# Patient Record
Sex: MALE | Race: WHITE | NOT HISPANIC OR LATINO | Employment: OTHER | URBAN - METROPOLITAN AREA
[De-identification: names, ages, dates, MRNs, and addresses within clinical notes are randomized per-mention and may not be internally consistent; named-entity substitution may affect disease eponyms.]

---

## 2017-03-24 ENCOUNTER — OFFICE VISIT (OUTPATIENT)
Dept: FAMILY MEDICINE | Facility: CLINIC | Age: 67
End: 2017-03-24
Payer: MEDICARE

## 2017-03-24 VITALS
BODY MASS INDEX: 33.12 KG/M2 | HEIGHT: 68 IN | WEIGHT: 218.5 LBS | DIASTOLIC BLOOD PRESSURE: 76 MMHG | HEART RATE: 93 BPM | SYSTOLIC BLOOD PRESSURE: 130 MMHG | OXYGEN SATURATION: 98 %

## 2017-03-24 DIAGNOSIS — E11.42 TYPE 2 DIABETES MELLITUS WITH DIABETIC POLYNEUROPATHY, WITHOUT LONG-TERM CURRENT USE OF INSULIN: ICD-10-CM

## 2017-03-24 DIAGNOSIS — E78.5 HYPERLIPIDEMIA ASSOCIATED WITH TYPE 2 DIABETES MELLITUS: ICD-10-CM

## 2017-03-24 DIAGNOSIS — I15.2 HYPERTENSION ASSOCIATED WITH DIABETES: ICD-10-CM

## 2017-03-24 DIAGNOSIS — R09.89 PROMINENT AORTA: ICD-10-CM

## 2017-03-24 DIAGNOSIS — H43.11 VITREOUS HEMORRHAGE, RIGHT: ICD-10-CM

## 2017-03-24 DIAGNOSIS — E11.69 HYPERLIPIDEMIA ASSOCIATED WITH TYPE 2 DIABETES MELLITUS: ICD-10-CM

## 2017-03-24 DIAGNOSIS — Z00.00 ENCOUNTER FOR PREVENTIVE HEALTH EXAMINATION: Primary | ICD-10-CM

## 2017-03-24 DIAGNOSIS — Z13.5 SCREENING FOR GLAUCOMA: ICD-10-CM

## 2017-03-24 DIAGNOSIS — E11.59 HYPERTENSION ASSOCIATED WITH DIABETES: ICD-10-CM

## 2017-03-24 DIAGNOSIS — E03.9 HYPOTHYROIDISM, UNSPECIFIED TYPE: ICD-10-CM

## 2017-03-24 DIAGNOSIS — Z23 NEED FOR VACCINATION AGAINST STREPTOCOCCUS PNEUMONIAE: ICD-10-CM

## 2017-03-24 DIAGNOSIS — Z12.5 ENCOUNTER FOR PROSTATE CANCER SCREENING: ICD-10-CM

## 2017-03-24 DIAGNOSIS — Z11.59 NEED FOR HEPATITIS C SCREENING TEST: ICD-10-CM

## 2017-03-24 PROCEDURE — 90670 PCV13 VACCINE IM: CPT | Mod: S$GLB,,, | Performed by: NURSE PRACTITIONER

## 2017-03-24 PROCEDURE — 99999 PR PBB SHADOW E&M-EST. PATIENT-LVL IV: CPT | Mod: PBBFAC,,, | Performed by: NURSE PRACTITIONER

## 2017-03-24 PROCEDURE — 3075F SYST BP GE 130 - 139MM HG: CPT | Mod: S$GLB,,, | Performed by: NURSE PRACTITIONER

## 2017-03-24 PROCEDURE — 3078F DIAST BP <80 MM HG: CPT | Mod: S$GLB,,, | Performed by: NURSE PRACTITIONER

## 2017-03-24 PROCEDURE — G0439 PPPS, SUBSEQ VISIT: HCPCS | Mod: S$GLB,,, | Performed by: NURSE PRACTITIONER

## 2017-03-24 PROCEDURE — 99499 UNLISTED E&M SERVICE: CPT | Mod: S$GLB,,, | Performed by: NURSE PRACTITIONER

## 2017-03-24 PROCEDURE — G0009 ADMIN PNEUMOCOCCAL VACCINE: HCPCS | Mod: S$GLB,,, | Performed by: NURSE PRACTITIONER

## 2017-03-24 NOTE — PATIENT INSTRUCTIONS
Counseling and Referral of Other Preventative  (Italic type indicates deductible and co-insurance are waived)    Patient Name: Cesar Simpson  Today's Date: 3/24/2017      SERVICE LIMITATIONS RECOMMENDATION    Vaccines    · Pneumococcal (once after 65)    · Influenza (annually)    · Hepatitis B (if medium/high risk)    · Prevnar 13      Hepatitis B medium/high risk factors:       - End-stage renal disease       - Hemophiliacs who received Factor VII or         IX concentrates       - Clients of institutions for the mentally             retarded       - Persons who live in the same house as          a HepB carrier       - Homosexual men       - Illicit injectable drug abusers     Pneumococcal: Done, no repeat necessary     Influenza: Done, repeat in one year     Hepatitis B: N/A     Prevnar 13: Scheduled - see appointments    Prostate cancer screening (annually to age 75)     Prostate specific antigen (PSA) Shared decision making with Provider. Sometimes a co-pay may be required if the patient decides to have this test. The USPSTF no longer recommends prostate cancer screening routinely in medicine: every 1 year    Colorectal cancer screening (to age 75)    · Fecal occult blood test (annual)  · Flexible sigmoidoscopy (5y)  · Screening colonoscopy (10y)  · Barium enema   Last done 01/25/08, recommend to repeat every 10  years    Diabetes self-management training (no USPSTF recommendations)  Requires referral by treating physician for patient with diabetes or renal disease. 10 hours of initial DSMT sessions of no less than 30 minutes each in a continuous 12-month period. 2 hours of follow-up DSMT in subsequent years.  N/A    Glaucoma screening (no USPSTF recommendation)  Diabetes mellitus, family history   , age 50 or over    American, age 65 or over  Scheduled, see appointments    Medical nutrition therapy for diabetes or renal disease (no recommended schedule)  Requires referral by treating  physician for patient with diabetes or renal disease or kidney transplant within the past 3 years.  Can be provided in same year as diabetes self-management training (DSMT), and CMS recommends medical nutrition therapy take place after DSMT. Up to 3 hours for initial year and 2 hours in subsequent years.  N/A    Cardiovascular screening blood tests (every 5 years)  · Fasting lipid panel  Order as a panel if possible  Done this year, repeat every year    Diabetes screening tests (at least every 3 years, Medicare covers annually or at 6-month intervals for prediabetic patients)  · Fasting blood sugar (FBS) or glucose tolerance test (GTT)  Patient must be diagnosed with one of the following:       - Hypertension       - Dyslipidemia       - Obesity (BMI 30kg/m2)       - Previous elevated impaired FBS or GTT       ... or any two of the following:       - Overweight (BMI 25 but <30)       - Family history of diabetes       - Age 65 or older       - History of gestational diabetes or birth of baby weighing more than 9 pounds  Done this year, repeat every year    Abdominal aortic aneurysm screening (once)  · Sonogram   Limited to patients who meet one of the following criteria:       - Men who are 65-75 years old and have smoked more than 100 cigarette in their lifetime       - Anyone with a family history of abdominal aortic aneurysm       - Anyone recommended for screening by the USPSTF  N/A    HIV screening (annually for increased risk patients)  · HIV-1 and HIV-2 by EIA, or ELICEO, rapid antibody test or oral mucosa transudate  Patients must be at increased risk for HIV infection per USPSTF guidelines or pregnant. Tests covered annually for patient at increased risk or as requested by the patient. Pregnant patients may receive up to 3 tests during pregnancy.  Risks discussed, screening is not recommended    Smoking cessation counseling (up to 8 sessions per year)  Patients must be asymptomatic of tobacco-related  conditions to receive as a preventative service.  Never Smoker    Subsequent annual wellness visit  At least 12 months since last AWV  Return in one year     The following information is provided to all patients.  This information is to help you find resources for any of the problems found today that may be affecting your health:                Living healthy guide: www.[facility].[place].[facility]      Understanding Diabetes: www.diabetes.org      Eating healthy: www.cdc.gov/healthyweight      CDC home safety checklist: www.cdc.gov/steadi/patient.html      Agency on Aging: www.goea.[place].[facility]      Alcoholics anonymous (AA): www.aa.org      Physical Activity: www.kenneth.nih.gov/gr2tjoa      Tobacco use: www.quitwithusla.org   Bandaid applied, tolerated well, pt instructed to wait 15 minutes.

## 2017-03-24 NOTE — MR AVS SNAPSHOT
[facility]   [facility] [place] [address]  Phone: [phone]  Fax: [phone]                  Cesar Simpson   3/24/2017 2:00 PM   Office Visit    Description:  Male : 1950   Provider:  Judy Sanz NP   Department:  [facility]           Reason for Visit     Health Risk Assessment           Diagnoses this Visit        Comments    Encounter for preventive health examination    -  Primary     Type 2 diabetes mellitus with diabetic polyneuropathy, without long-term current use of insulin         Screening for glaucoma         Need for hepatitis C screening test         Encounter for prostate cancer screening         Need for vaccination against Streptococcus pneumoniae                To Do List           Future Appointments        Provider Department Dept Phone    3/27/2017 11:00 AM Emerson Kumar OD [place] [facility]ometry [phone]    2017 9:00 AM [facility], [facility] [phone]    2017 3:20 PM Ezio Murcia MD [facility] [phone]      Goals (5 Years of Data)     None      [facility]     [facility] On [facility] Nurse Care Line -  Assistance  Registered nurses in the [facility] provide clinical advisement, health education, appointment booking, and other advisory services.  Call for this free service at [phone].             Medications           Message regarding Medications     Verify the changes and/or additions to your medication regime listed below are the same as discussed with your clinician today.  If any of these changes or additions are incorrect, please notify your healthcare provider.             Verify that the below list of medications is an accurate representation of the medications you are currently taking.  If none reported, the list may be blank. If incorrect, please contact your healthcare provider. Carry this list with you in case of emergency.          "  Current Medications     CONTOUR TEST STRIPS Strp USE TO TEST TWICE DAILY    levothyroxine (SYNTHROID) 75 MCG tablet Take 1 tablet (75 mcg total) by mouth before breakfast.    losartan (COZAAR) 100 MG tablet TAKE 1 TABLET ONE TIME DAILY    metformin (GLUCOPHAGE-XR) 500 MG 24 hr tablet Take 1 tablet (500 mg total) by mouth 2 (two) times daily.    MICROLET LANCET Misc AS DIRECTED TWICE DAILY    pravastatin (PRAVACHOL) 40 MG tablet Take 1 tablet (40 mg total) by mouth every evening.    zolpidem (AMBIEN) 5 MG Tab Take 1 tablet (5 mg total) by mouth nightly as needed.           Clinical Reference Information           Your Vitals Were     BP Pulse Height Weight SpO2 BMI    130/76 (BP Location: Left arm, Patient Position: Sitting, BP Method: Manual) 93 5' 7.5" (1.715 m) 99.1 kg (218 lb 7.6 oz) 98% 33.71 kg/m2      Blood Pressure          Most Recent Value    BP  130/76      Allergies as of 3/24/2017     No Known Allergies      Immunizations Administered on Date of Encounter - 3/24/2017     Name Date Dose VIS Date Route    Pneumococcal Conjugate - 13 Valent  Incomplete 0.5 mL 11/5/2015 Intramuscular      Orders Placed During Today's Visit      Normal Orders This Visit    Ambulatory referral to Optometry     PNEUMOCOCCAL CONJUGATE VACCINE 13-VALENT LESS THAN 4YO & GREATER THAN 51YO IM     Future Labs/Procedures Expected by Expires    Hepatitis C antibody  3/24/2017 5/23/2018    PSA, Screening  3/24/2017 5/23/2018      Instructions      Counseling and Referral of Other Preventative  (Italic type indicates deductible and co-insurance are waived)    Patient Name: Cesar Simpson  Today's Date: 3/24/2017      SERVICE LIMITATIONS RECOMMENDATION    Vaccines    · Pneumococcal (once after 65)    · Influenza (annually)    · Hepatitis B (if medium/high risk)    · Prevnar 13      Hepatitis B medium/high risk factors:       - End-stage renal disease       - Hemophiliacs who received Factor VII or         IX concentrates       - Clients " [facility] for the mentally             retarded       - Persons who live in the same house as          a HepB carrier       - Homosexual men       - Illicit injectable drug abusers     Pneumococcal: Done, no repeat necessary     Influenza: Done, repeat in one year     Hepatitis B: N/A     Prevnar 13: Scheduled - see appointments    Prostate cancer screening (annually to age 75)     Prostate specific antigen (PSA) Shared decision making with Provider. Sometimes a co-pay may be required if the patient decides to have this test. The USPSTF no longer recommends prostate cancer screening routinely in medicine: every 1 year    Colorectal cancer screening (to age 75)    · Fecal occult blood test (annual)  · Flexible sigmoidoscopy (5y)  · Screening colonoscopy (10y)  · Barium enema   Last done 01/25/08, recommend to repeat every 10  years    Diabetes self-management training (no USPSTF recommendations)  Requires referral by treating physician for patient with diabetes or renal disease. 10 hours of initial DSMT sessions of no less than 30 minutes each in a continuous 12-month period. 2 hours of follow-up DSMT in subsequent years.  N/A    Glaucoma screening (no USPSTF recommendation)  Diabetes mellitus, family history   , age 50 or over    American, age 65 or over  Scheduled, see appointments    Medical nutrition therapy for diabetes or renal disease (no recommended schedule)  Requires referral by treating physician for patient with diabetes or renal disease or kidney transplant within the past 3 years.  Can be provided in same year as diabetes self-management training (DSMT), and CMS recommends medical nutrition therapy take place after DSMT. Up to 3 hours for initial year and 2 hours in subsequent years.  N/A    Cardiovascular screening blood tests (every 5 years)  · Fasting lipid panel  Order as a panel if possible  Done this year, repeat every year    Diabetes screening tests (at least every  3 years, Medicare covers annually or at 6-month intervals for prediabetic patients)  · Fasting blood sugar (FBS) or glucose tolerance test (GTT)  Patient must be diagnosed with one of the following:       - Hypertension       - Dyslipidemia       - Obesity (BMI 30kg/m2)       - Previous elevated impaired FBS or GTT       ... or any two of the following:       - Overweight (BMI 25 but <30)       - Family history of diabetes       - Age 65 or older       - History of gestational diabetes or birth of baby weighing more than 9 pounds  Done this year, repeat every year    Abdominal aortic aneurysm screening (once)  · Sonogram   Limited to patients who meet one of the following criteria:       - Men who are 65-75 years old and have smoked more than 100 cigarette in their lifetime       - Anyone with a family history of abdominal aortic aneurysm       - Anyone recommended for screening by the USPSTF  N/A    HIV screening (annually for increased risk patients)  · HIV-1 and HIV-2 by EIA, or ELICEO, rapid antibody test or oral mucosa transudate  Patients must be at increased risk for HIV infection per USPSTF guidelines or pregnant. Tests covered annually for patient at increased risk or as requested by the patient. Pregnant patients may receive up to 3 tests during pregnancy.  Risks discussed, screening is not recommended    Smoking cessation counseling (up to 8 sessions per year)  Patients must be asymptomatic of tobacco-related conditions to receive as a preventative service.  Never Smoker    Subsequent annual wellness visit  At least 12 months since last AWV  Return in one year     The following information is provided to all patients.  This information is to help you find resources for any of the problems found today that may be affecting your health:                Living healthy guide: www.[facility].[place].gov      Understanding Diabetes: www.diabetes.org      Eating healthy: www.cdc.gov/healthyweight      CDC home safety  checklist: www.cdc.gov/steadi/patient.html      Agency on Aging: www.goea.[place].gov      Alcoholics anonymous (AA): www.aa.org      Physical Activity: www.kenneth.nih.gov/po7xbwe      Tobacco use: www.quitwithusla.org          Language Assistance Services     ATTENTION: Language assistance services are available, free of charge. Please call [phone].      ATENCIÓN: Si kendrickla cyndi, tiene a lim disposición servicios gratuitos de asistencia lingüística. Llame al [phone].     CHÚ Ý: N?u b?n nói Ti?ng Vi?t, có các d?ch v? h? tr? ngôn ng? mi?n phí dành cho b?n. G?i s? [phone].         [facility] complies with applicable Federal civil rights laws and does not discriminate on the basis of race, color, national origin, age, disability, or sex.

## 2017-03-24 NOTE — PROGRESS NOTES
"Cesar Simpson presented for a  Medicare AWV and comprehensive Health Risk Assessment today. The following components were reviewed and updated:    · Medical history  · Family History  · Social history  · Allergies and Current Medications  · Health Risk Assessment  · Health Maintenance  · Care Team     ** See Completed Assessments for Annual Wellness Visit within the encounter summary.**       The following assessments were completed:  · Living Situation  · CAGE  · Depression Screening  · Timed Get Up and Go  · Whisper Test  · Cognitive Function Screening  · Nutrition Screening  · ADL Screening  · PAQ Screening    Vitals:    03/24/17 1344   BP: 130/76   BP Location: Left arm   Patient Position: Sitting   BP Method: Manual   Pulse: 93   SpO2: 98%   Weight: 99.1 kg (218 lb 7.6 oz)   Height: 5' 7.5" (1.715 m)     Body mass index is 33.71 kg/(m^2).     Physical Exam   Constitutional: He is oriented to person, place, and time. He appears well-developed and well-nourished. No distress.   HENT:   Head: Normocephalic and atraumatic.   Eyes: EOM are normal. Pupils are equal, round, and reactive to light.   Neck: Neck supple. No JVD present. No tracheal deviation present.   Cardiovascular: Normal rate, regular rhythm, normal heart sounds and intact distal pulses.    No murmur heard.  Pulmonary/Chest: Effort normal and breath sounds normal. No respiratory distress. He has no wheezes. He has no rales.   Abdominal: Soft. Bowel sounds are normal. He exhibits no distension and no mass. There is no tenderness.   Musculoskeletal: Normal range of motion. He exhibits no edema or tenderness.   Neurological: He is alert and oriented to person, place, and time. Coordination normal.   Skin: Skin is warm and dry. No erythema. No pallor.   Psychiatric: He has a normal mood and affect. His behavior is normal. Judgment and thought content normal. Cognition and memory are normal. He expresses no homicidal and no suicidal ideation.   Nursing note " and vitals reviewed.        Diagnoses and health risks identified today and associated recommendations/orders:    1. Encounter for preventive health examination    2. Type 2 diabetes mellitus with diabetic polyneuropathy, without long-term current use of insulin  Chronic; stable on medication.  Followed by PCP.    3. Hypertension associated with diabetes  Chronic; stable on medication.  Followed by PCP.    4. Hyperlipidemia associated with type 2 diabetes mellitus  Chronic; stable on medication.  Followed by PCP.    5. Prominent aorta  Chronic; stable.  Seen on imaging dated 08/13/10.  Followed by PCP.    6. Hypothyroidism, unspecified type  Chronic; stable on medication.  Followed by PCP.    7. Vitreous hemorrhage, right  Chronic; stable.  Appt with Optometry scheduled.    8. Screening for glaucoma  - Ambulatory referral to Optometry    9. Encounter for prostate cancer screening  - PSA, Screening; Future    10. Need for hepatitis C screening test  - Hepatitis C antibody; Future    11. Need for vaccination against Streptococcus pneumoniae  Prevnar 13 vaccination administered today in clinic.  - PNEUMOCOCCAL CONJUGATE VACCINE 13-VALENT LESS THAN 6YO & GREATER THAN 49YO IM      Provided Cesar with a 5-10 year written screening schedule and personal prevention plan. Recommendations were developed using the USPSTF age appropriate recommendations. Education, counseling, and referrals were provided as needed. After Visit Summary printed and given to patient which includes a list of additional screenings\tests needed.    Return in 4 weeks (on 4/21/2017) for follow-up with PCP, HRA visit in 1 year.    Judy Sanz NP

## 2017-03-24 NOTE — Clinical Note
Primary Care Providers: Ezio Murcia MD, MD (General)  Your patient was seen today for a HRA visit. Gap(s) in care (HEDIS gaps) have been identified during this visit that require additional testing and possible follow up.  Orders Placed This Encounter     PNEUMOCOCCAL CONJUGATE VACCINE 13-VALENT LESS THAN 6YO & GREATER THAN 51YO IM     Hepatitis C antibody         Standing Status: Future         Standing Expiration Date: 5/23/2018     PSA, Screening         Standing Status: Future         Standing Expiration Date: 5/23/2018     Ambulatory referral to Optometry         Referral Priority:Routine         Referral Type:Vision (Optometry)         Referral Reason:Specialty Services Required         Requested Specialty:Optometry         Number of Visits Requested:1   These orders were placed using [facility] approved protocol and any results will be forwarded to your office for appropriate follow up. I have included a copy of my visit note; please review the note and feel free to contac

## 2017-03-27 ENCOUNTER — OFFICE VISIT (OUTPATIENT)
Dept: OPTOMETRY | Facility: CLINIC | Age: 67
End: 2017-03-27
Payer: MEDICARE

## 2017-03-27 DIAGNOSIS — Z98.890 HISTORY OF REFRACTIVE SURGERY: ICD-10-CM

## 2017-03-27 DIAGNOSIS — H52.7 REFRACTIVE ERROR: ICD-10-CM

## 2017-03-27 DIAGNOSIS — E11.9 TYPE 2 DIABETES MELLITUS WITHOUT RETINOPATHY: Primary | ICD-10-CM

## 2017-03-27 DIAGNOSIS — Z96.1 PSEUDOPHAKIA OF BOTH EYES: ICD-10-CM

## 2017-03-27 DIAGNOSIS — H43.393 FLOATER, VITREOUS, BILATERAL: ICD-10-CM

## 2017-03-27 PROCEDURE — 99999 PR PBB SHADOW E&M-EST. PATIENT-LVL II: CPT | Mod: PBBFAC,,, | Performed by: OPTOMETRIST

## 2017-03-27 PROCEDURE — 92014 COMPRE OPH EXAM EST PT 1/>: CPT | Mod: S$GLB,,, | Performed by: OPTOMETRIST

## 2017-03-27 PROCEDURE — 92015 DETERMINE REFRACTIVE STATE: CPT | Mod: S$GLB,,, | Performed by: OPTOMETRIST

## 2017-03-27 PROCEDURE — 99499 UNLISTED E&M SERVICE: CPT | Mod: S$GLB,,, | Performed by: OPTOMETRIST

## 2017-03-27 NOTE — LETTER
March 27, 2017      Judy Sanz NP  [address] [place] [address]           [facility]  [address]  [place] [address]  Phone: [phone]  Fax: [phone]          Patient: Cesar Simpson   MR Number: [id number]   YOB: 1950   Date of Visit: 3/27/2017       Dear Judy Sanz:    Thank you for referring Cesar Simpson to me for evaluation. Attached you will find relevant portions of my assessment and plan of care.    If you have questions, please do not hesitate to call me. I look forward to following Cesar Simpson along with you.    Sincerely,    Emerson Kumar, OD    Enclosure  CC:  No Recipients    If you would like to receive this communication electronically, please contact [email or web address] or [phone] to request more information on [facility] access.    For providers and/or their staff who would like to refer a patient to [facility], please contact us through our one-stop-shop provider referral line, Jon Smith, at [phone].    If you feel you have received this communication in error or would no longer like to receive these types of communications, please e-mail [email or web address]

## 2017-03-27 NOTE — PROGRESS NOTES
HPI     OD vision seems worse. Diabetic. CE/IOL ou 6-7 yrs ago. Radial   keratometry ou about 20 yrs ago.  Blur od at dist, x mos, no assoc pain or red, no relief over time,   constant       Last edited by Emerson Kumar, OD on 3/27/2017 12:13 PM.     ROS     Negative for: Constitutional, Gastrointestinal, Neurological, Skin,   Genitourinary, Musculoskeletal, HENT, Endocrine, Cardiovascular, Eyes,   Respiratory, Psychiatric, Allergic/Imm, Heme/Lymph    Last edited by Emerson Kumar, OD on 3/27/2017 11:59 AM. (History)        Assessment /Plan     For exam results, see Encounter Report.    Type 2 diabetes mellitus without retinopathy    Floater, vitreous, bilateral    History of refractive surgery    Refractive error    Pseudophakia of both eyes      1. No diabetic retinopathy, no csme. Return in 1 year for dilated eye exam.  2. Monitor condition. Patient to report any changes. RTC 1 year recheck.  3. S/p rk with scarring OD.  4. Spec Rx given. Different lens options discussed with patient. RTC 1 year full exam.  5. Monitor condition. Patient to report any changes. RTC 1 year recheck.

## 2017-04-12 ENCOUNTER — LAB VISIT (OUTPATIENT)
Dept: LAB | Facility: HOSPITAL | Age: 67
End: 2017-04-12
Attending: FAMILY MEDICINE
Payer: MEDICARE

## 2017-04-12 DIAGNOSIS — E11.9 TYPE 2 DIABETES MELLITUS WITHOUT COMPLICATION: ICD-10-CM

## 2017-04-12 DIAGNOSIS — Z12.5 ENCOUNTER FOR PROSTATE CANCER SCREENING: ICD-10-CM

## 2017-04-12 DIAGNOSIS — Z11.59 NEED FOR HEPATITIS C SCREENING TEST: ICD-10-CM

## 2017-04-12 LAB
CHOLEST/HDLC SERPL: 4.4 {RATIO}
COMPLEXED PSA SERPL-MCNC: 1.3 NG/ML
HDL/CHOLESTEROL RATIO: 22.6 %
HDLC SERPL-MCNC: 155 MG/DL
HDLC SERPL-MCNC: 35 MG/DL
LDLC SERPL CALC-MCNC: 96.2 MG/DL
NONHDLC SERPL-MCNC: 120 MG/DL
TRIGL SERPL-MCNC: 119 MG/DL

## 2017-04-12 PROCEDURE — 80061 LIPID PANEL: CPT

## 2017-04-12 PROCEDURE — 84153 ASSAY OF PSA TOTAL: CPT

## 2017-04-12 PROCEDURE — 86803 HEPATITIS C AB TEST: CPT

## 2017-04-12 PROCEDURE — 36415 COLL VENOUS BLD VENIPUNCTURE: CPT | Mod: PO

## 2017-04-12 PROCEDURE — 83036 HEMOGLOBIN GLYCOSYLATED A1C: CPT

## 2017-04-13 LAB
ESTIMATED AVG GLUCOSE: 131 MG/DL
HBA1C MFR BLD HPLC: 6.2 %
HCV AB SERPL QL IA: NEGATIVE

## 2017-04-21 ENCOUNTER — OFFICE VISIT (OUTPATIENT)
Dept: FAMILY MEDICINE | Facility: CLINIC | Age: 67
End: 2017-04-21
Payer: MEDICARE

## 2017-04-21 ENCOUNTER — LAB VISIT (OUTPATIENT)
Dept: LAB | Facility: HOSPITAL | Age: 67
End: 2017-04-21
Attending: FAMILY MEDICINE
Payer: MEDICARE

## 2017-04-21 VITALS
OXYGEN SATURATION: 95 % | BODY MASS INDEX: 32.77 KG/M2 | SYSTOLIC BLOOD PRESSURE: 118 MMHG | WEIGHT: 216.25 LBS | DIASTOLIC BLOOD PRESSURE: 76 MMHG | HEART RATE: 105 BPM | HEIGHT: 68 IN

## 2017-04-21 DIAGNOSIS — E11.42 TYPE 2 DIABETES MELLITUS WITH DIABETIC POLYNEUROPATHY, WITHOUT LONG-TERM CURRENT USE OF INSULIN: Primary | ICD-10-CM

## 2017-04-21 DIAGNOSIS — E11.59 HYPERTENSION ASSOCIATED WITH DIABETES: ICD-10-CM

## 2017-04-21 DIAGNOSIS — E03.9 HYPOTHYROIDISM, UNSPECIFIED TYPE: ICD-10-CM

## 2017-04-21 DIAGNOSIS — R82.90 FOUL SMELLING URINE: ICD-10-CM

## 2017-04-21 DIAGNOSIS — I15.2 HYPERTENSION ASSOCIATED WITH DIABETES: ICD-10-CM

## 2017-04-21 DIAGNOSIS — E78.6 LOW HDL (UNDER 40): ICD-10-CM

## 2017-04-21 LAB
BACTERIA #/AREA URNS AUTO: ABNORMAL /HPF
BILIRUB UR QL STRIP: NEGATIVE
CLARITY UR REFRACT.AUTO: ABNORMAL
COLOR UR AUTO: ABNORMAL
GLUCOSE UR QL STRIP: NEGATIVE
HGB UR QL STRIP: ABNORMAL
KETONES UR QL STRIP: NEGATIVE
LEUKOCYTE ESTERASE UR QL STRIP: ABNORMAL
MICROSCOPIC COMMENT: ABNORMAL
NITRITE UR QL STRIP: NEGATIVE
PH UR STRIP: 5 [PH] (ref 5–8)
PROT UR QL STRIP: NEGATIVE
RBC #/AREA URNS AUTO: 1 /HPF (ref 0–4)
SP GR UR STRIP: 1.02 (ref 1–1.03)
SQUAMOUS #/AREA URNS AUTO: 0 /HPF
TSH SERPL DL<=0.005 MIU/L-ACNC: 3.73 UIU/ML
URN SPEC COLLECT METH UR: ABNORMAL
UROBILINOGEN UR STRIP-ACNC: NEGATIVE EU/DL
WBC #/AREA URNS AUTO: 14 /HPF (ref 0–5)

## 2017-04-21 PROCEDURE — 84443 ASSAY THYROID STIM HORMONE: CPT

## 2017-04-21 PROCEDURE — 36415 COLL VENOUS BLD VENIPUNCTURE: CPT | Mod: PO

## 2017-04-21 PROCEDURE — 99214 OFFICE O/P EST MOD 30 MIN: CPT | Mod: S$GLB,,, | Performed by: FAMILY MEDICINE

## 2017-04-21 PROCEDURE — 1159F MED LIST DOCD IN RCRD: CPT | Mod: S$GLB,,, | Performed by: FAMILY MEDICINE

## 2017-04-21 PROCEDURE — 3078F DIAST BP <80 MM HG: CPT | Mod: S$GLB,,, | Performed by: FAMILY MEDICINE

## 2017-04-21 PROCEDURE — 1125F AMNT PAIN NOTED PAIN PRSNT: CPT | Mod: S$GLB,,, | Performed by: FAMILY MEDICINE

## 2017-04-21 PROCEDURE — 4010F ACE/ARB THERAPY RXD/TAKEN: CPT | Mod: S$GLB,,, | Performed by: FAMILY MEDICINE

## 2017-04-21 PROCEDURE — 3044F HG A1C LEVEL LT 7.0%: CPT | Mod: S$GLB,,, | Performed by: FAMILY MEDICINE

## 2017-04-21 PROCEDURE — 1160F RVW MEDS BY RX/DR IN RCRD: CPT | Mod: S$GLB,,, | Performed by: FAMILY MEDICINE

## 2017-04-21 PROCEDURE — 99999 PR PBB SHADOW E&M-EST. PATIENT-LVL III: CPT | Mod: PBBFAC,,, | Performed by: FAMILY MEDICINE

## 2017-04-21 PROCEDURE — 3074F SYST BP LT 130 MM HG: CPT | Mod: S$GLB,,, | Performed by: FAMILY MEDICINE

## 2017-04-21 NOTE — MR AVS SNAPSHOT
[facility]   [place]  [address] [address]  Phone: [phone]  Fax: [phone]                  Cesar Simpson   2017 3:20 PM   Office Visit    Description:  Male : 1950   Provider:  Ezio Murcia MD   Department:  [facility]           Reason for Visit     Follow-up           Diagnoses this Visit        Comments    Type 2 diabetes mellitus with diabetic polyneuropathy, without long-term current use of insulin    -  Primary     Hypertension associated with diabetes         Foul smelling urine         BMI 33.0-33.9,adult         Hypothyroidism, unspecified type         Low HDL (under 40)                To Do List           Future Appointments        Provider Department Dept Phone    2017 4:00 PM [facility], [facility] [phone]      Goals (5 Years of Data)     None      Follow-Up and Disposition     Return in about 6 months (around 10/21/2017), or if symptoms worsen or fail to improve.      [facility]     [facility] Care [facility] -  Assistance  Unless otherwise directed by your provider, please contact [facility], our nurse care line that is available for  assistance.     Registered nurses in the [facility] provide: appointment scheduling, clinical advisement, health education, and other advisory services.  Call: [phone] (toll free)               Medications           Message regarding Medications     Verify the changes and/or additions to your medication regime listed below are the same as discussed with your clinician today.  If any of these changes or additions are incorrect, please notify your healthcare provider.             Verify that the below list of medications is an accurate representation of the medications you are currently taking.  If none reported, the list may be blank. If incorrect, please contact your healthcare provider. Carry this list with you in case of  "emergency.           Current Medications     CONTOUR TEST STRIPS Strp USE TO TEST TWICE DAILY    levothyroxine (SYNTHROID) 75 MCG tablet Take 1 tablet (75 mcg total) by mouth before breakfast.    losartan (COZAAR) 100 MG tablet TAKE 1 TABLET ONE TIME DAILY    metformin (GLUCOPHAGE-XR) 500 MG 24 hr tablet Take 1 tablet (500 mg total) by mouth 2 (two) times daily.    MICROLET LANCET Misc AS DIRECTED TWICE DAILY    pravastatin (PRAVACHOL) 40 MG tablet Take 1 tablet (40 mg total) by mouth every evening.           Clinical Reference Information           Your Vitals Were     BP Pulse Height Weight SpO2 BMI    118/76 (BP Location: Right arm, Patient Position: Sitting, BP Method: Manual) 105 5' 7.5" (1.715 m) 98.1 kg (216 lb 4.3 oz) 95% 33.37 kg/m2      Blood Pressure          Most Recent Value    BP  118/76      Allergies as of 4/21/2017     No Known Allergies      Immunizations Administered on Date of Encounter - 4/21/2017     None      Orders Placed During Today's Visit      Normal Orders This Visit    Urinalysis     Urine culture     Future Labs/Procedures Expected by Expires    TSH  4/21/2017 7/20/2017      Instructions      Comidas sanas para la diabetes    Pídale a lim equipo de atención médica que le ayude a preparar un plan de comidas adecuado a debra necesidades. [place] plan establecerá el horario de debra comidas, el tipo de alimentos que debe comer y la cantidad de cada rich de ellos. No es necesario que deje de comer todas las cosas que le gustan, rojelio deberá seguir ciertas pautas.  Elija carbohidratos saludables  Los almidones, las azúcares y la fibra son todos tipos de carbohidratos. La fibra puede ayudarle a bajar lim colesterol y triglicéridos. También es un alimento saludable para el corazón. Usted debe consumir de 20 a 35 gramos de fibra total todos los días. Entre los alimentos ricos en fibra, se encuentran los siguientes:     · Pan y cereales integrales  · Rajani bulgur  · Arroz de la torre     · Pastas de rajani " "integral  · Frutas y verduras  · Frijoles y chícharos secos      Lleve la cuenta de los carbohidratos que consume. [place] puede ayudarle a mantener el equilibrio adecuado entre la actividad física y los medicamentos. La cantidad de carbohidratos necesarios varará para cada persona, dependiendo de muchas cosas bassem lim isaiah, los medicamentos que mann y qué tan activo se mantiene. Puede empezar con unos 45 a 60 gramos de carbohidratos por comida, dependiendo de lim situación.  Estos son algunos ejemplos de alimentos que contienen unos 15 gramos de carbohidratos (1 porción de carbohidratos):  · 1/2 taza de fruta enlatada o congelada  · Linda fruta pequeña (4 onzas)  · 1 rebanada de pan  · 1/2 taza de mack  · 1/3 de taza de arroz  · 4 a 6 galletas saladas  · 1/2 English muffin  · 1/2 taza de frijoles negros  · 1/4 de papa azada, demar (3 onzas)  · 2/3 de taza de yogur natural sin grasa  · 1 taza de sopa  · 1/2 taza de guizado  · 6 "nuggets" de awilda  · 1 bownie de 2 pulgadas cuadradas o un pastel sin glasear  · 2 galletas dulces pequeñas  · 1/2 taza de helado o de sorbete  Escoja alimentos proteínicos sanos  Las proteínas bajas en grasa pueden ayudarle a controlar el peso y a mantener jose el corazón. Entre los alimentos proteínicos bajos en grasa, se encuentran los siguientes:  · Pescado  · Proteínas vegetales bassem las de los frijoles y chícharos secos, las nueces y los productos derivados de la soya, bassem el tofu y la leche de soya  · Carne magra a la que se le ha quitado toda la grasa visible  · Carne de ave sin la piel  · Leche, queso o yogur bajos en grasa o sin grasa  Limite las grasas no saludables y el azúcar  Las grasas saturadas y las grasas trans no son buenas para el corazón porque aumentan el colesterol LDL (madelaine). Además, la grasa tiene un alto contenido de calorías y le puede hacer ganar peso. Para reducir la cantidad de grasas malas y azúcar, limite el consumo de los siguientes alimentos:     · Mantequilla " "y margarina  · Aceite de romero o de deborah  · Crema o ranav  · Queso  · Tocino  · Dylon frías     · Helados  · Dulces, pasteles, madalenas y donas  · Mermeladas y gelatinas  · Chocolatinas  · Refrescos azucarados   Cuánto debe comer  La cantidad de comida que ingiere afecta el nivel de azúcar en lim sherrie y lim peso. Lim equipo de atención médica le dirá cuánto debe comer de cada clase de alimentos.  · Utilice tazas y cucharas de medir, y clarence balanza de cocina para medir las porciones.  · Aprenda a identificar visualmente el tamaño correcto de clarence porción en lim plato. [place] le será útil cuando coma fuera de casa y no pueda medir las porciones.  · Coma solamente el número de porciones que le hayan dado en lim plan de comidas para cada tipo de alimento. No se sirva por segunda vez.  · Aprenda a leer las etiquetas de los alimentos. Asegúrese de saber cuánto es clarence porción, y la cantidad total de carbohidratos, fibra, calorías, azúcar y grasa saturada y trans que tiene. Busque alternativas más saludables para los alimentos que tengan azúcar añadida.  · Planifique de antemano para las fiestas de manera que aún pueda divertirse sin sobrepasarse con alimentos poco saludables. Dé un buen ejemplo trayendo un platillo saudable a los "potlucks".  Elija bocadillos saludables  Cuando se habla de bocadillos se piensa en alimentos con azúcar añadida y grasas, rojelio hay otras opciones de bocadillos que son más saludables. Estas son algunas ideas que puede elegir:  Bocadillos con menos de 5 gramos de carbohidratos  · Clarence unidad de queso en tiras (string cheese)  · Larry ramas de apio con 1 cucharada de mantequilla de cacahuate  · 5 tomates tipo "cherry" con 1 cucharada de aderezo tipo "ranch"  · 1 huevo matty  · 1/4 de taza de arándanos azules (blue berries)  · 5 zanahorias "baby"  · 1 taza de palomitas de maiz "light"  · 1/2 taza de gelatina sin azúcar  · 15 almendras  Bocadillos con entre 10 y 20 gramos de carbohidratos " "aproximadamente  · 1/3 de taza de "hummus" con trozos de vegetales sin almidon (zanahorias, pimientos verdes, brócoli, apio o clarence combinación)  · 1/2 taza de fruta fresca o enlatada con 1/4 de taza de requezón (cottage cheese)  · 1/2 taza de ensalada de atún con 4 galletas saladas  · 2 pasteles de arroz (rice cakes) y clarence cucharada de mantequilla de cacahuate  · 1 manzana o 1 naranja pequeñas  · 3 tazas de palomitas de maíz "light"  · 1/2 sándwich de pavo que tenga clarence rebanada de pan de rajani integral, 2 onzas de pavo y mostaza  El tamaño de las porciones es importante para controlar el azúcar en la sherrie y mantener un peso saludable. Aprovisiónese de bocadillos saludables para que los tenga siempre a mano.  Cuándo debe comer  Lim plan de comidas probablemente incluirá desayuno, almuerzo, eloy y algunos bocadillos entre comidas.  · Intente que debra comidas y bocadillos ester a la misma hora todos los días.  · Coma todas debra comidas y bocadillos. Saltarse clarence comida o bocadillo puede hacer que baje demasiado el nivel de azúcar en lim sherrie y provocar que coma excesivamente más tarde, con lo que el nivel de azúcar en lim sherrie podría elevarse demasiado.  Date Last Reviewed: 3/31/2014  © 0137-2304 [employer]. [address] [place] [address], [place] [address]. Todos los derechos reservados. Esta información no pretende sustituir la atención médica profesional. Sólo lim médico puede diagnosticar y tratar un problema de isaiah.             Language Assistance Services     ATTENTION: Language assistance services are available, free of charge. Please call [phone].      ATENCIÓN: Si habla español, tiene a lim disposición servicios gratuitos de asistencia lingüística. Llame al [phone].     CHÚ Ý: N?u b?n nói Ti?ng Vi?t, có các d?ch v? h? tr? ngôn ng? mi?n phí dành cho b?n. G?i s? [phone].         [facility] complies with applicable Federal civil rights laws and does not discriminate on " the basis of race, color, national origin, age, disability, or sex.

## 2017-04-21 NOTE — PATIENT INSTRUCTIONS
"  Comidas sanas para la diabetes    Pídale a lim equipo de atención médica que le ayude a preparar un plan de comidas adecuado a debra necesidades. [place] plan establecerá el horario de debra comidas, el tipo de alimentos que debe comer y la cantidad de cada rich de ellos. No es necesario que deje de comer todas las cosas que le gustan, rojelio deberá seguir ciertas pautas.  Elija carbohidratos saludables  Los almidones, las azúcares y la fibra son todos tipos de carbohidratos. La fibra puede ayudarle a bajar lim colesterol y triglicéridos. También es un alimento saludable para el corazón. Usted debe consumir de 20 a 35 gramos de fibra total todos los días. Entre los alimentos ricos en fibra, se encuentran los siguientes:     · Pan y cereales integrales  · Rajani bulgur  · Arroz de la torre     · Pastas de rajani integral  · Frutas y verduras  · Frijoles y chícharos secos      Lleve la cuenta de los carbohidratos que consume. [place] puede ayudarle a mantener el equilibrio adecuado entre la actividad física y los medicamentos. La cantidad de carbohidratos necesarios varará para cada persona, dependiendo de muchas cosas bassem lim isaiah, los medicamentos que mann y qué tan activo se mantiene. Puede empezar con unos 45 a 60 gramos de carbohidratos por comida, dependiendo de lim situación.  Estos son algunos ejemplos de alimentos que contienen unos 15 gramos de carbohidratos (1 porción de carbohidratos):  · 1/2 taza de fruta enlatada o congelada  · Linda fruta pequeña (4 onzas)  · 1 rebanada de pan  · 1/2 taza de mack  · 1/3 de taza de arroz  · 4 a 6 galletas saladas  · 1/2 English muffin  · 1/2 taza de frijoles negros  · 1/4 de papa azada, demar (3 onzas)  · 2/3 de taza de yogur natural sin grasa  · 1 taza de sopa  · 1/2 taza de guizado  · 6 "nuggets" de awilda  · 1 bownie de 2 pulgadas cuadradas o un pastel sin glasear  · 2 galletas dulces pequeñas  · 1/2 taza de helado o de sorbete  Escoja alimentos proteínicos sanos  Las proteínas bajas en " grasa pueden ayudarle a controlar el peso y a mantener jose el corazón. Entre los alimentos proteínicos bajos en grasa, se encuentran los siguientes:  · Pescado  · Proteínas vegetales bassem las de los frijoles y chícharos secos, las nueces y los productos derivados de la soya, bassem el tofu y la leche de soya  · Carne magra a la que se le ha quitado toda la grasa visible  · Carne de ave sin la piel  · Leche, queso o yogur bajos en grasa o sin grasa  Limite las grasas no saludables y el azúcar  Las grasas saturadas y las grasas trans no son buenas para el corazón porque aumentan el colesterol LDL (madelaine). Además, la grasa tiene un alto contenido de calorías y le puede hacer ganar peso. Para reducir la cantidad de grasas malas y azúcar, limite el consumo de los siguientes alimentos:     · Mantequilla y margarina  · Aceite de romero o de deborah  · Crema o arnav  · Queso  · Tocino  · Dylon frías     · Helados  · Dulces, pasteles, madalenas y donas  · Mermeladas y gelatinas  · Chocolatinas  · Refrescos azucarados   Cuánto debe comer  La cantidad de comida que ingiere afecta el nivel de azúcar en lim sherrie y lim peso. Lim equipo de atención médica le dirá cuánto debe comer de cada clase de alimentos.  · Utilice tazas y cucharas de medir, y clarence balanza de cocina para medir las porciones.  · Aprenda a identificar visualmente el tamaño correcto de clarence porción en lim plato. [place] le será útil cuando coma fuera de casa y no pueda medir las porciones.  · Coma solamente el número de porciones que le hayan dado en lim plan de comidas para cada tipo de alimento. No se sirva por segunda vez.  · Aprenda a leer las etiquetas de los alimentos. Asegúrese de saber cuánto es clarence porción, y la cantidad total de carbohidratos, fibra, calorías, azúcar y grasa saturada y trans que tiene. Busque alternativas más saludables para los alimentos que tengan azúcar añadida.  · Planifique de antemano para las fiestas de manera que aún pueda divertirse sin  "sobrepasarse con alimentos poco saludables. Dé un buen ejemplo trayendo un platillo saudable a los "potlucks".  Elija bocadillos saludables  Cuando se habla de bocadillos se piensa en alimentos con azúcar añadida y grasas, rojelio hay otras opciones de bocadillos que son más saludables. Estas son algunas ideas que puede elegir:  Bocadillos con menos de 5 gramos de carbohidratos  · Clarence unidad de queso en tiras (string cheese)  · Larry ramas de apio con 1 cucharada de mantequilla de cacahuate  · 5 tomates tipo "cherry" con 1 cucharada de aderezo tipo "ranch"  · 1 huevo matty  · 1/4 de taza de arándanos azules (blue berries)  · 5 zanahorias "baby"  · 1 taza de palomitas de maiz "light"  · 1/2 taza de gelatina sin azúcar  · 15 almendras  Bocadillos con entre 10 y 20 gramos de carbohidratos aproximadamente  · 1/3 de taza de "hummus" con trozos de vegetales sin almidon (zanahorias, pimientos verdes, brócoli, apio o clarence combinación)  · 1/2 taza de fruta fresca o enlatada con 1/4 de taza de requezón (cottage cheese)  · 1/2 taza de ensalada de atún con 4 galletas saladas  · 2 pasteles de arroz (rice cakes) y clarence cucharada de mantequilla de cacahuate  · 1 manzana o 1 naranja pequeñas  · 3 tazas de palomitas de maíz "light"  · 1/2 sándwich de pavo que tenga clarence rebanada de pan de rajani integral, 2 onzas de pavo y mostaza  El tamaño de las porciones es importante para controlar el azúcar en la sherrie y mantener un peso saludable. Aprovisiónese de bocadillos saludables para que los tenga siempre a mano.  Cuándo debe comer  Lim plan de comidas probablemente incluirá desayuno, almuerzo, eloy y algunos bocadillos entre comidas.  · Intente que debra comidas y bocadillos ester a la misma hora todos los días.  · Coma todas debra comidas y bocadillos. Saltarse clarence comida o bocadillo puede hacer que baje demasiado el nivel de azúcar en lim sherrie y provocar que coma excesivamente más tarde, con lo que el nivel de azúcar en lim sherrie podría elevarse " demasiado.  Date Last Reviewed: 3/31/2014  © 6172-2246 [employer]. [address] [place] [address], [place] [address]. Todos los derechos reservados. Esta información no pretende sustituir la atención médica profesional. Sólo lim médico puede diagnosticar y tratar un problema de isaiah.

## 2017-04-21 NOTE — PROGRESS NOTES
Subjective:       Patient ID: Cesar Simpson is a 67 y.o. male.    Chief Complaint: Follow-up    HPI Comments: 67 years old male who came to the clinic for diabetes check.  Last A1c was stable.  No polyuria polydipsia or polyphagia.  Blood pressure today is stable.  No chest pain palpitations orthopnea or PND.  Patient is trying to increase his physical activity to improve his HDL.  Last BMI was 33 and he is currently trying to lose weight.  Patient reports foul-smelling urine.    Review of Systems   Constitutional: Negative.  Negative for chills and fever.   HENT: Negative.    Eyes: Negative.    Respiratory: Negative.    Cardiovascular: Negative.  Negative for chest pain, palpitations and leg swelling.   Gastrointestinal: Negative.    Endocrine: Negative for cold intolerance, heat intolerance, polydipsia, polyphagia and polyuria.   Genitourinary: Negative.  Negative for dysuria, frequency and urgency.   Musculoskeletal: Negative.    Skin: Negative.    Neurological: Negative.    Psychiatric/Behavioral: Negative.        Objective:      Physical Exam   Constitutional: He is oriented to person, place, and time. He appears well-developed and well-nourished. No distress.   HENT:   Head: Normocephalic and atraumatic.   Right Ear: External ear normal.   Left Ear: External ear normal.   Nose: Nose normal.   Mouth/Throat: Oropharynx is clear and moist. No oropharyngeal exudate.   Eyes: Conjunctivae and EOM are normal. Pupils are equal, round, and reactive to light. Right eye exhibits no discharge. Left eye exhibits no discharge. No scleral icterus.   Neck: Normal range of motion. Neck supple. No JVD present. No tracheal deviation present. No thyromegaly present.   Cardiovascular: Normal rate, regular rhythm, normal heart sounds and intact distal pulses.  Exam reveals no gallop and no friction rub.    No murmur heard.  Pulmonary/Chest: Effort normal and breath sounds normal. No stridor. No respiratory distress. He has no  wheezes. He has no rales. He exhibits no tenderness.   Abdominal: Soft. Bowel sounds are normal. He exhibits no distension and no mass. There is no tenderness. There is no rebound and no guarding.   Musculoskeletal: Normal range of motion. He exhibits no edema or tenderness.   Lymphadenopathy:     He has no cervical adenopathy.   Neurological: He is alert and oriented to person, place, and time. He has normal reflexes. He displays normal reflexes. No cranial nerve deficit. He exhibits normal muscle tone. Coordination normal.   Skin: Skin is warm and dry. No rash noted. He is not diaphoretic. No erythema. No pallor.   Psychiatric: He has a normal mood and affect. His behavior is normal. Judgment and thought content normal.   Nursing note and vitals reviewed.      Assessment:       1. Type 2 diabetes mellitus with diabetic polyneuropathy, without long-term current use of insulin    2. Hypertension associated with diabetes    3. Foul smelling urine    4. BMI 33.0-33.9,adult    5. Hypothyroidism, unspecified type    6. Low HDL (under 40)        Plan:     Cesar was seen today for follow-up.    Diagnoses and all orders for this visit:    Type 2 diabetes mellitus with diabetic polyneuropathy, without long-term current use of insulin    Hypertension associated with diabetes    Foul smelling urine  -     Urine culture  -     Urinalysis    BMI 33.0-33.9,adult    Hypothyroidism, unspecified type  -     TSH; Future    Low HDL (under 40)    Continue monitoring blood pressure at home, low sodium diet.    Continue monitoring blood sugar at home,ADA diet.   Diet and physical activity to promote weight loss.

## 2017-04-24 ENCOUNTER — TELEPHONE (OUTPATIENT)
Dept: FAMILY MEDICINE | Facility: CLINIC | Age: 67
End: 2017-04-24

## 2017-04-24 DIAGNOSIS — N39.0 URINARY TRACT INFECTION WITHOUT HEMATURIA, SITE UNSPECIFIED: Primary | ICD-10-CM

## 2017-04-24 LAB — BACTERIA UR CULT: NORMAL

## 2017-04-24 RX ORDER — CIPROFLOXACIN 500 MG/1
500 TABLET ORAL 2 TIMES DAILY
Qty: 14 TABLET | Refills: 0 | Status: SHIPPED | OUTPATIENT
Start: 2017-04-24 | End: 2017-05-04

## 2017-06-02 ENCOUNTER — PATIENT MESSAGE (OUTPATIENT)
Dept: ADMINISTRATIVE | Facility: OTHER | Age: 67
End: 2017-06-02

## 2017-06-07 ENCOUNTER — PATIENT MESSAGE (OUTPATIENT)
Dept: RESEARCH | Facility: HOSPITAL | Age: 67
End: 2017-06-07

## 2017-06-07 ENCOUNTER — PATIENT MESSAGE (OUTPATIENT)
Dept: ADMINISTRATIVE | Facility: OTHER | Age: 67
End: 2017-06-07

## 2017-06-19 ENCOUNTER — PATIENT OUTREACH (OUTPATIENT)
Dept: OTHER | Facility: OTHER | Age: 67
End: 2017-06-19
Payer: MEDICARE

## 2017-06-19 NOTE — PROGRESS NOTES
"Last 5 Patient Entered Readings                                                               Current 30 Day Average: 130/85     Recent Readings 6/19/2017 6/18/2017 6/18/2017 6/17/2017 6/16/2017    Systolic BP (mmHg) 135 136 139 132 113    Diastolic BP (mmHg) 88 91 87 97 80    Pulse 71 74 74 83 90      Initial introduction completed with the pt and the role of the health  was explained. Patient wears hearing aids.  We discussed the following information:  Exercise - Mr. Simpson walks daily. He reports that he gets about 8-12,000 steps daily.  Diet - Patient does not follow any specific diet, but tries to stay away from sweets and "flour". He reports that he does not add salt to any foods, but may be eating foods that are high in sodium. He will start to review food labels.    Mr. Simpson has been taking BP medication since he was 20 y.o. He would like to know if he should be cutting BP pill in half so he can take 1/2 in am and 1/2 in pm for better BP control.     Resources on diet and exercise were sent.     Pt aware that I am not available for emergencies and to call 911 or [facility] on call if one arises.  Pt aware of the importance of medication adherence.  Pt aware of the importance of diet and exercise.  Pt aware that his sodium intake should be no more than 2000mg per day.  Pt aware that the recommended physical activity each week should be about 30 minutes per day at least 5 times per week.   Pt aware of the importance of taking BP readings at least weekly if not more and during different times each day.  Pt aware that the health  can be used as a resource for lifestyle modifications to help reduce or maintain a healthy BP    "

## 2017-06-27 ENCOUNTER — PATIENT OUTREACH (OUTPATIENT)
Dept: OTHER | Facility: OTHER | Age: 67
End: 2017-06-27

## 2017-06-27 NOTE — PROGRESS NOTES
Last 5 Patient Entered Readings                                                               Current 30 Day Average: 126/86     Recent Readings 6/27/2017 6/27/2017 6/27/2017 6/27/2017 6/26/2017    Systolic BP (mmHg) 134 149 123 145 115    Diastolic BP (mmHg) 100 92 84 83 84    Pulse 69 63 61 65 93        Hypertension Medications             losartan (COZAAR) 100 MG tablet TAKE 1 TABLET ONE TIME DAILY - qam        Called patient to introduce him into the HDM (hypertension digital medicine) clinic.     Verified the following information with the patient:  1. Medications, medication adherence- patient states he is adherent.   2. Drug allergies    Screening questionnaires:  1. Depression- patient did not answer questionnaire  2. Sleep apnea- diagnosed, not on CPAP- patient did not see improvement in sleep.  Patient states he will start     Explained that our goal is to get his BP to consistently below 140/90mmHg.  Patient denies having further questions, concerns. BP is well controlled. Will continue to monitor, WCB in 4 weeks.

## 2017-06-27 NOTE — LETTER
"   Saira Puente, Jolynn  [address] [place] [address]     Dear Cesar Simpson,    Welcome to the [facility]!         My name is Saira Puente PharmD and I am your dedicated Digital Medicine clinician.  As an expert in medication management, I will help ensure that the medications you are taking continue to provide you with the intended benefits.      This is Suly Lion and she will be your health  for the duration of the program.  Her job is to help you identify lifestyle changes to improve your blood pressure control.  You will talk about nutrition, exercise, and other ways that you may be able to adjust your current habits to better your health. Together, we will work to improve your overall health and encourage you to meet your goals for a healthier lifestyle.    What we expect from YOU:    You will need to take blood pressure readings multiple times a week and no less than one reading per week.   It is important that you take your measurements at different times during the day, when possible.     What you should expect from your Digital Medicine Care Team:   We will provide you with education about high blood pressure, including lifestyle changes that could help you to control your blood pressure.   We will review your weekly readings and provide you with monthly blood pressure progress reports after you have been in the program for more than 30 days.   We will send monthly progress reports on your blood pressure control to your physician so they can follow along with your progress as well.    You will be able to reach me by phone at [phone] or through your [facility] account by clicking "Send a message to your doctor's office" on the home screen then selecting my name in the "To the office of:" field.     I look forward to working with you to achieve your blood pressure goals!    Sincerely,    Saira Puente PharmD  Your personal " clinician    Please visit [email or web address] to learn more about high blood pressure and what you can do lower your blood pressure.                                                                                         Cesar Simpson  [address] [place] [address]

## 2017-07-10 ENCOUNTER — PATIENT OUTREACH (OUTPATIENT)
Dept: OTHER | Facility: OTHER | Age: 67
End: 2017-07-10

## 2017-07-10 NOTE — PROGRESS NOTES
"Last 5 Patient Entered Redings Current 30 Day Average: 121/84     Recent Readings 7/10/2017 7/10/2017 7/10/2017 7/9/2017 7/9/2017    Systolic BP (mmHg) 125 131 124 105 90    Diastolic BP (mmHg) 85 88 93 72 55    Pulse 77 79 76 85 86        Follow up with Mr. Cesar Simpson completed. Mr. Simpson is doing well. Patient reports that all the salt that he eats is "hidden salt". Patient does not add salt to any foods. He has also increased physical activity. Patient did not have any further questions or concerns. I will follow up in a few weeks to see how he is doing and progressing.        "

## 2017-07-25 ENCOUNTER — PATIENT OUTREACH (OUTPATIENT)
Dept: OTHER | Facility: OTHER | Age: 67
End: 2017-07-25

## 2017-07-25 NOTE — PROGRESS NOTES
Last 5 Patient Entered Redings Current 30 Day Average: 117/82     Recent Readings 7/25/2017 7/25/2017 7/25/2017 7/25/2017 7/25/2017    Systolic BP (mmHg) 110 121 106 135 130    Diastolic BP (mmHg) 79 85 73 88 89    Pulse 64 63 63 66 72        Hypertension Medications             losartan (COZAAR) 100 MG tablet TAKE 1 TABLET ONE TIME DAILY        Plan:   Called patient to follow up. Per current 30 day average, BP is well controlled. Patient denies hypotensive s/sx. Patient denies having questions or concerns. Will continue to monitor. WCB in 4 months, sooner if BP begins to trend up or down.

## 2017-08-07 ENCOUNTER — PATIENT OUTREACH (OUTPATIENT)
Dept: OTHER | Facility: OTHER | Age: 67
End: 2017-08-07

## 2017-08-07 NOTE — PROGRESS NOTES
Last 5 Patient Entered Redings Current 30 Day Average: 119/82     Recent Readings 8/14/2017 8/14/2017 8/14/2017 8/14/2017 8/14/2017    Systolic BP (mmHg) 144 145 137 140 123    Diastolic BP (mmHg) 87 95 87 97 83    Pulse 70 77 63 63 63        Follow up with Mr. Cesar Simpson completed. Mr. Simpson is doing well. Patient states that he would like to see his diastolic BP average below 80. Patient reports that he doesn't add salt to anything, but may eat food with salt. He is cooking more at home to monitor his sodium intake. Patient is walking for exercise. Patient did not have any further questions or concerns. I will follow up in a few weeks to see how he is doing and progressing.

## 2017-09-12 ENCOUNTER — PATIENT OUTREACH (OUTPATIENT)
Dept: OTHER | Facility: OTHER | Age: 67
End: 2017-09-12

## 2017-09-12 NOTE — PROGRESS NOTES
"Last 5 Patient Entered Redings Current 30 Day Average: 112/78     Recent Readings 9/12/2017 9/12/2017 9/12/2017 9/11/2017 9/11/2017    Systolic BP (mmHg) 128 116 122 114 111    Diastolic BP (mmHg) 81 79 89 77 76    Pulse 65 72 64 81 73        Hypertension Digital Medicine Program (HDMP): Health  Follow Up    Lifestyle Modifications:    1. Low sodium diet: yes Patient reports that he is really trying to monitor his sodium intake. He is concerned about diastolic BP being "too high (85-90s)". Discussed foods to avoid that are high in sodium and tips to reduce salt when dining out.     2.Physical activity: yes Mr. Simpson is walking for exercise.    3.Hypotension/Hypertension symptoms: no   Frequency/Alleviating factors/Precipitating factors, etc.     4. Patient  has been been compliant with the medication regimen     Follow up with Mr. Cesar Simpson completed. Mr. Simpson is questioning the accuracy of his BP cuff. Patient reports that he went to donate blood a few weeks ago and his BP was 138/85. Last week he went to the health fair at [facility] and the nurse had to take three readings. His final reading was 140/90. Mr. Simpson believes that his digital cuff needs to be calibrated and compared to a manual reading for accuracy. Explained "white coat" and "masked" hypertension to Mr. Simpson. Also explained variations in BP can be normal. He will bring his cuff to [facility] to have it compared to a manual BP reading. No further questions or concerns. I will follow up in a few weeks to assess progress.         "

## 2017-10-11 ENCOUNTER — PATIENT OUTREACH (OUTPATIENT)
Dept: OTHER | Facility: OTHER | Age: 67
End: 2017-10-11

## 2017-10-11 NOTE — PROGRESS NOTES
Last 5 Patient Entered Redings Current 30 Day Average: 112/77     Recent Readings 10/11/2017 10/11/2017 10/10/2017 10/10/2017 10/10/2017    Systolic BP (mmHg) 117 132 111 115 110    Diastolic BP (mmHg) 71 80 78 77 85    Pulse 72 74 94 99 102        Hypertension Digital Medicine Program (HDMP): Health  Follow Up    Lifestyle Modifications:    1.Low sodium diet: yes Patient reports that he is maintaining his low sodium diet.     2.Physical activity: yes Mr. Simpson is continuing to walk for exercise.    3.Hypotension/Hypertension symptoms: no   Frequency/Alleviating factors/Precipitating factors, etc.     4.Patient has been compliant with the medication regimen.     Follow up with Mr. Cesar Simpson completed. Mr. Simpson is doing well. He is no longer concerned about the accuracy of this digital cuff. No further questions or concerns. I will follow up in a few weeks to assess progress.

## 2017-11-14 ENCOUNTER — PATIENT OUTREACH (OUTPATIENT)
Dept: OTHER | Facility: OTHER | Age: 67
End: 2017-11-14

## 2017-11-14 NOTE — PROGRESS NOTES
Last 5 Patient Entered Readings Current 30 Day Average: 113/78     Recent Readings 12/5/2017 12/5/2017 12/5/2017 12/5/2017 12/4/2017    Systolic BP (mmHg) 122 107 102 110 104    Diastolic BP (mmHg) 72 76 74 75 78    Pulse 126 95 92 67 104        Hypertension Digital Medicine Program (HDMP): Health  Follow Up    Lifestyle Modifications:    1.Low sodium diet: yes Patient reports that his salt intake in minimal.    2.Physical activity: yes Mr. Simpson is continuing to walk for exercise.    3.Hypotension/Hypertension symptoms: no   Frequency/Alleviating factors/Precipitating factors, etc.     4.Patient has been compliant with the medication regimen.     Follow up with Mr. Cesar Simpson completed. Mr. Simpson is doing well. No further questions or concerns. I will follow up in a few weeks to assess progress.

## 2017-11-15 ENCOUNTER — TELEPHONE (OUTPATIENT)
Dept: FAMILY MEDICINE | Facility: CLINIC | Age: 67
End: 2017-11-15

## 2017-11-15 ENCOUNTER — PATIENT MESSAGE (OUTPATIENT)
Dept: INTERNAL MEDICINE | Facility: CLINIC | Age: 67
End: 2017-11-15

## 2017-11-15 NOTE — TELEPHONE ENCOUNTER
Patient sent a message to my [facility] to  asking about possible labs.  Please check with the patient if he is changing primary care provider.

## 2017-11-22 ENCOUNTER — OFFICE VISIT (OUTPATIENT)
Dept: INTERNAL MEDICINE | Facility: CLINIC | Age: 67
End: 2017-11-22
Payer: MEDICARE

## 2017-11-22 VITALS
OXYGEN SATURATION: 99 % | DIASTOLIC BLOOD PRESSURE: 72 MMHG | HEIGHT: 68 IN | BODY MASS INDEX: 30.3 KG/M2 | SYSTOLIC BLOOD PRESSURE: 128 MMHG | HEART RATE: 108 BPM | WEIGHT: 199.94 LBS

## 2017-11-22 DIAGNOSIS — Z00.00 ANNUAL PHYSICAL EXAM: Primary | ICD-10-CM

## 2017-11-22 DIAGNOSIS — Z12.5 PROSTATE CANCER SCREENING: ICD-10-CM

## 2017-11-22 DIAGNOSIS — E11.65 TYPE 2 DIABETES MELLITUS WITH HYPERGLYCEMIA, WITHOUT LONG-TERM CURRENT USE OF INSULIN: ICD-10-CM

## 2017-11-22 DIAGNOSIS — R93.89 ABNORMAL FINDING ON RADIOLOGY EXAM: ICD-10-CM

## 2017-11-22 DIAGNOSIS — I10 ESSENTIAL HYPERTENSION: ICD-10-CM

## 2017-11-22 DIAGNOSIS — E78.5 DYSLIPIDEMIA: ICD-10-CM

## 2017-11-22 DIAGNOSIS — R00.0 TACHYCARDIA: ICD-10-CM

## 2017-11-22 DIAGNOSIS — Z23 NEED FOR IMMUNIZATION AGAINST INFLUENZA: ICD-10-CM

## 2017-11-22 DIAGNOSIS — E03.9 HYPOTHYROIDISM, UNSPECIFIED TYPE: ICD-10-CM

## 2017-11-22 PROCEDURE — 99397 PER PM REEVAL EST PAT 65+ YR: CPT | Mod: S$GLB,,, | Performed by: INTERNAL MEDICINE

## 2017-11-22 PROCEDURE — G0008 ADMIN INFLUENZA VIRUS VAC: HCPCS | Mod: S$GLB,,, | Performed by: INTERNAL MEDICINE

## 2017-11-22 PROCEDURE — 93005 ELECTROCARDIOGRAM TRACING: CPT | Mod: S$GLB,,, | Performed by: INTERNAL MEDICINE

## 2017-11-22 PROCEDURE — 90662 IIV NO PRSV INCREASED AG IM: CPT | Mod: S$GLB,,, | Performed by: INTERNAL MEDICINE

## 2017-11-22 PROCEDURE — 99499 UNLISTED E&M SERVICE: CPT | Mod: S$PBB,,, | Performed by: INTERNAL MEDICINE

## 2017-11-22 PROCEDURE — 99999 PR PBB SHADOW E&M-EST. PATIENT-LVL III: CPT | Mod: PBBFAC,,, | Performed by: INTERNAL MEDICINE

## 2017-11-22 PROCEDURE — 93010 ELECTROCARDIOGRAM REPORT: CPT | Mod: S$GLB,,, | Performed by: INTERNAL MEDICINE

## 2017-11-22 RX ORDER — NIACIN 500 MG
CAPSULE, EXTENDED RELEASE ORAL
COMMUNITY
Start: 2017-10-05 | End: 2018-12-19

## 2017-11-22 RX ORDER — LANCETS
EACH MISCELLANEOUS
Qty: 100 EACH | Refills: 12 | Status: CANCELLED | OUTPATIENT
Start: 2017-11-22

## 2017-11-22 RX ORDER — LEVOTHYROXINE SODIUM 75 UG/1
75 TABLET ORAL
Qty: 90 TABLET | Refills: 3 | Status: SHIPPED | OUTPATIENT
Start: 2017-11-22 | End: 2018-06-28 | Stop reason: SDUPTHER

## 2017-11-22 RX ORDER — NIACIN 500 MG
CAPSULE, EXTENDED RELEASE ORAL
Status: CANCELLED | COMMUNITY
Start: 2017-11-22

## 2017-11-22 RX ORDER — DEXTROSE 4 G
TABLET,CHEWABLE ORAL
Qty: 1 EACH | Refills: 0 | Status: SHIPPED | OUTPATIENT
Start: 2017-11-22 | End: 2017-12-27 | Stop reason: ALTCHOICE

## 2017-11-22 RX ORDER — METFORMIN HYDROCHLORIDE 500 MG/1
500 TABLET, EXTENDED RELEASE ORAL 2 TIMES DAILY
Qty: 180 TABLET | Refills: 3 | Status: SHIPPED | OUTPATIENT
Start: 2017-11-22 | End: 2018-06-28 | Stop reason: SDUPTHER

## 2017-11-22 RX ORDER — LANCETS
EACH MISCELLANEOUS
Qty: 1 EACH | Refills: 11 | Status: SHIPPED | OUTPATIENT
Start: 2017-11-22 | End: 2017-12-27 | Stop reason: ALTCHOICE

## 2017-11-22 RX ORDER — PRAVASTATIN SODIUM 40 MG/1
40 TABLET ORAL NIGHTLY
Qty: 90 TABLET | Refills: 1 | Status: SHIPPED | OUTPATIENT
Start: 2017-11-22 | End: 2018-04-12 | Stop reason: SDUPTHER

## 2017-11-22 RX ORDER — LOSARTAN POTASSIUM 100 MG/1
TABLET ORAL
Qty: 90 TABLET | Refills: 3 | Status: SHIPPED | OUTPATIENT
Start: 2017-11-22 | End: 2018-11-22 | Stop reason: SDUPTHER

## 2017-11-22 NOTE — PATIENT INSTRUCTIONS
Understanding Tachycardia    The heart has an electrical system that sends signals to control the heartbeat. Any abnormal change in the speed or pattern of the heartbeat is called an arrhythmia. If you have an arrhythmia that causes the heart to beat faster than normal, this is known as tachycardia. There are many types of tachycardia. They can affect the hearts upper chambers (atria), the hearts lower chambers (ventricles), or both.  What causes tachycardia?  Many things can cause tachycardia, including:  · Damage to heart tissue from heart disease, past heart attack, or heart surgery  · Abnormal electrical pathways in the heart  · Problems with the hearts structure that you are born with   · High blood pressure  · Overactive thyroid  · Use of certain medicines  · Severe blood loss or anemia  · Dehydration  · Severe stress, fear, or anxiety  · Smoking  · Too much alcohol or caffeine  · Abuse of certain street drugs, such as cocaine  · Infections  · Certain inflammatory conditions  · Chronic  pain syndromes  What are the symptoms of tachycardia?  Tachycardia can cause a fast, pounding, or irregular heartbeat. It can also make it harder for the heart to pump blood efficiently to the body. This may cause symptoms such as:  · Shortness of breath  · Tiredness  · Dizziness or fainting  · Chest pain  Some people with tachycardia may have no symptoms at all.  How is tachycardiatreated?  Treatment for tachycardia depends on the cause. It also depends on the type you have and how severe your symptoms are. Tachycardia in the ventricles is often more serious than in the atria. For this reason, it may need to be treated right away. Possible treatments include:  · Treating the underlying cause. For instance, if a medicine is causing your tachycardia, changing the dosage or stopping the medicine with your doctors guidance may correct the problem.  · Lifestyle changes. These include getting enough sleep and reducing stress.  They also include avoiding caffeine, alcohol, tobacco, and street drugs.  · Vagal maneuvers.These are techniques that may help interrupt a fast heartbeat and slow it down. One example is to take a deep breath and bear down hard while holding your breath.   · Medicines. These may be used to help slow down a fast heartbeat. They may also be used to regulate the pattern of the heartbeat.  · Electrical cardioversion. Special pads or paddles are used to send one or more brief  electrical shocks to the heart. This can help restore the heartbeat to normal.  · Ablation. A long thin tube called a catheter is inserted into a blood vessel and threaded to the heart. The catheter sends out hot or cold energy to the areas causing abnormal signals. This destroys the problem tissue or cells. This may stop certain types of tachycardia.  · Pacemaker. This is a device that is placed just under the skin in the chest. It sends paced signals to make the heart beat at a more normal rate and rhythm. You may need this when certain medicines that treat tachycardia also result in a slow heart rate.    · Implantable cardioverter defibrillator (ICD). This is a device that is placed just under the skin in the chest or armpit. The ICD monitors your heart rate. When needed, it sends controlled burst of signals to the heart to overdrive a tachycardia.  It can also send a single stronger shock to the heart to stop a life-threatening type of tachycardia, if needed.  · Surgery. During surgery, different techniques may be used to create scar tissue in the areas of the heart causing abnormal signals. This may help stop certain types of tachycardia.  What are the complications of tachycardia?  These can include:  · Blood clots or stroke  · Heart failure. With this problem, the heart muscle is so weak it no longer pumps blood well.  · Sudden cardiac arrest. This is when the heart suddenly stops beating.  When should I call my healthcare provider?  Call  your healthcare provider right away if you have any of these:  · Symptoms that dont get better with treatment, or get worse  · New symptoms   Date Last Reviewed: 5/1/2016  © 3190-1796 [employer]. [address] [place] [address], [place] [address]. All rights reserved. This information is not intended as a substitute for professional medical care. Always follow your healthcare professional's instructions.

## 2017-11-24 ENCOUNTER — TELEPHONE (OUTPATIENT)
Dept: INTERNAL MEDICINE | Facility: CLINIC | Age: 67
End: 2017-11-24

## 2017-11-24 ENCOUNTER — PATIENT OUTREACH (OUTPATIENT)
Dept: OTHER | Facility: OTHER | Age: 67
End: 2017-11-24

## 2017-11-24 ENCOUNTER — HOSPITAL ENCOUNTER (OUTPATIENT)
Dept: RADIOLOGY | Facility: HOSPITAL | Age: 67
Discharge: HOME | End: 2017-11-24
Attending: INTERNAL MEDICINE
Payer: MEDICARE

## 2017-11-24 DIAGNOSIS — R93.89 ABNORMAL FINDING ON RADIOLOGY EXAM: ICD-10-CM

## 2017-11-24 PROCEDURE — 71020 XR CHEST PA AND LATERAL: CPT | Mod: TC

## 2017-11-24 PROCEDURE — 71020 XR CHEST PA AND LATERAL: CPT | Mod: 26,,, | Performed by: RADIOLOGY

## 2017-11-24 NOTE — PROGRESS NOTES
Last 5 Patient Entered Readings Current 30 Day Average: 112/78     Recent Readings 11/24/2017 11/24/2017 11/23/2017 11/23/2017 11/23/2017    Systolic BP (mmHg) 112 125 143 118 118    Diastolic BP (mmHg) 79 81 93 82 78    Pulse 81 83 75 78 77        Hypertension Medications             losartan (COZAAR) 100 MG tablet TAKE 1 TABLET ONE TIME DAILY        Assessment/ Plan:   Called patient to follow up. Per newly released 2017 ACC/ AHA HTN guidelines (goal of BP < 130/80), current 30-day average is well controlled.  Discussed new goals with patient.   Patient denies having questions or concerns. Instructed patient to call if he has any questions or concerns, patient confirms understanding.   Will continue to monitor. WCB in 4 months, sooner if BP begins to trend up or down.

## 2017-11-24 NOTE — PROGRESS NOTES
Portions of this note are generated with voice recognition software. Typographical errors may exist.     SUBJECTIVE:    This is a/an 67 y.o. male here for primary care visit for  Chief Complaint   Patient presents with    [facility]    Medication Refill     Patient states that for the last several months he has been having recurring episodes of tachycardia at rest.  No associated orthostatic symptoms.  There has not been any syncope or presyncopal symptoms.  Patient without taking extra doses of levothyroxine.  Denies associated angina symptoms.  Patient does not believe that this is related to emotional stress necessarily but he does note that he has difficulty with sleeping for unclear reasons.    Patient states that he isn't having dysuria.  States that occasionally he notices a strong odor to the urine but that hasn't been recent.    Patient is complying with diabetic medications.  Not checking blood sugar.  Complying with blood pressure and cholesterol medications.    Patient concerned about incidental finding of prominent aorta on chest x-ray many years ago.        Answers for HPI/ROS submitted by the patient on 11/20/2017   Diabetes problem  Diabetes type: type 2  MedicAlert ID: No  Disease duration: 10 years  fatigue: Yes  foot paresthesias: No  foot ulcerations: No  polyphagia: No  polyuria: Yes  visual change: Yes  Symptom course: worsening  confusion: No  hunger: Yes  mood changes: No  pallor: No  sleepiness: Yes  speech difficulty: No  sweats: No  blackouts: No  hospitalization: No  nocturnal hypoglycemia: No  required assistance: Yes  required glucagon: No  CVA: No  heart disease: Yes  impotence: No  nephropathy: No  peripheral neuropathy: No  PVD: Yes  retinopathy: No  autonomic neuropathy: No  CAD risks: dyslipidemia, family history, hypertension, obesity, sedentary lifestyle, stress, male sex  Current treatments: diet, oral agent (monotherapy)  Treatment compliance: all of the time  Home blood  tests: 1-2 x per day  Monitoring compliance: inadequate  Blood glucose trend: increasing steadily  Weight trend: decreasing steadily  Current diet: diabetic  Meal planning: avoidance of concentrated sweets  Exercise: every other day  Dietitian visit: No  Eye exam current: Yes  Sees podiatrist: No        Medications Reviewed and Updated    Past medical, family, and social histories were reviewed and updated.    Review of Systems negative unless otherwise noted in history of present illness-  Review of Systems   Constitutional: Negative for weight loss.   Cardiovascular: Negative for chest pain.   Neurological: Positive for weakness and headaches. Negative for dizziness, tremors and seizures.   Endo/Heme/Allergies: Positive for polydipsia.   Psychiatric/Behavioral: The patient is nervous/anxious.            Allergic:  Review of patient's allergies indicates:  No Known Allergies    OBJECTIVE:  BP: 128/72 Pulse: 108    Wt Readings from Last 3 Encounters:   11/22/17 90.7 kg (199 lb 15.3 oz)   04/21/17 98.1 kg (216 lb 4.3 oz)   03/24/17 99.1 kg (218 lb 7.6 oz)    Body mass index is 30.86 kg/m².  Previous Blood Pressure Readings :   BP Readings from Last 3 Encounters:   11/22/17 128/72   04/21/17 118/76   03/24/17 130/76       Physical Exam    GEN: No apparent distress  HEENT: sclera non-icteric, conjunctiva clear  CV: no peripheral edema, tachycardia.  Grade 2/6 systolic murmur  PULM: breathing non-labored  ABD:  protuberant abdomen., supple  PSYCH: appropriate affect  MSK: able to rise from chair without assistance  SKIN: normal skin turgor    Pertinent Labs Reviewed       ASSESSMENT/PLAN:    Annual physical exam    Essential hypertension  -     losartan (COZAAR) 100 MG tablet; TAKE 1 TABLET ONE TIME DAILY  Dispense: 90 tablet; Refill: 3    Dyslipidemia  -     pravastatin (PRAVACHOL) 40 MG tablet; Take 1 tablet (40 mg total) by mouth every evening.  Dispense: 90 tablet; Refill: 1    Type 2 diabetes mellitus with  hyperglycemia, without long-term current use of insulin  -     metFORMIN (GLUCOPHAGE-XR) 500 MG 24 hr tablet; Take 1 tablet (500 mg total) by mouth 2 (two) times daily.  Dispense: 180 tablet; Refill: 3  -     Hemoglobin A1c; Future; Expected date: 11/22/2017  -     blood-glucose meter Misc; 1 device.  Test once daily. Supply whatever is covered by insurance.  Dispense: 1 each; Refill: 0  -     blood sugar diagnostic Strp; 100 each.  Test once daily. Supply whatever insurance covers.  Dispense: 100 each; Refill: 11  -     lancets Misc; 1 box.  Test once daily. Supply whatever is covered by insurance  Dispense: 1 each; Refill: 11    Hypothyroidism, unspecified type  -     levothyroxine (SYNTHROID) 75 MCG tablet; Take 1 tablet (75 mcg total) by mouth before breakfast.  Dispense: 90 tablet; Refill: 3    Abnormal finding on radiology exam  -     Cancel: X-Ray Chest PA And Lateral; Future; Expected date: 11/22/2017  -     X-Ray Chest PA And Lateral; Future; Expected date: 11/22/2017    Tachycardia  -     EKG 12-lead; Future  -     TSH; Future; Expected date: 11/22/2017  -     CBC auto differential; Future; Expected date: 11/22/2017  -     Brain natriuretic peptide; Future; Expected date: 11/22/2017  -     Cardiac event monitor (30 day); Future    Prostate cancer screening  -     PSA, Screening; Future; Expected date: 11/22/2017    Need for immunization against influenza  -     Influenza - High Dose (65+) (PF) (IM)          Future Appointments  Date Time Provider Department Center   11/27/2017 2:00 PM MONITOR, ARRHYTHMIA EVENT [facility] ARRHYTH [address]   12/27/2017 10:00 AM Ar Willett MD [facility] Shantal Willett  11/24/2017  11:40 AM

## 2017-11-24 NOTE — TELEPHONE ENCOUNTER
----- Message from Ar Willett MD sent at 11/24/2017 11:45 AM CST -----  Contact patient to schedule Holter monitor study.  This is to further evaluate the cause of tachycardia which is inconclusive based on current blood work.

## 2017-11-27 ENCOUNTER — CLINICAL SUPPORT (OUTPATIENT)
Dept: ELECTROPHYSIOLOGY | Facility: CLINIC | Age: 67
End: 2017-11-27
Attending: INTERNAL MEDICINE
Payer: MEDICARE

## 2017-11-27 DIAGNOSIS — R00.0 TACHYCARDIA: ICD-10-CM

## 2017-11-27 PROCEDURE — 93271 ECG/MONITORING AND ANALYSIS: CPT | Mod: S$GLB,,, | Performed by: INTERNAL MEDICINE

## 2017-11-27 PROCEDURE — 93270 REMOTE 30 DAY ECG REV/REPORT: CPT | Mod: S$GLB,,, | Performed by: INTERNAL MEDICINE

## 2017-12-27 ENCOUNTER — OFFICE VISIT (OUTPATIENT)
Dept: INTERNAL MEDICINE | Facility: CLINIC | Age: 67
End: 2017-12-27
Payer: MEDICARE

## 2017-12-27 VITALS
HEIGHT: 68 IN | HEART RATE: 66 BPM | DIASTOLIC BLOOD PRESSURE: 80 MMHG | SYSTOLIC BLOOD PRESSURE: 132 MMHG | WEIGHT: 198.63 LBS | OXYGEN SATURATION: 98 % | BODY MASS INDEX: 30.1 KG/M2

## 2017-12-27 DIAGNOSIS — R35.0 URINARY FREQUENCY: ICD-10-CM

## 2017-12-27 DIAGNOSIS — E03.9 HYPOTHYROIDISM, UNSPECIFIED TYPE: ICD-10-CM

## 2017-12-27 DIAGNOSIS — R35.0 BENIGN PROSTATIC HYPERPLASIA WITH URINARY FREQUENCY: Primary | ICD-10-CM

## 2017-12-27 DIAGNOSIS — I10 ESSENTIAL HYPERTENSION: ICD-10-CM

## 2017-12-27 DIAGNOSIS — R00.2 HEART PALPITATIONS: ICD-10-CM

## 2017-12-27 DIAGNOSIS — N40.1 BENIGN PROSTATIC HYPERPLASIA WITH URINARY FREQUENCY: Primary | ICD-10-CM

## 2017-12-27 PROCEDURE — 99499 UNLISTED E&M SERVICE: CPT | Mod: S$GLB,,, | Performed by: INTERNAL MEDICINE

## 2017-12-27 PROCEDURE — 87186 SC STD MICRODIL/AGAR DIL: CPT

## 2017-12-27 PROCEDURE — 87086 URINE CULTURE/COLONY COUNT: CPT

## 2017-12-27 PROCEDURE — 87077 CULTURE AEROBIC IDENTIFY: CPT

## 2017-12-27 PROCEDURE — 81001 URINALYSIS AUTO W/SCOPE: CPT

## 2017-12-27 PROCEDURE — 87088 URINE BACTERIA CULTURE: CPT

## 2017-12-27 PROCEDURE — 99999 PR PBB SHADOW E&M-EST. PATIENT-LVL III: CPT | Mod: PBBFAC,,, | Performed by: INTERNAL MEDICINE

## 2017-12-27 PROCEDURE — 81003 URINALYSIS AUTO W/O SCOPE: CPT

## 2017-12-27 PROCEDURE — 99214 OFFICE O/P EST MOD 30 MIN: CPT | Mod: S$GLB,,, | Performed by: INTERNAL MEDICINE

## 2017-12-27 RX ORDER — BLOOD-GLUCOSE METER
EACH MISCELLANEOUS
COMMUNITY
Start: 2017-11-27 | End: 2019-05-23

## 2017-12-27 RX ORDER — LANCETS 33 GAUGE
EACH MISCELLANEOUS
COMMUNITY
Start: 2017-11-27 | End: 2019-05-23

## 2017-12-27 RX ORDER — TAMSULOSIN HYDROCHLORIDE 0.4 MG/1
0.4 CAPSULE ORAL DAILY
Qty: 90 CAPSULE | Refills: 0 | Status: SHIPPED | OUTPATIENT
Start: 2017-12-27 | End: 2018-06-28 | Stop reason: SDUPTHER

## 2017-12-27 NOTE — PATIENT INSTRUCTIONS
01/05/17, resultados, de taye estudio, llamons.     Recommendations for today    Urinary symptoms are likely due to enlarged prostate.  Therefore I recommend starting the medication below called Flomax\tamsulosin.  Within the first 7 days of taking the medication the frequency of urination should improve significantly.  If symptoms fail to improve contact the clinic so that we can arrange urology follow-up.      .If mild side effects develop on tamsulosin simply continue taking the medication.  Mild side effects tend to go away after taking the medication for 14 days.  If side effects persist or worsen stop taking the medication and contact the clinic.    Do not take tamsulosin at the same time his blood pressure medication losartan.  Separate these medications by at least 4-5 hours to avoid lightheadedness.          Tamsulosin capsules  What is this medicine?  TAMSULOSIN (martins ALIZE manisha sin) is used to treat enlargement of the prostate gland in men, a condition called benign prostatic hyperplasia or BPH. It is not for use in women. It works by relaxing muscles in the prostate and bladder neck. This improves urine flow and reduces BPH symptoms.  How should I use this medicine?  Take this medicine by mouth about 30 minutes after the same meal every day. Follow the directions on the prescription label. Swallow the capsules whole with a glass of water. Do not crush, chew, or open capsules. Do not take your medicine more often than directed. Do not stop taking your medicine unless your doctor tells you to.  Talk to your pediatrician regarding the use of this medicine in children. Special care may be needed.    What may interact with this medicine?    · ketoconazole  · medicines for erectile disfunction like sildenafil, tadalafil, vardenafil  · medicines for high blood pressure    What if I miss a dose?  If you miss a dose, take it as soon as you can. If it is almost time for your next dose, take only that dose. Do not take  double or extra doses. If you stop taking your medicine for several days or more, ask your doctor or health care professional what dose you should start back on.  Where should I keep my medicine?  Keep out of the reach of children.  Store at room temperature between 15 and 30 degrees C (59 and 86 degrees F). Throw away any unused medicine after the expiration date.    What should I watch for while using this medicine?  Visit your doctor or health care professional for regular check ups. You will need lab work done before you start this medicine and regularly while you are taking it. Check your blood pressure as directed. Ask your health care professional what your blood pressure should be, and when you should contact him or her.  This medicine may make you feel dizzy or lightheaded. This is more likely to happen after the first dose, after an increase in dose, or during hot weather or exercise. Drinking alcohol and taking some medicines can make this worse. Do not drive, use machinery, or do anything that needs mental alertness until you know how this medicine affects you. Do not sit or stand up quickly. If you begin to feel dizzy, sit down until you feel better. These effects can decrease once your body adjusts to the medicine.  Contact your doctor or health care professional right away if you have an erection that lasts longer than 4 hours or if it becomes painful. This may be a sign of a serious problem and must be treated right away to prevent permanent damage.  If you are thinking of having cataract surgery, tell your eye surgeon that you have taken this medicine.  NOTE:This sheet is a summary. It may not cover all possible information. If you have questions about this medicine, talk to your doctor, pharmacist, or health care provider. Copyright© 2017 [employer]

## 2017-12-27 NOTE — PROGRESS NOTES
Portions of this note are generated with voice recognition software. Typographical errors may exist.     SUBJECTIVE:    This is a/an 67 y.o. male here for primary care visit for  Chief Complaint   Patient presents with    Diabetes     follow up     Patient states that he has significant problems with urinary frequency often having to go 6 or 7 times daily.  Incomplete elimination.  Nocturia is not as much of a problem.  Denies dysuria now but he has had problems like this in the past.  He has had episodes of prostatitis in the past but he denies classic symptoms now.  Patient has not been on alpha blockade medication in the past.  States it is willing to pursue this.    Patient is compliant with blood pressure medication.  Heart palpitations happen occasionally and he has continued to complete Holter monitor and is due to return this soon.  Has not been contacted by cardiology regarding any abnormal results of far.        Answers for HPI/ROS submitted by the patient on 12/25/2017   activity change: No  unexpected weight change: No  rhinorrhea: No  trouble swallowing: No  visual disturbance: No  chest tightness: No  polyuria: Yes  difficulty urinating: No  joint swelling: No  arthralgias: No  confusion: No  dysphoric mood: No        Medications Reviewed and Updated    Past medical, family, and social histories were reviewed and updated.    Review of Systems negative unless otherwise noted in history of present illness-  Review of Systems   HENT: Negative for hearing loss.    Eyes: Negative for discharge.   Respiratory: Negative for wheezing.    Cardiovascular: Positive for palpitations. Negative for chest pain.   Gastrointestinal: Negative for blood in stool, constipation, diarrhea and vomiting.   Genitourinary: Negative for hematuria and urgency.   Musculoskeletal: Negative for neck pain.   Neurological: Negative for weakness and headaches.   Endo/Heme/Allergies: Negative for polydipsia.         Allergic:  Review  of patient's allergies indicates:  No Known Allergies    OBJECTIVE:  BP: 132/80 Pulse: 66    Wt Readings from Last 3 Encounters:   12/27/17 90.1 kg (198 lb 10.2 oz)   11/22/17 90.7 kg (199 lb 15.3 oz)   04/21/17 98.1 kg (216 lb 4.3 oz)    Body mass index is 30.65 kg/m².  Previous Blood Pressure Readings :   BP Readings from Last 3 Encounters:   12/27/17 132/80   11/22/17 128/72   04/21/17 118/76       Physical Exam    GEN: No apparent distress  HEENT: sclera non-icteric, conjunctiva clear  CV: no peripheral edema, RRR  PULM: breathing non-labored  ABD: Obese, protuberant abdomen. Supple   PSYCH: appropriate affect  MSK: able to rise from chair without assistance  SKIN: normal skin turgor    Pertinent Labs Reviewed       ASSESSMENT/PLAN:    Benign prostatic hyperplasia with urinary frequency.Condition not optimally controlled. Detailed counseling on self care measures. Plan to monitor clinically in addition to plan below.   -     tamsulosin (FLOMAX) 0.4 mg Cp24; Take 1 capsule (0.4 mg total) by mouth once daily.  Dispense: 90 capsule; Refill: 0    Urinary frequency.Further evaluation warranted.  Recommendations as below.  -     URINALYSIS  -     Urine culture    Hypothyroidism, unspecified type.Condition stable.  Counseling on self-care measures. Plan to monitor clinically. Continue current medical plan.   -     TSH; Future; Expected date: 12/27/2017    Heart palpitations.Further evaluation warranted.  Recommendations as below.  - waiting holter    Essential hypertension.Condition stable.  Counseling on self-care measures. Plan to monitor clinically. Continue current medical plan.       Future Appointments  Date Time Provider Department [place]   6/25/2018 7:00 AM LAB, [facility] [place]   6/28/2018 8:00 AM Ar Willett MD [facility] Shantal Willett  12/27/2017  12:34 PM

## 2017-12-28 LAB
BACTERIA #/AREA URNS AUTO: ABNORMAL /HPF
BILIRUB UR QL STRIP: NEGATIVE
CLARITY UR REFRACT.AUTO: ABNORMAL
COLOR UR AUTO: YELLOW
GLUCOSE UR QL STRIP: NEGATIVE
HGB UR QL STRIP: ABNORMAL
KETONES UR QL STRIP: NEGATIVE
LEUKOCYTE ESTERASE UR QL STRIP: ABNORMAL
MICROSCOPIC COMMENT: ABNORMAL
NITRITE UR QL STRIP: POSITIVE
PH UR STRIP: 5 [PH] (ref 5–8)
PROT UR QL STRIP: NEGATIVE
RBC #/AREA URNS AUTO: 2 /HPF (ref 0–4)
SP GR UR STRIP: 1.02 (ref 1–1.03)
URN SPEC COLLECT METH UR: ABNORMAL
UROBILINOGEN UR STRIP-ACNC: NEGATIVE EU/DL
WBC #/AREA URNS AUTO: 13 /HPF (ref 0–5)

## 2017-12-29 PROCEDURE — 93272 ECG/REVIEW INTERPRET ONLY: CPT | Mod: S$GLB,,, | Performed by: INTERNAL MEDICINE

## 2017-12-30 LAB — BACTERIA UR CULT: NORMAL

## 2018-01-03 ENCOUNTER — TELEPHONE (OUTPATIENT)
Dept: FAMILY MEDICINE | Facility: CLINIC | Age: 68
End: 2018-01-03

## 2018-01-03 ENCOUNTER — PATIENT MESSAGE (OUTPATIENT)
Dept: INTERNAL MEDICINE | Facility: CLINIC | Age: 68
End: 2018-01-03

## 2018-01-03 DIAGNOSIS — N30.00 ACUTE CYSTITIS WITHOUT HEMATURIA: Primary | ICD-10-CM

## 2018-01-03 RX ORDER — CIPROFLOXACIN 250 MG/1
250 TABLET, FILM COATED ORAL EVERY 12 HOURS
Qty: 14 TABLET | Refills: 0 | Status: SHIPPED | OUTPATIENT
Start: 2018-01-03 | End: 2018-01-10

## 2018-01-03 NOTE — TELEPHONE ENCOUNTER
Spoke with patient and informed patient urine culture positive and antibiotic sent to the pharmacy. Patient voices understanding.

## 2018-01-03 NOTE — TELEPHONE ENCOUNTER
----- Message from Cathy Fam MD sent at 1/3/2018  4:18 PM CST -----  I am covering for Dr. Willett. Please let Mr. Simpson know that his urine culture was positive, indicating a urinary tract infection. I am sending an antibiotic to his pharmacy for treatment.    Thank you  Dr. Fam

## 2018-01-04 ENCOUNTER — PATIENT MESSAGE (OUTPATIENT)
Dept: INTERNAL MEDICINE | Facility: CLINIC | Age: 68
End: 2018-01-04

## 2018-01-11 ENCOUNTER — PATIENT OUTREACH (OUTPATIENT)
Dept: OTHER | Facility: OTHER | Age: 68
End: 2018-01-11

## 2018-01-11 NOTE — PROGRESS NOTES
"Last 5 Patient Entered Readings Current 30 Day Average: 118/80     Recent Readings 1/11/2018 1/11/2018 1/11/2018 1/10/2018 1/10/2018    SBP (mmHg) 109 105 121 103 110    DBP (mmHg) 78 78 73 71 73    Pulse 65 67 64 66 68        Hypertension Digital Medicine Program (HDMP): Health  Follow Up    Lifestyle Modifications:    1.Low sodium diet: yes Patient is continuing to limit salt intake.    2.Physical activity: yes Mr. Simpson continues to walk for exercise.    3.Hypotension/Hypertension symptoms: no   Frequency/Alleviating factors/Precipitating factors, etc.     4.Patient has been compliant with the medication regimen.     Follow up with Mr. Cesar Simpson completed. Mr. Simpson reports feeling "a little under the weather". He is pleased with his BP readings. No further questions or concerns. I will follow up in a few weeks to assess progress.         "

## 2018-02-20 ENCOUNTER — PATIENT OUTREACH (OUTPATIENT)
Dept: OTHER | Facility: OTHER | Age: 68
End: 2018-02-20

## 2018-02-20 NOTE — PROGRESS NOTES
"Last 5 Patient Entered Readings                                      Current 30 Day Average: 115/78     Recent Readings 2/19/2018 2/19/2018 2/19/2018 2/19/2018 2/19/2018    SBP (mmHg) 105 99 118 110 116    DBP (mmHg) 70 72 82 72 78    Pulse 81 82 82 86 65        Hypertension Digital Medicine Program (HDMP): Health  Follow Up    Lifestyle Modifications:    1.Low sodium diet: yes Patient reports that he is "really watching" his diet in order to limit his salt intake.    2.Physical activity: yes Mr. Simpson is walking for exercise 3-4 days each week.    3.Hypotension/Hypertension symptoms: no   Frequency/Alleviating factors/Precipitating factors, etc.     4.Patient has been compliant with the medication regimen.     Follow up with Mr. Cesar Simpson completed. Mr. Simpson is doing well. Overall, he is pleased with his BP. No further questions or concerns. I will follow up in a few weeks to assess progress.         "

## 2018-02-21 ENCOUNTER — PES CALL (OUTPATIENT)
Dept: ADMINISTRATIVE | Facility: CLINIC | Age: 68
End: 2018-02-21

## 2018-02-21 ENCOUNTER — TELEPHONE (OUTPATIENT)
Dept: ADMINISTRATIVE | Facility: HOSPITAL | Age: 68
End: 2018-02-21

## 2018-02-21 DIAGNOSIS — Z12.11 ENCOUNTER FOR COLORECTAL CANCER SCREENING: Primary | ICD-10-CM

## 2018-02-21 DIAGNOSIS — Z12.12 ENCOUNTER FOR COLORECTAL CANCER SCREENING: Primary | ICD-10-CM

## 2018-03-12 ENCOUNTER — LAB VISIT (OUTPATIENT)
Dept: LAB | Facility: HOSPITAL | Age: 68
End: 2018-03-12
Attending: INTERNAL MEDICINE
Payer: MEDICARE

## 2018-03-12 DIAGNOSIS — Z12.12 ENCOUNTER FOR COLORECTAL CANCER SCREENING: ICD-10-CM

## 2018-03-12 DIAGNOSIS — Z12.11 ENCOUNTER FOR COLORECTAL CANCER SCREENING: ICD-10-CM

## 2018-03-12 LAB — HEMOCCULT STL QL IA: NEGATIVE

## 2018-03-12 PROCEDURE — 82274 ASSAY TEST FOR BLOOD FECAL: CPT

## 2018-03-26 ENCOUNTER — PATIENT OUTREACH (OUTPATIENT)
Dept: OTHER | Facility: OTHER | Age: 68
End: 2018-03-26

## 2018-03-26 NOTE — PROGRESS NOTES
Last 5 Patient Entered Readings                                      Current 30 Day Average: 117/79     Recent Readings 3/26/2018 3/26/2018 3/25/2018 3/25/2018 3/25/2018    SBP (mmHg) 116 121 115 121 108    DBP (mmHg) 76 78 77 76 70    Pulse 58 63 89 80 92        Hypertension Medications             losartan (COZAAR) 100 MG tablet TAKE 1 TABLET ONE TIME DAILY        Plan:   Called patient to follow up. Per newly released 2017 ACC/ AHA HTN guidelines  (goal of BP < 130/80), current 30-day average is well controlled.  LVM, requested patient call back at his convenience if he has any questions or concerns.  Will continue to monitor. WCB in 3 months, sooner if BP begins to trend up or down.

## 2018-03-27 ENCOUNTER — OFFICE VISIT (OUTPATIENT)
Dept: FAMILY MEDICINE | Facility: CLINIC | Age: 68
End: 2018-03-27
Payer: MEDICARE

## 2018-03-27 VITALS
DIASTOLIC BLOOD PRESSURE: 80 MMHG | SYSTOLIC BLOOD PRESSURE: 122 MMHG | OXYGEN SATURATION: 97 % | HEIGHT: 68 IN | BODY MASS INDEX: 28.74 KG/M2 | WEIGHT: 189.63 LBS | HEART RATE: 67 BPM

## 2018-03-27 DIAGNOSIS — E78.5 HYPERLIPIDEMIA ASSOCIATED WITH TYPE 2 DIABETES MELLITUS: ICD-10-CM

## 2018-03-27 DIAGNOSIS — E66.3 OVERWEIGHT (BMI 25.0-29.9): ICD-10-CM

## 2018-03-27 DIAGNOSIS — E11.69 HYPERLIPIDEMIA ASSOCIATED WITH TYPE 2 DIABETES MELLITUS: ICD-10-CM

## 2018-03-27 DIAGNOSIS — N40.1 BENIGN PROSTATIC HYPERPLASIA WITH URINARY FREQUENCY: ICD-10-CM

## 2018-03-27 DIAGNOSIS — R09.89 PROMINENT AORTA: ICD-10-CM

## 2018-03-27 DIAGNOSIS — E03.9 HYPOTHYROIDISM, UNSPECIFIED TYPE: ICD-10-CM

## 2018-03-27 DIAGNOSIS — E11.59 HYPERTENSION ASSOCIATED WITH DIABETES: ICD-10-CM

## 2018-03-27 DIAGNOSIS — R35.0 BENIGN PROSTATIC HYPERPLASIA WITH URINARY FREQUENCY: ICD-10-CM

## 2018-03-27 DIAGNOSIS — E11.42 TYPE 2 DIABETES MELLITUS WITH DIABETIC POLYNEUROPATHY, WITHOUT LONG-TERM CURRENT USE OF INSULIN: ICD-10-CM

## 2018-03-27 DIAGNOSIS — Z00.00 ENCOUNTER FOR PREVENTIVE HEALTH EXAMINATION: Primary | ICD-10-CM

## 2018-03-27 DIAGNOSIS — I15.2 HYPERTENSION ASSOCIATED WITH DIABETES: ICD-10-CM

## 2018-03-27 PROCEDURE — 99499 UNLISTED E&M SERVICE: CPT | Mod: S$GLB,,, | Performed by: NURSE PRACTITIONER

## 2018-03-27 PROCEDURE — 99999 PR PBB SHADOW E&M-EST. PATIENT-LVL IV: CPT | Mod: PBBFAC,,, | Performed by: NURSE PRACTITIONER

## 2018-03-27 PROCEDURE — G0439 PPPS, SUBSEQ VISIT: HCPCS | Mod: S$GLB,,, | Performed by: NURSE PRACTITIONER

## 2018-03-27 NOTE — PATIENT INSTRUCTIONS
Counseling and Referral of Other Preventative  (Italic type indicates deductible and co-insurance are waived)    Patient Name: Cesar Simpson  Today's Date: 3/27/2018    Health Maintenance       Date Due Completion Date    Eye Exam 04/21/2018 4/21/2017    Override on 12/22/2015: Done (Dr. Rodriguez)    Lipid Panel 04/12/2018 4/12/2017    Hemoglobin A1c 05/24/2018 11/24/2017    Foot Exam 11/22/2018 11/22/2017 (Done)    Override on 11/22/2017: Done    Low Dose Statin 12/27/2018 12/27/2017    Fecal Occult Blood Test (FOBT)/[employer] 03/12/2019 3/12/2018    Pneumococcal (65+) (2 of 2 - PPSV23) 02/08/2021 3/24/2017    TETANUS VACCINE 10/10/2026 10/10/2016        No orders of the defined types were placed in this encounter.    The following information is provided to all patients.  This information is to help you find resources for any of the problems found today that may be affecting your health:                Living healthy guide: www.[facility].[place].gov      Understanding Diabetes: www.diabetes.org      Eating healthy: www.cdc.gov/healthyweight      CDC home safety checklist: www.cdc.gov/steadi/patient.html      Agency on Aging: www.goea.[place].gov      Alcoholics anonymous (AA): www.aa.org      Physical Activity: www.kenneth.nih.gov/ur7arwb      Tobacco use: www.quitwithusla.org

## 2018-04-03 ENCOUNTER — PATIENT OUTREACH (OUTPATIENT)
Dept: OTHER | Facility: OTHER | Age: 68
End: 2018-04-03

## 2018-04-03 NOTE — PROGRESS NOTES
"Last 5 Patient Entered Readings                                      Current 30 Day Average: 116/78     Recent Readings 4/17/2018 4/17/2018 4/17/2018 4/17/2018 4/17/2018    SBP (mmHg) 112 106 112 104 102    DBP (mmHg) 73 62 83 71 74    Pulse 67 65 66 67 70        Digital Medicine: Health  Follow Up    Lifestyle Modifications:    1.Dietary Modifications (Sodium intake <2,000mg/day, food labels, dining out): Patient reports that his diet "has not changed".    2.Physical Activity: Mr. Simpson continues to walk for exercise.     3.Medication Therapy: Patient has been compliant with the medication regimen.    4.Patient has the following medication side effects/concerns: None  (Frequency/Alleviating factors/Precipitating factors, etc.)     Follow up with Mr. Cesar Simpson completed. Mr. Simpson is doing okay. He reports that his diet and exercise routine have not changed. He attributes spikes in BP to rushing to check his BP or not having the cuff on tight enough. Asked that he rest long enough before taking a reading and to make sure that the cuff is secure. Patient verbalized understanding. No further questions or concerns. I will follow up in a few weeks to assess progress.    "

## 2018-04-12 DIAGNOSIS — E78.5 DYSLIPIDEMIA: ICD-10-CM

## 2018-04-13 RX ORDER — PRAVASTATIN SODIUM 40 MG/1
TABLET ORAL
Qty: 90 TABLET | Refills: 1 | Status: SHIPPED | OUTPATIENT
Start: 2018-04-13 | End: 2018-11-22 | Stop reason: SDUPTHER

## 2018-05-16 ENCOUNTER — PATIENT MESSAGE (OUTPATIENT)
Dept: INTERNAL MEDICINE | Facility: CLINIC | Age: 68
End: 2018-05-16

## 2018-05-18 DIAGNOSIS — E11.9 CONTROLLED TYPE 2 DIABETES MELLITUS WITHOUT COMPLICATION, WITHOUT LONG-TERM CURRENT USE OF INSULIN: Primary | ICD-10-CM

## 2018-05-22 ENCOUNTER — PATIENT OUTREACH (OUTPATIENT)
Dept: OTHER | Facility: OTHER | Age: 68
End: 2018-05-22

## 2018-05-22 NOTE — PROGRESS NOTES
Last 5 Patient Entered Readings                                      Current 30 Day Average: 113/77     Recent Readings 5/22/2018 5/22/2018 5/21/2018 5/21/2018 5/21/2018    SBP (mmHg) 120 118 120 128 121    DBP (mmHg) 79 75 77 81 79    Pulse 59 59 67 71 71        Digital Medicine: Health  Follow Up    Lifestyle Modifications:    1.Dietary Modifications (Sodium intake <2,000mg/day, food labels, dining out): Mr. Simpson reports that he has made a few more changes to his diet and has lost a little weight. He has also noticed a difference in his BG readings since weight loss.     2.Physical Activity: Patient states that he is continuing to walk for exercise.     3.Medication Therapy: Patient has been compliant with the medication regimen.    4.Patient has the following medication side effects/concerns: None   (Frequency/Alleviating factors/Precipitating factors, etc.)     Follow up with Mr. Cesar Simpson completed. Mr. Simpson is doing well. Patient states that he received a letter in the mail regarding diabetes care. He messaged his PCP, whom submitted a referral for diabetes education. As of today, patient states that he has not heard anything back. Advised to message PCP next week if no one has called. He is pleased with the HDMP, and believes that he may benefit from diabetes education as well. He will discuss cutting back on antihypertensive medications at his next appointment late June. No further questions or concerns. Will continue follow up to achieve health goals.

## 2018-05-24 ENCOUNTER — PATIENT MESSAGE (OUTPATIENT)
Dept: ADMINISTRATIVE | Facility: HOSPITAL | Age: 68
End: 2018-05-24

## 2018-06-18 ENCOUNTER — PATIENT OUTREACH (OUTPATIENT)
Dept: OTHER | Facility: OTHER | Age: 68
End: 2018-06-18

## 2018-06-18 NOTE — PROGRESS NOTES
Last 5 Patient Entered Readings                                      Current 30 Day Average: 111/76     Recent Readings 6/18/2018 6/18/2018 6/17/2018 6/17/2018 6/16/2018    SBP (mmHg) 107 110 105 106 109    DBP (mmHg) 73 72 74 73 78    Pulse 65 64 73 69 66        Hypertension Medications             losartan (COZAAR) 100 MG tablet TAKE 1 TABLET ONE TIME DAILY        Plan:   Called patient to follow up. Per newly released 2017 ACC/ AHA HTN guidelines  (goal of BP < 130/80), current 30-day average is well controlled.  LVM, requested patient call back at his convenience if he has any questions or concerns, and to continue to take at least weekly BP readings.   Will continue to monitor. WCB in 6 months, sooner if BP begins to trend up or down.

## 2018-06-25 ENCOUNTER — LAB VISIT (OUTPATIENT)
Dept: LAB | Facility: HOSPITAL | Age: 68
End: 2018-06-25
Attending: INTERNAL MEDICINE
Payer: MEDICARE

## 2018-06-25 DIAGNOSIS — E03.9 HYPOTHYROIDISM, UNSPECIFIED TYPE: ICD-10-CM

## 2018-06-25 DIAGNOSIS — E11.9 CONTROLLED TYPE 2 DIABETES MELLITUS WITHOUT COMPLICATION, WITHOUT LONG-TERM CURRENT USE OF INSULIN: ICD-10-CM

## 2018-06-25 LAB
ALBUMIN SERPL BCP-MCNC: 3.8 G/DL
ALP SERPL-CCNC: 45 U/L
ALT SERPL W/O P-5'-P-CCNC: 23 U/L
ANION GAP SERPL CALC-SCNC: 8 MMOL/L
AST SERPL-CCNC: 18 U/L
BILIRUB SERPL-MCNC: 0.4 MG/DL
BUN SERPL-MCNC: 26 MG/DL
CALCIUM SERPL-MCNC: 9.8 MG/DL
CHLORIDE SERPL-SCNC: 103 MMOL/L
CHOLEST SERPL-MCNC: 140 MG/DL
CHOLEST/HDLC SERPL: 4 {RATIO}
CO2 SERPL-SCNC: 27 MMOL/L
CREAT SERPL-MCNC: 0.8 MG/DL
EST. GFR  (AFRICAN AMERICAN): >60 ML/MIN/1.73 M^2
EST. GFR  (NON AFRICAN AMERICAN): >60 ML/MIN/1.73 M^2
ESTIMATED AVG GLUCOSE: 103 MG/DL
GLUCOSE SERPL-MCNC: 109 MG/DL
HBA1C MFR BLD HPLC: 5.2 %
HDLC SERPL-MCNC: 35 MG/DL
HDLC SERPL: 25 %
LDLC SERPL CALC-MCNC: 88.8 MG/DL
NONHDLC SERPL-MCNC: 105 MG/DL
POTASSIUM SERPL-SCNC: 4 MMOL/L
PROT SERPL-MCNC: 7.3 G/DL
SODIUM SERPL-SCNC: 138 MMOL/L
T4 FREE SERPL-MCNC: 0.94 NG/DL
TRIGL SERPL-MCNC: 81 MG/DL
TSH SERPL DL<=0.005 MIU/L-ACNC: 5.42 UIU/ML

## 2018-06-25 PROCEDURE — 84443 ASSAY THYROID STIM HORMONE: CPT

## 2018-06-25 PROCEDURE — 80053 COMPREHEN METABOLIC PANEL: CPT

## 2018-06-25 PROCEDURE — 80061 LIPID PANEL: CPT

## 2018-06-25 PROCEDURE — 36415 COLL VENOUS BLD VENIPUNCTURE: CPT | Mod: PO

## 2018-06-25 PROCEDURE — 84439 ASSAY OF FREE THYROXINE: CPT

## 2018-06-25 PROCEDURE — 83036 HEMOGLOBIN GLYCOSYLATED A1C: CPT

## 2018-06-28 ENCOUNTER — OFFICE VISIT (OUTPATIENT)
Dept: INTERNAL MEDICINE | Facility: CLINIC | Age: 68
End: 2018-06-28
Payer: MEDICARE

## 2018-06-28 VITALS
HEART RATE: 72 BPM | OXYGEN SATURATION: 97 % | SYSTOLIC BLOOD PRESSURE: 134 MMHG | BODY MASS INDEX: 28.63 KG/M2 | WEIGHT: 188.94 LBS | DIASTOLIC BLOOD PRESSURE: 80 MMHG | HEIGHT: 68 IN

## 2018-06-28 DIAGNOSIS — E11.65 TYPE 2 DIABETES MELLITUS WITH HYPERGLYCEMIA, WITHOUT LONG-TERM CURRENT USE OF INSULIN: ICD-10-CM

## 2018-06-28 DIAGNOSIS — R35.0 BENIGN PROSTATIC HYPERPLASIA WITH URINARY FREQUENCY: ICD-10-CM

## 2018-06-28 DIAGNOSIS — E78.5 HYPERLIPIDEMIA, UNSPECIFIED HYPERLIPIDEMIA TYPE: ICD-10-CM

## 2018-06-28 DIAGNOSIS — E11.42 TYPE 2 DIABETES MELLITUS WITH DIABETIC POLYNEUROPATHY, WITHOUT LONG-TERM CURRENT USE OF INSULIN: ICD-10-CM

## 2018-06-28 DIAGNOSIS — E11.9 DIABETIC EYE EXAM: ICD-10-CM

## 2018-06-28 DIAGNOSIS — E03.9 HYPOTHYROIDISM, UNSPECIFIED TYPE: ICD-10-CM

## 2018-06-28 DIAGNOSIS — R06.02 SHORTNESS OF BREATH: ICD-10-CM

## 2018-06-28 DIAGNOSIS — R06.09 DYSPNEA ON EXERTION: ICD-10-CM

## 2018-06-28 DIAGNOSIS — R01.1 HEART MURMUR, SYSTOLIC: Primary | ICD-10-CM

## 2018-06-28 DIAGNOSIS — Z01.00 DIABETIC EYE EXAM: ICD-10-CM

## 2018-06-28 DIAGNOSIS — N40.1 BENIGN PROSTATIC HYPERPLASIA WITH URINARY FREQUENCY: ICD-10-CM

## 2018-06-28 PROCEDURE — 99215 OFFICE O/P EST HI 40 MIN: CPT | Mod: S$GLB,,, | Performed by: INTERNAL MEDICINE

## 2018-06-28 PROCEDURE — 3044F HG A1C LEVEL LT 7.0%: CPT | Mod: CPTII,S$GLB,, | Performed by: INTERNAL MEDICINE

## 2018-06-28 PROCEDURE — 3079F DIAST BP 80-89 MM HG: CPT | Mod: CPTII,S$GLB,, | Performed by: INTERNAL MEDICINE

## 2018-06-28 PROCEDURE — 99499 UNLISTED E&M SERVICE: CPT | Mod: S$PBB,,, | Performed by: INTERNAL MEDICINE

## 2018-06-28 PROCEDURE — 99999 PR PBB SHADOW E&M-EST. PATIENT-LVL III: CPT | Mod: PBBFAC,,, | Performed by: INTERNAL MEDICINE

## 2018-06-28 PROCEDURE — 3075F SYST BP GE 130 - 139MM HG: CPT | Mod: CPTII,S$GLB,, | Performed by: INTERNAL MEDICINE

## 2018-06-28 PROCEDURE — 93000 ELECTROCARDIOGRAM COMPLETE: CPT | Mod: S$GLB,,, | Performed by: INTERNAL MEDICINE

## 2018-06-28 RX ORDER — METFORMIN HYDROCHLORIDE 500 MG/1
500 TABLET, EXTENDED RELEASE ORAL
Qty: 90 TABLET | Refills: 3 | Status: SHIPPED | OUTPATIENT
Start: 2018-06-28 | End: 2020-02-26 | Stop reason: SDUPTHER

## 2018-06-28 RX ORDER — TAMSULOSIN HYDROCHLORIDE 0.4 MG/1
0.4 CAPSULE ORAL DAILY
Qty: 90 CAPSULE | Refills: 3 | Status: SHIPPED | OUTPATIENT
Start: 2018-06-28 | End: 2019-07-31

## 2018-06-28 RX ORDER — LEVOTHYROXINE SODIUM 88 UG/1
88 TABLET ORAL
Qty: 90 TABLET | Refills: 3 | Status: SHIPPED | OUTPATIENT
Start: 2018-06-28 | End: 2019-07-31 | Stop reason: SDUPTHER

## 2018-06-28 NOTE — PATIENT INSTRUCTIONS
Recommendations for today    It should be acceptable to reduce the dosage of metformin.  Taking 1 tablet once daily should be sufficient to control blood sugar levels.  Blood sugar ranging between 90 and 120 confirms that blood sugar is still well controlled.  If reducing the dosage of metformin results in blood sugar rising above this range on a consistent basis we recommend that you contact the clinic so that we can adjust the metformin dosage    We are increasing the dosage of levothyroxine to help with thyroid hormone levels.  We will check thyroid levels again within 4-5 weeks to see if the new dosage is correct.

## 2018-06-30 NOTE — PROGRESS NOTES
Portions of this note are generated with voice recognition software. Typographical errors may exist.     SUBJECTIVE:    This is a/an 68 y.o. male here for primary care visit for  Chief Complaint   Patient presents with    Annual Exam       Patient was doing well with Flomax.  Requesting a refill.    Patient states that over the last 6 months he has felt that he has had uncharacteristic fatigue with typical activities of daily living.  No classic angina symptoms.  He has not had a cardiac stress test in more than 8 years.  The last stress test that was done did not diagnosis coronary artery disease.    If patient is complying with all medications and voices no side effects.        Medications Reviewed and Updated    Past medical, family, and social histories were reviewed and updated.    Review of Systems negative unless otherwise noted in history of present illness-  ROS    General ROS: negative  Psychological ROS: negative  ENT ROS: negative  Endocrine ROS: Negative  Allergy and Immunology ROS: negative  Cardiovascular ROS: negative  Pulmonary ROS: Negative  Gastrointestinal ROS: negative  Genito-Urinary ROS: negative  Musculoskeletal ROS: negative  Neurological ROS: negative  Dermatological ROS: negative        Allergic:  Review of patient's allergies indicates:  No Known Allergies    OBJECTIVE:  BP: 134/80 Pulse: 72    Wt Readings from Last 3 Encounters:   06/28/18 85.7 kg (188 lb 15 oz)   03/27/18 86 kg (189 lb 9.5 oz)   12/27/17 90.1 kg (198 lb 10.2 oz)    Body mass index is 29.15 kg/m².  Previous Blood Pressure Readings :   BP Readings from Last 3 Encounters:   06/28/18 134/80   03/27/18 122/80   12/27/17 132/80       Physical Exam    GEN: No apparent distress  HEENT: sclera non-icteric, conjunctiva clear  CV: no peripheral edema  PULM: breathing non-labored  ABD: Obese, protuberant abdomen.  PSYCH: appropriate affect  MSK: able to rise from chair without assistance  SKIN: normal skin turgor    Pertinent Labs  Reviewed       ASSESSMENT/PLAN:    Heart murmur, systolic.Etiology unclear. Not controlled. Further evaluation warranted.  Recommendations as below or as written on After Visit Summary.   -     EKG 12-lead; Future  -     Brain natriuretic peptide; Standing  -     NM Myocardial Perfusion Spect Multi Pharmacologic; Future; Expected date: 06/28/2018  -     [facility] Study RX Stress Card Component; Future    Hypothyroidism, unspecified type.Condition stable.  Counseling given today on self-care measures. Plan to monitor clinically. Continue current medical plan.   -     levothyroxine (SYNTHROID) 88 MCG tablet; Take 1 tablet (88 mcg total) by mouth before breakfast.  Dispense: 90 tablet; Refill: 3  -     TSH; Standing    Hyperlipidemia, unspecified hyperlipidemia type.Condition stable.  Counseling given today on self-care measures. Plan to monitor clinically. Continue current medical plan.     Type 2 diabetes mellitus with diabetic polyneuropathy, without long-term current use of insulin.Condition stable.  Counseling given today on self-care measures. Plan to monitor clinically. Continue current medical plan.     Benign prostatic hyperplasia with urinary frequency.Condition improving.  Counseling on self-care measures today.  Continue with current plan in addition to recommendations written on After Visit Summary.   -     tamsulosin (FLOMAX) 0.4 mg Cp24; Take 1 capsule (0.4 mg total) by mouth once daily.  Dispense: 90 capsule; Refill: 3    Type 2 diabetes mellitus with hyperglycemia, without long-term current use of insulin  -     metFORMIN (GLUCOPHAGE-XR) 500 MG 24 hr tablet; Take 1 tablet (500 mg total) by mouth daily with breakfast.  Dispense: 90 tablet; Refill: 3    Dyspnea on exertion.Etiology unclear. Not controlled. Further evaluation warranted.  Recommendations as below or as written on After Visit Summary.   -     NM Myocardial Perfusion Spect Multi Pharmacologic; Future; Expected date: 06/28/2018  -     NM Multi  Study RX Stress Card Component; Future      Diabetic eye exam  -     Diabetic Eye Screening Photo; Future    Other orders  -     Cancel: NM Myocardial Perfusion Spect Multi Exer; Future; Expected date: 06/28/2018  -     Cancel: NM Multi Study Stress Exer Card Component; Future          Future Appointments  Date Time Provider Department Center   7/10/2018 9:00 AM [facility] NM2 INJ [facility] NUCLEAR [facility]   7/10/2018 9:30 AM [facility] NM1 [facility] NUCLEAR [facility]   7/10/2018 10:00 AM CARDIOLOGY, STRESS/TILT TABLE/PRESTON [facility] CARD [facility]   8/2/2018 8:30 AM LAB, [facility] [place]   11/19/2018 8:30 AM LAB, [facility] [place]   11/21/2018 8:20 AM Ar Willett MD [facility] Shantal Willett  6/30/2018  5:48 PM

## 2018-07-10 ENCOUNTER — HOSPITAL ENCOUNTER (OUTPATIENT)
Dept: RADIOLOGY | Facility: HOSPITAL | Age: 68
Discharge: HOME | End: 2018-07-10
Attending: INTERNAL MEDICINE
Payer: MEDICARE

## 2018-07-10 ENCOUNTER — HOSPITAL ENCOUNTER (OUTPATIENT)
Dept: CARDIOLOGY | Facility: HOSPITAL | Age: 68
Discharge: HOME | End: 2018-07-10
Attending: INTERNAL MEDICINE
Payer: MEDICARE

## 2018-07-10 DIAGNOSIS — R01.1 HEART MURMUR, SYSTOLIC: ICD-10-CM

## 2018-07-10 DIAGNOSIS — R06.09 DYSPNEA ON EXERTION: ICD-10-CM

## 2018-07-10 DIAGNOSIS — R06.02 SHORTNESS OF BREATH: ICD-10-CM

## 2018-07-10 LAB — DIASTOLIC DYSFUNCTION: NO

## 2018-07-10 PROCEDURE — 93018 CV STRESS TEST I&R ONLY: CPT | Mod: ,,, | Performed by: INTERNAL MEDICINE

## 2018-07-10 PROCEDURE — 78452 HT MUSCLE IMAGE SPECT MULT: CPT | Mod: 26,,, | Performed by: RADIOLOGY

## 2018-07-10 PROCEDURE — 93017 CV STRESS TEST TRACING ONLY: CPT

## 2018-07-10 PROCEDURE — A9502 TC99M TETROFOSMIN: HCPCS

## 2018-07-10 PROCEDURE — 93016 CV STRESS TEST SUPVJ ONLY: CPT | Mod: ,,, | Performed by: INTERNAL MEDICINE

## 2018-07-11 ENCOUNTER — PATIENT OUTREACH (OUTPATIENT)
Dept: OTHER | Facility: OTHER | Age: 68
End: 2018-07-11

## 2018-07-11 NOTE — PROGRESS NOTES
Last 5 Patient Entered Readings                                      Current 30 Day Average: 114/78     Recent Readings 7/11/2018 7/11/2018 7/10/2018 7/10/2018 7/9/2018    SBP (mmHg) 115 121 110 120 108    DBP (mmHg) 75 75 79 76 78    Pulse 60 64 66 65 99        Digital Medicine: Health  Follow Up    Left voicemail to follow up with Mr. Cesar Simpson.  Current BP average 114/78 mmHg is at goal, <130/80 mmHg.  Will follow up in 2 weeks.

## 2018-07-11 NOTE — PROGRESS NOTES
"Last 5 Patient Entered Readings                                      Current 30 Day Average: 114/78     Recent Readings 7/11/2018 7/11/2018 7/10/2018 7/10/2018 7/9/2018    SBP (mmHg) 115 121 110 120 108    DBP (mmHg) 75 75 79 76 78    Pulse 60 64 66 65 99      Digital Medicine: Health  Follow Up    Lifestyle Modifications:    1.Dietary Modifications (Sodium intake <2,000mg/day, food labels, dining out): Patient is maintaining a low sodium diet.     2.Physical Activity: Mr. Simpson denies changes to his exercise regimen.    3.Medication Therapy: Patient has been compliant with the medication regimen.    4.Patient has the following medication side effects/concerns: None  (Frequency/Alleviating factors/Precipitating factors, etc.)     Follow up with Mr. Cesar Simpson completed. Mr. Simpson is doing well. He visited his PCP on 6/28, and wanted to discuss reducing doses of antihypertensive medications. However, his BP in the clinic was 134/80. He states that he was able to show Dr. Willett his BP readings from home, but "he's kind of tough". Mr. Simpson denies being nervous/anixuos when visiting the clinic. He did say that his feet are "hanging" when his BP is checked in the clinic. Reviewed proper technique for taking a BP reading. He now questions the accuracy of his digital cuff. Advised patient to bring [employer] cuff with to next visit to have compared to a manual reading. No further questions or concerns. Will continue follow up to achieve health goals.        "

## 2018-08-02 ENCOUNTER — LAB VISIT (OUTPATIENT)
Dept: LAB | Facility: HOSPITAL | Age: 68
End: 2018-08-02
Attending: INTERNAL MEDICINE
Payer: MEDICARE

## 2018-08-02 DIAGNOSIS — E03.9 HYPOTHYROIDISM, UNSPECIFIED TYPE: ICD-10-CM

## 2018-08-02 LAB — TSH SERPL DL<=0.005 MIU/L-ACNC: 3.63 UIU/ML

## 2018-08-02 PROCEDURE — 36415 COLL VENOUS BLD VENIPUNCTURE: CPT | Mod: PO

## 2018-08-02 PROCEDURE — 84443 ASSAY THYROID STIM HORMONE: CPT

## 2018-08-15 ENCOUNTER — PATIENT OUTREACH (OUTPATIENT)
Dept: OTHER | Facility: OTHER | Age: 68
End: 2018-08-15

## 2018-08-15 NOTE — PROGRESS NOTES
Last 5 Patient Entered Readings                                      Current 30 Day Average: 115/78     Recent Readings 8/15/2018 8/15/2018 8/15/2018 8/15/2018 8/14/2018    SBP (mmHg) 114 107 117 132 120    DBP (mmHg) 72 72 97 77 75    Pulse 67 66 80 86 61        Digital Medicine: Health  Follow Up    Left voicemail to follow up with Mr. Cesar Simpson.  Current BP average 115/78 mmHg is at goal, <130/80 mmHg.  Will follow up in 2 weeks.

## 2018-08-29 ENCOUNTER — PATIENT MESSAGE (OUTPATIENT)
Dept: ADMINISTRATIVE | Facility: OTHER | Age: 68
End: 2018-08-29

## 2018-08-29 DIAGNOSIS — E11.9 TYPE 2 DIABETES MELLITUS: ICD-10-CM

## 2018-08-29 NOTE — PROGRESS NOTES
"Last 5 Patient Entered Readings                                      Current 30 Day Average: 114/78     Recent Readings 8/29/2018 8/29/2018 8/29/2018 8/28/2018 8/27/2018    SBP (mmHg) 120 127 122 117 116    DBP (mmHg) 76 79 80 82 80    Pulse 56 55 53 60 64      Digital Medicine: Health  Follow Up    Lifestyle Modifications:    1.Dietary Modifications (Sodium intake <2,000mg/day, food labels, dining out): Patient is maintaining his low salt diet.     2.Physical Activity: Mr. Simpson is continuing to exercise most days of the week.     3.Medication Therapy: Patient has been compliant with the medication regimen.    4.Patient has the following medication side effects/concerns: None  (Frequency/Alleviating factors/Precipitating factors, etc.)     Follow up with Mr. Cesar Simpson completed. Patient reports that he is having "a little more trouble controlling the blood pressure". He would like to see lower readings so he can stop HTN medications, but is not sure what else he can do in order to lower them. Otherwise, he feels his BP is okay. Submitted order for DD today. No further questions or concerns. Will continue follow up to achieve health goals.        "

## 2018-08-29 NOTE — PROGRESS NOTES
Last 5 Patient Entered Readings                                      Current 30 Day Average: 114/78     Recent Readings 8/29/2018 8/29/2018 8/29/2018 8/28/2018 8/27/2018    SBP (mmHg) 120 127 122 117 116    DBP (mmHg) 76 79 80 82 80    Pulse 56 55 53 60 64        Digital Medicine: Health  Follow Up    Left voicemail to follow up with Mr. Guerrero Calvin.  Current BP average 114/78 mmHg is at goal, <130/80 mmHg.  Patient is now eligible for DDMP. Will follow up 1 week.

## 2018-09-18 ENCOUNTER — PATIENT OUTREACH (OUTPATIENT)
Dept: OTHER | Facility: OTHER | Age: 68
End: 2018-09-18

## 2018-09-18 DIAGNOSIS — E11.42 TYPE 2 DIABETES MELLITUS WITH DIABETIC POLYNEUROPATHY, WITHOUT LONG-TERM CURRENT USE OF INSULIN: Primary | ICD-10-CM

## 2018-09-18 NOTE — PROGRESS NOTES
Last 6 Patient Entered Readings                                          Most Recent A1c: 5.2% on 6/25/2018  (Goal: 7%)     Recent Readings 9/18/2018 9/17/2018 9/16/2018 9/15/2018 9/15/2018    Blood Glucose (mg/dL) 123 109 120 154 100        Diabetes Medications             metFORMIN (GLUCOPHAGE-XR) 500 MG 24 hr tablet Take 1 tablet (500 mg total) by mouth daily with breakfast.        Plan:   Called to welcome patient to the DDMP. Reviewed recent readings. Per AACE/ACE guidelines (goal A1C < 7%), DM is well controlled (A1C on 6/25 was 5.2%).  LVM, requested patient call back at his convenience.  Will continue to monitor. WCB in 1 month.

## 2018-09-19 ENCOUNTER — PATIENT MESSAGE (OUTPATIENT)
Dept: ADMINISTRATIVE | Facility: OTHER | Age: 68
End: 2018-09-19

## 2018-09-24 NOTE — PROGRESS NOTES
1. HTN  Last 5 Patient Entered Readings                                      Current 30 Day Average: 113/76     Recent Readings 9/24/2018 9/24/2018 9/24/2018 9/24/2018 9/24/2018    SBP (mmHg) 114 114 111 111 120    DBP (mmHg) 76 76 80 80 49    Pulse 87 87 90 90 91        Hypertension Medications             losartan (COZAAR) 100 MG tablet TAKE 1 TABLET ONE TIME DAILY        Plan:   Called patient to follow up, reviewed BP readings. Per 2017 ACC/ AHA HTN guidelines  (goal of BP < 130/80), current 30-day average is well controlled.  LVM, requested patient call back at his convenience.  Will continue to monitor. WCB in 2 weeks.       2. DM  Last 6 Patient Entered Readings                                          Most Recent A1c: 5.2% on 6/25/2018  (Goal: 7%)     Recent Readings 9/24/2018 9/22/2018 9/22/2018 9/21/2018 9/21/2018    Blood Glucose (mg/dL) 116 170 103 61 109        Diabetes Medications             metFORMIN (GLUCOPHAGE-XR) 500 MG 24 hr tablet Take 1 tablet (500 mg total) by mouth daily with breakfast.        Plan:   Called to welcome patient to the DD and to review low reading of 61 taken on 9/21. Reviewed recent readings. Per AACE/ACE guidelines (goal A1C < 7%), DM is well controlled (A1C on 6/25 was 5.2%).  LVM, 2nd attempt, requested patient call back at his convenience.  Will continue to monitor. WCB in 2 weeks.

## 2018-10-04 ENCOUNTER — PATIENT OUTREACH (OUTPATIENT)
Dept: OTHER | Facility: OTHER | Age: 68
End: 2018-10-04

## 2018-10-04 NOTE — PROGRESS NOTES
Last 5 Patient Entered Readings                                      Current 30 Day Average: 113/76     Recent Readings 10/4/2018 10/4/2018 10/4/2018 10/4/2018 10/4/2018    SBP (mmHg) 123 123 121 121 126    DBP (mmHg) 81 81 75 75 78    Pulse 62 62 60 60 63          Last 6 Patient Entered Readings                                          Most Recent A1c: 5.2% on 6/25/2018  (Goal: 8%)     Recent Readings 10/3/2018 10/2/2018 10/1/2018 10/1/2018 9/30/2018    Blood Glucose (mg/dL) 121 111 131 109 110        Digital Medicine: Health  Introduction    Patient is newly enrolled in [facility]. Discussed health  role and recommended lifestyle modifications.    Lifestyle Assessment:  Current Dietary Habits(i.e. low sodium, food labels, dining out): Reviewed meal times with Mr. Simpson. Patient states that he may not eat breakfast until 11am most day. He is mostly concerned with his BG levels after eating. He also reports eating rice with every meal. Encouraged patient to keep food diary to see what foods may be causing BG to spike. Patient agrees, and will check BG 1-2 hours after eating.     Exercise: Mr. Simpson walks for exercise.    Alcohol/Tobacco: None    Medication Adherence: has been compliant with the medicaiton regimen    Reviewed AHA/AACE recommendations:  Recommended CHO intake, 45-65% of daily caloric intake  Fasting BG goal 90-130mg/dI and BG Post-Meal goal <180mg/dI 1 hour after; <160mg/dI 2 hours after  Perform 150 minutes of physical activity per week    Reviewed the importance of self-monitoring, medication adherence, and that the health  can be used as a resource for lifestyle modifications to help reduce or maintain a healthy lifestyle.  Reviewed that the Digital Medicine team is not available for emergencies and instructed the patient to call 911 or [facility] ([phone] or [phone]) if one arises.

## 2018-10-18 NOTE — PROGRESS NOTES
1. HTN  Last 5 Patient Entered Readings                                      Current 30 Day Average: 112/74     Recent Readings 10/13/2018 10/13/2018 10/13/2018 10/13/2018 10/13/2018    SBP (mmHg) 121 121 122 122 125    DBP (mmHg) 75 75 78 78 81    Pulse 62 62 62 62 59        Hypertension Medications             losartan (COZAAR) 100 MG tablet TAKE 1 TABLET ONE TIME DAILY        Assessment/ Plan:   Called patient to follow up, reviewed recent readings.   Per 2017 ACC/ AHA HTN guidelines (goal of BP < 130/80), current 30-day average is well controlled.   Patient denies having questions or concerns. Instructed patient to call if he has any questions or concerns, patient confirms understanding.   Will continue to monitor. WCB in 3 months, sooner if BP begins to trend up or down.       2. DM  Last 6 Patient Entered Readings                                          Most Recent A1c: 5.2% on 6/25/2018  (Goal: 8%)     Recent Readings 10/16/2018 10/15/2018 10/15/2018 10/14/2018 10/12/2018    Blood Glucose (mg/dL) 108 166 145 120 124        Diabetes Medications             metFORMIN (GLUCOPHAGE-XR) 500 MG 24 hr tablet Take 1 tablet (500 mg total) by mouth daily with breakfast.        Assessment/ Plan:   Called to welcome patient to the [facility], reviewed recent readings.   Per AACE/ACE guidelines (goal A1C < 7%), DM is well controlled (A1C on 6/25 was 5.2%). Next A1C will be due in 12/18.  Will send updated RX for test strips so patient may test BID.   Patient denies having questions or concerns. Instructed patient to call if he has any questions or concerns, patient confirms understanding.   Will continue to monitor. WCB in 3 months, sooner if BG begins to trend up or down.

## 2018-11-01 ENCOUNTER — PATIENT OUTREACH (OUTPATIENT)
Dept: OTHER | Facility: OTHER | Age: 68
End: 2018-11-01

## 2018-11-01 NOTE — PROGRESS NOTES
Last 5 Patient Entered Readings                                      Current 30 Day Average: 115/77     Recent Readings 11/1/2018 11/1/2018 11/1/2018 11/1/2018 11/1/2018    SBP (mmHg) 108 108 176 176 102    DBP (mmHg) 76 76 128 128 69    Pulse 65 65 65 65 58        Hypertension Medications             losartan (COZAAR) 100 MG tablet TAKE 1 TABLET ONE TIME DAILY        Plan:   Called patient to follow up regarding recent critical readings, 176/128 on 11/1 and 132/117 on 10/31. Patient retook readings and repeat readings were 108/76 and 117/78, respectively. Reviewed BP readings. Per 2017 ACC/ AHA HTN guidelines  (goal of BP < 130/80), current 30-day average is well controlled.  LVM, requested patient call back at his convenience.  Will continue to monitor. WCB in 2 weeks.       Patient called back. Patient attributes moving his arm while taking his BP to falsely elevated readings.   Reviewed recent readings. Per 2017 ACC/ AHA HTN guidelines (goal of BP < 130/80), current 30-day average is well controlled.   Patient denies having questions or concerns. Instructed patient to call if he has any questions or concerns, patient confirms understanding.   Will continue to monitor. WCB in 3 months, sooner if BP begins to trend up or down.

## 2018-11-12 ENCOUNTER — PATIENT OUTREACH (OUTPATIENT)
Dept: OTHER | Facility: OTHER | Age: 68
End: 2018-11-12

## 2018-11-12 NOTE — PROGRESS NOTES
Last 5 Patient Entered Readings                                      Current 30 Day Average: 115/78     Recent Readings 11/12/2018 11/12/2018 11/12/2018 11/12/2018 11/11/2018    SBP (mmHg) 114 114 125 125 102    DBP (mmHg) 77 77 84 84 66    Pulse 72 72 76 76 68          Last 6 Patient Entered Readings                                          Most Recent A1c: 5.2% on 6/25/2018  (Goal: 8%)     Recent Readings 11/11/2018 11/10/2018 11/10/2018 11/10/2018 11/10/2018    Blood Glucose (mg/dL) 136 129 6 1 134        Digital Medicine: Health  Follow Up    Left voicemail to follow up with Mr. Cesar Simpson.  Current BP average 115/78 mmHg is at goal, <130/80 mmHg.  High BP and low BG recorded 11/10. Will follow up in 1 week.

## 2018-11-13 NOTE — PROGRESS NOTES
"Last 5 Patient Entered Readings                                      Current 30 Day Average: 116/79     Recent Readings 11/13/2018 11/13/2018 11/13/2018 11/13/2018 11/13/2018    SBP (mmHg) 135 135 135 135 123    DBP (mmHg) 82 82 82 82 82    Pulse 59 59 61 61 62          Last 6 Patient Entered Readings                                          Most Recent A1c: 5.2% on 6/25/2018  (Goal: 8%)     Recent Readings 11/11/2018 11/10/2018 11/10/2018 11/10/2018 11/10/2018    Blood Glucose (mg/dL) 136 129 6 1 134          Digital Medicine: Health  Follow Up    Lifestyle Modifications:    1.Dietary Modifications (Sodium intake <2,000mg/day, food labels, dining out): Patient states that he is watching what he eats. He has "eliminated almost everything that has carbs". He reports buying a lot of foods "without labels" (eating mostly fresh produce and salads). Other foods, he is reading the food label to help with limiting CHO intake. Patient states that he occasionally will purchase frozen meals, but does not consider sodium when making his purchase. Advised patient to be cautious about frozen prepared meals, review sodium intake and keep meals <500mg.     2.Physical Activity: Patient continues to walk for exercise.     3.Medication Therapy: Patient has been compliant with the medication regimen.    4.Patient has the following medication side effects/concerns: None   (Frequency/Alleviating factors/Precipitating factors, etc.)     Follow up with Mr. Cesar Simpson completed. Mr. Simpson is doing okay. Patient attributes low BG to "the meter refusing to work because it thought I was out of strips". He is not sure what caused spike in BP. Will continue to monitor. No questions or concerns currently. Will continue to follow up to achieve health goals.      "

## 2018-11-19 ENCOUNTER — LAB VISIT (OUTPATIENT)
Dept: LAB | Facility: HOSPITAL | Age: 68
End: 2018-11-19
Attending: INTERNAL MEDICINE
Payer: MEDICARE

## 2018-11-19 DIAGNOSIS — E03.9 HYPOTHYROIDISM, UNSPECIFIED TYPE: ICD-10-CM

## 2018-11-19 DIAGNOSIS — E11.9 CONTROLLED TYPE 2 DIABETES MELLITUS WITHOUT COMPLICATION, WITHOUT LONG-TERM CURRENT USE OF INSULIN: ICD-10-CM

## 2018-11-19 LAB
ALBUMIN SERPL BCP-MCNC: 3.8 G/DL
ALP SERPL-CCNC: 44 U/L
ALT SERPL W/O P-5'-P-CCNC: 30 U/L
ANION GAP SERPL CALC-SCNC: 6 MMOL/L
AST SERPL-CCNC: 24 U/L
BILIRUB SERPL-MCNC: 0.4 MG/DL
BUN SERPL-MCNC: 15 MG/DL
CALCIUM SERPL-MCNC: 9.6 MG/DL
CHLORIDE SERPL-SCNC: 102 MMOL/L
CO2 SERPL-SCNC: 30 MMOL/L
CREAT SERPL-MCNC: 0.8 MG/DL
EST. GFR  (AFRICAN AMERICAN): >60 ML/MIN/1.73 M^2
EST. GFR  (NON AFRICAN AMERICAN): >60 ML/MIN/1.73 M^2
GLUCOSE SERPL-MCNC: 113 MG/DL
POTASSIUM SERPL-SCNC: 4.8 MMOL/L
PROT SERPL-MCNC: 7.3 G/DL
SODIUM SERPL-SCNC: 138 MMOL/L
TSH SERPL DL<=0.005 MIU/L-ACNC: 2.38 UIU/ML

## 2018-11-19 PROCEDURE — 36415 COLL VENOUS BLD VENIPUNCTURE: CPT | Mod: HCNC,PO

## 2018-11-19 PROCEDURE — 80053 COMPREHEN METABOLIC PANEL: CPT | Mod: HCNC

## 2018-11-19 PROCEDURE — 84443 ASSAY THYROID STIM HORMONE: CPT | Mod: HCNC

## 2018-11-21 ENCOUNTER — OFFICE VISIT (OUTPATIENT)
Dept: INTERNAL MEDICINE | Facility: CLINIC | Age: 68
End: 2018-11-21
Payer: MEDICARE

## 2018-11-21 VITALS
DIASTOLIC BLOOD PRESSURE: 80 MMHG | HEART RATE: 72 BPM | OXYGEN SATURATION: 95 % | BODY MASS INDEX: 29.44 KG/M2 | HEIGHT: 68 IN | WEIGHT: 194.25 LBS | SYSTOLIC BLOOD PRESSURE: 126 MMHG

## 2018-11-21 DIAGNOSIS — Z23 NEED FOR IMMUNIZATION AGAINST INFLUENZA: ICD-10-CM

## 2018-11-21 DIAGNOSIS — G47.33 OSA ON CPAP: ICD-10-CM

## 2018-11-21 DIAGNOSIS — R53.82 CHRONIC FATIGUE: Primary | ICD-10-CM

## 2018-11-21 DIAGNOSIS — E11.9 CONTROLLED TYPE 2 DIABETES MELLITUS WITHOUT COMPLICATION, WITHOUT LONG-TERM CURRENT USE OF INSULIN: ICD-10-CM

## 2018-11-21 DIAGNOSIS — E11.9 ENCOUNTER FOR DIABETIC FOOT EXAM: ICD-10-CM

## 2018-11-21 PROCEDURE — G0008 ADMIN INFLUENZA VIRUS VAC: HCPCS | Mod: HCNC,S$GLB,, | Performed by: INTERNAL MEDICINE

## 2018-11-21 PROCEDURE — 1101F PT FALLS ASSESS-DOCD LE1/YR: CPT | Mod: CPTII,HCNC,S$GLB, | Performed by: INTERNAL MEDICINE

## 2018-11-21 PROCEDURE — 3079F DIAST BP 80-89 MM HG: CPT | Mod: CPTII,HCNC,S$GLB, | Performed by: INTERNAL MEDICINE

## 2018-11-21 PROCEDURE — 99499 UNLISTED E&M SERVICE: CPT | Mod: HCNC,S$GLB,, | Performed by: INTERNAL MEDICINE

## 2018-11-21 PROCEDURE — 3044F HG A1C LEVEL LT 7.0%: CPT | Mod: CPTII,HCNC,S$GLB, | Performed by: INTERNAL MEDICINE

## 2018-11-21 PROCEDURE — 3074F SYST BP LT 130 MM HG: CPT | Mod: CPTII,HCNC,S$GLB, | Performed by: INTERNAL MEDICINE

## 2018-11-21 PROCEDURE — 90662 IIV NO PRSV INCREASED AG IM: CPT | Mod: HCNC,S$GLB,, | Performed by: INTERNAL MEDICINE

## 2018-11-21 PROCEDURE — 99214 OFFICE O/P EST MOD 30 MIN: CPT | Mod: 25,HCNC,S$GLB, | Performed by: INTERNAL MEDICINE

## 2018-11-21 PROCEDURE — 99999 PR PBB SHADOW E&M-EST. PATIENT-LVL IV: CPT | Mod: PBBFAC,HCNC,, | Performed by: INTERNAL MEDICINE

## 2018-11-21 NOTE — PROGRESS NOTES
Portions of this note are generated with voice recognition software. Typographical errors may exist.     SUBJECTIVE:    This is a/an 68 y.o. male here for primary care visit for  Chief Complaint   Patient presents with    Diabetes     follow up      Patient states lately his sleeping has been 3-4 hours of sleep on interrupted and that he has very significant difficulty going back to sleep.  The patient states that nocturia is not a component of this.  States that he only goes to the bathroom once nightly.  Let using CPAP.  Patient states that he has traditionally gone to the gym but has not done significant aerobic activity when he goes.  He does not have any major orthopedic limitations to doing low impact aerobic activity.  He states he simply just has to motivate himself to do the aerobic exercise and to do it consistently every week.    The patient has been meticulously checking his blood pressure but has been frustrated that the goal from the program of a systolic blood pressure of 130 has not been achieved lately.  The patient is reassured that based on his age a blood pressure of less than 140 is still a reasonable target.    The patient denies any diabetic foot problems such as numbness tingling or burning on a recurring basis.    Medications Reviewed and Updated    Past medical, family, and social histories were reviewed and updated.    Review of Systems negative unless otherwise noted in history of present illness-  ROS    General ROS: negative  Psychological ROS: negative  ENT ROS: negative  Endocrine ROS: Negative  Allergy and Immunology ROS: negative  Cardiovascular ROS: negative  Pulmonary ROS: Negative  Gastrointestinal ROS: negative  Genito-Urinary ROS: negative  Musculoskeletal ROS: negative  Neurological ROS: negative  Dermatological ROS: negative        Allergic:  Review of patient's allergies indicates:  No Known Allergies    OBJECTIVE:  BP: 126/80 Pulse: 72    Wt Readings from Last 3  Encounters:   11/21/18 88.1 kg (194 lb 3.6 oz)   06/28/18 85.7 kg (188 lb 15 oz)   03/27/18 86 kg (189 lb 9.5 oz)    Body mass index is 29.97 kg/m².  Previous Blood Pressure Readings :   BP Readings from Last 3 Encounters:   11/21/18 126/80   06/28/18 134/80   03/27/18 122/80       Physical Exam    GEN: No apparent distress  HEENT: sclera non-icteric, conjunctiva clear  CV: no peripheral edema regular rate and rhythm no murmurs  PULM: breathing non-labored  ABD:  protuberant abdomen.  PSYCH: appropriate affect  MSK: able to rise from chair without assistance  SKIN: normal skin turgor        Neuropathy Screen   Left: Filament test present 9 sites  Right:   Filament test present 9 sites    Dermatologic Skin  - cracking: No  - thickened nails  No  - blistering  No    Infection:   - fungal infection between toes: No    Ulceration: No    Musculoskeletal Deformity  - claw toes No  - prominent metatarsal heads  No  - charcot joint  No  - muscle wasting (guttering between metatarsals) No    Left DPP Pulse Diminished: No  Right DPP Pulse Diminished:  No        Pertinent Labs Reviewed       ASSESSMENT/PLAN:    Chronic fatigue.Condition not optimally controlled. Detailed counseling on self care measures. Plan to monitor clinically in addition to plan below or as listed on After Visit Summary.     FABY on CPAP.Condition not optimally controlled. Detailed counseling on self care measures. Plan to monitor clinically in addition to plan below or as listed on After Visit Summary.   -     Ambulatory consult to Sleep Disorders    Controlled type 2 diabetes mellitus without complication, without long-term current use of insulin.Condition stable.  Counseling given today on self-care measures. Plan to monitor clinically. Continue current medical plan.     Encounter for diabetic foot exam    Need for immunization against influenza  -     Influenza - High Dose (65+) (PF) (IM)          Future Appointments   Date Time Provider Department  [place]   11/28/2018  9:30 AM John Rodriguez OD [facility] [place]   12/13/2018  3:40 PM Idalia Jacob NP [facility] SLEEP [place]   5/20/2019  7:00 AM [facility] [place]   5/20/2019  7:15 AM SPECIMEN, [facility] SPECLAB [place]   5/23/2019  8:40 AM Ar Willett MD [facility] [place]       Ar Willett  11/21/2018  8:49 AM

## 2018-11-21 NOTE — PATIENT INSTRUCTIONS
Ejercicios para la parte inferior del cuerpo: Estiramiento de pantorrilla  Peyton ejercicio estira y fortalece la parte inferior del cuerpo para ayudar a lim espalda. Practíquelo con la frecuencia que le recomiende lim proveedor de atención médica. No se apresure ni nico esfuerzos mientras hace el ejercicio. Use clarence colchoneta, almohada o toalla doblada para protegerse las rodillas y otras áreas delicadas.  · Colóquese de frente a clarence pared, a 2 pies de distancia. Dé un paso hacia la pared con un pie.  · Apoye las dos nell en la pared y doble la rodilla de la pierna que acaba de avanzar.  · Inclínese hacia adelante, manteniendo recta la pierna posterior y el talón en el piso.  · Sostenga la posición david 20 segundos. Jena de temona.  Date Last Reviewed: 11/14/2015  © 1736-8268 [employer]. [address] [place] [address], [place] [address]. Todos los derechos reservados. Esta información no pretende sustituir la atención médica profesional. Sólo lim médico puede diagnosticar y tratar un problema de isaiah.

## 2018-11-22 DIAGNOSIS — E78.5 DYSLIPIDEMIA: ICD-10-CM

## 2018-11-22 DIAGNOSIS — I10 ESSENTIAL HYPERTENSION: ICD-10-CM

## 2018-11-22 RX ORDER — LOSARTAN POTASSIUM 100 MG/1
TABLET ORAL
Qty: 90 TABLET | Refills: 1 | Status: SHIPPED | OUTPATIENT
Start: 2018-11-22 | End: 2018-11-22 | Stop reason: SDUPTHER

## 2018-11-22 RX ORDER — PRAVASTATIN SODIUM 40 MG/1
TABLET ORAL
Qty: 90 TABLET | Refills: 1 | Status: SHIPPED | OUTPATIENT
Start: 2018-11-22 | End: 2019-05-09 | Stop reason: SDUPTHER

## 2018-11-22 RX ORDER — LOSARTAN POTASSIUM 100 MG/1
TABLET ORAL
Qty: 90 TABLET | Refills: 1 | Status: SHIPPED | OUTPATIENT
Start: 2018-11-22 | End: 2019-05-09 | Stop reason: SDUPTHER

## 2018-11-28 ENCOUNTER — OFFICE VISIT (OUTPATIENT)
Dept: OPTOMETRY | Facility: CLINIC | Age: 68
End: 2018-11-28
Payer: COMMERCIAL

## 2018-11-28 DIAGNOSIS — Z98.42 S/P BILATERAL CATARACT EXTRACTION: ICD-10-CM

## 2018-11-28 DIAGNOSIS — Z96.1 PSEUDOPHAKIA OF BOTH EYES: ICD-10-CM

## 2018-11-28 DIAGNOSIS — E11.9 TYPE 2 DIABETES MELLITUS WITHOUT RETINOPATHY: ICD-10-CM

## 2018-11-28 DIAGNOSIS — E11.9 DIABETIC EYE EXAM: Primary | ICD-10-CM

## 2018-11-28 DIAGNOSIS — H52.7 REFRACTIVE ERRORS: ICD-10-CM

## 2018-11-28 DIAGNOSIS — Z01.00 DIABETIC EYE EXAM: Primary | ICD-10-CM

## 2018-11-28 DIAGNOSIS — Z98.41 S/P BILATERAL CATARACT EXTRACTION: ICD-10-CM

## 2018-11-28 DIAGNOSIS — Z98.890 HISTORY OF REFRACTIVE SURGERY: ICD-10-CM

## 2018-11-28 PROCEDURE — 92014 COMPRE OPH EXAM EST PT 1/>: CPT | Mod: S$GLB,,, | Performed by: OPTOMETRIST

## 2018-11-28 PROCEDURE — 92015 DETERMINE REFRACTIVE STATE: CPT | Mod: S$GLB,,, | Performed by: OPTOMETRIST

## 2018-11-28 PROCEDURE — 99999 PR PBB SHADOW E&M-EST. PATIENT-LVL III: CPT | Mod: PBBFAC,,, | Performed by: OPTOMETRIST

## 2018-11-28 NOTE — PROGRESS NOTES
"HPI     Diabetic Eye Exam      Additional comments: Diabetic eye exam  NIDDM (taking Metformin) x 10 years.   Feels VA in OD ("dominant eye") better than in OS.  Gets headache if he wears glasses "too long"              Comments     Patient's age: 68 y.o.  male   Occupation: Retired  Approximate date of last eye examination:  03/27/2017  Name of last eye doctor seen:   City/State: [place]  Wears glasses? OTC readers     If yes, wears  Full-time or part-time?  Part time  Present glasses are: Bifocal, SV Distance, SV Reading?  SV reading     Any problem with VA with glasses?  Headache if he wears glasses too long   Wears CLs?:    Headaches?  Occasional HA's with readers, as noted above  Eye pain/discomfort?  No                                                                                      Flashes?  no  Floaters?  intermittent   Diplopia/Double vision?  No   Patient's Ocular History:         Any eye surgeries? S/p phaco OU         Any eye injury?  No          Any treatment for eye disease?  No   Family history of eye disease?  no  Significant patient medical history:         1. Diabetes?  Yes, 113 this morning       If yes, IDDM or NIDDM?  NIDDM x 10 years +/-   2. HBP?  Yes, controlled                ! OTC eyedrops currently using:  No   ! Prescription eye meds currently using:  No    ! Any history of allergy/adverse reaction to any eye meds used   previously?  No     ! Any history of allergy/adverse reaction to eyedrops used during prior   eye exam(s)? No     ! Any history of allergy/adverse reaction to Novacaine or similar meds?   No    ! Any history of allergy/adverse reaction to Epinephrine or similar meds?   No     ! Patient okay with use of anesthetic eyedrops to check eye pressure?    Yes         ! Patient okay with use of eyedrops to dilate pupils today?  yes   !  Allergies/Medications/Medical History/Family History reviewed today?    yes      PD =   64/40  Desired reading distance =  " "15"                                                                     Last edited by John Rodriguez, OD on 11/28/2018  9:57 AM. (History)            Assessment /Plan     For exam results, see Encounter Report.    1. Diabetic eye exam     2. Type 2 diabetes mellitus without retinopathy     3. S/P bilateral cataract extraction     4. Pseudophakia of both eyes     5. History of refractive surgery     6. Refractive errors                    S/P cataract surgery in both eyes, with bilateral posterior chamber intraocular lens.  S/P YAG laser posterior capsulotomy in both eyes.  Residual distance refractive error in each eye, with very satisfactory best-corrected VA in each eye.    New spectacle lens Rx issued for use as desired.    S/P RK refractive surgery in both eyes many years ago.      Type 2 diabetes without evidence of diabetic retinopathy in either eye.    Recheck in twelve months, or prior if any problems or changes in vision noted in the interim.     "

## 2018-11-28 NOTE — PATIENT INSTRUCTIONS
S/P cataract surgery in both eyes, with bilateral posterior chamber intraocular lens.  S/P YAG laser posterior capsulotomy in both eyes.  Residual distance refractive error in each eye, with very satisfactory best-corrected VA in each eye.    New spectacle lens Rx issued for use as desired.    S/P RK refractive surgery in both eyes many years ago.      Type 2 diabetes without evidence of diabetic retinopathy in either eye.    Recheck in twelve months, or prior if any problems or changes in vision noted in the interim.

## 2018-12-11 ENCOUNTER — PATIENT OUTREACH (OUTPATIENT)
Dept: OTHER | Facility: OTHER | Age: 68
End: 2018-12-11

## 2018-12-11 NOTE — PROGRESS NOTES
Last 5 Patient Entered Readings                                      Current 30 Day Average: 118/80     Recent Readings 12/11/2018 12/11/2018 12/10/2018 12/10/2018 12/10/2018    SBP (mmHg) 102 102 118 118 116    DBP (mmHg) 68 68 74 74 75    Pulse 67 67 86 86 56          Last 6 Patient Entered Readings                                          Most Recent A1c: 5.2% on 6/25/2018  (Goal: 8%)     Recent Readings 12/9/2018 12/7/2018 12/6/2018 12/6/2018 12/5/2018    Blood Glucose (mg/dL) 108 128 134 144 128        Digital Medicine: Health  Follow Up    Left voicemail to follow up with Mr. Cesar Simpson.  Current BP average 118/80 mmHg is at goal, <130/80 mmHg.  Hypoglycemic event recorded 11/21. Will follow up in 2-3 weeks.

## 2018-12-13 NOTE — PROGRESS NOTES
Last 5 Patient Entered Readings                                      Current 30 Day Average: 120/80     Recent Readings 12/13/2018 12/13/2018 12/13/2018 12/13/2018 12/12/2018    SBP (mmHg) 120 120 126 126 115    DBP (mmHg) 82 82 82 82 70    Pulse 59 59 61 61 61          Last 6 Patient Entered Readings                                          Most Recent A1c: 5.2% on 6/25/2018  (Goal: 8%)     Recent Readings 12/12/2018 12/11/2018 12/9/2018 12/7/2018 12/6/2018    Blood Glucose (mg/dL) 103 114 108 128 134        Digital Medicine: Health  Follow Up    Lifestyle Modifications:    1.Dietary Modifications (Sodium intake <2,000mg/day, food labels, dining out): Mr. Simpson is staying on track with his diet. Encouraged sodium restriction.    2.Physical Activity: Patient reports that he has been trying to exercise a little more.     3.Medication Therapy: Patient has been compliant with the medication regimen.    4.Patient has the following medication side effects/concerns: None  (Frequency/Alleviating factors/Precipitating factors, etc.)     Follow up with Mr. Cesar Simpson completed. Mr. Simpson is doing well. He reports that low BG recorded on 11/21 was false. He is now questioning the accuracy of his [facility] glucometer. He states that BG readings can sometimes be up to 20 points off. His 30 day BP average 120/80 mmHg is at goal, <130/80 mmHg. He has no questions for improving dietary habits or physical activity currently. No further questions or concerns. Will continue to follow up to achieve health goals.

## 2018-12-19 ENCOUNTER — OFFICE VISIT (OUTPATIENT)
Dept: SLEEP MEDICINE | Facility: CLINIC | Age: 68
End: 2018-12-19
Payer: MEDICARE

## 2018-12-19 VITALS
DIASTOLIC BLOOD PRESSURE: 73 MMHG | HEART RATE: 60 BPM | HEIGHT: 68 IN | WEIGHT: 194.88 LBS | BODY MASS INDEX: 29.54 KG/M2 | SYSTOLIC BLOOD PRESSURE: 123 MMHG

## 2018-12-19 DIAGNOSIS — G47.00 INSOMNIA, UNSPECIFIED TYPE: ICD-10-CM

## 2018-12-19 DIAGNOSIS — G47.33 OSA (OBSTRUCTIVE SLEEP APNEA): Primary | ICD-10-CM

## 2018-12-19 PROCEDURE — 99204 OFFICE O/P NEW MOD 45 MIN: CPT | Mod: HCNC,S$GLB,, | Performed by: PSYCHIATRY & NEUROLOGY

## 2018-12-19 PROCEDURE — 3078F DIAST BP <80 MM HG: CPT | Mod: CPTII,HCNC,S$GLB, | Performed by: PSYCHIATRY & NEUROLOGY

## 2018-12-19 PROCEDURE — 99999 PR PBB SHADOW E&M-EST. PATIENT-LVL III: CPT | Mod: PBBFAC,HCNC,, | Performed by: PSYCHIATRY & NEUROLOGY

## 2018-12-19 PROCEDURE — 3074F SYST BP LT 130 MM HG: CPT | Mod: CPTII,HCNC,S$GLB, | Performed by: PSYCHIATRY & NEUROLOGY

## 2018-12-19 PROCEDURE — 1101F PT FALLS ASSESS-DOCD LE1/YR: CPT | Mod: CPTII,HCNC,S$GLB, | Performed by: PSYCHIATRY & NEUROLOGY

## 2018-12-19 RX ORDER — TRAZODONE HYDROCHLORIDE 50 MG/1
50 TABLET ORAL NIGHTLY
Qty: 30 TABLET | Refills: 5 | Status: SHIPPED | OUTPATIENT
Start: 2018-12-19 | End: 2019-03-20 | Stop reason: SDUPTHER

## 2018-12-19 NOTE — LETTER
December 19, 2018      Ar Willett MD  [address]  [place] [address]           [facility]  [address] [place] [address]  Phone: [phone]  Fax: [phone]          Patient: Cesar Simpson   MR Number: [id number]   YOB: 1950   Date of Visit: 12/19/2018       Dear Dr. Ar Willett:    Thank you for referring Cesar Simpson to me for evaluation. Attached you will find relevant portions of my assessment and plan of care.    If you have questions, please do not hesitate to call me. I look forward to following Cesar Simpson along with you.    Sincerely,    Keri Cr MD    Enclosure  CC:  No Recipients    If you would like to receive this communication electronically, please contact [email or web address] or [phone] to request more information on [employer] access.    For providers and/or their staff who would like to refer a patient to [facility], please contact us through our one-stop-shop provider referral line, [facility], at [phone].    If you feel you have received this communication in error or would no longer like to receive these types of communications, please e-mail [email or web address]

## 2018-12-21 ENCOUNTER — TELEPHONE (OUTPATIENT)
Dept: FAMILY MEDICINE | Facility: CLINIC | Age: 68
End: 2018-12-21

## 2018-12-21 NOTE — TELEPHONE ENCOUNTER
----- Message from Irene Gregg sent at 12/21/2018 10:12 AM CST -----  Contact: Cesar Stephenson,    Mr. Simpson received a Diabetes [facility] e-mail on 12/8/18 stating he needed to complete an A1C lab.  The patient went to both labs--at [facility] and both labs said they were not aware of the test needed. Please contact the patient & schedule if the lab is needed.    Thank you,     Irene Gregg  [facility] [place] [facility]

## 2019-01-10 ENCOUNTER — PATIENT OUTREACH (OUTPATIENT)
Dept: OTHER | Facility: OTHER | Age: 69
End: 2019-01-10

## 2019-01-10 NOTE — PROGRESS NOTES
HPI:  Called patient to follow up. Patient endorses adherence to medication regimen daily and denies missed doses. Patient denies hypotensive s/sx (lightheadedness, dizziness, nausea, fatigue); patient denies hypertensive s/sx (SOB, CP, severe headaches, changes in vision, dizziness, fatigue, confusion, anxiety, nosebleeds).     Last 5 Patient Entered Readings                                      Current 30 Day Average: 115/75     Recent Readings 1/10/2019 1/10/2019 1/10/2019 1/10/2019 1/10/2019    SBP (mmHg) 122 122 114 114 105    DBP (mmHg) 75 75 78 78 66    Pulse 68 68 65 65 70        Assessment:  Reviewed recent readings. Per 2017 ACC/ AHA HTN guidelines (goal of BP < 130/80), current 30-day average is well controlled.     Plan:  Continue current medication regimen.   Instructed patient to call dispensing pharmacy to confirm whether or not he has received recalled losartan.   Patients health , Suly Lion, will be following up as scheduled.   I will continue to monitor regularly and will follow-up in 12 weeks, sooner if blood pressure begins to trend upward or downward.     Current medication regimen:  Hypertension Medications             losartan (COZAAR) 100 MG tablet TAKE 1 TABLET ONE TIME DAILY         Patient denies having questions or concerns. Patient has my contact information and knows to call with any concerns or clinical changes. Instructed patient to call if BP remains > 130/80.       2. DM  HPI:  Called patient to follow up regarding the DDMP (Diabetes Digital Medicine Program).  Patient endorses adherence to medication regimen daily and denies missed doses.   Patient denies hypoglycemic s/sx (dizziness, extreme hunger, headaches, confusion, trouble concentrating, sweating, shaking, blurred vision, personality changes); patient denies hyperglycemic s/sx (increased thirst, increased urination, headaches, trouble concentrating, blurred vision, fatigue).    Last 6 Patient Entered Readings                                           Most Recent A1c: 5.2% on 6/25/2018  (Goal: 7%)     Recent Readings 1/9/2019 1/7/2019 1/6/2019 1/5/2019 1/4/2019    Blood Glucose (mg/dL) 102 130 113 117 131        Assessment:  Reviewed recent readings. Per AACE/ACE guidelines (goal A1C < 7%), DM is well controlled (A1C on 6/25 was 5.2%). Next A1C is due.    Plan:  Continue current medication regimen. Patients health , Suly Lion, will be following up as scheduled. I will continue to monitor regularly and will follow-up in 12 weeks, sooner if blood sugar begins to trend upward or downward.     Current Medication Regimen:  Diabetes Medications             metFORMIN (GLUCOPHAGE-XR) 500 MG 24 hr tablet Take 1 tablet (500 mg total) by mouth daily with breakfast.         Patient has my contact information and knows to call with any concerns or clinical changes.

## 2019-01-22 ENCOUNTER — PATIENT OUTREACH (OUTPATIENT)
Dept: OTHER | Facility: OTHER | Age: 69
End: 2019-01-22

## 2019-01-22 DIAGNOSIS — E11.42 TYPE 2 DIABETES MELLITUS WITH DIABETIC POLYNEUROPATHY, WITHOUT LONG-TERM CURRENT USE OF INSULIN: Primary | ICD-10-CM

## 2019-01-22 NOTE — PROGRESS NOTES
Last 5 Patient Entered Readings                                      Current 30 Day Average: 117/75     Recent Readings 1/21/2019 1/21/2019 1/20/2019 1/20/2019 1/19/2019    SBP (mmHg) 110 110 115 115 124    DBP (mmHg) 73 73 79 79 83    Pulse 71 71 63 63 74          Last 6 Patient Entered Readings                                          Most Recent A1c: 5.2% on 6/25/2018  (Goal: 7%)     Recent Readings 1/21/2019 1/20/2019 1/19/2019 1/18/2019 1/18/2019    Blood Glucose (mg/dL) 107 112 162 181 180        Digital Medicine: Health  Follow Up    Lifestyle Modifications:    1.Dietary Modifications (Sodium intake <2,000mg/day, food labels, dining out): Patient denies changes to his diet, and reports eating the same things.     2.Physical Activity: Mr. Simpson continues to walk for exercise.     3.Medication Therapy: Patient has been compliant with the medication regimen.    4.Patient has the following medication side effects/concerns: None  (Frequency/Alleviating factors/Precipitating factors, etc.)     Follow up with Mr. Cesar Simpson completed. Mr. Simpson is doing okay. He continues to express some concern about his [employer] glucometer, but overall, is pleased with his BG readings. He states that he went to [facility] for have his A1c drawn, but was told that there wasn't anything in the system for him. Will inquire. No further questions or concerns. Will continue to follow up to achieve health goals.

## 2019-01-29 ENCOUNTER — LAB VISIT (OUTPATIENT)
Dept: LAB | Facility: HOSPITAL | Age: 69
End: 2019-01-29
Attending: INTERNAL MEDICINE
Payer: MEDICARE

## 2019-01-29 DIAGNOSIS — E11.9 CONTROLLED TYPE 2 DIABETES MELLITUS WITHOUT COMPLICATION, WITHOUT LONG-TERM CURRENT USE OF INSULIN: ICD-10-CM

## 2019-01-29 LAB
ESTIMATED AVG GLUCOSE: 114 MG/DL
HBA1C MFR BLD HPLC: 5.6 %

## 2019-01-29 PROCEDURE — 83036 HEMOGLOBIN GLYCOSYLATED A1C: CPT | Mod: HCNC

## 2019-01-29 PROCEDURE — 36415 COLL VENOUS BLD VENIPUNCTURE: CPT | Mod: HCNC,PO

## 2019-01-29 NOTE — PROGRESS NOTES
Last 5 Patient Entered Readings                                      Current 30 Day Average: 119/76     Recent Readings 1/29/2019 1/29/2019 1/29/2019 1/29/2019 1/28/2019    SBP (mmHg) 127 127 131 131 117    DBP (mmHg) 79 79 79 79 81    Pulse 70 70 71 71 66          Last 6 Patient Entered Readings                                          Most Recent A1c: 5.2% on 6/25/2018  (Goal: 7%)     Recent Readings 1/28/2019 1/27/2019 1/26/2019 1/26/2019 1/25/2019    Blood Glucose (mg/dL) 105 103 150 174 99        Called patient to inform his of A1c orders in place. He should report to the lab to have new A1c drawn. Left voicemail.

## 2019-02-27 ENCOUNTER — PATIENT OUTREACH (OUTPATIENT)
Dept: OTHER | Facility: OTHER | Age: 69
End: 2019-02-27

## 2019-02-27 NOTE — PROGRESS NOTES
Last 5 Patient Entered Readings                                      Current 30 Day Average: 115/75     Recent Readings 2/26/2019 2/26/2019 2/24/2019 2/24/2019 2/23/2019    SBP (mmHg) 112 112 103 103 114    DBP (mmHg) 75 75 71 71 73    Pulse 59 59 71 71 74          Last 6 Patient Entered Readings                                          Most Recent A1c: 5.6% on 1/29/2019  (Goal: 7%)     Recent Readings 2/26/2019 2/25/2019 2/24/2019 2/24/2019 2/23/2019    Blood Glucose (mg/dL) 118 110 116 95 113        Digital Medicine: Health  Follow Up    Mr. Cesar Simpson is out of test strips for his [employer] glucometer. He has been entering readings manually. DME contact information provided. No further questions or concerns. Will follow up in 2-3 weeks.

## 2019-03-20 ENCOUNTER — OFFICE VISIT (OUTPATIENT)
Dept: SLEEP MEDICINE | Facility: CLINIC | Age: 69
End: 2019-03-20
Payer: MEDICARE

## 2019-03-20 VITALS
SYSTOLIC BLOOD PRESSURE: 121 MMHG | BODY MASS INDEX: 29.45 KG/M2 | HEART RATE: 63 BPM | DIASTOLIC BLOOD PRESSURE: 75 MMHG | HEIGHT: 68 IN | WEIGHT: 194.31 LBS

## 2019-03-20 DIAGNOSIS — G47.00 INSOMNIA, UNSPECIFIED TYPE: ICD-10-CM

## 2019-03-20 PROCEDURE — 99214 OFFICE O/P EST MOD 30 MIN: CPT | Mod: HCNC,S$GLB,, | Performed by: PSYCHIATRY & NEUROLOGY

## 2019-03-20 PROCEDURE — 3288F PR FALLS RISK ASSESSMENT DOCUMENTED: ICD-10-PCS | Mod: HCNC,CPTII,S$GLB, | Performed by: PSYCHIATRY & NEUROLOGY

## 2019-03-20 PROCEDURE — 99214 PR OFFICE/OUTPT VISIT, EST, LEVL IV, 30-39 MIN: ICD-10-PCS | Mod: HCNC,S$GLB,, | Performed by: PSYCHIATRY & NEUROLOGY

## 2019-03-20 PROCEDURE — 99999 PR PBB SHADOW E&M-EST. PATIENT-LVL III: ICD-10-PCS | Mod: PBBFAC,HCNC,, | Performed by: PSYCHIATRY & NEUROLOGY

## 2019-03-20 PROCEDURE — 3074F SYST BP LT 130 MM HG: CPT | Mod: HCNC,CPTII,S$GLB, | Performed by: PSYCHIATRY & NEUROLOGY

## 2019-03-20 PROCEDURE — 3078F DIAST BP <80 MM HG: CPT | Mod: HCNC,CPTII,S$GLB, | Performed by: PSYCHIATRY & NEUROLOGY

## 2019-03-20 PROCEDURE — 1100F PTFALLS ASSESS-DOCD GE2>/YR: CPT | Mod: HCNC,CPTII,S$GLB, | Performed by: PSYCHIATRY & NEUROLOGY

## 2019-03-20 PROCEDURE — 99499 UNLISTED E&M SERVICE: CPT | Mod: HCNC,S$GLB,, | Performed by: PSYCHIATRY & NEUROLOGY

## 2019-03-20 PROCEDURE — 99499 RISK ADDL DX/OHS AUDIT: ICD-10-PCS | Mod: HCNC,S$GLB,, | Performed by: PSYCHIATRY & NEUROLOGY

## 2019-03-20 PROCEDURE — 3078F PR MOST RECENT DIASTOLIC BLOOD PRESSURE < 80 MM HG: ICD-10-PCS | Mod: HCNC,CPTII,S$GLB, | Performed by: PSYCHIATRY & NEUROLOGY

## 2019-03-20 PROCEDURE — 3288F FALL RISK ASSESSMENT DOCD: CPT | Mod: HCNC,CPTII,S$GLB, | Performed by: PSYCHIATRY & NEUROLOGY

## 2019-03-20 PROCEDURE — 1100F PR PT FALLS ASSESS DOC 2+ FALLS/FALL W/INJURY/YR: ICD-10-PCS | Mod: HCNC,CPTII,S$GLB, | Performed by: PSYCHIATRY & NEUROLOGY

## 2019-03-20 PROCEDURE — 99999 PR PBB SHADOW E&M-EST. PATIENT-LVL III: CPT | Mod: PBBFAC,HCNC,, | Performed by: PSYCHIATRY & NEUROLOGY

## 2019-03-20 PROCEDURE — 3074F PR MOST RECENT SYSTOLIC BLOOD PRESSURE < 130 MM HG: ICD-10-PCS | Mod: HCNC,CPTII,S$GLB, | Performed by: PSYCHIATRY & NEUROLOGY

## 2019-03-20 RX ORDER — TRAZODONE HYDROCHLORIDE 50 MG/1
TABLET ORAL
Qty: 60 TABLET | Refills: 5 | Status: SHIPPED | OUTPATIENT
Start: 2019-03-20 | End: 2020-02-05 | Stop reason: SDUPTHER

## 2019-03-20 NOTE — PROGRESS NOTES
Cesar Simpson  was seen as a follow up.    CHIEF COMPLAINT:    No chief complaint on file.      HISTORY OF PRESENT ILLNESS: Cesar Simpson is a 68 y.o. male is here for sleep evaluation.   Patient was referred for in lab sleep study but was denied.  Patient with loud snoring.  No witnessed sleep apnea.  +fatigue upon awake daily.  No parasomnia.  No cataplexy.  Over past 2 years, patient had experience 2-3 episodes where he woken up from sleep with choking sensation.      Patient has difficulty with sleep initiation since 2000.  No known trigger.  Sleep difficulties worsen since [facility].    +nocturna cramp in calves area.    [place] Sleepiness Scale score during initial sleep evaluation was 11.  Patient underwent hst on 7/1/15 with ahi of 26.  Patient was started on apap (8-15).  Feel more rested upon awake.  During last visit, stimulus control and sleep restriction d/w patient to help with sleep initation and maintenance.  Patient stated that ambien with sleep initiation but still wake up 4.5 hour later.  Using apap everynight without issue.        12/19/2018: He had a break using his machine - back to usage of APAP 8-15  90% 9 cm  Failed Ambien in the past.  6hr  AHI 0.6  Leak 1-5%  Lost 15 lbs - taken of some HTN and DM meds.  Sees no diff ence in insomnia.   Followed sleep hygiene recommendations given by Dr. Smita oliveros: going to bed when sleepy, avoiding TV in the bedroom. 1 cup of coffee in AM.    Used to work night shifts in the past.    Once in bed feels awake    The patient reports improved sleep continuity and daytime sleepiness on PAP. ESS today is 14/24.  Denies break through snoring. No dry mouth.     Still drowsy driving.    EPWORTH SLEEPINESS SCALE 12/19/2018   Sitting and reading 3   Watching TV 3   Sitting, inactive in a public place (e.g. a theatre or a meeting) 3   As a passenger in a car for an hour without a break 2   Lying down to rest in the afternoon when circumstances permit 2   Sitting and  talking to someone 0   Sitting quietly after a lunch without alcohol 0   In a car, while stopped for a few minutes in traffic 1   Total score 14       PHQ9 12/19/2018   Little interest or pleasure in doing things: More than half the days   Feeling down, depressed or hopeless: More than half the days   Trouble falling asleep, staying asleep, or sleeping too much: Nearly every day   Feeling tired or having little energy: More than half the days   Poor appetite or overeating: Not at all   Feeling bad about yourself- or that you are a failure or have let yourself or family down Several days   Trouble concentrating on things, such as reading the newspaper or watching television: Not at all   Moving or speaking so slowly that other people could have noticed. Or the opposite- being so fidgety or restless that you have been moving around a lot more than usual: Not at all   Thoughts that you would be better off dead or hurting yourself in some way: Not at all   If you indicated you have experienced any of the aforementioned problems, how difficult have these problems made it for you to do your work, take care of things at home or get along with other people? Somewhat difficult   Total Score 10     GAD7 12/19/2018   Feeling nervous, anxious, on edge Several days   Not being able to stop or control worrying Nearly everyday   Worrying too much about different things Nearly everyday   Trouble relaxing More than half the days   Being so restless that its hard to sit still More than half the days   Becoming easily annoyed or irritable Not at all   Feeling afraid as if something awful might happen Several days   If you marked you are experiencing any of the aforementioned problems, how difficult have these made it for you to do your work, take care of things at home, or get along with other people? Somewhat difficult   ANTONIA-7 Score 12             Still difficulty with sleep initiation and early morning awakenings.    Worries  BT:  10:30-12  WT: 5:30-6 AM      SLEEP ROUTINE:  Activity the hour prior to sleep: watch tv    Bed partner:  alone  Time to bed:  12 am   Lights off:  yes  Sleep onset latency:  15 minutes; 30 minutes without ambien  Disruptions or awakenings:    1 (difficulty going back to sleep); usually 4.5 hours later   Wakeup time:      5:30 am  Perceived sleep quality:  rested      Daytime naps:      none  Weekend sleep routine:      same  Caffeine use: 1-2 cups of coffee in am  exercise habit:   3 miles walking per day        INTERVAL HISTORY:    03/20/2019:  The patient has not presented any new complaints since the previous visit. HAs been doing fairly well on 8-15 cm H2O range.  90% 9 cm.  Since switching to a full face mask (did not bring today), he feels some lack of air at the starting pressure of  8 cm.      4/7 and 21/30 days of compliance.    His modem is no longer recording    AHI  Was 2-3  Leak 2-3 %    Averages 6:42    It is still hard for his to sleep through the night.    Tends to look at his fitbit at night    SLEEP STUDIES:    HST 7.26.18: Significant Obstructive sleep apnea (FABY) with AHI (apnea hypopnea Index) of 6:36 and SaO2 of 80 (weight  210).    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    Hyperlipidemia associated with type 2 diabetes mellitus 03/24/2017    Essential hypertension 03/24/2017    Benign prostatic hyperplasia with urinary frequency 12/27/2017    Heart palpitations 12/27/2017    Hypertension  associated with diabetes 03/27/2018    Overweight (BMI 25.0-29.9) 03/27/2018     Resolved Ambulatory Problems     Diagnosis Date Noted    Diabetes mellitus type II, uncontrolled 02/27/2013    Vitreous hemorrhage 07/07/2014     Past Medical History:   Diagnosis Date    Diabetes mellitus type II, uncontrolled 2/27/2013    High-density lipoprotein deficiency 2/27/2013    HTN (hypertension) 2/27/2013    Hyperlipidemia     Hypothyroidism     Obesity     Osteoarthritis 2/8/2016    Screening for prostate cancer 2/27/2013    Trouble in sleeping     Type 2 diabetes mellitus     Type 2 diabetes mellitus with diabetic polyneuropathy, without long-term current use of insulin                 PAST SURGICAL HISTORY:    Past Surgical History:   Procedure Laterality Date    CATARACT EXTRACTION      EYE SURGERY      cataracts    JOINT REPLACEMENT Left     arthroplasty    SHOULDER SURGERY      TONSILLECTOMY      TOTAL SHOULDER ARTHROPLASTY Left          FAMILY HISTORY:                Family History   Problem Relation Age of Onset    Osteoporosis Mother     Early death Father     Stroke Father     Heart disease Father     Heart disease Brother     Early death Brother     No Known Problems Daughter     No Known Problems Son        SOCIAL HISTORY:          Tobacco:   Social History     Tobacco Use   Smoking Status Never Smoker   Smokeless Tobacco Never Used       alcohol use:    Social History     Substance and Sexual Activity   Alcohol Use No                 Occupation: former [employer] worker as a     ALLERGIES:  Review of patient's allergies indicates:  No Known Allergies    CURRENT MEDICATIONS:    Current Outpatient Medications   Medication Sig Dispense Refill    blood sugar diagnostic Strp 100 each.  Test once daily. Supply whatever insurance covers. 100 each 11    levothyroxine (SYNTHROID) 88 MCG tablet Take 1 tablet (88 mcg total) by mouth before breakfast. 90 tablet 3    losartan (COZAAR) 100  "MG tablet TAKE 1 TABLET ONE TIME DAILY 90 tablet 1    metFORMIN (GLUCOPHAGE-XR) 500 MG 24 hr tablet Take 1 tablet (500 mg total) by mouth daily with breakfast. 90 tablet 3    pravastatin (PRAVACHOL) 40 MG tablet TAKE 1 TABLET EVERY EVENING 90 tablet 1    tamsulosin (FLOMAX) 0.4 mg Cp24 Take 1 capsule (0.4 mg total) by mouth once daily. 90 capsule 3    traZODone (DESYREL) 50 MG tablet Take 1 tablet (50 mg total) by mouth every evening. 1 pill PO PRN for insomnia at bedtime. OK to take 2nd pill in 30 minutes if still awake. 30 tablet 5    TRUE METRIX AIR GLUCOSE METER kit       TRUEPLUS LANCETS 33 gauge Misc        No current facility-administered medications for this visit.                   REVIEW OF SYSTEMS:   No acute changes from previous encounter dated 6/23/2015 with exceptions mentioned in HPI.              PHYSICAL EXAM:  Vitals:    03/20/19 0958   BP: 121/75   Pulse: 63   Weight: 88.1 kg (194 lb 4.8 oz)   Height: 5' 7.5" (1.715 m)   PainSc: 0-No pain     Body mass index is 29.98 kg/m².     GENERAL: Normal development, well groomed  HEENT:  Conjunctivae are non-erythematous; Pupils equal, round, and reactive to light; Nose is symmetrical; Nasal mucosa is pink and moist; Septum is midline; Inferior turbinates are normal; Nasal airflow is normal; Posterior pharynx is pink; Modified Mallampati: 4; Posterior palate is normal; Tonsils +1; Uvula is normal and pink;Tongue is normal; Dentition is fair; No TMJ tenderness; Jaw opening and protrusion without click and without discomfort.  +retronagthia  NECK: Supple. Neck circumference is 15.25 inches. No thyromegaly. No palpable nodes.     SKIN: On face and neck: No abrasions, no rashes, no lesions.  No subcutaneous nodules are palpable.  RESPIRATORY: Chest is clear to auscultation.  Normal chest expansion and non-labored breathing at rest.  CARDIOVASCULAR: Normal S1, S2.  No murmurs, gallops or rubs. No carotid bruits bilaterally.  EXTREMITIES: No edema. No " clubbing. No cyanosis. Station normal. Gait normal.        NEURO/PSYCH: Oriented to time, place and person. Normal attention span and concentration. Affect is full. Mood is normal.                                              DATA    7/1/15 ahi of 26 with min sat of 80.9  Lab Results   Component Value Date    TSH 2.383 11/19/2018     ASSESSMENT  No diagnosis found.     1. Insomnia, prior shift work, anxiety likely play a role  2. FABY - moderate-radu    PLAN:       - modem no longer transmitting    Increase APAP 8-15 to 9-15 cm H2O  Will re-order supplies.    Will order Trazodone - will increase to 60 pills per moght    Will change his fit bit heart rate settings.    Will refer to [facility] whenever the next group is available.      ---------------------------------------    Will re-evaluate - at that point will consider repeating HST if wt stays down.     Sleep Apnea - good compliance with improve sleep quality.  Prefer simplus mask over nasal mask.    Insomnia - difficulty with sleep initiation.  Sleep hygiene d/w patient.  Stimulus control and sleep restriction d/w patient.  Sleep time from 1 am till 5:30 am.  ambien prn.  Sleep diary request.    Education: During our discussion today, we talked about the etiology of obstructive sleep apnea as well as the potential ramifications of untreated sleep apnea, which could include daytime sleepiness, hypertension, heart disease and/or stroke.     Precautions: The patient was advised to abstain from driving should they feel sleepy or drowsy.       Patient will No Follow-up on file.    This is 25 minutes visit, over 50% of time spent in direct consultation with patient.

## 2019-03-22 ENCOUNTER — PATIENT OUTREACH (OUTPATIENT)
Dept: OTHER | Facility: OTHER | Age: 69
End: 2019-03-22

## 2019-03-22 NOTE — PROGRESS NOTES
Last 5 Patient Entered Readings                                      Current 30 Day Average: 112/75     Recent Readings 3/22/2019 3/22/2019 3/22/2019 3/22/2019 3/21/2019    SBP (mmHg) 115 115 108 108 119    DBP (mmHg) 72 72 70 70 74    Pulse 59 59 57 57 60          Last 6 Patient Entered Readings                                          Most Recent A1c: 5.6% on 1/29/2019  (Goal: 7%)     Recent Readings 3/2/2019 2/28/2019 2/26/2019 2/25/2019 2/24/2019    Blood Glucose (mg/dL) 120 122 118 110 116        Digital Medicine: Health  Follow Up    Lifestyle Modifications:    1.Dietary Modifications (Sodium intake <2,000mg/day, food labels, dining out): Patient denies changes to his diet. He has no questions for improving dietary habits currently.     2.Physical Activity: Patient continues to walk for exercise. He has no questions for improving physical activity.     3.Medication Therapy: Patient has been compliant with the medication regimen.    4.Patient has the following medication side effects/concerns: None  (Frequency/Alleviating factors/Precipitating factors, etc.)     Follow up with Mr. Cesar Simpson completed. Mr. Simpson is doing well and feeling good. He was able to contact DME and received a new order to testing supplies. Mr. Simpson was instructed to update his [employer] application. He plans to do so today, and will resume BG monitoring. He is pleased with his BP. He will follow up in with PCP in May. He thanks me for calling, and has no questions or concerns. Will continue to follow up to achieve health goals.

## 2019-04-02 ENCOUNTER — PATIENT OUTREACH (OUTPATIENT)
Dept: OTHER | Facility: OTHER | Age: 69
End: 2019-04-02

## 2019-04-02 NOTE — PROGRESS NOTES
1. HTN  HPI:  Called patient to follow up. Patient reports adherence to medication regimen daily and denies missed doses. Patient denies hypotensive s/sx (lightheadedness, dizziness, nausea, fatigue); patient denies hypertensive s/sx (SOB, CP, severe headaches, changes in vision, dizziness, fatigue, confusion, anxiety, nosebleeds).     Last 5 Patient Entered Readings                                      Current 30 Day Average: 111/75     Recent Readings 3/31/2019 3/31/2019 3/31/2019 3/31/2019 3/30/2019    SBP (mmHg) 123 123 114 114 100    DBP (mmHg) 78 78 78 78 65    Pulse 57 57 55 55 69        Assessment:  Reviewed recent readings. Per 2017 ACC/ AHA HTN guidelines (goal of BP < 130/80), current 30-day average is well controlled.     Plan:  Offered to decrease losartan to 50mg due to well controlled/ low BP. Patient declines change today, states he would like to discuss with PCP on 5/23; therefore, continue current medication regimen.   Patients health , Suly Lion, will be following up as scheduled.   I will continue to monitor regularly and will follow-up in 12 weeks, sooner if blood pressure begins to trend upward or downward.     Current medication regimen:  Hypertension Medications             losartan (COZAAR) 100 MG tablet TAKE 1 TABLET ONE TIME DAILY         Patient denies having questions or concerns. Patient has my contact information and knows to call with any concerns or clinical changes. Instructed patient to call if BP remains > 130/80.     2. DM  HPI:  Called patient to follow up regarding the DDMP (Diabetes Digital Medicine Program).  Patient reports adherence to medication regimen daily and denies missed doses.   Patient denies hypoglycemic s/sx (dizziness, extreme hunger, headaches, confusion, trouble concentrating, sweating, shaking, blurred vision, personality changes); patient denies hyperglycemic s/sx (increased thirst, increased urination, headaches, trouble concentrating, blurred  vision, fatigue).        Last 6 Patient Entered Readings                                          Most Recent A1c: 5.6% on 1/29/2019  (Goal: 7%)     Recent Readings 4/1/2019 3/28/2019 3/26/2019 3/25/2019 3/23/2019    Blood Glucose (mg/dL) 115 119 122 125 120        Assessment:  Reviewed recent readings. Per AACE/ACE guidelines (goal A1C < 7%), DM is well controlled (A1C on 1/29 was 5.6%). Next A1C will be due in .    Plan:  Continue current medication regimen. Patients health , Suly Lion, will be following up as scheduled. I will continue to monitor regularly and will follow-up in 12 weeks, sooner if blood sugar begins to trend upward or downward.     Current Medication Regimen:  Diabetes Medications             metFORMIN (GLUCOPHAGE-XR) 500 MG 24 hr tablet Take 1 tablet (500 mg total) by mouth daily with breakfast.         Patient has my contact information and knows to call with any concerns or clinical changes.

## 2019-05-09 DIAGNOSIS — E78.5 DYSLIPIDEMIA: ICD-10-CM

## 2019-05-09 DIAGNOSIS — I10 ESSENTIAL HYPERTENSION: ICD-10-CM

## 2019-05-10 RX ORDER — LOSARTAN POTASSIUM 100 MG/1
TABLET ORAL
Qty: 90 TABLET | Refills: 0 | Status: SHIPPED | OUTPATIENT
Start: 2019-05-10 | End: 2019-07-31 | Stop reason: SDUPTHER

## 2019-05-10 RX ORDER — PRAVASTATIN SODIUM 40 MG/1
TABLET ORAL
Qty: 90 TABLET | Refills: 0 | Status: SHIPPED | OUTPATIENT
Start: 2019-05-10 | End: 2020-07-10 | Stop reason: SDUPTHER

## 2019-05-17 ENCOUNTER — PATIENT OUTREACH (OUTPATIENT)
Dept: OTHER | Facility: OTHER | Age: 69
End: 2019-05-17

## 2019-05-17 NOTE — PROGRESS NOTES
Last 5 Patient Entered Readings                                      Current 30 Day Average: 119/78     Recent Readings 5/15/2019 5/15/2019 5/15/2019 5/13/2019 5/13/2019    SBP (mmHg) 121 130 118 114 112    DBP (mmHg) 82 83 79 78 73    Pulse 61 62 63 70 68          Last 6 Patient Entered Readings                                          Most Recent A1c: 5.6% on 1/29/2019  (Goal: 7%)     Recent Readings 5/17/2019 5/16/2019 5/16/2019 5/15/2019 5/13/2019    Blood Glucose (mg/dL) 105 121 100 119 100        Digital Medicine: Health  Follow Up    Lifestyle Modifications:    1.Dietary Modifications (Sodium intake <2,000mg/day, food labels, dining out): Patient denies changes to his diet.     2.Physical Activity: Mr. Simpson continues to walk for exercise.    3.Medication Therapy: Patient has been compliant with the medication regimen.    4.Patient has the following medication side effects/concerns: None  (Frequency/Alleviating factors/Precipitating factors, etc.)     Follow up with Mr. Cesar Simpson completed. Mr. Simpson is feeling well. He is pleased with his BP and BG overall. Patient will have labs drawn early next week, and will meet with his PCP on Friday, 5/24. He thanks me for calling, and has no further questions or concerns currently. Will continue to follow up to achieve health goals.

## 2019-05-20 ENCOUNTER — LAB VISIT (OUTPATIENT)
Dept: LAB | Facility: HOSPITAL | Age: 69
End: 2019-05-20
Attending: INTERNAL MEDICINE
Payer: MEDICARE

## 2019-05-20 DIAGNOSIS — E11.9 CONTROLLED TYPE 2 DIABETES MELLITUS WITHOUT COMPLICATION, WITHOUT LONG-TERM CURRENT USE OF INSULIN: ICD-10-CM

## 2019-05-20 DIAGNOSIS — E03.9 HYPOTHYROIDISM, UNSPECIFIED TYPE: ICD-10-CM

## 2019-05-20 LAB
ALBUMIN SERPL BCP-MCNC: 3.5 G/DL (ref 3.5–5.2)
ALP SERPL-CCNC: 50 U/L (ref 55–135)
ALT SERPL W/O P-5'-P-CCNC: 14 U/L (ref 10–44)
ANION GAP SERPL CALC-SCNC: 12 MMOL/L (ref 8–16)
AST SERPL-CCNC: 16 U/L (ref 10–40)
BILIRUB SERPL-MCNC: 0.3 MG/DL (ref 0.1–1)
BUN SERPL-MCNC: 14 MG/DL (ref 8–23)
CALCIUM SERPL-MCNC: 9.4 MG/DL (ref 8.7–10.5)
CHLORIDE SERPL-SCNC: 108 MMOL/L (ref 95–110)
CHOLEST SERPL-MCNC: 157 MG/DL (ref 120–199)
CHOLEST/HDLC SERPL: 3.9 {RATIO} (ref 2–5)
CO2 SERPL-SCNC: 21 MMOL/L (ref 23–29)
CREAT SERPL-MCNC: 0.9 MG/DL (ref 0.5–1.4)
EST. GFR  (AFRICAN AMERICAN): >60 ML/MIN/1.73 M^2
EST. GFR  (NON AFRICAN AMERICAN): >60 ML/MIN/1.73 M^2
ESTIMATED AVG GLUCOSE: 120 MG/DL (ref 68–131)
GLUCOSE SERPL-MCNC: 114 MG/DL (ref 70–110)
HBA1C MFR BLD HPLC: 5.8 % (ref 4–5.6)
HDLC SERPL-MCNC: 40 MG/DL (ref 40–75)
HDLC SERPL: 25.5 % (ref 20–50)
LDLC SERPL CALC-MCNC: 105.8 MG/DL (ref 63–159)
NONHDLC SERPL-MCNC: 117 MG/DL
POTASSIUM SERPL-SCNC: 3.9 MMOL/L (ref 3.5–5.1)
PROT SERPL-MCNC: 6.8 G/DL (ref 6–8.4)
SODIUM SERPL-SCNC: 141 MMOL/L (ref 136–145)
TRIGL SERPL-MCNC: 56 MG/DL (ref 30–150)
TSH SERPL DL<=0.005 MIU/L-ACNC: 1.91 UIU/ML (ref 0.4–4)

## 2019-05-20 PROCEDURE — 80061 LIPID PANEL: CPT | Mod: HCNC

## 2019-05-20 PROCEDURE — 36415 COLL VENOUS BLD VENIPUNCTURE: CPT | Mod: HCNC,PO

## 2019-05-20 PROCEDURE — 80053 COMPREHEN METABOLIC PANEL: CPT | Mod: HCNC

## 2019-05-20 PROCEDURE — 83036 HEMOGLOBIN GLYCOSYLATED A1C: CPT | Mod: HCNC

## 2019-05-20 PROCEDURE — 84443 ASSAY THYROID STIM HORMONE: CPT | Mod: HCNC

## 2019-05-23 ENCOUNTER — OFFICE VISIT (OUTPATIENT)
Dept: FAMILY MEDICINE | Facility: CLINIC | Age: 69
End: 2019-05-23
Payer: MEDICARE

## 2019-05-23 ENCOUNTER — DOCUMENTATION ONLY (OUTPATIENT)
Dept: INTERNAL MEDICINE | Facility: CLINIC | Age: 69
End: 2019-05-23

## 2019-05-23 ENCOUNTER — OFFICE VISIT (OUTPATIENT)
Dept: INTERNAL MEDICINE | Facility: CLINIC | Age: 69
End: 2019-05-23
Payer: MEDICARE

## 2019-05-23 VITALS
HEART RATE: 62 BPM | BODY MASS INDEX: 28.87 KG/M2 | DIASTOLIC BLOOD PRESSURE: 74 MMHG | OXYGEN SATURATION: 96 % | WEIGHT: 190.5 LBS | SYSTOLIC BLOOD PRESSURE: 118 MMHG | HEIGHT: 68 IN

## 2019-05-23 VITALS
HEIGHT: 68 IN | OXYGEN SATURATION: 96 % | DIASTOLIC BLOOD PRESSURE: 74 MMHG | WEIGHT: 190.5 LBS | SYSTOLIC BLOOD PRESSURE: 118 MMHG | BODY MASS INDEX: 28.87 KG/M2 | HEART RATE: 62 BPM

## 2019-05-23 DIAGNOSIS — E03.9 HYPOTHYROIDISM, UNSPECIFIED TYPE: ICD-10-CM

## 2019-05-23 DIAGNOSIS — E78.5 HYPERLIPIDEMIA ASSOCIATED WITH TYPE 2 DIABETES MELLITUS: ICD-10-CM

## 2019-05-23 DIAGNOSIS — Z23 NEED FOR SHINGLES VACCINE: ICD-10-CM

## 2019-05-23 DIAGNOSIS — E11.42 TYPE 2 DIABETES MELLITUS WITH DIABETIC POLYNEUROPATHY, WITHOUT LONG-TERM CURRENT USE OF INSULIN: ICD-10-CM

## 2019-05-23 DIAGNOSIS — G47.00 INSOMNIA, UNSPECIFIED TYPE: ICD-10-CM

## 2019-05-23 DIAGNOSIS — E66.3 OVERWEIGHT (BMI 25.0-29.9): Primary | ICD-10-CM

## 2019-05-23 DIAGNOSIS — E11.59 HYPERTENSION ASSOCIATED WITH DIABETES: ICD-10-CM

## 2019-05-23 DIAGNOSIS — Z12.11 ENCOUNTER FOR FECAL IMMUNOCHEMICAL TEST SCREENING: ICD-10-CM

## 2019-05-23 DIAGNOSIS — E66.3 OVERWEIGHT (BMI 25.0-29.9): ICD-10-CM

## 2019-05-23 DIAGNOSIS — R35.0 BENIGN PROSTATIC HYPERPLASIA WITH URINARY FREQUENCY: ICD-10-CM

## 2019-05-23 DIAGNOSIS — N40.1 BENIGN PROSTATIC HYPERPLASIA WITH URINARY FREQUENCY: ICD-10-CM

## 2019-05-23 DIAGNOSIS — E11.69 HYPERLIPIDEMIA ASSOCIATED WITH TYPE 2 DIABETES MELLITUS: ICD-10-CM

## 2019-05-23 DIAGNOSIS — I15.2 HYPERTENSION ASSOCIATED WITH DIABETES: ICD-10-CM

## 2019-05-23 DIAGNOSIS — Z00.00 ENCOUNTER FOR PREVENTIVE HEALTH EXAMINATION: Primary | ICD-10-CM

## 2019-05-23 DIAGNOSIS — R09.89 PROMINENT AORTA: ICD-10-CM

## 2019-05-23 PROCEDURE — 99499 UNLISTED E&M SERVICE: CPT | Mod: HCNC,S$GLB,, | Performed by: NURSE PRACTITIONER

## 2019-05-23 PROCEDURE — 99999 PR PBB SHADOW E&M-EST. PATIENT-LVL IV: CPT | Mod: PBBFAC,HCNC,, | Performed by: NURSE PRACTITIONER

## 2019-05-23 PROCEDURE — 3044F PR MOST RECENT HEMOGLOBIN A1C LEVEL <7.0%: ICD-10-PCS | Mod: HCNC,CPTII,S$GLB, | Performed by: NURSE PRACTITIONER

## 2019-05-23 PROCEDURE — 99213 PR OFFICE/OUTPT VISIT, EST, LEVL III, 20-29 MIN: ICD-10-PCS | Mod: HCNC,S$GLB,, | Performed by: INTERNAL MEDICINE

## 2019-05-23 PROCEDURE — G0439 PR MEDICARE ANNUAL WELLNESS SUBSEQUENT VISIT: ICD-10-PCS | Mod: HCNC,S$GLB,, | Performed by: NURSE PRACTITIONER

## 2019-05-23 PROCEDURE — G0439 PPPS, SUBSEQ VISIT: HCPCS | Mod: HCNC,S$GLB,, | Performed by: NURSE PRACTITIONER

## 2019-05-23 PROCEDURE — 3078F DIAST BP <80 MM HG: CPT | Mod: HCNC,CPTII,S$GLB, | Performed by: INTERNAL MEDICINE

## 2019-05-23 PROCEDURE — 3074F SYST BP LT 130 MM HG: CPT | Mod: HCNC,CPTII,S$GLB, | Performed by: NURSE PRACTITIONER

## 2019-05-23 PROCEDURE — 1101F PT FALLS ASSESS-DOCD LE1/YR: CPT | Mod: HCNC,CPTII,S$GLB, | Performed by: INTERNAL MEDICINE

## 2019-05-23 PROCEDURE — 3074F PR MOST RECENT SYSTOLIC BLOOD PRESSURE < 130 MM HG: ICD-10-PCS | Mod: HCNC,CPTII,S$GLB, | Performed by: NURSE PRACTITIONER

## 2019-05-23 PROCEDURE — 3078F PR MOST RECENT DIASTOLIC BLOOD PRESSURE < 80 MM HG: ICD-10-PCS | Mod: HCNC,CPTII,S$GLB, | Performed by: NURSE PRACTITIONER

## 2019-05-23 PROCEDURE — 1101F PR PT FALLS ASSESS DOC 0-1 FALLS W/OUT INJ PAST YR: ICD-10-PCS | Mod: HCNC,CPTII,S$GLB, | Performed by: INTERNAL MEDICINE

## 2019-05-23 PROCEDURE — 3078F PR MOST RECENT DIASTOLIC BLOOD PRESSURE < 80 MM HG: ICD-10-PCS | Mod: HCNC,CPTII,S$GLB, | Performed by: INTERNAL MEDICINE

## 2019-05-23 PROCEDURE — 99213 OFFICE O/P EST LOW 20 MIN: CPT | Mod: HCNC,S$GLB,, | Performed by: INTERNAL MEDICINE

## 2019-05-23 PROCEDURE — 3078F DIAST BP <80 MM HG: CPT | Mod: HCNC,CPTII,S$GLB, | Performed by: NURSE PRACTITIONER

## 2019-05-23 PROCEDURE — 99999 PR PBB SHADOW E&M-EST. PATIENT-LVL IV: ICD-10-PCS | Mod: PBBFAC,HCNC,, | Performed by: INTERNAL MEDICINE

## 2019-05-23 PROCEDURE — 3074F PR MOST RECENT SYSTOLIC BLOOD PRESSURE < 130 MM HG: ICD-10-PCS | Mod: HCNC,CPTII,S$GLB, | Performed by: INTERNAL MEDICINE

## 2019-05-23 PROCEDURE — 3074F SYST BP LT 130 MM HG: CPT | Mod: HCNC,CPTII,S$GLB, | Performed by: INTERNAL MEDICINE

## 2019-05-23 PROCEDURE — 99499 RISK ADDL DX/OHS AUDIT: ICD-10-PCS | Mod: HCNC,S$GLB,, | Performed by: NURSE PRACTITIONER

## 2019-05-23 PROCEDURE — 99999 PR PBB SHADOW E&M-EST. PATIENT-LVL IV: CPT | Mod: PBBFAC,HCNC,, | Performed by: INTERNAL MEDICINE

## 2019-05-23 PROCEDURE — 3044F HG A1C LEVEL LT 7.0%: CPT | Mod: HCNC,CPTII,S$GLB, | Performed by: NURSE PRACTITIONER

## 2019-05-23 PROCEDURE — 99999 PR PBB SHADOW E&M-EST. PATIENT-LVL IV: ICD-10-PCS | Mod: PBBFAC,HCNC,, | Performed by: NURSE PRACTITIONER

## 2019-05-23 RX ORDER — UBIDECARENONE 30 MG
CAPSULE ORAL
COMMUNITY
Start: 2019-04-01 | End: 2019-09-24

## 2019-05-23 NOTE — PROGRESS NOTES
"Cesar Simpson presented for a  Medicare AWV and comprehensive Health Risk Assessment today. The following components were reviewed and updated:    · Medical history  · Family History  · Social history  · Allergies and Current Medications  · Health Risk Assessment  · Health Maintenance  · Care Team     ** See Completed Assessments for Annual Wellness Visit within the encounter summary.**       The following assessments were completed:  · Living Situation  · CAGE  · Depression Screening  · Timed Get Up and Go  · Whisper Test  · Cognitive Function Screening  · Nutrition Screening  · ADL Screening  · PAQ Screening    Vitals:    05/23/19 0911   BP: 118/74   BP Location: Right arm   Patient Position: Sitting   BP Method: Medium (Manual)   Pulse: 62   SpO2: 96%   Weight: 86.4 kg (190 lb 7.6 oz)   Height: 5' 7.5" (1.715 m)     Body mass index is 29.39 kg/m².     Physical Exam   Constitutional: He is oriented to person, place, and time. He appears well-developed and well-nourished. No distress.   HENT:   Head: Normocephalic and atraumatic.   Eyes: Pupils are equal, round, and reactive to light. EOM are normal.   Neck: Neck supple. No JVD present. No tracheal deviation present.   Cardiovascular: Normal rate, regular rhythm, normal heart sounds and intact distal pulses.   No murmur heard.  Pulmonary/Chest: Effort normal and breath sounds normal. No respiratory distress. He has no wheezes. He has no rales.   Abdominal: Soft. Bowel sounds are normal. He exhibits no distension and no mass. There is no tenderness.   Musculoskeletal: Normal range of motion. He exhibits no edema or tenderness.   Neurological: He is alert and oriented to person, place, and time. Coordination normal.   Skin: Skin is warm and dry. No erythema. No pallor.   Psychiatric: He has a normal mood and affect. His behavior is normal. Judgment and thought content normal. Cognition and memory are normal. He expresses no homicidal and no suicidal ideation. "   Nursing note and vitals reviewed.        Diagnoses and health risks identified today and associated recommendations/orders:    1. Encounter for preventive health examination    2. Type 2 diabetes mellitus with diabetic polyneuropathy, without long-term current use of insulin  Chronic; stable on medication.  Followed by PCP.    3. Hypertension associated with diabetes  Chronic; stable on medication.  Followed by PCP.    4. Hyperlipidemia associated with type 2 diabetes mellitus  Chronic; stable on medication.  Followed by PCP.    5. Prominent aorta  Chronic; stable.  Followed by PCP.    6. Hypothyroidism, unspecified type  Chronic; stable on medication.  Followed by PCP.    7. Benign prostatic hyperplasia with urinary frequency  Chronic; stable on medication.  Followed by PCP.    8. Insomnia, unspecified type  Chronic; stable on medication.  Followed by Sleep Medicine.    9. Overweight (BMI 25.0-29.9)  Chronic, stable. Therapeutic lifestyle changes discussed. Followed by PCP.      Provided Cesar with a 5-10 year written screening schedule and personal prevention plan. Recommendations were developed using the USPSTF age appropriate recommendations. Education, counseling, and referrals were provided as needed. After Visit Summary printed and given to patient which includes a list of additional screenings\tests needed.    Follow up for Annual Wellness Visit in 1 year.    Judy Sanz NP         I offered to discuss end of life issues, including information on how to make advance directives that the patient could use to name someone who would make medical decisions on their behalf if they became too ill to make themselves.    ___Patient declined  _X_Patient is interested, I provided paper work and offered to discuss.

## 2019-05-23 NOTE — PATIENT INSTRUCTIONS
Counseling and Referral of Other Preventative  (Italic type indicates deductible and co-insurance are waived)    Patient Name: Cesar Simpson  Today's Date: 5/23/2019    Health Maintenance       Date Due Completion Date    Shingles Vaccine (2 of 3) 01/09/2016 11/14/2015    Fecal Occult Blood Test (FOBT)/[employer] 03/12/2019 3/12/2018    Influenza Vaccine 08/01/2019 11/21/2018    Override on 10/15/2015: Done (Beck)    Hemoglobin A1c 11/20/2019 5/20/2019    Foot Exam 11/21/2019 11/21/2018 (Done)    Override on 11/21/2018: Done    Override on 11/22/2017: Done    Eye Exam 11/28/2019 11/28/2018    Override on 12/22/2015: Done (Dr. Rodriguez)    Low Dose Statin 05/10/2020 5/10/2019    Lipid Panel 05/20/2020 5/20/2019    Pneumococcal Vaccine (65+ Low/Medium Risk) (2 of 2 - PPSV23) 02/08/2021 3/24/2017    TETANUS VACCINE 10/10/2026 10/10/2016        No orders of the defined types were placed in this encounter.    The following information is provided to all patients.  This information is to help you find resources for any of the problems found today that may be affecting your health:                Living healthy guide: www.[facility].[place].gov      Understanding Diabetes: www.diabetes.org      Eating healthy: www.cdc.gov/healthyweight      CDC home safety checklist: www.cdc.gov/steadi/patient.html      Agency on Aging: www.goea.[place].gov      Alcoholics anonymous (AA): www.aa.org      Physical Activity: www.kenneth.nih.gov/qx3wisa      Tobacco use: www.quitwithusla.org

## 2019-05-23 NOTE — PROGRESS NOTES
Portions of this note are generated with voice recognition software. Typographical errors may exist.     SUBJECTIVE:    This is a/an 69 y.o. male here for primary care visit for  Chief Complaint   Patient presents with    Follow-up     6m       Patient states he is dissatisfied with his current weight.  He leads a mostly sedentary life.  Very low intensity aerobic exercise sporadically.  Patient does not have any major orthopedic limitations to pursue more vigorous exercise.  He is motivated to increase physical activity before trying pharmacologic efforts for weight loss.    Patient states that he has not had an episode of shingles.    Patient is complying with diabetic medications and voices no hypoglycemic episodes.    Answers for HPI/ROS submitted by the patient on 5/21/2019   activity change: No  unexpected weight change: No  rhinorrhea: No  trouble swallowing: No  visual disturbance: Yes  chest tightness: No  polyuria: No  difficulty urinating: No  joint swelling: No  arthralgias: No  confusion: No  dysphoric mood: No      Medications Reviewed and Updated    Past medical, family, and social histories were reviewed and updated.    Review of Systems negative unless otherwise noted in history of present illness-  Review of Systems   Constitutional: Negative for weight loss.   HENT: Positive for hearing loss.    Eyes: Negative for discharge.   Respiratory: Negative for wheezing.    Cardiovascular: Negative for chest pain and palpitations.   Gastrointestinal: Negative for blood in stool, constipation, diarrhea and vomiting.   Genitourinary: Negative for hematuria and urgency.   Musculoskeletal: Negative for neck pain.   Neurological: Negative for weakness and headaches.   Endo/Heme/Allergies: Negative for polydipsia.         Allergic:  Review of patient's allergies indicates:  No Known Allergies    OBJECTIVE:  BP: 118/74 Pulse: 62    Wt Readings from Last 3 Encounters:   05/23/19 86.4 kg (190 lb 7.6 oz)   05/23/19  86.4 kg (190 lb 7.6 oz)   03/20/19 88.1 kg (194 lb 4.8 oz)    Body mass index is 29.39 kg/m².  Previous Blood Pressure Readings :   BP Readings from Last 3 Encounters:   05/23/19 118/74   05/23/19 118/74   03/20/19 121/75       Physical Exam    GEN: No apparent distress  HEENT: sclera non-icteric, conjunctiva clear  CV: no peripheral edema regular rate and rhythm.  PULM: breathing non-labored  ABD: Obese, protuberant abdomen.  PSYCH: appropriate affect  MSK: able to rise from chair without assistance  SKIN: normal skin turgor    Pertinent Labs Reviewed       ASSESSMENT/PLAN:    Overweight (BMI 25.0-29.9).Condition not optimally controlled. Detailed counseling on self care measures. Plan to monitor clinically in addition to plan below or as listed on After Visit Summary.     Need for shingles vaccine  -     varicella-zoster [id number], PF, (SHINGRIX, PF,) 50 mcg/0.5 mL injection; Inject 0.5 mLs into the muscle As instructed. 2 doses  Dispense: 0.5 mL; Refill: 2    Encounter for fecal immunochemical test screening  -     Fecal Immunochemical Test (iFOBT); Future; Expected date: 05/23/2019          Future Appointments   Date Time Provider Department Center   9/19/2019  7:30 AM LAB, [facility] [place]   9/24/2019  8:00 AM Ar Willett MD [facility]  5/25/2019  9:00 AM

## 2019-05-23 NOTE — PATIENT INSTRUCTIONS
Aerobic exercise necessary for weight loss is typically between 4-5 sessions.  Aerobic exercise necessary to maintain your weight is approximately 3 sessions per week.    Weight loss should occur at a rate of 1 pound per week once you have started 4-5 sessions of aerobic exercise weekly.    To promote weight loss, count your total daily calories for several days.  Then reduce your calories by 500 from your baseline.     TARGET WEIGHT FOR YOU:  10 lb weight loss    ACTIVITY FOR YOU:  Elliptical, treadmill, exercise bicycle    FREQUENCY  - 4-5 sessions per week    DURATION   - 30-40 minutes

## 2019-05-24 LAB — HEMOCCULT STL QL IA: NEGATIVE

## 2019-06-24 ENCOUNTER — PATIENT OUTREACH (OUTPATIENT)
Dept: OTHER | Facility: OTHER | Age: 69
End: 2019-06-24

## 2019-06-24 NOTE — PROGRESS NOTES
1. HTN  Last 5 Patient Entered Readings                                      Current 30 Day Average: 123/81     Recent Readings 6/23/2019 6/23/2019 6/23/2019 6/23/2019 6/23/2019    SBP (mmHg) 111 118 125 136 102    DBP (mmHg) 72 77 93 110 75    Pulse 75 84 87 94 107        Hypertension Medications             losartan (COZAAR) 100 MG tablet TAKE 1 TABLET EVERY DAY        Called patient to follow up, reviewed BP readings. Per 2017 ACC/ AHA HTN guidelines  (goal of BP < 130/80), current 30-day average is slightly uncontrolled; however, reading of 136/110 taken on 6/23 appears to be an outlier and is likely skewing the current average. Patient was seen by PCP on 5/23, BP was 118/74.  LVM, requested patient call back at his convenience if he has any questions or concerns.  Will continue to monitor. WCB in 3 months, sooner if BP begins to trend up or down.     2. DM  Last 6 Patient Entered Readings                                          Most Recent A1c: 5.8% on 5/20/2019  (Goal: 7%)     Recent Readings 6/24/2019 6/21/2019 6/20/2019 6/17/2019 6/17/2019    Blood Glucose (mg/dL) 104 90 110 130 112        Diabetes Medications             metFORMIN (GLUCOPHAGE-XR) 500 MG 24 hr tablet Take 1 tablet (500 mg total) by mouth daily with breakfast.        Called to follow up with patient, reviewed recent readings. Per AACE/ACE guidelines (goal A1C < 7%), DM is well controlled (A1C on 5/20 was 5.8%).  LVM, requested patient call back at his convenience if he has any questions or concerns.  Will continue to monitor. WCB in 3 months, sooner if BG begins to trend up or down.

## 2019-06-25 ENCOUNTER — PATIENT OUTREACH (OUTPATIENT)
Dept: OTHER | Facility: OTHER | Age: 69
End: 2019-06-25

## 2019-06-25 NOTE — PROGRESS NOTES
Last 5 Patient Entered Readings                                      Current 30 Day Average: 122/81     Recent Readings 6/25/2019 6/25/2019 6/24/2019 6/24/2019 6/24/2019    SBP (mmHg) 110 114 118 116 129    DBP (mmHg) 69 77 83 78 84    Pulse 63 64 77 74 81          Last 6 Patient Entered Readings                                          Most Recent A1c: 5.8% on 5/20/2019  (Goal: 7%)     Recent Readings 6/25/2019 6/24/2019 6/21/2019 6/20/2019 6/17/2019    Blood Glucose (mg/dL) 97 104 90 110 130          Opened in error.

## 2019-07-11 ENCOUNTER — TELEPHONE (OUTPATIENT)
Dept: ADMINISTRATIVE | Facility: HOSPITAL | Age: 69
End: 2019-07-11

## 2019-07-16 ENCOUNTER — PATIENT OUTREACH (OUTPATIENT)
Dept: OTHER | Facility: OTHER | Age: 69
End: 2019-07-16

## 2019-07-16 NOTE — PROGRESS NOTES
Last 5 Patient Entered Readings                                      Current 30 Day Average: 115/77     Recent Readings 7/16/2019 7/16/2019 7/14/2019 7/14/2019 7/13/2019    SBP (mmHg) 113 120 105 107 109    DBP (mmHg) 74 77 72 71 74    Pulse 60 61 62 63 65        Last 6 Patient Entered Readings                                          Most Recent A1c: 5.8% on 5/20/2019  (Goal: 7%)     Recent Readings 7/14/2019 7/13/2019 7/11/2019 7/10/2019 7/8/2019    Blood Glucose (mg/dL) 100 121 107 108 99        Digital Medicine: Health  Follow Up    Lifestyle Modifications:    1.Dietary Modifications (Sodium intake <2,000mg/day, food labels, dining out): Encouraged patient to maintain healthy eating habits.     2.Physical Activity: Encouraged patient to continue daily activity.    3.Medication Therapy: Patient has been compliant with the medication regimen.    4.Patient has the following medication side effects/concerns: None   (Frequency/Alleviating factors/Precipitating factors, etc.)     Follow up with Mr. Cesar Simpson completed. Mr. Simpson is feeling well and is pleased with his BP and BG overall. His 30 day BP average 115/77 mmHg is at goal, <130/80 mmHg, and his most recent A1c is at goal, <7%. He has no new health goals to establish currently, and no questions for improving dietary habits or physical activity. Will continue to follow up to achieve health goals.

## 2019-07-31 DIAGNOSIS — E03.9 HYPOTHYROIDISM, UNSPECIFIED TYPE: ICD-10-CM

## 2019-07-31 DIAGNOSIS — I10 ESSENTIAL HYPERTENSION: ICD-10-CM

## 2019-07-31 RX ORDER — TAMSULOSIN HYDROCHLORIDE 0.4 MG/1
CAPSULE ORAL
Qty: 90 CAPSULE | Refills: 1 | Status: SHIPPED | OUTPATIENT
Start: 2019-07-31 | End: 2019-12-02 | Stop reason: SDUPTHER

## 2019-07-31 RX ORDER — LOSARTAN POTASSIUM 100 MG/1
TABLET ORAL
Qty: 90 TABLET | Refills: 1 | Status: SHIPPED | OUTPATIENT
Start: 2019-07-31 | End: 2019-12-03 | Stop reason: SDUPTHER

## 2019-07-31 RX ORDER — LEVOTHYROXINE SODIUM 88 UG/1
88 TABLET ORAL
Qty: 90 TABLET | Refills: 1 | Status: SHIPPED | OUTPATIENT
Start: 2019-07-31 | End: 2019-12-03 | Stop reason: SDUPTHER

## 2019-09-09 NOTE — PROGRESS NOTES
"Cesar Simpson presented for a  Medicare AWV and comprehensive Health Risk Assessment today. The following components were reviewed and updated:    · Medical history  · Family History  · Social history  · Allergies and Current Medications  · Health Risk Assessment  · Health Maintenance  · Care Team     ** See Completed Assessments for Annual Wellness Visit within the encounter summary.**       The following assessments were completed:  · Living Situation  · CAGE  · Depression Screening  · Timed Get Up and Go  · Whisper Test  · Cognitive Function Screening  · Nutrition Screening  · ADL Screening  · PAQ Screening    Vitals:    03/27/18 0946   BP: 122/80   BP Location: Right arm   Patient Position: Sitting   BP Method: Medium (Manual)   Pulse: 67   SpO2: 97%   Weight: 86 kg (189 lb 9.5 oz)   Height: 5' 7.5" (1.715 m)     Body mass index is 29.26 kg/m².     Physical Exam   Constitutional: He is oriented to person, place, and time. He appears well-developed and well-nourished. No distress.   HENT:   Head: Normocephalic and atraumatic.   Eyes: EOM are normal. Pupils are equal, round, and reactive to light.   Neck: Neck supple. No JVD present. No tracheal deviation present.   Cardiovascular: Normal rate, regular rhythm, normal heart sounds and intact distal pulses.    No murmur heard.  Pulmonary/Chest: Effort normal and breath sounds normal. No respiratory distress. He has no wheezes. He has no rales.   Abdominal: Soft. Bowel sounds are normal. He exhibits no distension and no mass. There is no tenderness.   Musculoskeletal: Normal range of motion. He exhibits no edema or tenderness.   Neurological: He is alert and oriented to person, place, and time. Coordination normal.   Skin: Skin is warm and dry. No erythema. No pallor.   Psychiatric: He has a normal mood and affect. His behavior is normal. Judgment and thought content normal. Cognition and memory are normal. He expresses no homicidal and no suicidal ideation.   Nursing " note and vitals reviewed.        Diagnoses and health risks identified today and associated recommendations/orders:    1. Encounter for preventive health examination    2. Type 2 diabetes mellitus with diabetic polyneuropathy, without long-term current use of insulin  Chronic; stable on medication.  Followed by PCP.    3. Hypertension associated with diabetes  Chronic; stable on medication.  Followed by PCP.    4. Hyperlipidemia associated with type 2 diabetes mellitus  Chronic; stable on medication.  Followed by PCP.    5. Prominent aorta  Chronic; stable.  Followed by PCP.    6. Hypothyroidism, unspecified type  Chronic; stable on medication.  Followed by PCP.    7. Benign prostatic hyperplasia with urinary frequency  Chronic; stable on medication.  Followed by PCP.    8. Overweight (BMI 25.0-29.9)  Chronic, stable. Therapeutic lifestyle changes discussed. Followed by PCP.      Provided Cesar with a 5-10 year written screening schedule and personal prevention plan. Recommendations were developed using the USPSTF age appropriate recommendations. Education, counseling, and referrals were provided as needed. After Visit Summary printed and given to patient which includes a list of additional screenings\tests needed.    Follow-up in 3 months (on 6/28/2018) for follow-up with PCP, HRA visit in 1 year.    Judy Sanz NP   panic attack

## 2019-09-11 ENCOUNTER — PATIENT MESSAGE (OUTPATIENT)
Dept: ADMINISTRATIVE | Facility: OTHER | Age: 69
End: 2019-09-11

## 2019-09-16 ENCOUNTER — PATIENT OUTREACH (OUTPATIENT)
Dept: OTHER | Facility: OTHER | Age: 69
End: 2019-09-16

## 2019-09-16 NOTE — PROGRESS NOTES
Digital Medicine: Clinician Follow-Up    HPI  1. HTN  Last 5 Patient Entered Readings                                      Current 30 Day Average: 115/77     Recent Readings 9/15/2019 9/15/2019 9/14/2019 9/14/2019 9/12/2019    SBP (mmHg) 110 121 120 121 115    DBP (mmHg) 70 75 77 76 74    Pulse 56 60 58 54 56        Hypertension Medications             losartan (COZAAR) 100 MG tablet TAKE 1 TABLET EVERY DAY        Called patient to follow up, reviewed BP readings. Per 2017 ACC/ AHA HTN guidelines  (goal of BP < 130/80), current 30-day average is well controlled.  LVM, requested patient call back at his convenience if he has any questions or concerns.  Will continue to monitor. WCB in 3 months, sooner if BP begins to trend up or down.      2. DM  Last 6 Patient Entered Readings                                          Most Recent A1c: 5.8% on 5/20/2019  (Goal: 7%)     Recent Readings 9/15/2019 9/11/2019 9/10/2019 9/9/2019 9/8/2019    Blood Glucose (mg/dL) 101 118 120 100 118         Diabetes Medications             metFORMIN (GLUCOPHAGE-XR) 500 MG 24 hr tablet Take 1 tablet (500 mg total) by mouth daily with breakfast.        Called to follow up with patient, reviewed recent readings. Per AACE/ACE guidelines (goal A1C < 7%), DM is well controlled (A1C on 5/20 was 5.8%).  LVM, requested patient call back at his convenience if he has any questions or concerns.  Will continue to monitor. WCB in 3 months, sooner if BG begins to trend up or down.

## 2019-09-19 ENCOUNTER — LAB VISIT (OUTPATIENT)
Dept: LAB | Facility: HOSPITAL | Age: 69
End: 2019-09-19
Attending: INTERNAL MEDICINE
Payer: MEDICARE

## 2019-09-19 DIAGNOSIS — E11.9 CONTROLLED TYPE 2 DIABETES MELLITUS WITHOUT COMPLICATION, WITHOUT LONG-TERM CURRENT USE OF INSULIN: ICD-10-CM

## 2019-09-19 LAB
ESTIMATED AVG GLUCOSE: 120 MG/DL (ref 68–131)
HBA1C MFR BLD HPLC: 5.8 % (ref 4–5.6)

## 2019-09-19 PROCEDURE — 36415 COLL VENOUS BLD VENIPUNCTURE: CPT | Mod: HCNC,PO

## 2019-09-19 PROCEDURE — 83036 HEMOGLOBIN GLYCOSYLATED A1C: CPT | Mod: HCNC

## 2019-09-24 ENCOUNTER — OFFICE VISIT (OUTPATIENT)
Dept: INTERNAL MEDICINE | Facility: CLINIC | Age: 69
End: 2019-09-24
Payer: MEDICARE

## 2019-09-24 VITALS
WEIGHT: 181 LBS | HEART RATE: 62 BPM | SYSTOLIC BLOOD PRESSURE: 132 MMHG | DIASTOLIC BLOOD PRESSURE: 86 MMHG | HEIGHT: 67 IN | OXYGEN SATURATION: 99 % | BODY MASS INDEX: 28.41 KG/M2

## 2019-09-24 DIAGNOSIS — G47.00 RECURRENT INSOMNIA: Primary | ICD-10-CM

## 2019-09-24 DIAGNOSIS — E78.5 DYSLIPIDEMIA: ICD-10-CM

## 2019-09-24 DIAGNOSIS — E11.65 TYPE 2 DIABETES MELLITUS WITH HYPERGLYCEMIA, WITHOUT LONG-TERM CURRENT USE OF INSULIN: ICD-10-CM

## 2019-09-24 DIAGNOSIS — Z23 NEED FOR IMMUNIZATION AGAINST INFLUENZA: ICD-10-CM

## 2019-09-24 DIAGNOSIS — E11.9 ENCOUNTER FOR DIABETIC FOOT EXAM: ICD-10-CM

## 2019-09-24 PROCEDURE — 90662 IIV NO PRSV INCREASED AG IM: CPT | Mod: HCNC,S$GLB,, | Performed by: INTERNAL MEDICINE

## 2019-09-24 PROCEDURE — 3079F PR MOST RECENT DIASTOLIC BLOOD PRESSURE 80-89 MM HG: ICD-10-PCS | Mod: HCNC,CPTII,S$GLB, | Performed by: INTERNAL MEDICINE

## 2019-09-24 PROCEDURE — 99999 PR PBB SHADOW E&M-EST. PATIENT-LVL III: CPT | Mod: PBBFAC,HCNC,, | Performed by: INTERNAL MEDICINE

## 2019-09-24 PROCEDURE — 1100F PTFALLS ASSESS-DOCD GE2>/YR: CPT | Mod: HCNC,CPTII,S$GLB, | Performed by: INTERNAL MEDICINE

## 2019-09-24 PROCEDURE — 3044F PR MOST RECENT HEMOGLOBIN A1C LEVEL <7.0%: ICD-10-PCS | Mod: HCNC,CPTII,S$GLB, | Performed by: INTERNAL MEDICINE

## 2019-09-24 PROCEDURE — 3079F DIAST BP 80-89 MM HG: CPT | Mod: HCNC,CPTII,S$GLB, | Performed by: INTERNAL MEDICINE

## 2019-09-24 PROCEDURE — 90662 FLU VACCINE - HIGH DOSE (65+) PRESERVATIVE FREE IM: ICD-10-PCS | Mod: HCNC,S$GLB,, | Performed by: INTERNAL MEDICINE

## 2019-09-24 PROCEDURE — 3044F HG A1C LEVEL LT 7.0%: CPT | Mod: HCNC,CPTII,S$GLB, | Performed by: INTERNAL MEDICINE

## 2019-09-24 PROCEDURE — 3075F PR MOST RECENT SYSTOLIC BLOOD PRESS GE 130-139MM HG: ICD-10-PCS | Mod: HCNC,CPTII,S$GLB, | Performed by: INTERNAL MEDICINE

## 2019-09-24 PROCEDURE — 3288F FALL RISK ASSESSMENT DOCD: CPT | Mod: HCNC,CPTII,S$GLB, | Performed by: INTERNAL MEDICINE

## 2019-09-24 PROCEDURE — 3288F PR FALLS RISK ASSESSMENT DOCUMENTED: ICD-10-PCS | Mod: HCNC,CPTII,S$GLB, | Performed by: INTERNAL MEDICINE

## 2019-09-24 PROCEDURE — 99213 PR OFFICE/OUTPT VISIT, EST, LEVL III, 20-29 MIN: ICD-10-PCS | Mod: 25,HCNC,S$GLB, | Performed by: INTERNAL MEDICINE

## 2019-09-24 PROCEDURE — G0008 ADMIN INFLUENZA VIRUS VAC: HCPCS | Mod: HCNC,S$GLB,, | Performed by: INTERNAL MEDICINE

## 2019-09-24 PROCEDURE — 99213 OFFICE O/P EST LOW 20 MIN: CPT | Mod: 25,HCNC,S$GLB, | Performed by: INTERNAL MEDICINE

## 2019-09-24 PROCEDURE — 99999 PR PBB SHADOW E&M-EST. PATIENT-LVL III: ICD-10-PCS | Mod: PBBFAC,HCNC,, | Performed by: INTERNAL MEDICINE

## 2019-09-24 PROCEDURE — 3075F SYST BP GE 130 - 139MM HG: CPT | Mod: HCNC,CPTII,S$GLB, | Performed by: INTERNAL MEDICINE

## 2019-09-24 PROCEDURE — G0008 FLU VACCINE - HIGH DOSE (65+) PRESERVATIVE FREE IM: ICD-10-PCS | Mod: HCNC,S$GLB,, | Performed by: INTERNAL MEDICINE

## 2019-09-24 PROCEDURE — 1100F PR PT FALLS ASSESS DOC 2+ FALLS/FALL W/INJURY/YR: ICD-10-PCS | Mod: HCNC,CPTII,S$GLB, | Performed by: INTERNAL MEDICINE

## 2019-09-24 RX ORDER — PRAVASTATIN SODIUM 40 MG/1
40 TABLET ORAL NIGHTLY
Qty: 90 TABLET | Refills: 0 | Status: CANCELLED | OUTPATIENT
Start: 2019-09-24

## 2019-09-24 RX ORDER — METFORMIN HYDROCHLORIDE 500 MG/1
500 TABLET, EXTENDED RELEASE ORAL
Qty: 90 TABLET | Refills: 3 | Status: CANCELLED | OUTPATIENT
Start: 2019-09-24 | End: 2020-09-23

## 2019-09-24 NOTE — PROGRESS NOTES
Portions of this note are generated with voice recognition software. Typographical errors may exist.     SUBJECTIVE:    This is a/an 69 y.o. male here for primary care visit for  Chief Complaint   Patient presents with    Diabetes     Patient states that he continues to have recurring problems with wakening with significant difficulty with sleep re-initiation.  States that he takes 1-2 tablets of trazodone to help with symptoms but overall remains dissatisfied with the quality of his sleep.  Most days he exercises 30 min on treadmill or elliptical.  Patient does not have major orthopedic limitations to increasing time on the exercise equipment.  He is not motivated to pursue adjustments and pharmacologic treatment and wants to pursue changes in exercise 1st.  States that because of inadequate sleep during the daytime he has problems with multitasking nights that he has not slept well.    Patient denies any problems with numbness tingling or burning affecting the feet.      Answers for HPI/ROS submitted by the patient on 9/18/2019   activity change: No  unexpected weight change: No  rhinorrhea: No  trouble swallowing: No  visual disturbance: Yes  chest tightness: No  polyuria: No  difficulty urinating: No  joint swelling: No  arthralgias: Yes  confusion: No  dysphoric mood: No        Medications Reviewed and Updated    Past medical, family, and social histories were reviewed and updated.    Review of Systems negative unless otherwise noted in history of present illness-  ROS    General ROS: negative  Psychological ROS: negative  ENT ROS: negative  Endocrine ROS: Negative  Allergy and Immunology ROS: negative  Cardiovascular ROS: negative  Genito-Urinary ROS: negative  Musculoskeletal ROS: negative  Neurological ROS: negative  Dermatological ROS: negative        Allergic:  Review of patient's allergies indicates:  No Known Allergies    OBJECTIVE:  BP: 132/86 Pulse: 62    Wt Readings from Last 3 Encounters:   09/24/19  82.1 kg (181 lb)   05/23/19 86.4 kg (190 lb 7.6 oz)   05/23/19 86.4 kg (190 lb 7.6 oz)    Body mass index is 28.35 kg/m².  Previous Blood Pressure Readings :   BP Readings from Last 3 Encounters:   09/24/19 132/86   05/23/19 118/74   05/23/19 118/74       Physical Exam    GEN: No apparent distress  HEENT: sclera non-icteric, conjunctiva clear  CV: no peripheral edema regular rate and rhythm  PULM: breathing non-labored  ABD: Obese, protuberant abdomen.  PSYCH: appropriate affect  MSK: able to rise from chair without assistance   SKIN: normal skin turgor    Pertinent Labs Reviewed       ASSESSMENT/PLAN:    Recurrent insomnia.Condition not optimally controlled. Detailed counseling on self care measures. Plan to monitor clinically in addition to plan below or as listed on After Visit Summary.    - increase exercise     Encounter for diabetic foot exam.Condition stable.  Counseling given today on self-care measures. Plan to monitor clinically. Continue current medical plan.     Need for immunization against influenza  -     Influenza - High Dose (65+) (PF) ()          Future Appointments   Date Time Provider Department Center   2/24/2020  8:00 AM LAB, [facility] [place]   2/26/2020  8:00 AM Ar Willett MD [facility]  9/24/2019  8:22 AM

## 2019-09-27 ENCOUNTER — PATIENT MESSAGE (OUTPATIENT)
Dept: ADMINISTRATIVE | Facility: OTHER | Age: 69
End: 2019-09-27

## 2019-10-21 NOTE — PROGRESS NOTES
Digital Medicine: Health  Follow-Up    The history is provided by the patient.     Follow Up  Follow-up reason(s): reading review    Mr. Simpson is doing okay. He mostly discussed interest in reducing antihypertensive medications. Patient states that he recently had an annual check up with his PCP, but this was not discussed during that visit. Mr. Simpson would like to discuss potentially cutting back on some medications, but not eliminating it completely. He is okay to wait to discuss with PCP or PharmD, VITALY Puente, at next encounter.     He is pleased with BP and BG readings. No additional questions or concerns today. Will continue to monitor.     There are no preventive care reminders to display for this patient.    Last 5 Patient Entered Readings                                      Current 30 Day Average: 117/79     Recent Readings 10/21/2019 10/21/2019 10/21/2019 10/19/2019 10/18/2019    SBP (mmHg) 117 138 129 118 118    DBP (mmHg) 78 88 85 74 77    Pulse 60 64 62 65 65        Last 6 Patient Entered Readings                                          Most Recent A1c: 5.8% on 9/19/2019  (Goal: 7%)     Recent Readings 10/19/2019 10/18/2019 10/16/2019 10/12/2019 10/11/2019    Blood Glucose (mg/dL) 109 118 125 95 118

## 2019-10-24 DIAGNOSIS — E11.42 TYPE 2 DIABETES MELLITUS WITH DIABETIC POLYNEUROPATHY, WITHOUT LONG-TERM CURRENT USE OF INSULIN: Primary | ICD-10-CM

## 2019-11-21 ENCOUNTER — TELEPHONE (OUTPATIENT)
Dept: FAMILY MEDICINE | Facility: HOSPITAL | Age: 69
End: 2019-11-21

## 2019-11-23 ENCOUNTER — CLINICAL SUPPORT (OUTPATIENT)
Dept: FAMILY MEDICINE | Facility: CLINIC | Age: 69
End: 2019-11-23
Payer: MEDICARE

## 2019-11-23 DIAGNOSIS — Z23 FLU VACCINE NEED: Primary | ICD-10-CM

## 2019-11-23 PROCEDURE — 90662 IIV NO PRSV INCREASED AG IM: CPT | Mod: PBBFAC,HCNC,PO

## 2019-11-23 NOTE — PROGRESS NOTES
Flu revaccination.   Injection was administered IM. Patient tolerated well. Patient instructed to sit for 15mins before leaving. VIS was given.

## 2019-12-02 RX ORDER — TAMSULOSIN HYDROCHLORIDE 0.4 MG/1
CAPSULE ORAL
Qty: 90 CAPSULE | Refills: 1 | Status: SHIPPED | OUTPATIENT
Start: 2019-12-02 | End: 2020-06-12

## 2019-12-03 DIAGNOSIS — E03.9 HYPOTHYROIDISM, UNSPECIFIED TYPE: ICD-10-CM

## 2019-12-03 DIAGNOSIS — I10 ESSENTIAL HYPERTENSION: ICD-10-CM

## 2019-12-04 RX ORDER — LEVOTHYROXINE SODIUM 88 UG/1
88 TABLET ORAL
Qty: 90 TABLET | Refills: 0 | Status: SHIPPED | OUTPATIENT
Start: 2019-12-04 | End: 2020-02-04

## 2019-12-04 RX ORDER — LOSARTAN POTASSIUM 100 MG/1
TABLET ORAL
Qty: 90 TABLET | Refills: 0 | Status: SHIPPED | OUTPATIENT
Start: 2019-12-04 | End: 2020-04-04

## 2019-12-09 ENCOUNTER — PATIENT OUTREACH (OUTPATIENT)
Dept: OTHER | Facility: OTHER | Age: 69
End: 2019-12-09

## 2020-01-04 ENCOUNTER — PATIENT OUTREACH (OUTPATIENT)
Dept: ADMINISTRATIVE | Facility: OTHER | Age: 70
End: 2020-01-04

## 2020-01-07 ENCOUNTER — OFFICE VISIT (OUTPATIENT)
Dept: OPTOMETRY | Facility: CLINIC | Age: 70
End: 2020-01-07
Payer: MEDICARE

## 2020-01-07 DIAGNOSIS — Z96.1 PSEUDOPHAKIA OF BOTH EYES: ICD-10-CM

## 2020-01-07 DIAGNOSIS — Z98.890 HISTORY OF REFRACTIVE SURGERY: ICD-10-CM

## 2020-01-07 DIAGNOSIS — Z98.41 S/P BILATERAL CATARACT EXTRACTION: ICD-10-CM

## 2020-01-07 DIAGNOSIS — E11.9 TYPE 2 DIABETES MELLITUS WITHOUT RETINOPATHY: ICD-10-CM

## 2020-01-07 DIAGNOSIS — E11.9 DIABETIC EYE EXAM: Primary | ICD-10-CM

## 2020-01-07 DIAGNOSIS — Z98.42 S/P BILATERAL CATARACT EXTRACTION: ICD-10-CM

## 2020-01-07 DIAGNOSIS — Z01.00 DIABETIC EYE EXAM: Primary | ICD-10-CM

## 2020-01-07 DIAGNOSIS — H52.7 REFRACTIVE ERRORS: ICD-10-CM

## 2020-01-07 PROCEDURE — 92014 PR EYE EXAM, EST PATIENT,COMPREHESV: ICD-10-PCS | Mod: HCNC,S$GLB,, | Performed by: OPTOMETRIST

## 2020-01-07 PROCEDURE — 92015 PR REFRACTION: ICD-10-PCS | Mod: HCNC,S$GLB,, | Performed by: OPTOMETRIST

## 2020-01-07 PROCEDURE — 99999 PR PBB SHADOW E&M-EST. PATIENT-LVL III: CPT | Mod: PBBFAC,HCNC,, | Performed by: OPTOMETRIST

## 2020-01-07 PROCEDURE — 92015 DETERMINE REFRACTIVE STATE: CPT | Mod: HCNC,S$GLB,, | Performed by: OPTOMETRIST

## 2020-01-07 PROCEDURE — 99999 PR PBB SHADOW E&M-EST. PATIENT-LVL III: ICD-10-PCS | Mod: PBBFAC,HCNC,, | Performed by: OPTOMETRIST

## 2020-01-07 PROCEDURE — 92014 COMPRE OPH EXAM EST PT 1/>: CPT | Mod: HCNC,S$GLB,, | Performed by: OPTOMETRIST

## 2020-01-07 NOTE — PATIENT INSTRUCTIONS
S/P cataract surgery in both eyes, with bilateral posterior chamber intraocular lens.  S/P YAG laser posterior capsulotomy in both eyes.  Residual distance refractive error in each eye, with very satisfactory best-corrected VA in each eye.  Note change in refractive error in each eye, greater in the right eye.      New spectacle lens Rx issued for use as desired.     S/P RK refractive surgery in both eyes many years ago.       Type 2 diabetes without evidence of diabetic retinopathy in either eye.     Recheck in twelve months, or prior if any problems or changes in vision noted in the interim.

## 2020-01-07 NOTE — PROGRESS NOTES
"HPI     Eye Problem      Additional comments: Feels that VA in the right eye is getting worse.  S/P cataract surgery in both eyes.               Comments     Patient's age: 69 y.o.  Occupation: RETIRED  Approximate date of last eye examination:  11/28/2018  Name of [facility] eye doctor seen: [facility]/State: [facility]  Wears glasses? YES     If yes, wears  Full-time or part-time?  FULL  Present glasses are: Bifocal, SV Distance, SV Reading?  BIFOCAL  Approximate age of present glasses:  1 YEAR   Got new glasses following last exam, or subsequently?:  YES   Any problem with VA with glasses?  NO  Wears CLs?:  NO  Headaches?  NO  Eye pain/discomfort?  NO                                                                                     Flashes?  YES  Floaters?  YES  Diplopia/Double vision?  NO  Patient's Ocular History:         Any eye surgeries? YES         Any eye injury?  YES         Any treatment for eye disease?  NO  Family history of eye disease?  NO  Significant patient medical history:         1. Diabetes?  BORDERLINE       If yes, IDDM or NIDDM? NIDDM   2. HBP?  YES              3. Other (describe):  NO   ! OTC eyedrops currently using:  NO   ! Prescription eye meds currently using:  NO   ! Any history of allergy/adverse reaction to any eye meds used   previously?  NO    ! Any history of allergy/adverse reaction to eyedrops used during prior   eye exam(s)? NO    ! Any history of allergy/adverse reaction to Novacaine or similar meds?   NO   ! Any history of allergy/adverse reaction to Epinephrine or similar meds?   NO    ! Patient okay with use of anesthetic eyedrops to check eye pressure?    YES        ! Patient okay with use of eyedrops to dilate pupils today?  YES   !  Allergies/Medications/Medical History/Family History reviewed today?    NO      PD =   64/60  Desired reading distance =  15"                                                                        Last edited by John Rodriguez, OD on 1/7/2020 "  3:21 PM. (History)            Assessment /Plan     For exam results, see Encounter Report.    1. Diabetic eye exam     2. Type 2 diabetes mellitus without retinopathy     3. S/P bilateral cataract extraction     4. Pseudophakia of both eyes     5. History of refractive surgery     6. Refractive errors                       S/P cataract surgery in both eyes, with bilateral posterior chamber intraocular lens.  S/P YAG laser posterior capsulotomy in both eyes.  Residual distance refractive error in each eye, with very satisfactory best-corrected VA in each eye.  Note change in refractive error in each eye, greater in the right eye.      New spectacle lens Rx issued for use as desired.     S/P RK refractive surgery in both eyes many years ago.       Type 2 diabetes without evidence of diabetic retinopathy in either eye.     Recheck in twelve months, or prior if any problems or changes in vision noted in the interim.

## 2020-01-08 ENCOUNTER — DOCUMENTATION ONLY (OUTPATIENT)
Dept: INTERNAL MEDICINE | Facility: CLINIC | Age: 70
End: 2020-01-08

## 2020-02-04 DIAGNOSIS — E03.9 HYPOTHYROIDISM, UNSPECIFIED TYPE: ICD-10-CM

## 2020-02-04 RX ORDER — LEVOTHYROXINE SODIUM 88 UG/1
88 TABLET ORAL
Qty: 90 TABLET | Refills: 1 | Status: SHIPPED | OUTPATIENT
Start: 2020-02-04 | End: 2020-04-04

## 2020-02-05 DIAGNOSIS — G47.00 INSOMNIA, UNSPECIFIED TYPE: ICD-10-CM

## 2020-02-05 RX ORDER — TRAZODONE HYDROCHLORIDE 50 MG/1
TABLET ORAL
Qty: 60 TABLET | Refills: 5 | Status: SHIPPED | OUTPATIENT
Start: 2020-02-05 | End: 2020-07-27 | Stop reason: SDUPTHER

## 2020-02-05 NOTE — TELEPHONE ENCOUNTER
LOV 03/20/19    Last filled traZODone (DESYREL) 50 MG tablet 60 tablet w/ 5 refills on 3/20/2019.

## 2020-02-17 ENCOUNTER — PATIENT OUTREACH (OUTPATIENT)
Dept: OTHER | Facility: OTHER | Age: 70
End: 2020-02-17

## 2020-02-24 ENCOUNTER — LAB VISIT (OUTPATIENT)
Dept: LAB | Facility: HOSPITAL | Age: 70
End: 2020-02-24
Attending: INTERNAL MEDICINE
Payer: MEDICARE

## 2020-02-24 DIAGNOSIS — E03.9 HYPOTHYROIDISM, UNSPECIFIED TYPE: ICD-10-CM

## 2020-02-24 DIAGNOSIS — E11.9 CONTROLLED TYPE 2 DIABETES MELLITUS WITHOUT COMPLICATION, WITHOUT LONG-TERM CURRENT USE OF INSULIN: ICD-10-CM

## 2020-02-24 LAB
ALBUMIN SERPL BCP-MCNC: 3.9 G/DL (ref 3.5–5.2)
ALP SERPL-CCNC: 45 U/L (ref 55–135)
ALT SERPL W/O P-5'-P-CCNC: 19 U/L (ref 10–44)
ANION GAP SERPL CALC-SCNC: 13 MMOL/L (ref 8–16)
AST SERPL-CCNC: 17 U/L (ref 10–40)
BILIRUB SERPL-MCNC: 0.3 MG/DL (ref 0.1–1)
BUN SERPL-MCNC: 23 MG/DL (ref 8–23)
CALCIUM SERPL-MCNC: 9.8 MG/DL (ref 8.7–10.5)
CHLORIDE SERPL-SCNC: 100 MMOL/L (ref 95–110)
CO2 SERPL-SCNC: 25 MMOL/L (ref 23–29)
CREAT SERPL-MCNC: 0.9 MG/DL (ref 0.5–1.4)
EST. GFR  (AFRICAN AMERICAN): >60 ML/MIN/1.73 M^2
EST. GFR  (NON AFRICAN AMERICAN): >60 ML/MIN/1.73 M^2
ESTIMATED AVG GLUCOSE: 117 MG/DL (ref 68–131)
GLUCOSE SERPL-MCNC: 115 MG/DL (ref 70–110)
HBA1C MFR BLD HPLC: 5.7 % (ref 4–5.6)
POTASSIUM SERPL-SCNC: 4.1 MMOL/L (ref 3.5–5.1)
PROT SERPL-MCNC: 7.5 G/DL (ref 6–8.4)
SODIUM SERPL-SCNC: 138 MMOL/L (ref 136–145)
T4 FREE SERPL-MCNC: 1.01 NG/DL (ref 0.71–1.51)
TSH SERPL DL<=0.005 MIU/L-ACNC: 4.22 UIU/ML (ref 0.4–4)

## 2020-02-24 PROCEDURE — 84439 ASSAY OF FREE THYROXINE: CPT | Mod: HCNC

## 2020-02-24 PROCEDURE — 84443 ASSAY THYROID STIM HORMONE: CPT | Mod: HCNC

## 2020-02-24 PROCEDURE — 83036 HEMOGLOBIN GLYCOSYLATED A1C: CPT | Mod: HCNC

## 2020-02-24 PROCEDURE — 80053 COMPREHEN METABOLIC PANEL: CPT | Mod: HCNC

## 2020-02-24 PROCEDURE — 36415 COLL VENOUS BLD VENIPUNCTURE: CPT | Mod: HCNC,PO

## 2020-02-26 ENCOUNTER — OFFICE VISIT (OUTPATIENT)
Dept: INTERNAL MEDICINE | Facility: CLINIC | Age: 70
End: 2020-02-26
Payer: MEDICARE

## 2020-02-26 VITALS
DIASTOLIC BLOOD PRESSURE: 80 MMHG | OXYGEN SATURATION: 98 % | HEART RATE: 75 BPM | HEIGHT: 67 IN | SYSTOLIC BLOOD PRESSURE: 118 MMHG | TEMPERATURE: 98 F | WEIGHT: 182.13 LBS | BODY MASS INDEX: 28.58 KG/M2

## 2020-02-26 DIAGNOSIS — E11.65 TYPE 2 DIABETES MELLITUS WITH HYPERGLYCEMIA, WITHOUT LONG-TERM CURRENT USE OF INSULIN: ICD-10-CM

## 2020-02-26 DIAGNOSIS — E11.9 CONTROLLED TYPE 2 DIABETES MELLITUS WITHOUT COMPLICATION, WITHOUT LONG-TERM CURRENT USE OF INSULIN: Primary | ICD-10-CM

## 2020-02-26 PROCEDURE — 1126F PR PAIN SEVERITY QUANTIFIED, NO PAIN PRESENT: ICD-10-PCS | Mod: HCNC,S$GLB,, | Performed by: INTERNAL MEDICINE

## 2020-02-26 PROCEDURE — 99213 OFFICE O/P EST LOW 20 MIN: CPT | Mod: HCNC,S$GLB,, | Performed by: INTERNAL MEDICINE

## 2020-02-26 PROCEDURE — 1101F PR PT FALLS ASSESS DOC 0-1 FALLS W/OUT INJ PAST YR: ICD-10-PCS | Mod: HCNC,CPTII,S$GLB, | Performed by: INTERNAL MEDICINE

## 2020-02-26 PROCEDURE — 3079F PR MOST RECENT DIASTOLIC BLOOD PRESSURE 80-89 MM HG: ICD-10-PCS | Mod: HCNC,CPTII,S$GLB, | Performed by: INTERNAL MEDICINE

## 2020-02-26 PROCEDURE — 99999 PR PBB SHADOW E&M-EST. PATIENT-LVL III: ICD-10-PCS | Mod: PBBFAC,HCNC,, | Performed by: INTERNAL MEDICINE

## 2020-02-26 PROCEDURE — 3044F HG A1C LEVEL LT 7.0%: CPT | Mod: HCNC,CPTII,S$GLB, | Performed by: INTERNAL MEDICINE

## 2020-02-26 PROCEDURE — 99999 PR PBB SHADOW E&M-EST. PATIENT-LVL III: CPT | Mod: PBBFAC,HCNC,, | Performed by: INTERNAL MEDICINE

## 2020-02-26 PROCEDURE — 99213 PR OFFICE/OUTPT VISIT, EST, LEVL III, 20-29 MIN: ICD-10-PCS | Mod: HCNC,S$GLB,, | Performed by: INTERNAL MEDICINE

## 2020-02-26 PROCEDURE — 1126F AMNT PAIN NOTED NONE PRSNT: CPT | Mod: HCNC,S$GLB,, | Performed by: INTERNAL MEDICINE

## 2020-02-26 PROCEDURE — 3074F SYST BP LT 130 MM HG: CPT | Mod: HCNC,CPTII,S$GLB, | Performed by: INTERNAL MEDICINE

## 2020-02-26 PROCEDURE — 1101F PT FALLS ASSESS-DOCD LE1/YR: CPT | Mod: HCNC,CPTII,S$GLB, | Performed by: INTERNAL MEDICINE

## 2020-02-26 PROCEDURE — 3044F PR MOST RECENT HEMOGLOBIN A1C LEVEL <7.0%: ICD-10-PCS | Mod: HCNC,CPTII,S$GLB, | Performed by: INTERNAL MEDICINE

## 2020-02-26 PROCEDURE — 3079F DIAST BP 80-89 MM HG: CPT | Mod: HCNC,CPTII,S$GLB, | Performed by: INTERNAL MEDICINE

## 2020-02-26 PROCEDURE — 1159F MED LIST DOCD IN RCRD: CPT | Mod: HCNC,S$GLB,, | Performed by: INTERNAL MEDICINE

## 2020-02-26 PROCEDURE — 1159F PR MEDICATION LIST DOCUMENTED IN MEDICAL RECORD: ICD-10-PCS | Mod: HCNC,S$GLB,, | Performed by: INTERNAL MEDICINE

## 2020-02-26 PROCEDURE — 3074F PR MOST RECENT SYSTOLIC BLOOD PRESSURE < 130 MM HG: ICD-10-PCS | Mod: HCNC,CPTII,S$GLB, | Performed by: INTERNAL MEDICINE

## 2020-02-26 RX ORDER — METFORMIN HYDROCHLORIDE 500 MG/1
500 TABLET, EXTENDED RELEASE ORAL
Qty: 90 TABLET | Refills: 3 | Status: CANCELLED | OUTPATIENT
Start: 2020-02-26 | End: 2021-02-25

## 2020-02-26 RX ORDER — METFORMIN HYDROCHLORIDE 500 MG/1
500 TABLET, EXTENDED RELEASE ORAL
Qty: 90 TABLET | Refills: 3 | Status: SHIPPED | OUTPATIENT
Start: 2020-02-26 | End: 2020-09-24

## 2020-02-26 RX ORDER — LANCETS
EACH MISCELLANEOUS
Qty: 100 EACH | Refills: 3 | Status: SHIPPED | OUTPATIENT
Start: 2020-02-26 | End: 2021-08-11 | Stop reason: SDUPTHER

## 2020-02-26 RX ORDER — DEXTROSE 4 G
TABLET,CHEWABLE ORAL
Qty: 1 EACH | Refills: 0 | Status: SHIPPED | OUTPATIENT
Start: 2020-02-26

## 2020-02-26 NOTE — PROGRESS NOTES
Portions of this note are generated with voice recognition software. Typographical errors may exist.     SUBJECTIVE:    This is a/an 69 y.o. male here for primary care visit for  Chief Complaint   Patient presents with    Diabetes     Patient states he has not had symptomatic hypoglycemia.  States that he has been having a lot of variable numbers with his glucometer from [facility].  He wants to discontinue and switch to a different meter.    Patient states that he did receive a prescription for benzodiazepine and opiate medication for planned Lasix procedure but the procedure was canceled and he never took the medication.  States that he plans to throw away the medication as it is not needed.    Answers for HPI/ROS submitted by the patient on 2/25/2020   activity change: No  unexpected weight change: No  rhinorrhea: No  trouble swallowing: No  visual disturbance: Yes  chest tightness: No  polyuria: No  difficulty urinating: No  joint swelling: No  arthralgias: No  confusion: No  dysphoric mood: Yes            Medications Reviewed and Updated    Past medical, family, and social histories were reviewed and updated.    Review of Systems negative unless otherwise noted in history of present illness-  Review of Systems   Constitutional: Negative for malaise/fatigue.   HENT: Positive for hearing loss.    Eyes: Negative for discharge.   Respiratory: Negative for wheezing.    Cardiovascular: Negative for chest pain and palpitations.   Gastrointestinal: Negative for blood in stool, constipation, diarrhea and vomiting.   Genitourinary: Positive for urgency. Negative for hematuria.   Musculoskeletal: Negative for neck pain.   Neurological: Negative for weakness and headaches.   Endo/Heme/Allergies: Negative for polydipsia.           Allergic:  Review of patient's allergies indicates:  No Known Allergies    OBJECTIVE:  BP: 118/80 Pulse: 75 Temp: 98.1 °F (36.7 °C)  Wt Readings from Last 3 Encounters:   02/26/20  82.6 kg (182 lb 1.6 oz)   09/24/19 82.1 kg (181 lb)   05/23/19 86.4 kg (190 lb 7.6 oz)    Body mass index is 28.52 kg/m².  Previous Blood Pressure Readings :   BP Readings from Last 3 Encounters:   02/26/20 118/80   09/24/19 132/86   05/23/19 118/74       Physical Exam    GEN: No apparent distress  HEENT: sclera non-icteric, conjunctiva clear  CV: no peripheral edema regular rate and rhythm.  No murmurs.  No carotid bruits.  PULM: breathing non-labored  ABD: non protuberant abdomen.  PSYCH: appropriate affect  MSK: able to rise from chair without assistance  SKIN: normal skin turgor    Pertinent Labs Reviewed       ASSESSMENT/PLAN:    Controlled type 2 diabetes mellitus without complication, without long-term current use of insulin.Condition stable.  Counseling given today on self-care measures. Plan to monitor clinically. Continue current medical plan.   -     blood-glucose meter Misc; 1 device.  Check sugar 1 times daily as directed by MD. Supply preferred brand .Dx Code: [E11.8]  Dispense: 1 each; Refill: 0  -     lancets Misc; 1 box.  Check 1x daily as directed by MD. Supply brand covered by insurance. Dx Code: [E11.8]  Dispense: 100 each; Refill: 3  -     blood sugar diagnostic Strp; Check 1 x daily as directed by MD. Supply brand covered by insurance. Dx Code: [E11.8]  Dispense: 100 each; Refill: 3    Type 2 diabetes mellitus with hyperglycemia, without long-term current use of insulin  -     metFORMIN (GLUCOPHAGE-XR) 500 MG XR 24hr tablet; Take 1 tablet (500 mg total) by mouth daily with breakfast.  Dispense: 90 tablet; Refill: 3          Future Appointments   Date Time Provider Department [facility]   6/22/2020  9:00 AM LAB, [facility] [place]   [facility]25/2020  [facility]  2/26/2020  8:25 AM

## 2020-02-27 ENCOUNTER — PATIENT OUTREACH (OUTPATIENT)
Dept: OTHER | Facility: OTHER | Age: 70
End: 2020-02-27

## 2020-02-27 NOTE — PROGRESS NOTES
1. HTN  Last 5 Patient Entered Readings                                      Current 30 Day Average: 117/80     Recent Readings 2/26/2020 2/24/2020 2/21/2020 2/21/2020 2/21/2020    SBP (mmHg) 120 119 128 127 132    DBP (mmHg) 81 80 85 86 86    Pulse 71 72 70 79 81        Hypertension Medications             losartan (COZAAR) 100 MG tablet TAKE 1 TABLET EVERY DAY        Called patient to follow up, no answer, no VM. Reviewed recent readings and labs (last CMP drawn on 2/24/20). Per 2017 ACC/ AHA HTN guidelines  (goal of BP < 130/80), current 30-day average is well controlled. Patient was seen by PCP yesterday, BP was 118/80.  Will continue to monitor. WCB in 3 months, sooner if BP begins to trend up or down.       2. DM  Last 6 Patient Entered Readings                                          Most Recent A1c: 5.7% on 2/24/2020  (Goal: 7%)     Recent Readings 2/25/2020 2/21/2020 2/19/2020 2/18/2020 2/15/2020    Blood Glucose (mg/dL) 169 146 125 136 132        Diabetes Medications             metFORMIN (GLUCOPHAGE-XR) 500 MG XR 24hr tablet Take 1 tablet (500 mg total) by mouth daily with breakfast.        Called patient to follow up, no answer, no VM. Reviewed recent readings. Per AACE/ACE guidelines (goal A1C < 7%), DM is well controlled (A1C on 2/24/20 was 5.7%).  Will continue to monitor. WCB in 3 months, sooner if BG begins to trend up or down.

## 2020-02-27 NOTE — PROGRESS NOTES
Digital Medicine: Clinician Follow-Up    The history is provided by the patient.     Follow Up  Follow-up reason(s): reading review        1. HTN  HPI:  Patient called back. Patient reports adherence to medication regimen daily and denies missed doses. Patient denies hypotensive s/sx (lightheadedness, dizziness, nausea, fatigue); patient denies hypertensive s/sx (SOB, CP, severe headaches, changes in vision, dizziness, fatigue, confusion, anxiety, nosebleeds).     Last 5 Patient Entered Readings                                      Current 30 Day Average: 117/80     Recent Readings 2/26/2020 2/24/2020 2/21/2020 2/21/2020 2/21/2020    SBP (mmHg) 120 119 128 127 132    DBP (mmHg) 81 80 85 86 86    Pulse 71 72 70 79 81        Assessment:  Reviewed recent readings and labs (last CMP drawn on 2/24/20). Per 2017 ACC/ AHA HTN guidelines (goal of BP < 130/80), current 30-day average is well controlled. Patient was seen by PCP yesterday, BP was 118/80.    Plan:  Continue current medication regimen.   Instructed patient to call if BP remains > 130/80.   Patients health  will be following up as scheduled.   I will continue to monitor regularly and will follow-up in 12 weeks, sooner if blood pressure begins to trend upward or downward.     Current medication regimen:  Hypertension Medications             losartan (COZAAR) 100 MG tablet TAKE 1 TABLET EVERY DAY         Patient denies having questions or concerns. Patient has my contact information and knows to call with any concerns or clinical changes.     2. DM  HPI:  Patient called back.  Patient reports adherence to medication regimen daily and denies missed doses.   Patient denies hypoglycemic s/sx (dizziness, extreme hunger, headaches, confusion, trouble concentrating, sweating, shaking, blurred vision, personality changes); patient denies hyperglycemic s/sx (increased thirst, increased urination, headaches, trouble concentrating, blurred vision, fatigue).    Patient  reports digital glucometer run about 15-30 points higher than home glucometer.    Last 6 Patient Entered Readings                                          Most Recent A1c: 5.7% on 2/24/2020  (Goal: 7%)     Recent Readings 2/25/2020 2/21/2020 2/19/2020 2/18/2020 2/15/2020    Blood Glucose (mg/dL) 169 146 125 136 132        Assessment:  Reviewed recent readings. Per AACE/ACE guidelines (goal A1C < 7%), DM is well controlled (A1C on 2/24/20 was 5.7%). Next A1C will be due in 8/2020.    Plan:  Continue current medication regimen.   Patients health  will be following up as scheduled.   I will continue to monitor regularly and will follow-up in 12 weeks, sooner if blood sugar begins to trend upward or downward.     Current Medication Regimen:  Diabetes Medications             metFORMIN (GLUCOPHAGE-XR) 500 MG XR 24hr tablet Take 1 tablet (500 mg total) by mouth daily with breakfast.         Patient has my contact information and knows to call with any concerns or clinical changes.

## 2020-03-02 NOTE — PROGRESS NOTES
Digital Medicine: Health  Follow-Up    The history is provided by the patient.     Follow Up  Follow-up reason(s): reading review      Readings are trending down   Mr. Simpson is doing okay. He expressed some concern about low BP readings today, but denies hypotensive symptoms. He drank a cup of coffee and his BP came back up. Encouraged patient to stay hydrated today. Also advised him to call back if symptoms arise, or to have a small, salty snack to help bring BP back up. He says that he was having some flu symptoms last week, but he is feeling better today. He thinks this may be why his BP is low.    Mr. Simpson isn't as confident in the [employer] glucometer as he is in his personal device. He reports that readings can sometimes be >30 points different. He called [employer] after being told to by [facility] personnel. They told him that up to 40 points difference is acceptable. He finds this discouraging, but will continue to monitor. He is pleased with his most recent A1c.        INTERVENTION(S)  encouragement/support and denied questions    PLAN  continue monitoring      There are no preventive care reminders to display for this patient.    Last 5 Patient Entered Readings                                      Current 30 Day Average: 119/79     Recent Readings 3/2/2020 3/2/2020 3/2/2020 3/2/2020 2/29/2020    SBP (mmHg) 124 104 90 101 139    DBP (mmHg) 79 71 72 58 93    Pulse 69 68 76 70 107        Last 6 Patient Entered Readings                                          Most Recent A1c: 5.7% on 2/24/2020  (Goal: 7%)     Recent Readings 2/25/2020 2/21/2020 2/19/2020 2/18/2020 2/15/2020    Blood Glucose (mg/dL) 169 146 125 136 132                    Diet Screening   Patient reports eating or drinking the following: fruit, water, fresh vegetables and caffeineHe has the following dietary restrictions: low sodium diet and diabeticHe has standard fasting periods.    Fasting details: Jewish fasting and no eating after 6PM  daily    Physical Activity Screening   When asked if exercising, patient responded: yes    Patient participates in the following activities: walking      SDOH

## 2020-03-11 ENCOUNTER — PATIENT MESSAGE (OUTPATIENT)
Dept: ADMINISTRATIVE | Facility: OTHER | Age: 70
End: 2020-03-11

## 2020-04-03 DIAGNOSIS — I10 ESSENTIAL HYPERTENSION: ICD-10-CM

## 2020-04-03 DIAGNOSIS — E03.9 HYPOTHYROIDISM, UNSPECIFIED TYPE: ICD-10-CM

## 2020-04-04 RX ORDER — LEVOTHYROXINE SODIUM 88 UG/1
88 TABLET ORAL
Qty: 90 TABLET | Refills: 0 | Status: SHIPPED | OUTPATIENT
Start: 2020-04-04 | End: 2020-06-12

## 2020-04-04 RX ORDER — LOSARTAN POTASSIUM 100 MG/1
TABLET ORAL
Qty: 90 TABLET | Refills: 0 | Status: SHIPPED | OUTPATIENT
Start: 2020-04-04 | End: 2020-06-12

## 2020-05-01 ENCOUNTER — PATIENT MESSAGE (OUTPATIENT)
Dept: ADMINISTRATIVE | Facility: OTHER | Age: 70
End: 2020-05-01

## 2020-05-15 ENCOUNTER — TELEPHONE (OUTPATIENT)
Dept: INTERNAL MEDICINE | Facility: CLINIC | Age: 70
End: 2020-05-15

## 2020-05-19 ENCOUNTER — PATIENT OUTREACH (OUTPATIENT)
Dept: OTHER | Facility: OTHER | Age: 70
End: 2020-05-19

## 2020-05-19 NOTE — PROGRESS NOTES
1. HTN  Last 5 Patient Entered Readings                                      Current 30 Day Average: 116/78     Recent Readings 5/19/2020 5/18/2020 5/17/2020 5/16/2020 5/16/2020    SBP (mmHg) 116 114 106 123 151    DBP (mmHg) 73 75 76 87 121    Pulse 70 65 73 94 97        Hypertension Medications             losartan (COZAAR) 100 MG tablet TAKE 1 TABLET EVERY DAY        Called patient to follow up, no answer, no VM. Reviewed recent readings and labs (last CMP drawn on 2/24/20). Per 2017 ACC/ AHA HTN guidelines  (goal of BP < 130/80), current 30-day average is well controlled. Will also message patient.  Will continue to monitor. WCB in 3 months, sooner if BP begins to trend up or down.         2. DM  Last 6 Patient Entered Readings                                          Most Recent A1c: 5.7% on 2/24/2020  (Goal: 7%)     Recent Readings 5/19/2020 5/18/2020 5/17/2020 5/16/2020 5/16/2020    Blood Glucose (mg/dL) 110 115 115 127 125        Diabetes Medications             metFORMIN (GLUCOPHAGE-XR) 500 MG XR 24hr tablet Take 1 tablet (500 mg total) by mouth daily with breakfast.        Called to follow up with patient, no answer, no VM. Reviewed recent readings. Per AACE/ACE guidelines (goal A1C < 7%), DM is well controlled (A1C on 2/24/20 was 5.7%). Will also message patient.  Will continue to monitor. WCB in 3 months, sooner if BG begins to trend up or down.

## 2020-05-31 PROCEDURE — 99454 PR REMOTE MNTR, PHYS PARAM, INITIAL, EA 30 DAYS: ICD-10-PCS | Mod: S$GLB,,, | Performed by: FAMILY MEDICINE

## 2020-05-31 PROCEDURE — 99454 REM MNTR PHYSIOL PARAM 16-30: CPT | Mod: S$GLB,,, | Performed by: FAMILY MEDICINE

## 2020-06-11 ENCOUNTER — PATIENT OUTREACH (OUTPATIENT)
Dept: OTHER | Facility: OTHER | Age: 70
End: 2020-06-11

## 2020-06-17 DIAGNOSIS — E78.5 DYSLIPIDEMIA: ICD-10-CM

## 2020-06-22 ENCOUNTER — LAB VISIT (OUTPATIENT)
Dept: LAB | Facility: HOSPITAL | Age: 70
End: 2020-06-22
Attending: INTERNAL MEDICINE
Payer: MEDICARE

## 2020-06-22 DIAGNOSIS — E03.9 HYPOTHYROIDISM, UNSPECIFIED TYPE: ICD-10-CM

## 2020-06-22 DIAGNOSIS — E11.9 CONTROLLED TYPE 2 DIABETES MELLITUS WITHOUT COMPLICATION, WITHOUT LONG-TERM CURRENT USE OF INSULIN: ICD-10-CM

## 2020-06-22 LAB
ALBUMIN SERPL BCP-MCNC: 3.9 G/DL (ref 3.5–5.2)
ALP SERPL-CCNC: 46 U/L (ref 55–135)
ALT SERPL W/O P-5'-P-CCNC: 21 U/L (ref 10–44)
ANION GAP SERPL CALC-SCNC: 9 MMOL/L (ref 8–16)
AST SERPL-CCNC: 17 U/L (ref 10–40)
BILIRUB SERPL-MCNC: 0.4 MG/DL (ref 0.1–1)
BUN SERPL-MCNC: 13 MG/DL (ref 8–23)
CALCIUM SERPL-MCNC: 9 MG/DL (ref 8.7–10.5)
CHLORIDE SERPL-SCNC: 107 MMOL/L (ref 95–110)
CO2 SERPL-SCNC: 25 MMOL/L (ref 23–29)
CREAT SERPL-MCNC: 0.8 MG/DL (ref 0.5–1.4)
EST. GFR  (AFRICAN AMERICAN): >60 ML/MIN/1.73 M^2
EST. GFR  (NON AFRICAN AMERICAN): >60 ML/MIN/1.73 M^2
ESTIMATED AVG GLUCOSE: 120 MG/DL (ref 68–131)
GLUCOSE SERPL-MCNC: 109 MG/DL (ref 70–110)
HBA1C MFR BLD HPLC: 5.8 % (ref 4–5.6)
POTASSIUM SERPL-SCNC: 4.3 MMOL/L (ref 3.5–5.1)
PROT SERPL-MCNC: 7.2 G/DL (ref 6–8.4)
SODIUM SERPL-SCNC: 141 MMOL/L (ref 136–145)
TSH SERPL DL<=0.005 MIU/L-ACNC: 2.32 UIU/ML (ref 0.4–4)

## 2020-06-22 PROCEDURE — 80053 COMPREHEN METABOLIC PANEL: CPT | Mod: HCNC

## 2020-06-22 PROCEDURE — 36415 COLL VENOUS BLD VENIPUNCTURE: CPT | Mod: HCNC,PO

## 2020-06-22 PROCEDURE — 83036 HEMOGLOBIN GLYCOSYLATED A1C: CPT | Mod: HCNC

## 2020-06-22 PROCEDURE — 84443 ASSAY THYROID STIM HORMONE: CPT | Mod: HCNC

## 2020-06-24 ENCOUNTER — OFFICE VISIT (OUTPATIENT)
Dept: INTERNAL MEDICINE | Facility: CLINIC | Age: 70
End: 2020-06-24
Payer: MEDICARE

## 2020-06-24 ENCOUNTER — DOCUMENTATION ONLY (OUTPATIENT)
Dept: INTERNAL MEDICINE | Facility: CLINIC | Age: 70
End: 2020-06-24

## 2020-06-24 VITALS
HEART RATE: 82 BPM | OXYGEN SATURATION: 98 % | WEIGHT: 190.25 LBS | TEMPERATURE: 98 F | BODY MASS INDEX: 29.8 KG/M2 | DIASTOLIC BLOOD PRESSURE: 64 MMHG | SYSTOLIC BLOOD PRESSURE: 110 MMHG

## 2020-06-24 DIAGNOSIS — Z12.11 ENCOUNTER FOR FECAL IMMUNOCHEMICAL TEST SCREENING: ICD-10-CM

## 2020-06-24 DIAGNOSIS — M77.8 RIGHT SHOULDER TENDONITIS: ICD-10-CM

## 2020-06-24 DIAGNOSIS — M19.049 HAND ARTHRITIS: Primary | ICD-10-CM

## 2020-06-24 DIAGNOSIS — E11.9 CONTROLLED TYPE 2 DIABETES MELLITUS WITHOUT COMPLICATION, WITHOUT LONG-TERM CURRENT USE OF INSULIN: ICD-10-CM

## 2020-06-24 PROCEDURE — 1125F AMNT PAIN NOTED PAIN PRSNT: CPT | Mod: HCNC,S$GLB,, | Performed by: INTERNAL MEDICINE

## 2020-06-24 PROCEDURE — 1101F PR PT FALLS ASSESS DOC 0-1 FALLS W/OUT INJ PAST YR: ICD-10-PCS | Mod: HCNC,CPTII,S$GLB, | Performed by: INTERNAL MEDICINE

## 2020-06-24 PROCEDURE — 3044F PR MOST RECENT HEMOGLOBIN A1C LEVEL <7.0%: ICD-10-PCS | Mod: HCNC,CPTII,S$GLB, | Performed by: INTERNAL MEDICINE

## 2020-06-24 PROCEDURE — 99999 PR PBB SHADOW E&M-EST. PATIENT-LVL V: ICD-10-PCS | Mod: PBBFAC,HCNC,, | Performed by: INTERNAL MEDICINE

## 2020-06-24 PROCEDURE — 3008F PR BODY MASS INDEX (BMI) DOCUMENTED: ICD-10-PCS | Mod: HCNC,CPTII,S$GLB, | Performed by: INTERNAL MEDICINE

## 2020-06-24 PROCEDURE — 3074F SYST BP LT 130 MM HG: CPT | Mod: HCNC,CPTII,S$GLB, | Performed by: INTERNAL MEDICINE

## 2020-06-24 PROCEDURE — 3078F PR MOST RECENT DIASTOLIC BLOOD PRESSURE < 80 MM HG: ICD-10-PCS | Mod: HCNC,CPTII,S$GLB, | Performed by: INTERNAL MEDICINE

## 2020-06-24 PROCEDURE — 3078F DIAST BP <80 MM HG: CPT | Mod: HCNC,CPTII,S$GLB, | Performed by: INTERNAL MEDICINE

## 2020-06-24 PROCEDURE — 99214 OFFICE O/P EST MOD 30 MIN: CPT | Mod: HCNC,S$GLB,, | Performed by: INTERNAL MEDICINE

## 2020-06-24 PROCEDURE — 1159F PR MEDICATION LIST DOCUMENTED IN MEDICAL RECORD: ICD-10-PCS | Mod: HCNC,S$GLB,, | Performed by: INTERNAL MEDICINE

## 2020-06-24 PROCEDURE — 3044F HG A1C LEVEL LT 7.0%: CPT | Mod: HCNC,CPTII,S$GLB, | Performed by: INTERNAL MEDICINE

## 2020-06-24 PROCEDURE — 99999 PR PBB SHADOW E&M-EST. PATIENT-LVL V: CPT | Mod: PBBFAC,HCNC,, | Performed by: INTERNAL MEDICINE

## 2020-06-24 PROCEDURE — 3074F PR MOST RECENT SYSTOLIC BLOOD PRESSURE < 130 MM HG: ICD-10-PCS | Mod: HCNC,CPTII,S$GLB, | Performed by: INTERNAL MEDICINE

## 2020-06-24 PROCEDURE — 1101F PT FALLS ASSESS-DOCD LE1/YR: CPT | Mod: HCNC,CPTII,S$GLB, | Performed by: INTERNAL MEDICINE

## 2020-06-24 PROCEDURE — 99214 PR OFFICE/OUTPT VISIT, EST, LEVL IV, 30-39 MIN: ICD-10-PCS | Mod: HCNC,S$GLB,, | Performed by: INTERNAL MEDICINE

## 2020-06-24 PROCEDURE — 1125F PR PAIN SEVERITY QUANTIFIED, PAIN PRESENT: ICD-10-PCS | Mod: HCNC,S$GLB,, | Performed by: INTERNAL MEDICINE

## 2020-06-24 PROCEDURE — 3008F BODY MASS INDEX DOCD: CPT | Mod: HCNC,CPTII,S$GLB, | Performed by: INTERNAL MEDICINE

## 2020-06-24 PROCEDURE — 1159F MED LIST DOCD IN RCRD: CPT | Mod: HCNC,S$GLB,, | Performed by: INTERNAL MEDICINE

## 2020-06-24 NOTE — PROGRESS NOTES
Portions of this note are generated with voice recognition software. Typographical errors may exist.     SUBJECTIVE:    This is a/an 70 y.o. male here for primary care visit for  Chief Complaint   Patient presents with    Thyroid Problem     f/u       Patient states that he has had hypertrophy of the joints especially in his upper extremities for many years and this has not been progressing rapidly.  He is right hand dominant and has had the most deformity in his right hand.  He has an inability to completely extend the fingers in his right hand any is starting to have some finger deviation.  Elbow and shoulder also problematic.  Knees and ankles are not as bothersome.  Patient does not think that he has been screened for rheumatologic or crystalline arthritis.    Patient has had left shoulder surgery which was a very complicated or deal.  States that after he had surgery he ended up having problems with range of motion in the left shoulder and even had some episodes of dislocation caused by range of motion exercises.  He now has most problems with his right shoulder.  He is reluctant to pursue surgical intervention but is considering physical therapy to help with range of motion.  He chronically has stiffness that limits his activities with his right shoulder.    Patient has been monitoring blood pressure and diabetes with the digital medicine program.  Doing well.    Answers for HPI/ROS submitted by the patient on 6/20/2020   activity change: Yes  unexpected weight change: No  rhinorrhea: No  trouble swallowing: No  visual disturbance: No  chest tightness: No  polyuria: No  difficulty urinating: No  joint swelling: No  arthralgias: No  confusion: No  dysphoric mood: Yes      Medications Reviewed and Updated    Past medical, family, and social histories were reviewed and updated.    Review of Systems negative unless otherwise noted in history of present illness-  Review of Systems   HENT: Positive for hearing  loss.    Eyes: Negative for discharge.   Respiratory: Negative for wheezing.    Cardiovascular: Negative for chest pain and palpitations.   Gastrointestinal: Negative for blood in stool, constipation, diarrhea and vomiting.   Genitourinary: Positive for urgency. Negative for hematuria.   Musculoskeletal: Negative for neck pain.   Neurological: Positive for weakness. Negative for headaches.   Endo/Heme/Allergies: Negative for polydipsia.           Allergic:  Review of patient's allergies indicates:  No Known Allergies    OBJECTIVE:  BP: 110/64 Pulse: 82 Temp: 97.5 °F (36.4 °C)  Wt Readings from Last 3 Encounters:   06/24/20 86.3 kg (190 lb 4.1 oz)   02/26/20 82.6 kg (182 lb 1.6 oz)   09/24/19 82.1 kg (181 lb)    Body mass index is 29.8 kg/m².  Previous Blood Pressure Readings :   BP Readings from Last 3 Encounters:   06/24/20 110/64   02/26/20 118/80   09/24/19 132/86       Physical Exam    GEN: No apparent distress  HEENT: sclera non-icteric, conjunctiva clear  CV: no peripheral edema.  Grade 2/6 systolic murmur heard loudest in the apex  PULM: breathing non-labored  ABD: non, protuberant abdomen.  PSYCH: appropriate affect  MSK: able to rise from chair without assistance. AROM limited, R shoulder  - Joint hypertrophy, primarily MCP, R dominant hand  SKIN: normal skin turgor    Pertinent Labs Reviewed       ASSESSMENT/PLAN:  .Etiology unclear. Not controlled. Further evaluation warranted.  Recommendations as below or as written on After Visit Summary.   Hand arthritis  -     Rheumatoid factor; Future; Expected date: 06/24/2020  -     URIC ACID; Future; Expected date: 06/24/2020    Controlled type 2 diabetes mellitus without complication, without long-term current use of insulin.Condition stable.  Counseling given today on self-care measures. Plan to monitor clinically. Continue current medical plan.   -     Brain Natriuretic Peptide; Future; Expected date: 06/24/2020    Encounter for fecal immunochemical test  screening  -     Fecal Immunochemical Test (iFOBT); Future; Expected date: 06/24/2020    Right shoulder tendonitis.Condition not optimally controlled. Detailed counseling on self care measures. Plan to monitor clinically in addition to plan below or as listed on After Visit Summary.   -     Ambulatory referral/consult to Physical/Occupational Therapy; Future; Expected date: 07/01/2020          Future Appointments   Date Time Provider Department Center   [facility]26[facility]2020  [facility]   9/14/2020 10:00 AM Keri Cr MD [facility] SLEEP [place]   9/18/2020  8:30 AM LAB, [facility] [place]   9/24/2020  9:20 AM DO BRYNN Garcia [facility]       Ar Willett  6/25/2020  10:52 AM

## 2020-06-25 ENCOUNTER — LAB VISIT (OUTPATIENT)
Dept: LAB | Facility: HOSPITAL | Age: 70
End: 2020-06-25
Attending: INTERNAL MEDICINE
Payer: MEDICARE

## 2020-06-25 DIAGNOSIS — M19.049 HAND ARTHRITIS: ICD-10-CM

## 2020-06-25 LAB — RHEUMATOID FACT SERPL-ACNC: <10 IU/ML (ref 0–15)

## 2020-06-25 PROCEDURE — 36415 COLL VENOUS BLD VENIPUNCTURE: CPT | Mod: HCNC,PO

## 2020-06-25 PROCEDURE — 86431 RHEUMATOID FACTOR QUANT: CPT | Mod: HCNC

## 2020-06-26 ENCOUNTER — LAB VISIT (OUTPATIENT)
Dept: LAB | Facility: HOSPITAL | Age: 70
End: 2020-06-26
Attending: INTERNAL MEDICINE
Payer: MEDICARE

## 2020-06-26 ENCOUNTER — CLINICAL SUPPORT (OUTPATIENT)
Dept: REHABILITATION | Facility: HOSPITAL | Age: 70
End: 2020-06-26
Attending: INTERNAL MEDICINE
Payer: MEDICARE

## 2020-06-26 DIAGNOSIS — M25.611 DECREASED SHOULDER MOBILITY, RIGHT: ICD-10-CM

## 2020-06-26 DIAGNOSIS — R29.3 POSTURE ABNORMALITY: ICD-10-CM

## 2020-06-26 DIAGNOSIS — M25.511 CHRONIC RIGHT SHOULDER PAIN: ICD-10-CM

## 2020-06-26 DIAGNOSIS — G89.29 CHRONIC RIGHT SHOULDER PAIN: ICD-10-CM

## 2020-06-26 DIAGNOSIS — Z12.11 ENCOUNTER FOR FECAL IMMUNOCHEMICAL TEST SCREENING: ICD-10-CM

## 2020-06-26 PROCEDURE — 82274 ASSAY TEST FOR BLOOD FECAL: CPT | Mod: HCNC

## 2020-06-26 PROCEDURE — 97161 PT EVAL LOW COMPLEX 20 MIN: CPT | Mod: HCNC,PN

## 2020-06-26 NOTE — PLAN OF CARE
[facility]  Physical Therapy Initial Evaluation    Date: 6/26/2020   Name: Cesar Simpson  Clinic Number: [id number]    Therapy Diagnosis:   Encounter Diagnoses   Name Primary?    Chronic right shoulder pain     Decreased shoulder mobility, right     Posture abnormality      Physician: Ar Willett*    Physician Orders: PT eval only submit for treat  Medical Diagnosis from Referral: M75.81 (ICD-10-CM) - Right shoulder tendonitis  Evaluation Date: 6/26/2020  Authorization Period Expiration: 12/31/2020  Plan of Care Expiration: 8/7/2020  Visit # / Visits authorized: 1/ 1    Time In: 1:30PM  Time Out: 2:20PM  Total Appointment Time (timed & untimed codes): 50 minutes (1 low eval)    Precautions: Standard, Diabetes and Fall (pt reports he has fallen 3-4 times within last year)    Subjective   Date of onset: R shoulder pain/dysfunction has been present for several years but has gotten worse within last year and a half; carrying heavy objects and gripping has gotten much more difficult as well  History of current condition - Cesar reports: Both L and R shoulders have caused difficulty completing functional tasks over the past several years. He reports 3 shoulder surgeries on L shoulder (2 debridements and 1 shoulder replacement that occurred when he was 59). R shoulder is chief complaint currently, and has gotten worse in terms of pain/limited ROM over the past year and a half. Tasks like carrying heavy objects, gripping (ex: using firearm), overhead activity, and behind the back activity has gotten more difficult within this period of time.     Medical History:   Past Medical History:   Diagnosis Date    Diabetes mellitus type II, uncontrolled 2/27/2013    High-density lipoprotein deficiency 2/27/2013    HTN (hypertension) 2/27/2013    Hyperlipidemia     Hypothyroidism     Obesity     Osteoarthritis 2/8/2016    Screening for prostate cancer 2/27/2013    Trouble in sleeping   "   Type 2 diabetes mellitus     Type 2 diabetes mellitus with diabetic polyneuropathy, without long-term current use of insulin        Surgical History:   Cesar Simpson  has a past surgical history that includes Shoulder surgery; Cataract extraction; Eye surgery; Tonsillectomy; Joint replacement (Left); and Total shoulder arthroplasty (Left).    Medications:   Cesar has a current medication list which includes the following prescription(s): blood sugar diagnostic, blood-glucose meter, lancets, levothyroxine, losartan, metformin, pravastatin, tamsulosin, trazodone, and varicella-zoster [id number] (pf).    Allergies:   Review of patient's allergies indicates:  No Known Allergies     Imaging: None recent    Prior Therapy: PT for L shoulder surgeries   Social History: Lives alone  Activity Level: Relatively active; went to gym regularly before Covid-19 outbreak; often working on home projects  Occupation: Retired  Prior Level of Function: Independent with less pain and saucedo use of RUE  Current Level of Function: Independent but with more pain/more difficulty using RUE, especially for gripping, lifting, and reaching behind back    Pain:  Current 5/10, worst 7/10, best 3/10   Location: right shoulder  Description: Aching  Aggravating Factors: Lifting and UE activity; changes in weather "reaching high and reaching behind"  Easing Factors: Heat    Pts goals: Improve function and range of motion of RUE    Objective     Command followin% simple and complex     Speech: no deficits    Mental status: alert, oriented to person, place, and time  Behavior:  calm and cooperative  Attention Span and Concentration:  Normal    Dominant hand:  right     Posture Alignment: Forward head; rounded shoulders    Sensation: Denies sensory changes    Visual/Auditory: Pt reports he is mildly hard of hearing    Upper Extremity ROM/Strength   RUE AROM/PROM LUE AROM/PROM RUE MMT LUE MMT   Shoulder Flexion: 120/130 169/175 5/5! 5/5   Shoulder " Abduction: 105/115 160/166 5/5! 5/5   Shoulder Extension: Reduced WFL NT NT   Shoulder External  Rotation (90/90):   50/54 60/66 5/5 5/5   Shoulder Internal  Rotation (90/90):   10/13 60/65 5/5 5/5   Elbow Flexion:     WFL WFL 5/5 5/5   Elbow Extension: WFL WFL 5/5 5/5   : N/A N/A Reduced Reduced   Key: ! = with pain; NT = not tested; WFL = within functional limits    Accessory Motion: GH glides reduced in all directions on R; L NT due to previous shoulder replacement    Thoracic Spine: Significant joint hypomobility for upper to mid T-spine PA    Cervical Spine: ROM WFL with exception of B side-bending    Palpation: Hypertonicity and TTP over R upper trap    Special Tests: Cervical Compression (-); Cervical Distraction (-); Yergason's (-); Neer (+) on R; [place] (-); Hornblower's Sign (-); Belly Press (-)    Limitation/Restriction for FOTO Shoulder Survey    Therapist reviewed FOTO scores for Cesar Simpson on 6/26/2020.   FOTO documents entered into [employer] - see Media section.    Limitation Score: 39%         TREATMENT   Treatment options to be initiated at next visit: Moist heat; upper trap and levator scap stretch; GH stretching/mobilization; pulleys; pec stretching; thoracic extension over bolster    Home Exercises and Patient Education Provided    Education provided:   - General PT POC    Written Home Exercises Provided: yes.  Exercises were reviewed and Cesar was able to demonstrate them prior to the end of the session.  Cesar demonstrated good  understanding of the education provided.     See EMR under Patient Instructions for exercises provided 6/26/2020.    Assessment   Cesar is a 70 y.o. male referred to outpatient Physical Therapy with a medical diagnosis of Right shoulder tendonitis. Pt's medical history notable for L shoulder issues in the past, with partial replacement completed about a decade ago. Pt presents with impaired R shoulder and upper quarter mobility (including reduced R shoulder  AROM/PROM, accessory motion of GH joint in all planes, and upper to mid-thoracic mobility), reduced bilateral  strength, and pain/dysfunction with overhead activity, reaching behind back, gripping activity, and lifting activities, especially when using RUE. Neer test (+) on R, but unclear if pain directly resulted from significant reductions in shoulder complex mobility or impingement. BUE strength WNL. Pt exhibits no signs/symptoms associated with cervical dysfunction. Pt is appropriate for physical therapy services to improve pain and significant functional mobility impairments of R shoulder complex in order to reduce activity/participation restrictions.    Pt prognosis is Good.   Pt will benefit from skilled outpatient Physical Therapy to address the deficits stated above and in the chart below, provide pt/family education, and to maximize pt's level of independence.     Plan of care discussed with patient: Yes  Pt's spiritual, cultural and educational needs considered and patient is agreeable to the plan of care and goals as stated below:     Anticipated Barriers for therapy: Age, chronicity of R shoulder dysfunction    Medical Necessity is demonstrated by the following  History  Co-morbidities and personal factors that may impact the plan of care Co-morbidities:    Diabetes mellitus type II, uncontrolled    High-density lipoprotein deficiency    HTN (hypertension)    Hyperlipidemia    Hypothyroidism    Obesity    Osteoarthritis    Screening for prostate cancer    Trouble in sleeping    Type 2 diabetes mellitus    Type 2 diabetes mellitus with diabetic polyneuropathy, without long-term current use of insulin     Personal Factors:   age     high   Examination  Body Structures and Functions, activity limitations and participation restrictions that may impact the plan of care Body Regions:   back  upper extremities    Body Systems:    gross symmetry  ROM    Participation Restrictions:   All  activities involving lifting/gripping (ex: Using firearm/repairs around home)    Activity limitations:   Learning and applying knowledge  no deficits    General Tasks and Commands  no deficits    Communication  communicating with/receiving spoken language (difficulty hearing)    Mobility  lifting and carrying objects  fine hand use (grasping/picking up)    Self care  washing oneself (bathing, drying, washing hands)  caring for body parts (brushing teeth, shaving, grooming)  dressing    Domestic Life  doing house work (cleaning house, washing dishes, laundry)    Interactions/Relationships  no deficits    Life Areas  no deficits    Community and Social Life  community life  recreation and leisure         high   Clinical Presentation stable and uncomplicated low   Decision Making/ Complexity Score: low     Goals:  Short Term Goals: 3 weeks   1. Pt will be independent with initial HEP in order to maximize therapeutic benefits outside of PT.  2. Pt will improve R shoulder ROM measures by 5 degrees or more in order to improve functional mobility of upper extremity.  3. Pt will reduce pain at worst (currently 7/10) to 5/10 in order to improve comfort during ADLs.  4. Pt will demonstrate improved sitting posture to decrease pain and improve functional use of upper quadrant.    Long Term Goals: 6 weeks   1. Pt will be independent with updated HEP in order to maximize therapeutic benefits outside of PT.  2. Pt will improve R shoulder ROM measures by 10 degrees or more in order to improve functional mobility of upper extremity.  3. Pt will reduce pain at worst (currently 7/10) to 3/10 in order to improve comfort during ADLs.  4. Pt will improve B cervical SB ROM to WNL in order to improve mobility of upper quadrant during R shoulder activity.  5. Pt will improve FOTO to </=30% limitation to improve perceived limitation with changing and maintaining mobility.    Plan   Plan of care Certification: 6/26/2020 to  8/7/2020.    Outpatient Physical Therapy 2 times weekly for 6 weeks to include the following interventions: Manual Therapy, Moist Heat/ Ice, Neuromuscular Re-ed, Patient Education, Self Care, Therapeutic Activites and Therapeutic Exercise.     ZAC STOCKTON, PT

## 2020-06-26 NOTE — PATIENT INSTRUCTIONS
Flexibility: Upper Trapezius Stretch  Copyright © 4669-8875 [employer]  Gently draw your head towards the side of your top hand. Do not over-stretch.  Hold 30 seconds. Repeat 3 times per session. Do 2 sessions per day.      Levator Scapula Stretch, Sitting  Copyright © 1566-9996 [employer]  Sitting upright, tilt your head downward and towards your armpit. Hold once you feel a gentle stretch. Perform on both sides.  Hold 30 seconds. Repeat 3 times per session. Do 2 sessions per day.      Pec Door Stretch     doorframe with palms, forearms, and elbows against frame. Lean forward until a stretch is felt in the chest. Hold 30 seconds.  Repeat 3 times per session. Do 2 sessions per day.      Pectoral Stretch, Supine on Foam Roller     Copyright © 8469-6426 [employer]  Lie on back on rolled up towel and allow arms to spread wide with palms up, keeping arms relaxed on ground. To increase stretch, bend elbows or slide arms over head.   Hold 3-5 minutes. Do 2 sessions per day.            ROM: Flexion - Wand (Supine)        Lie on back holding wand. Raise arms over head.   Repeat 15 times per set. Do 2 sets per session. Do 3-5 sessions per day.     [email or web address].[email or web address]

## 2020-06-30 ENCOUNTER — CLINICAL SUPPORT (OUTPATIENT)
Dept: REHABILITATION | Facility: HOSPITAL | Age: 70
End: 2020-06-30
Payer: MEDICARE

## 2020-06-30 DIAGNOSIS — M25.511 CHRONIC RIGHT SHOULDER PAIN: ICD-10-CM

## 2020-06-30 DIAGNOSIS — M25.611 DECREASED SHOULDER MOBILITY, RIGHT: ICD-10-CM

## 2020-06-30 DIAGNOSIS — R29.3 POSTURE ABNORMALITY: ICD-10-CM

## 2020-06-30 DIAGNOSIS — G89.29 CHRONIC RIGHT SHOULDER PAIN: ICD-10-CM

## 2020-06-30 PROCEDURE — 97140 MANUAL THERAPY 1/> REGIONS: CPT | Mod: HCNC,PN,CQ

## 2020-06-30 PROCEDURE — 97110 THERAPEUTIC EXERCISES: CPT | Mod: HCNC,PN,CQ

## 2020-06-30 NOTE — PROGRESS NOTES
"  Physical Therapy Treatment Note     Name: Cesar Simpson  Clinic Number: [id number]    Therapy Diagnosis:        Encounter Diagnoses   Name Primary?    Chronic right shoulder pain      Decreased shoulder mobility, right      Posture abnormality       Physician: Ar Willett*       Visit Date: 6/30/2020    Physician Orders: PT eval only submit for treat  Medical Diagnosis from Referral: M75.81 (ICD-10-CM) - Right shoulder tendonitis  Evaluation Date: 6/26/2020  Authorization Period Expiration: 12/31/2020  Plan of Care Expiration: 8/7/2020  Visit # / Visits authorized: 2/ 1 (pending)       Time In: 1400   Time Out:1445  Total Billable Time: 45 minutes    Precautions: Standard, Diabetes and Fall (pt reports he has fallen 3-4 times within last year)    Subjective     Pt reports: no new falls. .  He was not compliant with home exercise program.  Response to previous treatment: Tolerated well   Functional change: Ongoing    Pain: 0/10  Location: right shoulder     Objective     Cesar received therapeutic exercises to develop strength, endurance, ROM, flexibility, posture and core stabilization for 30 minutes including:    Supine:  Chest press                                         2x10 w/dowel   Shoulder oscillation at 90* flex             20 x CW&CCW  Shoulder flexion                                   2x10 w/dowel       Side Lying:  Right ER                                              2x10 reps                    Right Abd                                             2x10 reps  Right Flexion w/[place]                       2x10 reps        Seated:   Upper trap stretch                                3x30"   Levator stretch                                     3x30"     Standing:  Rows                                                    3x10 w/GTB     received the following manual therapy techniques: Joint mobilizations, Myofacial release, Soft tissue Mobilization and Friction Massage were applied to the: right " upper quadrant for 15 minutes, including:  Right shoulder PROM  Right upper trap static pressure  Right upper trap CFM  Right G-H joint AP moblization      Home Exercises Provided and Patient Education Provided     Education provided:   - HEP review    Written Home Exercises Provided: yes.  Exercises were reviewed and Cesar was able to demonstrate them prior to the end of the session.  Cesar demonstrated good  understanding of the education provided.     See EMR under Patient Instructions for exercises provided prior visit.    Assessment   Cesar tolerated first follow up session well. Reviewed HEP. Interventions as above. No pain elicited     Cesar is progressing well towards his goals.   Pt prognosis is Good.     Pt will continue to benefit from skilled outpatient physical therapy to address the deficits listed in the problem list box on initial evaluation, provide pt/family education and to maximize pt's level of independence in the home and community environment.     Pt's spiritual, cultural and educational needs considered and pt agreeable to plan of care and goals.     Anticipated barriers to physical therapy: Age, chronicity of R shoulder dysfunction    Goals:   Short Term Goals: 3 weeks   1. Pt will be independent with initial HEP in order to maximize therapeutic benefits outside of PT.  2. Pt will improve R shoulder ROM measures by 5 degrees or more in order to improve functional mobility of upper extremity.  3. Pt will reduce pain at worst (currently 7/10) to 5/10 in order to improve comfort during ADLs.  4. Pt will demonstrate improved sitting posture to decrease pain and improve functional use of upper quadrant.     Long Term Goals: 6 weeks   1. Pt will be independent with updated HEP in order to maximize therapeutic benefits outside of PT.  2. Pt will improve R shoulder ROM measures by 10 degrees or more in order to improve functional mobility of upper extremity.  3. Pt will reduce pain at worst (currently  7/10) to 3/10 in order to improve comfort during ADLs.  4. Pt will improve B cervical SB ROM to WNL in order to improve mobility of upper quadrant during R shoulder activity.  5. Pt will improve FOTO to </=30% limitation to improve perceived limitation with changing and maintaining mobility.    Plan     Continue PT POC    Link Mejia, PTA

## 2020-07-07 ENCOUNTER — CLINICAL SUPPORT (OUTPATIENT)
Dept: REHABILITATION | Facility: HOSPITAL | Age: 70
End: 2020-07-07
Payer: MEDICARE

## 2020-07-07 DIAGNOSIS — M25.611 DECREASED SHOULDER MOBILITY, RIGHT: ICD-10-CM

## 2020-07-07 DIAGNOSIS — G89.29 CHRONIC RIGHT SHOULDER PAIN: ICD-10-CM

## 2020-07-07 DIAGNOSIS — R29.3 POSTURE ABNORMALITY: ICD-10-CM

## 2020-07-07 DIAGNOSIS — M25.511 CHRONIC RIGHT SHOULDER PAIN: ICD-10-CM

## 2020-07-07 LAB — HEMOCCULT STL QL IA: NEGATIVE

## 2020-07-07 PROCEDURE — 97140 MANUAL THERAPY 1/> REGIONS: CPT | Mod: HCNC,PN

## 2020-07-07 PROCEDURE — 97110 THERAPEUTIC EXERCISES: CPT | Mod: HCNC,PN

## 2020-07-07 NOTE — PROGRESS NOTES
"  Physical Therapy Treatment Note     Name: Cesar Simpson  Clinic Number: [id number]    Therapy Diagnosis:        Encounter Diagnoses   Name Primary?    Chronic right shoulder pain      Decreased shoulder mobility, right      Posture abnormality       Physician: Ar Willett*     Visit Date: 7/7/2020    Physician Orders: PT eval and treat  Medical Diagnosis from Referral: M75.81 (ICD-10-CM) - Right shoulder tendonitis  Evaluation Date: 6/26/2020  Authorization Period Expiration: 12/31/2020  Plan of Care Expiration: 8/7/2020  Visit # / Visits authorized: 2/12 (3 visits total including eval)     Time In: 1:56PM  Time Out: 2:41  Total Billable Time: 45 minutes (1MT, 2TE)    Precautions: Standard, Diabetes and Fall (pt reports he has fallen 3-4 times within last year)    Subjective     Pt reports: Humidity has been causing shoulder pain to flare up; he has been able to reach higher shelves at home however  He was compliant with home exercise program.  Response to previous treatment: Tolerated well   Functional change: Pt notes he is able to reach higher shelves lately    Pain: 2/10  Location: right shoulder     Objective     Cesar received therapeutic exercises to develop strength, endurance, ROM, flexibility, posture and core stabilization for 30 minutes including:    Supine:  -- Pec stretch over towel roll   2 min   -- Chest press     2x10 w/dowel   -- Shoulder rhythmic stab at 90* flex  3 x 30 multi-direction  -- Shoulder flexion     3x10; 2# dowel     Standing:  -- Rows     3x10 GTB  -- Shoulder ER    2x10 RTB  -- IR towel stretch    3x10"    Cesar received the following manual therapy techniques: Joint mobilizations, Myofacial release, Soft tissue Mobilization and Friction Massage were applied to the: right upper quadrant for 15 minutes, including:  Right shoulder PROM  Right G-H joint AP moblization  Right GH joint inferior mobilization  Right GH joint distraction w/ oscillations  Right upper trap manual " stretching     AROM Shoulder Measurements (7/7/2020)  Flexion: 134  Abduction: 110  ER 90/90: 50  IR 90/90: 10    Home Exercises Provided and Patient Education Provided     Education provided:   - continue HEP with additions    Written Home Exercises Provided: yes.  Exercises were reviewed and Cesar was able to demonstrate them prior to the end of the session.  Cesar demonstrated good  understanding of the education provided.     See EMR under Patient Instructions for exercises provided prior visit. See Media for 7/7/20 for new HEP items    Assessment   Pt tolerated program well today. Increased AROM noted in flexion and abduction, with significant ROM limitations still noted in ER/IR. Pt given updated HEP items and displayed understanding with performance. Per required mod verbal and tactile cues to perform exercises correctly, but responded well to these. He remains appropriate for PT services to progress toward goal attainment.    Cesar is progressing well towards his goals.   Pt prognosis is Good.     Pt will continue to benefit from skilled outpatient physical therapy to address the deficits listed in the problem list box on initial evaluation, provide pt/family education and to maximize pt's level of independence in the home and community environment.     Pt's spiritual, cultural and educational needs considered and pt agreeable to plan of care and goals.     Anticipated barriers to physical therapy: Age, chronicity of R shoulder dysfunction    Goals:   Short Term Goals: 3 weeks   1. Pt will be independent with initial HEP in order to maximize therapeutic benefits outside of PT. (progressing, not met)  2. Pt will improve R shoulder ROM measures by 5 degrees or more in order to improve functional mobility of upper extremity. (progressing, not met)  3. Pt will reduce pain at worst (currently 7/10) to 5/10 in order to improve comfort during ADLs. (progressing, not met)  4. Pt will demonstrate improved sitting  posture to decrease pain and improve functional use of upper quadrant. (progressing, not met)     Long Term Goals: 6 weeks   1. Pt will be independent with updated HEP in order to maximize therapeutic benefits outside of PT. (progressing, not met)  2. Pt will improve R shoulder ROM measures by 10 degrees or more in order to improve functional mobility of upper extremity. (progressing, not met)  3. Pt will reduce pain at worst (currently 7/10) to 3/10 in order to improve comfort during ADLs. (progressing, not met)  4. Pt will improve B cervical SB ROM to WNL in order to improve mobility of upper quadrant during R shoulder activity. (progressing, not met)  5. Pt will improve FOTO to </=30% limitation to improve perceived limitation with changing and maintaining mobility. (progressing, not met)    Plan     Continue PT POC. Follow up regarding new HEP Items.    ZAC STOCKTON, PT

## 2020-07-09 ENCOUNTER — CLINICAL SUPPORT (OUTPATIENT)
Dept: REHABILITATION | Facility: HOSPITAL | Age: 70
End: 2020-07-09
Payer: MEDICARE

## 2020-07-09 DIAGNOSIS — G89.29 CHRONIC RIGHT SHOULDER PAIN: ICD-10-CM

## 2020-07-09 DIAGNOSIS — M25.611 DECREASED SHOULDER MOBILITY, RIGHT: ICD-10-CM

## 2020-07-09 DIAGNOSIS — M25.511 CHRONIC RIGHT SHOULDER PAIN: ICD-10-CM

## 2020-07-09 DIAGNOSIS — R29.3 POSTURE ABNORMALITY: ICD-10-CM

## 2020-07-09 PROCEDURE — 97110 THERAPEUTIC EXERCISES: CPT | Mod: HCNC,PN,CQ

## 2020-07-09 PROCEDURE — 97140 MANUAL THERAPY 1/> REGIONS: CPT | Mod: HCNC,PN,CQ

## 2020-07-09 NOTE — PROGRESS NOTES
"  Physical Therapy Treatment Note     Name: Cesar Simpson  Clinic Number: [id number]    Therapy Diagnosis:        Encounter Diagnoses   Name Primary?    Chronic right shoulder pain      Decreased shoulder mobility, right      Posture abnormality       Physician: Ar Willett*     Visit Date: 7/9/2020    Physician Orders: PT eval and treat  Medical Diagnosis from Referral: M75.81 (ICD-10-CM) - Right shoulder tendonitis  Evaluation Date: 6/26/2020  Authorization Period Expiration: 12/31/2020  Plan of Care Expiration: 8/7/2020  Visit # / Visits authorized: 3/12 (3 visits total including eval)     Time In: 1400  Time Out: 1445  Total Billable Time: 45 minutes (1MT, 2TE)    Precautions: Standard, Diabetes and Fall (pt reports he has fallen 3-4 times within last year)    Subjective     Pt reports: he feels like he's making progress.  He was compliant with home exercise program  Response to previous treatment: Tolerated well   Functional change: flexibility improving    Pain: 1/10  Location: right shoulder     Objective     Cesar received therapeutic exercises to develop strength, endurance, ROM, flexibility, posture and core stabilization for 30 minutes including:    Supine:  -- Pec stretch over towel roll   2 min   -- Chest press     2x10 w/ 2# dowel  --Serratus press ups                                    2x10 w/2# dowel   -- Shoulder rhythmic stab at 90* flex  3 x 30 multi-direction  -- Shoulder flexion                                           2 x10; 2# dowel    Side lying:  --Right ER                                                       2x10   --Abdduction                                                   2x10   --Flexion                                                          2x10      Standing:  -- Rows     3x10 BTB  -- Shoulder ER    2x10 RTB  -- IR towel stretch    3x10"    Cesar received the following manual therapy techniques: Joint mobilizations, Myofacial release, Soft tissue Mobilization and " Friction Massage were applied to the: right upper quadrant for 15 minutes, including:    Right shoulder PROM  Right G-H joint AP moblization  Right GH joint inferior mobilization  Right GH joint distraction w/ oscillations  Right upper trap manual stretching   Right upper trap deep static forearm pressure release    Home Exercises Provided and Patient Education Provided     Education provided:   - continue HEP with additions    Written Home Exercises Provided: yes.  Exercises were reviewed and Cesar was able to demonstrate them prior to the end of the session.  Cesar demonstrated good  understanding of the education provided.     See EMR under Patient Instructions for exercises provided prior visit. See Media for 7/7/20 for new HEP items    Assessment   Pt tolerated program well today. Increased AROM noted in flexion and abduction, with less ROM limitations still noted in ER/IR but improving. He remains appropriate for PT services to address deficits    Cesar is progressing well towards his goals.    Pt prognosis is Good.     Pt will continue to benefit from skilled outpatient physical therapy to address the deficits listed in the problem list box on initial evaluation, provide pt/family education and to maximize pt's level of independence in the home and community environment.     Pt's spiritual, cultural and educational needs considered and pt agreeable to plan of care and goals.     Anticipated barriers to physical therapy: Age, chronicity of R shoulder dysfunction    Goals:   Short Term Goals: 3 weeks   1. Pt will be independent with initial HEP in order to maximize therapeutic benefits outside of PT. (progressing, not met)  2. Pt will improve R shoulder ROM measures by 5 degrees or more in order to improve functional mobility of upper extremity. (progressing, not met)  3. Pt will reduce pain at worst (currently 7/10) to 5/10 in order to improve comfort during ADLs. (progressing, not met)  4. Pt will demonstrate  improved sitting posture to decrease pain and improve functional use of upper quadrant. (progressing, not met)     Long Term Goals: 6 weeks   1. Pt will be independent with updated HEP in order to maximize therapeutic benefits outside of PT. (progressing, not met)  2. Pt will improve R shoulder ROM measures by 10 degrees or more in order to improve functional mobility of upper extremity. (progressing, not met)  3. Pt will reduce pain at worst (currently 7/10) to 3/10 in order to improve comfort during ADLs. (progressing, not met)  4. Pt will improve B cervical SB ROM to WNL in order to improve mobility of upper quadrant during R shoulder activity. (progressing, not met)  5. Pt will improve FOTO to </=30% limitation to improve perceived limitation with changing and maintaining mobility. (progressing, not met)    Plan     Continue PT POC. Follow up regarding new HEP Items.    Link Mejia, PTA

## 2020-07-10 DIAGNOSIS — E78.5 DYSLIPIDEMIA: ICD-10-CM

## 2020-07-10 RX ORDER — PRAVASTATIN SODIUM 40 MG/1
40 TABLET ORAL NIGHTLY
Qty: 90 TABLET | Refills: 0 | Status: SHIPPED | OUTPATIENT
Start: 2020-07-10 | End: 2020-09-24 | Stop reason: SDUPTHER

## 2020-07-17 ENCOUNTER — CLINICAL SUPPORT (OUTPATIENT)
Dept: REHABILITATION | Facility: HOSPITAL | Age: 70
End: 2020-07-17
Payer: MEDICARE

## 2020-07-17 DIAGNOSIS — M25.611 DECREASED SHOULDER MOBILITY, RIGHT: ICD-10-CM

## 2020-07-17 DIAGNOSIS — R29.3 POSTURE ABNORMALITY: ICD-10-CM

## 2020-07-17 DIAGNOSIS — G89.29 CHRONIC RIGHT SHOULDER PAIN: ICD-10-CM

## 2020-07-17 DIAGNOSIS — M25.511 CHRONIC RIGHT SHOULDER PAIN: ICD-10-CM

## 2020-07-17 PROCEDURE — 97110 THERAPEUTIC EXERCISES: CPT | Mod: HCNC,PN,CQ

## 2020-07-17 PROCEDURE — 97140 MANUAL THERAPY 1/> REGIONS: CPT | Mod: HCNC,PN,CQ

## 2020-07-17 NOTE — PROGRESS NOTES
"  Physical Therapy Treatment Note     Name: Cesar Simpson  Clinic Number: [id number]    Therapy Diagnosis:        Encounter Diagnoses   Name Primary?    Chronic right shoulder pain      Decreased shoulder mobility, right      Posture abnormality       Physician: Ar Willett*     Visit Date: 7/17/2020    Physician Orders: PT eval and treat  Medical Diagnosis from Referral: M75.81 (ICD-10-CM) - Right shoulder tendonitis  Evaluation Date: 6/26/2020  Authorization Period Expiration: 12/31/2020  Plan of Care Expiration: 8/7/2020  Visit # / Visits authorized: 4/12 (4 visits total including eval)     Time In: 8:47 am  Time Out: 9:28 am  Total Billable Time: 41 minutes (1MT, 2TE)    Precautions: Standard, Diabetes and Fall (pt reports he has fallen 3-4 times within last year)    Subjective     Pt reports: his pain is not too bad.  Patient reports having good days and bad days.  He was compliant with home exercise program  Response to previous treatment: Tolerated well   Functional change: flexibility improving    Pain: 2/10  Location: right shoulder     Objective     Cesar received therapeutic exercises to develop strength, endurance, ROM, flexibility, posture and core stabilization for 31 minutes including:    Supine:  -- Pec stretch over towel roll   2 min   -- Chest press     2x10 w/ 2# dowel  --Serratus press ups    2x10 w/2# dowel   -- Shoulder rhythmic stab at 90* flex  3 x 30 multi-direction  -- Shoulder flexion    2 x10; 2# dowel    Side lying:  --Right ER      2x10   --Abdduction     2x10   --Flexion     2x10      Standing:  -- Rows     3x10 BTB  -- Shoulder ER    3x10 RTB  -- IR towel stretch    3x10"    Cesar received the following manual therapy techniques: Joint mobilizations, Myofacial release, Soft tissue Mobilization and Friction Massage were applied to the: right upper quadrant for 10 minutes, including:    Right shoulder PROM  Right G-H joint AP moblization  Right GH joint inferior " mobilization  Right GH joint distraction w/ oscillations  Right upper trap manual stretching  - not today  Right upper trap deep static forearm pressure release - not today    Home Exercises Provided and Patient Education Provided     Education provided:   - continue HEP with additions    Written Home Exercises Provided: yes.  Exercises were reviewed and Cesar was able to demonstrate them prior to the end of the session.  Cesar demonstrated good  understanding of the education provided.     See EMR under Patient Instructions for exercises provided prior visit. See Media for 7/7/20 for new HEP items    Assessment   Patient had no difficulty with today's progressions with no increase in symptom,s prior to leaving the clinic.. He remains appropriate for PT services to address deficits    Cesar is progressing well towards his goals.    Pt prognosis is Good.     Pt will continue to benefit from skilled outpatient physical therapy to address the deficits listed in the problem list box on initial evaluation, provide pt/family education and to maximize pt's level of independence in the home and community environment.     Pt's spiritual, cultural and educational needs considered and pt agreeable to plan of care and goals.     Anticipated barriers to physical therapy: Age, chronicity of R shoulder dysfunction    Goals:   Short Term Goals: 3 weeks   1. Pt will be independent with initial HEP in order to maximize therapeutic benefits outside of PT. (progressing, not met)  2. Pt will improve R shoulder ROM measures by 5 degrees or more in order to improve functional mobility of upper extremity. (progressing, not met)  3. Pt will reduce pain at worst (currently 7/10) to 5/10 in order to improve comfort during ADLs. (progressing, not met)  4. Pt will demonstrate improved sitting posture to decrease pain and improve functional use of upper quadrant. (progressing, not met)     Long Term Goals: 6 weeks   1. Pt will be independent with  updated HEP in order to maximize therapeutic benefits outside of PT. (progressing, not met)  2. Pt will improve R shoulder ROM measures by 10 degrees or more in order to improve functional mobility of upper extremity. (progressing, not met)  3. Pt will reduce pain at worst (currently 7/10) to 3/10 in order to improve comfort during ADLs. (progressing, not met)  4. Pt will improve B cervical SB ROM to WNL in order to improve mobility of upper quadrant during R shoulder activity. (progressing, not met)  5. Pt will improve FOTO to </=30% limitation to improve perceived limitation with changing and maintaining mobility. (progressing, not met)    Plan     Continue PT POC. Follow up regarding new HEP Items.    Foster Vallejo, PTA

## 2020-07-20 ENCOUNTER — CLINICAL SUPPORT (OUTPATIENT)
Dept: REHABILITATION | Facility: HOSPITAL | Age: 70
End: 2020-07-20
Payer: MEDICARE

## 2020-07-20 DIAGNOSIS — R29.3 POSTURE ABNORMALITY: ICD-10-CM

## 2020-07-20 DIAGNOSIS — M25.511 CHRONIC RIGHT SHOULDER PAIN: ICD-10-CM

## 2020-07-20 DIAGNOSIS — G89.29 CHRONIC RIGHT SHOULDER PAIN: ICD-10-CM

## 2020-07-20 DIAGNOSIS — M25.611 DECREASED SHOULDER MOBILITY, RIGHT: ICD-10-CM

## 2020-07-20 PROCEDURE — 97140 MANUAL THERAPY 1/> REGIONS: CPT | Mod: HCNC,PN

## 2020-07-20 PROCEDURE — 97110 THERAPEUTIC EXERCISES: CPT | Mod: HCNC,PN

## 2020-07-20 NOTE — TELEPHONE ENCOUNTER
LOV 03/20/2019    Last filled traZODone (DESYREL) 50 MG tablet 60 tablet w/ 5 refills on 2/5/2020.     Pt has an appt scheduled with Dr. Cr on 09/14/2020.

## 2020-07-20 NOTE — PROGRESS NOTES
"  Physical Therapy Treatment Note     Name: Cesar Simpson  Clinic Number: [id number]    Therapy Diagnosis:        Encounter Diagnoses   Name Primary?    Chronic right shoulder pain      Decreased shoulder mobility, right      Posture abnormality       Physician: Ar Willett*     Visit Date: 7/20/2020    Physician Orders: PT eval and treat  Medical Diagnosis from Referral: M75.81 (ICD-10-CM) - Right shoulder tendonitis  Evaluation Date: 6/26/2020  Authorization Period Expiration: 12/31/2020  Plan of Care Expiration: 8/7/2020  Visit # / Visits authorized: 5/12 (5 visits total including eval)  FOTO: 5/10 (5th visit FOTO complete)     Time In: 12:40PM  Time Out: 1:25PM  Total Billable Time: 45 minutes (1MT, 2TE)    Precautions: Standard, Diabetes and Fall (pt reports he has fallen 3-4 times within last year)    Subjective     Pt reports: He still feels intermittent stiffness in shoulder, but pain with activity is getting better  He was compliant with home exercise program  Response to previous treatment: No adverse response   Functional change: Pain with functional activities gradually decreasing    Pain: 2/10  Location: right shoulder     Objective   Pt completed FOTO survey x 5min    Cesar received therapeutic exercises to develop strength, endurance, ROM, flexibility, posture and core stabilization for 28 minutes including:    Supine:   -- Chest press     2x10 w/ 4# dowel   -- Shoulder flexion    2 x10; 3# dowel    Side lying:  --Right ER      2x15 1#   --Abduction     2x15;1#    Prone  --Prone I and T    2x8 each        Standing:  -- Rows     3x10 BTB  -- IR towel stretch    3x30"  -- Corner pec stretch    3x30"    Cesar received the following manual therapy techniques: Joint mobilizations were applied to the: right shoulder for 12 minutes, including:    Right shoulder PROM  Right G-H joint AP mobilization (Grade III-IV)  Right GH joint inferior mobilization (Grade III-IV)  Right GH joint distraction w/ " oscillations    Home Exercises Provided and Patient Education Provided     Education provided:   - continue HEP  - importance of proper posture    Written Home Exercises Provided: yes.  Exercises were reviewed and Cesar was able to demonstrate them prior to the end of the session.  Cesar demonstrated good  understanding of the education provided.     See EMR under Patient Instructions for exercises provided prior visit. See Media for 7/7/20 for new HEP items    Assessment   Cesar tolerated progressions (increased weight) well with no increase in symptoms. Some difficulty noted completing prone exercises due to fatigue onset and limited shoulder ROM. Shoulder IR ROM still limited, but slight increase in PROM noted following manual therapy. Verbal cues still required for correct posture during standing exercise, and pt encouraged to be mindful of posture during daily activity.    Cesar is progressing well towards his goals.    Pt prognosis is Good.     Pt will continue to benefit from skilled outpatient physical therapy to address the deficits listed in the problem list box on initial evaluation, provide pt/family education and to maximize pt's level of independence in the home and community environment.     Pt's spiritual, cultural and educational needs considered and pt agreeable to plan of care and goals.     Anticipated barriers to physical therapy: Age, chronicity of R shoulder dysfunction    Goals:   Short Term Goals: 3 weeks   1. Pt will be independent with initial HEP in order to maximize therapeutic benefits outside of PT. MET (7/20/2020)  2. Pt will improve R shoulder ROM measures by 5 degrees or more in order to improve functional mobility of upper extremity. (progressing, not met)  3. Pt will reduce pain at worst (currently 7/10) to 5/10 in order to improve comfort during ADLs. MET (7/20/2020)  4. Pt will demonstrate improved sitting posture to decrease pain and improve functional use of upper quadrant.  (progressing, not met)     Long Term Goals: 6 weeks   1. Pt will be independent with updated HEP in order to maximize therapeutic benefits outside of PT. (progressing, not met)  2. Pt will improve R shoulder ROM measures by 10 degrees or more in order to improve functional mobility of upper extremity. (progressing, not met)  3. Pt will reduce pain at worst (currently 7/10) to 3/10 in order to improve comfort during ADLs. (progressing, not met)  4. Pt will improve B cervical SB ROM to WNL in order to improve mobility of upper quadrant during R shoulder activity. (progressing, not met)  5. Pt will improve FOTO to </=30% limitation to improve perceived limitation with changing and maintaining mobility. (progressing, not met)    Plan     Continue to progress as per POC; attempt arm bike if time permits.    ZAC STOCKTON, PT

## 2020-07-27 DIAGNOSIS — G47.00 INSOMNIA, UNSPECIFIED TYPE: ICD-10-CM

## 2020-07-27 RX ORDER — TRAZODONE HYDROCHLORIDE 50 MG/1
TABLET ORAL
Qty: 60 TABLET | Refills: 5 | Status: SHIPPED | OUTPATIENT
Start: 2020-07-27 | End: 2020-12-28

## 2020-07-27 NOTE — TELEPHONE ENCOUNTER
LOV 03/20/2019    Last filled traZODone (DESYREL) 50 MG tablet 60 tablet w/ 5 refills on 2/5/2020.    Pt is scheduled to see Dr. Cr on 09/14/2020.

## 2020-07-28 NOTE — PROGRESS NOTES
[facility]: Health  Follow-Up    The history is provided by the patient.             Reason for review: Blood glucose at goal and Blood pressure at goal        Topics Covered on Call: overdue labs    Additional Follow-up details: Mr. Simpson is doing okay. He is pleased with his readings overall and denies any lifestyle changes.     Patient commented on overdue labs for lipids, but is having to wait to establish care with a new PCP since Dr. Willett left [facility]. He has an appointment scheduled in September 2020.        Diet-no change to diet    No change to diet.      Additional diet details:    Physical Activity-no change to routine  No change to exercise routine.     Medication Adherence-Medication Adherence not addressed.      Substance, Sleep, Stress-Not assessed    PLAN  Continue current diet/physical activity routine:    Patient did not express questions or concerns and patient has contact information if needed.    Explained the importance of self-monitoring and medication adherence. Encouraged the patient to communicate with their health  for lifestyle modifications to help improve or maintain a healthy lifestyle.            Topic    Lipid (Cholesterol) Test        Last 5 Patient Entered Readings                                      Current 30 Day Average: 118/78     Recent Readings 7/26/2020 7/25/2020 7/24/2020 7/24/2020 7/24/2020    SBP (mmHg) 117 124 110 102 122    DBP (mmHg) 74 80 69 65 78    Pulse 92 70 79 76 87        Last 6 Patient Entered Readings                                          Most Recent A1c: 5.8% on 6/22/2020  (Goal: 7%)     Recent Readings 7/27/2020 7/27/2020 7/26/2020 7/23/2020 7/16/2020    Blood Glucose (mg/dL) 108 130 114 113 116

## 2020-07-29 ENCOUNTER — CLINICAL SUPPORT (OUTPATIENT)
Dept: REHABILITATION | Facility: HOSPITAL | Age: 70
End: 2020-07-29
Payer: MEDICARE

## 2020-07-29 DIAGNOSIS — G89.29 CHRONIC RIGHT SHOULDER PAIN: ICD-10-CM

## 2020-07-29 DIAGNOSIS — M25.511 CHRONIC RIGHT SHOULDER PAIN: ICD-10-CM

## 2020-07-29 DIAGNOSIS — M25.611 DECREASED SHOULDER MOBILITY, RIGHT: ICD-10-CM

## 2020-07-29 DIAGNOSIS — R29.3 POSTURE ABNORMALITY: ICD-10-CM

## 2020-07-29 PROCEDURE — 97140 MANUAL THERAPY 1/> REGIONS: CPT | Mod: HCNC,PN,CQ

## 2020-07-29 PROCEDURE — 97110 THERAPEUTIC EXERCISES: CPT | Mod: HCNC,PN,CQ

## 2020-07-29 NOTE — PROGRESS NOTES
"  Physical Therapy Treatment Note     Name: Cesar Simpson  Clinic Number: [id number]    Therapy Diagnosis:        Encounter Diagnoses   Name Primary?    Chronic right shoulder pain      Decreased shoulder mobility, right      Posture abnormality       Physician: Ar Willett*     Visit Date: 7/29/2020    Physician Orders: PT eval and treat  Medical Diagnosis from Referral: M75.81 (ICD-10-CM) - Right shoulder tendonitis  Evaluation Date: 6/26/2020  Authorization Period Expiration: 12/31/2020  Plan of Care Expiration: 8/7/2020  Visit # / Visits authorized: 6/12 (6 visits total including eval)  FOTO: 6/10 (5th visit FOTO complete)     Time In: 9:00 AM  Time Out: 9:30 AM  Total Billable Time: 30 minutes (1MT, 1TE)    Precautions: Standard, Diabetes and Fall (pt reports he has fallen 3-4 times within last year)    Subjective     Pt reports: his pain is slowly improving, not too bad today.  He was compliant with home exercise program  Response to previous treatment: No adverse response   Functional change: Pain with functional activities gradually decreasing    Pain: 2/10  Location: right shoulder     Objective     Cesar received therapeutic exercises to develop strength, endurance, ROM, flexibility, posture and core stabilization for 20 minutes including:    Supine:   -- Chest press     3x10 w/ 4# dowel   -- Shoulder flexion    3 x10; 3# dowel    Side lying:  --Right ER      2x15 1#   --Abduction     2x15;1#    Prone  --Prone I and T    2x10 each        Standing:  -- Rows     3x10 BTB  -- IR towel stretch    3x30"  -- Corner pec stretch    3x30"    Cesar received the following manual therapy techniques: Joint mobilizations were applied to the: right shoulder for 10 minutes, including:    Right shoulder PROM  Right G-H joint AP mobilization (Grade III-IV)  Right GH joint inferior mobilization (Grade III-IV)  Right GH joint distraction w/ oscillations    Home Exercises Provided and Patient Education Provided "     Education provided:   - continue HEP  - importance of proper posture    Written Home Exercises Provided: yes.  Exercises were reviewed and Cesar was able to demonstrate them prior to the end of the session.  Cesar demonstrated good  understanding of the education provided.     See EMR under Patient Instructions for exercises provided prior visit. See Media for 7/7/20 for new HEP items    Assessment   Cesar had no difficulty with today's progressions with no increase in symptoms prior to leaving the clinic. Patient continues to require verbal cues for correct posture during standing exercise, and pt encouraged to be mindful of posture during daily activity.    Cesar is progressing well towards his goals.    Pt prognosis is Good.     Pt will continue to benefit from skilled outpatient physical therapy to address the deficits listed in the problem list box on initial evaluation, provide pt/family education and to maximize pt's level of independence in the home and community environment.     Pt's spiritual, cultural and educational needs considered and pt agreeable to plan of care and goals.     Anticipated barriers to physical therapy: Age, chronicity of R shoulder dysfunction    Goals:   Short Term Goals: 3 weeks   1. Pt will be independent with initial HEP in order to maximize therapeutic benefits outside of PT. MET (7/20/2020)  2. Pt will improve R shoulder ROM measures by 5 degrees or more in order to improve functional mobility of upper extremity. (progressing, not met)  3. Pt will reduce pain at worst (currently 7/10) to 5/10 in order to improve comfort during ADLs. MET (7/20/2020)  4. Pt will demonstrate improved sitting posture to decrease pain and improve functional use of upper quadrant. (progressing, not met)     Long Term Goals: 6 weeks   1. Pt will be independent with updated HEP in order to maximize therapeutic benefits outside of PT. (progressing, not met)  2. Pt will improve R shoulder ROM measures by  10 degrees or more in order to improve functional mobility of upper extremity. (progressing, not met)  3. Pt will reduce pain at worst (currently 7/10) to 3/10 in order to improve comfort during ADLs. (progressing, not met)  4. Pt will improve B cervical SB ROM to WNL in order to improve mobility of upper quadrant during R shoulder activity. (progressing, not met)  5. Pt will improve FOTO to </=30% limitation to improve perceived limitation with changing and maintaining mobility. (progressing, not met)    Plan     Continue to progress as per POC; attempt arm bike if time permits.    Foster Vallejo, PTA

## 2020-07-31 ENCOUNTER — CLINICAL SUPPORT (OUTPATIENT)
Dept: REHABILITATION | Facility: HOSPITAL | Age: 70
End: 2020-07-31
Payer: MEDICARE

## 2020-07-31 DIAGNOSIS — R29.3 POSTURE ABNORMALITY: ICD-10-CM

## 2020-07-31 DIAGNOSIS — M25.511 CHRONIC RIGHT SHOULDER PAIN: ICD-10-CM

## 2020-07-31 DIAGNOSIS — M25.611 DECREASED SHOULDER MOBILITY, RIGHT: ICD-10-CM

## 2020-07-31 DIAGNOSIS — G89.29 CHRONIC RIGHT SHOULDER PAIN: ICD-10-CM

## 2020-07-31 PROCEDURE — 97110 THERAPEUTIC EXERCISES: CPT | Mod: HCNC,PN,CQ

## 2020-07-31 PROCEDURE — 97140 MANUAL THERAPY 1/> REGIONS: CPT | Mod: HCNC,PN,CQ

## 2020-07-31 PROCEDURE — 99454 PR REMOTE MNTR, PHYS PARAM, INITIAL, EA 30 DAYS: ICD-10-PCS | Mod: S$GLB,,, | Performed by: FAMILY MEDICINE

## 2020-07-31 PROCEDURE — 99454 REM MNTR PHYSIOL PARAM 16-30: CPT | Mod: S$GLB,,, | Performed by: FAMILY MEDICINE

## 2020-07-31 NOTE — PATIENT INSTRUCTIONS
Strengthening: Resisted Extension    Hold Red elastic band in both hand, elbow straight and arm in front of body. Pull arm back, elbow straight, and squeeze shoulder blades back. Do not shrug.  Repeat 10 times each side per set. Do 1 sets per session. Do 2 sessions per day.

## 2020-07-31 NOTE — PROGRESS NOTES
"  Physical Therapy Treatment Note     Name: Cesar Simpson  Clinic Number: [id number]    Therapy Diagnosis:        Encounter Diagnoses   Name Primary?    Chronic right shoulder pain      Decreased shoulder mobility, right      Posture abnormality       Physician: Ar Willett*     Visit Date: 7/31/2020    Physician Orders: PT eval and treat  Medical Diagnosis from Referral: M75.81 (ICD-10-CM) - Right shoulder tendonitis  Evaluation Date: 6/26/2020  Authorization Period Expiration: 12/31/2020  Plan of Care Expiration: 8/7/2020  Visit # / Visits authorized: 7/12 (8 visits total including eval)  FOTO: 7/10 (5th visit FOTO complete)     Time In: 10:05 AM  Time Out: 10:45 AM  Total Billable Time: 40 minutes (1MT, 2TE)    Precautions: Standard, Diabetes and Fall (pt reports he has fallen 3-4 times within last year)    Subjective     Pt reports: his pain is level is low but feels more stiffness than pain.  He was compliant with home exercise program  Response to previous treatment: No adverse response   Functional change: Pain with functional activities gradually decreasing    Pain: 2/10  Location: right shoulder     Objective     Cesar received therapeutic exercises to develop strength, endurance, ROM, flexibility, posture and core stabilization for 32 minutes including:    Supine:   -- Chest press     3x10 w/ 4# dowel   -- Shoulder flexion    3 x10; 4# dowel    Side lying:  --Right ER      2x15 1#   --Abduction     2x15;1#    Prone  --Prone I and T    2x10 each        Standing:  -- Rows     3x10 BTB  --SAPD     2x10 RTB  -- IR towel stretch    3x30"  -- Corner pec stretch    3x30"    Cesar received the following manual therapy techniques: Joint mobilizations were applied to the: right shoulder for 8 minutes, including:    Right shoulder PROM  Right G-H joint AP mobilization (Grade III-IV)  Right GH joint inferior mobilization (Grade III-IV)  Right GH joint distraction w/ oscillations    Home Exercises Provided and " Patient Education Provided     Education provided:   - continue HEP  - importance of proper posture    Written Home Exercises Provided: yes.  Exercises were reviewed and Cesar was able to demonstrate them prior to the end of the session.  Cesar demonstrated good  understanding of the education provided.     See EMR under Patient Instructions for exercises provided prior visit. See Media for 7/7/20 for new HEP items    Assessment   Cesar had no difficulty with new exercise added along with today's progressions with no increase in symptoms prior to leaving the clinic. Patient continues to require verbal cues for correct posture during standing exercise.    Cesar is progressing well towards his goals.    Pt prognosis is Good.     Pt will continue to benefit from skilled outpatient physical therapy to address the deficits listed in the problem list box on initial evaluation, provide pt/family education and to maximize pt's level of independence in the home and community environment.     Pt's spiritual, cultural and educational needs considered and pt agreeable to plan of care and goals.     Anticipated barriers to physical therapy: Age, chronicity of R shoulder dysfunction    Goals:   Short Term Goals: 3 weeks   1. Pt will be independent with initial HEP in order to maximize therapeutic benefits outside of PT. MET (7/20/2020)  2. Pt will improve R shoulder ROM measures by 5 degrees or more in order to improve functional mobility of upper extremity. (progressing, not met)  3. Pt will reduce pain at worst (currently 7/10) to 5/10 in order to improve comfort during ADLs. MET (7/20/2020)  4. Pt will demonstrate improved sitting posture to decrease pain and improve functional use of upper quadrant. (progressing, not met)     Long Term Goals: 6 weeks   1. Pt will be independent with updated HEP in order to maximize therapeutic benefits outside of PT. (progressing, not met)  2. Pt will improve R shoulder ROM measures by 10 degrees  or more in order to improve functional mobility of upper extremity. (progressing, not met)  3. Pt will reduce pain at worst (currently 7/10) to 3/10 in order to improve comfort during ADLs. (progressing, not met)  4. Pt will improve B cervical SB ROM to WNL in order to improve mobility of upper quadrant during R shoulder activity. (progressing, not met)  5. Pt will improve FOTO to </=30% limitation to improve perceived limitation with changing and maintaining mobility. (progressing, not met)    Plan     Continue to progress as per POC; attempt arm bike if time permits.    Foster Vallejo, PTA

## 2020-08-04 ENCOUNTER — CLINICAL SUPPORT (OUTPATIENT)
Dept: REHABILITATION | Facility: HOSPITAL | Age: 70
End: 2020-08-04
Payer: MEDICARE

## 2020-08-04 DIAGNOSIS — M25.511 CHRONIC RIGHT SHOULDER PAIN: ICD-10-CM

## 2020-08-04 DIAGNOSIS — M25.611 DECREASED SHOULDER MOBILITY, RIGHT: ICD-10-CM

## 2020-08-04 DIAGNOSIS — R29.3 POSTURE ABNORMALITY: ICD-10-CM

## 2020-08-04 DIAGNOSIS — G89.29 CHRONIC RIGHT SHOULDER PAIN: ICD-10-CM

## 2020-08-04 PROCEDURE — 97110 THERAPEUTIC EXERCISES: CPT | Mod: HCNC,PN

## 2020-08-04 PROCEDURE — 97140 MANUAL THERAPY 1/> REGIONS: CPT | Mod: HCNC,PN

## 2020-08-04 NOTE — PLAN OF CARE
"  Outpatient Therapy Updated Plan of Care     Visit Date: 8/4/2020  Name: Cesar Simpson  Clinic Number: [id number]    Therapy Diagnosis:   Encounter Diagnoses   Name Primary?    Chronic right shoulder pain     Decreased shoulder mobility, right     Posture abnormality      Physician: Ar Willett*    Physician Orders:  PT eval and treat  Medical Diagnosis: M75.81 (ICD-10-CM) - Right shoulder tendonitis  Evaluation Date: 6/26/2020    Total Visits Received: 9 (including initial evaluation)  Cancelled Visits: 2  No Show Visits: 0    Current Certification Period:  8/4/2020 to 9/18/2020  Precautions:  Standard, Diabetes and Fall (pt reports he has fallen 3-4 times within last year)  Visits from Evaluation Date:  9  Functional Level Prior to Evaluation:  Independent but with more pain/more difficulty using RUE, especially for gripping, lifting, and reaching behind back    Subjective     Update: Cesar believes his shoulder function has improved since physical therapy in terms of pain, but that he still feels frequent "stiffness" when attempting to perform ADLs. He says he would like to continue with physical therapy, but once a week,    Objective     Update:    Upper Extremity ROM/Strength NEW VALUES BOLDED/IN PARENTHESES    RUE AROM/PROM RUE MMT   Shoulder Flexion: 120/130 (130/145) 5/5   Shoulder Abduction: 105/115 (115/120) 5/5   Shoulder External  Rotation (90/90):    50/54 (58/62) 5/5   Shoulder Internal  Rotation (90/90):    10/13 (30/36) 5/5     Current pain: 1/10  Pain at worst during ADLs: 5/10    Assessment     Update: Pt with overall improved function of R shoulder since initial evaluation per self-report. Objective measures show increase in shoulder ROM in all planes, but shoulder R ROM still limited. Other improvements include decreased pain noted with resisted shoulder motions, particularly for resisted R shoulder abduction and R shoulder flexion. Pt still demonstrating impaired posture at rest " (forward head, rounded shoulders). He would benefit from continued physical therapy to address ROM deficits, strengthen within newly gained ranges, continue to receive enforcement of postural cuing. Pt believes he can only commit to one session/week moving forward with therapy.    Previous Short Term Goals Status:   3 weeks   1. Pt will be independent with initial HEP in order to maximize therapeutic benefits outside of PT. MET (7/20/2020)  2. Pt will improve R shoulder ROM measures by 5 degrees or more in order to improve functional mobility of upper extremity. MET (8/4/2020)  3. Pt will reduce pain at worst (currently 7/10) to 5/10 in order to improve comfort during ADLs. MET (7/20/2020)  4. Pt will demonstrate improved sitting posture to decrease pain and improve functional use of upper quadrant. (progressing, not met)  New Short Term Goals Status: Continue to progress toward unmet STGs  Long Term Goal Status:  1. Pt will be independent with updated HEP in order to maximize therapeutic benefits outside of PT. (progressing, not met)  2. Pt will improve R shoulder ROM measures by 10 degrees or more in order to improve functional mobility of upper extremity. (progressing, not met) [MET for all directions except shoulder ER]  3. Pt will reduce pain at worst (currently 7/10) to 3/10 in order to improve comfort during ADLs. (progressing, not met)  4. Pt will improve B cervical SB ROM to WNL in order to improve mobility of upper quadrant during R shoulder activity. (progressing, not met)  5. Pt will improve FOTO to </=30% limitation to improve perceived limitation with changing and maintaining mobility. (progressing, not met)  Reasons for Recertification of Therapy: Expiration of current POC    Plan     Updated Certification Period: 8/4/2020 to 9/18/2020  Recommended Treatment Plan: 1 times per week for 6 weeks: Manual Therapy, Moist Heat/ Ice, Neuromuscular Re-ed, Orthotic Management and Training, Patient Education,  Self Care, Therapeutic Activites, Therapeutic Exercise and Modalities PRN  Other Recommendations: N/A    ZAC STOCKTON, PT  8/4/2020    I CERTIFY THE NEED FOR THESE SERVICES FURNISHED UNDER THIS PLAN OF TREATMENT AND WHILE UNDER MY CARE    Physician's comments:        Physician's Signature: ___________________________________________________

## 2020-08-11 ENCOUNTER — PATIENT OUTREACH (OUTPATIENT)
Dept: OTHER | Facility: OTHER | Age: 70
End: 2020-08-11

## 2020-08-11 NOTE — PROGRESS NOTES
Digital Medicine: Clinician Follow-Up    Patient reports he is scheduled to establish care with new PCP, Dr. Fritz, on 9/24/20.    The history is provided by the patient.   Follow-up reason(s): routine follow up.     Hypertension    Patient readings are stable   Diabetes    Patient readings are stable   Patient is not experiencing signs/symptoms of hypotension.  Patient is not experiencing signs/symptoms of hypertension.  Patient is not experiencing signs/symptoms of hypoglycemia.  Patient is not experiencing signs/symptoms of hyperglycemia.      Last 5 Patient Entered Readings                                      Current 30 Day Average: 117/77     Recent Readings 8/10/2020 8/10/2020 8/9/2020 8/8/2020 8/7/2020    SBP (mmHg) 132 135 126 123 120    DBP (mmHg) 82 85 79 76 81    Pulse 62 67 70 56 101        Last 6 Patient Entered Readings                                          Most Recent A1c: 5.8% on 6/22/2020  (Goal: 7%)     Recent Readings 8/10/2020 8/9/2020 8/7/2020 8/5/2020 8/3/2020    Blood Glucose (mg/dL) 112 98 110 110 152           Depression Screening  Did not address depression screening.    Sleep Apnea Screening    Did not address sleep apnea screening.     Medication Affordability Screening  Did not address medication affordability screening.     Medication Adherence-Medication Adherence not addressed.        ASSESSMENT(S)  Patients BP average is 117/77 mmHg, which is at goal. Patient's BP goal is less than or equal to 130/80 per 2017 ACC/AHA Hypertension Guidelines.  Patient's A1C goal is less than or equal to 7 per 2020 ADA guidelines. Patient's most recent A1C result is at goal. Lab Results    Component                Value               Date                     HGBA1C                   5.8 (H)             06/22/2020          .         Hypertension Plan  Continue current therapy.    Diabetes Plan  Continue current therapy.       Addressed any questions or concerns and patient has my contact  information if needed prior to next outreach. Patient verbalizes understanding.          Hypertension Medications             losartan (COZAAR) 100 MG tablet TAKE 1 TABLET EVERY DAY        Diabetes Medications             metFORMIN (GLUCOPHAGE-XR) 500 MG XR 24hr tablet Take 1 tablet (500 mg total) by mouth daily with breakfast.

## 2020-08-13 ENCOUNTER — CLINICAL SUPPORT (OUTPATIENT)
Dept: REHABILITATION | Facility: HOSPITAL | Age: 70
End: 2020-08-13
Payer: MEDICARE

## 2020-08-13 DIAGNOSIS — M25.611 DECREASED SHOULDER MOBILITY, RIGHT: ICD-10-CM

## 2020-08-13 DIAGNOSIS — M25.511 CHRONIC RIGHT SHOULDER PAIN: ICD-10-CM

## 2020-08-13 DIAGNOSIS — R29.3 POSTURE ABNORMALITY: ICD-10-CM

## 2020-08-13 DIAGNOSIS — G89.29 CHRONIC RIGHT SHOULDER PAIN: ICD-10-CM

## 2020-08-13 PROCEDURE — 97110 THERAPEUTIC EXERCISES: CPT | Mod: HCNC,PN,CQ

## 2020-08-13 PROCEDURE — 97140 MANUAL THERAPY 1/> REGIONS: CPT | Mod: HCNC,PN,CQ

## 2020-08-13 NOTE — PROGRESS NOTES
"  Physical Therapy Treatment Note     Name: Cesar Simpson  Clinic Number: [id number]    Therapy Diagnosis:        Encounter Diagnoses   Name Primary?    Chronic right shoulder pain      Decreased shoulder mobility, right      Posture abnormality       Physician: Ar Willett*     Visit Date: 8/13/2020    Physician Orders: PT eval and treat  Medical Diagnosis from Referral: M75.81 (ICD-10-CM) - Right shoulder tendonitis  Evaluation Date: 6/26/2020  Authorization Period Expiration: 12/31/2020  Plan of Care Expiration: 8/7/2020  Visit # / Visits authorized: 9/12 (9 visits total including eval)  FOTO: 9/10      Time In: 1315  Time Out: 1400  Total Billable Time: 45 minutes (2MT, 1TE)    Precautions: Standard, Diabetes and Fall (pt reports he has fallen 3-4 times within last year)    Subjective     Pt reports:   He was compliant with home exercise program  Response to previous treatment: No adverse response   Functional change: Pain with functional activities continues to decrease    Pain: 1/10  Location: right shoulder     Objective     Cesar received therapeutic exercises to develop strength, endurance, ROM, flexibility, posture and core stabilization for 25 minutes including:    -UBE: 3 min forward; 3 min backwards  Supine:              -- Chest press                                                 3x10 w/ 5# dowel   -- Shoulder flexion                                            3 x10;  5# dowel   -- Serratus press up                                      3 x 10;  5# dowel      Side lying:  --Right ER                                                       2x15 2#   --Abduction                                                      2x15;2#  --Flexion                                                         2x15 2#     Prone  -- scapular squeezes                                    10x5" hold  --Prone I and T                                                 2x10  W/1# dumbbell each B                                    " "           Standing: out of time  -- Rows                                                            3x10 BTB  --SAPD                                                            2x10 RTB  -- IR towel stretch                                            3x30"  -- Corner pec stretch                                       3x30"       Cesar received the following manual therapy techniques: Joint mobilizations were applied to the: right shoulder for 20 minutes, including:    Right shoulder PROM (with overpressure at end range)   Right G-H joint AP mobilization (Grade III-IV)  Right GH joint inferior mobilization (Grade III-IV)  Right GH joint distraction w/ oscillations  Right Upper trap release static pressure    Home Exercises Provided and Patient Education Provided     Education provided:   - continue HEP  - importance of proper posture  - discharge planning  - importance of strengthening within newly gained ROM    Written Home Exercises Provided: yes.  Exercises were reviewed and Cesar was able to demonstrate them prior to the end of the session.  Cesar demonstrated good  understanding of the education provided.     See EMR under Patient Instructions for exercises provided prior visit. See Media for 7/7/20 for new HEP items    Assessment   Tolerated session well. Progressed with supine, side lying, and prone strengthening therapeutic exercises in bold    Cesar is progressing well towards his goals.    Pt prognosis is Good.     Pt will continue to benefit from skilled outpatient physical therapy to address the deficits listed in the problem list box on initial evaluation, provide pt/family education and to maximize pt's level of independence in the home and community environment.     Pt's spiritual, cultural and educational needs considered and pt agreeable to plan of care and goals.     Anticipated barriers to physical therapy: Age, chronicity of R shoulder dysfunction    Goals:   Short Term Goals: 3 weeks   1. Pt will be " independent with initial HEP in order to maximize therapeutic benefits outside of PT. MET (7/20/2020)  2. Pt will improve R shoulder ROM measures by 5 degrees or more in order to improve functional mobility of upper extremity. MET (8/4/2020)  3. Pt will reduce pain at worst (currently 7/10) to 5/10 in order to improve comfort during ADLs. MET (7/20/2020)  4. Pt will demonstrate improved sitting posture to decrease pain and improve functional use of upper quadrant. (progressing, not met)     Long Term Goals: 6 weeks   1. Pt will be independent with updated HEP in order to maximize therapeutic benefits outside of PT. (progressing, not met)  2. Pt will improve R shoulder ROM measures by 10 degrees or more in order to improve functional mobility of upper extremity. (progressing, not met) [MET for all directions except shoulder ER]  3. Pt will reduce pain at worst (currently 7/10) to 3/10 in order to improve comfort during ADLs. (progressing, not met)  4. Pt will improve B cervical SB ROM to WNL in order to improve mobility of upper quadrant during R shoulder activity. (progressing, not met)  5. Pt will improve FOTO to </=30% limitation to improve perceived limitation with changing and maintaining mobility. (progressing, not met)    Plan     Continue PT POC  Link Mejia, PTA

## 2020-08-18 ENCOUNTER — CLINICAL SUPPORT (OUTPATIENT)
Dept: REHABILITATION | Facility: HOSPITAL | Age: 70
End: 2020-08-18
Payer: MEDICARE

## 2020-08-18 DIAGNOSIS — M25.511 CHRONIC RIGHT SHOULDER PAIN: ICD-10-CM

## 2020-08-18 DIAGNOSIS — R29.3 POSTURE ABNORMALITY: ICD-10-CM

## 2020-08-18 DIAGNOSIS — G89.29 CHRONIC RIGHT SHOULDER PAIN: ICD-10-CM

## 2020-08-18 DIAGNOSIS — M25.611 DECREASED SHOULDER MOBILITY, RIGHT: ICD-10-CM

## 2020-08-18 PROCEDURE — 97110 THERAPEUTIC EXERCISES: CPT | Mod: HCNC,PN,CQ

## 2020-08-18 NOTE — PROGRESS NOTES
"  Physical Therapy Treatment Note     Name: Cesar Simposn  Clinic Number: [id number]    Therapy Diagnosis:        Encounter Diagnoses   Name Primary?    Chronic right shoulder pain      Decreased shoulder mobility, right      Posture abnormality       Physician: Ar Willett*     Visit Date: 8/18/2020    Physician Orders: PT eval and treat  Medical Diagnosis from Referral: M75.81 (ICD-10-CM) - Right shoulder tendonitis  Evaluation Date: 6/26/2020  Authorization Period Expiration: 12/31/2020  Plan of Care Expiration: 8/7/2020  Visit # / Visits authorized: 10/12 (9 visits total including eval)  FOTO: 10/ at DC      Time In: 1400  Time Out: 1445  Total Billable Time: 45 minutes (2MT, 1TE)    Precautions: Standard, Diabetes and Fall (pt reports he has fallen 3-4 times within last year)    Subjective     Pt reports: no new c/o  He was compliant with home exercise program  Response to previous treatment: No adverse response   Functional change: Pain with functional activities continues to decrease    Pain: 0/10  Location: right shoulder     Objective     Cesar received therapeutic exercises to develop strength, endurance, ROM, flexibility, posture and core stabilization for 45 minutes including:    -UBE: 3 min forward; 3 min backwards  Supine:              -- Chest press                                                 3x10  6# dowel   -- Shoulder flexion                                           3 x10;  6# dowel   -- Serratus press up                                        3 x 10;  6# dowel       Side lying:  --Right ER                                                       2x15 3#   --Abduction                                                      2x15;3#  --Flexion                                                         2x15 3#     Prone  -- scapular squeezes                                    10x5" hold  --Prone I and T                                                 2x10  W/1# dumbbell each B                    " "                           Standing  -- Rows                                                            3x10 BTB  -- Pull downs                                                  3x10 RTB  -- Brugers                                                       3x10 RTB         --SAPD                                                            2x10 RTB  -- IR towel stretch                                            3x30"  -- Corner pec stretch                                       3x30"       Cesar received the following manual therapy techniques: Joint mobilizations were applied to the: right shoulder for 00 minutes, including:    Right shoulder PROM (with overpressure at end range)   Right G-H joint AP mobilization (Grade III-IV)  Right GH joint inferior mobilization (Grade III-IV)  Right GH joint distraction w/ oscillations  Right Upper trap release static pressure    Home Exercises Provided and Patient Education Provided     Education provided:   - continue HEP  - importance of proper posture  - discharge planning  - importance of strengthening within newly gained ROM    Written Home Exercises Provided: yes.  Exercises were reviewed and Cesar was able to demonstrate them prior to the end of the session.  Cesar demonstrated good  understanding of the education provided.     See EMR under Patient Instructions for exercises provided prior visit. See Media for 7/7/20 for new HEP items    Assessment   Presets with no pain today. Manual therapy deferred. Tolerated session well. Progressed with supine, side lying, prone and standing strengthening therapeutic exercises in bold    Cesar is progressing well towards his goals.    Pt prognosis is Good.     Pt will continue to benefit from skilled outpatient physical therapy to address the deficits listed in the problem list box on initial evaluation, provide pt/family education and to maximize pt's level of independence in the home and community environment.     Pt's spiritual, cultural " and educational needs considered and pt agreeable to plan of care and goals.     Anticipated barriers to physical therapy: Age, chronicity of R shoulder dysfunction    Goals:   Short Term Goals: 3 weeks   1. Pt will be independent with initial HEP in order to maximize therapeutic benefits outside of PT. MET (7/20/2020)  2. Pt will improve R shoulder ROM measures by 5 degrees or more in order to improve functional mobility of upper extremity. MET (8/4/2020)  3. Pt will reduce pain at worst (currently 7/10) to 5/10 in order to improve comfort during ADLs. MET (7/20/2020)  4. Pt will demonstrate improved sitting posture to decrease pain and improve functional use of upper quadrant. (progressing, not met)     Long Term Goals: 6 weeks   1. Pt will be independent with updated HEP in order to maximize therapeutic benefits outside of PT. (progressing, not met)  2. Pt will improve R shoulder ROM measures by 10 degrees or more in order to improve functional mobility of upper extremity. (progressing, not met) [MET for all directions except shoulder ER]  3. Pt will reduce pain at worst (currently 7/10) to 3/10 in order to improve comfort during ADLs. (progressing, not met)  4. Pt will improve B cervical SB ROM to WNL in order to improve mobility of upper quadrant during R shoulder activity. (progressing, not met)  5. Pt will improve FOTO to </=30% limitation to improve perceived limitation with changing and maintaining mobility. (progressing, not met)    Plan     Continue PT POC  Link Mejia, PTA

## 2020-08-24 ENCOUNTER — CLINICAL SUPPORT (OUTPATIENT)
Dept: REHABILITATION | Facility: HOSPITAL | Age: 70
End: 2020-08-24
Payer: MEDICARE

## 2020-08-24 DIAGNOSIS — G89.29 CHRONIC RIGHT SHOULDER PAIN: ICD-10-CM

## 2020-08-24 DIAGNOSIS — M25.611 DECREASED SHOULDER MOBILITY, RIGHT: ICD-10-CM

## 2020-08-24 DIAGNOSIS — M25.511 CHRONIC RIGHT SHOULDER PAIN: ICD-10-CM

## 2020-08-24 DIAGNOSIS — R29.3 POSTURE ABNORMALITY: ICD-10-CM

## 2020-08-24 PROCEDURE — 97110 THERAPEUTIC EXERCISES: CPT | Mod: PN

## 2020-08-24 NOTE — PROGRESS NOTES
"  Physical Therapy Treatment Note/Discharge Summary     Name: Cesar Simpson  Clinic Number: [id number]    Therapy Diagnosis:        Encounter Diagnoses   Name Primary?    Chronic right shoulder pain      Decreased shoulder mobility, right      Posture abnormality       Physician: Ar Willett*     Visit Date: 8/24/2020    Physician Orders: PT eval and treat  Medical Diagnosis from Referral: M75.81 (ICD-10-CM) - Right shoulder tendonitis  Evaluation Date: 6/26/2020  Authorization Period Expiration: 12/31/2020  Plan of Care Expiration: 9/18/2020  Visit # / Visits authorized: 12/12 (13 visits total including eval)  FOTO: COMPLETE      Time In: 10:30AM  Time Out: 11:15AM  Total Billable Time: 45 minutes (3TE)    Precautions: Standard, Diabetes and Fall (pt reports he has fallen 3-4 times within last year)    Subjective     Pt reports: Feels like his right shoulder mobility has improved since beginning PT, but now believes his left shoulder is getting worse in terms of function. He says it has felt unstable and like it has "subluxed" in the past since his partial shoulder replacement, but now these symptoms are getting worse. He plans to tell his PCP about these new issues.  He was compliant with home exercise program  Response to previous treatment: No adverse response   Functional change: Pain with functional activities continues to decrease    Pain: 3/10  Location: right shoulder     Objective     Cesar received therapeutic exercises to develop strength, endurance, ROM, flexibility, posture and core stabilization for 45 minutes including:    -UBE: 3 min forward; 3 min backwards  -Seated upper trap stretch 3x30" B  -Scapular retractions BTB 3x10  -IR stretch with strap 3x30" R only  -Objective tests/measures:                                          Upper Extremity ROM/Strength NEW VALUES BOLDED/IN PARENTHESES    RUE AROM/PROM RUE MMT   Shoulder Flexion: 120/130 (132/155) 5/5   Shoulder Abduction: 105/115 " (116/130) 5/5   Shoulder External  Rotation (90/90):    50/54 (55/60) 5/5   Shoulder Internal  Rotation (90/90):    10/13 (30/35) 5/5     Home Exercises Provided and Patient Education Provided     Education provided:   - continue HEP with updated items  - importance of proper posture  - discharge planning  - importance of strengthening within newly gained ROM    Written Home Exercises Provided: yes.  Exercises were reviewed and Ceasr was able to demonstrate them prior to the end of the session.  Cesar demonstrated good  understanding of the education provided.     See EMR under Patient Instructions for exercises provided prior visit. See Media for 7/7/20 for new HEP items    Assessment     Since beginning physical therapy, Cesar has improved right shoulder mobility in all planes, but he has not increased ROM significantly since last formally assessed. He appears to be reaching his functional maximum for this reason. Despite self-report of improved mobility, Cesar's FOTO limitation score has not improved since initial evaluation. Of note, pt's left shoulder is now bothering him more than right shoulder. He had received partial shoulder replacement in the past on left side, but notes he feels increased instability of left shoulder lately. No gross instability noted during brief assessment of left shoulder, but pt is appropriate for further medical workup and will address these concerns at next PCP visit. He is appropriate for discharge for PT for right shoulder at this time.    Cesar is progressing well towards his goals.    Pt prognosis is Good.     Pt will continue to benefit from skilled outpatient physical therapy to address the deficits listed in the problem list box on initial evaluation, provide pt/family education and to maximize pt's level of independence in the home and community environment.     Pt's spiritual, cultural and educational needs considered and pt agreeable to plan of care and goals.     Anticipated  barriers to physical therapy: Age, chronicity of R shoulder dysfunction    Goals:   Short Term Goals: 3 weeks   1. Pt will be independent with initial HEP in order to maximize therapeutic benefits outside of PT. MET (7/20/2020)  2. Pt will improve R shoulder ROM measures by 5 degrees or more in order to improve functional mobility of upper extremity. MET (8/4/2020)  3. Pt will reduce pain at worst (currently 7/10) to 5/10 in order to improve comfort during ADLs. MET (7/20/2020)  4. Pt will demonstrate improved sitting posture to decrease pain and improve functional use of upper quadrant. (progressing, not met)     Long Term Goals: 6 weeks   1. Pt will be independent with updated HEP in order to maximize therapeutic benefits outside of PT. MET (8/24/2020)  2. Pt will improve R shoulder ROM measures by 10 degrees or more in order to improve functional mobility of upper extremity. (progressing, not met) [MET for all directions except shoulder ER]  3. Pt will reduce pain at worst (currently 7/10) to 3/10 in order to improve comfort during ADLs. MET (8/24/2020)  4. Pt will improve B cervical SB ROM to WNL in order to improve mobility of upper quadrant during R shoulder activity. (progressing, not met)  5. Pt will improve FOTO to </=30% limitation to improve perceived limitation with changing and maintaining mobility. (progressing, not met)    Plan   Discharge PT    ZAC STOCKTON PT

## 2020-08-25 PROBLEM — M25.611 DECREASED SHOULDER MOBILITY, RIGHT: Status: RESOLVED | Noted: 2020-06-26 | Resolved: 2020-08-25

## 2020-08-25 PROBLEM — R29.3 POSTURE ABNORMALITY: Status: RESOLVED | Noted: 2020-06-26 | Resolved: 2020-08-25

## 2020-08-25 PROBLEM — M25.511 CHRONIC RIGHT SHOULDER PAIN: Status: RESOLVED | Noted: 2020-06-26 | Resolved: 2020-08-25

## 2020-08-25 PROBLEM — G89.29 CHRONIC RIGHT SHOULDER PAIN: Status: RESOLVED | Noted: 2020-06-26 | Resolved: 2020-08-25

## 2020-08-26 ENCOUNTER — OFFICE VISIT (OUTPATIENT)
Dept: SLEEP MEDICINE | Facility: CLINIC | Age: 70
End: 2020-08-26
Payer: MEDICARE

## 2020-08-26 VITALS
BODY MASS INDEX: 30.13 KG/M2 | SYSTOLIC BLOOD PRESSURE: 117 MMHG | WEIGHT: 192 LBS | DIASTOLIC BLOOD PRESSURE: 71 MMHG | HEIGHT: 67 IN | HEART RATE: 68 BPM | TEMPERATURE: 98 F

## 2020-08-26 DIAGNOSIS — G47.33 OSA (OBSTRUCTIVE SLEEP APNEA): Primary | ICD-10-CM

## 2020-08-26 DIAGNOSIS — G47.00 INSOMNIA, UNSPECIFIED TYPE: ICD-10-CM

## 2020-08-26 PROCEDURE — 3078F DIAST BP <80 MM HG: CPT | Mod: HCNC,CPTII,S$GLB, | Performed by: PSYCHIATRY & NEUROLOGY

## 2020-08-26 PROCEDURE — 3078F PR MOST RECENT DIASTOLIC BLOOD PRESSURE < 80 MM HG: ICD-10-PCS | Mod: HCNC,CPTII,S$GLB, | Performed by: PSYCHIATRY & NEUROLOGY

## 2020-08-26 PROCEDURE — 3008F PR BODY MASS INDEX (BMI) DOCUMENTED: ICD-10-PCS | Mod: HCNC,CPTII,S$GLB, | Performed by: PSYCHIATRY & NEUROLOGY

## 2020-08-26 PROCEDURE — 99214 PR OFFICE/OUTPT VISIT, EST, LEVL IV, 30-39 MIN: ICD-10-PCS | Mod: HCNC,S$GLB,, | Performed by: PSYCHIATRY & NEUROLOGY

## 2020-08-26 PROCEDURE — 1125F AMNT PAIN NOTED PAIN PRSNT: CPT | Mod: HCNC,S$GLB,, | Performed by: PSYCHIATRY & NEUROLOGY

## 2020-08-26 PROCEDURE — 1159F PR MEDICATION LIST DOCUMENTED IN MEDICAL RECORD: ICD-10-PCS | Mod: HCNC,S$GLB,, | Performed by: PSYCHIATRY & NEUROLOGY

## 2020-08-26 PROCEDURE — 3074F SYST BP LT 130 MM HG: CPT | Mod: HCNC,CPTII,S$GLB, | Performed by: PSYCHIATRY & NEUROLOGY

## 2020-08-26 PROCEDURE — 99999 PR PBB SHADOW E&M-EST. PATIENT-LVL IV: ICD-10-PCS | Mod: PBBFAC,HCNC,, | Performed by: PSYCHIATRY & NEUROLOGY

## 2020-08-26 PROCEDURE — 1159F MED LIST DOCD IN RCRD: CPT | Mod: HCNC,S$GLB,, | Performed by: PSYCHIATRY & NEUROLOGY

## 2020-08-26 PROCEDURE — 3074F PR MOST RECENT SYSTOLIC BLOOD PRESSURE < 130 MM HG: ICD-10-PCS | Mod: HCNC,CPTII,S$GLB, | Performed by: PSYCHIATRY & NEUROLOGY

## 2020-08-26 PROCEDURE — 1125F PR PAIN SEVERITY QUANTIFIED, PAIN PRESENT: ICD-10-PCS | Mod: HCNC,S$GLB,, | Performed by: PSYCHIATRY & NEUROLOGY

## 2020-08-26 PROCEDURE — 1101F PR PT FALLS ASSESS DOC 0-1 FALLS W/OUT INJ PAST YR: ICD-10-PCS | Mod: HCNC,CPTII,S$GLB, | Performed by: PSYCHIATRY & NEUROLOGY

## 2020-08-26 PROCEDURE — 1101F PT FALLS ASSESS-DOCD LE1/YR: CPT | Mod: HCNC,CPTII,S$GLB, | Performed by: PSYCHIATRY & NEUROLOGY

## 2020-08-26 PROCEDURE — 99999 PR PBB SHADOW E&M-EST. PATIENT-LVL IV: CPT | Mod: PBBFAC,HCNC,, | Performed by: PSYCHIATRY & NEUROLOGY

## 2020-08-26 PROCEDURE — 99214 OFFICE O/P EST MOD 30 MIN: CPT | Mod: HCNC,S$GLB,, | Performed by: PSYCHIATRY & NEUROLOGY

## 2020-08-26 PROCEDURE — 3008F BODY MASS INDEX DOCD: CPT | Mod: HCNC,CPTII,S$GLB, | Performed by: PSYCHIATRY & NEUROLOGY

## 2020-08-26 RX ORDER — TRAZODONE HYDROCHLORIDE 50 MG/1
TABLET ORAL
Qty: 90 TABLET | Refills: 5 | Status: SHIPPED | OUTPATIENT
Start: 2020-08-26 | End: 2020-12-28 | Stop reason: SDUPTHER

## 2020-08-26 NOTE — PROGRESS NOTES
Cesar Simpson  Reports difficulty returning to sleep after waking up for a bathroom   [employer]  - not interested in getting a new machine. He would like to repeat the sleep study, but not till after COVID a he is wondering if his FABY has improved with 20 lbs weight loss.    The patient reports improved sleep continuity and daytime sleepiness on PAP. ESS today is 8/24.  Denies break through snoring.  No dry mouth.   No aerophagia or air hunger. No significant mask leaks and discomfort.    Using [employer] FFM - he has just replaced cushion    Using Trazodone 100 mg - insufficient effect on staying asleep.    APAP 9-15 cm H2O   6 hrs average   7/7 and 26/30 days  15% mask leak  AHi 0.6    PREVIOUS SLEEP HISTORY    12/19/2018: He had a break using his machine - back to usage of APAP 8-15  90% 9 cm  Failed Ambien in the past.  6hr  AHI 0.6  Leak 1-5%  Lost 15 lbs - taken of some HTN and DM meds.  Sees no diff ence in insomnia.   Followed sleep hygiene recommendations given by Dr. Ordoñez re: going to bed when sleepy, avoiding TV in the bedroom. 1 cup of coffee in AM.    Used to work night shifts in the past.    Once in bed feels awake    The patient reports improved sleep continuity and daytime sleepiness on PAP. ESS today is 14/24.  Denies break through snoring. No dry mouth.     Still drowsy driving.    ESS 14/24     PHQ9 - 7; GAD7-9      Still difficulty with sleep initiation and early morning awakenings.      03/20/2019:  The patient has not presented any new complaints since the previous visit. HAs been doing fairly well on 8-15 cm H2O range.  90% 9 cm.  Since switching to a full face mask (did not bring today), he feels some lack of air at the starting pressure of  8 cm.      4/7 and 21/30 days of compliance.    His modem is no longer recording    AHI  Was 2-3  Leak 2-3 %    Averages 6:42    It is still hard for his to sleep through the night.    Tends to look at his fitbit at night        SLEEP  ROUTINE:    Worries  BT: 10:30-12  WT: 5:30-6 AM    Activity the hour prior to sleep: watch tv    Bed partner:  alone  Time to bed:  12 am   Lights off:  yes  Sleep onset latency:  15 minutes; 30 minutes without ambien  Disruptions or awakenings:    1 (difficulty going back to sleep); usually 4.5 hours later   Wakeup time:      5:30 am  Perceived sleep quality:  rested      Daytime naps:      none  Weekend sleep routine:      same  Caffeine use: 1-2 cups of coffee in am  exercise habit:   3 miles walking per day          SLEEP STUDIES:    HST 7.26.18: Significant Obstructive sleep apnea (FABY) with AHI (apnea hypopnea Index) of 6:36 and SaO2 of 80 (weight  210).    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    Hyperlipidemia associated with type 2 diabetes mellitus 03/24/2017    Essential hypertension 03/24/2017    Benign prostatic hyperplasia with urinary frequency 12/27/2017    Heart palpitations 12/27/2017    Hypertension associated with diabetes 03/27/2018    Overweight (BMI 25.0-29.9) 03/27/2018    Insomnia 05/23/2019     Resolved Ambulatory Problems     Diagnosis Date Noted    Diabetes mellitus type II, uncontrolled 02/27/2013    Vitreous hemorrhage 07/07/2014    Chronic right shoulder pain 06/26/2020    Decreased shoulder mobility, right 06/26/2020    Posture abnormality 06/26/2020     Past Medical History:   Diagnosis Date    HTN (hypertension) 2/27/2013    Obesity     Screening for prostate cancer  2/27/2013    Trouble in sleeping     Type 2 diabetes mellitus                 PAST SURGICAL HISTORY:    Past Surgical History:   Procedure Laterality Date    CATARACT EXTRACTION      EYE SURGERY      cataracts    JOINT REPLACEMENT Left     arthroplasty    SHOULDER SURGERY      TONSILLECTOMY      TOTAL SHOULDER ARTHROPLASTY Left          FAMILY HISTORY:                Family History   Problem Relation Age of Onset    Osteoporosis Mother     Early death Father     Stroke Father     Heart disease Father     Heart disease Brother     Early death Brother     No Known Problems Daughter     No Known Problems Son        SOCIAL HISTORY:          Tobacco:   Social History     Tobacco Use   Smoking Status Never Smoker   Smokeless Tobacco Never Used       alcohol use:    Social History     Substance and Sexual Activity   Alcohol Use Yes    Frequency: Monthly or less    Drinks per session: 1 or 2    Binge frequency: Never    Comment: occasionally (maybe every 3 months)                 Occupation: former shipyard worker as a     ALLERGIES:  Review of patient's allergies indicates:  No Known Allergies    CURRENT MEDICATIONS:    Current Outpatient Medications   Medication Sig Dispense Refill    blood sugar diagnostic Strp Check 1 x daily as directed by MD. Supply brand covered by insurance. Dx Code: [E11.8] 100 each 3    blood-glucose meter Misc 1 device.  Check sugar 1 times daily as directed by MD. Supply preferred brand .Dx Code: [E11.8] 1 each 0    lancets Misc 1 box.  Check 1x daily as directed by MD. Supply brand covered by insurance. Dx Code: [E11.8] 100 each 3    levothyroxine (SYNTHROID) 88 MCG tablet TAKE 1 TABLET (88 MCG TOTAL) BY MOUTH BEFORE BREAKFAST. 90 tablet 0    losartan (COZAAR) 100 MG tablet TAKE 1 TABLET EVERY DAY 90 tablet 0    metFORMIN (GLUCOPHAGE-XR) 500 MG XR 24hr tablet Take 1 tablet (500 mg total) by mouth daily with breakfast. 90 tablet 3    pravastatin (PRAVACHOL) 40 MG  "tablet Take 1 tablet (40 mg total) by mouth every evening. 90 tablet 0    tamsulosin (FLOMAX) 0.4 mg Cap TAKE 1 CAPSULE EVERY DAY 90 capsule 0    traZODone (DESYREL) 50 MG tablet 2 pills PO bedtime PRN insomnia 60 tablet 5    varicella-zoster gE-AS01B, PF, (SHINGRIX, PF,) 50 mcg/0.5 mL injection Inject 0.5 mLs into the muscle As instructed. 2 doses 0.5 mL 2     No current facility-administered medications for this visit.                   REVIEW OF SYSTEMS:   No acute changes from previous encounter dated 6/23/2015 with exceptions mentioned in HPI.              PHYSICAL EXAM:  Vitals:    08/26/20 0813   BP: 117/71   Pulse: 68   Temp: 97.9 °F (36.6 °C)   Weight: 87.1 kg (192 lb)   Height: 5' 7" (1.702 m)   PainSc:   3   PainLoc: Shoulder     Body mass index is 30.07 kg/m².     GENERAL: Normal development, well groomed  HEENT:  Conjunctivae are non-erythematous; Pupils equal, round, and reactive to light; Nose is symmetrical; Nasal mucosa is pink and moist; Septum is midline; Inferior turbinates are normal; Nasal airflow is normal; Posterior pharynx is pink; Modified Mallampati: 4; Posterior palate is normal; Tonsils +1; Uvula is normal and pink;Tongue is normal; Dentition is fair; No TMJ tenderness; Jaw opening and protrusion without click and without discomfort.  +retronagthia  NECK: Supple. Neck circumference is 15.25 inches. No thyromegaly. No palpable nodes.     SKIN: On face and neck: No abrasions, no rashes, no lesions.  No subcutaneous nodules are palpable.  RESPIRATORY: Chest is clear to auscultation.  Normal chest expansion and non-labored breathing at rest.  CARDIOVASCULAR: Normal S1, S2.  No murmurs, gallops or rubs. No carotid bruits bilaterally.  EXTREMITIES: No edema. No clubbing. No cyanosis. Station normal. Gait normal.        NEURO/PSYCH: Oriented to time, place and person. Normal attention span and concentration. Affect is full. Mood is normal.                                              DATA  "   7/1/15 ahi of 26 with min sat of 80.9  Lab Results   Component Value Date    TSH 2.317 06/22/2020     ASSESSMENT  No diagnosis found.     1. Insomnia, prior shift work, anxiety likely play a role  2. FABY - moderate-radu    PLAN:       - modem no longer transmitting.   He would like to repeat the study post weight loss and get a new machine once COVID lockdown is completely over.     For now continue APAP 8-15   Will re-order supplies - wants to try F30      Will order Trazodone - will increase to 90 pills per month        ---------------------------------------    Will re-evaluate - at that point will consider repeating HST if wt stays down.     Sleep Apnea - good compliance with improve sleep quality.  Prefer simplus mask over nasal mask.    Insomnia - difficulty with sleep initiation.  Sleep hygiene d/w patient.  Stimulus control and sleep restriction d/w patient.  Sleep time from 1 am till 5:30 am.  ambien prn.  Sleep diary request.    Education: During our discussion today, we talked about the etiology of obstructive sleep apnea as well as the potential ramifications of untreated sleep apnea, which could include daytime sleepiness, hypertension, heart disease and/or stroke.     Precautions: The patient was advised to abstain from driving should they feel sleepy or drowsy.       Patient will No follow-ups on file.    This is 25 minutes visit, over 50% of time spent in direct consultation with patient.

## 2020-08-31 PROCEDURE — 99454 REM MNTR PHYSIOL PARAM 16-30: CPT | Mod: S$GLB,,, | Performed by: FAMILY MEDICINE

## 2020-08-31 PROCEDURE — 99454 PR REMOTE MNTR, PHYS PARAM, INITIAL, EA 30 DAYS: ICD-10-PCS | Mod: S$GLB,,, | Performed by: FAMILY MEDICINE

## 2020-09-18 ENCOUNTER — LAB VISIT (OUTPATIENT)
Dept: LAB | Facility: HOSPITAL | Age: 70
End: 2020-09-18
Attending: INTERNAL MEDICINE
Payer: MEDICARE

## 2020-09-18 DIAGNOSIS — E11.9 CONTROLLED TYPE 2 DIABETES MELLITUS WITHOUT COMPLICATION, WITHOUT LONG-TERM CURRENT USE OF INSULIN: ICD-10-CM

## 2020-09-18 DIAGNOSIS — M19.049 HAND ARTHRITIS: ICD-10-CM

## 2020-09-18 LAB
BNP SERPL-MCNC: <10 PG/ML (ref 0–99)
CHOLEST SERPL-MCNC: 167 MG/DL (ref 120–199)
CHOLEST/HDLC SERPL: 4.4 {RATIO} (ref 2–5)
HDLC SERPL-MCNC: 38 MG/DL (ref 40–75)
HDLC SERPL: 22.8 % (ref 20–50)
LDLC SERPL CALC-MCNC: 114.2 MG/DL (ref 63–159)
NONHDLC SERPL-MCNC: 129 MG/DL
TRIGL SERPL-MCNC: 74 MG/DL (ref 30–150)
URATE SERPL-MCNC: 6.6 MG/DL (ref 3.4–7)

## 2020-09-18 PROCEDURE — 84550 ASSAY OF BLOOD/URIC ACID: CPT | Mod: HCNC

## 2020-09-18 PROCEDURE — 36415 COLL VENOUS BLD VENIPUNCTURE: CPT | Mod: HCNC,PO

## 2020-09-18 PROCEDURE — 80061 LIPID PANEL: CPT | Mod: HCNC

## 2020-09-18 PROCEDURE — 83880 ASSAY OF NATRIURETIC PEPTIDE: CPT | Mod: HCNC

## 2020-09-23 ENCOUNTER — PATIENT MESSAGE (OUTPATIENT)
Dept: ADMINISTRATIVE | Facility: OTHER | Age: 70
End: 2020-09-23

## 2020-09-24 ENCOUNTER — LAB VISIT (OUTPATIENT)
Dept: LAB | Facility: HOSPITAL | Age: 70
End: 2020-09-24
Attending: FAMILY MEDICINE
Payer: MEDICARE

## 2020-09-24 ENCOUNTER — OFFICE VISIT (OUTPATIENT)
Dept: FAMILY MEDICINE | Facility: CLINIC | Age: 70
End: 2020-09-24
Payer: MEDICARE

## 2020-09-24 VITALS
TEMPERATURE: 97 F | WEIGHT: 194.44 LBS | HEART RATE: 76 BPM | SYSTOLIC BLOOD PRESSURE: 128 MMHG | HEIGHT: 67 IN | BODY MASS INDEX: 30.52 KG/M2 | OXYGEN SATURATION: 98 % | DIASTOLIC BLOOD PRESSURE: 78 MMHG

## 2020-09-24 DIAGNOSIS — Z76.89 ENCOUNTER TO ESTABLISH CARE WITH NEW DOCTOR: Primary | ICD-10-CM

## 2020-09-24 DIAGNOSIS — E78.5 HYPERLIPIDEMIA, UNSPECIFIED HYPERLIPIDEMIA TYPE: ICD-10-CM

## 2020-09-24 DIAGNOSIS — R35.1 BPH ASSOCIATED WITH NOCTURIA: ICD-10-CM

## 2020-09-24 DIAGNOSIS — E11.69 HYPERLIPIDEMIA ASSOCIATED WITH TYPE 2 DIABETES MELLITUS: ICD-10-CM

## 2020-09-24 DIAGNOSIS — Z79.899 ENCOUNTER FOR LONG-TERM CURRENT USE OF MEDICATION: ICD-10-CM

## 2020-09-24 DIAGNOSIS — R39.9 UTI SYMPTOMS: ICD-10-CM

## 2020-09-24 DIAGNOSIS — N40.1 BPH ASSOCIATED WITH NOCTURIA: ICD-10-CM

## 2020-09-24 DIAGNOSIS — Z23 NEED FOR INFLUENZA VACCINATION: ICD-10-CM

## 2020-09-24 DIAGNOSIS — E11.9 DIET-CONTROLLED DIABETES MELLITUS: ICD-10-CM

## 2020-09-24 DIAGNOSIS — E03.8 OTHER SPECIFIED HYPOTHYROIDISM: ICD-10-CM

## 2020-09-24 DIAGNOSIS — E79.0 ELEVATED URIC ACID IN BLOOD: ICD-10-CM

## 2020-09-24 DIAGNOSIS — I10 ESSENTIAL HYPERTENSION: ICD-10-CM

## 2020-09-24 DIAGNOSIS — I15.2 HYPERTENSION ASSOCIATED WITH DIABETES: ICD-10-CM

## 2020-09-24 DIAGNOSIS — E78.5 HYPERLIPIDEMIA ASSOCIATED WITH TYPE 2 DIABETES MELLITUS: ICD-10-CM

## 2020-09-24 DIAGNOSIS — Z12.5 SCREENING PSA (PROSTATE SPECIFIC ANTIGEN): ICD-10-CM

## 2020-09-24 DIAGNOSIS — M19.90 ARTHRITIS: ICD-10-CM

## 2020-09-24 DIAGNOSIS — E11.59 HYPERTENSION ASSOCIATED WITH DIABETES: ICD-10-CM

## 2020-09-24 DIAGNOSIS — E11.42 TYPE 2 DIABETES MELLITUS WITH DIABETIC POLYNEUROPATHY, WITHOUT LONG-TERM CURRENT USE OF INSULIN: ICD-10-CM

## 2020-09-24 LAB
BACTERIA #/AREA URNS AUTO: ABNORMAL /HPF
BILIRUB UR QL STRIP: NEGATIVE
CLARITY UR REFRACT.AUTO: ABNORMAL
COLOR UR AUTO: YELLOW
GLUCOSE UR QL STRIP: NEGATIVE
HGB UR QL STRIP: NEGATIVE
KETONES UR QL STRIP: NEGATIVE
LEUKOCYTE ESTERASE UR QL STRIP: ABNORMAL
MICROSCOPIC COMMENT: ABNORMAL
NITRITE UR QL STRIP: NEGATIVE
PH UR STRIP: 5 [PH] (ref 5–8)
PROT UR QL STRIP: NEGATIVE
RBC #/AREA URNS AUTO: 1 /HPF (ref 0–4)
SP GR UR STRIP: 1.01 (ref 1–1.03)
SQUAMOUS #/AREA URNS AUTO: 0 /HPF
URN SPEC COLLECT METH UR: ABNORMAL
WBC #/AREA URNS AUTO: 20 /HPF (ref 0–5)

## 2020-09-24 PROCEDURE — 1126F AMNT PAIN NOTED NONE PRSNT: CPT | Mod: HCNC,S$GLB,, | Performed by: FAMILY MEDICINE

## 2020-09-24 PROCEDURE — 3074F SYST BP LT 130 MM HG: CPT | Mod: HCNC,CPTII,S$GLB, | Performed by: FAMILY MEDICINE

## 2020-09-24 PROCEDURE — 1159F PR MEDICATION LIST DOCUMENTED IN MEDICAL RECORD: ICD-10-PCS | Mod: HCNC,S$GLB,, | Performed by: FAMILY MEDICINE

## 2020-09-24 PROCEDURE — 90694 VACC AIIV4 NO PRSRV 0.5ML IM: CPT | Mod: HCNC,S$GLB,, | Performed by: FAMILY MEDICINE

## 2020-09-24 PROCEDURE — 3044F HG A1C LEVEL LT 7.0%: CPT | Mod: HCNC,CPTII,S$GLB, | Performed by: FAMILY MEDICINE

## 2020-09-24 PROCEDURE — 3008F BODY MASS INDEX DOCD: CPT | Mod: HCNC,CPTII,S$GLB, | Performed by: FAMILY MEDICINE

## 2020-09-24 PROCEDURE — 99999 PR PBB SHADOW E&M-EST. PATIENT-LVL IV: ICD-10-PCS | Mod: PBBFAC,HCNC,, | Performed by: FAMILY MEDICINE

## 2020-09-24 PROCEDURE — 87086 URINE CULTURE/COLONY COUNT: CPT | Mod: HCNC

## 2020-09-24 PROCEDURE — 1101F PT FALLS ASSESS-DOCD LE1/YR: CPT | Mod: HCNC,CPTII,S$GLB, | Performed by: FAMILY MEDICINE

## 2020-09-24 PROCEDURE — 3074F PR MOST RECENT SYSTOLIC BLOOD PRESSURE < 130 MM HG: ICD-10-PCS | Mod: HCNC,CPTII,S$GLB, | Performed by: FAMILY MEDICINE

## 2020-09-24 PROCEDURE — 1159F MED LIST DOCD IN RCRD: CPT | Mod: HCNC,S$GLB,, | Performed by: FAMILY MEDICINE

## 2020-09-24 PROCEDURE — 87088 URINE BACTERIA CULTURE: CPT | Mod: HCNC

## 2020-09-24 PROCEDURE — 81001 URINALYSIS AUTO W/SCOPE: CPT | Mod: HCNC

## 2020-09-24 PROCEDURE — 3078F DIAST BP <80 MM HG: CPT | Mod: HCNC,CPTII,S$GLB, | Performed by: FAMILY MEDICINE

## 2020-09-24 PROCEDURE — 3078F PR MOST RECENT DIASTOLIC BLOOD PRESSURE < 80 MM HG: ICD-10-PCS | Mod: HCNC,CPTII,S$GLB, | Performed by: FAMILY MEDICINE

## 2020-09-24 PROCEDURE — G0008 ADMIN INFLUENZA VIRUS VAC: HCPCS | Mod: HCNC,S$GLB,, | Performed by: FAMILY MEDICINE

## 2020-09-24 PROCEDURE — 99215 OFFICE O/P EST HI 40 MIN: CPT | Mod: HCNC,25,S$GLB, | Performed by: FAMILY MEDICINE

## 2020-09-24 PROCEDURE — 90694 FLU VACCINE - QUADRIVALENT - ADJUVANTED: ICD-10-PCS | Mod: HCNC,S$GLB,, | Performed by: FAMILY MEDICINE

## 2020-09-24 PROCEDURE — 1101F PR PT FALLS ASSESS DOC 0-1 FALLS W/OUT INJ PAST YR: ICD-10-PCS | Mod: HCNC,CPTII,S$GLB, | Performed by: FAMILY MEDICINE

## 2020-09-24 PROCEDURE — G0008 PR ADMIN INFLUENZA VIRUS VAC: ICD-10-PCS | Mod: HCNC,S$GLB,, | Performed by: FAMILY MEDICINE

## 2020-09-24 PROCEDURE — 87186 SC STD MICRODIL/AGAR DIL: CPT | Mod: HCNC

## 2020-09-24 PROCEDURE — 99215 PR OFFICE/OUTPT VISIT, EST, LEVL V, 40-54 MIN: ICD-10-PCS | Mod: HCNC,25,S$GLB, | Performed by: FAMILY MEDICINE

## 2020-09-24 PROCEDURE — 1126F PR PAIN SEVERITY QUANTIFIED, NO PAIN PRESENT: ICD-10-PCS | Mod: HCNC,S$GLB,, | Performed by: FAMILY MEDICINE

## 2020-09-24 PROCEDURE — 87077 CULTURE AEROBIC IDENTIFY: CPT | Mod: HCNC

## 2020-09-24 PROCEDURE — 99999 PR PBB SHADOW E&M-EST. PATIENT-LVL IV: CPT | Mod: PBBFAC,HCNC,, | Performed by: FAMILY MEDICINE

## 2020-09-24 PROCEDURE — 3008F PR BODY MASS INDEX (BMI) DOCUMENTED: ICD-10-PCS | Mod: HCNC,CPTII,S$GLB, | Performed by: FAMILY MEDICINE

## 2020-09-24 PROCEDURE — 3044F PR MOST RECENT HEMOGLOBIN A1C LEVEL <7.0%: ICD-10-PCS | Mod: HCNC,CPTII,S$GLB, | Performed by: FAMILY MEDICINE

## 2020-09-24 RX ORDER — FINASTERIDE 5 MG/1
5 TABLET, FILM COATED ORAL DAILY
Qty: 90 TABLET | Refills: 1 | Status: SHIPPED | OUTPATIENT
Start: 2020-09-24 | End: 2020-12-28 | Stop reason: SDUPTHER

## 2020-09-24 RX ORDER — DICLOFENAC SODIUM 10 MG/G
2 GEL TOPICAL DAILY
Qty: 100 G | Refills: 0 | Status: SHIPPED | OUTPATIENT
Start: 2020-09-24 | End: 2021-12-28

## 2020-09-24 RX ORDER — TAMSULOSIN HYDROCHLORIDE 0.4 MG/1
1 CAPSULE ORAL DAILY
Qty: 90 CAPSULE | Refills: 1 | Status: SHIPPED | OUTPATIENT
Start: 2020-09-24 | End: 2020-12-28 | Stop reason: SDUPTHER

## 2020-09-24 RX ORDER — PRAVASTATIN SODIUM 40 MG/1
40 TABLET ORAL NIGHTLY
Qty: 90 TABLET | Refills: 1 | Status: SHIPPED | OUTPATIENT
Start: 2020-09-24 | End: 2020-12-28 | Stop reason: SDUPTHER

## 2020-09-24 RX ORDER — DULOXETIN HYDROCHLORIDE 30 MG/1
30 CAPSULE, DELAYED RELEASE ORAL DAILY
Qty: 90 CAPSULE | Refills: 0 | Status: SHIPPED | OUTPATIENT
Start: 2020-09-24 | End: 2020-12-28 | Stop reason: SDUPTHER

## 2020-09-24 RX ORDER — LEVOTHYROXINE SODIUM 88 UG/1
88 TABLET ORAL
Qty: 90 TABLET | Refills: 1 | Status: SHIPPED | OUTPATIENT
Start: 2020-09-24 | End: 2020-12-28 | Stop reason: SDUPTHER

## 2020-09-24 RX ORDER — MELOXICAM 7.5 MG/1
7.5 TABLET ORAL DAILY
Qty: 90 TABLET | Refills: 0 | Status: SHIPPED | OUTPATIENT
Start: 2020-09-24 | End: 2021-06-28 | Stop reason: SDUPTHER

## 2020-09-24 RX ORDER — LOSARTAN POTASSIUM 100 MG/1
100 TABLET ORAL DAILY
Qty: 90 TABLET | Refills: 1 | Status: SHIPPED | OUTPATIENT
Start: 2020-09-24 | End: 2020-12-28 | Stop reason: SDUPTHER

## 2020-09-24 NOTE — PATIENT INSTRUCTIONS
Trial of diet controlled diabetes  If sugars are >140 AM fasting, let me know  Otherwise, low carbs, low sugar, low rice  Try to increase activity  Stop metformin     Continue synthroid  Continue pravastatin  Continue losartan  Continue flomax for BPH. Try adding finesteride. This may take 3 months to notice a difference.     I want you to start taking tylenol 500 mg with breakfast and dinner  I want you to try mobic with food once daily as needed  In 10 days, I want you to try taking cymbalta, daily for sleep/pain  I also have given voltaren gel, an antiinflammatory gel, use that on unbroken skin, from the neck down, for parts that hurt      Eating to Prevent Gout  Gout is a painful form of arthritis caused by an excess of uric acid. This is a waste product made by the body. It builds up in the body and forms crystals that collect in the joints, bringing on a gout attack. Alcohol and certain foods can trigger a gout attack. Below are some guidelines for changing your diet to help you manage gout. Your healthcare provider can work with you to determine the best eating plan for you. Know that diet is only one part of managing gout. Take your medicines as prescribed and follow the other guidelines your healthcare provider has given you.  Foods to limit  Eating too many foods containing purines may increase the levels of uric acid in your body and increase your risk for a gout attack. It may be best to limit these high-purine foods:  · Alcohol (beer, red wine). You may be told to avoid alcohol completely.  · Certain fish (anchovies, sardines, fish roes, herring, tuna, mussels, codfish, scallops, trout, and laura)  · Certain meats (red meat, processed meat, conteh, turkey, wild game, and goose)  · Sauces and gravies made with meat  · Organ meats (such as liver, kidneys, sweetbreads, and tripe)  · Legumes (such as dried beans, peas)  · Mushrooms, spinach, asparagus, and cauliflower  · Yeast and yeast extract  supplements  Foods to try  Some foods may be helpful for people with gout. You may want to try adding some of the following foods to your diet:  · Dark berries: These include blueberries, blackberries, and cherries. These berries contain chemicals that may lower uric acid.  · Tofu: Tofu, which is made from soy, is a good source of protein. Studies have shown that it may be a better choice than meat for people with gout.  · Omega fatty acids: These acids are found in fatty fish (such as salmon), certain oils (such as flax, olive, or nut oils), or nuts. They may help prevent inflammation due to gout.  The following guidelines are recommended by the American Medical Association for people with gout. Your diet should be:  · High in fiber, whole grains, fruits, and vegetables.  · Low in protein (15% of calories should come from protein. Choose lean sources such as soy, lean meats, and poultry).  · Low in fat (no more than 30% of calories should come from fat, with only 10% coming from animal fat).   Date Last Reviewed: 6/17/2015  © 2347-6357 [employer]. [address] [place] [address]. All rights reserved. This information is not intended as a substitute for professional medical care. Always follow your healthcare professional's instructions.      Diabetes Management Status    Statin: Taking  ACE/ARB: Taking    Screening or Prevention Patient's value Goal Complete/Controlled?   HgA1C Testing and Control   Lab Results   Component Value Date    HGBA1C 5.8 (H) 06/22/2020      Annually/Less than 8% Yes   Lipid profile : 09/18/2020 Annually Yes   LDL control Lab Results   Component Value Date    LDLCALC 114.2 09/18/2020    Annually/Less than 100 mg/dl  No   Nephropathy screening Lab Results   Component Value Date    LABMICR 8.0 05/20/2019     Lab Results   Component Value Date    PROTEINUA Negative 12/27/2017    Annually No   Blood pressure BP Readings from Last 1 Encounters:   09/24/20 128/78    Less  than 140/90 Yes   Dilated retinal exam : 01/07/2020 Annually Yes   Foot exam   : 09/24/2020 Annually Yes

## 2020-09-24 NOTE — PROGRESS NOTES
Subjective:       Patient ID: Cesar Simpson is a 70 y.o. male.    Chief Complaint: establish care    Cesar Simpson is a 70 y.o. male who presents today to establish care.     Diet: he doesn't drink any soda. Rare fast food. He tries to cook. He tries to eat salads.   Exercise: limited due to arthritis.     Flu: ordered  Labs: reviewed and ordered    Family history of MI: had stress test in 2018. Normal myocardial perfusion scan with no evidence of ischemia or infarct.  DM: taking metformin once daily. Fasting sugars are in the low 100s.   DLD: taking pravastatin  HTN: taking losartan.   Insomnia: taking trazodone    Arthritis: has joint pain. Negative RF. Mostly shoulder and hips and fingers. He has trouble sitting on the floor. Has had arthritis on imaging since 2010.     C-scope: FIT Kit UTD      PMHx: reviewed in EMR and updated  Meds: reviewed in EMR and updated  Shx: reviewed in EMR and updated  FMHx: reviewed in EMR and updated  Social: he lives alone. He has 2 kids, they live in [place] and [place]. No pets at home.       Review of Systems   Constitutional: Negative for activity change and unexpected weight change.   HENT: Positive for hearing loss. Negative for rhinorrhea and trouble swallowing.    Eyes: Negative for discharge and visual disturbance.   Respiratory: Negative for chest tightness and wheezing.    Cardiovascular: Negative for chest pain and palpitations.   Gastrointestinal: Negative for blood in stool, constipation, diarrhea and vomiting.   Endocrine: Positive for polyuria. Negative for polydipsia.   Genitourinary: Positive for urgency. Negative for difficulty urinating and hematuria.   Musculoskeletal: Positive for arthralgias. Negative for joint swelling and neck pain.   Neurological: Negative for weakness and headaches.   Psychiatric/Behavioral: Negative for confusion and dysphoric mood.           Health Maintenance Due   Topic Date Due    Shingles Vaccine (2 of 3) 01/09/2016     Immunization  History   Administered Date(s) Administered    Influenza 02/27/2013, 10/27/2015    Influenza (FLUAD) - Quadrivalent - Adjuvanted - PF *Preferred* (65+) 09/24/2020    Influenza - High Dose - PF (65 years and older) 10/10/2016, 11/22/2017, 11/21/2018, 09/24/2019, 11/23/2019    Influenza - Quadrivalent 10/29/2014    Influenza - Trivalent (ADULT) 02/27/2013    Influenza - Trivalent - PF (ADULT) 10/27/2015    Influenza Split 02/27/2013    Pneumococcal Conjugate - 13 Valent 03/24/2017    Pneumococcal Polysaccharide - 23 Valent 02/08/2016    Tdap 10/10/2016    Zoster 11/14/2015           Objective:     Vitals:    09/24/20 0913   BP: 128/78   Pulse: 76   Temp: 97.4 °F (36.3 °C)        Physical Exam  Vitals signs and nursing note reviewed.   Constitutional:       Appearance: He is well-developed.   HENT:      Head: Normocephalic and atraumatic.   Eyes:      Conjunctiva/sclera: Conjunctivae normal.   Neck:      Musculoskeletal: Normal range of motion and neck supple.   Cardiovascular:      Rate and Rhythm: Normal rate and regular rhythm.   Pulmonary:      Effort: Pulmonary effort is normal.      Breath sounds: Normal breath sounds.   Abdominal:      Palpations: Abdomen is soft.      Tenderness: There is no abdominal tenderness.   Skin:     Findings: No rash.   Neurological:      Mental Status: He is alert and oriented to person, place, and time.   Psychiatric:         Behavior: Behavior normal.         Thought Content: Thought content normal.         Judgment: Judgment normal.         Assessment:       1. Encounter to establish care with new doctor    2. Hypertension associated with diabetes    3. Essential hypertension    4. Hyperlipidemia associated with type 2 diabetes mellitus    5. Hyperlipidemia, unspecified hyperlipidemia type    6. Diet-controlled diabetes mellitus    7. Type 2 diabetes mellitus with diabetic polyneuropathy, without long-term current use of insulin    8. Other specified hypothyroidism    9.  BMI 30.0-30.9,adult    10. Screening PSA (prostate specific antigen)    11. Encounter for long-term current use of medication     12. Elevated uric acid in blood    13. Arthritis    14. BPH associated with nocturia    15. UTI symptoms    16. Need for influenza vaccination        Plan:       Trial of diet controlled diabetes  If sugars are >140 AM fasting, let me know  Otherwise, low carbs, low sugar, low rice  Try to increase activity  Stop metformin     Continue synthroid  Continue pravastatin  Continue losartan  Continue flomax for BPH. Try adding finesteride. This may take 3 months to notice a difference.     I want you to start taking tylenol 500 mg with breakfast and dinner  I want you to try mobic with food once daily as needed  In 10 days, I want you to try taking cymbalta, daily for sleep/pain  I also have given voltaren gel, an antiinflammatory gel, use that on unbroken skin, from the neck down, for parts that hurt    Urine today. F/u 3 months, labs prior.     Encounter to establish care with new doctor  -     CBC auto differential; Future; Expected date: 09/24/2020  -     Comprehensive metabolic panel; Future; Expected date: 09/24/2020    Hypertension associated with diabetes  -     CBC auto differential; Future; Expected date: 09/24/2020  -     Comprehensive metabolic panel; Future; Expected date: 09/24/2020    Essential hypertension  -     CBC auto differential; Future; Expected date: 09/24/2020  -     Comprehensive metabolic panel; Future; Expected date: 09/24/2020  -     losartan (COZAAR) 100 MG tablet; Take 1 tablet (100 mg total) by mouth once daily.  Dispense: 90 tablet; Refill: 1    Hyperlipidemia associated with type 2 diabetes mellitus  -     pravastatin (PRAVACHOL) 40 MG tablet; Take 1 tablet (40 mg total) by mouth every evening.  Dispense: 90 tablet; Refill: 1    Hyperlipidemia, unspecified hyperlipidemia type  -     pravastatin (PRAVACHOL) 40 MG tablet; Take 1 tablet (40 mg total) by mouth  every evening.  Dispense: 90 tablet; Refill: 1    Diet-controlled diabetes mellitus    Type 2 diabetes mellitus with diabetic polyneuropathy, without long-term current use of insulin  -     Vitamin B12; Future; Expected date: 09/24/2020  -     Hemoglobin A1C; Future; Expected date: 09/24/2020    Other specified hypothyroidism  -     levothyroxine (SYNTHROID) 88 MCG tablet; Take 1 tablet (88 mcg total) by mouth before breakfast.  Dispense: 90 tablet; Refill: 1    BMI 30.0-30.9,adult    Screening PSA (prostate specific antigen)  -     PSA, Screening; Future; Expected date: 09/24/2020    Encounter for long-term current use of medication   -     Vitamin B12; Future; Expected date: 09/24/2020    Elevated uric acid in blood  -     URIC ACID; Future; Expected date: 09/24/2020  -     diclofenac sodium (VOLTAREN) 1 % Gel; Apply 2 g topically once daily.  Dispense: 100 g; Refill: 0    Arthritis  -     diclofenac sodium (VOLTAREN) 1 % Gel; Apply 2 g topically once daily.  Dispense: 100 g; Refill: 0  -     DULoxetine (CYMBALTA) 30 MG capsule; Take 1 capsule (30 mg total) by mouth once daily.  Dispense: 90 capsule; Refill: 0  -     meloxicam (MOBIC) 7.5 MG tablet; Take 1 tablet (7.5 mg total) by mouth once daily.  Dispense: 90 tablet; Refill: 0    BPH associated with nocturia  -     tamsulosin (FLOMAX) 0.4 mg Cap; Take 1 capsule (0.4 mg total) by mouth once daily.  Dispense: 90 capsule; Refill: 1  -     finasteride (PROSCAR) 5 mg tablet; Take 1 tablet (5 mg total) by mouth once daily.  Dispense: 90 tablet; Refill: 1    UTI symptoms  -     Urine culture; Future; Expected date: 09/24/2020  -     Urinalysis; Future; Expected date: 09/24/2020    Need for influenza vaccination  -     Influenza (FLUAD) - Quadrivalent (Adjuvanted) *Preferred* (65+) (PF)      40 minutes spent with patient, of which >50% was spent on counseling and coordination of care.       Warning signs discussed, patient to call with any further issues or worsening  of symptoms.

## 2020-09-26 LAB — BACTERIA UR CULT: ABNORMAL

## 2020-09-27 DIAGNOSIS — R35.1 BPH ASSOCIATED WITH NOCTURIA: ICD-10-CM

## 2020-09-27 DIAGNOSIS — R39.9 UTI SYMPTOMS: Primary | ICD-10-CM

## 2020-09-27 DIAGNOSIS — N40.1 BPH ASSOCIATED WITH NOCTURIA: ICD-10-CM

## 2020-09-27 RX ORDER — CIPROFLOXACIN 500 MG/1
500 TABLET ORAL 2 TIMES DAILY
Qty: 60 TABLET | Refills: 1 | Status: SHIPPED | OUTPATIENT
Start: 2020-09-27 | End: 2020-10-27

## 2020-09-28 ENCOUNTER — PATIENT MESSAGE (OUTPATIENT)
Dept: FAMILY MEDICINE | Facility: CLINIC | Age: 70
End: 2020-09-28

## 2020-10-20 ENCOUNTER — PES CALL (OUTPATIENT)
Dept: ADMINISTRATIVE | Facility: CLINIC | Age: 70
End: 2020-10-20

## 2020-11-03 ENCOUNTER — PATIENT OUTREACH (OUTPATIENT)
Dept: OTHER | Facility: OTHER | Age: 70
End: 2020-11-03

## 2020-11-03 NOTE — PROGRESS NOTES
1. HTN  Attempted to reach patient for routine follow up. Reviewed available blood pressure readings and labs. Per 2017 ACC/AHA Hypertension Guidelines, current 30-day average is at goal.     Patient was seen by new PCP on 9/24/20, BP was 128/78, metformin was stopped.    Last 5 Patient Entered Readings                                      Current 30 Day Average: 118/78     Recent Readings 11/3/2020 11/3/2020 11/2/2020 10/31/2020 10/31/2020    SBP (mmHg) 137 137 110 121 117    DBP (mmHg) 83 89 67 78 77    Pulse 69 72 68 84 62        Hypertension Medications             losartan (COZAAR) 100 MG tablet Take 1 tablet (100 mg total) by mouth once daily.        No answer, no VM.    Will continue to monitor regularly. Will follow up in 1 month.        2. DMII  Attempted to reach patient for routine follow up. Reviewed available SMBG readings and labs. Per ADA guidelines, current SMBG readings and A1C are at goal.    Last 6 Patient Entered Readings                                          Most Recent A1c: 5.8% on 6/22/2020  (Goal: 7%)     Recent Readings 11/3/2020 11/2/2020 11/1/2020 10/31/2020 10/29/2020    Blood Glucose (mg/dL) 111 108 105 111 116        No answer, no VM.    Will continue to monitor regularly. Will follow up in 1 month.

## 2020-11-13 ENCOUNTER — PATIENT OUTREACH (OUTPATIENT)
Dept: OTHER | Facility: OTHER | Age: 70
End: 2020-11-13

## 2020-11-13 NOTE — PROGRESS NOTES
"Digital Medicine: Health  Follow-Up    The history is provided by the patient.             Reason for review: Blood glucose at goal and Blood pressure at goal        Topics Covered on Call: physical activity and Diet    Additional Follow-up details: Mr. Simpson is doing okay. Patient reports establishing care with a new PCP and during his first visit, metformin was stopped. He agrees that readings remain "okay" but wonders what his A1c will look like in December. Encouraged patient to maintain healthy dietary habits and exercise regularly. He plans to stay on track.            Diet-no change to diet    No change to diet.        Physical Activity-no change to routine  No change to exercise routine.     Medication Adherence-Medication adherence was assessed.      Substance, Sleep, Stress-Not assessed      Continue current diet/physical activity routine.       Addressed patient questions and patient has my contact information if needed prior to next outreach. Patient verbalizes understanding.      Explained the importance of self-monitoring and medication adherence. Encouraged the patient to communicate with their health  for lifestyle modifications to help improve or maintain a healthy lifestyle.               There are no preventive care reminders to display for this patient.      Last 5 Patient Entered Readings                                      Current 30 Day Average: 118/78     Recent Readings 11/11/2020 11/11/2020 11/9/2020 11/7/2020 11/3/2020    SBP (mmHg) 116 124 126 125 117    DBP (mmHg) 77 84 87 79 72    Pulse 61 75 70 68 62        Last 6 Patient Entered Readings                                          Most Recent A1c: 5.8% on 6/22/2020  (Goal: 7%)     Recent Readings 11/11/2020 11/10/2020 11/7/2020 11/6/2020 11/4/2020    Blood Glucose (mg/dL) 114 122 109 104 110             "

## 2020-11-23 PROCEDURE — 99454 REM MNTR PHYSIOL PARAM 16-30: CPT | Mod: S$GLB,,, | Performed by: FAMILY MEDICINE

## 2020-11-23 PROCEDURE — 99454 PR REMOTE MNTR, PHYS PARAM, INITIAL, EA 30 DAYS: ICD-10-PCS | Mod: S$GLB,,, | Performed by: FAMILY MEDICINE

## 2020-11-24 ENCOUNTER — PES CALL (OUTPATIENT)
Dept: ADMINISTRATIVE | Facility: CLINIC | Age: 70
End: 2020-11-24

## 2020-11-30 ENCOUNTER — PES CALL (OUTPATIENT)
Dept: ADMINISTRATIVE | Facility: CLINIC | Age: 70
End: 2020-11-30

## 2020-12-02 NOTE — PROGRESS NOTES
Digital Medicine: Clinician Follow-Up    Called patient to follow up regarding HTN and DM digital readings. Patient is scheduled to have labs drawn on 12/23/20 and is scheduled to see PCP on 12/28/20.      The history is provided by the patient.   Follow-up reason(s): routine follow up.     Hypertension    Patient's blood pressure is stable.   Diabetes    Patient's blood glucose is stable.   Patient is not experiencing signs/symptoms of hypotension.  Patient is not experiencing signs/symptoms of hypertension.  Patient is not experiencing signs/symptoms of hypoglycemia.  Patient is not experiencing signs/symptoms of hyperglycemia.          Last 5 Patient Entered Readings                                      Current 30 Day Average: 122/81     Recent Readings 12/2/2020 12/2/2020 12/2/2020 12/2/2020 12/1/2020    SBP (mmHg) 114 118 116 132 113    DBP (mmHg) 72 72 76 81 77    Pulse 74 72 75 75 106        Last 6 Patient Entered Readings                                          Most Recent A1c: 5.8% on 6/22/2020  (Goal: 7%)     Recent Readings 12/2/2020 12/1/2020 11/30/2020 11/29/2020 11/28/2020    Blood Glucose (mg/dL) 119 108 107 117 125               Depression Screening  Did not address depression screening.    Sleep Apnea Screening    Did not address sleep apnea screening.     Medication Affordability Screening  Did not address medication affordability screening.     Medication Adherence-Medication adherence was asssessed.  Patient continue taking medication as prescribed.            ASSESSMENT(S)  Patients BP average is 122/81 mmHg, which is at goal. Patient's BP goal is less than or equal to 130/80.  Patient's A1C goal is less than or equal to 7. Patient's most recent A1C result is at goal. Lab Results    Component                Value               Date                     HGBA1C                   5.8 (H)             06/22/2020          .       Hypertension Plan  Instructed patient to call if BP remains > 130/80 or  if he begins to experience s/s of hypotension.   Diabetes Plan  Continue current therapy.  Instructed patient to call if BG repeatedly < 70 or >180 or if he begins to experience s/s of hypoglycemia, hyperglycemia.    Will continue to monitor, WCB in 12 weeks.          Addressed patient questions and patient has my contact information if needed prior to next outreach. Patient verbalizes understanding.                 Hypertension Medications             losartan (COZAAR) 100 MG tablet Take 1 tablet (100 mg total) by mouth once daily.        DMII medications: none

## 2020-12-17 PROCEDURE — 99454 PR REMOTE MNTR, PHYS PARAM, INITIAL, EA 30 DAYS: ICD-10-PCS | Mod: S$GLB,,, | Performed by: FAMILY MEDICINE

## 2020-12-17 PROCEDURE — 99454 REM MNTR PHYSIOL PARAM 16-30: CPT | Mod: S$GLB,,, | Performed by: FAMILY MEDICINE

## 2020-12-23 ENCOUNTER — LAB VISIT (OUTPATIENT)
Dept: LAB | Facility: HOSPITAL | Age: 70
End: 2020-12-23
Attending: FAMILY MEDICINE
Payer: MEDICARE

## 2020-12-23 DIAGNOSIS — E11.59 HYPERTENSION ASSOCIATED WITH DIABETES: ICD-10-CM

## 2020-12-23 DIAGNOSIS — Z12.5 SCREENING PSA (PROSTATE SPECIFIC ANTIGEN): ICD-10-CM

## 2020-12-23 DIAGNOSIS — E79.0 ELEVATED URIC ACID IN BLOOD: ICD-10-CM

## 2020-12-23 DIAGNOSIS — Z76.89 ENCOUNTER TO ESTABLISH CARE WITH NEW DOCTOR: ICD-10-CM

## 2020-12-23 DIAGNOSIS — I15.2 HYPERTENSION ASSOCIATED WITH DIABETES: ICD-10-CM

## 2020-12-23 DIAGNOSIS — E11.42 TYPE 2 DIABETES MELLITUS WITH DIABETIC POLYNEUROPATHY, WITHOUT LONG-TERM CURRENT USE OF INSULIN: ICD-10-CM

## 2020-12-23 DIAGNOSIS — I10 ESSENTIAL HYPERTENSION: ICD-10-CM

## 2020-12-23 DIAGNOSIS — Z79.899 ENCOUNTER FOR LONG-TERM CURRENT USE OF MEDICATION: ICD-10-CM

## 2020-12-23 LAB
ALBUMIN SERPL BCP-MCNC: 4 G/DL (ref 3.5–5.2)
ALP SERPL-CCNC: 54 U/L (ref 55–135)
ALT SERPL W/O P-5'-P-CCNC: 63 U/L (ref 10–44)
ANION GAP SERPL CALC-SCNC: 9 MMOL/L (ref 8–16)
AST SERPL-CCNC: 42 U/L (ref 10–40)
BASOPHILS # BLD AUTO: 0.05 K/UL (ref 0–0.2)
BASOPHILS NFR BLD: 0.8 % (ref 0–1.9)
BILIRUB SERPL-MCNC: 0.4 MG/DL (ref 0.1–1)
BUN SERPL-MCNC: 22 MG/DL (ref 8–23)
CALCIUM SERPL-MCNC: 9.1 MG/DL (ref 8.7–10.5)
CHLORIDE SERPL-SCNC: 98 MMOL/L (ref 95–110)
CO2 SERPL-SCNC: 30 MMOL/L (ref 23–29)
COMPLEXED PSA SERPL-MCNC: 0.43 NG/ML (ref 0–4)
CREAT SERPL-MCNC: 0.9 MG/DL (ref 0.5–1.4)
DIFFERENTIAL METHOD: NORMAL
EOSINOPHIL # BLD AUTO: 0.2 K/UL (ref 0–0.5)
EOSINOPHIL NFR BLD: 3 % (ref 0–8)
ERYTHROCYTE [DISTWIDTH] IN BLOOD BY AUTOMATED COUNT: 14.2 % (ref 11.5–14.5)
EST. GFR  (AFRICAN AMERICAN): >60 ML/MIN/1.73 M^2
EST. GFR  (NON AFRICAN AMERICAN): >60 ML/MIN/1.73 M^2
ESTIMATED AVG GLUCOSE: 117 MG/DL (ref 68–131)
GLUCOSE SERPL-MCNC: 107 MG/DL (ref 70–110)
HBA1C MFR BLD HPLC: 5.7 % (ref 4–5.6)
HCT VFR BLD AUTO: 45.7 % (ref 40–54)
HGB BLD-MCNC: 15 G/DL (ref 14–18)
IMM GRANULOCYTES # BLD AUTO: 0.03 K/UL (ref 0–0.04)
IMM GRANULOCYTES NFR BLD AUTO: 0.5 % (ref 0–0.5)
LYMPHOCYTES # BLD AUTO: 1.7 K/UL (ref 1–4.8)
LYMPHOCYTES NFR BLD: 27.9 % (ref 18–48)
MCH RBC QN AUTO: 28.8 PG (ref 27–31)
MCHC RBC AUTO-ENTMCNC: 32.8 G/DL (ref 32–36)
MCV RBC AUTO: 88 FL (ref 82–98)
MONOCYTES # BLD AUTO: 0.5 K/UL (ref 0.3–1)
MONOCYTES NFR BLD: 8.9 % (ref 4–15)
NEUTROPHILS # BLD AUTO: 3.5 K/UL (ref 1.8–7.7)
NEUTROPHILS NFR BLD: 58.9 % (ref 38–73)
NRBC BLD-RTO: 0 /100 WBC
PLATELET # BLD AUTO: 212 K/UL (ref 150–350)
PMV BLD AUTO: 10.9 FL (ref 9.2–12.9)
POTASSIUM SERPL-SCNC: 4.1 MMOL/L (ref 3.5–5.1)
PROT SERPL-MCNC: 7.5 G/DL (ref 6–8.4)
RBC # BLD AUTO: 5.21 M/UL (ref 4.6–6.2)
SODIUM SERPL-SCNC: 137 MMOL/L (ref 136–145)
URATE SERPL-MCNC: 6.3 MG/DL (ref 3.4–7)
VIT B12 SERPL-MCNC: 969 PG/ML (ref 210–950)
WBC # BLD AUTO: 5.96 K/UL (ref 3.9–12.7)

## 2020-12-23 PROCEDURE — 36415 COLL VENOUS BLD VENIPUNCTURE: CPT | Mod: HCNC,PO

## 2020-12-23 PROCEDURE — 82607 VITAMIN B-12: CPT | Mod: HCNC

## 2020-12-23 PROCEDURE — 84550 ASSAY OF BLOOD/URIC ACID: CPT | Mod: HCNC

## 2020-12-23 PROCEDURE — 83036 HEMOGLOBIN GLYCOSYLATED A1C: CPT | Mod: HCNC

## 2020-12-23 PROCEDURE — 84153 ASSAY OF PSA TOTAL: CPT | Mod: HCNC

## 2020-12-23 PROCEDURE — 80053 COMPREHEN METABOLIC PANEL: CPT | Mod: HCNC

## 2020-12-23 PROCEDURE — 85025 COMPLETE CBC W/AUTO DIFF WBC: CPT | Mod: HCNC

## 2020-12-28 ENCOUNTER — OFFICE VISIT (OUTPATIENT)
Dept: FAMILY MEDICINE | Facility: CLINIC | Age: 70
End: 2020-12-28
Payer: MEDICARE

## 2020-12-28 ENCOUNTER — LAB VISIT (OUTPATIENT)
Dept: LAB | Facility: HOSPITAL | Age: 70
End: 2020-12-28
Attending: FAMILY MEDICINE
Payer: MEDICARE

## 2020-12-28 VITALS
OXYGEN SATURATION: 98 % | HEIGHT: 67 IN | WEIGHT: 184.75 LBS | SYSTOLIC BLOOD PRESSURE: 110 MMHG | DIASTOLIC BLOOD PRESSURE: 70 MMHG | HEART RATE: 95 BPM | BODY MASS INDEX: 29 KG/M2 | TEMPERATURE: 97 F

## 2020-12-28 DIAGNOSIS — E11.42 TYPE 2 DIABETES MELLITUS WITH DIABETIC POLYNEUROPATHY, WITHOUT LONG-TERM CURRENT USE OF INSULIN: Primary | ICD-10-CM

## 2020-12-28 DIAGNOSIS — N40.1 BENIGN PROSTATIC HYPERPLASIA WITH URINARY FREQUENCY: ICD-10-CM

## 2020-12-28 DIAGNOSIS — R35.1 BPH ASSOCIATED WITH NOCTURIA: ICD-10-CM

## 2020-12-28 DIAGNOSIS — N40.1 BPH ASSOCIATED WITH NOCTURIA: ICD-10-CM

## 2020-12-28 DIAGNOSIS — I15.2 HYPERTENSION ASSOCIATED WITH DIABETES: ICD-10-CM

## 2020-12-28 DIAGNOSIS — I10 ESSENTIAL HYPERTENSION: ICD-10-CM

## 2020-12-28 DIAGNOSIS — E11.59 HYPERTENSION ASSOCIATED WITH DIABETES: ICD-10-CM

## 2020-12-28 DIAGNOSIS — R79.89 ELEVATED LIVER FUNCTION TESTS: ICD-10-CM

## 2020-12-28 DIAGNOSIS — E66.3 OVERWEIGHT (BMI 25.0-29.9): ICD-10-CM

## 2020-12-28 DIAGNOSIS — E78.5 HYPERLIPIDEMIA ASSOCIATED WITH TYPE 2 DIABETES MELLITUS: ICD-10-CM

## 2020-12-28 DIAGNOSIS — E11.69 HYPERLIPIDEMIA ASSOCIATED WITH TYPE 2 DIABETES MELLITUS: ICD-10-CM

## 2020-12-28 DIAGNOSIS — R35.0 BENIGN PROSTATIC HYPERPLASIA WITH URINARY FREQUENCY: ICD-10-CM

## 2020-12-28 DIAGNOSIS — E11.9 DIET-CONTROLLED DIABETES MELLITUS: ICD-10-CM

## 2020-12-28 DIAGNOSIS — M19.90 ARTHRITIS: ICD-10-CM

## 2020-12-28 DIAGNOSIS — E03.8 OTHER SPECIFIED HYPOTHYROIDISM: ICD-10-CM

## 2020-12-28 DIAGNOSIS — G47.00 INSOMNIA, UNSPECIFIED TYPE: ICD-10-CM

## 2020-12-28 DIAGNOSIS — E78.49 OTHER HYPERLIPIDEMIA: ICD-10-CM

## 2020-12-28 LAB
BILIRUB UR QL STRIP: NEGATIVE
CLARITY UR REFRACT.AUTO: CLEAR
COLOR UR AUTO: YELLOW
GLUCOSE UR QL STRIP: NEGATIVE
HGB UR QL STRIP: NEGATIVE
KETONES UR QL STRIP: NEGATIVE
LEUKOCYTE ESTERASE UR QL STRIP: NEGATIVE
MICROSCOPIC COMMENT: NORMAL
NITRITE UR QL STRIP: NEGATIVE
PH UR STRIP: 5 [PH] (ref 5–8)
PROT UR QL STRIP: NEGATIVE
RBC #/AREA URNS AUTO: 1 /HPF (ref 0–4)
SP GR UR STRIP: 1.02 (ref 1–1.03)
SQUAMOUS #/AREA URNS AUTO: 0 /HPF
URN SPEC COLLECT METH UR: NORMAL
WBC #/AREA URNS AUTO: 1 /HPF (ref 0–5)

## 2020-12-28 PROCEDURE — 81001 URINALYSIS AUTO W/SCOPE: CPT | Mod: HCNC

## 2020-12-28 PROCEDURE — 3078F DIAST BP <80 MM HG: CPT | Mod: HCNC,CPTII,S$GLB, | Performed by: FAMILY MEDICINE

## 2020-12-28 PROCEDURE — 1101F PT FALLS ASSESS-DOCD LE1/YR: CPT | Mod: HCNC,CPTII,S$GLB, | Performed by: FAMILY MEDICINE

## 2020-12-28 PROCEDURE — 87086 URINE CULTURE/COLONY COUNT: CPT | Mod: HCNC

## 2020-12-28 PROCEDURE — 3074F PR MOST RECENT SYSTOLIC BLOOD PRESSURE < 130 MM HG: ICD-10-PCS | Mod: HCNC,CPTII,S$GLB, | Performed by: FAMILY MEDICINE

## 2020-12-28 PROCEDURE — 3008F BODY MASS INDEX DOCD: CPT | Mod: HCNC,CPTII,S$GLB, | Performed by: FAMILY MEDICINE

## 2020-12-28 PROCEDURE — 99999 PR PBB SHADOW E&M-EST. PATIENT-LVL IV: ICD-10-PCS | Mod: PBBFAC,HCNC,, | Performed by: FAMILY MEDICINE

## 2020-12-28 PROCEDURE — 3008F PR BODY MASS INDEX (BMI) DOCUMENTED: ICD-10-PCS | Mod: HCNC,CPTII,S$GLB, | Performed by: FAMILY MEDICINE

## 2020-12-28 PROCEDURE — 3044F HG A1C LEVEL LT 7.0%: CPT | Mod: HCNC,CPTII,S$GLB, | Performed by: FAMILY MEDICINE

## 2020-12-28 PROCEDURE — 87088 URINE BACTERIA CULTURE: CPT | Mod: HCNC

## 2020-12-28 PROCEDURE — 3288F PR FALLS RISK ASSESSMENT DOCUMENTED: ICD-10-PCS | Mod: HCNC,CPTII,S$GLB, | Performed by: FAMILY MEDICINE

## 2020-12-28 PROCEDURE — 99214 PR OFFICE/OUTPT VISIT, EST, LEVL IV, 30-39 MIN: ICD-10-PCS | Mod: HCNC,S$GLB,, | Performed by: FAMILY MEDICINE

## 2020-12-28 PROCEDURE — 1126F PR PAIN SEVERITY QUANTIFIED, NO PAIN PRESENT: ICD-10-PCS | Mod: HCNC,S$GLB,, | Performed by: FAMILY MEDICINE

## 2020-12-28 PROCEDURE — 3044F PR MOST RECENT HEMOGLOBIN A1C LEVEL <7.0%: ICD-10-PCS | Mod: HCNC,CPTII,S$GLB, | Performed by: FAMILY MEDICINE

## 2020-12-28 PROCEDURE — 1159F PR MEDICATION LIST DOCUMENTED IN MEDICAL RECORD: ICD-10-PCS | Mod: HCNC,S$GLB,, | Performed by: FAMILY MEDICINE

## 2020-12-28 PROCEDURE — 3074F SYST BP LT 130 MM HG: CPT | Mod: HCNC,CPTII,S$GLB, | Performed by: FAMILY MEDICINE

## 2020-12-28 PROCEDURE — 1126F AMNT PAIN NOTED NONE PRSNT: CPT | Mod: HCNC,S$GLB,, | Performed by: FAMILY MEDICINE

## 2020-12-28 PROCEDURE — 1159F MED LIST DOCD IN RCRD: CPT | Mod: HCNC,S$GLB,, | Performed by: FAMILY MEDICINE

## 2020-12-28 PROCEDURE — 99214 OFFICE O/P EST MOD 30 MIN: CPT | Mod: HCNC,S$GLB,, | Performed by: FAMILY MEDICINE

## 2020-12-28 PROCEDURE — 1101F PR PT FALLS ASSESS DOC 0-1 FALLS W/OUT INJ PAST YR: ICD-10-PCS | Mod: HCNC,CPTII,S$GLB, | Performed by: FAMILY MEDICINE

## 2020-12-28 PROCEDURE — 3288F FALL RISK ASSESSMENT DOCD: CPT | Mod: HCNC,CPTII,S$GLB, | Performed by: FAMILY MEDICINE

## 2020-12-28 PROCEDURE — 3078F PR MOST RECENT DIASTOLIC BLOOD PRESSURE < 80 MM HG: ICD-10-PCS | Mod: HCNC,CPTII,S$GLB, | Performed by: FAMILY MEDICINE

## 2020-12-28 PROCEDURE — 99999 PR PBB SHADOW E&M-EST. PATIENT-LVL IV: CPT | Mod: PBBFAC,HCNC,, | Performed by: FAMILY MEDICINE

## 2020-12-28 RX ORDER — LOSARTAN POTASSIUM 100 MG/1
100 TABLET ORAL DAILY
Qty: 90 TABLET | Refills: 1 | Status: SHIPPED | OUTPATIENT
Start: 2020-12-28 | End: 2021-06-28 | Stop reason: SDUPTHER

## 2020-12-28 RX ORDER — DULOXETIN HYDROCHLORIDE 30 MG/1
30 CAPSULE, DELAYED RELEASE ORAL DAILY
Qty: 90 CAPSULE | Refills: 1 | Status: SHIPPED | OUTPATIENT
Start: 2020-12-28 | End: 2021-03-25 | Stop reason: SDUPTHER

## 2020-12-28 RX ORDER — PRAVASTATIN SODIUM 40 MG/1
40 TABLET ORAL NIGHTLY
Qty: 90 TABLET | Refills: 1 | Status: SHIPPED | OUTPATIENT
Start: 2020-12-28 | End: 2021-06-28 | Stop reason: SDUPTHER

## 2020-12-28 RX ORDER — METFORMIN HYDROCHLORIDE 500 MG/1
TABLET, EXTENDED RELEASE ORAL
COMMUNITY
Start: 2020-10-06 | End: 2020-12-28

## 2020-12-28 RX ORDER — FINASTERIDE 5 MG/1
5 TABLET, FILM COATED ORAL DAILY
Qty: 90 TABLET | Refills: 1 | Status: SHIPPED | OUTPATIENT
Start: 2020-12-28 | End: 2021-04-24 | Stop reason: SDUPTHER

## 2020-12-28 RX ORDER — LEVOTHYROXINE SODIUM 88 UG/1
88 TABLET ORAL
Qty: 90 TABLET | Refills: 1 | Status: SHIPPED | OUTPATIENT
Start: 2020-12-28 | End: 2021-06-28 | Stop reason: SDUPTHER

## 2020-12-28 RX ORDER — TRAZODONE HYDROCHLORIDE 50 MG/1
50 TABLET ORAL NIGHTLY PRN
Qty: 90 TABLET | Refills: 1 | Status: SHIPPED | OUTPATIENT
Start: 2020-12-28 | End: 2021-01-21 | Stop reason: SDUPTHER

## 2020-12-28 RX ORDER — TAMSULOSIN HYDROCHLORIDE 0.4 MG/1
1 CAPSULE ORAL DAILY
Qty: 90 CAPSULE | Refills: 1 | Status: SHIPPED | OUTPATIENT
Start: 2020-12-28 | End: 2021-06-28 | Stop reason: SDUPTHER

## 2020-12-28 NOTE — PROGRESS NOTES
Subjective:       Patient ID: Cesar Simpson is a 70 y.o. male.    Chief Complaint: Follow-up and Diabetes    Cesar is a 70 y.o. male who presents today for f/u    Family history of MI: had stress test in 2018. Normal myocardial perfusion scan with no evidence of ischemia or infarct.    DM: trial of diet controlled DM. Stopped metformin at visit 9/2020. a1c stable.     DLD: taking pravastatin  HTN: taking losartan.   Insomnia: taking trazodone, prn. Started cymbalta. Tolerating. Sleep is better  Hypothyroidism: on synthroid  BPH: added 2nd medication. Urinary symptoms improved, especially nocturia.   Elevated LFT: unclear etiology. Will recheck in 3-6 months.     Has lost weight. Has been trying to eat less. Cutting down on portions.     Seeing the eye doctor in 2 months.     Labs as below reviewed in detail.     Review of Systems   Constitutional: Negative for chills and fever.   Gastrointestinal: Negative for diarrhea, nausea and vomiting.   Skin: Negative for rash.   Neurological: Negative for dizziness, light-headedness and headaches.             Lab Results   Component Value Date    HGBA1C 5.7 (H) 12/23/2020    HGBA1C 5.8 (H) 06/22/2020    HGBA1C 5.7 (H) 02/24/2020             Results for orders placed or performed in visit on 12/23/20   CBC auto differential   Result Value Ref Range    WBC 5.96 3.90 - 12.70 K/uL    RBC 5.21 4.60 - 6.20 M/uL    Hemoglobin 15.0 14.0 - 18.0 g/dL    Hematocrit 45.7 40.0 - 54.0 %    MCV 88 82 - 98 fL    MCH 28.8 27.0 - 31.0 pg    MCHC 32.8 32.0 - 36.0 g/dL    RDW 14.2 11.5 - 14.5 %    Platelets 212 150 - 350 K/uL    MPV 10.9 9.2 - 12.9 fL    Immature Granulocytes 0.5 0.0 - 0.5 %    Gran # (ANC) 3.5 1.8 - 7.7 K/uL    Immature Grans (Abs) 0.03 0.00 - 0.04 K/uL    Lymph # 1.7 1.0 - 4.8 K/uL    Mono # 0.5 0.3 - 1.0 K/uL    Eos # 0.2 0.0 - 0.5 K/uL    Baso # 0.05 0.00 - 0.20 K/uL    nRBC 0 0 /100 WBC    Gran % 58.9 38.0 - 73.0 %    Lymph % 27.9 18.0 - 48.0 %    Mono % 8.9 4.0 - 15.0 %     Eosinophil % 3.0 0.0 - 8.0 %    Basophil % 0.8 0.0 - 1.9 %    Differential Method Automated    Comprehensive metabolic panel   Result Value Ref Range    Sodium 137 136 - 145 mmol/L    Potassium 4.1 3.5 - 5.1 mmol/L    Chloride 98 95 - 110 mmol/L    CO2 30 (H) 23 - 29 mmol/L    Glucose 107 70 - 110 mg/dL    BUN 22 8 - 23 mg/dL    Creatinine 0.9 0.5 - 1.4 mg/dL    Calcium 9.1 8.7 - 10.5 mg/dL    Total Protein 7.5 6.0 - 8.4 g/dL    Albumin 4.0 3.5 - 5.2 g/dL    Total Bilirubin 0.4 0.1 - 1.0 mg/dL    Alkaline Phosphatase 54 (L) 55 - 135 U/L    AST 42 (H) 10 - 40 U/L    ALT 63 (H) 10 - 44 U/L    Anion Gap 9 8 - 16 mmol/L    eGFR if African American >60.0 >60 mL/min/1.73 m^2    eGFR if non African American >60.0 >60 mL/min/1.73 m^2   PSA, Screening   Result Value Ref Range    PSA, Screen 0.43 0.00 - 4.00 ng/mL   Vitamin B12   Result Value Ref Range    Vitamin B-12 969 (H) 210 - 950 pg/mL   Hemoglobin A1C   Result Value Ref Range    Hemoglobin A1C 5.7 (H) 4.0 - 5.6 %    Estimated Avg Glucose 117 68 - 131 mg/dL   URIC ACID   Result Value Ref Range    Uric Acid 6.3 3.4 - 7.0 mg/dL       Objective:     Vitals:    12/28/20 0957   BP: 110/70   Pulse: 95   Temp: 97.4 °F (36.3 °C)        Physical Exam  Vitals signs and nursing note reviewed.   Constitutional:       Appearance: He is well-developed.   HENT:      Head: Normocephalic and atraumatic.   Cardiovascular:      Rate and Rhythm: Normal rate and regular rhythm.      Heart sounds: Murmur present. Systolic murmur present with a grade of 2/6.   Pulmonary:      Effort: Pulmonary effort is normal.      Breath sounds: Normal breath sounds.   Abdominal:      Palpations: Abdomen is soft.      Tenderness: There is no abdominal tenderness.   Skin:     Findings: No rash.   Neurological:      Mental Status: He is alert and oriented to person, place, and time.   Psychiatric:         Behavior: Behavior normal.         Thought Content: Thought content normal.         Judgment: Judgment  normal.         Assessment:       1. Type 2 diabetes mellitus with diabetic polyneuropathy, without long-term current use of insulin    2. Diet-controlled diabetes mellitus    3. Overweight (BMI 25.0-29.9)    4. Other specified hypothyroidism    5. Essential hypertension    6. Hypertension associated with diabetes    7. Hyperlipidemia associated with type 2 diabetes mellitus    8. Other hyperlipidemia    9. BPH associated with nocturia    10. Benign prostatic hyperplasia with urinary frequency    11. Elevated liver function tests    12. Arthritis    13. Insomnia, unspecified type        Plan:       Continue diet controlled diabetes  No metformin  Diet, exercise, weight loss. Decrease sugar/carbs    Continue synthroid  Continue pravastatin  Continue losartan  Continue flomax for BPH.   continue finasteride (added in early 2020)  Continue cymbalta for sleep/pain    Can continue trazodone PRN    F/u 6 months, labs prior. MAWV    Type 2 diabetes mellitus with diabetic polyneuropathy, without long-term current use of insulin  -     Hemoglobin A1C; Future; Expected date: 12/28/2020    Diet-controlled diabetes mellitus  -     Hemoglobin A1C; Future; Expected date: 12/28/2020    Overweight (BMI 25.0-29.9)    Other specified hypothyroidism  -     TSH; Future; Expected date: 12/28/2020  -     T4, Free; Future; Expected date: 12/28/2020  -     levothyroxine (SYNTHROID) 88 MCG tablet; Take 1 tablet (88 mcg total) by mouth before breakfast.  Dispense: 90 tablet; Refill: 1    Essential hypertension  -     CBC Auto Differential; Future; Expected date: 12/28/2020  -     Comprehensive Metabolic Panel; Future; Expected date: 12/28/2020  -     losartan (COZAAR) 100 MG tablet; Take 1 tablet (100 mg total) by mouth once daily.  Dispense: 90 tablet; Refill: 1    Hypertension associated with diabetes    Hyperlipidemia associated with type 2 diabetes mellitus  -     pravastatin (PRAVACHOL) 40 MG tablet; Take 1 tablet (40 mg total) by mouth  every evening.  Dispense: 90 tablet; Refill: 1    Other hyperlipidemia  -     Lipid Panel; Future; Expected date: 12/28/2020  -     pravastatin (PRAVACHOL) 40 MG tablet; Take 1 tablet (40 mg total) by mouth every evening.  Dispense: 90 tablet; Refill: 1    BPH associated with nocturia  -     finasteride (PROSCAR) 5 mg tablet; Take 1 tablet (5 mg total) by mouth once daily.  Dispense: 90 tablet; Refill: 1  -     tamsulosin (FLOMAX) 0.4 mg Cap; Take 1 capsule (0.4 mg total) by mouth once daily.  Dispense: 90 capsule; Refill: 1  -     Urine culture; Future; Expected date: 12/28/2020  -     Urinalysis; Future; Expected date: 12/28/2020    Benign prostatic hyperplasia with urinary frequency  -     Urine culture; Future; Expected date: 12/28/2020  -     Urinalysis; Future; Expected date: 12/28/2020    Elevated liver function tests  -     Comprehensive Metabolic Panel; Future; Expected date: 12/28/2020    Arthritis  -     DULoxetine (CYMBALTA) 30 MG capsule; Take 1 capsule (30 mg total) by mouth once daily.  Dispense: 90 capsule; Refill: 1    Insomnia, unspecified type  -     traZODone (DESYREL) 50 MG tablet; Take 1 tablet (50 mg total) by mouth nightly as needed for Insomnia.  Dispense: 90 tablet; Refill: 1        Warning signs discussed, patient to call with any further issues or worsening of symptoms.

## 2020-12-28 NOTE — PATIENT INSTRUCTIONS
Continue diet controlled diabetes  No metformin  Diet, exercise, weight loss. Decrease sugar/carbs    Continue synthroid  Continue pravastatin  Continue losartan  Continue flomax for BPH.   continue finasteride (added in early 2020)  Continue cymbalta for sleep/pain    Can continue trazodone PRN    F/u 6 months, labs prior. MAWV    Diabetes health maintenance:  -- advise regular and consistent glucose monitoring and medication compliance.  -- advise daily foot checks  -- advise yearly ophthalmologic exams  -- advise adequate dietary and exercise modification  -- advise regular dental visits  -- Medication management    Diabetes Management Status    Statin: Taking  ACE/ARB: Taking    Screening or Prevention Patient's value Goal Complete/Controlled?   HgA1C Testing and Control   Lab Results   Component Value Date    HGBA1C 5.7 (H) 12/23/2020      Annually/Less than 8% Yes   Lipid profile : 09/18/2020 Annually Yes   LDL control Lab Results   Component Value Date    LDLCALC 114.2 09/18/2020    Annually/Less than 100 mg/dl  No   Nephropathy screening Lab Results   Component Value Date    LABMICR 8.0 05/20/2019     Lab Results   Component Value Date    PROTEINUA Negative 09/24/2020    Annually Yes   Blood pressure BP Readings from Last 1 Encounters:   12/28/20 110/70    Less than 140/90 Yes   Dilated retinal exam : 01/07/2020 Annually Yes   Foot exam   : 09/24/2020 Annually Yes

## 2020-12-29 LAB — BACTERIA UR CULT: ABNORMAL

## 2021-01-05 ENCOUNTER — PATIENT MESSAGE (OUTPATIENT)
Dept: ADMINISTRATIVE | Facility: OTHER | Age: 71
End: 2021-01-05

## 2021-01-21 DIAGNOSIS — G47.00 INSOMNIA, UNSPECIFIED TYPE: ICD-10-CM

## 2021-01-21 RX ORDER — TRAZODONE HYDROCHLORIDE 50 MG/1
50 TABLET ORAL NIGHTLY PRN
Qty: 90 TABLET | Refills: 1 | Status: SHIPPED | OUTPATIENT
Start: 2021-01-21 | End: 2021-12-28 | Stop reason: SDUPTHER

## 2021-01-23 PROCEDURE — 99454 PR REMOTE MNTR, PHYS PARAM, INITIAL, EA 30 DAYS: ICD-10-PCS | Mod: S$GLB,,, | Performed by: FAMILY MEDICINE

## 2021-01-23 PROCEDURE — 99454 REM MNTR PHYSIOL PARAM 16-30: CPT | Mod: S$GLB,,, | Performed by: FAMILY MEDICINE

## 2021-02-15 ENCOUNTER — OFFICE VISIT (OUTPATIENT)
Dept: OPTOMETRY | Facility: CLINIC | Age: 71
End: 2021-02-15
Payer: MEDICARE

## 2021-02-15 DIAGNOSIS — Z13.5 SCREENING FOR EYE CONDITION: ICD-10-CM

## 2021-02-15 DIAGNOSIS — H52.7 REFRACTIVE ERRORS: ICD-10-CM

## 2021-02-15 DIAGNOSIS — E11.9 TYPE 2 DIABETES MELLITUS WITHOUT RETINOPATHY: ICD-10-CM

## 2021-02-15 DIAGNOSIS — Z98.42 S/P BILATERAL CATARACT EXTRACTION: ICD-10-CM

## 2021-02-15 DIAGNOSIS — Z01.00 DIABETIC EYE EXAM: Primary | ICD-10-CM

## 2021-02-15 DIAGNOSIS — Z98.890 HISTORY OF REFRACTIVE SURGERY: ICD-10-CM

## 2021-02-15 DIAGNOSIS — H43.811 VITREOUS DETACHMENT OF RIGHT EYE: ICD-10-CM

## 2021-02-15 DIAGNOSIS — H43.393 VITREOUS FLOATERS OF BOTH EYES: ICD-10-CM

## 2021-02-15 DIAGNOSIS — Z98.41 S/P BILATERAL CATARACT EXTRACTION: ICD-10-CM

## 2021-02-15 DIAGNOSIS — E11.9 DIABETIC EYE EXAM: Primary | ICD-10-CM

## 2021-02-15 DIAGNOSIS — Z96.1 PSEUDOPHAKIA OF BOTH EYES: ICD-10-CM

## 2021-02-15 PROCEDURE — 99999 PR PBB SHADOW E&M-EST. PATIENT-LVL III: CPT | Mod: PBBFAC,,, | Performed by: OPTOMETRIST

## 2021-02-15 PROCEDURE — 92015 PR REFRACTION: ICD-10-PCS | Mod: S$GLB,,, | Performed by: OPTOMETRIST

## 2021-02-15 PROCEDURE — 92015 DETERMINE REFRACTIVE STATE: CPT | Mod: S$GLB,,, | Performed by: OPTOMETRIST

## 2021-02-15 PROCEDURE — 92014 PR EYE EXAM, EST PATIENT,COMPREHESV: ICD-10-PCS | Mod: S$GLB,,, | Performed by: OPTOMETRIST

## 2021-02-15 PROCEDURE — 99499 RISK ADDL DX/OHS AUDIT: ICD-10-PCS | Mod: S$GLB,,, | Performed by: OPTOMETRIST

## 2021-02-15 PROCEDURE — 99499 UNLISTED E&M SERVICE: CPT | Mod: S$GLB,,, | Performed by: OPTOMETRIST

## 2021-02-15 PROCEDURE — 99999 PR PBB SHADOW E&M-EST. PATIENT-LVL III: ICD-10-PCS | Mod: PBBFAC,,, | Performed by: OPTOMETRIST

## 2021-02-15 PROCEDURE — 92014 COMPRE OPH EXAM EST PT 1/>: CPT | Mod: S$GLB,,, | Performed by: OPTOMETRIST

## 2021-02-21 PROCEDURE — 99454 REM MNTR PHYSIOL PARAM 16-30: CPT | Mod: S$GLB,,, | Performed by: FAMILY MEDICINE

## 2021-02-21 PROCEDURE — 99454 PR REMOTE MNTR, PHYS PARAM, INITIAL, EA 30 DAYS: ICD-10-PCS | Mod: S$GLB,,, | Performed by: FAMILY MEDICINE

## 2021-03-19 PROCEDURE — 99454 REM MNTR PHYSIOL PARAM 16-30: CPT | Mod: S$GLB,,, | Performed by: FAMILY MEDICINE

## 2021-03-19 PROCEDURE — 99454 PR REMOTE MNTR, PHYS PARAM, INITIAL, EA 30 DAYS: ICD-10-PCS | Mod: S$GLB,,, | Performed by: FAMILY MEDICINE

## 2021-03-25 DIAGNOSIS — M19.90 ARTHRITIS: ICD-10-CM

## 2021-03-25 RX ORDER — DULOXETIN HYDROCHLORIDE 30 MG/1
30 CAPSULE, DELAYED RELEASE ORAL DAILY
Qty: 90 CAPSULE | Refills: 0 | Status: SHIPPED | OUTPATIENT
Start: 2021-03-25 | End: 2021-04-05 | Stop reason: SDUPTHER

## 2021-04-05 DIAGNOSIS — M19.90 ARTHRITIS: ICD-10-CM

## 2021-04-05 RX ORDER — DULOXETIN HYDROCHLORIDE 30 MG/1
30 CAPSULE, DELAYED RELEASE ORAL DAILY
Qty: 90 CAPSULE | Refills: 0 | Status: SHIPPED | OUTPATIENT
Start: 2021-04-05 | End: 2021-06-21

## 2021-04-22 PROCEDURE — 99454 PR REMOTE MNTR, PHYS PARAM, INITIAL, EA 30 DAYS: ICD-10-PCS | Mod: S$GLB,,, | Performed by: FAMILY MEDICINE

## 2021-04-22 PROCEDURE — 99454 REM MNTR PHYSIOL PARAM 16-30: CPT | Mod: S$GLB,,, | Performed by: FAMILY MEDICINE

## 2021-04-24 DIAGNOSIS — N40.1 BPH ASSOCIATED WITH NOCTURIA: ICD-10-CM

## 2021-04-24 DIAGNOSIS — R35.1 BPH ASSOCIATED WITH NOCTURIA: ICD-10-CM

## 2021-04-26 RX ORDER — FINASTERIDE 5 MG/1
5 TABLET, FILM COATED ORAL DAILY
Qty: 90 TABLET | Refills: 0 | Status: SHIPPED | OUTPATIENT
Start: 2021-04-26 | End: 2021-06-21

## 2021-05-18 PROCEDURE — 99454 PR REMOTE MNTR, PHYS PARAM, INITIAL, EA 30 DAYS: ICD-10-PCS | Mod: S$GLB,,, | Performed by: FAMILY MEDICINE

## 2021-05-18 PROCEDURE — 99454 REM MNTR PHYSIOL PARAM 16-30: CPT | Mod: S$GLB,,, | Performed by: FAMILY MEDICINE

## 2021-06-16 ENCOUNTER — PES CALL (OUTPATIENT)
Dept: ADMINISTRATIVE | Facility: CLINIC | Age: 71
End: 2021-06-16

## 2021-06-25 ENCOUNTER — LAB VISIT (OUTPATIENT)
Dept: LAB | Facility: HOSPITAL | Age: 71
End: 2021-06-25
Attending: FAMILY MEDICINE
Payer: MEDICARE

## 2021-06-25 DIAGNOSIS — R79.89 ELEVATED LIVER FUNCTION TESTS: ICD-10-CM

## 2021-06-25 DIAGNOSIS — I10 ESSENTIAL HYPERTENSION: ICD-10-CM

## 2021-06-25 DIAGNOSIS — E78.49 OTHER HYPERLIPIDEMIA: ICD-10-CM

## 2021-06-25 DIAGNOSIS — E03.8 OTHER SPECIFIED HYPOTHYROIDISM: ICD-10-CM

## 2021-06-25 DIAGNOSIS — E11.42 TYPE 2 DIABETES MELLITUS WITH DIABETIC POLYNEUROPATHY, WITHOUT LONG-TERM CURRENT USE OF INSULIN: ICD-10-CM

## 2021-06-25 DIAGNOSIS — E11.9 DIET-CONTROLLED DIABETES MELLITUS: ICD-10-CM

## 2021-06-25 LAB
ALBUMIN SERPL BCP-MCNC: 3.9 G/DL (ref 3.5–5.2)
ALP SERPL-CCNC: 43 U/L (ref 55–135)
ALT SERPL W/O P-5'-P-CCNC: 30 U/L (ref 10–44)
ANION GAP SERPL CALC-SCNC: 10 MMOL/L (ref 8–16)
AST SERPL-CCNC: 22 U/L (ref 10–40)
BASOPHILS # BLD AUTO: 0.07 K/UL (ref 0–0.2)
BASOPHILS NFR BLD: 1.2 % (ref 0–1.9)
BILIRUB SERPL-MCNC: 0.4 MG/DL (ref 0.1–1)
BUN SERPL-MCNC: 23 MG/DL (ref 8–23)
CALCIUM SERPL-MCNC: 9.1 MG/DL (ref 8.7–10.5)
CHLORIDE SERPL-SCNC: 104 MMOL/L (ref 95–110)
CHOLEST SERPL-MCNC: 161 MG/DL (ref 120–199)
CHOLEST/HDLC SERPL: 3.5 {RATIO} (ref 2–5)
CO2 SERPL-SCNC: 25 MMOL/L (ref 23–29)
CREAT SERPL-MCNC: 1 MG/DL (ref 0.5–1.4)
DIFFERENTIAL METHOD: ABNORMAL
EOSINOPHIL # BLD AUTO: 0.3 K/UL (ref 0–0.5)
EOSINOPHIL NFR BLD: 4.4 % (ref 0–8)
ERYTHROCYTE [DISTWIDTH] IN BLOOD BY AUTOMATED COUNT: 13.1 % (ref 11.5–14.5)
EST. GFR  (AFRICAN AMERICAN): >60 ML/MIN/1.73 M^2
EST. GFR  (NON AFRICAN AMERICAN): >60 ML/MIN/1.73 M^2
ESTIMATED AVG GLUCOSE: 120 MG/DL (ref 68–131)
GLUCOSE SERPL-MCNC: 107 MG/DL (ref 70–110)
HBA1C MFR BLD: 5.8 % (ref 4–5.6)
HCT VFR BLD AUTO: 41.8 % (ref 40–54)
HDLC SERPL-MCNC: 46 MG/DL (ref 40–75)
HDLC SERPL: 28.6 % (ref 20–50)
HGB BLD-MCNC: 13.9 G/DL (ref 14–18)
IMM GRANULOCYTES # BLD AUTO: 0.03 K/UL (ref 0–0.04)
IMM GRANULOCYTES NFR BLD AUTO: 0.5 % (ref 0–0.5)
LDLC SERPL CALC-MCNC: 95.6 MG/DL (ref 63–159)
LYMPHOCYTES # BLD AUTO: 1.7 K/UL (ref 1–4.8)
LYMPHOCYTES NFR BLD: 28.7 % (ref 18–48)
MCH RBC QN AUTO: 29.9 PG (ref 27–31)
MCHC RBC AUTO-ENTMCNC: 33.3 G/DL (ref 32–36)
MCV RBC AUTO: 90 FL (ref 82–98)
MONOCYTES # BLD AUTO: 0.6 K/UL (ref 0.3–1)
MONOCYTES NFR BLD: 9.4 % (ref 4–15)
NEUTROPHILS # BLD AUTO: 3.3 K/UL (ref 1.8–7.7)
NEUTROPHILS NFR BLD: 55.8 % (ref 38–73)
NONHDLC SERPL-MCNC: 115 MG/DL
NRBC BLD-RTO: 0 /100 WBC
PLATELET # BLD AUTO: 189 K/UL (ref 150–450)
PMV BLD AUTO: 11.1 FL (ref 9.2–12.9)
POTASSIUM SERPL-SCNC: 4 MMOL/L (ref 3.5–5.1)
PROT SERPL-MCNC: 7.2 G/DL (ref 6–8.4)
RBC # BLD AUTO: 4.65 M/UL (ref 4.6–6.2)
SODIUM SERPL-SCNC: 139 MMOL/L (ref 136–145)
T4 FREE SERPL-MCNC: 0.99 NG/DL (ref 0.71–1.51)
TRIGL SERPL-MCNC: 97 MG/DL (ref 30–150)
TSH SERPL DL<=0.005 MIU/L-ACNC: 3.25 UIU/ML (ref 0.4–4)
WBC # BLD AUTO: 5.85 K/UL (ref 3.9–12.7)

## 2021-06-25 PROCEDURE — 84439 ASSAY OF FREE THYROXINE: CPT | Performed by: FAMILY MEDICINE

## 2021-06-25 PROCEDURE — 80061 LIPID PANEL: CPT | Performed by: FAMILY MEDICINE

## 2021-06-25 PROCEDURE — 84443 ASSAY THYROID STIM HORMONE: CPT | Performed by: FAMILY MEDICINE

## 2021-06-25 PROCEDURE — 36415 COLL VENOUS BLD VENIPUNCTURE: CPT | Mod: PO | Performed by: FAMILY MEDICINE

## 2021-06-25 PROCEDURE — 83036 HEMOGLOBIN GLYCOSYLATED A1C: CPT | Performed by: FAMILY MEDICINE

## 2021-06-25 PROCEDURE — 80053 COMPREHEN METABOLIC PANEL: CPT | Performed by: FAMILY MEDICINE

## 2021-06-25 PROCEDURE — 85025 COMPLETE CBC W/AUTO DIFF WBC: CPT | Performed by: FAMILY MEDICINE

## 2021-06-28 ENCOUNTER — OFFICE VISIT (OUTPATIENT)
Dept: FAMILY MEDICINE | Facility: CLINIC | Age: 71
End: 2021-06-28
Payer: MEDICARE

## 2021-06-28 VITALS
HEART RATE: 75 BPM | OXYGEN SATURATION: 98 % | BODY MASS INDEX: 28.75 KG/M2 | DIASTOLIC BLOOD PRESSURE: 72 MMHG | SYSTOLIC BLOOD PRESSURE: 112 MMHG | WEIGHT: 183.19 LBS | HEIGHT: 67 IN

## 2021-06-28 DIAGNOSIS — Z00.00 MEDICARE ANNUAL WELLNESS VISIT, SUBSEQUENT: Primary | ICD-10-CM

## 2021-06-28 DIAGNOSIS — I10 ESSENTIAL HYPERTENSION: ICD-10-CM

## 2021-06-28 DIAGNOSIS — M19.90 ARTHRITIS: ICD-10-CM

## 2021-06-28 DIAGNOSIS — L98.9 SKIN LESION: ICD-10-CM

## 2021-06-28 DIAGNOSIS — Z12.11 COLON CANCER SCREENING: ICD-10-CM

## 2021-06-28 DIAGNOSIS — E78.49 OTHER HYPERLIPIDEMIA: ICD-10-CM

## 2021-06-28 DIAGNOSIS — D64.9 NORMOCYTIC ANEMIA: ICD-10-CM

## 2021-06-28 DIAGNOSIS — R35.1 BPH ASSOCIATED WITH NOCTURIA: ICD-10-CM

## 2021-06-28 DIAGNOSIS — E11.9 DIET-CONTROLLED DIABETES MELLITUS: ICD-10-CM

## 2021-06-28 DIAGNOSIS — E11.42 TYPE 2 DIABETES MELLITUS WITH DIABETIC POLYNEUROPATHY, WITHOUT LONG-TERM CURRENT USE OF INSULIN: ICD-10-CM

## 2021-06-28 DIAGNOSIS — N40.1 BPH ASSOCIATED WITH NOCTURIA: ICD-10-CM

## 2021-06-28 DIAGNOSIS — E78.5 HYPERLIPIDEMIA ASSOCIATED WITH TYPE 2 DIABETES MELLITUS: ICD-10-CM

## 2021-06-28 DIAGNOSIS — E03.8 OTHER SPECIFIED HYPOTHYROIDISM: ICD-10-CM

## 2021-06-28 DIAGNOSIS — E11.69 HYPERLIPIDEMIA ASSOCIATED WITH TYPE 2 DIABETES MELLITUS: ICD-10-CM

## 2021-06-28 DIAGNOSIS — Z97.4 DOES USE HEARING AID: ICD-10-CM

## 2021-06-28 PROBLEM — R00.2 HEART PALPITATIONS: Status: RESOLVED | Noted: 2017-12-27 | Resolved: 2021-06-28

## 2021-06-28 PROBLEM — R35.0 BENIGN PROSTATIC HYPERPLASIA WITH URINARY FREQUENCY: Status: RESOLVED | Noted: 2017-12-27 | Resolved: 2021-06-28

## 2021-06-28 PROCEDURE — G0439 PR MEDICARE ANNUAL WELLNESS SUBSEQUENT VISIT: ICD-10-PCS | Mod: S$GLB,,, | Performed by: FAMILY MEDICINE

## 2021-06-28 PROCEDURE — 3008F PR BODY MASS INDEX (BMI) DOCUMENTED: ICD-10-PCS | Mod: CPTII,S$GLB,, | Performed by: FAMILY MEDICINE

## 2021-06-28 PROCEDURE — 99999 PR PBB SHADOW E&M-EST. PATIENT-LVL IV: CPT | Mod: PBBFAC,,, | Performed by: FAMILY MEDICINE

## 2021-06-28 PROCEDURE — 3288F PR FALLS RISK ASSESSMENT DOCUMENTED: ICD-10-PCS | Mod: CPTII,S$GLB,, | Performed by: FAMILY MEDICINE

## 2021-06-28 PROCEDURE — 1101F PR PT FALLS ASSESS DOC 0-1 FALLS W/OUT INJ PAST YR: ICD-10-PCS | Mod: CPTII,S$GLB,, | Performed by: FAMILY MEDICINE

## 2021-06-28 PROCEDURE — 3072F PR LOW RISK FOR RETINOPATHY: ICD-10-PCS | Mod: S$GLB,,, | Performed by: FAMILY MEDICINE

## 2021-06-28 PROCEDURE — 99499 UNLISTED E&M SERVICE: CPT | Mod: S$GLB,,, | Performed by: FAMILY MEDICINE

## 2021-06-28 PROCEDURE — 99999 PR PBB SHADOW E&M-EST. PATIENT-LVL IV: ICD-10-PCS | Mod: PBBFAC,,, | Performed by: FAMILY MEDICINE

## 2021-06-28 PROCEDURE — 3288F FALL RISK ASSESSMENT DOCD: CPT | Mod: CPTII,S$GLB,, | Performed by: FAMILY MEDICINE

## 2021-06-28 PROCEDURE — 1126F PR PAIN SEVERITY QUANTIFIED, NO PAIN PRESENT: ICD-10-PCS | Mod: S$GLB,,, | Performed by: FAMILY MEDICINE

## 2021-06-28 PROCEDURE — 3008F BODY MASS INDEX DOCD: CPT | Mod: CPTII,S$GLB,, | Performed by: FAMILY MEDICINE

## 2021-06-28 PROCEDURE — 1126F AMNT PAIN NOTED NONE PRSNT: CPT | Mod: S$GLB,,, | Performed by: FAMILY MEDICINE

## 2021-06-28 PROCEDURE — 1101F PT FALLS ASSESS-DOCD LE1/YR: CPT | Mod: CPTII,S$GLB,, | Performed by: FAMILY MEDICINE

## 2021-06-28 PROCEDURE — 3072F LOW RISK FOR RETINOPATHY: CPT | Mod: S$GLB,,, | Performed by: FAMILY MEDICINE

## 2021-06-28 PROCEDURE — G0439 PPPS, SUBSEQ VISIT: HCPCS | Mod: S$GLB,,, | Performed by: FAMILY MEDICINE

## 2021-06-28 PROCEDURE — 99499 RISK ADDL DX/OHS AUDIT: ICD-10-PCS | Mod: S$GLB,,, | Performed by: FAMILY MEDICINE

## 2021-06-28 RX ORDER — LOSARTAN POTASSIUM 100 MG/1
100 TABLET ORAL DAILY
Qty: 90 TABLET | Refills: 1 | Status: SHIPPED | OUTPATIENT
Start: 2021-06-28 | End: 2021-09-14

## 2021-06-28 RX ORDER — TAMSULOSIN HYDROCHLORIDE 0.4 MG/1
1 CAPSULE ORAL DAILY
Qty: 90 CAPSULE | Refills: 1 | Status: SHIPPED | OUTPATIENT
Start: 2021-06-28 | End: 2021-11-22

## 2021-06-28 RX ORDER — PRAVASTATIN SODIUM 40 MG/1
40 TABLET ORAL NIGHTLY
Qty: 90 TABLET | Refills: 1 | Status: SHIPPED | OUTPATIENT
Start: 2021-06-28 | End: 2021-06-28 | Stop reason: SDUPTHER

## 2021-06-28 RX ORDER — FINASTERIDE 5 MG/1
5 TABLET, FILM COATED ORAL DAILY
Qty: 90 TABLET | Refills: 0 | Status: SHIPPED | OUTPATIENT
Start: 2021-06-28 | End: 2021-09-12

## 2021-06-28 RX ORDER — PRAVASTATIN SODIUM 40 MG/1
40 TABLET ORAL NIGHTLY
Qty: 90 TABLET | Refills: 1 | Status: SHIPPED | OUTPATIENT
Start: 2021-06-28 | End: 2021-09-14

## 2021-06-28 RX ORDER — LEVOTHYROXINE SODIUM 88 UG/1
88 TABLET ORAL
Qty: 90 TABLET | Refills: 1 | Status: SHIPPED | OUTPATIENT
Start: 2021-06-28 | End: 2021-09-14

## 2021-06-28 RX ORDER — DULOXETIN HYDROCHLORIDE 30 MG/1
30 CAPSULE, DELAYED RELEASE ORAL DAILY
Qty: 90 CAPSULE | Refills: 1 | Status: SHIPPED | OUTPATIENT
Start: 2021-06-28 | End: 2021-12-28 | Stop reason: SDUPTHER

## 2021-06-28 RX ORDER — MELOXICAM 7.5 MG/1
7.5 TABLET ORAL DAILY PRN
Qty: 90 TABLET | Refills: 0 | Status: SHIPPED | OUTPATIENT
Start: 2021-06-28 | End: 2022-12-29

## 2021-07-08 ENCOUNTER — LAB VISIT (OUTPATIENT)
Dept: LAB | Facility: HOSPITAL | Age: 71
End: 2021-07-08
Attending: FAMILY MEDICINE
Payer: MEDICARE

## 2021-07-08 DIAGNOSIS — Z12.11 COLON CANCER SCREENING: ICD-10-CM

## 2021-07-08 PROCEDURE — 82274 ASSAY TEST FOR BLOOD FECAL: CPT | Performed by: FAMILY MEDICINE

## 2021-07-16 LAB — HEMOCCULT STL QL IA: NEGATIVE

## 2021-07-23 PROCEDURE — 99454 PR REMOTE MNTR, PHYS PARAM, INITIAL, EA 30 DAYS: ICD-10-PCS | Mod: S$GLB,,, | Performed by: FAMILY MEDICINE

## 2021-07-23 PROCEDURE — 99454 REM MNTR PHYSIOL PARAM 16-30: CPT | Mod: S$GLB,,, | Performed by: FAMILY MEDICINE

## 2021-08-28 PROCEDURE — 99454 REM MNTR PHYSIOL PARAM 16-30: CPT | Mod: S$GLB,,, | Performed by: FAMILY MEDICINE

## 2021-08-28 PROCEDURE — 99454 PR REMOTE MNTR, PHYS PARAM, INITIAL, EA 30 DAYS: ICD-10-PCS | Mod: S$GLB,,, | Performed by: FAMILY MEDICINE

## 2021-09-11 DIAGNOSIS — R35.1 BPH ASSOCIATED WITH NOCTURIA: ICD-10-CM

## 2021-09-11 DIAGNOSIS — N40.1 BPH ASSOCIATED WITH NOCTURIA: ICD-10-CM

## 2021-09-12 RX ORDER — FINASTERIDE 5 MG/1
TABLET, FILM COATED ORAL
Qty: 90 TABLET | Refills: 0 | Status: SHIPPED | OUTPATIENT
Start: 2021-09-12 | End: 2021-09-30

## 2021-09-13 ENCOUNTER — PATIENT MESSAGE (OUTPATIENT)
Dept: DERMATOLOGY | Facility: CLINIC | Age: 71
End: 2021-09-13

## 2021-09-20 ENCOUNTER — PATIENT MESSAGE (OUTPATIENT)
Dept: DERMATOLOGY | Facility: CLINIC | Age: 71
End: 2021-09-20

## 2021-11-23 ENCOUNTER — OFFICE VISIT (OUTPATIENT)
Dept: DERMATOLOGY | Facility: CLINIC | Age: 71
End: 2021-11-23
Payer: MEDICARE

## 2021-11-23 DIAGNOSIS — D18.01 ANGIOMA OF SKIN: ICD-10-CM

## 2021-11-23 DIAGNOSIS — L81.4 LENTIGO: ICD-10-CM

## 2021-11-23 DIAGNOSIS — L82.1 SK (SEBORRHEIC KERATOSIS): Primary | ICD-10-CM

## 2021-11-23 DIAGNOSIS — Z12.83 SCREENING EXAM FOR SKIN CANCER: ICD-10-CM

## 2021-11-23 DIAGNOSIS — L91.8 SKIN TAG: ICD-10-CM

## 2021-11-23 DIAGNOSIS — L73.8 SEBACEOUS GLAND HYPERPLASIA: ICD-10-CM

## 2021-11-23 PROCEDURE — 3072F LOW RISK FOR RETINOPATHY: CPT | Mod: HCNC,CPTII,S$GLB, | Performed by: DERMATOLOGY

## 2021-11-23 PROCEDURE — 3072F PR LOW RISK FOR RETINOPATHY: ICD-10-PCS | Mod: HCNC,CPTII,S$GLB, | Performed by: DERMATOLOGY

## 2021-11-23 PROCEDURE — 3061F NEG MICROALBUMINURIA REV: CPT | Mod: HCNC,CPTII,S$GLB, | Performed by: DERMATOLOGY

## 2021-11-23 PROCEDURE — 99203 PR OFFICE/OUTPT VISIT, NEW, LEVL III, 30-44 MIN: ICD-10-PCS | Mod: HCNC,S$GLB,, | Performed by: DERMATOLOGY

## 2021-11-23 PROCEDURE — 4010F PR ACE/ARB THEARPY RXD/TAKEN: ICD-10-PCS | Mod: HCNC,CPTII,S$GLB, | Performed by: DERMATOLOGY

## 2021-11-23 PROCEDURE — 99203 OFFICE O/P NEW LOW 30 MIN: CPT | Mod: HCNC,S$GLB,, | Performed by: DERMATOLOGY

## 2021-11-23 PROCEDURE — 3061F PR NEG MICROALBUMINURIA RESULT DOCUMENTED/REVIEW: ICD-10-PCS | Mod: HCNC,CPTII,S$GLB, | Performed by: DERMATOLOGY

## 2021-11-23 PROCEDURE — 99999 PR PBB SHADOW E&M-EST. PATIENT-LVL III: CPT | Mod: PBBFAC,HCNC,, | Performed by: DERMATOLOGY

## 2021-11-23 PROCEDURE — 99999 PR PBB SHADOW E&M-EST. PATIENT-LVL III: ICD-10-PCS | Mod: PBBFAC,HCNC,, | Performed by: DERMATOLOGY

## 2021-11-23 PROCEDURE — 3066F PR DOCUMENTATION OF TREATMENT FOR NEPHROPATHY: ICD-10-PCS | Mod: HCNC,CPTII,S$GLB, | Performed by: DERMATOLOGY

## 2021-11-23 PROCEDURE — 3066F NEPHROPATHY DOC TX: CPT | Mod: HCNC,CPTII,S$GLB, | Performed by: DERMATOLOGY

## 2021-11-23 PROCEDURE — 4010F ACE/ARB THERAPY RXD/TAKEN: CPT | Mod: HCNC,CPTII,S$GLB, | Performed by: DERMATOLOGY

## 2021-12-21 ENCOUNTER — LAB VISIT (OUTPATIENT)
Dept: LAB | Facility: HOSPITAL | Age: 71
End: 2021-12-21
Attending: FAMILY MEDICINE
Payer: MEDICARE

## 2021-12-21 DIAGNOSIS — E11.42 TYPE 2 DIABETES MELLITUS WITH DIABETIC POLYNEUROPATHY, WITHOUT LONG-TERM CURRENT USE OF INSULIN: ICD-10-CM

## 2021-12-21 DIAGNOSIS — D64.9 NORMOCYTIC ANEMIA: ICD-10-CM

## 2021-12-21 DIAGNOSIS — E11.9 DIET-CONTROLLED DIABETES MELLITUS: ICD-10-CM

## 2021-12-21 LAB
ALBUMIN SERPL BCP-MCNC: 4.1 G/DL (ref 3.5–5.2)
ALP SERPL-CCNC: 53 U/L (ref 55–135)
ALT SERPL W/O P-5'-P-CCNC: 34 U/L (ref 10–44)
ANION GAP SERPL CALC-SCNC: 9 MMOL/L (ref 8–16)
AST SERPL-CCNC: 20 U/L (ref 10–40)
BASOPHILS # BLD AUTO: 0.06 K/UL (ref 0–0.2)
BASOPHILS NFR BLD: 0.7 % (ref 0–1.9)
BILIRUB SERPL-MCNC: 0.7 MG/DL (ref 0.1–1)
BUN SERPL-MCNC: 26 MG/DL (ref 8–23)
CALCIUM SERPL-MCNC: 10 MG/DL (ref 8.7–10.5)
CHLORIDE SERPL-SCNC: 101 MMOL/L (ref 95–110)
CO2 SERPL-SCNC: 28 MMOL/L (ref 23–29)
CREAT SERPL-MCNC: 1 MG/DL (ref 0.5–1.4)
DIFFERENTIAL METHOD: ABNORMAL
EOSINOPHIL # BLD AUTO: 0.2 K/UL (ref 0–0.5)
EOSINOPHIL NFR BLD: 2.3 % (ref 0–8)
ERYTHROCYTE [DISTWIDTH] IN BLOOD BY AUTOMATED COUNT: 12.6 % (ref 11.5–14.5)
EST. GFR  (AFRICAN AMERICAN): >60 ML/MIN/1.73 M^2
EST. GFR  (NON AFRICAN AMERICAN): >60 ML/MIN/1.73 M^2
ESTIMATED AVG GLUCOSE: 114 MG/DL (ref 68–131)
GLUCOSE SERPL-MCNC: 119 MG/DL (ref 70–110)
HBA1C MFR BLD: 5.6 % (ref 4–5.6)
HCT VFR BLD AUTO: 46 % (ref 40–54)
HGB BLD-MCNC: 15.6 G/DL (ref 14–18)
IMM GRANULOCYTES # BLD AUTO: 0.07 K/UL (ref 0–0.04)
IMM GRANULOCYTES NFR BLD AUTO: 0.8 % (ref 0–0.5)
LYMPHOCYTES # BLD AUTO: 1.7 K/UL (ref 1–4.8)
LYMPHOCYTES NFR BLD: 20.8 % (ref 18–48)
MCH RBC QN AUTO: 30.4 PG (ref 27–31)
MCHC RBC AUTO-ENTMCNC: 33.9 G/DL (ref 32–36)
MCV RBC AUTO: 90 FL (ref 82–98)
MONOCYTES # BLD AUTO: 0.7 K/UL (ref 0.3–1)
MONOCYTES NFR BLD: 8.6 % (ref 4–15)
NEUTROPHILS # BLD AUTO: 5.6 K/UL (ref 1.8–7.7)
NEUTROPHILS NFR BLD: 66.8 % (ref 38–73)
NRBC BLD-RTO: 0 /100 WBC
PLATELET # BLD AUTO: 221 K/UL (ref 150–450)
PMV BLD AUTO: 10.7 FL (ref 9.2–12.9)
POTASSIUM SERPL-SCNC: 4.7 MMOL/L (ref 3.5–5.1)
PROT SERPL-MCNC: 7.5 G/DL (ref 6–8.4)
RBC # BLD AUTO: 5.14 M/UL (ref 4.6–6.2)
SODIUM SERPL-SCNC: 138 MMOL/L (ref 136–145)
WBC # BLD AUTO: 8.35 K/UL (ref 3.9–12.7)

## 2021-12-21 PROCEDURE — 83036 HEMOGLOBIN GLYCOSYLATED A1C: CPT | Mod: HCNC | Performed by: FAMILY MEDICINE

## 2021-12-21 PROCEDURE — 85025 COMPLETE CBC W/AUTO DIFF WBC: CPT | Mod: HCNC | Performed by: FAMILY MEDICINE

## 2021-12-21 PROCEDURE — 80053 COMPREHEN METABOLIC PANEL: CPT | Mod: HCNC | Performed by: FAMILY MEDICINE

## 2021-12-21 PROCEDURE — 36415 COLL VENOUS BLD VENIPUNCTURE: CPT | Mod: HCNC,PO | Performed by: FAMILY MEDICINE

## 2021-12-28 ENCOUNTER — OFFICE VISIT (OUTPATIENT)
Dept: FAMILY MEDICINE | Facility: CLINIC | Age: 71
End: 2021-12-28
Payer: MEDICARE

## 2021-12-28 ENCOUNTER — HOSPITAL ENCOUNTER (OUTPATIENT)
Dept: RADIOLOGY | Facility: HOSPITAL | Age: 71
Discharge: HOME | End: 2021-12-28
Attending: FAMILY MEDICINE
Payer: MEDICARE

## 2021-12-28 VITALS
WEIGHT: 190.25 LBS | HEIGHT: 67 IN | SYSTOLIC BLOOD PRESSURE: 118 MMHG | HEART RATE: 98 BPM | DIASTOLIC BLOOD PRESSURE: 64 MMHG | BODY MASS INDEX: 29.86 KG/M2 | OXYGEN SATURATION: 96 %

## 2021-12-28 DIAGNOSIS — Z23 NEED FOR INFLUENZA VACCINATION: Primary | ICD-10-CM

## 2021-12-28 DIAGNOSIS — I10 ESSENTIAL HYPERTENSION: ICD-10-CM

## 2021-12-28 DIAGNOSIS — M54.9 DORSALGIA, UNSPECIFIED: ICD-10-CM

## 2021-12-28 DIAGNOSIS — E78.5 HYPERLIPIDEMIA ASSOCIATED WITH TYPE 2 DIABETES MELLITUS: ICD-10-CM

## 2021-12-28 DIAGNOSIS — E11.9 DIET-CONTROLLED DIABETES MELLITUS: ICD-10-CM

## 2021-12-28 DIAGNOSIS — N40.1 BPH ASSOCIATED WITH NOCTURIA: ICD-10-CM

## 2021-12-28 DIAGNOSIS — M19.90 ARTHRITIS: ICD-10-CM

## 2021-12-28 DIAGNOSIS — E11.69 HYPERLIPIDEMIA ASSOCIATED WITH TYPE 2 DIABETES MELLITUS: ICD-10-CM

## 2021-12-28 DIAGNOSIS — E78.49 OTHER HYPERLIPIDEMIA: ICD-10-CM

## 2021-12-28 DIAGNOSIS — R35.1 BPH ASSOCIATED WITH NOCTURIA: ICD-10-CM

## 2021-12-28 DIAGNOSIS — E03.8 OTHER SPECIFIED HYPOTHYROIDISM: ICD-10-CM

## 2021-12-28 DIAGNOSIS — G47.00 INSOMNIA, UNSPECIFIED TYPE: ICD-10-CM

## 2021-12-28 PROCEDURE — 3288F PR FALLS RISK ASSESSMENT DOCUMENTED: ICD-10-PCS | Mod: CPTII,S$GLB,, | Performed by: FAMILY MEDICINE

## 2021-12-28 PROCEDURE — 99214 OFFICE O/P EST MOD 30 MIN: CPT | Mod: 25,S$GLB,, | Performed by: FAMILY MEDICINE

## 2021-12-28 PROCEDURE — 99999 PR PBB SHADOW E&M-EST. PATIENT-LVL IV: ICD-10-PCS | Mod: PBBFAC,,, | Performed by: FAMILY MEDICINE

## 2021-12-28 PROCEDURE — 72110 XR LUMBAR SPINE COMPLETE 5 VIEW: ICD-10-PCS | Mod: 26,HCNC,, | Performed by: RADIOLOGY

## 2021-12-28 PROCEDURE — 1101F PT FALLS ASSESS-DOCD LE1/YR: CPT | Mod: CPTII,S$GLB,, | Performed by: FAMILY MEDICINE

## 2021-12-28 PROCEDURE — G0008 ADMIN INFLUENZA VIRUS VAC: HCPCS | Mod: S$GLB,,, | Performed by: FAMILY MEDICINE

## 2021-12-28 PROCEDURE — 1101F PR PT FALLS ASSESS DOC 0-1 FALLS W/OUT INJ PAST YR: ICD-10-PCS | Mod: CPTII,S$GLB,, | Performed by: FAMILY MEDICINE

## 2021-12-28 PROCEDURE — 1160F PR REVIEW ALL MEDS BY PRESCRIBER/CLIN PHARMACIST DOCUMENTED: ICD-10-PCS | Mod: CPTII,S$GLB,, | Performed by: FAMILY MEDICINE

## 2021-12-28 PROCEDURE — 3072F LOW RISK FOR RETINOPATHY: CPT | Mod: CPTII,S$GLB,, | Performed by: FAMILY MEDICINE

## 2021-12-28 PROCEDURE — 1159F MED LIST DOCD IN RCRD: CPT | Mod: CPTII,S$GLB,, | Performed by: FAMILY MEDICINE

## 2021-12-28 PROCEDURE — 72110 X-RAY EXAM L-2 SPINE 4/>VWS: CPT | Mod: TC,HCNC,FY,PO

## 2021-12-28 PROCEDURE — 90694 VACC AIIV4 NO PRSRV 0.5ML IM: CPT | Mod: S$GLB,,, | Performed by: FAMILY MEDICINE

## 2021-12-28 PROCEDURE — 3044F HG A1C LEVEL LT 7.0%: CPT | Mod: CPTII,S$GLB,, | Performed by: FAMILY MEDICINE

## 2021-12-28 PROCEDURE — 99214 PR OFFICE/OUTPT VISIT, EST, LEVL IV, 30-39 MIN: ICD-10-PCS | Mod: 25,S$GLB,, | Performed by: FAMILY MEDICINE

## 2021-12-28 PROCEDURE — 3061F PR NEG MICROALBUMINURIA RESULT DOCUMENTED/REVIEW: ICD-10-PCS | Mod: CPTII,S$GLB,, | Performed by: FAMILY MEDICINE

## 2021-12-28 PROCEDURE — 3066F NEPHROPATHY DOC TX: CPT | Mod: CPTII,S$GLB,, | Performed by: FAMILY MEDICINE

## 2021-12-28 PROCEDURE — 1159F PR MEDICATION LIST DOCUMENTED IN MEDICAL RECORD: ICD-10-PCS | Mod: CPTII,S$GLB,, | Performed by: FAMILY MEDICINE

## 2021-12-28 PROCEDURE — 3074F PR MOST RECENT SYSTOLIC BLOOD PRESSURE < 130 MM HG: ICD-10-PCS | Mod: CPTII,S$GLB,, | Performed by: FAMILY MEDICINE

## 2021-12-28 PROCEDURE — 3061F NEG MICROALBUMINURIA REV: CPT | Mod: CPTII,S$GLB,, | Performed by: FAMILY MEDICINE

## 2021-12-28 PROCEDURE — 3072F PR LOW RISK FOR RETINOPATHY: ICD-10-PCS | Mod: CPTII,S$GLB,, | Performed by: FAMILY MEDICINE

## 2021-12-28 PROCEDURE — 99999 PR PBB SHADOW E&M-EST. PATIENT-LVL IV: CPT | Mod: PBBFAC,,, | Performed by: FAMILY MEDICINE

## 2021-12-28 PROCEDURE — 3044F PR MOST RECENT HEMOGLOBIN A1C LEVEL <7.0%: ICD-10-PCS | Mod: CPTII,S$GLB,, | Performed by: FAMILY MEDICINE

## 2021-12-28 PROCEDURE — 1160F RVW MEDS BY RX/DR IN RCRD: CPT | Mod: CPTII,S$GLB,, | Performed by: FAMILY MEDICINE

## 2021-12-28 PROCEDURE — 1125F AMNT PAIN NOTED PAIN PRSNT: CPT | Mod: CPTII,S$GLB,, | Performed by: FAMILY MEDICINE

## 2021-12-28 PROCEDURE — 3074F SYST BP LT 130 MM HG: CPT | Mod: CPTII,S$GLB,, | Performed by: FAMILY MEDICINE

## 2021-12-28 PROCEDURE — 3008F BODY MASS INDEX DOCD: CPT | Mod: CPTII,S$GLB,, | Performed by: FAMILY MEDICINE

## 2021-12-28 PROCEDURE — 3288F FALL RISK ASSESSMENT DOCD: CPT | Mod: CPTII,S$GLB,, | Performed by: FAMILY MEDICINE

## 2021-12-28 PROCEDURE — 72110 X-RAY EXAM L-2 SPINE 4/>VWS: CPT | Mod: 26,HCNC,, | Performed by: RADIOLOGY

## 2021-12-28 PROCEDURE — 3008F PR BODY MASS INDEX (BMI) DOCUMENTED: ICD-10-PCS | Mod: CPTII,S$GLB,, | Performed by: FAMILY MEDICINE

## 2021-12-28 PROCEDURE — 4010F PR ACE/ARB THEARPY RXD/TAKEN: ICD-10-PCS | Mod: CPTII,S$GLB,, | Performed by: FAMILY MEDICINE

## 2021-12-28 PROCEDURE — 4010F ACE/ARB THERAPY RXD/TAKEN: CPT | Mod: CPTII,S$GLB,, | Performed by: FAMILY MEDICINE

## 2021-12-28 PROCEDURE — G0008 FLU VACCINE - QUADRIVALENT - ADJUVANTED: ICD-10-PCS | Mod: S$GLB,,, | Performed by: FAMILY MEDICINE

## 2021-12-28 PROCEDURE — 90694 FLU VACCINE - QUADRIVALENT - ADJUVANTED: ICD-10-PCS | Mod: S$GLB,,, | Performed by: FAMILY MEDICINE

## 2021-12-28 PROCEDURE — 3078F DIAST BP <80 MM HG: CPT | Mod: CPTII,S$GLB,, | Performed by: FAMILY MEDICINE

## 2021-12-28 PROCEDURE — 3066F PR DOCUMENTATION OF TREATMENT FOR NEPHROPATHY: ICD-10-PCS | Mod: CPTII,S$GLB,, | Performed by: FAMILY MEDICINE

## 2021-12-28 PROCEDURE — 1125F PR PAIN SEVERITY QUANTIFIED, PAIN PRESENT: ICD-10-PCS | Mod: CPTII,S$GLB,, | Performed by: FAMILY MEDICINE

## 2021-12-28 PROCEDURE — 3078F PR MOST RECENT DIASTOLIC BLOOD PRESSURE < 80 MM HG: ICD-10-PCS | Mod: CPTII,S$GLB,, | Performed by: FAMILY MEDICINE

## 2021-12-28 RX ORDER — DICLOFENAC SODIUM 10 MG/G
2 GEL TOPICAL DAILY
Qty: 100 G | Refills: 3 | Status: SHIPPED | OUTPATIENT
Start: 2021-12-28 | End: 2022-12-29

## 2021-12-28 RX ORDER — LOSARTAN POTASSIUM 100 MG/1
100 TABLET ORAL DAILY
Qty: 90 TABLET | Refills: 1 | Status: SHIPPED | OUTPATIENT
Start: 2021-12-28 | End: 2021-12-28 | Stop reason: SDUPTHER

## 2021-12-28 RX ORDER — PRAVASTATIN SODIUM 40 MG/1
40 TABLET ORAL NIGHTLY
Qty: 90 TABLET | Refills: 1 | Status: SHIPPED | OUTPATIENT
Start: 2021-12-28 | End: 2022-06-24

## 2021-12-28 RX ORDER — LEVOTHYROXINE SODIUM 88 UG/1
88 TABLET ORAL
Qty: 90 TABLET | Refills: 1 | Status: SHIPPED | OUTPATIENT
Start: 2021-12-28 | End: 2021-12-28 | Stop reason: SDUPTHER

## 2021-12-28 RX ORDER — FINASTERIDE 5 MG/1
5 TABLET, FILM COATED ORAL DAILY
Qty: 90 TABLET | Refills: 0 | Status: SHIPPED | OUTPATIENT
Start: 2021-12-28 | End: 2022-04-15

## 2021-12-28 RX ORDER — FINASTERIDE 5 MG/1
5 TABLET, FILM COATED ORAL DAILY
Qty: 90 TABLET | Refills: 0 | Status: SHIPPED | OUTPATIENT
Start: 2021-12-28 | End: 2021-12-28 | Stop reason: SDUPTHER

## 2021-12-28 RX ORDER — TAMSULOSIN HYDROCHLORIDE 0.4 MG/1
1 CAPSULE ORAL DAILY
Qty: 90 CAPSULE | Refills: 1 | Status: SHIPPED | OUTPATIENT
Start: 2021-12-28 | End: 2021-12-28 | Stop reason: SDUPTHER

## 2021-12-28 RX ORDER — LOSARTAN POTASSIUM 100 MG/1
100 TABLET ORAL DAILY
Qty: 90 TABLET | Refills: 1 | Status: SHIPPED | OUTPATIENT
Start: 2021-12-28 | End: 2022-06-24

## 2021-12-28 RX ORDER — DULOXETIN HYDROCHLORIDE 30 MG/1
30 CAPSULE, DELAYED RELEASE ORAL DAILY
Qty: 90 CAPSULE | Refills: 1 | Status: SHIPPED | OUTPATIENT
Start: 2021-12-28 | End: 2022-06-24

## 2021-12-28 RX ORDER — TRAZODONE HYDROCHLORIDE 50 MG/1
50 TABLET ORAL NIGHTLY PRN
Qty: 90 TABLET | Refills: 1 | Status: SHIPPED | OUTPATIENT
Start: 2021-12-28 | End: 2022-06-29 | Stop reason: SDUPTHER

## 2021-12-28 RX ORDER — TAMSULOSIN HYDROCHLORIDE 0.4 MG/1
1 CAPSULE ORAL DAILY
Qty: 90 CAPSULE | Refills: 1 | Status: SHIPPED | OUTPATIENT
Start: 2021-12-28 | End: 2022-06-27

## 2021-12-28 RX ORDER — DULOXETIN HYDROCHLORIDE 30 MG/1
30 CAPSULE, DELAYED RELEASE ORAL DAILY
Qty: 90 CAPSULE | Refills: 1 | Status: SHIPPED | OUTPATIENT
Start: 2021-12-28 | End: 2021-12-28 | Stop reason: SDUPTHER

## 2021-12-28 RX ORDER — LEVOTHYROXINE SODIUM 88 UG/1
88 TABLET ORAL
Qty: 90 TABLET | Refills: 1 | Status: SHIPPED | OUTPATIENT
Start: 2021-12-28 | End: 2022-06-24

## 2021-12-28 RX ORDER — PRAVASTATIN SODIUM 40 MG/1
40 TABLET ORAL NIGHTLY
Qty: 90 TABLET | Refills: 1 | Status: SHIPPED | OUTPATIENT
Start: 2021-12-28 | End: 2021-12-28 | Stop reason: SDUPTHER

## 2021-12-28 RX ORDER — TIZANIDINE 4 MG/1
4 TABLET ORAL NIGHTLY PRN
Qty: 30 TABLET | Refills: 0 | Status: SHIPPED | OUTPATIENT
Start: 2021-12-28 | End: 2022-01-07

## 2022-02-11 NOTE — PROGRESS NOTES
Cesar Simpson  was seen as a follow up.    CHIEF COMPLAINT:    Chief Complaint   Patient presents with    Follow-up       HISTORY OF PRESENT ILLNESS: Cesar Simpson is a 68 y.o. male is here for sleep evaluation.   Patient was referred for in lab sleep study but was denied.  Patient with loud snoring.  No witnessed sleep apnea.  +fatigue upon awake daily.  No parasomnia.  No cataplexy.  Over past 2 years, patient had experience 2-3 episodes where he woken up from sleep with choking sensation.      Patient has difficulty with sleep initiation since 2000.  No known trigger.  Sleep difficulties worsen since skilled nursing.    +nocturna cramp in calves area.    [place] Sleepiness Scale score during initial sleep evaluation was 11.  Patient underwent hst on 7/1/15 with ahi of 26.  Patient was started on apap (8-15).  Feel more rested upon awake.  During last visit, stimulus control and sleep restriction d/w patient to help with sleep initation and maintenance.  Patient stated that ambien with sleep initiation but still wake up 4.5 hour later.  Using apap everynight without issue.        12/19/2018: He had a break using his machine - back to usage of APAP 8-15  90% 9 cm  Failed Ambien in the past.  6hr  AHI 0.6  Leak 1-5%  Lost 15 lbs - taken of some HTN and DM meds.  Sees no diff ence in insomnia.   Followed sleep hygiene recommendations given by Dr. Smita oliveros: going to bed when sleepy, avoiding TV in the bedroom. 1 cup of coffee in AM.    Used to work night shifts in the past.    Once in bed feels awake    The patient reports improved sleep continuity and daytime sleepiness on PAP. ESS today is 14/24.  Denies break through snoring. No dry mouth.     Still drowsy driving.    EPWORTH SLEEPINESS SCALE 12/19/2018   Sitting and reading 3   Watching TV 3   Sitting, inactive in a public place (e.g. a theatre or a meeting) 3   As a passenger in a car for an hour without a break 2   Lying down to rest in the afternoon when circumstances  permit 2   Sitting and talking to someone 0   Sitting quietly after a lunch without alcohol 0   In a car, while stopped for a few minutes in traffic 1   Total score 14       PHQ9 12/19/2018   Little interest or pleasure in doing things: More than half the days   Feeling down, depressed or hopeless: More than half the days   Trouble falling asleep, staying asleep, or sleeping too much: Nearly every day   Feeling tired or having little energy: More than half the days   Poor appetite or overeating: Not at all   Feeling bad about yourself- or that you are a failure or have let yourself or family down Several days   Trouble concentrating on things, such as reading the newspaper or watching television: Not at all   Moving or speaking so slowly that other people could have noticed. Or the opposite- being so fidgety or restless that you have been moving around a lot more than usual: Not at all   Thoughts that you would be better off dead or hurting yourself in some way: Not at all   If you indicated you have experienced any of the aforementioned problems, how difficult have these problems made it for you to do your work, take care of things at home or get along with other people? Somewhat difficult   Total Score 10     GAD7 12/19/2018   Feeling nervous, anxious, on edge Several days   Not being able to stop or control worrying Nearly everyday   Worrying too much about different things Nearly everyday   Trouble relaxing More than half the days   Being so restless that its hard to sit still More than half the days   Becoming easily annoyed or irritable Not at all   Feeling afraid as if something awful might happen Several days   If you marked you are experiencing any of the aforementioned problems, how difficult have these made it for you to do your work, take care of things at home, or get along with other people? Somewhat difficult   ANTONIA-7 Score 12             Still difficulty with sleep initiation and early morning  awakenings.    Worries  BT: 10:30-12  WT: 5:30-6 AM      SLEEP ROUTINE:  Activity the hour prior to sleep: watch tv    Bed partner:  alone  Time to bed:  12 am   Lights off:  yes  Sleep onset latency:  15 minutes; 30 minutes without ambien  Disruptions or awakenings:    1 (difficulty going back to sleep); usually 4.5 hours later   Wakeup time:      5:30 am  Perceived sleep quality:  rested      Daytime naps:      none  Weekend sleep routine:      same  Caffeine use: 1-2 cups of coffee in am  exercise habit:   3 miles walking per day        SLEEP STUDIES:    HST 7.26.18: Significant Obstructive sleep apnea (FABY) with AHI (apnea hypopnea Index) of 6:36 and SaO2 of 80 (weight  210).    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    Hyperlipidemia associated with type 2 diabetes mellitus 03/24/2017    Essential hypertension 03/24/2017    Benign prostatic hyperplasia with urinary frequency 12/27/2017    Heart palpitations 12/27/2017    Hypertension associated with diabetes 03/27/2018    Overweight (BMI 25.0-29.9) 03/27/2018     Resolved Ambulatory Problems     Diagnosis Date Noted    Diabetes mellitus type II, uncontrolled 02/27/2013    Vitreous hemorrhage 07/07/2014     Past Medical History:   Diagnosis Date    Diabetes mellitus type II, uncontrolled 2/27/2013    High-density lipoprotein deficiency 2/27/2013    HTN (hypertension) 2/27/2013    Hyperlipidemia     Hypothyroidism     Obesity     Osteoarthritis 2/8/2016     Screening for prostate cancer 2/27/2013    Trouble in sleeping     Type 2 diabetes mellitus     Type 2 diabetes mellitus with diabetic polyneuropathy, without long-term current use of insulin                 PAST SURGICAL HISTORY:    Past Surgical History:   Procedure Laterality Date    CATARACT EXTRACTION      EYE SURGERY      cataracts    JOINT REPLACEMENT Left     arthroplasty    SHOULDER SURGERY      TONSILLECTOMY      TOTAL SHOULDER ARTHROPLASTY Left          FAMILY HISTORY:                Family History   Problem Relation Age of Onset    Osteoporosis Mother     Early death Father     Stroke Father     Heart disease Father     Heart disease Brother     Early death Brother     No Known Problems Daughter     No Known Problems Son        SOCIAL HISTORY:          Tobacco:   Social History     Tobacco Use   Smoking Status Never Smoker   Smokeless Tobacco Never Used       alcohol use:    Social History     Substance and Sexual Activity   Alcohol Use No                 Occupation: former [employer] worker as a     ALLERGIES:  Review of patient's allergies indicates:  No Known Allergies    CURRENT MEDICATIONS:    Current Outpatient Medications   Medication Sig Dispense Refill    blood sugar diagnostic Strp 100 each.  Test once daily. Supply whatever insurance covers. 100 each 11    levothyroxine (SYNTHROID) 88 MCG tablet Take 1 tablet (88 mcg total) by mouth before breakfast. 90 tablet 3    losartan (COZAAR) 100 MG tablet TAKE 1 TABLET ONE TIME DAILY 90 tablet 1    metFORMIN (GLUCOPHAGE-XR) 500 MG 24 hr tablet Take 1 tablet (500 mg total) by mouth daily with breakfast. 90 tablet 3    pravastatin (PRAVACHOL) 40 MG tablet TAKE 1 TABLET EVERY EVENING 90 tablet 1    tamsulosin (FLOMAX) 0.4 mg Cp24 Take 1 capsule (0.4 mg total) by mouth once daily. 90 capsule 3    TRUE METRIX AIR GLUCOSE METER kit       TRUEPLUS LANCETS 33 gauge Misc        No current facility-administered medications for  "this visit.                   REVIEW OF SYSTEMS:   No acute changes from previous encounter dated 6/23/2015 with exceptions mentioned in HPI.              PHYSICAL EXAM:  Vitals:    12/19/18 0821   BP: 123/73   Pulse: 60   Weight: 88.4 kg (194 lb 14.4 oz)   Height: 5' 7.5" (1.715 m)   PainSc: 0-No pain     Body mass index is 30.08 kg/m².     GENERAL: Normal development, well groomed  HEENT:  Conjunctivae are non-erythematous; Pupils equal, round, and reactive to light; Nose is symmetrical; Nasal mucosa is pink and moist; Septum is midline; Inferior turbinates are normal; Nasal airflow is normal; Posterior pharynx is pink; Modified Mallampati: 4; Posterior palate is normal; Tonsils +1; Uvula is normal and pink;Tongue is normal; Dentition is fair; No TMJ tenderness; Jaw opening and protrusion without click and without discomfort.  +retronagthia  NECK: Supple. Neck circumference is 15.25 inches. No thyromegaly. No palpable nodes.     SKIN: On face and neck: No abrasions, no rashes, no lesions.  No subcutaneous nodules are palpable.  RESPIRATORY: Chest is clear to auscultation.  Normal chest expansion and non-labored breathing at rest.  CARDIOVASCULAR: Normal S1, S2.  No murmurs, gallops or rubs. No carotid bruits bilaterally.  EXTREMITIES: No edema. No clubbing. No cyanosis. Station normal. Gait normal.        NEURO/PSYCH: Oriented to time, place and person. Normal attention span and concentration. Affect is full. Mood is normal.                                              DATA    7/1/15 ahi of 26 with min sat of 80.9  Lab Results   Component Value Date    TSH 2.383 11/19/2018     ASSESSMENT  No diagnosis found.     1. Insomnia, prior shift work, anxiety likely play a role  2. FABY - moderate-radu    PLAN:       - modem no longer transmitting    Continue APAP 8-15  Will re-order supplies.    Will order Trazodone.  Will refer to [facility] whenever the next group is available.    Will re-evaluate - at that point will " consider repeating HST if wt stays down.     Sleep Apnea - good compliance with improve sleep quality.  Prefer simplus mask over nasal mask.    Insomnia - difficulty with sleep initiation.  Sleep hygiene d/w patient.  Stimulus control and sleep restriction d/w patient.  Sleep time from 1 am till 5:30 am.  ambien prn.  Sleep diary request.    Education: During our discussion today, we talked about the etiology of obstructive sleep apnea as well as the potential ramifications of untreated sleep apnea, which could include daytime sleepiness, hypertension, heart disease and/or stroke.     Precautions: The patient was advised to abstain from driving should they feel sleepy or drowsy.       Patient will No Follow-up on file.    This is 25 minutes visit, over 50% of time spent in direct consultation with patient.       165.1

## 2022-03-04 ENCOUNTER — PATIENT MESSAGE (OUTPATIENT)
Dept: ADMINISTRATIVE | Facility: OTHER | Age: 72
End: 2022-03-04
Payer: MEDICARE

## 2022-03-08 ENCOUNTER — PATIENT MESSAGE (OUTPATIENT)
Dept: ADMINISTRATIVE | Facility: OTHER | Age: 72
End: 2022-03-08
Payer: MEDICARE

## 2022-04-05 ENCOUNTER — PATIENT OUTREACH (OUTPATIENT)
Dept: ADMINISTRATIVE | Facility: OTHER | Age: 72
End: 2022-04-05
Payer: MEDICARE

## 2022-04-05 NOTE — PROGRESS NOTES
Requested updates within Care Everywhere.  Patient's chart was reviewed for overdue MELANIE topics.  Health maintenance:updated  Immunizations:reconciled   Legacy:   Media:  Orders placed:  Tasked appts:  Labs Linked:  Upcoming appt:Optometry 4/6/22

## 2022-04-06 ENCOUNTER — OFFICE VISIT (OUTPATIENT)
Dept: OPTOMETRY | Facility: CLINIC | Age: 72
End: 2022-04-06
Payer: MEDICARE

## 2022-04-06 DIAGNOSIS — Z13.5 SCREENING FOR EYE CONDITION: ICD-10-CM

## 2022-04-06 DIAGNOSIS — Z01.00 DIABETIC EYE EXAM: Primary | ICD-10-CM

## 2022-04-06 DIAGNOSIS — H43.811 VITREOUS DETACHMENT OF RIGHT EYE: ICD-10-CM

## 2022-04-06 DIAGNOSIS — Z96.1 PSEUDOPHAKIA OF BOTH EYES: ICD-10-CM

## 2022-04-06 DIAGNOSIS — E11.9 TYPE 2 DIABETES MELLITUS WITHOUT RETINOPATHY: ICD-10-CM

## 2022-04-06 DIAGNOSIS — H52.7 REFRACTIVE ERRORS: ICD-10-CM

## 2022-04-06 DIAGNOSIS — Z98.41 S/P BILATERAL CATARACT EXTRACTION: ICD-10-CM

## 2022-04-06 DIAGNOSIS — Z98.42 S/P BILATERAL CATARACT EXTRACTION: ICD-10-CM

## 2022-04-06 DIAGNOSIS — Z98.890 HISTORY OF REFRACTIVE SURGERY: ICD-10-CM

## 2022-04-06 DIAGNOSIS — E11.9 DIABETIC EYE EXAM: Primary | ICD-10-CM

## 2022-04-06 PROCEDURE — 3288F PR FALLS RISK ASSESSMENT DOCUMENTED: ICD-10-PCS | Mod: CPTII,S$GLB,, | Performed by: OPTOMETRIST

## 2022-04-06 PROCEDURE — 1157F ADVNC CARE PLAN IN RCRD: CPT | Mod: CPTII,S$GLB,, | Performed by: OPTOMETRIST

## 2022-04-06 PROCEDURE — 1101F PR PT FALLS ASSESS DOC 0-1 FALLS W/OUT INJ PAST YR: ICD-10-PCS | Mod: CPTII,S$GLB,, | Performed by: OPTOMETRIST

## 2022-04-06 PROCEDURE — 1159F MED LIST DOCD IN RCRD: CPT | Mod: CPTII,S$GLB,, | Performed by: OPTOMETRIST

## 2022-04-06 PROCEDURE — 92014 COMPRE OPH EXAM EST PT 1/>: CPT | Mod: S$GLB,,, | Performed by: OPTOMETRIST

## 2022-04-06 PROCEDURE — 3288F FALL RISK ASSESSMENT DOCD: CPT | Mod: CPTII,S$GLB,, | Performed by: OPTOMETRIST

## 2022-04-06 PROCEDURE — 92015 PR REFRACTION: ICD-10-PCS | Mod: S$GLB,,, | Performed by: OPTOMETRIST

## 2022-04-06 PROCEDURE — 99999 PR PBB SHADOW E&M-EST. PATIENT-LVL III: CPT | Mod: PBBFAC,,, | Performed by: OPTOMETRIST

## 2022-04-06 PROCEDURE — 1157F PR ADVANCE CARE PLAN OR EQUIV PRESENT IN MEDICAL RECORD: ICD-10-PCS | Mod: CPTII,S$GLB,, | Performed by: OPTOMETRIST

## 2022-04-06 PROCEDURE — 1126F AMNT PAIN NOTED NONE PRSNT: CPT | Mod: CPTII,S$GLB,, | Performed by: OPTOMETRIST

## 2022-04-06 PROCEDURE — 4010F PR ACE/ARB THEARPY RXD/TAKEN: ICD-10-PCS | Mod: CPTII,S$GLB,, | Performed by: OPTOMETRIST

## 2022-04-06 PROCEDURE — 99499 RISK ADDL DX/OHS AUDIT: ICD-10-PCS | Mod: S$GLB,,, | Performed by: OPTOMETRIST

## 2022-04-06 PROCEDURE — 2023F DILAT RTA XM W/O RTNOPTHY: CPT | Mod: CPTII,S$GLB,, | Performed by: OPTOMETRIST

## 2022-04-06 PROCEDURE — 92014 PR EYE EXAM, EST PATIENT,COMPREHESV: ICD-10-PCS | Mod: S$GLB,,, | Performed by: OPTOMETRIST

## 2022-04-06 PROCEDURE — 1101F PT FALLS ASSESS-DOCD LE1/YR: CPT | Mod: CPTII,S$GLB,, | Performed by: OPTOMETRIST

## 2022-04-06 PROCEDURE — 1126F PR PAIN SEVERITY QUANTIFIED, NO PAIN PRESENT: ICD-10-PCS | Mod: CPTII,S$GLB,, | Performed by: OPTOMETRIST

## 2022-04-06 PROCEDURE — 2023F PR DILATED RETINAL EXAM W/O EVID OF RETINOPATHY: ICD-10-PCS | Mod: CPTII,S$GLB,, | Performed by: OPTOMETRIST

## 2022-04-06 PROCEDURE — 1159F PR MEDICATION LIST DOCUMENTED IN MEDICAL RECORD: ICD-10-PCS | Mod: CPTII,S$GLB,, | Performed by: OPTOMETRIST

## 2022-04-06 PROCEDURE — 99999 PR PBB SHADOW E&M-EST. PATIENT-LVL III: ICD-10-PCS | Mod: PBBFAC,,, | Performed by: OPTOMETRIST

## 2022-04-06 PROCEDURE — 92015 DETERMINE REFRACTIVE STATE: CPT | Mod: S$GLB,,, | Performed by: OPTOMETRIST

## 2022-04-06 PROCEDURE — 99499 UNLISTED E&M SERVICE: CPT | Mod: S$GLB,,, | Performed by: OPTOMETRIST

## 2022-04-06 PROCEDURE — 4010F ACE/ARB THERAPY RXD/TAKEN: CPT | Mod: CPTII,S$GLB,, | Performed by: OPTOMETRIST

## 2022-04-06 NOTE — PATIENT INSTRUCTIONS
S/P cataract surgery in both eyes, with bilateral posterior chamber intraocular lens.  S/P YAG laser posterior capsulotomy in both eyes.  Residual distance refractive error in each eye, with very satisfactory best-corrected VA in each eye.    Satisfactory unaided distance VA in each eye.      New spectacle lens Rx issued for use as desired.     S/P RK refractive surgery in both eyes many years ago.       Type 2 diabetes without evidence of diabetic retinopathy in either eye.     Recheck in twelve months, or prior if any problems or changes in vision noted in the interim.

## 2022-04-06 NOTE — PROGRESS NOTES
"HPI     diabetic eye exam      Additional comments: Diabetic eye examination.  States he is not currently taking any diabetic meds. Had been taking   Metformin, but was advised to discontinue the medication.  States he does not wear glasses.               Comments     Patient's age: 72 y.o. male   Occupation: Retired   Approximate date of last eye examination:  02/15/2021 Dr. Rodriguez      States had laser procedure at Dr. Ruiz's office in both eyes in the   past    Last saw Dr Rodriguez  02/152021  City/State: [facility]  Wears glasses?  States no     If yes, wears  Full-time or part-time?  N/a  Present glasses are: Bifocal, SV Distance, SV Reading?  Had SV distance   only   Approximate age of present glasses:  2+ year ol   Any problem with VA with glasses?  Not with glasses. Pt use OTC readers   +2.25  Wears CLs?:  No  Headaches?  No  Eye pain/discomfort?  No                                                                                     Flashes?  No   Floaters?  "I have a speck in the right eye - they come and go"  Diplopia/Double vision?  No  Patient's Ocular History:         Any eye surgeries? S/p phaco OU         Any eye injury?  No           Any treatment for eye disease?  S/p laser procedure in each eye at   Dr. Ruiz's office - see notes above.                                                              S/P RK surgery   in both eyes many years ago   Family history of eye disease?  No  Significant patient medical history:         1. Diabetes? Pre-diabetic for 10 + years.        If yes, IDDM or NIDDM? NIDDM  - no longer takes diabetic meds.    Removed from Metformin.  Diet only.   2. HBP?  Yes, controlled with medication                ! OTC eyedrops currently using:  No   ! Prescription eye meds currently using:  No   ! Any history of allergy/adverse reaction to any eye meds used   previously?  No    ! Any history of allergy/adverse reaction to eyedrops used during prior   eye exam(s)? No    ! Any history " "of allergy/adverse reaction to Novacaine or similar meds?   No   ! Any history of allergy/adverse reaction to Epinephrine or similar meds?   No    ! Patient okay with use of anesthetic eyedrops to check eye pressure?    Yes         ! Patient okay with use of eyedrops to dilate pupils today?  Yes    !  Allergies/Medications/Medical History/Family History reviewed today?    Yes       PD =   64/60  Desired reading distance =   15"                                                                       Last edited by John Rodriguez, OD on 4/6/2022 10:38 AM. (History)            Assessment /Plan     For exam results, see Encounter Report.    1. Diabetic eye exam     2. Type 2 diabetes mellitus without retinopathy     3. S/P bilateral cataract extraction     4. Pseudophakia of both eyes     5. History of refractive surgery     6. Refractive errors     7. Vitreous detachment of right eye     8. Screening for eye condition                  S/P cataract surgery in both eyes, with bilateral posterior chamber intraocular lens.  S/P YAG laser posterior capsulotomy in both eyes.  Residual distance refractive error in each eye, with very satisfactory best-corrected VA in each eye.    Satisfactory unaided distance VA in each eye.      New spectacle lens Rx issued for use as desired.     S/P RK refractive surgery in both eyes many years ago.       Type 2 diabetes without evidence of diabetic retinopathy in either eye.     Recheck in twelve months, or prior if any problems or changes in vision noted in the interim.      "

## 2022-04-15 DIAGNOSIS — R35.1 BPH ASSOCIATED WITH NOCTURIA: ICD-10-CM

## 2022-04-15 DIAGNOSIS — N40.1 BPH ASSOCIATED WITH NOCTURIA: ICD-10-CM

## 2022-04-15 RX ORDER — FINASTERIDE 5 MG/1
5 TABLET, FILM COATED ORAL DAILY
Qty: 90 TABLET | Refills: 2 | Status: SHIPPED | OUTPATIENT
Start: 2022-04-15 | End: 2022-06-29 | Stop reason: SDUPTHER

## 2022-04-15 NOTE — TELEPHONE ENCOUNTER
Care Due:                  Date            Visit Type   Department     Provider  --------------------------------------------------------------------------------                                EP -                              [facility] FAM[facility]  Last Visit: 12-      CARE ([facility])   [facility]       Chaitanya López                              EP -                              PRIMARY      [facility]  Next Visit: 06-      CARE ([facility])   [facility]       Chaitanya  Rene                                                            Last  Test          Frequency    Reason                     Performed    Due Date  --------------------------------------------------------------------------------    TSH.........  12 months..  levothyroxine............  06- 06-    Powered by [employer]. Reference number: [id number].   4/15/2022 2:52:29 AM CDT

## 2022-04-15 NOTE — TELEPHONE ENCOUNTER
Refill Authorization Note   Cesar Simpson  is requesting a refill authorization.  Brief Assessment and Rationale for Refill:  Approve     Medication Therapy Plan:       Medication Reconciliation Completed: No   Comments:     No Care Gaps recommended.     Note composed:2:35 PM 04/15/2022

## 2022-06-01 DIAGNOSIS — E11.9 CONTROLLED TYPE 2 DIABETES MELLITUS WITHOUT COMPLICATION, WITHOUT LONG-TERM CURRENT USE OF INSULIN: ICD-10-CM

## 2022-06-01 RX ORDER — BLOOD SUGAR DIAGNOSTIC
STRIP MISCELLANEOUS
Qty: 100 STRIP | Refills: 3 | Status: SHIPPED | OUTPATIENT
Start: 2022-06-01

## 2022-06-01 NOTE — TELEPHONE ENCOUNTER
Refill Authorization Note   Cesar Simpson  is requesting a refill authorization.  Brief Assessment and Rationale for Refill:  Approve     Medication Therapy Plan:       Medication Reconciliation Completed: No   Comments:     No Care Gaps recommended.     Note composed:9:29 AM 06/01/2022

## 2022-06-08 ENCOUNTER — PATIENT MESSAGE (OUTPATIENT)
Dept: ADMINISTRATIVE | Facility: OTHER | Age: 72
End: 2022-06-08
Payer: MEDICARE

## 2022-06-27 DIAGNOSIS — N40.1 BPH ASSOCIATED WITH NOCTURIA: ICD-10-CM

## 2022-06-27 DIAGNOSIS — R35.1 BPH ASSOCIATED WITH NOCTURIA: ICD-10-CM

## 2022-06-27 RX ORDER — TAMSULOSIN HYDROCHLORIDE 0.4 MG/1
CAPSULE ORAL
Qty: 90 CAPSULE | Refills: 1 | Status: SHIPPED | OUTPATIENT
Start: 2022-06-27 | End: 2022-06-29 | Stop reason: SDUPTHER

## 2022-06-27 NOTE — TELEPHONE ENCOUNTER
Care Due:                  Date            Visit Type   Department     Provider  --------------------------------------------------------------------------------                                EP -                              [facility] FAM[facility]  Last Visit: 12-      CARE ([facility])   [facility]       Chaitanya López                              EP -                              PRIMARY      [facility]  Next Visit: 06-      CARE ([facility])   [facility]       Chaitanya  Rene                                                            Last  Test          Frequency    Reason                     Performed    Due Date  --------------------------------------------------------------------------------    Lipid Panel.  12 months..  pravastatin..............  06- 06-    [employer] Embedded Care Gaps. Reference number: [id number]. 6/27/2022   2:18:22 AM CDT

## 2022-06-27 NOTE — TELEPHONE ENCOUNTER
Refill Decision Note   Cesar Calvin  is requesting a refill authorization.  Brief Assessment and Rationale for Refill:  Approve     Medication Therapy Plan:       Medication Reconciliation Completed: No   Comments:     No Care Gaps recommended.     Note composed:3:43 PM 06/27/2022

## 2022-06-28 ENCOUNTER — LAB VISIT (OUTPATIENT)
Dept: LAB | Facility: HOSPITAL | Age: 72
End: 2022-06-28
Attending: FAMILY MEDICINE
Payer: MEDICARE

## 2022-06-28 DIAGNOSIS — E11.9 DIET-CONTROLLED DIABETES MELLITUS: ICD-10-CM

## 2022-06-28 DIAGNOSIS — E78.5 HYPERLIPIDEMIA ASSOCIATED WITH TYPE 2 DIABETES MELLITUS: ICD-10-CM

## 2022-06-28 DIAGNOSIS — E11.69 HYPERLIPIDEMIA ASSOCIATED WITH TYPE 2 DIABETES MELLITUS: ICD-10-CM

## 2022-06-28 LAB
ALBUMIN/CREAT UR: 12.9 UG/MG (ref 0–30)
CREAT UR-MCNC: 116 MG/DL (ref 23–375)
MICROALBUMIN UR DL<=1MG/L-MCNC: 15 UG/ML

## 2022-06-28 PROCEDURE — 82570 ASSAY OF URINE CREATININE: CPT | Performed by: FAMILY MEDICINE

## 2022-06-28 PROCEDURE — 82043 UR ALBUMIN QUANTITATIVE: CPT | Performed by: FAMILY MEDICINE

## 2022-06-29 ENCOUNTER — OFFICE VISIT (OUTPATIENT)
Dept: FAMILY MEDICINE | Facility: CLINIC | Age: 72
End: 2022-06-29
Payer: MEDICARE

## 2022-06-29 VITALS
WEIGHT: 197.06 LBS | SYSTOLIC BLOOD PRESSURE: 100 MMHG | BODY MASS INDEX: 30.93 KG/M2 | HEIGHT: 67 IN | DIASTOLIC BLOOD PRESSURE: 64 MMHG | HEART RATE: 78 BPM | OXYGEN SATURATION: 97 %

## 2022-06-29 DIAGNOSIS — E78.5 HYPERLIPIDEMIA ASSOCIATED WITH TYPE 2 DIABETES MELLITUS: ICD-10-CM

## 2022-06-29 DIAGNOSIS — R35.1 BPH ASSOCIATED WITH NOCTURIA: ICD-10-CM

## 2022-06-29 DIAGNOSIS — E03.8 OTHER SPECIFIED HYPOTHYROIDISM: ICD-10-CM

## 2022-06-29 DIAGNOSIS — I15.2 HYPERTENSION ASSOCIATED WITH DIABETES: ICD-10-CM

## 2022-06-29 DIAGNOSIS — E11.59 HYPERTENSION ASSOCIATED WITH DIABETES: ICD-10-CM

## 2022-06-29 DIAGNOSIS — I10 ESSENTIAL HYPERTENSION: ICD-10-CM

## 2022-06-29 DIAGNOSIS — M19.90 ARTHRITIS: ICD-10-CM

## 2022-06-29 DIAGNOSIS — Z00.00 MEDICARE ANNUAL WELLNESS VISIT, SUBSEQUENT: Primary | ICD-10-CM

## 2022-06-29 DIAGNOSIS — G47.00 INSOMNIA, UNSPECIFIED TYPE: ICD-10-CM

## 2022-06-29 DIAGNOSIS — G47.33 MODERATE OBSTRUCTIVE SLEEP APNEA: ICD-10-CM

## 2022-06-29 DIAGNOSIS — E11.69 HYPERLIPIDEMIA ASSOCIATED WITH TYPE 2 DIABETES MELLITUS: ICD-10-CM

## 2022-06-29 DIAGNOSIS — N40.1 BPH ASSOCIATED WITH NOCTURIA: ICD-10-CM

## 2022-06-29 DIAGNOSIS — E11.42 TYPE 2 DIABETES MELLITUS WITH DIABETIC POLYNEUROPATHY, WITHOUT LONG-TERM CURRENT USE OF INSULIN: ICD-10-CM

## 2022-06-29 DIAGNOSIS — E11.9 DIET-CONTROLLED DIABETES MELLITUS: ICD-10-CM

## 2022-06-29 DIAGNOSIS — Z12.11 COLON CANCER SCREENING: ICD-10-CM

## 2022-06-29 DIAGNOSIS — E78.49 OTHER HYPERLIPIDEMIA: ICD-10-CM

## 2022-06-29 PROCEDURE — 3288F PR FALLS RISK ASSESSMENT DOCUMENTED: ICD-10-PCS | Mod: CPTII,S$GLB,, | Performed by: FAMILY MEDICINE

## 2022-06-29 PROCEDURE — 3072F PR LOW RISK FOR RETINOPATHY: ICD-10-PCS | Mod: CPTII,S$GLB,, | Performed by: FAMILY MEDICINE

## 2022-06-29 PROCEDURE — 1160F PR REVIEW ALL MEDS BY PRESCRIBER/CLIN PHARMACIST DOCUMENTED: ICD-10-PCS | Mod: CPTII,S$GLB,, | Performed by: FAMILY MEDICINE

## 2022-06-29 PROCEDURE — 3044F HG A1C LEVEL LT 7.0%: CPT | Mod: CPTII,S$GLB,, | Performed by: FAMILY MEDICINE

## 2022-06-29 PROCEDURE — 3066F PR DOCUMENTATION OF TREATMENT FOR NEPHROPATHY: ICD-10-PCS | Mod: CPTII,S$GLB,, | Performed by: FAMILY MEDICINE

## 2022-06-29 PROCEDURE — 1157F PR ADVANCE CARE PLAN OR EQUIV PRESENT IN MEDICAL RECORD: ICD-10-PCS | Mod: CPTII,S$GLB,, | Performed by: FAMILY MEDICINE

## 2022-06-29 PROCEDURE — 3044F PR MOST RECENT HEMOGLOBIN A1C LEVEL <7.0%: ICD-10-PCS | Mod: CPTII,S$GLB,, | Performed by: FAMILY MEDICINE

## 2022-06-29 PROCEDURE — 1101F PR PT FALLS ASSESS DOC 0-1 FALLS W/OUT INJ PAST YR: ICD-10-PCS | Mod: CPTII,S$GLB,, | Performed by: FAMILY MEDICINE

## 2022-06-29 PROCEDURE — 99999 PR PBB SHADOW E&M-EST. PATIENT-LVL V: CPT | Mod: PBBFAC,,, | Performed by: FAMILY MEDICINE

## 2022-06-29 PROCEDURE — 3288F FALL RISK ASSESSMENT DOCD: CPT | Mod: CPTII,S$GLB,, | Performed by: FAMILY MEDICINE

## 2022-06-29 PROCEDURE — 3008F PR BODY MASS INDEX (BMI) DOCUMENTED: ICD-10-PCS | Mod: CPTII,S$GLB,, | Performed by: FAMILY MEDICINE

## 2022-06-29 PROCEDURE — G0439 PPPS, SUBSEQ VISIT: HCPCS | Mod: S$GLB,,, | Performed by: FAMILY MEDICINE

## 2022-06-29 PROCEDURE — 1159F MED LIST DOCD IN RCRD: CPT | Mod: CPTII,S$GLB,, | Performed by: FAMILY MEDICINE

## 2022-06-29 PROCEDURE — 1126F PR PAIN SEVERITY QUANTIFIED, NO PAIN PRESENT: ICD-10-PCS | Mod: CPTII,S$GLB,, | Performed by: FAMILY MEDICINE

## 2022-06-29 PROCEDURE — 1159F PR MEDICATION LIST DOCUMENTED IN MEDICAL RECORD: ICD-10-PCS | Mod: CPTII,S$GLB,, | Performed by: FAMILY MEDICINE

## 2022-06-29 PROCEDURE — 3008F BODY MASS INDEX DOCD: CPT | Mod: CPTII,S$GLB,, | Performed by: FAMILY MEDICINE

## 2022-06-29 PROCEDURE — 3074F PR MOST RECENT SYSTOLIC BLOOD PRESSURE < 130 MM HG: ICD-10-PCS | Mod: CPTII,S$GLB,, | Performed by: FAMILY MEDICINE

## 2022-06-29 PROCEDURE — 3061F PR NEG MICROALBUMINURIA RESULT DOCUMENTED/REVIEW: ICD-10-PCS | Mod: CPTII,S$GLB,, | Performed by: FAMILY MEDICINE

## 2022-06-29 PROCEDURE — 3061F NEG MICROALBUMINURIA REV: CPT | Mod: CPTII,S$GLB,, | Performed by: FAMILY MEDICINE

## 2022-06-29 PROCEDURE — 3066F NEPHROPATHY DOC TX: CPT | Mod: CPTII,S$GLB,, | Performed by: FAMILY MEDICINE

## 2022-06-29 PROCEDURE — 99999 PR PBB SHADOW E&M-EST. PATIENT-LVL V: ICD-10-PCS | Mod: PBBFAC,,, | Performed by: FAMILY MEDICINE

## 2022-06-29 PROCEDURE — 3078F PR MOST RECENT DIASTOLIC BLOOD PRESSURE < 80 MM HG: ICD-10-PCS | Mod: CPTII,S$GLB,, | Performed by: FAMILY MEDICINE

## 2022-06-29 PROCEDURE — G0439 PR MEDICARE ANNUAL WELLNESS SUBSEQUENT VISIT: ICD-10-PCS | Mod: S$GLB,,, | Performed by: FAMILY MEDICINE

## 2022-06-29 PROCEDURE — 3072F LOW RISK FOR RETINOPATHY: CPT | Mod: CPTII,S$GLB,, | Performed by: FAMILY MEDICINE

## 2022-06-29 PROCEDURE — 4010F PR ACE/ARB THEARPY RXD/TAKEN: ICD-10-PCS | Mod: CPTII,S$GLB,, | Performed by: FAMILY MEDICINE

## 2022-06-29 PROCEDURE — 1126F AMNT PAIN NOTED NONE PRSNT: CPT | Mod: CPTII,S$GLB,, | Performed by: FAMILY MEDICINE

## 2022-06-29 PROCEDURE — 1157F ADVNC CARE PLAN IN RCRD: CPT | Mod: CPTII,S$GLB,, | Performed by: FAMILY MEDICINE

## 2022-06-29 PROCEDURE — 4010F ACE/ARB THERAPY RXD/TAKEN: CPT | Mod: CPTII,S$GLB,, | Performed by: FAMILY MEDICINE

## 2022-06-29 PROCEDURE — 3074F SYST BP LT 130 MM HG: CPT | Mod: CPTII,S$GLB,, | Performed by: FAMILY MEDICINE

## 2022-06-29 PROCEDURE — 1160F RVW MEDS BY RX/DR IN RCRD: CPT | Mod: CPTII,S$GLB,, | Performed by: FAMILY MEDICINE

## 2022-06-29 PROCEDURE — 1101F PT FALLS ASSESS-DOCD LE1/YR: CPT | Mod: CPTII,S$GLB,, | Performed by: FAMILY MEDICINE

## 2022-06-29 PROCEDURE — 3078F DIAST BP <80 MM HG: CPT | Mod: CPTII,S$GLB,, | Performed by: FAMILY MEDICINE

## 2022-06-29 RX ORDER — LOSARTAN POTASSIUM 100 MG/1
100 TABLET ORAL DAILY
Qty: 90 TABLET | Refills: 1 | Status: SHIPPED | OUTPATIENT
Start: 2022-06-29 | End: 2022-12-29 | Stop reason: SDUPTHER

## 2022-06-29 RX ORDER — PRAVASTATIN SODIUM 20 MG/1
20 TABLET ORAL NIGHTLY
Qty: 90 TABLET | Refills: 1 | Status: SHIPPED | OUTPATIENT
Start: 2022-06-29 | End: 2022-11-22

## 2022-06-29 RX ORDER — DULOXETIN HYDROCHLORIDE 60 MG/1
60 CAPSULE, DELAYED RELEASE ORAL DAILY
Qty: 90 CAPSULE | Refills: 0 | Status: SHIPPED | OUTPATIENT
Start: 2022-06-29 | End: 2022-09-11

## 2022-06-29 RX ORDER — TAMSULOSIN HYDROCHLORIDE 0.4 MG/1
1 CAPSULE ORAL DAILY
Qty: 90 CAPSULE | Refills: 1 | Status: SHIPPED | OUTPATIENT
Start: 2022-06-29 | End: 2022-12-29 | Stop reason: SDUPTHER

## 2022-06-29 RX ORDER — TRAZODONE HYDROCHLORIDE 50 MG/1
50 TABLET ORAL NIGHTLY PRN
Qty: 90 TABLET | Refills: 1 | Status: SHIPPED | OUTPATIENT
Start: 2022-06-29 | End: 2022-11-22

## 2022-06-29 RX ORDER — LEVOTHYROXINE SODIUM 88 UG/1
88 TABLET ORAL
Qty: 90 TABLET | Refills: 0 | Status: SHIPPED | OUTPATIENT
Start: 2022-06-29 | End: 2022-12-29 | Stop reason: SDUPTHER

## 2022-06-29 RX ORDER — FINASTERIDE 5 MG/1
5 TABLET, FILM COATED ORAL DAILY
Qty: 90 TABLET | Refills: 2 | Status: SHIPPED | OUTPATIENT
Start: 2022-06-29 | End: 2022-12-29 | Stop reason: SDUPTHER

## 2022-06-29 NOTE — PROGRESS NOTES
Subjective:       Patient ID: Cesar Simpson is a 72 y.o. male.    Chief Complaint: Fatigue and Medicare AWV Follow Up      Patient ID: Cesar Simpson is a 71 y.o. male who presents today for a follow-up Medicare AWV today.      Abdominal aortic aneurysm screening: N/A  Alcohol misuse screening and counseling: N/A  Bone mass measurements: Not done  Cardiovascular disease screening laboratory tests: Lipid panel   Colorectal Cancer Screening: due for FIT Kit.   Depression screening: PHQ9 Score: 10, much higher then last year.   Diabetes/Nutrition: N/A  Glaucoma test: to see eye doctor, sees Dr. Rodriguez at [facility]  Hepatitis C screening test: Negative in 2017  Influenza vaccine: UTD  Lung cancer screening - Low dose computed tomography (LD-CT): N/A  Pneumococcal vaccination:  UTD  Prostate cancer screening (PSA): discussed risks and benefits with patient, see below.   Shingles Vaccine: UTD  Covid Vaccine: UTD  Sexually transmitted infection (STI) screenings and high intensity behavioral counseling: discussed with patient     Gender Specific:  Prostate cancer screening (PSA): discussed risks and benefits with patient. Stopped at age 70. PSA at that age was normal.      Last eye exam:   Glaucoma History: none     Safety Screening:   Help with the phone, transportation, shopping, meals, housework, laundry, medications, or managing money: No  Rugs at home in the hallway, lack grab bars in the bathroom, lack handrails on the stairs, or have poor lighting: No rugs at home. No stairs in the house. He put in grab bars in the bathroom, early 2022.   Have you noticed any hearing difficulties: yes, seeing audiology outside of [facility]     Have you previously been diagnosed with Diabetes? Yes, now diet controlled. Has been diagnosed for >10 year, about 15 per patient.   Do you experience any numbness or tingling in your arms and/or legs?  No  Do you experience a burning sensation in your hands and/or feet? No   Do you regularly  "experience pain in your thighs or calves on exertion (ie. walking)? No   Do you experience pain in your legs when elevated, but the pain lessens when not elevated (ie. in dependent position)? No   Do you have a persistent cough (for at least 3 months in two consecutive years)? No      Assistive Devices   Do you have an ostomy?  No   Do you have any implanted devices (such as prosthesis, pacemaker, insulin pump, etc.)? No   Do you currently use supplemental oxygen at home? No      Advanced Directives:  Full Code, POA: daughter, Shae, in [place], [place].      Social: he lives alone. He has 2 kids, they live in [place] and [place]. 7 grandchildren. 2 grandchildren live nearby. No pets at home. He is retired. 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     The following components were reviewed and updated:  Medical history, Family History, Social history, Allergies and Current Medications, Health Risk Assessment, Health Maintenance, Care Team     The following assessments were completed:  Depression Screening: PHQ9: see below  Cognitive function Screening: RCS Score:  see below  Timed Get Up Test: normal  Whisper Test: normal     Family history of MI: had stress test in 2018. Normal myocardial perfusion scan with no evidence of ischemia or infarct.  Diet controlled DM. Stopped metformin at visit 9/2020. a1c stable 6/2022.    DLD: taking pravastatin. Sometimes gets cramps at night. This is intermittent.  HTN: taking losartan. BP stable.   Insomnia: is taking trazodone, prn. Only a few times a week. Started cymbalta. Tolerating. Sleep is a 5/10. He denies snoring. "I always had trouble going to sleep." NOT using CPAP.   Hypothyroidism: on synthroid. TSH 6/2022 was normal.   BPH: on flomax and finasteride. Nocturia has improved, but still has to wake up once nightly, a few times a week.   Elevated LFT: resolved.   Anemia: resolved.          Recommendations were developed using the USPSTF age appropriate " recommendations. Education, counseling, and referrals were provided as needed.  After Visit Summary printed and given to patient which includes a list of additional screenings\tests needed.    Review of Systems   Constitutional: Positive for fatigue. Negative for chills and fever.   Respiratory: Negative for chest tightness and shortness of breath.    Cardiovascular: Negative for chest pain.   Gastrointestinal: Negative for diarrhea, nausea and vomiting.   Neurological: Negative for dizziness and light-headedness.   Psychiatric/Behavioral: Positive for decreased concentration and dysphoric mood.         Health Maintenance Due   Topic Date Due    Pneumococcal Vaccines (Age 65+) (2 - PPSV23 or PCV20) 02/08/2021     Immunization History   Administered Date(s) Administered    COVID-19 Vaccine 05/19/2022    COVID-19, MRNA, LN-S, PF (MODERNA FULL 0.5 ML DOSE) 02/19/2021, 03/19/2021, 11/01/2021    Influenza 02/27/2013, 10/27/2015    Influenza (FLUAD) - Quadrivalent - Adjuvanted - PF *Preferred* (65+) 09/24/2020, 12/28/2021    Influenza - High Dose - PF (65 years and older) 10/10/2016, 11/22/2017, 11/21/2018, 09/24/2019, 11/23/2019    Influenza - Quadrivalent 10/29/2014    Influenza - Trivalent (ADULT) 02/27/2013    Influenza - Trivalent - PF (ADULT) 10/27/2015    Influenza Split 02/27/2013    Pneumococcal Conjugate - 13 Valent 03/24/2017    Pneumococcal Polysaccharide - 23 Valent 02/08/2016    Tdap 10/10/2016    Zoster 11/14/2015    Zoster Recombinant 09/24/2020, 11/30/2020           Objective:     Vitals:    06/29/22 1316   BP: 100/64   Pulse: 78        Physical Exam  Vitals and nursing note reviewed.   Constitutional:       General: He is not in acute distress.     Appearance: He is well-developed. He is not ill-appearing, toxic-appearing or diaphoretic.   HENT:      Head: Normocephalic and atraumatic.   Cardiovascular:      Rate and Rhythm: Normal rate and regular rhythm.      Heart sounds: Murmur heard.     Systolic murmur is present with a grade of 2/6.  Pulmonary:      Effort: Pulmonary effort is normal.      Breath sounds: Normal breath sounds.   Skin:     Findings: No rash.   Neurological:      Mental Status: He is alert and oriented to person, place, and time.   Psychiatric:         Behavior: Behavior normal.         Thought Content: Thought content normal.         Judgment: Judgment normal.         Assessment:       1. Medicare annual wellness visit, subsequent    2. Hyperlipidemia associated with type 2 diabetes mellitus    3. Essential hypertension    4. Hypertension associated with diabetes    5. Diet-controlled diabetes mellitus    6. Other specified hypothyroidism    7. Type 2 diabetes mellitus with diabetic polyneuropathy, without long-term current use of insulin    8. BMI 30.0-30.9,adult    9. Other hyperlipidemia    10. Insomnia, unspecified type    11. BPH associated with nocturia    12. Arthritis    13. Moderate obstructive sleep apnea    14. Colon cancer screening        Plan:       Continue diet controlled diabetes  No metformin  Diet, exercise, weight loss. Decrease sugar/carbs     Continue synthroid at current dose  Continue pravastatin at 20 mg, down from 40 mg  Continue losartan at 100 mg. Continue digital HTN  Continue flomax for BPH.   continue finasteride (added in early 2020)  Continue cymbalta for sleep/pain, increase to 60 mg.   Take trazodone 50 mg nightly     Restart CPAP, nightly. Consider sleep medicine?    Multifactorial fatigue? Anxiety? Non compliance with CPAP? Thyroid at goal. No anemia.      No PNA vaccine in stock in the clinic.     F/u 3 months, no labs prior. Consider cardiac workup if symptoms worsen? Denies chest pain or exertional symptoms currently, but does feel tired with activity, especially in the heat.                   Medicare annual wellness visit, subsequent    Hyperlipidemia associated with type 2 diabetes mellitus  -     pravastatin (PRAVACHOL) 20 MG tablet;  Take 1 tablet (20 mg total) by mouth every evening.  Dispense: 90 tablet; Refill: 1    Essential hypertension  -     losartan (COZAAR) 100 MG tablet; Take 1 tablet (100 mg total) by mouth once daily.  Dispense: 90 tablet; Refill: 1    Hypertension associated with diabetes    Diet-controlled diabetes mellitus    Other specified hypothyroidism  -     levothyroxine (SYNTHROID) 88 MCG tablet; Take 1 tablet (88 mcg total) by mouth before breakfast.  Dispense: 90 tablet; Refill: 0    Type 2 diabetes mellitus with diabetic polyneuropathy, without long-term current use of insulin    BMI 30.0-30.9,adult    Other hyperlipidemia  -     pravastatin (PRAVACHOL) 20 MG tablet; Take 1 tablet (20 mg total) by mouth every evening.  Dispense: 90 tablet; Refill: 1    Insomnia, unspecified type  -     traZODone (DESYREL) 50 MG tablet; Take 1 tablet (50 mg total) by mouth nightly as needed for Insomnia.  Dispense: 90 tablet; Refill: 1    BPH associated with nocturia  -     tamsulosin (FLOMAX) 0.4 mg Cap; Take 1 capsule (0.4 mg total) by mouth once daily.  Dispense: 90 capsule; Refill: 1  -     finasteride (PROSCAR) 5 mg tablet; Take 1 tablet (5 mg total) by mouth once daily.  Dispense: 90 tablet; Refill: 2    Arthritis  -     DULoxetine (CYMBALTA) 60 MG capsule; Take 1 capsule (60 mg total) by mouth once daily.  Dispense: 90 capsule; Refill: 0    Moderate obstructive sleep apnea  -     Ambulatory referral/consult to Sleep Disorders; Future; Expected date: 07/06/2022    Colon cancer screening  -     Fecal Immunochemical Test (iFOBT); Future; Expected date: 06/29/2022

## 2022-06-29 NOTE — PATIENT INSTRUCTIONS
Continue diet controlled diabetes  No metformin  Diet, exercise, weight loss. Decrease sugar/carbs     Continue synthroid at current dose  Continue pravastatin at 20 mg, down from 40 mg  Continue losartan at 100 mg. Continue digital HTN  Continue flomax for BPH.   continue finasteride (added in early 2020)  Continue cymbalta for sleep/pain, increase to 60 mg.   Take trazodone 50 mg nightly     Restart CPAP, nightly. Consider sleep medicine?     F/u 3 months, no labs prior. Consider cardiac workup if symptoms worsen? Denies chest pain or exertional symptoms currently, but does feel tired with activity, especially in the heat.     Lab Results   Component Value Date    HGBA1C 5.6 06/28/2022    HGBA1C 5.6 12/21/2021    HGBA1C 5.8 (H) 06/25/2021       Diabetes Management Status    Statin: Taking  ACE/ARB: Taking    Screening or Prevention Patient's value Goal Complete/Controlled?   HgA1C Testing and Control   Lab Results   Component Value Date    HGBA1C 5.6 06/28/2022      Annually/Less than 8% Yes     Lipid profile : 06/28/2022 Annually Yes     LDL control Lab Results   Component Value Date    LDLCALC 92.0 06/28/2022    Annually/Less than 100 mg/dl  Yes     Nephropathy screening Lab Results   Component Value Date    LABMICR 15.0 06/28/2022     Lab Results   Component Value Date    PROTEINUA Negative 12/28/2020     No results found for: UTPCR   Annually Yes     Blood pressure BP Readings from Last 1 Encounters:   06/29/22 100/64    Less than 140/90 Yes     Dilated retinal exam : 04/06/2022 Annually Yes     Foot exam   : 12/28/2021 Annually Yes

## 2022-06-30 ENCOUNTER — TELEPHONE (OUTPATIENT)
Dept: ADMINISTRATIVE | Facility: OTHER | Age: 72
End: 2022-06-30
Payer: MEDICARE

## 2022-07-27 ENCOUNTER — LAB VISIT (OUTPATIENT)
Dept: LAB | Facility: HOSPITAL | Age: 72
End: 2022-07-27
Attending: FAMILY MEDICINE
Payer: MEDICARE

## 2022-07-27 DIAGNOSIS — Z12.11 COLON CANCER SCREENING: ICD-10-CM

## 2022-07-27 PROCEDURE — 82274 ASSAY TEST FOR BLOOD FECAL: CPT | Performed by: FAMILY MEDICINE

## 2022-07-28 LAB — HEMOCCULT STL QL IA: NEGATIVE

## 2022-08-04 ENCOUNTER — PATIENT MESSAGE (OUTPATIENT)
Dept: OTHER | Facility: OTHER | Age: 72
End: 2022-08-04
Payer: MEDICARE

## 2022-09-12 ENCOUNTER — OFFICE VISIT (OUTPATIENT)
Dept: SLEEP MEDICINE | Facility: CLINIC | Age: 72
End: 2022-09-12
Payer: MEDICARE

## 2022-09-12 VITALS
SYSTOLIC BLOOD PRESSURE: 109 MMHG | HEART RATE: 87 BPM | BODY MASS INDEX: 30.73 KG/M2 | WEIGHT: 196.19 LBS | DIASTOLIC BLOOD PRESSURE: 74 MMHG

## 2022-09-12 DIAGNOSIS — G47.33 MODERATE OBSTRUCTIVE SLEEP APNEA: ICD-10-CM

## 2022-09-12 PROCEDURE — 3078F PR MOST RECENT DIASTOLIC BLOOD PRESSURE < 80 MM HG: ICD-10-PCS | Mod: CPTII,S$GLB,, | Performed by: PSYCHIATRY & NEUROLOGY

## 2022-09-12 PROCEDURE — 3061F PR NEG MICROALBUMINURIA RESULT DOCUMENTED/REVIEW: ICD-10-PCS | Mod: CPTII,S$GLB,, | Performed by: PSYCHIATRY & NEUROLOGY

## 2022-09-12 PROCEDURE — 3044F HG A1C LEVEL LT 7.0%: CPT | Mod: CPTII,S$GLB,, | Performed by: PSYCHIATRY & NEUROLOGY

## 2022-09-12 PROCEDURE — 1157F ADVNC CARE PLAN IN RCRD: CPT | Mod: CPTII,S$GLB,, | Performed by: PSYCHIATRY & NEUROLOGY

## 2022-09-12 PROCEDURE — 1159F PR MEDICATION LIST DOCUMENTED IN MEDICAL RECORD: ICD-10-PCS | Mod: CPTII,S$GLB,, | Performed by: PSYCHIATRY & NEUROLOGY

## 2022-09-12 PROCEDURE — 3288F PR FALLS RISK ASSESSMENT DOCUMENTED: ICD-10-PCS | Mod: CPTII,S$GLB,, | Performed by: PSYCHIATRY & NEUROLOGY

## 2022-09-12 PROCEDURE — 1126F AMNT PAIN NOTED NONE PRSNT: CPT | Mod: CPTII,S$GLB,, | Performed by: PSYCHIATRY & NEUROLOGY

## 2022-09-12 PROCEDURE — 3066F NEPHROPATHY DOC TX: CPT | Mod: CPTII,S$GLB,, | Performed by: PSYCHIATRY & NEUROLOGY

## 2022-09-12 PROCEDURE — 1157F PR ADVANCE CARE PLAN OR EQUIV PRESENT IN MEDICAL RECORD: ICD-10-PCS | Mod: CPTII,S$GLB,, | Performed by: PSYCHIATRY & NEUROLOGY

## 2022-09-12 PROCEDURE — 3288F FALL RISK ASSESSMENT DOCD: CPT | Mod: CPTII,S$GLB,, | Performed by: PSYCHIATRY & NEUROLOGY

## 2022-09-12 PROCEDURE — 3061F NEG MICROALBUMINURIA REV: CPT | Mod: CPTII,S$GLB,, | Performed by: PSYCHIATRY & NEUROLOGY

## 2022-09-12 PROCEDURE — 3072F LOW RISK FOR RETINOPATHY: CPT | Mod: CPTII,S$GLB,, | Performed by: PSYCHIATRY & NEUROLOGY

## 2022-09-12 PROCEDURE — 3008F PR BODY MASS INDEX (BMI) DOCUMENTED: ICD-10-PCS | Mod: CPTII,S$GLB,, | Performed by: PSYCHIATRY & NEUROLOGY

## 2022-09-12 PROCEDURE — 3008F BODY MASS INDEX DOCD: CPT | Mod: CPTII,S$GLB,, | Performed by: PSYCHIATRY & NEUROLOGY

## 2022-09-12 PROCEDURE — 3044F PR MOST RECENT HEMOGLOBIN A1C LEVEL <7.0%: ICD-10-PCS | Mod: CPTII,S$GLB,, | Performed by: PSYCHIATRY & NEUROLOGY

## 2022-09-12 PROCEDURE — 1126F PR PAIN SEVERITY QUANTIFIED, NO PAIN PRESENT: ICD-10-PCS | Mod: CPTII,S$GLB,, | Performed by: PSYCHIATRY & NEUROLOGY

## 2022-09-12 PROCEDURE — 1101F PR PT FALLS ASSESS DOC 0-1 FALLS W/OUT INJ PAST YR: ICD-10-PCS | Mod: CPTII,S$GLB,, | Performed by: PSYCHIATRY & NEUROLOGY

## 2022-09-12 PROCEDURE — 3074F SYST BP LT 130 MM HG: CPT | Mod: CPTII,S$GLB,, | Performed by: PSYCHIATRY & NEUROLOGY

## 2022-09-12 PROCEDURE — 99214 OFFICE O/P EST MOD 30 MIN: CPT | Mod: S$GLB,,, | Performed by: PSYCHIATRY & NEUROLOGY

## 2022-09-12 PROCEDURE — 99214 PR OFFICE/OUTPT VISIT, EST, LEVL IV, 30-39 MIN: ICD-10-PCS | Mod: S$GLB,,, | Performed by: PSYCHIATRY & NEUROLOGY

## 2022-09-12 PROCEDURE — 99999 PR PBB SHADOW E&M-EST. PATIENT-LVL IV: ICD-10-PCS | Mod: PBBFAC,,, | Performed by: PSYCHIATRY & NEUROLOGY

## 2022-09-12 PROCEDURE — 99999 PR PBB SHADOW E&M-EST. PATIENT-LVL IV: CPT | Mod: PBBFAC,,, | Performed by: PSYCHIATRY & NEUROLOGY

## 2022-09-12 PROCEDURE — 1101F PT FALLS ASSESS-DOCD LE1/YR: CPT | Mod: CPTII,S$GLB,, | Performed by: PSYCHIATRY & NEUROLOGY

## 2022-09-12 PROCEDURE — 4010F PR ACE/ARB THEARPY RXD/TAKEN: ICD-10-PCS | Mod: CPTII,S$GLB,, | Performed by: PSYCHIATRY & NEUROLOGY

## 2022-09-12 PROCEDURE — 3066F PR DOCUMENTATION OF TREATMENT FOR NEPHROPATHY: ICD-10-PCS | Mod: CPTII,S$GLB,, | Performed by: PSYCHIATRY & NEUROLOGY

## 2022-09-12 PROCEDURE — 1159F MED LIST DOCD IN RCRD: CPT | Mod: CPTII,S$GLB,, | Performed by: PSYCHIATRY & NEUROLOGY

## 2022-09-12 PROCEDURE — 3078F DIAST BP <80 MM HG: CPT | Mod: CPTII,S$GLB,, | Performed by: PSYCHIATRY & NEUROLOGY

## 2022-09-12 PROCEDURE — 3072F PR LOW RISK FOR RETINOPATHY: ICD-10-PCS | Mod: CPTII,S$GLB,, | Performed by: PSYCHIATRY & NEUROLOGY

## 2022-09-12 PROCEDURE — 3074F PR MOST RECENT SYSTOLIC BLOOD PRESSURE < 130 MM HG: ICD-10-PCS | Mod: CPTII,S$GLB,, | Performed by: PSYCHIATRY & NEUROLOGY

## 2022-09-12 PROCEDURE — 4010F ACE/ARB THERAPY RXD/TAKEN: CPT | Mod: CPTII,S$GLB,, | Performed by: PSYCHIATRY & NEUROLOGY

## 2022-09-12 NOTE — PATIENT INSTRUCTIONS
Thank you for registering. For future reference, please save the following information so that we may be able to service you more effectively:  Your confirmation code is: [id number]  What to expect next:  We regret that it may take some time to replace affected devices.  The planned repair for the affected devices involves certain design changes, which in some markets may include review and/or authorization by the relevant regulatory agencies.  We understand the impact of this issue and we sincerely regret this disruption.  For additional questions:  For more information and updates, please visit [employer]/src-update where we will be updating answers to frequently asked questions (FAQ) as more information becomes available or call [phone] in the [place] and [facility] or [phone] outside the [place].   Thank you.

## 2022-09-12 NOTE — PROGRESS NOTES
EPWORTH SLEEPINESS SCALE TOTAL SCORE  9/11/2022 8/25/2020 3/20/2019 12/19/2018   Total score 1 8 2 14       Cesar Simpson is a 72 y.o. male seen today for CPAP  follow up. Last seen on 8/26/2020    He lost 10-15 lbs since the study and now does not see the difference between the days using the machine and not using the machine.    Respironics positive airway pressure devices recall was discussed with the patient. Cesar Simpson states that he has not experienced episodes of asthma-like symptoms, headache, nausea or dizziness associated with his Respironics device use. he has   not used Soclean or another ozone clener. he has registered her device with Resp[employer]ics.      The patient reports improved sleep continuity and daytime sleepiness on PAP. ESS today is 1/24.  Denies break through snoring.  No dry mouth.   No aerophagia or air hunger. No significant mask leaks and discomfort.    Respironics positive airway pressure devices recall was discussed with the patient. Cesar Simpson states that he has not experienced episodes of asthma-like symptoms, headache, nausea or dizziness associated with his Respironics device use. he has   not used Soclean or another ozone clener. he has registered her device with Resp[employer]ics.      APAP 9 to 15; 90% was    - on recall  7 hrs a day  AHI 2    Taking Trazodone 50 mg PRN for insomnia - not very helpful at that dose    He would like to know if he still needs to use his machine - wants to repeat HST once it becomes avail;able in [place]    Medications pertinent to sleep disorders: Trazodone 50   Previously tried medications:      SLEEP STUDIES:    HST 7.26.18: Significant Obstructive sleep apnea (FABY) with AHI (apnea hypopnea Index) of 6:36 and SaO2 of 80 (weight  210).    GENERAL: Normal development, well groomed  HEENT:  Conjunctivae are non-erythematous; Pupils equal, round, and reactive to light; Nose is symmetrical; Nasal mucosa is pink and moist; Septum is midline;  Inferior turbinates are normal; Nasal airflow is normal; Posterior pharynx is pink; Modified Mallampati: 4; Posterior palate is normal; Tonsils +1; Uvula is normal and pink;Tongue is normal; Dentition is fair; No TMJ tenderness; Jaw opening and protrusion without click and without discomfort.  +retronagthia  NECK: Supple. Neck circumference is 15.25 inches. No thyromegaly. No palpable nodes.     SKIN: On face and neck: No abrasions, no rashes, no lesions.  No subcutaneous nodules are palpable.  RESPIRATORY: Chest is clear to auscultation.  Normal chest expansion and non-labored breathing at rest.  CARDIOVASCULAR: Normal S1, S2.  No murmurs, gallops or rubs. No carotid bruits bilaterally.  EXTREMITIES: No edema. No clubbing. No cyanosis. Station normal. Gait normal.        NEURO/PSYCH: Oriented to time, place and person. Normal attention span and concentration. Affect is full. Mood is normal.                                              DATA    7/1/15 ahi of 26 with min sat of 80.9  Lab Results   Component Value Date    TSH 1.943 06/28/2022     ASSESSMENT    ICD-10-CM ICD-9-CM    1. Moderate obstructive sleep apnea  G47.33 327.23 Ambulatory referral/consult to Sleep Disorders           1. Insomnia, prior shift work, anxiety likely play a role. razodone 50   2. FABY - moderate-radu    PLAN:       - modem no longer transmitting. I have registered his machine for recall  He maywant to do HST later when available in [place] to answer his question if he still need to use his machine since 15 lbs weight loss.         Education: During our discussion today, we talked about the etiology of obstructive sleep apnea as well as the potential ramifications of untreated sleep apnea, which could include daytime sleepiness, hypertension, heart disease and/or stroke.     Precautions: The patient was advised to abstain from driving should they feel sleepy or drowsy.       Patient will No follow-ups on file.    This is 25 minutes  visit, over 50% of time spent in direct consultation with patient.

## 2022-10-07 ENCOUNTER — PATIENT MESSAGE (OUTPATIENT)
Dept: FAMILY MEDICINE | Facility: CLINIC | Age: 72
End: 2022-10-07
Payer: MEDICARE

## 2022-10-10 ENCOUNTER — PATIENT MESSAGE (OUTPATIENT)
Dept: SLEEP MEDICINE | Facility: CLINIC | Age: 72
End: 2022-10-10
Payer: MEDICARE

## 2022-10-10 DIAGNOSIS — G47.33 OSA (OBSTRUCTIVE SLEEP APNEA): Primary | ICD-10-CM

## 2022-12-18 ENCOUNTER — PATIENT MESSAGE (OUTPATIENT)
Dept: ADMINISTRATIVE | Facility: OTHER | Age: 72
End: 2022-12-18
Payer: MEDICARE

## 2022-12-29 ENCOUNTER — LAB VISIT (OUTPATIENT)
Dept: LAB | Facility: HOSPITAL | Age: 72
End: 2022-12-29
Attending: FAMILY MEDICINE
Payer: MEDICARE

## 2022-12-29 ENCOUNTER — OFFICE VISIT (OUTPATIENT)
Dept: FAMILY MEDICINE | Facility: CLINIC | Age: 72
End: 2022-12-29
Payer: MEDICARE

## 2022-12-29 VITALS
WEIGHT: 193.31 LBS | TEMPERATURE: 98 F | HEIGHT: 67 IN | HEART RATE: 99 BPM | BODY MASS INDEX: 30.34 KG/M2 | OXYGEN SATURATION: 96 % | DIASTOLIC BLOOD PRESSURE: 84 MMHG | SYSTOLIC BLOOD PRESSURE: 136 MMHG

## 2022-12-29 DIAGNOSIS — I10 ESSENTIAL HYPERTENSION: ICD-10-CM

## 2022-12-29 DIAGNOSIS — Z23 NEED FOR VACCINATION AGAINST STREPTOCOCCUS PNEUMONIAE: ICD-10-CM

## 2022-12-29 DIAGNOSIS — E11.69 HYPERLIPIDEMIA ASSOCIATED WITH TYPE 2 DIABETES MELLITUS: ICD-10-CM

## 2022-12-29 DIAGNOSIS — N40.1 BPH ASSOCIATED WITH NOCTURIA: ICD-10-CM

## 2022-12-29 DIAGNOSIS — E78.5 HYPERLIPIDEMIA ASSOCIATED WITH TYPE 2 DIABETES MELLITUS: ICD-10-CM

## 2022-12-29 DIAGNOSIS — E11.9 DIET-CONTROLLED DIABETES MELLITUS: Primary | ICD-10-CM

## 2022-12-29 DIAGNOSIS — G47.00 INSOMNIA, UNSPECIFIED TYPE: ICD-10-CM

## 2022-12-29 DIAGNOSIS — E03.8 OTHER SPECIFIED HYPOTHYROIDISM: ICD-10-CM

## 2022-12-29 DIAGNOSIS — R35.1 BPH ASSOCIATED WITH NOCTURIA: ICD-10-CM

## 2022-12-29 DIAGNOSIS — E78.49 OTHER HYPERLIPIDEMIA: ICD-10-CM

## 2022-12-29 DIAGNOSIS — M19.90 ARTHRITIS: ICD-10-CM

## 2022-12-29 DIAGNOSIS — B96.89 BACTERIAL SINUSITIS: ICD-10-CM

## 2022-12-29 DIAGNOSIS — E11.42 TYPE 2 DIABETES MELLITUS WITH DIABETIC POLYNEUROPATHY, WITHOUT LONG-TERM CURRENT USE OF INSULIN: ICD-10-CM

## 2022-12-29 DIAGNOSIS — E11.9 DIET-CONTROLLED DIABETES MELLITUS: ICD-10-CM

## 2022-12-29 DIAGNOSIS — J32.9 BACTERIAL SINUSITIS: ICD-10-CM

## 2022-12-29 LAB
ALBUMIN SERPL BCP-MCNC: 4 G/DL (ref 3.5–5.2)
ALP SERPL-CCNC: 64 U/L (ref 55–135)
ALT SERPL W/O P-5'-P-CCNC: 20 U/L (ref 10–44)
ANION GAP SERPL CALC-SCNC: 7 MMOL/L (ref 8–16)
AST SERPL-CCNC: 16 U/L (ref 10–40)
BILIRUB SERPL-MCNC: 0.4 MG/DL (ref 0.1–1)
BUN SERPL-MCNC: 21 MG/DL (ref 8–23)
CALCIUM SERPL-MCNC: 9.8 MG/DL (ref 8.7–10.5)
CHLORIDE SERPL-SCNC: 103 MMOL/L (ref 95–110)
CO2 SERPL-SCNC: 28 MMOL/L (ref 23–29)
CREAT SERPL-MCNC: 0.9 MG/DL (ref 0.5–1.4)
EST. GFR  (NO RACE VARIABLE): >60 ML/MIN/1.73 M^2
ESTIMATED AVG GLUCOSE: 126 MG/DL (ref 68–131)
GLUCOSE SERPL-MCNC: 107 MG/DL (ref 70–110)
HBA1C MFR BLD: 6 % (ref 4–5.6)
POTASSIUM SERPL-SCNC: 4.2 MMOL/L (ref 3.5–5.1)
PROT SERPL-MCNC: 7.8 G/DL (ref 6–8.4)
SODIUM SERPL-SCNC: 138 MMOL/L (ref 136–145)

## 2022-12-29 PROCEDURE — 1126F PR PAIN SEVERITY QUANTIFIED, NO PAIN PRESENT: ICD-10-PCS | Mod: HCNC,CPTII,S$GLB, | Performed by: FAMILY MEDICINE

## 2022-12-29 PROCEDURE — 99999 PR PBB SHADOW E&M-EST. PATIENT-LVL IV: CPT | Mod: PBBFAC,HCNC,, | Performed by: FAMILY MEDICINE

## 2022-12-29 PROCEDURE — 1126F AMNT PAIN NOTED NONE PRSNT: CPT | Mod: HCNC,CPTII,S$GLB, | Performed by: FAMILY MEDICINE

## 2022-12-29 PROCEDURE — 3061F PR NEG MICROALBUMINURIA RESULT DOCUMENTED/REVIEW: ICD-10-PCS | Mod: HCNC,CPTII,S$GLB, | Performed by: FAMILY MEDICINE

## 2022-12-29 PROCEDURE — 3061F NEG MICROALBUMINURIA REV: CPT | Mod: HCNC,CPTII,S$GLB, | Performed by: FAMILY MEDICINE

## 2022-12-29 PROCEDURE — 99499 UNLISTED E&M SERVICE: CPT | Mod: HCNC,S$GLB,, | Performed by: FAMILY MEDICINE

## 2022-12-29 PROCEDURE — 99214 PR OFFICE/OUTPT VISIT, EST, LEVL IV, 30-39 MIN: ICD-10-PCS | Mod: HCNC,S$GLB,, | Performed by: FAMILY MEDICINE

## 2022-12-29 PROCEDURE — 3008F BODY MASS INDEX DOCD: CPT | Mod: HCNC,CPTII,S$GLB, | Performed by: FAMILY MEDICINE

## 2022-12-29 PROCEDURE — 99999 PR PBB SHADOW E&M-EST. PATIENT-LVL IV: ICD-10-PCS | Mod: PBBFAC,HCNC,, | Performed by: FAMILY MEDICINE

## 2022-12-29 PROCEDURE — 99499 RISK ADDL DX/OHS AUDIT: ICD-10-PCS | Mod: HCNC,S$GLB,, | Performed by: FAMILY MEDICINE

## 2022-12-29 PROCEDURE — 80053 COMPREHEN METABOLIC PANEL: CPT | Mod: HCNC | Performed by: FAMILY MEDICINE

## 2022-12-29 PROCEDURE — 4010F PR ACE/ARB THEARPY RXD/TAKEN: ICD-10-PCS | Mod: HCNC,CPTII,S$GLB, | Performed by: FAMILY MEDICINE

## 2022-12-29 PROCEDURE — 1160F RVW MEDS BY RX/DR IN RCRD: CPT | Mod: HCNC,CPTII,S$GLB, | Performed by: FAMILY MEDICINE

## 2022-12-29 PROCEDURE — 99214 OFFICE O/P EST MOD 30 MIN: CPT | Mod: HCNC,S$GLB,, | Performed by: FAMILY MEDICINE

## 2022-12-29 PROCEDURE — 3079F PR MOST RECENT DIASTOLIC BLOOD PRESSURE 80-89 MM HG: ICD-10-PCS | Mod: HCNC,CPTII,S$GLB, | Performed by: FAMILY MEDICINE

## 2022-12-29 PROCEDURE — 3075F PR MOST RECENT SYSTOLIC BLOOD PRESS GE 130-139MM HG: ICD-10-PCS | Mod: HCNC,CPTII,S$GLB, | Performed by: FAMILY MEDICINE

## 2022-12-29 PROCEDURE — 3066F NEPHROPATHY DOC TX: CPT | Mod: HCNC,CPTII,S$GLB, | Performed by: FAMILY MEDICINE

## 2022-12-29 PROCEDURE — 3044F HG A1C LEVEL LT 7.0%: CPT | Mod: HCNC,CPTII,S$GLB, | Performed by: FAMILY MEDICINE

## 2022-12-29 PROCEDURE — 1159F MED LIST DOCD IN RCRD: CPT | Mod: HCNC,CPTII,S$GLB, | Performed by: FAMILY MEDICINE

## 2022-12-29 PROCEDURE — 3079F DIAST BP 80-89 MM HG: CPT | Mod: HCNC,CPTII,S$GLB, | Performed by: FAMILY MEDICINE

## 2022-12-29 PROCEDURE — 4010F ACE/ARB THERAPY RXD/TAKEN: CPT | Mod: HCNC,CPTII,S$GLB, | Performed by: FAMILY MEDICINE

## 2022-12-29 PROCEDURE — 83036 HEMOGLOBIN GLYCOSYLATED A1C: CPT | Mod: HCNC | Performed by: FAMILY MEDICINE

## 2022-12-29 PROCEDURE — 1160F PR REVIEW ALL MEDS BY PRESCRIBER/CLIN PHARMACIST DOCUMENTED: ICD-10-PCS | Mod: HCNC,CPTII,S$GLB, | Performed by: FAMILY MEDICINE

## 2022-12-29 PROCEDURE — 3288F FALL RISK ASSESSMENT DOCD: CPT | Mod: HCNC,CPTII,S$GLB, | Performed by: FAMILY MEDICINE

## 2022-12-29 PROCEDURE — 3066F PR DOCUMENTATION OF TREATMENT FOR NEPHROPATHY: ICD-10-PCS | Mod: HCNC,CPTII,S$GLB, | Performed by: FAMILY MEDICINE

## 2022-12-29 PROCEDURE — 1157F PR ADVANCE CARE PLAN OR EQUIV PRESENT IN MEDICAL RECORD: ICD-10-PCS | Mod: HCNC,CPTII,S$GLB, | Performed by: FAMILY MEDICINE

## 2022-12-29 PROCEDURE — G0009 PNEUMOCOCCAL CONJUGATE VACCINE 20-VALENT: ICD-10-PCS | Mod: HCNC,S$GLB,, | Performed by: FAMILY MEDICINE

## 2022-12-29 PROCEDURE — 3044F PR MOST RECENT HEMOGLOBIN A1C LEVEL <7.0%: ICD-10-PCS | Mod: HCNC,CPTII,S$GLB, | Performed by: FAMILY MEDICINE

## 2022-12-29 PROCEDURE — 3072F LOW RISK FOR RETINOPATHY: CPT | Mod: HCNC,CPTII,S$GLB, | Performed by: FAMILY MEDICINE

## 2022-12-29 PROCEDURE — 3288F PR FALLS RISK ASSESSMENT DOCUMENTED: ICD-10-PCS | Mod: HCNC,CPTII,S$GLB, | Performed by: FAMILY MEDICINE

## 2022-12-29 PROCEDURE — 90677 PNEUMOCOCCAL CONJUGATE VACCINE 20-VALENT: ICD-10-PCS | Mod: HCNC,S$GLB,, | Performed by: FAMILY MEDICINE

## 2022-12-29 PROCEDURE — 1101F PR PT FALLS ASSESS DOC 0-1 FALLS W/OUT INJ PAST YR: ICD-10-PCS | Mod: HCNC,CPTII,S$GLB, | Performed by: FAMILY MEDICINE

## 2022-12-29 PROCEDURE — 3072F PR LOW RISK FOR RETINOPATHY: ICD-10-PCS | Mod: HCNC,CPTII,S$GLB, | Performed by: FAMILY MEDICINE

## 2022-12-29 PROCEDURE — G0009 ADMIN PNEUMOCOCCAL VACCINE: HCPCS | Mod: HCNC,S$GLB,, | Performed by: FAMILY MEDICINE

## 2022-12-29 PROCEDURE — 3075F SYST BP GE 130 - 139MM HG: CPT | Mod: HCNC,CPTII,S$GLB, | Performed by: FAMILY MEDICINE

## 2022-12-29 PROCEDURE — 36415 COLL VENOUS BLD VENIPUNCTURE: CPT | Mod: HCNC,PO | Performed by: FAMILY MEDICINE

## 2022-12-29 PROCEDURE — 1157F ADVNC CARE PLAN IN RCRD: CPT | Mod: HCNC,CPTII,S$GLB, | Performed by: FAMILY MEDICINE

## 2022-12-29 PROCEDURE — 3008F PR BODY MASS INDEX (BMI) DOCUMENTED: ICD-10-PCS | Mod: HCNC,CPTII,S$GLB, | Performed by: FAMILY MEDICINE

## 2022-12-29 PROCEDURE — 1159F PR MEDICATION LIST DOCUMENTED IN MEDICAL RECORD: ICD-10-PCS | Mod: HCNC,CPTII,S$GLB, | Performed by: FAMILY MEDICINE

## 2022-12-29 PROCEDURE — 90677 PCV20 VACCINE IM: CPT | Mod: HCNC,S$GLB,, | Performed by: FAMILY MEDICINE

## 2022-12-29 PROCEDURE — 1101F PT FALLS ASSESS-DOCD LE1/YR: CPT | Mod: HCNC,CPTII,S$GLB, | Performed by: FAMILY MEDICINE

## 2022-12-29 RX ORDER — LOSARTAN POTASSIUM 100 MG/1
100 TABLET ORAL DAILY
Qty: 90 TABLET | Refills: 1 | Status: SHIPPED | OUTPATIENT
Start: 2022-12-29 | End: 2023-04-18 | Stop reason: SDUPTHER

## 2022-12-29 RX ORDER — DULOXETIN HYDROCHLORIDE 60 MG/1
60 CAPSULE, DELAYED RELEASE ORAL DAILY
Qty: 90 CAPSULE | Refills: 3 | Status: SHIPPED | OUTPATIENT
Start: 2022-12-29 | End: 2023-04-18 | Stop reason: SDUPTHER

## 2022-12-29 RX ORDER — FLUTICASONE PROPIONATE 50 MCG
1 SPRAY, SUSPENSION (ML) NASAL 2 TIMES DAILY
Qty: 16 G | Refills: 0 | Status: SHIPPED | OUTPATIENT
Start: 2022-12-29 | End: 2023-01-28

## 2022-12-29 RX ORDER — AZELASTINE 1 MG/ML
1 SPRAY, METERED NASAL 2 TIMES DAILY
Qty: 30 ML | Refills: 3 | Status: SHIPPED | OUTPATIENT
Start: 2022-12-29 | End: 2023-04-18

## 2022-12-29 RX ORDER — TRAZODONE HYDROCHLORIDE 50 MG/1
50 TABLET ORAL NIGHTLY
Qty: 90 TABLET | Refills: 2 | Status: SHIPPED | OUTPATIENT
Start: 2022-12-29 | End: 2023-04-18 | Stop reason: SDUPTHER

## 2022-12-29 RX ORDER — AMOXICILLIN AND CLAVULANATE POTASSIUM 875; 125 MG/1; MG/1
1 TABLET, FILM COATED ORAL EVERY 12 HOURS
Qty: 14 TABLET | Refills: 0 | Status: SHIPPED | OUTPATIENT
Start: 2022-12-29 | End: 2023-01-05

## 2022-12-29 RX ORDER — LEVOTHYROXINE SODIUM 88 UG/1
88 TABLET ORAL
Qty: 90 TABLET | Refills: 1 | Status: SHIPPED | OUTPATIENT
Start: 2022-12-29 | End: 2023-01-23

## 2022-12-29 RX ORDER — TAMSULOSIN HYDROCHLORIDE 0.4 MG/1
1 CAPSULE ORAL DAILY
Qty: 90 CAPSULE | Refills: 1 | Status: SHIPPED | OUTPATIENT
Start: 2022-12-29 | End: 2023-04-18 | Stop reason: SDUPTHER

## 2022-12-29 RX ORDER — PRAVASTATIN SODIUM 20 MG/1
20 TABLET ORAL NIGHTLY
Qty: 90 TABLET | Refills: 2 | Status: SHIPPED | OUTPATIENT
Start: 2022-12-29 | End: 2023-04-18 | Stop reason: SDUPTHER

## 2022-12-29 RX ORDER — FINASTERIDE 5 MG/1
5 TABLET, FILM COATED ORAL DAILY
Qty: 90 TABLET | Refills: 2 | Status: SHIPPED | OUTPATIENT
Start: 2022-12-29 | End: 2023-04-18 | Stop reason: SDUPTHER

## 2022-12-29 NOTE — PATIENT INSTRUCTIONS
Continue diet controlled diabetes  No metformin for now.   Diet, exercise, weight loss. Decrease sugar/carbs    Continue synthroid at current dose  Continue pravastatin at 20 mg, down from 40 mg    Continue losartan at 100 mg. Continue digital HTN  Diastolic slightly high at home, will continue to monitor closely  Likely okay adjusted for age  135/85 or less is goal  Per last note: Current 30 Day Average: 127/83    Continue flomax for BPH.   continue finasteride (added in early 2020)  Continue cymbalta 60 mg  Continue trazodone 50 mg nightly      Augmentin, nasal sprays  If not better, consider ENT    F/u 4 months, labs today, labs prior.

## 2022-12-29 NOTE — PROGRESS NOTES
"Subjective:       Patient ID: Cesar Simpson is a 72 y.o. male.    Chief Complaint: Diabetes    Cesar is a 72 y.o. male who presents today for f/u  Last apt cancelled as I was out of clinic    Family history of MI: had stress test in 2018. Normal myocardial perfusion scan with no evidence of ischemia or infarct.  Diet controlled DM. Stopped metformin at visit 9/2020. A1c pending today  DLD: taking pravastatin, 20 mg. Decreased 6/29/2022 due to myalgias. No longer having cramps.   HTN: taking losartan. BP stable. At home, similar to readings today. Close to goal, adjusted for age.   Insomnia: taking Cymbalta 60 mg and trazodone 50 mg. "I sleep a little bit better."  FABY: cpap pending, on back order.   Hypothyroidism: on synthroid. TSH 6/2022 was normal.   BPH: on flomax and finasteride. Nocturia has resolved.      Had digital HTN and DM.     Having congestion x 3 months. He may have had a cold,but symptoms never resolved since then. No change in sandra. No new pets. Reports congestion is "constant."    Obesity: had gained weight, has lost about 4 pounds since the last visit.     Fatigue better. Overall feeling better.     Review of Systems   Constitutional:  Negative for chills and fatigue.   HENT:  Positive for congestion and rhinorrhea. Negative for sore throat, trouble swallowing and voice change.    Respiratory:  Negative for cough and shortness of breath.    Gastrointestinal:  Negative for diarrhea, nausea and vomiting.   Neurological:  Negative for dizziness and light-headedness.           Results for orders placed or performed in visit on 07/27/22   Fecal Immunochemical Test (iFOBT)   Result Value Ref Range    Fecal Immunochemical Test (iFOBT) Negative Negative       Objective:     Vitals:    12/29/22 1259   BP: 136/84   Pulse: 99   Temp: 97.7 °F (36.5 °C)        Physical Exam  Vitals and nursing note reviewed.   Constitutional:       General: He is not in acute distress.     Appearance: He is well-developed. " He is not ill-appearing, toxic-appearing or diaphoretic.   HENT:      Nose: Congestion and rhinorrhea present.      Mouth/Throat:      Pharynx: No oropharyngeal exudate or posterior oropharyngeal erythema.      Comments: cobblestoning  Cardiovascular:      Rate and Rhythm: Normal rate and regular rhythm.      Heart sounds: Murmur heard.   Systolic murmur is present with a grade of 2/6.   Pulmonary:      Effort: Pulmonary effort is normal.      Breath sounds: Normal breath sounds.   Musculoskeletal:         General: No swelling.      Right foot: No deformity.      Left foot: No deformity.   Feet:      Right foot:      Protective Sensation: 10 sites tested.  10 sites sensed.      Skin integrity: No ulcer, blister, skin breakdown, erythema, warmth, callus or dry skin.      Left foot:      Protective Sensation: 10 sites tested.  10 sites sensed.      Skin integrity: No ulcer, blister, skin breakdown, erythema, warmth, callus or dry skin.   Lymphadenopathy:      Cervical: No cervical adenopathy.   Skin:     Findings: No rash.   Neurological:      Mental Status: He is alert and oriented to person, place, and time.   Psychiatric:         Behavior: Behavior normal.         Thought Content: Thought content normal.         Judgment: Judgment normal.       Assessment:       1. Diet-controlled diabetes mellitus    2. Essential hypertension    3. Other specified hypothyroidism    4. BPH associated with nocturia    5. Hyperlipidemia associated with type 2 diabetes mellitus    6. Other hyperlipidemia    7. Insomnia, unspecified type    8. Type 2 diabetes mellitus with diabetic polyneuropathy, without long-term current use of insulin    9. Arthritis    10. Bacterial sinusitis    11. Need for vaccination against Streptococcus pneumoniae        Plan:       Continue diet controlled diabetes  No metformin  Diet, exercise, weight loss. Decrease sugar/carbs    Continue synthroid at current dose  Continue pravastatin at 20 mg, down from  40 mg    Continue losartan at 100 mg. Continue digital HTN  Diastolic slightly high at home, will continue to monitor closely  Likely okay adjusted for age  135/85 or less is goal  Per last note: 30 Day Average: 127/83    Continue flomax for BPH.   continue finasteride (added in early 2020)  Continue cymbalta 60 mg  Continue trazodone 50 mg nightly    Augmentin, nasal sprays  If not better, consider ENT    F/u 4 months, labs today, labs prior.     Diet-controlled diabetes mellitus  -     CBC Auto Differential; Future; Expected date: 12/29/2022  -     Comprehensive Metabolic Panel; Future; Expected date: 12/29/2022  -     Hemoglobin A1C; Future; Expected date: 12/29/2022  -     Comprehensive Metabolic Panel; Future; Expected date: 12/29/2022  -     Hemoglobin A1C; Future; Expected date: 12/29/2022    Essential hypertension  -     losartan (COZAAR) 100 MG tablet; Take 1 tablet (100 mg total) by mouth once daily.  Dispense: 90 tablet; Refill: 1    Other specified hypothyroidism  -     levothyroxine (SYNTHROID) 88 MCG tablet; Take 1 tablet (88 mcg total) by mouth before breakfast.  Dispense: 90 tablet; Refill: 1    BPH associated with nocturia  -     finasteride (PROSCAR) 5 mg tablet; Take 1 tablet (5 mg total) by mouth once daily.  Dispense: 90 tablet; Refill: 2  -     tamsulosin (FLOMAX) 0.4 mg Cap; Take 1 capsule (0.4 mg total) by mouth once daily.  Dispense: 90 capsule; Refill: 1    Hyperlipidemia associated with type 2 diabetes mellitus  -     pravastatin (PRAVACHOL) 20 MG tablet; Take 1 tablet (20 mg total) by mouth every evening.  Dispense: 90 tablet; Refill: 2    Other hyperlipidemia  -     pravastatin (PRAVACHOL) 20 MG tablet; Take 1 tablet (20 mg total) by mouth every evening.  Dispense: 90 tablet; Refill: 2    Insomnia, unspecified type  -     traZODone (DESYREL) 50 MG tablet; Take 1 tablet (50 mg total) by mouth every evening.  Dispense: 90 tablet; Refill: 2    Type 2 diabetes mellitus with diabetic  polyneuropathy, without long-term current use of insulin  -     CBC Auto Differential; Future; Expected date: 12/29/2022  -     Comprehensive Metabolic Panel; Future; Expected date: 12/29/2022  -     Hemoglobin A1C; Future; Expected date: 12/29/2022    Arthritis  -     DULoxetine (CYMBALTA) 60 MG capsule; Take 1 capsule (60 mg total) by mouth once daily.  Dispense: 90 capsule; Refill: 3    Bacterial sinusitis  -     amoxicillin-clavulanate 875-125mg (AUGMENTIN) 875-125 mg per tablet; Take 1 tablet by mouth every 12 (twelve) hours. for 7 days  Dispense: 14 tablet; Refill: 0  -     fluticasone propionate (FLONASE) 50 mcg/actuation nasal spray; 1 spray (50 mcg total) by Each Nostril route 2 (two) times daily.  Dispense: 16 g; Refill: 0  -     azelastine (ASTELIN) 137 mcg (0.1 %) nasal spray; 1 spray (137 mcg total) by Nasal route 2 (two) times daily.  Dispense: 30 mL; Refill: 3    Need for vaccination against Streptococcus pneumoniae  -     (In Office Administered) Pneumococcal Conjugate Vaccine (20 Valent) (IM)        Warning signs discussed, patient to call with any further issues or worsening of symptoms.

## 2023-01-03 ENCOUNTER — PES CALL (OUTPATIENT)
Dept: ADMINISTRATIVE | Facility: CLINIC | Age: 73
End: 2023-01-03
Payer: MEDICARE

## 2023-01-23 DIAGNOSIS — E03.8 OTHER SPECIFIED HYPOTHYROIDISM: ICD-10-CM

## 2023-01-23 RX ORDER — LEVOTHYROXINE SODIUM 88 UG/1
TABLET ORAL
Qty: 90 TABLET | Refills: 1 | Status: SHIPPED | OUTPATIENT
Start: 2023-01-23 | End: 2023-04-18 | Stop reason: SDUPTHER

## 2023-01-23 NOTE — TELEPHONE ENCOUNTER
No new care gaps identified.  [facility] Embedded Care Gaps. Reference number: [id number]. 1/23/2023   2:28:19 AM CST

## 2023-01-23 NOTE — TELEPHONE ENCOUNTER
Refill Decision Note   Cesar Calvin  is requesting a refill authorization.  Brief Assessment and Rationale for Refill:  Approve     Medication Therapy Plan:       Medication Reconciliation Completed: No   Comments:     No Care Gaps recommended.     Note composed:12:30 PM 01/23/2023

## 2023-02-09 ENCOUNTER — PATIENT MESSAGE (OUTPATIENT)
Dept: SLEEP MEDICINE | Facility: CLINIC | Age: 73
End: 2023-02-09
Payer: MEDICARE

## 2023-02-09 DIAGNOSIS — Z00.00 ENCOUNTER FOR MEDICARE ANNUAL WELLNESS EXAM: ICD-10-CM

## 2023-02-22 ENCOUNTER — PES CALL (OUTPATIENT)
Dept: ADMINISTRATIVE | Facility: CLINIC | Age: 73
End: 2023-02-22
Payer: MEDICARE

## 2023-04-05 ENCOUNTER — TELEPHONE (OUTPATIENT)
Dept: FAMILY MEDICINE | Facility: CLINIC | Age: 73
End: 2023-04-05
Payer: MEDICARE

## 2023-04-13 ENCOUNTER — LAB VISIT (OUTPATIENT)
Dept: LAB | Facility: HOSPITAL | Age: 73
End: 2023-04-13
Attending: FAMILY MEDICINE
Payer: MEDICARE

## 2023-04-13 DIAGNOSIS — E11.42 TYPE 2 DIABETES MELLITUS WITH DIABETIC POLYNEUROPATHY, WITHOUT LONG-TERM CURRENT USE OF INSULIN: ICD-10-CM

## 2023-04-13 DIAGNOSIS — E11.9 DIET-CONTROLLED DIABETES MELLITUS: ICD-10-CM

## 2023-04-13 LAB
ALBUMIN SERPL BCP-MCNC: 3.8 G/DL (ref 3.5–5.2)
ALP SERPL-CCNC: 58 U/L (ref 55–135)
ALT SERPL W/O P-5'-P-CCNC: 25 U/L (ref 10–44)
ANION GAP SERPL CALC-SCNC: 9 MMOL/L (ref 8–16)
AST SERPL-CCNC: 15 U/L (ref 10–40)
BASOPHILS # BLD AUTO: 0.07 K/UL (ref 0–0.2)
BASOPHILS NFR BLD: 1.1 % (ref 0–1.9)
BILIRUB SERPL-MCNC: 0.4 MG/DL (ref 0.1–1)
BUN SERPL-MCNC: 16 MG/DL (ref 8–23)
CALCIUM SERPL-MCNC: 9.9 MG/DL (ref 8.7–10.5)
CHLORIDE SERPL-SCNC: 104 MMOL/L (ref 95–110)
CO2 SERPL-SCNC: 22 MMOL/L (ref 23–29)
CREAT SERPL-MCNC: 0.9 MG/DL (ref 0.5–1.4)
DIFFERENTIAL METHOD: ABNORMAL
EOSINOPHIL # BLD AUTO: 0.2 K/UL (ref 0–0.5)
EOSINOPHIL NFR BLD: 3.4 % (ref 0–8)
ERYTHROCYTE [DISTWIDTH] IN BLOOD BY AUTOMATED COUNT: 13.5 % (ref 11.5–14.5)
EST. GFR  (NO RACE VARIABLE): >60 ML/MIN/1.73 M^2
GLUCOSE SERPL-MCNC: 119 MG/DL (ref 70–110)
HCT VFR BLD AUTO: 43.4 % (ref 40–54)
HGB BLD-MCNC: 13.9 G/DL (ref 14–18)
IMM GRANULOCYTES # BLD AUTO: 0.06 K/UL (ref 0–0.04)
IMM GRANULOCYTES NFR BLD AUTO: 1 % (ref 0–0.5)
LYMPHOCYTES # BLD AUTO: 1.6 K/UL (ref 1–4.8)
LYMPHOCYTES NFR BLD: 25 % (ref 18–48)
MCH RBC QN AUTO: 29.5 PG (ref 27–31)
MCHC RBC AUTO-ENTMCNC: 32 G/DL (ref 32–36)
MCV RBC AUTO: 92 FL (ref 82–98)
MONOCYTES # BLD AUTO: 0.6 K/UL (ref 0.3–1)
MONOCYTES NFR BLD: 8.8 % (ref 4–15)
NEUTROPHILS # BLD AUTO: 3.8 K/UL (ref 1.8–7.7)
NEUTROPHILS NFR BLD: 60.7 % (ref 38–73)
NRBC BLD-RTO: 0 /100 WBC
PLATELET # BLD AUTO: 206 K/UL (ref 150–450)
PMV BLD AUTO: 10.9 FL (ref 9.2–12.9)
POTASSIUM SERPL-SCNC: 4.2 MMOL/L (ref 3.5–5.1)
PROT SERPL-MCNC: 7.1 G/DL (ref 6–8.4)
RBC # BLD AUTO: 4.71 M/UL (ref 4.6–6.2)
SODIUM SERPL-SCNC: 135 MMOL/L (ref 136–145)
WBC # BLD AUTO: 6.25 K/UL (ref 3.9–12.7)

## 2023-04-13 PROCEDURE — 36415 COLL VENOUS BLD VENIPUNCTURE: CPT | Mod: HCNC,PO | Performed by: FAMILY MEDICINE

## 2023-04-13 PROCEDURE — 80053 COMPREHEN METABOLIC PANEL: CPT | Mod: HCNC | Performed by: FAMILY MEDICINE

## 2023-04-13 PROCEDURE — 83036 HEMOGLOBIN GLYCOSYLATED A1C: CPT | Mod: HCNC | Performed by: FAMILY MEDICINE

## 2023-04-13 PROCEDURE — 85025 COMPLETE CBC W/AUTO DIFF WBC: CPT | Mod: HCNC | Performed by: FAMILY MEDICINE

## 2023-04-14 LAB
ESTIMATED AVG GLUCOSE: 117 MG/DL (ref 68–131)
HBA1C MFR BLD: 5.7 % (ref 4–5.6)

## 2023-04-16 ENCOUNTER — PATIENT MESSAGE (OUTPATIENT)
Dept: OTHER | Facility: OTHER | Age: 73
End: 2023-04-16
Payer: MEDICARE

## 2023-04-18 ENCOUNTER — PATIENT MESSAGE (OUTPATIENT)
Dept: OTHER | Facility: OTHER | Age: 73
End: 2023-04-18
Payer: MEDICARE

## 2023-04-18 ENCOUNTER — OFFICE VISIT (OUTPATIENT)
Dept: FAMILY MEDICINE | Facility: CLINIC | Age: 73
End: 2023-04-18
Payer: MEDICARE

## 2023-04-18 VITALS
DIASTOLIC BLOOD PRESSURE: 86 MMHG | OXYGEN SATURATION: 97 % | WEIGHT: 196 LBS | HEART RATE: 87 BPM | BODY MASS INDEX: 30.7 KG/M2 | SYSTOLIC BLOOD PRESSURE: 112 MMHG

## 2023-04-18 DIAGNOSIS — Z79.899 ENCOUNTER FOR LONG-TERM CURRENT USE OF MEDICATION: ICD-10-CM

## 2023-04-18 DIAGNOSIS — E78.5 HYPERLIPIDEMIA ASSOCIATED WITH TYPE 2 DIABETES MELLITUS: ICD-10-CM

## 2023-04-18 DIAGNOSIS — I10 ESSENTIAL HYPERTENSION: ICD-10-CM

## 2023-04-18 DIAGNOSIS — E11.69 HYPERLIPIDEMIA ASSOCIATED WITH TYPE 2 DIABETES MELLITUS: ICD-10-CM

## 2023-04-18 DIAGNOSIS — R35.1 BPH ASSOCIATED WITH NOCTURIA: ICD-10-CM

## 2023-04-18 DIAGNOSIS — E78.49 OTHER HYPERLIPIDEMIA: ICD-10-CM

## 2023-04-18 DIAGNOSIS — G47.00 INSOMNIA, UNSPECIFIED TYPE: ICD-10-CM

## 2023-04-18 DIAGNOSIS — M19.90 ARTHRITIS: ICD-10-CM

## 2023-04-18 DIAGNOSIS — E03.8 OTHER SPECIFIED HYPOTHYROIDISM: ICD-10-CM

## 2023-04-18 DIAGNOSIS — N40.1 BPH ASSOCIATED WITH NOCTURIA: ICD-10-CM

## 2023-04-18 DIAGNOSIS — E11.9 DIET-CONTROLLED DIABETES MELLITUS: ICD-10-CM

## 2023-04-18 DIAGNOSIS — E11.42 TYPE 2 DIABETES MELLITUS WITH DIABETIC POLYNEUROPATHY, WITHOUT LONG-TERM CURRENT USE OF INSULIN: Primary | ICD-10-CM

## 2023-04-18 PROCEDURE — 3008F BODY MASS INDEX DOCD: CPT | Mod: HCNC,CPTII,S$GLB, | Performed by: FAMILY MEDICINE

## 2023-04-18 PROCEDURE — 3074F SYST BP LT 130 MM HG: CPT | Mod: HCNC,CPTII,S$GLB, | Performed by: FAMILY MEDICINE

## 2023-04-18 PROCEDURE — 1159F PR MEDICATION LIST DOCUMENTED IN MEDICAL RECORD: ICD-10-PCS | Mod: HCNC,CPTII,S$GLB, | Performed by: FAMILY MEDICINE

## 2023-04-18 PROCEDURE — 3008F PR BODY MASS INDEX (BMI) DOCUMENTED: ICD-10-PCS | Mod: HCNC,CPTII,S$GLB, | Performed by: FAMILY MEDICINE

## 2023-04-18 PROCEDURE — 3288F FALL RISK ASSESSMENT DOCD: CPT | Mod: HCNC,CPTII,S$GLB, | Performed by: FAMILY MEDICINE

## 2023-04-18 PROCEDURE — 1157F ADVNC CARE PLAN IN RCRD: CPT | Mod: HCNC,CPTII,S$GLB, | Performed by: FAMILY MEDICINE

## 2023-04-18 PROCEDURE — 99999 PR PBB SHADOW E&M-EST. PATIENT-LVL III: CPT | Mod: PBBFAC,HCNC,, | Performed by: FAMILY MEDICINE

## 2023-04-18 PROCEDURE — 3288F PR FALLS RISK ASSESSMENT DOCUMENTED: ICD-10-PCS | Mod: HCNC,CPTII,S$GLB, | Performed by: FAMILY MEDICINE

## 2023-04-18 PROCEDURE — 1101F PT FALLS ASSESS-DOCD LE1/YR: CPT | Mod: HCNC,CPTII,S$GLB, | Performed by: FAMILY MEDICINE

## 2023-04-18 PROCEDURE — 3044F PR MOST RECENT HEMOGLOBIN A1C LEVEL <7.0%: ICD-10-PCS | Mod: HCNC,CPTII,S$GLB, | Performed by: FAMILY MEDICINE

## 2023-04-18 PROCEDURE — 1126F PR PAIN SEVERITY QUANTIFIED, NO PAIN PRESENT: ICD-10-PCS | Mod: HCNC,CPTII,S$GLB, | Performed by: FAMILY MEDICINE

## 2023-04-18 PROCEDURE — 3072F PR LOW RISK FOR RETINOPATHY: ICD-10-PCS | Mod: HCNC,CPTII,S$GLB, | Performed by: FAMILY MEDICINE

## 2023-04-18 PROCEDURE — 1126F AMNT PAIN NOTED NONE PRSNT: CPT | Mod: HCNC,CPTII,S$GLB, | Performed by: FAMILY MEDICINE

## 2023-04-18 PROCEDURE — 1157F PR ADVANCE CARE PLAN OR EQUIV PRESENT IN MEDICAL RECORD: ICD-10-PCS | Mod: HCNC,CPTII,S$GLB, | Performed by: FAMILY MEDICINE

## 2023-04-18 PROCEDURE — 3074F PR MOST RECENT SYSTOLIC BLOOD PRESSURE < 130 MM HG: ICD-10-PCS | Mod: HCNC,CPTII,S$GLB, | Performed by: FAMILY MEDICINE

## 2023-04-18 PROCEDURE — 1160F PR REVIEW ALL MEDS BY PRESCRIBER/CLIN PHARMACIST DOCUMENTED: ICD-10-PCS | Mod: HCNC,CPTII,S$GLB, | Performed by: FAMILY MEDICINE

## 2023-04-18 PROCEDURE — 99999 PR PBB SHADOW E&M-EST. PATIENT-LVL III: ICD-10-PCS | Mod: PBBFAC,HCNC,, | Performed by: FAMILY MEDICINE

## 2023-04-18 PROCEDURE — 4010F PR ACE/ARB THEARPY RXD/TAKEN: ICD-10-PCS | Mod: HCNC,CPTII,S$GLB, | Performed by: FAMILY MEDICINE

## 2023-04-18 PROCEDURE — 1159F MED LIST DOCD IN RCRD: CPT | Mod: HCNC,CPTII,S$GLB, | Performed by: FAMILY MEDICINE

## 2023-04-18 PROCEDURE — 99214 OFFICE O/P EST MOD 30 MIN: CPT | Mod: HCNC,S$GLB,, | Performed by: FAMILY MEDICINE

## 2023-04-18 PROCEDURE — 4010F ACE/ARB THERAPY RXD/TAKEN: CPT | Mod: HCNC,CPTII,S$GLB, | Performed by: FAMILY MEDICINE

## 2023-04-18 PROCEDURE — 3072F LOW RISK FOR RETINOPATHY: CPT | Mod: HCNC,CPTII,S$GLB, | Performed by: FAMILY MEDICINE

## 2023-04-18 PROCEDURE — 1101F PR PT FALLS ASSESS DOC 0-1 FALLS W/OUT INJ PAST YR: ICD-10-PCS | Mod: HCNC,CPTII,S$GLB, | Performed by: FAMILY MEDICINE

## 2023-04-18 PROCEDURE — 3044F HG A1C LEVEL LT 7.0%: CPT | Mod: HCNC,CPTII,S$GLB, | Performed by: FAMILY MEDICINE

## 2023-04-18 PROCEDURE — 3079F PR MOST RECENT DIASTOLIC BLOOD PRESSURE 80-89 MM HG: ICD-10-PCS | Mod: HCNC,CPTII,S$GLB, | Performed by: FAMILY MEDICINE

## 2023-04-18 PROCEDURE — 99214 PR OFFICE/OUTPT VISIT, EST, LEVL IV, 30-39 MIN: ICD-10-PCS | Mod: HCNC,S$GLB,, | Performed by: FAMILY MEDICINE

## 2023-04-18 PROCEDURE — 3079F DIAST BP 80-89 MM HG: CPT | Mod: HCNC,CPTII,S$GLB, | Performed by: FAMILY MEDICINE

## 2023-04-18 PROCEDURE — 1160F RVW MEDS BY RX/DR IN RCRD: CPT | Mod: HCNC,CPTII,S$GLB, | Performed by: FAMILY MEDICINE

## 2023-04-18 RX ORDER — LOSARTAN POTASSIUM 100 MG/1
100 TABLET ORAL DAILY
Qty: 90 TABLET | Refills: 1 | Status: SHIPPED | OUTPATIENT
Start: 2023-04-18 | End: 2023-09-07 | Stop reason: SDUPTHER

## 2023-04-18 RX ORDER — FINASTERIDE 5 MG/1
5 TABLET, FILM COATED ORAL DAILY
Qty: 90 TABLET | Refills: 2 | Status: SHIPPED | OUTPATIENT
Start: 2023-04-18 | End: 2023-09-07 | Stop reason: SDUPTHER

## 2023-04-18 RX ORDER — TRAZODONE HYDROCHLORIDE 100 MG/1
100 TABLET ORAL NIGHTLY
Qty: 90 TABLET | Refills: 1 | Status: SHIPPED | OUTPATIENT
Start: 2023-04-18 | End: 2023-05-18 | Stop reason: SDUPTHER

## 2023-04-18 RX ORDER — PRAVASTATIN SODIUM 20 MG/1
20 TABLET ORAL NIGHTLY
Qty: 90 TABLET | Refills: 2 | Status: SHIPPED | OUTPATIENT
Start: 2023-04-18 | End: 2023-09-07 | Stop reason: SDUPTHER

## 2023-04-18 RX ORDER — DULOXETIN HYDROCHLORIDE 60 MG/1
60 CAPSULE, DELAYED RELEASE ORAL DAILY
Qty: 90 CAPSULE | Refills: 3 | Status: SHIPPED | OUTPATIENT
Start: 2023-04-18 | End: 2023-05-18 | Stop reason: SDUPTHER

## 2023-04-18 RX ORDER — LEVOTHYROXINE SODIUM 88 UG/1
88 TABLET ORAL
Qty: 90 TABLET | Refills: 1 | Status: SHIPPED | OUTPATIENT
Start: 2023-04-18 | End: 2023-09-07 | Stop reason: SDUPTHER

## 2023-04-18 RX ORDER — TAMSULOSIN HYDROCHLORIDE 0.4 MG/1
1 CAPSULE ORAL DAILY
Qty: 90 CAPSULE | Refills: 1 | Status: SHIPPED | OUTPATIENT
Start: 2023-04-18 | End: 2023-09-07 | Stop reason: SDUPTHER

## 2023-04-18 NOTE — PROGRESS NOTES
Subjective:       Patient ID: Cesar Simpson is a 73 y.o. male.    Chief Complaint: Diabetes    Cesar is a 73 y.o. male who presents today for f/u    Family history of MI: had stress test in 2018. Normal myocardial perfusion scan with no evidence of ischemia or infarct.  Diet controlled DM. Stopped metformin at visit 9/2020. A1c at goal 4/13/2023.   DLD: taking pravastatin, 20 mg. Decreased 6/29/2022 due to myalgias. No longer having cramps.   HTN: taking losartan. BP stable. At home, similar to readings today. Close to goal, adjusted for age.     Insomnia: taking Cymbalta 60 mg and trazodone 50 mg. Still not sleeping well.   FABY: using cpap again. Still not sleeping well.   BPH: on flomax and finasteride. Nocturia has resolved.  however, still not sleeping well.     Hypothyroidism: on synthroid. TSH 6/2022 was normal.     Anemia: mild, will follow.      Has Digital HTN and DM. Pressures and sugars appear close to normal. Slightly high diastolic bp noted at home.       Review of Systems   Constitutional:  Negative for chills and fever.   Respiratory:  Negative for cough and shortness of breath.    Cardiovascular:  Negative for chest pain.   Gastrointestinal:  Negative for diarrhea, nausea and vomiting.   Neurological:  Negative for dizziness, light-headedness and headaches.         Results for orders placed or performed in visit on 04/13/23   CBC Auto Differential   Result Value Ref Range    WBC 6.25 3.90 - 12.70 K/uL    RBC 4.71 4.60 - 6.20 M/uL    Hemoglobin 13.9 (L) 14.0 - 18.0 g/dL    Hematocrit 43.4 40.0 - 54.0 %    MCV 92 82 - 98 fL    MCH 29.5 27.0 - 31.0 pg    MCHC 32.0 32.0 - 36.0 g/dL    RDW 13.5 11.5 - 14.5 %    Platelets 206 150 - 450 K/uL    MPV 10.9 9.2 - 12.9 fL    Immature Granulocytes 1.0 (H) 0.0 - 0.5 %    Gran # (ANC) 3.8 1.8 - 7.7 K/uL    Immature Grans (Abs) 0.06 (H) 0.00 - 0.04 K/uL    Lymph # 1.6 1.0 - 4.8 K/uL    Mono # 0.6 0.3 - 1.0 K/uL    Eos # 0.2 0.0 - 0.5 K/uL    Baso # 0.07 0.00 - 0.20  K/uL    nRBC 0 0 /100 WBC    Gran % 60.7 38.0 - 73.0 %    Lymph % 25.0 18.0 - 48.0 %    Mono % 8.8 4.0 - 15.0 %    Eosinophil % 3.4 0.0 - 8.0 %    Basophil % 1.1 0.0 - 1.9 %    Differential Method Automated    Comprehensive Metabolic Panel   Result Value Ref Range    Sodium 135 (L) 136 - 145 mmol/L    Potassium 4.2 3.5 - 5.1 mmol/L    Chloride 104 95 - 110 mmol/L    CO2 22 (L) 23 - 29 mmol/L    Glucose 119 (H) 70 - 110 mg/dL    BUN 16 8 - 23 mg/dL    Creatinine 0.9 0.5 - 1.4 mg/dL    Calcium 9.9 8.7 - 10.5 mg/dL    Total Protein 7.1 6.0 - 8.4 g/dL    Albumin 3.8 3.5 - 5.2 g/dL    Total Bilirubin 0.4 0.1 - 1.0 mg/dL    Alkaline Phosphatase 58 55 - 135 U/L    AST 15 10 - 40 U/L    ALT 25 10 - 44 U/L    Anion Gap 9 8 - 16 mmol/L    eGFR >60.0 >60 mL/min/1.73 m^2   Hemoglobin A1C   Result Value Ref Range    Hemoglobin A1C 5.7 (H) 4.0 - 5.6 %    Estimated Avg Glucose 117 68 - 131 mg/dL     Lab Results   Component Value Date    HGBA1C 5.7 (H) 04/13/2023    HGBA1C 6.0 (H) 12/29/2022    HGBA1C 5.6 06/28/2022         Objective:     Vitals:    04/18/23 0946   BP: 112/86   BP Location: Left arm   Patient Position: Sitting   BP Method: Medium (Manual)   Pulse: 87   SpO2: 97%   Weight: 88.9 kg (195 lb 15.8 oz)        Physical Exam  Vitals and nursing note reviewed.   Constitutional:       General: He is not in acute distress.     Appearance: He is well-developed. He is not ill-appearing, toxic-appearing or diaphoretic.   HENT:      Mouth/Throat:      Comments: cobblestoning  Cardiovascular:      Rate and Rhythm: Normal rate and regular rhythm.      Heart sounds: Murmur heard.   Systolic murmur is present with a grade of 2/6.   Pulmonary:      Effort: Pulmonary effort is normal.      Breath sounds: Normal breath sounds.   Musculoskeletal:         General: No swelling.   Lymphadenopathy:      Cervical: No cervical adenopathy.   Skin:     Findings: No rash.   Neurological:      Mental Status: He is alert and oriented to person,  place, and time.   Psychiatric:         Behavior: Behavior normal.         Thought Content: Thought content normal.         Judgment: Judgment normal.       Assessment:       1. Type 2 diabetes mellitus with diabetic polyneuropathy, without long-term current use of insulin    2. Essential hypertension    3. Arthritis    4. Insomnia, unspecified type    5. BPH associated with nocturia    6. Hyperlipidemia associated with type 2 diabetes mellitus    7. Other hyperlipidemia    8. Other specified hypothyroidism    9. Diet-controlled diabetes mellitus    10. Encounter for long-term current use of medication    11. BMI 30.0-30.9,adult        Plan:       Continue diet controlled diabetes  No metformin  Diet, exercise, weight loss. Decrease sugar/carbs     Continue synthroid at current dose  Continue pravastatin at 20 mg, due to myalgias.      Continue losartan at 100 mg. Continue digital HTN  Diastolic slightly high at home, will continue to monitor closely  Likely okay adjusted for age  135/85 or less is goal     Continue flomax for BPH.   continue finasteride (added in early 2020)    Murmur: consider ECHO in the future if symptomatic or worsening.     Continue cymbalta 60 mg  Continue trazodone but at 100 mg.      F/u 4 months, labs prior. MAWV.     Type 2 diabetes mellitus with diabetic polyneuropathy, without long-term current use of insulin  -     Hemoglobin A1C; Future; Expected date: 04/18/2023  -     Microalbumin/Creatinine Ratio, Urine; Future; Expected date: 04/18/2023    Essential hypertension  -     losartan (COZAAR) 100 MG tablet; Take 1 tablet (100 mg total) by mouth once daily.  Dispense: 90 tablet; Refill: 1  -     CBC Auto Differential; Future; Expected date: 04/18/2023  -     Comprehensive Metabolic Panel; Future; Expected date: 04/18/2023    Arthritis  -     DULoxetine (CYMBALTA) 60 MG capsule; Take 1 capsule (60 mg total) by mouth once daily.  Dispense: 90 capsule; Refill: 3    Insomnia, unspecified  type  -     traZODone (DESYREL) 100 MG tablet; Take 1 tablet (100 mg total) by mouth every evening.  Dispense: 90 tablet; Refill: 1    BPH associated with nocturia  -     tamsulosin (FLOMAX) 0.4 mg Cap; Take 1 capsule (0.4 mg total) by mouth once daily.  Dispense: 90 capsule; Refill: 1  -     finasteride (PROSCAR) 5 mg tablet; Take 1 tablet (5 mg total) by mouth once daily.  Dispense: 90 tablet; Refill: 2    Hyperlipidemia associated with type 2 diabetes mellitus  -     pravastatin (PRAVACHOL) 20 MG tablet; Take 1 tablet (20 mg total) by mouth every evening.  Dispense: 90 tablet; Refill: 2    Other hyperlipidemia  -     pravastatin (PRAVACHOL) 20 MG tablet; Take 1 tablet (20 mg total) by mouth every evening.  Dispense: 90 tablet; Refill: 2  -     Lipid Panel; Future; Expected date: 04/18/2023    Other specified hypothyroidism  -     levothyroxine (SYNTHROID) 88 MCG tablet; Take 1 tablet (88 mcg total) by mouth before breakfast.  Dispense: 90 tablet; Refill: 1  -     TSH; Future; Expected date: 04/18/2023  -     T4, Free; Future; Expected date: 04/18/2023    Diet-controlled diabetes mellitus  -     Hemoglobin A1C; Future; Expected date: 04/18/2023  -     Microalbumin/Creatinine Ratio, Urine; Future; Expected date: 04/18/2023    Encounter for long-term current use of medication  -     Vitamin B12; Future; Expected date: 04/18/2023    BMI 30.0-30.9,adult            Warning signs discussed, patient to call with any further issues or worsening of symptoms. Above note may have utilized dictation, please excuse any transcription errors.

## 2023-04-18 NOTE — PATIENT INSTRUCTIONS
Continue diet controlled diabetes  No metformin  Diet, exercise, weight loss. Decrease sugar/carbs     Continue synthroid at current dose  Continue pravastatin at 20 mg, down from 40 mg     Continue losartan at 100 mg. Continue digital HTN  Diastolic slightly high at home, will continue to monitor closely  Likely okay adjusted for age  135/85 or less is goal     Continue flomax for BPH.   continue finasteride (added in early 2020)    Murmur: consider ECHO in the future if symptomatic or worsening.     Continue cymbalta 60 mg  Continue trazodone but at 100 mg.      F/u 4 months, labs today, labs prior.     Lab Results   Component Value Date    HGBA1C 5.7 (H) 04/13/2023    HGBA1C 6.0 (H) 12/29/2022    HGBA1C 5.6 06/28/2022

## 2023-05-18 DIAGNOSIS — M19.90 ARTHRITIS: ICD-10-CM

## 2023-05-18 DIAGNOSIS — G47.00 INSOMNIA, UNSPECIFIED TYPE: ICD-10-CM

## 2023-05-18 RX ORDER — DULOXETIN HYDROCHLORIDE 60 MG/1
60 CAPSULE, DELAYED RELEASE ORAL DAILY
Qty: 90 CAPSULE | Refills: 3 | Status: SHIPPED | OUTPATIENT
Start: 2023-05-18 | End: 2023-09-07 | Stop reason: SDUPTHER

## 2023-05-18 NOTE — TELEPHONE ENCOUNTER
Care Due:                  Date            Visit Type   Department     Provider  --------------------------------------------------------------------------------                                EP -                              [facility] FAMILY  Last Visit: 04-      CARE ([facility])   [facility]       Chaitanya López                              EP -                              PRIMARY      [facility]  Next Visit: 09-      CARE ([facility])   [facility]       Chaitanyalinnea López                                                            Last  Test          Frequency    Reason                     Performed    Due Date  --------------------------------------------------------------------------------    Lipid Panel.  12 months..  pravastatin..............  06- 06-    TSH.........  12 months..  levothyroxine............  06- 06-    [facility] Embedded Care Due Messages. Reference number: [id number].   5/18/2023 1:18:05 PM CDT

## 2023-05-18 NOTE — TELEPHONE ENCOUNTER
Refill Routing Note   Medication(s) are not appropriate for processing by [facility] for the following reason(s):      New or recently adjusted medication    [facility] action(s):  Defer  Approve None identified     Medication Therapy Plan: FLOS      Appointments  past 12m or future 3m with PCP    Date Provider   Last Visit   4/18/2023 Chaitanya López DO   Next Visit   9/7/2023 Chaitanya López DO   ED visits in past 90 days: 0        Note composed:4:47 PM 05/18/2023

## 2023-05-20 ENCOUNTER — PATIENT MESSAGE (OUTPATIENT)
Dept: ADMINISTRATIVE | Facility: OTHER | Age: 73
End: 2023-05-20
Payer: MEDICARE

## 2023-05-22 RX ORDER — TRAZODONE HYDROCHLORIDE 100 MG/1
100 TABLET ORAL NIGHTLY
Qty: 90 TABLET | Refills: 0 | Status: SHIPPED | OUTPATIENT
Start: 2023-05-22 | End: 2023-09-06

## 2023-06-05 ENCOUNTER — OFFICE VISIT (OUTPATIENT)
Dept: OPTOMETRY | Facility: CLINIC | Age: 73
End: 2023-06-05
Payer: MEDICARE

## 2023-06-05 DIAGNOSIS — Z96.1 PSEUDOPHAKIA OF BOTH EYES: ICD-10-CM

## 2023-06-05 DIAGNOSIS — H52.7 REFRACTIVE ERRORS: ICD-10-CM

## 2023-06-05 DIAGNOSIS — H43.811 VITREOUS DETACHMENT OF RIGHT EYE: ICD-10-CM

## 2023-06-05 DIAGNOSIS — Z98.42 S/P BILATERAL CATARACT EXTRACTION: ICD-10-CM

## 2023-06-05 DIAGNOSIS — Z98.41 S/P BILATERAL CATARACT EXTRACTION: ICD-10-CM

## 2023-06-05 DIAGNOSIS — Z01.00 DIABETIC EYE EXAM: Primary | ICD-10-CM

## 2023-06-05 DIAGNOSIS — Z98.890 HISTORY OF REFRACTIVE SURGERY: ICD-10-CM

## 2023-06-05 DIAGNOSIS — E11.9 DIABETIC EYE EXAM: Primary | ICD-10-CM

## 2023-06-05 DIAGNOSIS — Z13.5 SCREENING FOR EYE CONDITION: ICD-10-CM

## 2023-06-05 DIAGNOSIS — E11.9 TYPE 2 DIABETES MELLITUS WITHOUT RETINOPATHY: ICD-10-CM

## 2023-06-05 PROCEDURE — 3288F PR FALLS RISK ASSESSMENT DOCUMENTED: ICD-10-PCS | Mod: HCNC,CPTII,S$GLB, | Performed by: OPTOMETRIST

## 2023-06-05 PROCEDURE — 3288F FALL RISK ASSESSMENT DOCD: CPT | Mod: HCNC,CPTII,S$GLB, | Performed by: OPTOMETRIST

## 2023-06-05 PROCEDURE — 1126F AMNT PAIN NOTED NONE PRSNT: CPT | Mod: HCNC,CPTII,S$GLB, | Performed by: OPTOMETRIST

## 2023-06-05 PROCEDURE — 92015 DETERMINE REFRACTIVE STATE: CPT | Mod: HCNC,S$GLB,, | Performed by: OPTOMETRIST

## 2023-06-05 PROCEDURE — 1159F MED LIST DOCD IN RCRD: CPT | Mod: HCNC,CPTII,S$GLB, | Performed by: OPTOMETRIST

## 2023-06-05 PROCEDURE — 99999 PR PBB SHADOW E&M-EST. PATIENT-LVL III: ICD-10-PCS | Mod: PBBFAC,HCNC,, | Performed by: OPTOMETRIST

## 2023-06-05 PROCEDURE — 2023F PR DILATED RETINAL EXAM W/O EVID OF RETINOPATHY: ICD-10-PCS | Mod: HCNC,CPTII,S$GLB, | Performed by: OPTOMETRIST

## 2023-06-05 PROCEDURE — 1126F PR PAIN SEVERITY QUANTIFIED, NO PAIN PRESENT: ICD-10-PCS | Mod: HCNC,CPTII,S$GLB, | Performed by: OPTOMETRIST

## 2023-06-05 PROCEDURE — 1101F PR PT FALLS ASSESS DOC 0-1 FALLS W/OUT INJ PAST YR: ICD-10-PCS | Mod: HCNC,CPTII,S$GLB, | Performed by: OPTOMETRIST

## 2023-06-05 PROCEDURE — 92014 COMPRE OPH EXAM EST PT 1/>: CPT | Mod: HCNC,S$GLB,, | Performed by: OPTOMETRIST

## 2023-06-05 PROCEDURE — 3044F PR MOST RECENT HEMOGLOBIN A1C LEVEL <7.0%: ICD-10-PCS | Mod: HCNC,CPTII,S$GLB, | Performed by: OPTOMETRIST

## 2023-06-05 PROCEDURE — 1157F PR ADVANCE CARE PLAN OR EQUIV PRESENT IN MEDICAL RECORD: ICD-10-PCS | Mod: HCNC,CPTII,S$GLB, | Performed by: OPTOMETRIST

## 2023-06-05 PROCEDURE — 1157F ADVNC CARE PLAN IN RCRD: CPT | Mod: HCNC,CPTII,S$GLB, | Performed by: OPTOMETRIST

## 2023-06-05 PROCEDURE — 92014 PR EYE EXAM, EST PATIENT,COMPREHESV: ICD-10-PCS | Mod: HCNC,S$GLB,, | Performed by: OPTOMETRIST

## 2023-06-05 PROCEDURE — 1101F PT FALLS ASSESS-DOCD LE1/YR: CPT | Mod: HCNC,CPTII,S$GLB, | Performed by: OPTOMETRIST

## 2023-06-05 PROCEDURE — 3044F HG A1C LEVEL LT 7.0%: CPT | Mod: HCNC,CPTII,S$GLB, | Performed by: OPTOMETRIST

## 2023-06-05 PROCEDURE — 4010F ACE/ARB THERAPY RXD/TAKEN: CPT | Mod: HCNC,CPTII,S$GLB, | Performed by: OPTOMETRIST

## 2023-06-05 PROCEDURE — 1159F PR MEDICATION LIST DOCUMENTED IN MEDICAL RECORD: ICD-10-PCS | Mod: HCNC,CPTII,S$GLB, | Performed by: OPTOMETRIST

## 2023-06-05 PROCEDURE — 99999 PR PBB SHADOW E&M-EST. PATIENT-LVL III: CPT | Mod: PBBFAC,HCNC,, | Performed by: OPTOMETRIST

## 2023-06-05 PROCEDURE — 4010F PR ACE/ARB THEARPY RXD/TAKEN: ICD-10-PCS | Mod: HCNC,CPTII,S$GLB, | Performed by: OPTOMETRIST

## 2023-06-05 PROCEDURE — 2023F DILAT RTA XM W/O RTNOPTHY: CPT | Mod: HCNC,CPTII,S$GLB, | Performed by: OPTOMETRIST

## 2023-06-05 PROCEDURE — 92015 PR REFRACTION: ICD-10-PCS | Mod: HCNC,S$GLB,, | Performed by: OPTOMETRIST

## 2023-06-05 RX ORDER — PNEUMOCOCCAL 20-VALENT CONJUGATE VACCINE 2.2; 2.2; 2.2; 2.2; 2.2; 2.2; 2.2; 2.2; 2.2; 2.2; 2.2; 2.2; 2.2; 2.2; 2.2; 2.2; 4.4; 2.2; 2.2; 2.2 UG/.5ML; UG/.5ML; UG/.5ML; UG/.5ML; UG/.5ML; UG/.5ML; UG/.5ML; UG/.5ML; UG/.5ML; UG/.5ML; UG/.5ML; UG/.5ML; UG/.5ML; UG/.5ML; UG/.5ML; UG/.5ML; UG/.5ML; UG/.5ML; UG/.5ML; UG/.5ML
INJECTION, SUSPENSION INTRAMUSCULAR
COMMUNITY
Start: 2022-12-29 | End: 2023-06-07

## 2023-06-05 NOTE — PROGRESS NOTES
HPI     Diabetic Eye Exam            Comments: Diet-controlled type 2 diabetes  States he does check blood glucose levels regularly, and seem okayl          Comments    Patient's age: 73 y.o. male   Occupation: Retired   Approximate date of last eye examination: 04/06/2023 Dr. Rodriguez   City/State: [facility]  Wears glasses?  no     If yes, wears  Full-time or part-time?  N/a  Present glasses are: Bifocal, SV Distance, SV Reading?  Had SV distance   only   Approximate age of present glasses: no   Any problem with VA with glasses?  no  Wears CLs?:  No  Headaches?  No  Eye pain/discomfort?  No                                                                                     Flashes?  No   Floaters?  yes  Diplopia/Double vision?  No  Patient's Ocular History:         Any eye surgeries? S/p phaco OU (Dr. Patten)         Any eye injury?  No           Any treatment for eye disease?  S/p laser procedure in each eye at   Dr. Ruiz's office - see notes above.                                                              S/P RK surgery   in both eyes many years ago   Family history of eye disease?  No  Significant patient medical history:         1. Diabetes? Pre-diabetic for 10 + years. - Diet controlled.        If yes, IDDM or NIDDM? NIDDM  - no longer takes diabetic meds.    Removed from Metformin.  Diet only.   2. HBP?  Yes, controlled with medication                ! OTC eyedrops currently using:  No   ! Prescription eye meds currently using:  No   ! Any history of allergy/adverse reaction to any eye meds used   previously?  No    ! Any history of allergy/adverse reaction to eyedrops used during prior   eye exam(s)? No    ! Any history of allergy/adverse reaction to Novacaine or similar meds?   No   ! Any history of allergy/adverse reaction to Epinephrine or similar meds?   No    ! Patient okay with use of anesthetic eyedrops to check eye pressure?    Yes         ! Patient okay with use of eyedrops to dilate pupils  "today?  Yes    !  Allergies/Medications/Medical History/Family History reviewed today?    Yes     PD =   64/60  Desired reading distance =   15"                                                                       Last edited by John Rodriguez, OD on 6/5/2023  9:52 AM.            Assessment /Plan     For exam results, see Encounter Report.    1. Diabetic eye exam        2. Type 2 diabetes mellitus without retinopathy        3. S/P bilateral cataract extraction        4. Pseudophakia of both eyes        5. History of refractive surgery        6. Refractive errors        7. Vitreous detachment of right eye        8. Screening for eye condition                       S/P cataract surgery in both eyes, with bilateral posterior chamber intraocular lens.  S/P YAG laser posterior capsulotomy in both eyes.  Residual distance refractive error in each eye, with very satisfactory best-corrected VA in each eye.      New spectacle lens Rx issued for use as desired.     S/P RK refractive surgery in both eyes many years ago.      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      Type 2 diabetes without evidence of diabetic retinopathy in either eye.     Recheck in twelve months, or prior if any problems or changes in vision noted in the interim.          "

## 2023-06-05 NOTE — PATIENT INSTRUCTIONS
S/P cataract surgery in both eyes, with bilateral posterior chamber intraocular lens.  S/P YAG laser posterior capsulotomy in both eyes.  Residual distance refractive error in each eye, with very satisfactory best-corrected VA in each eye.      New spectacle lens Rx issued for use as desired.     S/P RK refractive surgery in both eyes many years ago.      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      Type 2 diabetes without evidence of diabetic retinopathy in either eye.     Recheck in twelve months, or prior if any problems or changes in vision noted in the interim.

## 2023-06-07 ENCOUNTER — OFFICE VISIT (OUTPATIENT)
Dept: FAMILY MEDICINE | Facility: CLINIC | Age: 73
End: 2023-06-07
Payer: MEDICARE

## 2023-06-07 VITALS
HEIGHT: 67 IN | WEIGHT: 197.56 LBS | TEMPERATURE: 98 F | OXYGEN SATURATION: 96 % | DIASTOLIC BLOOD PRESSURE: 89 MMHG | HEART RATE: 92 BPM | BODY MASS INDEX: 31.01 KG/M2 | SYSTOLIC BLOOD PRESSURE: 139 MMHG

## 2023-06-07 DIAGNOSIS — J32.4 CHRONIC PANSINUSITIS: Primary | ICD-10-CM

## 2023-06-07 DIAGNOSIS — I15.2 HYPERTENSION ASSOCIATED WITH DIABETES: ICD-10-CM

## 2023-06-07 DIAGNOSIS — E11.42 TYPE 2 DIABETES MELLITUS WITH DIABETIC POLYNEUROPATHY, WITHOUT LONG-TERM CURRENT USE OF INSULIN: ICD-10-CM

## 2023-06-07 DIAGNOSIS — E11.59 HYPERTENSION ASSOCIATED WITH DIABETES: ICD-10-CM

## 2023-06-07 PROBLEM — I10 ESSENTIAL HYPERTENSION: Status: RESOLVED | Noted: 2017-03-24 | Resolved: 2023-06-07

## 2023-06-07 PROCEDURE — 1157F ADVNC CARE PLAN IN RCRD: CPT | Mod: HCNC,CPTII,S$GLB, | Performed by: FAMILY MEDICINE

## 2023-06-07 PROCEDURE — 4010F ACE/ARB THERAPY RXD/TAKEN: CPT | Mod: HCNC,CPTII,S$GLB, | Performed by: FAMILY MEDICINE

## 2023-06-07 PROCEDURE — 1101F PT FALLS ASSESS-DOCD LE1/YR: CPT | Mod: HCNC,CPTII,S$GLB, | Performed by: FAMILY MEDICINE

## 2023-06-07 PROCEDURE — 3072F PR LOW RISK FOR RETINOPATHY: ICD-10-PCS | Mod: HCNC,CPTII,S$GLB, | Performed by: FAMILY MEDICINE

## 2023-06-07 PROCEDURE — 3079F PR MOST RECENT DIASTOLIC BLOOD PRESSURE 80-89 MM HG: ICD-10-PCS | Mod: HCNC,CPTII,S$GLB, | Performed by: FAMILY MEDICINE

## 2023-06-07 PROCEDURE — 1160F RVW MEDS BY RX/DR IN RCRD: CPT | Mod: HCNC,CPTII,S$GLB, | Performed by: FAMILY MEDICINE

## 2023-06-07 PROCEDURE — 4010F PR ACE/ARB THEARPY RXD/TAKEN: ICD-10-PCS | Mod: HCNC,CPTII,S$GLB, | Performed by: FAMILY MEDICINE

## 2023-06-07 PROCEDURE — 1160F PR REVIEW ALL MEDS BY PRESCRIBER/CLIN PHARMACIST DOCUMENTED: ICD-10-PCS | Mod: HCNC,CPTII,S$GLB, | Performed by: FAMILY MEDICINE

## 2023-06-07 PROCEDURE — 3044F HG A1C LEVEL LT 7.0%: CPT | Mod: HCNC,CPTII,S$GLB, | Performed by: FAMILY MEDICINE

## 2023-06-07 PROCEDURE — 99214 OFFICE O/P EST MOD 30 MIN: CPT | Mod: HCNC,S$GLB,, | Performed by: FAMILY MEDICINE

## 2023-06-07 PROCEDURE — 1159F MED LIST DOCD IN RCRD: CPT | Mod: HCNC,CPTII,S$GLB, | Performed by: FAMILY MEDICINE

## 2023-06-07 PROCEDURE — 3075F SYST BP GE 130 - 139MM HG: CPT | Mod: HCNC,CPTII,S$GLB, | Performed by: FAMILY MEDICINE

## 2023-06-07 PROCEDURE — 3288F FALL RISK ASSESSMENT DOCD: CPT | Mod: HCNC,CPTII,S$GLB, | Performed by: FAMILY MEDICINE

## 2023-06-07 PROCEDURE — 99214 PR OFFICE/OUTPT VISIT, EST, LEVL IV, 30-39 MIN: ICD-10-PCS | Mod: HCNC,S$GLB,, | Performed by: FAMILY MEDICINE

## 2023-06-07 PROCEDURE — 1125F AMNT PAIN NOTED PAIN PRSNT: CPT | Mod: HCNC,CPTII,S$GLB, | Performed by: FAMILY MEDICINE

## 2023-06-07 PROCEDURE — 3044F PR MOST RECENT HEMOGLOBIN A1C LEVEL <7.0%: ICD-10-PCS | Mod: HCNC,CPTII,S$GLB, | Performed by: FAMILY MEDICINE

## 2023-06-07 PROCEDURE — 3072F LOW RISK FOR RETINOPATHY: CPT | Mod: HCNC,CPTII,S$GLB, | Performed by: FAMILY MEDICINE

## 2023-06-07 PROCEDURE — 1157F PR ADVANCE CARE PLAN OR EQUIV PRESENT IN MEDICAL RECORD: ICD-10-PCS | Mod: HCNC,CPTII,S$GLB, | Performed by: FAMILY MEDICINE

## 2023-06-07 PROCEDURE — 3008F PR BODY MASS INDEX (BMI) DOCUMENTED: ICD-10-PCS | Mod: HCNC,CPTII,S$GLB, | Performed by: FAMILY MEDICINE

## 2023-06-07 PROCEDURE — 3075F PR MOST RECENT SYSTOLIC BLOOD PRESS GE 130-139MM HG: ICD-10-PCS | Mod: HCNC,CPTII,S$GLB, | Performed by: FAMILY MEDICINE

## 2023-06-07 PROCEDURE — 1101F PR PT FALLS ASSESS DOC 0-1 FALLS W/OUT INJ PAST YR: ICD-10-PCS | Mod: HCNC,CPTII,S$GLB, | Performed by: FAMILY MEDICINE

## 2023-06-07 PROCEDURE — 99999 PR PBB SHADOW E&M-EST. PATIENT-LVL IV: CPT | Mod: PBBFAC,HCNC,, | Performed by: FAMILY MEDICINE

## 2023-06-07 PROCEDURE — 3288F PR FALLS RISK ASSESSMENT DOCUMENTED: ICD-10-PCS | Mod: HCNC,CPTII,S$GLB, | Performed by: FAMILY MEDICINE

## 2023-06-07 PROCEDURE — 3079F DIAST BP 80-89 MM HG: CPT | Mod: HCNC,CPTII,S$GLB, | Performed by: FAMILY MEDICINE

## 2023-06-07 PROCEDURE — 1125F PR PAIN SEVERITY QUANTIFIED, PAIN PRESENT: ICD-10-PCS | Mod: HCNC,CPTII,S$GLB, | Performed by: FAMILY MEDICINE

## 2023-06-07 PROCEDURE — 1159F PR MEDICATION LIST DOCUMENTED IN MEDICAL RECORD: ICD-10-PCS | Mod: HCNC,CPTII,S$GLB, | Performed by: FAMILY MEDICINE

## 2023-06-07 PROCEDURE — 3008F BODY MASS INDEX DOCD: CPT | Mod: HCNC,CPTII,S$GLB, | Performed by: FAMILY MEDICINE

## 2023-06-07 PROCEDURE — 99999 PR PBB SHADOW E&M-EST. PATIENT-LVL IV: ICD-10-PCS | Mod: PBBFAC,HCNC,, | Performed by: FAMILY MEDICINE

## 2023-06-07 RX ORDER — AMOXICILLIN 875 MG/1
875 TABLET, FILM COATED ORAL 2 TIMES DAILY
Qty: 14 TABLET | Refills: 0 | Status: SHIPPED | OUTPATIENT
Start: 2023-06-07 | End: 2023-06-14

## 2023-06-07 RX ORDER — CODEINE PHOSPHATE AND GUAIFENESIN 10; 100 MG/5ML; MG/5ML
5 SOLUTION ORAL EVERY 8 HOURS PRN
Qty: 237 ML | Refills: 0 | Status: SHIPPED | OUTPATIENT
Start: 2023-06-07 | End: 2023-06-23

## 2023-06-07 RX ORDER — FLUTICASONE PROPIONATE 50 MCG
2 SPRAY, SUSPENSION (ML) NASAL DAILY
Qty: 16 G | Refills: 0 | Status: SHIPPED | OUTPATIENT
Start: 2023-06-07 | End: 2023-08-11

## 2023-06-07 NOTE — PROGRESS NOTES
Subjective:       Patient ID: Cesar Simpson is a 73 y.o. male.    Chief Complaint: Cough  74 yo male pt of Dr. Chaitanya López with HTN, hyperlipidemia, hypothyroidism, FABY on CPAP and DM, Type 2 presents c/o cough for 3 weeks. Cough disturbs his sleep.  Cough  This is a new problem. The current episode started 1 to 4 weeks ago. The problem has been gradually worsening. The cough is Productive of purulent sputum. Associated symptoms include chest pain, postnasal drip and a sore throat. Pertinent negatives include no chills, fever or shortness of breath. He has tried OTC cough suppressant for the symptoms. The treatment provided no relief.   Review of Systems   Constitutional:  Negative for chills and fever.   HENT:  Positive for congestion, postnasal drip and sore throat.    Respiratory:  Positive for cough. Negative for shortness of breath.    Cardiovascular:  Positive for chest pain.     Objective:      Physical Exam  Vitals reviewed.   Constitutional:       General: He is not in acute distress.     Appearance: Normal appearance. He is obese. He is not ill-appearing.   HENT:      Head: Normocephalic and atraumatic.      Right Ear: Tympanic membrane, ear canal and external ear normal. There is no impacted cerumen.      Left Ear: Tympanic membrane, ear canal and external ear normal. There is no impacted cerumen.      Nose: Congestion present. No rhinorrhea.      Mouth/Throat:      Mouth: Mucous membranes are moist.      Pharynx: Oropharynx is clear. No oropharyngeal exudate or posterior oropharyngeal erythema.   Eyes:      General:         Right eye: No discharge.         Left eye: No discharge.      Extraocular Movements: Extraocular movements intact.      Conjunctiva/sclera: Conjunctivae normal.   Cardiovascular:      Rate and Rhythm: Normal rate and regular rhythm.      Heart sounds: Normal heart sounds. No murmur heard.    No gallop.   Pulmonary:      Effort: Pulmonary effort is normal.      Breath sounds: Normal  breath sounds. No wheezing or rales.   Abdominal:      General: Bowel sounds are normal.      Palpations: Abdomen is soft. There is no mass.      Tenderness: There is no abdominal tenderness.   Musculoskeletal:      Cervical back: Normal range of motion and neck supple. No rigidity or tenderness.   Lymphadenopathy:      Cervical: No cervical adenopathy.   Neurological:      Mental Status: He is alert.       Assessment:       See plan  Plan:       Chronic pansinusitis  -     amoxicillin (AMOXIL) 875 MG tablet; Take 1 tablet (875 mg total) by mouth 2 (two) times daily. for 7 days  Dispense: 14 tablet; Refill: 0  -     guaiFENesin-codeine 100-10 mg/5 ml (TUSSI-ORGANIDIN NR)  mg/5 mL syrup; Take 5 mLs by mouth every 8 (eight) hours as needed for Cough.  Dispense: 237 mL; Refill: 0  -     fluticasone propionate (FLONASE) 50 mcg/actuation nasal spray; 2 sprays (100 mcg total) by Each Nostril route once daily.  Dispense: 16 g; Refill: 0    Hypertension associated with diabetes: Stable  - Pt advised to avoid oral decongestants    Type 2 diabetes mellitus with diabetic polyneuropathy, without long-term current use of insulin: Stable    BMI 30.0-30.9,adult  The patient's BMI has been recorded in the chart. The patient has been provided educational materials regarding the benefits of attaining and maintaining a normal weight. We will continue to address and follow this issue during follow up visits.        F/U as needed.

## 2023-06-15 DIAGNOSIS — J32.4 CHRONIC PANSINUSITIS: ICD-10-CM

## 2023-06-15 RX ORDER — AMOXICILLIN 875 MG/1
875 TABLET, FILM COATED ORAL 2 TIMES DAILY
Qty: 14 TABLET | Refills: 0 | OUTPATIENT
Start: 2023-06-15 | End: 2023-06-22

## 2023-06-15 RX ORDER — CODEINE PHOSPHATE AND GUAIFENESIN 10; 100 MG/5ML; MG/5ML
5 SOLUTION ORAL EVERY 8 HOURS PRN
Qty: 237 ML | Refills: 0 | OUTPATIENT
Start: 2023-06-15 | End: 2023-07-01

## 2023-06-16 ENCOUNTER — OFFICE VISIT (OUTPATIENT)
Dept: INTERNAL MEDICINE | Facility: CLINIC | Age: 73
End: 2023-06-16
Payer: MEDICARE

## 2023-06-16 VITALS
BODY MASS INDEX: 29.62 KG/M2 | DIASTOLIC BLOOD PRESSURE: 86 MMHG | OXYGEN SATURATION: 96 % | SYSTOLIC BLOOD PRESSURE: 120 MMHG | HEIGHT: 67 IN | HEART RATE: 104 BPM | WEIGHT: 188.69 LBS

## 2023-06-16 DIAGNOSIS — J40 BRONCHITIS: ICD-10-CM

## 2023-06-16 DIAGNOSIS — R05.9 COUGH, UNSPECIFIED TYPE: Primary | ICD-10-CM

## 2023-06-16 PROCEDURE — 4010F ACE/ARB THERAPY RXD/TAKEN: CPT | Mod: HCNC,CPTII,S$GLB, | Performed by: INTERNAL MEDICINE

## 2023-06-16 PROCEDURE — 3008F BODY MASS INDEX DOCD: CPT | Mod: HCNC,CPTII,S$GLB, | Performed by: INTERNAL MEDICINE

## 2023-06-16 PROCEDURE — 3074F SYST BP LT 130 MM HG: CPT | Mod: HCNC,CPTII,S$GLB, | Performed by: INTERNAL MEDICINE

## 2023-06-16 PROCEDURE — 3044F PR MOST RECENT HEMOGLOBIN A1C LEVEL <7.0%: ICD-10-PCS | Mod: HCNC,CPTII,S$GLB, | Performed by: INTERNAL MEDICINE

## 2023-06-16 PROCEDURE — 1159F PR MEDICATION LIST DOCUMENTED IN MEDICAL RECORD: ICD-10-PCS | Mod: HCNC,CPTII,S$GLB, | Performed by: INTERNAL MEDICINE

## 2023-06-16 PROCEDURE — 1159F MED LIST DOCD IN RCRD: CPT | Mod: HCNC,CPTII,S$GLB, | Performed by: INTERNAL MEDICINE

## 2023-06-16 PROCEDURE — 3288F FALL RISK ASSESSMENT DOCD: CPT | Mod: HCNC,CPTII,S$GLB, | Performed by: INTERNAL MEDICINE

## 2023-06-16 PROCEDURE — 99214 OFFICE O/P EST MOD 30 MIN: CPT | Mod: HCNC,S$GLB,, | Performed by: INTERNAL MEDICINE

## 2023-06-16 PROCEDURE — 3079F DIAST BP 80-89 MM HG: CPT | Mod: HCNC,CPTII,S$GLB, | Performed by: INTERNAL MEDICINE

## 2023-06-16 PROCEDURE — 3074F PR MOST RECENT SYSTOLIC BLOOD PRESSURE < 130 MM HG: ICD-10-PCS | Mod: HCNC,CPTII,S$GLB, | Performed by: INTERNAL MEDICINE

## 2023-06-16 PROCEDURE — 99214 PR OFFICE/OUTPT VISIT, EST, LEVL IV, 30-39 MIN: ICD-10-PCS | Mod: HCNC,S$GLB,, | Performed by: INTERNAL MEDICINE

## 2023-06-16 PROCEDURE — 3072F LOW RISK FOR RETINOPATHY: CPT | Mod: HCNC,CPTII,S$GLB, | Performed by: INTERNAL MEDICINE

## 2023-06-16 PROCEDURE — 99999 PR PBB SHADOW E&M-EST. PATIENT-LVL IV: ICD-10-PCS | Mod: PBBFAC,HCNC,, | Performed by: INTERNAL MEDICINE

## 2023-06-16 PROCEDURE — 1157F PR ADVANCE CARE PLAN OR EQUIV PRESENT IN MEDICAL RECORD: ICD-10-PCS | Mod: HCNC,CPTII,S$GLB, | Performed by: INTERNAL MEDICINE

## 2023-06-16 PROCEDURE — 1101F PR PT FALLS ASSESS DOC 0-1 FALLS W/OUT INJ PAST YR: ICD-10-PCS | Mod: HCNC,CPTII,S$GLB, | Performed by: INTERNAL MEDICINE

## 2023-06-16 PROCEDURE — 3044F HG A1C LEVEL LT 7.0%: CPT | Mod: HCNC,CPTII,S$GLB, | Performed by: INTERNAL MEDICINE

## 2023-06-16 PROCEDURE — 4010F PR ACE/ARB THEARPY RXD/TAKEN: ICD-10-PCS | Mod: HCNC,CPTII,S$GLB, | Performed by: INTERNAL MEDICINE

## 2023-06-16 PROCEDURE — 99999 PR PBB SHADOW E&M-EST. PATIENT-LVL IV: CPT | Mod: PBBFAC,HCNC,, | Performed by: INTERNAL MEDICINE

## 2023-06-16 PROCEDURE — 3288F PR FALLS RISK ASSESSMENT DOCUMENTED: ICD-10-PCS | Mod: HCNC,CPTII,S$GLB, | Performed by: INTERNAL MEDICINE

## 2023-06-16 PROCEDURE — 1157F ADVNC CARE PLAN IN RCRD: CPT | Mod: HCNC,CPTII,S$GLB, | Performed by: INTERNAL MEDICINE

## 2023-06-16 PROCEDURE — 1160F PR REVIEW ALL MEDS BY PRESCRIBER/CLIN PHARMACIST DOCUMENTED: ICD-10-PCS | Mod: HCNC,CPTII,S$GLB, | Performed by: INTERNAL MEDICINE

## 2023-06-16 PROCEDURE — 3072F PR LOW RISK FOR RETINOPATHY: ICD-10-PCS | Mod: HCNC,CPTII,S$GLB, | Performed by: INTERNAL MEDICINE

## 2023-06-16 PROCEDURE — 3008F PR BODY MASS INDEX (BMI) DOCUMENTED: ICD-10-PCS | Mod: HCNC,CPTII,S$GLB, | Performed by: INTERNAL MEDICINE

## 2023-06-16 PROCEDURE — 1160F RVW MEDS BY RX/DR IN RCRD: CPT | Mod: HCNC,CPTII,S$GLB, | Performed by: INTERNAL MEDICINE

## 2023-06-16 PROCEDURE — 1101F PT FALLS ASSESS-DOCD LE1/YR: CPT | Mod: HCNC,CPTII,S$GLB, | Performed by: INTERNAL MEDICINE

## 2023-06-16 PROCEDURE — 3079F PR MOST RECENT DIASTOLIC BLOOD PRESSURE 80-89 MM HG: ICD-10-PCS | Mod: HCNC,CPTII,S$GLB, | Performed by: INTERNAL MEDICINE

## 2023-06-16 RX ORDER — AZITHROMYCIN 250 MG/1
TABLET, FILM COATED ORAL
Qty: 6 TABLET | Refills: 0 | Status: SHIPPED | OUTPATIENT
Start: 2023-06-16 | End: 2023-06-21

## 2023-06-16 RX ORDER — BENZONATATE 200 MG/1
200 CAPSULE ORAL 3 TIMES DAILY PRN
Qty: 30 CAPSULE | Refills: 1 | Status: SHIPPED | OUTPATIENT
Start: 2023-06-16 | End: 2023-06-26

## 2023-06-16 RX ORDER — PREDNISONE 20 MG/1
40 TABLET ORAL DAILY
Qty: 10 TABLET | Refills: 0 | Status: SHIPPED | OUTPATIENT
Start: 2023-06-16 | End: 2023-06-21

## 2023-06-16 RX ORDER — ALBUTEROL SULFATE 90 UG/1
2 AEROSOL, METERED RESPIRATORY (INHALATION) EVERY 6 HOURS PRN
Qty: 18 G | Refills: 0 | Status: SHIPPED | OUTPATIENT
Start: 2023-06-16 | End: 2023-07-21 | Stop reason: SDUPTHER

## 2023-06-19 NOTE — PROGRESS NOTES
Subjective     Patient ID: Cesar Simpson is a 73 y.o. male.    Chief Complaint: Cough    HPI 73-year-old male presents to clinic today regular PCP is Dr. López patient has been experiencing persistent cough congestion does report cough with sputum production.  Recently saw colleague Dr. Melgar prescribed amoxicillin patient reports persistent refractory symptoms Cholesterol reviewed and Controlled.  Continue current medical regimen.  Prescription refills addressed.  Followup advised. See after visit summary.  Increased chest congestion and phlegm production.  Denies any fever chills denies any difficulty breathing.  Patient does report some coughing spells  Review of Systems  Otherwise negative     Objective     Physical Exam  See Vital signs and growth parameters/charts in [facility]  General: Well-appearing, well-nourished. No distress.   HEENT: Normocephalic and atraumatic.  Conjunctivae are normal.  Pupils are equal and reative to light. TM's clear fluid posterior to TM¢â¬â¢s.  intact bilaterally. Hearing is grossly normal. Nasopharynx is erythematous. Oropharynx has clear post-nasal drainage.  Neck: Supple. No thyroidmegaly. No bruits.   Lymph: No cervical or supraclavicular adenopathy.  Heart: Regular rate and rhythm,Lungs: Clear to auscultation; respiratory effort normal.  Increased bronchial breath sounds no crackles no wheezing  Extremities: Good distal pulses. No edema.   Psychiatric:Awake to person, time and place.  Mood and affect appropriate.  Insight appropriate.           Assessment and Plan     1. Cough, unspecified type  -     predniSONE (DELTASONE) 20 MG tablet; Take 2 tablets (40 mg total) by mouth once daily. for 5 days  Dispense: 10 tablet; Refill: 0  -     albuterol (VENTOLIN HFA) 90 mcg/actuation inhaler; Inhale 2 puffs into the lungs every 6 (six) hours as needed for Wheezing. Rescue  Dispense: 18 g; Refill: 0  -     benzonatate (TESSALON) 200 MG capsule; Take 1 capsule (200 mg total) by mouth 3  (three) times daily as needed for Cough.  Dispense: 30 capsule; Refill: 1    2. Bronchitis  -     azithromycin (Z-PAIGE) 250 MG tablet; Take 2 tablets by mouth on day 1; Take 1 tablet by mouth on days 2-5  Dispense: 6 tablet; Refill: 0        Cesar was seen today for cough.    Diagnoses and all orders for this visit:    Cough, unspecified type  -     predniSONE (DELTASONE) 20 MG tablet; Take 2 tablets (40 mg total) by mouth once daily. for 5 days  -     albuterol (VENTOLIN HFA) 90 mcg/actuation inhaler; Inhale 2 puffs into the lungs every 6 (six) hours as needed for Wheezing. Rescue  -     benzonatate (TESSALON) 200 MG capsule; Take 1 capsule (200 mg total) by mouth 3 (three) times daily as needed for Cough.    Bronchitis  -     azithromycin (Z-PAIGE) 250 MG tablet; Take 2 tablets by mouth on day 1; Take 1 tablet by mouth on days 2-5     Discuss use benefit as well as potential adverse side effects of all medications follow-up if symptoms persistent refractory         No follow-ups on file.

## 2023-07-21 ENCOUNTER — HOSPITAL ENCOUNTER (OUTPATIENT)
Dept: RADIOLOGY | Facility: HOSPITAL | Age: 73
Discharge: HOME | End: 2023-07-21
Attending: NURSE PRACTITIONER
Payer: MEDICARE

## 2023-07-21 ENCOUNTER — OFFICE VISIT (OUTPATIENT)
Dept: FAMILY MEDICINE | Facility: CLINIC | Age: 73
End: 2023-07-21
Payer: MEDICARE

## 2023-07-21 VITALS
OXYGEN SATURATION: 96 % | SYSTOLIC BLOOD PRESSURE: 104 MMHG | TEMPERATURE: 98 F | DIASTOLIC BLOOD PRESSURE: 84 MMHG | HEIGHT: 67 IN | HEART RATE: 104 BPM | BODY MASS INDEX: 29.03 KG/M2 | WEIGHT: 184.94 LBS

## 2023-07-21 DIAGNOSIS — J32.9 BACTERIAL SINUSITIS: Primary | ICD-10-CM

## 2023-07-21 DIAGNOSIS — R05.8 COUGH PRESENT FOR GREATER THAN 3 WEEKS: ICD-10-CM

## 2023-07-21 DIAGNOSIS — B96.89 BACTERIAL SINUSITIS: Primary | ICD-10-CM

## 2023-07-21 PROCEDURE — 1126F AMNT PAIN NOTED NONE PRSNT: CPT | Mod: HCNC,CPTII,S$GLB, | Performed by: NURSE PRACTITIONER

## 2023-07-21 PROCEDURE — 4010F ACE/ARB THERAPY RXD/TAKEN: CPT | Mod: HCNC,CPTII,S$GLB, | Performed by: NURSE PRACTITIONER

## 2023-07-21 PROCEDURE — 3072F LOW RISK FOR RETINOPATHY: CPT | Mod: HCNC,CPTII,S$GLB, | Performed by: NURSE PRACTITIONER

## 2023-07-21 PROCEDURE — 99999 PR PBB SHADOW E&M-EST. PATIENT-LVL IV: CPT | Mod: PBBFAC,HCNC,, | Performed by: NURSE PRACTITIONER

## 2023-07-21 PROCEDURE — 1157F PR ADVANCE CARE PLAN OR EQUIV PRESENT IN MEDICAL RECORD: ICD-10-PCS | Mod: HCNC,CPTII,S$GLB, | Performed by: NURSE PRACTITIONER

## 2023-07-21 PROCEDURE — 71046 XR CHEST PA AND LATERAL: ICD-10-PCS | Mod: 26,HCNC,, | Performed by: RADIOLOGY

## 2023-07-21 PROCEDURE — 3079F PR MOST RECENT DIASTOLIC BLOOD PRESSURE 80-89 MM HG: ICD-10-PCS | Mod: HCNC,CPTII,S$GLB, | Performed by: NURSE PRACTITIONER

## 2023-07-21 PROCEDURE — 99214 OFFICE O/P EST MOD 30 MIN: CPT | Mod: HCNC,S$GLB,, | Performed by: NURSE PRACTITIONER

## 2023-07-21 PROCEDURE — 3074F PR MOST RECENT SYSTOLIC BLOOD PRESSURE < 130 MM HG: ICD-10-PCS | Mod: HCNC,CPTII,S$GLB, | Performed by: NURSE PRACTITIONER

## 2023-07-21 PROCEDURE — 71046 X-RAY EXAM CHEST 2 VIEWS: CPT | Mod: 26,HCNC,, | Performed by: RADIOLOGY

## 2023-07-21 PROCEDURE — 3288F FALL RISK ASSESSMENT DOCD: CPT | Mod: HCNC,CPTII,S$GLB, | Performed by: NURSE PRACTITIONER

## 2023-07-21 PROCEDURE — 1126F PR PAIN SEVERITY QUANTIFIED, NO PAIN PRESENT: ICD-10-PCS | Mod: HCNC,CPTII,S$GLB, | Performed by: NURSE PRACTITIONER

## 2023-07-21 PROCEDURE — 3008F PR BODY MASS INDEX (BMI) DOCUMENTED: ICD-10-PCS | Mod: HCNC,CPTII,S$GLB, | Performed by: NURSE PRACTITIONER

## 2023-07-21 PROCEDURE — 3074F SYST BP LT 130 MM HG: CPT | Mod: HCNC,CPTII,S$GLB, | Performed by: NURSE PRACTITIONER

## 2023-07-21 PROCEDURE — 3072F PR LOW RISK FOR RETINOPATHY: ICD-10-PCS | Mod: HCNC,CPTII,S$GLB, | Performed by: NURSE PRACTITIONER

## 2023-07-21 PROCEDURE — 1101F PT FALLS ASSESS-DOCD LE1/YR: CPT | Mod: HCNC,CPTII,S$GLB, | Performed by: NURSE PRACTITIONER

## 2023-07-21 PROCEDURE — 71046 X-RAY EXAM CHEST 2 VIEWS: CPT | Mod: TC,HCNC,FY

## 2023-07-21 PROCEDURE — 3008F BODY MASS INDEX DOCD: CPT | Mod: HCNC,CPTII,S$GLB, | Performed by: NURSE PRACTITIONER

## 2023-07-21 PROCEDURE — 3044F HG A1C LEVEL LT 7.0%: CPT | Mod: HCNC,CPTII,S$GLB, | Performed by: NURSE PRACTITIONER

## 2023-07-21 PROCEDURE — 1101F PR PT FALLS ASSESS DOC 0-1 FALLS W/OUT INJ PAST YR: ICD-10-PCS | Mod: HCNC,CPTII,S$GLB, | Performed by: NURSE PRACTITIONER

## 2023-07-21 PROCEDURE — 3079F DIAST BP 80-89 MM HG: CPT | Mod: HCNC,CPTII,S$GLB, | Performed by: NURSE PRACTITIONER

## 2023-07-21 PROCEDURE — 99999 PR PBB SHADOW E&M-EST. PATIENT-LVL IV: ICD-10-PCS | Mod: PBBFAC,HCNC,, | Performed by: NURSE PRACTITIONER

## 2023-07-21 PROCEDURE — 3044F PR MOST RECENT HEMOGLOBIN A1C LEVEL <7.0%: ICD-10-PCS | Mod: HCNC,CPTII,S$GLB, | Performed by: NURSE PRACTITIONER

## 2023-07-21 PROCEDURE — 3288F PR FALLS RISK ASSESSMENT DOCUMENTED: ICD-10-PCS | Mod: HCNC,CPTII,S$GLB, | Performed by: NURSE PRACTITIONER

## 2023-07-21 PROCEDURE — 1157F ADVNC CARE PLAN IN RCRD: CPT | Mod: HCNC,CPTII,S$GLB, | Performed by: NURSE PRACTITIONER

## 2023-07-21 PROCEDURE — 99214 PR OFFICE/OUTPT VISIT, EST, LEVL IV, 30-39 MIN: ICD-10-PCS | Mod: HCNC,S$GLB,, | Performed by: NURSE PRACTITIONER

## 2023-07-21 PROCEDURE — 4010F PR ACE/ARB THEARPY RXD/TAKEN: ICD-10-PCS | Mod: HCNC,CPTII,S$GLB, | Performed by: NURSE PRACTITIONER

## 2023-07-21 RX ORDER — AMOXICILLIN AND CLAVULANATE POTASSIUM 875; 125 MG/1; MG/1
1 TABLET, FILM COATED ORAL EVERY 12 HOURS
Qty: 20 TABLET | Refills: 0 | Status: SHIPPED | OUTPATIENT
Start: 2023-07-21 | End: 2023-08-11

## 2023-07-21 RX ORDER — GUAIFENESIN 600 MG/1
1200 TABLET, EXTENDED RELEASE ORAL 2 TIMES DAILY
Qty: 40 TABLET | Refills: 0 | Status: SHIPPED | OUTPATIENT
Start: 2023-07-21 | End: 2023-08-11

## 2023-07-21 RX ORDER — ALBUTEROL SULFATE 90 UG/1
2 AEROSOL, METERED RESPIRATORY (INHALATION) EVERY 6 HOURS PRN
Qty: 18 G | Refills: 0 | Status: SHIPPED | OUTPATIENT
Start: 2023-07-21 | End: 2023-09-07

## 2023-07-21 NOTE — PROGRESS NOTES
Subjective     Patient ID: Cesar Simpson is a 73 y.o. male.    Chief Complaint: Cough    This is a pleasant 72 yo male patient of Dr. López who is new to me. He presents today with c/o ongoing cough. PMH includes    Patient Active Problem List:     Type 2 diabetes mellitus with diabetic polyneuropathy, without long-term current use of insulin     Hyperlipidemia     Posterior vitreous detachment, right eye     Floater, vitreous     Vitreous detachment     Pseudophakia     Refractive error     Hypothyroidism     Osteoarthritis     Prominent aorta     Hyperlipidemia associated with type 2 diabetes mellitus     Hypertension associated with diabetes     Insomnia     Diet-controlled diabetes mellitus     BPH associated with nocturia     Arthritis     Does use hearing aid     Normocytic anemia     BMI 30.0-30.9,adult     Moderate obstructive sleep apnea    VSS today. Reports he has been experiencing ongoing cough since May. Has been evaluated twice since then and given different treatments including amoxicillin, cough syrup, Flonase, Z-ankit, Tesslon perles, prednisone. Offer mild relief but symptoms return. Tessalon perles have not helped at all, cough syrup caused dizziness. Denies fever, chills, CP, wheezing, ear pain, N/V/D. Experiencing some shortness of breath and rib pain with coughing spells. Cough is productive at times. Was feeling sinus pressure that has improved. Has also lost 13 lbs since June; reports decreased appetite. Not a smoker.     Review of Systems   Constitutional:  Positive for fatigue.   Respiratory:  Positive for cough (productive) and shortness of breath.    Otherwise negative     Objective     Physical Exam  Vitals reviewed.   Constitutional:       General: He is awake. He is not in acute distress.     Appearance: Normal appearance. He is well-developed and well-groomed.   HENT:      Head: Normocephalic and atraumatic.      Right Ear: Tympanic membrane, ear canal and external ear normal.       Left Ear: Tympanic membrane, ear canal and external ear normal.      Ears:      Comments: Clear fluid bubbles noted behind TM bilaterally     Mouth/Throat:      Lips: Pink.      Mouth: Mucous membranes are moist.      Pharynx: Uvula midline. No posterior oropharyngeal erythema.      Comments: Postnasal drip visualized  Cardiovascular:      Rate and Rhythm: Normal rate and regular rhythm.   Pulmonary:      Effort: Pulmonary effort is normal. No respiratory distress.      Breath sounds: Normal breath sounds. No wheezing or rhonchi.   Lymphadenopathy:      Cervical: No cervical adenopathy.   Skin:     General: Skin is warm and dry.      Coloration: Skin is not pale.   Neurological:      Mental Status: He is alert and oriented to person, place, and time.   Psychiatric:         Attention and Perception: Attention normal.         Mood and Affect: Mood and affect normal.         Speech: Speech normal.         Behavior: Behavior normal. Behavior is cooperative.         Thought Content: Thought content normal.          Assessment and Plan     1. Bacterial sinusitis  -     amoxicillin-clavulanate 875-125mg (AUGMENTIN) 875-125 mg per tablet; Take 1 tablet by mouth every 12 (twelve) hours. for 10 days  Dispense: 20 tablet; Refill: 0  -     guaiFENesin (MUCINEX) 600 mg 12 hr tablet; Take 2 tablets (1,200 mg total) by mouth 2 (two) times daily. for 10 days  Dispense: 40 tablet; Refill: 0    2. Cough present for greater than 3 weeks  -     X-Ray Chest PA And Lateral; Future; Expected date: 07/21/2023  -     albuterol (VENTOLIN HFA) 90 mcg/actuation inhaler; Inhale 2 puffs into the lungs every 6 (six) hours as needed for Wheezing. Rescue  Dispense: 18 g; Refill: 0  -     guaiFENesin (MUCINEX) 600 mg 12 hr tablet; Take 2 tablets (1,200 mg total) by mouth 2 (two) times daily. for 10 days  Dispense: 40 tablet; Refill: 0      - CXR ordered  - Take Augmentin BID x 10 days  - May use albuterol up to QID PRN SOB; instructions given on  proper use  - Use mucinex BID for next few days  - Continue with symptomatic relief treatments  - Drink plenty fluids and eat as tolerated  - Hot fluids and humidifier may help  - Warning signs discussed  - RTC in 2-3 weeks for close follow-up, can cancel if symptoms improve/resolve     Follow up if symptoms worsen or fail to improve.          I spent a total of 30 minutes on the day of the visit.  This includes face to face time and non-face to face time preparing to see the patient (eg, review of tests), obtaining and/or reviewing separately obtained history, documenting clinical information in the electronic or other health record, independently interpreting results and communicating results to the patient/family/caregiver, or care coordinator.

## 2023-07-24 ENCOUNTER — TELEPHONE (OUTPATIENT)
Dept: FAMILY MEDICINE | Facility: CLINIC | Age: 73
End: 2023-07-24
Payer: MEDICARE

## 2023-08-11 ENCOUNTER — OFFICE VISIT (OUTPATIENT)
Dept: FAMILY MEDICINE | Facility: CLINIC | Age: 73
End: 2023-08-11
Payer: MEDICARE

## 2023-08-11 ENCOUNTER — LAB VISIT (OUTPATIENT)
Dept: LAB | Facility: HOSPITAL | Age: 73
End: 2023-08-11
Attending: FAMILY MEDICINE
Payer: MEDICARE

## 2023-08-11 VITALS
BODY MASS INDEX: 28.55 KG/M2 | HEIGHT: 67 IN | SYSTOLIC BLOOD PRESSURE: 110 MMHG | HEART RATE: 80 BPM | OXYGEN SATURATION: 96 % | DIASTOLIC BLOOD PRESSURE: 78 MMHG | WEIGHT: 181.88 LBS

## 2023-08-11 DIAGNOSIS — J32.9 SINOBRONCHITIS: Primary | ICD-10-CM

## 2023-08-11 DIAGNOSIS — Z12.11 COLON CANCER SCREENING: ICD-10-CM

## 2023-08-11 DIAGNOSIS — R05.8 OTHER COUGH: ICD-10-CM

## 2023-08-11 DIAGNOSIS — E11.9 DIET-CONTROLLED DIABETES MELLITUS: ICD-10-CM

## 2023-08-11 DIAGNOSIS — E11.42 TYPE 2 DIABETES MELLITUS WITH DIABETIC POLYNEUROPATHY, WITHOUT LONG-TERM CURRENT USE OF INSULIN: ICD-10-CM

## 2023-08-11 DIAGNOSIS — R63.4 WEIGHT LOSS: ICD-10-CM

## 2023-08-11 DIAGNOSIS — J40 SINOBRONCHITIS: Primary | ICD-10-CM

## 2023-08-11 LAB
ALBUMIN/CREAT UR: 6.5 UG/MG (ref 0–30)
CREAT UR-MCNC: 246 MG/DL (ref 23–375)
MICROALBUMIN UR DL<=1MG/L-MCNC: 16 UG/ML

## 2023-08-11 PROCEDURE — 1126F AMNT PAIN NOTED NONE PRSNT: CPT | Mod: HCNC,CPTII,S$GLB, | Performed by: FAMILY MEDICINE

## 2023-08-11 PROCEDURE — 1157F PR ADVANCE CARE PLAN OR EQUIV PRESENT IN MEDICAL RECORD: ICD-10-PCS | Mod: HCNC,CPTII,S$GLB, | Performed by: FAMILY MEDICINE

## 2023-08-11 PROCEDURE — 1160F PR REVIEW ALL MEDS BY PRESCRIBER/CLIN PHARMACIST DOCUMENTED: ICD-10-PCS | Mod: HCNC,CPTII,S$GLB, | Performed by: FAMILY MEDICINE

## 2023-08-11 PROCEDURE — 1101F PR PT FALLS ASSESS DOC 0-1 FALLS W/OUT INJ PAST YR: ICD-10-PCS | Mod: HCNC,CPTII,S$GLB, | Performed by: FAMILY MEDICINE

## 2023-08-11 PROCEDURE — 4010F ACE/ARB THERAPY RXD/TAKEN: CPT | Mod: HCNC,CPTII,S$GLB, | Performed by: FAMILY MEDICINE

## 2023-08-11 PROCEDURE — 3008F BODY MASS INDEX DOCD: CPT | Mod: HCNC,CPTII,S$GLB, | Performed by: FAMILY MEDICINE

## 2023-08-11 PROCEDURE — 1157F ADVNC CARE PLAN IN RCRD: CPT | Mod: HCNC,CPTII,S$GLB, | Performed by: FAMILY MEDICINE

## 2023-08-11 PROCEDURE — 82570 ASSAY OF URINE CREATININE: CPT | Mod: HCNC | Performed by: FAMILY MEDICINE

## 2023-08-11 PROCEDURE — 3074F SYST BP LT 130 MM HG: CPT | Mod: HCNC,CPTII,S$GLB, | Performed by: FAMILY MEDICINE

## 2023-08-11 PROCEDURE — 3008F PR BODY MASS INDEX (BMI) DOCUMENTED: ICD-10-PCS | Mod: HCNC,CPTII,S$GLB, | Performed by: FAMILY MEDICINE

## 2023-08-11 PROCEDURE — 1160F RVW MEDS BY RX/DR IN RCRD: CPT | Mod: HCNC,CPTII,S$GLB, | Performed by: FAMILY MEDICINE

## 2023-08-11 PROCEDURE — 3078F PR MOST RECENT DIASTOLIC BLOOD PRESSURE < 80 MM HG: ICD-10-PCS | Mod: HCNC,CPTII,S$GLB, | Performed by: FAMILY MEDICINE

## 2023-08-11 PROCEDURE — 99215 PR OFFICE/OUTPT VISIT, EST, LEVL V, 40-54 MIN: ICD-10-PCS | Mod: HCNC,S$GLB,, | Performed by: FAMILY MEDICINE

## 2023-08-11 PROCEDURE — 1159F MED LIST DOCD IN RCRD: CPT | Mod: HCNC,CPTII,S$GLB, | Performed by: FAMILY MEDICINE

## 2023-08-11 PROCEDURE — 3044F PR MOST RECENT HEMOGLOBIN A1C LEVEL <7.0%: ICD-10-PCS | Mod: HCNC,CPTII,S$GLB, | Performed by: FAMILY MEDICINE

## 2023-08-11 PROCEDURE — 99999 PR PBB SHADOW E&M-EST. PATIENT-LVL IV: ICD-10-PCS | Mod: PBBFAC,HCNC,, | Performed by: FAMILY MEDICINE

## 2023-08-11 PROCEDURE — 4010F PR ACE/ARB THEARPY RXD/TAKEN: ICD-10-PCS | Mod: HCNC,CPTII,S$GLB, | Performed by: FAMILY MEDICINE

## 2023-08-11 PROCEDURE — 94640 AIRWAY INHALATION TREATMENT: CPT | Mod: 59,HCNC,S$GLB, | Performed by: FAMILY MEDICINE

## 2023-08-11 PROCEDURE — 3078F DIAST BP <80 MM HG: CPT | Mod: HCNC,CPTII,S$GLB, | Performed by: FAMILY MEDICINE

## 2023-08-11 PROCEDURE — 3288F PR FALLS RISK ASSESSMENT DOCUMENTED: ICD-10-PCS | Mod: HCNC,CPTII,S$GLB, | Performed by: FAMILY MEDICINE

## 2023-08-11 PROCEDURE — 1101F PT FALLS ASSESS-DOCD LE1/YR: CPT | Mod: HCNC,CPTII,S$GLB, | Performed by: FAMILY MEDICINE

## 2023-08-11 PROCEDURE — 3288F FALL RISK ASSESSMENT DOCD: CPT | Mod: HCNC,CPTII,S$GLB, | Performed by: FAMILY MEDICINE

## 2023-08-11 PROCEDURE — 99215 OFFICE O/P EST HI 40 MIN: CPT | Mod: HCNC,S$GLB,, | Performed by: FAMILY MEDICINE

## 2023-08-11 PROCEDURE — 3074F PR MOST RECENT SYSTOLIC BLOOD PRESSURE < 130 MM HG: ICD-10-PCS | Mod: HCNC,CPTII,S$GLB, | Performed by: FAMILY MEDICINE

## 2023-08-11 PROCEDURE — 3044F HG A1C LEVEL LT 7.0%: CPT | Mod: HCNC,CPTII,S$GLB, | Performed by: FAMILY MEDICINE

## 2023-08-11 PROCEDURE — 94640 PR INHAL RX, AIRWAY OBST/DX SPUTUM INDUCT: ICD-10-PCS | Mod: 59,HCNC,S$GLB, | Performed by: FAMILY MEDICINE

## 2023-08-11 PROCEDURE — 1126F PR PAIN SEVERITY QUANTIFIED, NO PAIN PRESENT: ICD-10-PCS | Mod: HCNC,CPTII,S$GLB, | Performed by: FAMILY MEDICINE

## 2023-08-11 PROCEDURE — 1159F PR MEDICATION LIST DOCUMENTED IN MEDICAL RECORD: ICD-10-PCS | Mod: HCNC,CPTII,S$GLB, | Performed by: FAMILY MEDICINE

## 2023-08-11 PROCEDURE — 99999 PR PBB SHADOW E&M-EST. PATIENT-LVL IV: CPT | Mod: PBBFAC,HCNC,, | Performed by: FAMILY MEDICINE

## 2023-08-11 RX ORDER — IPRATROPIUM BROMIDE AND ALBUTEROL SULFATE 2.5; .5 MG/3ML; MG/3ML
3 SOLUTION RESPIRATORY (INHALATION)
Status: COMPLETED | OUTPATIENT
Start: 2023-08-11 | End: 2023-08-11

## 2023-08-11 RX ORDER — PREDNISONE 50 MG/1
50 TABLET ORAL DAILY
Qty: 5 TABLET | Refills: 0 | Status: SHIPPED | OUTPATIENT
Start: 2023-08-11 | End: 2023-09-07

## 2023-08-11 RX ORDER — FLUTICASONE PROPIONATE AND SALMETEROL 250; 50 UG/1; UG/1
1 POWDER RESPIRATORY (INHALATION) 2 TIMES DAILY
Qty: 60 EACH | Refills: 1 | Status: SHIPPED | OUTPATIENT
Start: 2023-08-11 | End: 2023-09-07

## 2023-08-11 RX ORDER — AZITHROMYCIN 250 MG/1
TABLET, FILM COATED ORAL
Qty: 6 TABLET | Refills: 0 | Status: SHIPPED | OUTPATIENT
Start: 2023-08-11 | End: 2023-09-07

## 2023-08-11 RX ADMIN — IPRATROPIUM BROMIDE AND ALBUTEROL SULFATE 3 ML: 2.5; .5 SOLUTION RESPIRATORY (INHALATION) at 10:08

## 2023-08-11 NOTE — PATIENT INSTRUCTIONS
Check labs today, will also evaluate for weight loss    Improved air exchange after duo neb  Azithromycin  Prednisone  Advair twice daily - rinse throat after every use to avoid infection due to steroid component    If NOT BETTER IN 2 WEEKS, CONTACT ME FOR CT SCAN

## 2023-08-11 NOTE — PROGRESS NOTES
"Subjective:       Patient ID: Cesar Simpson is a 73 y.o. male.    Chief Complaint: Follow-up    Cesar is a 73 y.o. male who presents today for a cough. This has been ongoing since may. Has been on multiple courses of abx. This has not helped. CXR was normal 7/21/2023.     Deep breaths cause him to want to cough. He has coughing spells throughout the day. Its bad after he first wakes up. He does report that sometimes he wheezes. Cough is productive    Inhaler helped minimally.     Had lost some weight as well. Due to the cough?      Review of Systems   Constitutional:  Positive for unexpected weight change.   Respiratory:  Positive for cough and wheezing. Negative for apnea, chest tightness and shortness of breath.    Cardiovascular:  Negative for chest pain.   Gastrointestinal:  Negative for nausea and vomiting.   Neurological:  Negative for dizziness and headaches.           Objective:     Vitals:    08/11/23 0934   BP: 110/78   BP Location: Left arm   Patient Position: Sitting   BP Method: Medium (Manual)   Pulse: 80   SpO2: 96%   Weight: 82.5 kg (181 lb 14.1 oz)   Height: 5' 7" (1.702 m)        Physical Exam  Vitals and nursing note reviewed.   Constitutional:       General: He is not in acute distress.     Appearance: He is not ill-appearing, toxic-appearing or diaphoretic.   HENT:      Nose: Congestion present.      Mouth/Throat:      Pharynx: No oropharyngeal exudate or posterior oropharyngeal erythema.   Cardiovascular:      Rate and Rhythm: Normal rate and regular rhythm.   Pulmonary:      Effort: Pulmonary effort is normal. No respiratory distress.      Breath sounds: Wheezing present. No rales.      Comments: Diminished air exchange noted in all lung fields with coughing with deep breaths and intermittent wheeze.   S/p duoneb x 2, significantly improved air exchange, no wheezing noted.   Neurological:      General: No focal deficit present.      Mental Status: He is alert.   Psychiatric:         Mood and " Affect: Mood normal.         Behavior: Behavior normal.         Thought Content: Thought content normal.         Judgment: Judgment normal.         Assessment:       1. Sinobronchitis    2. Other cough    3. Colon cancer screening    4. Weight loss    5. BMI 28.0-28.9,adult        Plan:         Check labs today, will also evaluate for weight loss    Improved air exchange after duo neb  Azithromycin  Prednisone  Advair twice daily - rinse throat after every use to avoid infection due to steroid component    If NOT BETTER IN 2 WEEKS, CONTACT ME FOR CT SCAN    Keep scheduled f/u    Sinobronchitis  -     albuterol-ipratropium 2.5 mg-0.5 mg/3 mL nebulizer solution 3 mL  -     albuterol-ipratropium 2.5 mg-0.5 mg/3 mL nebulizer solution 3 mL  -     azithromycin (Z-PAIGE) 250 MG tablet; Take 2 tablets by mouth on day 1; Take 1 tablet by mouth on days 2-5  Dispense: 6 tablet; Refill: 0  -     predniSONE (DELTASONE) 50 MG Tab; Take 1 tablet (50 mg total) by mouth once daily. for 5 days  Dispense: 5 tablet; Refill: 0  -     fluticasone-salmeterol diskus inhaler 250-50 mcg; Inhale 1 puff into the lungs 2 (two) times daily. Controller  Dispense: 60 each; Refill: 1    Other cough  -     BNP; Future; Expected date: 08/11/2023    Colon cancer screening  -     Fecal Immunochemical Test (iFOBT); Future; Expected date: 08/11/2023    Weight loss  -     Urinalysis  -     Sedimentation rate; Future; Expected date: 08/11/2023  -     C-Reactive Protein; Future; Expected date: 08/11/2023    BMI 28.0-28.9,adult            Warning signs discussed, patient to call with any further issues or worsening of symptoms. Above note may have utilized dictation, please excuse any transcription errors.

## 2023-08-22 LAB — HEMOCCULT STL QL IA: NEGATIVE

## 2023-08-30 ENCOUNTER — LAB VISIT (OUTPATIENT)
Dept: LAB | Facility: HOSPITAL | Age: 73
End: 2023-08-30
Attending: FAMILY MEDICINE
Payer: MEDICARE

## 2023-08-30 DIAGNOSIS — Z12.11 COLON CANCER SCREENING: ICD-10-CM

## 2023-08-30 PROCEDURE — 82274 ASSAY TEST FOR BLOOD FECAL: CPT | Mod: HCNC | Performed by: FAMILY MEDICINE

## 2023-09-01 LAB — HEMOCCULT STL QL IA: NEGATIVE

## 2023-09-06 DIAGNOSIS — G47.00 INSOMNIA, UNSPECIFIED TYPE: ICD-10-CM

## 2023-09-06 RX ORDER — TRAZODONE HYDROCHLORIDE 100 MG/1
100 TABLET ORAL NIGHTLY
Qty: 90 TABLET | Refills: 3 | Status: SHIPPED | OUTPATIENT
Start: 2023-09-06 | End: 2023-09-07 | Stop reason: SDUPTHER

## 2023-09-06 NOTE — TELEPHONE ENCOUNTER
No care due was identified.  [facility] Embedded Care Due Messages. Reference number: [id number].   9/06/2023 2:30:59 AM CDT

## 2023-09-06 NOTE — TELEPHONE ENCOUNTER
Refill Decision Note   Cesar Simpson  is requesting a refill authorization.  Brief Assessment and Rationale for Refill:  Approve     Medication Therapy Plan:         Comments:     Note composed:8:20 AM 09/06/2023

## 2023-09-07 ENCOUNTER — OFFICE VISIT (OUTPATIENT)
Dept: FAMILY MEDICINE | Facility: CLINIC | Age: 73
End: 2023-09-07
Payer: MEDICARE

## 2023-09-07 VITALS
SYSTOLIC BLOOD PRESSURE: 128 MMHG | HEART RATE: 103 BPM | DIASTOLIC BLOOD PRESSURE: 78 MMHG | HEIGHT: 67 IN | OXYGEN SATURATION: 95 % | WEIGHT: 185.88 LBS | BODY MASS INDEX: 29.17 KG/M2

## 2023-09-07 DIAGNOSIS — I10 ESSENTIAL HYPERTENSION: ICD-10-CM

## 2023-09-07 DIAGNOSIS — E03.8 OTHER SPECIFIED HYPOTHYROIDISM: ICD-10-CM

## 2023-09-07 DIAGNOSIS — Z00.00 ENCOUNTER FOR MEDICARE ANNUAL WELLNESS EXAM: Primary | ICD-10-CM

## 2023-09-07 DIAGNOSIS — R35.1 BPH ASSOCIATED WITH NOCTURIA: ICD-10-CM

## 2023-09-07 DIAGNOSIS — N40.1 BPH ASSOCIATED WITH NOCTURIA: ICD-10-CM

## 2023-09-07 DIAGNOSIS — E11.9 DIET-CONTROLLED DIABETES MELLITUS: ICD-10-CM

## 2023-09-07 DIAGNOSIS — M19.90 ARTHRITIS: ICD-10-CM

## 2023-09-07 DIAGNOSIS — R00.0 TACHYCARDIA: ICD-10-CM

## 2023-09-07 DIAGNOSIS — G47.00 INSOMNIA, UNSPECIFIED TYPE: ICD-10-CM

## 2023-09-07 DIAGNOSIS — E11.69 HYPERLIPIDEMIA ASSOCIATED WITH TYPE 2 DIABETES MELLITUS: ICD-10-CM

## 2023-09-07 DIAGNOSIS — E78.5 HYPERLIPIDEMIA ASSOCIATED WITH TYPE 2 DIABETES MELLITUS: ICD-10-CM

## 2023-09-07 DIAGNOSIS — E78.49 OTHER HYPERLIPIDEMIA: ICD-10-CM

## 2023-09-07 DIAGNOSIS — E11.42 TYPE 2 DIABETES MELLITUS WITH DIABETIC POLYNEUROPATHY, WITHOUT LONG-TERM CURRENT USE OF INSULIN: ICD-10-CM

## 2023-09-07 PROCEDURE — 3061F NEG MICROALBUMINURIA REV: CPT | Mod: HCNC,CPTII,S$GLB, | Performed by: FAMILY MEDICINE

## 2023-09-07 PROCEDURE — 4010F ACE/ARB THERAPY RXD/TAKEN: CPT | Mod: HCNC,CPTII,S$GLB, | Performed by: FAMILY MEDICINE

## 2023-09-07 PROCEDURE — 3074F SYST BP LT 130 MM HG: CPT | Mod: HCNC,CPTII,S$GLB, | Performed by: FAMILY MEDICINE

## 2023-09-07 PROCEDURE — 1126F PR PAIN SEVERITY QUANTIFIED, NO PAIN PRESENT: ICD-10-PCS | Mod: HCNC,CPTII,S$GLB, | Performed by: FAMILY MEDICINE

## 2023-09-07 PROCEDURE — 93010 ELECTROCARDIOGRAM REPORT: CPT | Mod: HCNC,S$GLB,, | Performed by: INTERNAL MEDICINE

## 2023-09-07 PROCEDURE — 3061F PR NEG MICROALBUMINURIA RESULT DOCUMENTED/REVIEW: ICD-10-PCS | Mod: HCNC,CPTII,S$GLB, | Performed by: FAMILY MEDICINE

## 2023-09-07 PROCEDURE — 93005 ELECTROCARDIOGRAM TRACING: CPT | Mod: HCNC,S$GLB,, | Performed by: FAMILY MEDICINE

## 2023-09-07 PROCEDURE — 1159F MED LIST DOCD IN RCRD: CPT | Mod: HCNC,CPTII,S$GLB, | Performed by: FAMILY MEDICINE

## 2023-09-07 PROCEDURE — 3044F HG A1C LEVEL LT 7.0%: CPT | Mod: HCNC,CPTII,S$GLB, | Performed by: FAMILY MEDICINE

## 2023-09-07 PROCEDURE — 93005 PR ELECTROCARDIOGRAM, TRACING: ICD-10-PCS | Mod: HCNC,S$GLB,, | Performed by: FAMILY MEDICINE

## 2023-09-07 PROCEDURE — 1160F PR REVIEW ALL MEDS BY PRESCRIBER/CLIN PHARMACIST DOCUMENTED: ICD-10-PCS | Mod: HCNC,CPTII,S$GLB, | Performed by: FAMILY MEDICINE

## 2023-09-07 PROCEDURE — 1126F AMNT PAIN NOTED NONE PRSNT: CPT | Mod: HCNC,CPTII,S$GLB, | Performed by: FAMILY MEDICINE

## 2023-09-07 PROCEDURE — 3008F PR BODY MASS INDEX (BMI) DOCUMENTED: ICD-10-PCS | Mod: HCNC,CPTII,S$GLB, | Performed by: FAMILY MEDICINE

## 2023-09-07 PROCEDURE — 3288F FALL RISK ASSESSMENT DOCD: CPT | Mod: HCNC,CPTII,S$GLB, | Performed by: FAMILY MEDICINE

## 2023-09-07 PROCEDURE — G0439 PPPS, SUBSEQ VISIT: HCPCS | Mod: HCNC,S$GLB,, | Performed by: FAMILY MEDICINE

## 2023-09-07 PROCEDURE — 1157F ADVNC CARE PLAN IN RCRD: CPT | Mod: HCNC,CPTII,S$GLB, | Performed by: FAMILY MEDICINE

## 2023-09-07 PROCEDURE — 1159F PR MEDICATION LIST DOCUMENTED IN MEDICAL RECORD: ICD-10-PCS | Mod: HCNC,CPTII,S$GLB, | Performed by: FAMILY MEDICINE

## 2023-09-07 PROCEDURE — 3066F NEPHROPATHY DOC TX: CPT | Mod: HCNC,CPTII,S$GLB, | Performed by: FAMILY MEDICINE

## 2023-09-07 PROCEDURE — 1101F PT FALLS ASSESS-DOCD LE1/YR: CPT | Mod: HCNC,CPTII,S$GLB, | Performed by: FAMILY MEDICINE

## 2023-09-07 PROCEDURE — 3288F PR FALLS RISK ASSESSMENT DOCUMENTED: ICD-10-PCS | Mod: HCNC,CPTII,S$GLB, | Performed by: FAMILY MEDICINE

## 2023-09-07 PROCEDURE — 3008F BODY MASS INDEX DOCD: CPT | Mod: HCNC,CPTII,S$GLB, | Performed by: FAMILY MEDICINE

## 2023-09-07 PROCEDURE — 3078F PR MOST RECENT DIASTOLIC BLOOD PRESSURE < 80 MM HG: ICD-10-PCS | Mod: HCNC,CPTII,S$GLB, | Performed by: FAMILY MEDICINE

## 2023-09-07 PROCEDURE — 4010F PR ACE/ARB THEARPY RXD/TAKEN: ICD-10-PCS | Mod: HCNC,CPTII,S$GLB, | Performed by: FAMILY MEDICINE

## 2023-09-07 PROCEDURE — 1160F RVW MEDS BY RX/DR IN RCRD: CPT | Mod: HCNC,CPTII,S$GLB, | Performed by: FAMILY MEDICINE

## 2023-09-07 PROCEDURE — 1157F PR ADVANCE CARE PLAN OR EQUIV PRESENT IN MEDICAL RECORD: ICD-10-PCS | Mod: HCNC,CPTII,S$GLB, | Performed by: FAMILY MEDICINE

## 2023-09-07 PROCEDURE — 1101F PR PT FALLS ASSESS DOC 0-1 FALLS W/OUT INJ PAST YR: ICD-10-PCS | Mod: HCNC,CPTII,S$GLB, | Performed by: FAMILY MEDICINE

## 2023-09-07 PROCEDURE — 3044F PR MOST RECENT HEMOGLOBIN A1C LEVEL <7.0%: ICD-10-PCS | Mod: HCNC,CPTII,S$GLB, | Performed by: FAMILY MEDICINE

## 2023-09-07 PROCEDURE — 3078F DIAST BP <80 MM HG: CPT | Mod: HCNC,CPTII,S$GLB, | Performed by: FAMILY MEDICINE

## 2023-09-07 PROCEDURE — 3066F PR DOCUMENTATION OF TREATMENT FOR NEPHROPATHY: ICD-10-PCS | Mod: HCNC,CPTII,S$GLB, | Performed by: FAMILY MEDICINE

## 2023-09-07 PROCEDURE — 93010 PR ELECTROCARDIOGRAM REPORT: ICD-10-PCS | Mod: HCNC,S$GLB,, | Performed by: INTERNAL MEDICINE

## 2023-09-07 PROCEDURE — 3074F PR MOST RECENT SYSTOLIC BLOOD PRESSURE < 130 MM HG: ICD-10-PCS | Mod: HCNC,CPTII,S$GLB, | Performed by: FAMILY MEDICINE

## 2023-09-07 PROCEDURE — G0439 PR MEDICARE ANNUAL WELLNESS SUBSEQUENT VISIT: ICD-10-PCS | Mod: HCNC,S$GLB,, | Performed by: FAMILY MEDICINE

## 2023-09-07 PROCEDURE — 99999 PR PBB SHADOW E&M-EST. PATIENT-LVL IV: ICD-10-PCS | Mod: PBBFAC,HCNC,, | Performed by: FAMILY MEDICINE

## 2023-09-07 PROCEDURE — 99999 PR PBB SHADOW E&M-EST. PATIENT-LVL IV: CPT | Mod: PBBFAC,HCNC,, | Performed by: FAMILY MEDICINE

## 2023-09-07 RX ORDER — TRAZODONE HYDROCHLORIDE 100 MG/1
100 TABLET ORAL NIGHTLY
Qty: 90 TABLET | Refills: 3 | Status: SHIPPED | OUTPATIENT
Start: 2023-09-07 | End: 2024-03-07 | Stop reason: SDUPTHER

## 2023-09-07 RX ORDER — LOSARTAN POTASSIUM 100 MG/1
100 TABLET ORAL DAILY
Qty: 90 TABLET | Refills: 1 | Status: SHIPPED | OUTPATIENT
Start: 2023-09-07 | End: 2023-11-30 | Stop reason: ALTCHOICE

## 2023-09-07 RX ORDER — FINASTERIDE 5 MG/1
5 TABLET, FILM COATED ORAL DAILY
Qty: 90 TABLET | Refills: 2 | Status: SHIPPED | OUTPATIENT
Start: 2023-09-07 | End: 2024-03-07 | Stop reason: SDUPTHER

## 2023-09-07 RX ORDER — DULOXETIN HYDROCHLORIDE 60 MG/1
60 CAPSULE, DELAYED RELEASE ORAL DAILY
Qty: 90 CAPSULE | Refills: 3 | Status: SHIPPED | OUTPATIENT
Start: 2023-09-07 | End: 2024-03-07 | Stop reason: SDUPTHER

## 2023-09-07 RX ORDER — PRAVASTATIN SODIUM 20 MG/1
20 TABLET ORAL NIGHTLY
Qty: 90 TABLET | Refills: 2 | Status: SHIPPED | OUTPATIENT
Start: 2023-09-07 | End: 2024-03-07 | Stop reason: SDUPTHER

## 2023-09-07 RX ORDER — LEVOTHYROXINE SODIUM 88 UG/1
88 TABLET ORAL
Qty: 90 TABLET | Refills: 1 | Status: SHIPPED | OUTPATIENT
Start: 2023-09-07 | End: 2024-03-07 | Stop reason: SDUPTHER

## 2023-09-07 RX ORDER — TAMSULOSIN HYDROCHLORIDE 0.4 MG/1
1 CAPSULE ORAL DAILY
Qty: 90 CAPSULE | Refills: 1 | Status: SHIPPED | OUTPATIENT
Start: 2023-09-07 | End: 2024-02-12

## 2023-09-07 NOTE — PATIENT INSTRUCTIONS
Continue diet controlled diabetes  Diet, exercise, weight loss. Decrease sugar/carbs     Continue synthroid at current dose  Continue pravastatin at 20 mg, due to myalgias.      Continue losartan at 100 mg. Continue digital HTN  135/85 or less is goal     Continue flomax for BPH.   Continue finasteride (added in early 2020)     Tachycardia: EKG today, normal. Consider holter if symptoms persist? And/or ECHO as below  Murmur: consider ECHO in the future if symptomatic or worsening.      Continue cymbalta 60 mg  Continue trazodone at 100 mg.     HCPOA paperwork on file    High Dose Flu when in stock or at pharmacy.      F/u 6 months, labs prior

## 2023-09-07 NOTE — PROGRESS NOTES
Subjective:       Patient ID: Cesar Simpson is a 73 y.o. male.    Chief Complaint: Medicare AWV      Patient ID: Cesar Simpson is a 73 y.o. male who presents today for a follow-up Medicare AWV today.      Abdominal aortic aneurysm screening: N/A  Alcohol misuse screening and counseling: N/A  Bone mass measurements: Not done  Cardiovascular disease screening laboratory tests: Lipid panel   Lab Results       Component                Value               Date                       LDLCALC                  134.8               08/11/2023                 LDLCALC                  92.0                06/28/2022                 LDLCALC                  95.6                06/25/2021            Colorectal Cancer Screening: negative FIT kit  Depression screening: PHQ9 Score: 1  Diabetes/Nutrition: N/A  Glaucoma test: to see eye doctor, sees Dr. Rodriguez at [facility]  Hepatitis C screening test: Negative in 2017  Influenza vaccine: next flu season  Lung cancer screening - Low dose computed tomography (LD-CT): N/A  Pneumococcal vaccination:  UTD  Prostate cancer screening (PSA): discussed risks and benefits with patient, see below.   Shingles Vaccine: UTD  Covid Vaccine: UTD  Sexually transmitted infection (STI) screenings and high intensity behavioral counseling: discussed with patient  Opioid screen: no use noted    Gender Specific:  Prostate cancer screening (PSA): discussed risks and benefits with patient. Stopped at age 70. PSA at that age was normal.      Last eye exam:   Glaucoma History: none     Safety Screening:   Help with the phone, transportation, shopping, meals, housework, laundry, medications, or managing money: No  Rugs at home in the hallway, lack grab bars in the bathroom, lack handrails on the stairs, or have poor lighting: No rugs at home. No stairs in the house. He put in grab bars in the bathroom, early 2022.   Have you noticed any hearing difficulties: yes, seeing audiology outside of [facility]     Have you  "previously been diagnosed with Diabetes? Yes, now diet controlled. Has been diagnosed for >10 year, about 15 per patient.   Do you experience any numbness or tingling in your arms and/or legs?  No  Do you experience a burning sensation in your hands and/or feet? No   Do you regularly experience pain in your thighs or calves on exertion (ie. walking)? No   Do you experience pain in your legs when elevated, but the pain lessens when not elevated (ie. in dependent position)? No   Do you have a persistent cough (for at least 3 months in two consecutive years)? No      Assistive Devices   Do you have an ostomy?  No   Do you have any implanted devices (such as prosthesis, pacemaker, insulin pump, etc.)? No   Do you currently use supplemental oxygen at home? No      Advanced Directives:  Full Code, POA: daughterShae, in [place], [place].      Social: he lives alone. He has 2 kids, they live in [place] and [place]. 7 grandchildren. 2 grandchildren live nearby. No pets at home. He is retired. 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     The following components were reviewed and updated:  Medical history, Family History, Social history, Allergies and Current Medications, Health Risk Assessment, Health Maintenance, Care Team     The following assessments were completed:  Depression Screening: PHQ9: see below  Cognitive function Screening: RCS Score:  see below  Timed Get Up Test: normal  Whisper Test: normal     Family history of MI: had stress test in 2018. Normal myocardial perfusion scan with no evidence of ischemia or infarct.  Diet controlled DM. Stopped metformin at visit 9/2020. A1c at goal 4/13/2023.   DLD: taking pravastatin, 20 mg. Decreased 6/29/2022 due to myalgias. No longer having cramps.   HTN: taking losartan. BP stable. At home, similar to readings today. Close to goal, adjusted for age.   Insomnia: taking Cymbalta 60 mg and trazodone 100 mg. Sleeping "fine."  FABY: using cpap again.   BPH: on " flomax and finasteride. Nocturia has resolved.    Hypothyroidism: on synthroid. TSH 6/2022 was normal.   Cough: mostly resolved.   Hast lost weight since last visit. Reports this is intentional. Eating less, working out more.     He reports an increase in HR. It is often 100 at baseline. Doesn't feel his heart racing. Denies any palpitations, chest pain, SOB. He is able to ride the stationary bike for 1 hour.           Recommendations were developed using the USPSTF age appropriate recommendations. Education, counseling, and referrals were provided as needed.  After Visit Summary printed and given to patient which includes a list of additional screenings\tests needed.         Review of Systems   Constitutional:  Negative for chills, fatigue, fever and unexpected weight change.   Respiratory:  Negative for cough, chest tightness and shortness of breath.    Cardiovascular:  Negative for chest pain, palpitations and leg swelling.   Gastrointestinal:  Negative for nausea and vomiting.   Neurological:  Negative for dizziness, light-headedness and headaches.   Psychiatric/Behavioral:  Negative for sleep disturbance and suicidal ideas. The patient is not nervous/anxious.          Health Maintenance Due   Topic Date Due    COVID-19 Vaccine (5 - Moderna series) 12/09/2022    Influenza Vaccine (1) 09/01/2023     Immunization History   Administered Date(s) Administered    COVID-19 Vaccine 05/19/2022    COVID-19, MRNA, LN-S, PF (MODERNA FULL 0.5 ML DOSE) 02/19/2021, 03/19/2021, 11/01/2021    COVID-19, mRNA, LNP-S, bivalent booster, PF (Moderna Omicron) 10/14/2022, 10/14/2022    Influenza 02/27/2013, 10/27/2015    Influenza (FLUAD) - Quadrivalent - Adjuvanted - PF *Preferred* (65+) 09/24/2020, 12/28/2021    Influenza - High Dose - PF (65 years and older) 10/10/2016, 11/22/2017, 11/21/2018, 09/24/2019, 11/23/2019    Influenza - Quadrivalent 10/29/2014    Influenza - Quadrivalent - High Dose - PF (65 years and older) 10/14/2022     "Influenza - Trivalent (ADULT) 02/27/2013    Influenza - Trivalent - PF (ADULT) 10/27/2015    Influenza Split 02/27/2013    Pneumococcal Conjugate - 13 Valent 03/24/2017    Pneumococcal Conjugate - 20 Valent 12/29/2022    Pneumococcal Polysaccharide - 23 Valent 02/08/2016    Tdap 10/10/2016    Zoster 11/14/2015    Zoster Recombinant 09/24/2020, 11/30/2020             Objective:     Vitals:    09/07/23 0926   BP: 128/78   BP Location: Right arm   Patient Position: Sitting   BP Method: Medium (Manual)   Pulse: 103   SpO2: 95%   Weight: 84.3 kg (185 lb 13.6 oz)   Height: 5' 7" (1.702 m)        Physical Exam  Vitals and nursing note reviewed.   Constitutional:       General: He is not in acute distress.     Appearance: He is well-developed. He is not ill-appearing, toxic-appearing or diaphoretic.   HENT:      Mouth/Throat:      Comments: cobblestoning  Cardiovascular:      Rate and Rhythm: Regular rhythm. Tachycardia present.      Heart sounds: Murmur heard.      Systolic murmur is present with a grade of 2/6.   Pulmonary:      Effort: Pulmonary effort is normal. No respiratory distress.      Breath sounds: Normal breath sounds. No wheezing or rales.   Abdominal:      Palpations: Abdomen is soft.      Tenderness: There is no abdominal tenderness.   Musculoskeletal:         General: No swelling.   Lymphadenopathy:      Cervical: No cervical adenopathy.   Skin:     Findings: No rash.   Neurological:      General: No focal deficit present.      Mental Status: He is alert and oriented to person, place, and time.   Psychiatric:         Mood and Affect: Mood normal.         Behavior: Behavior normal.         Thought Content: Thought content normal.         Judgment: Judgment normal.         Assessment:       1. Encounter for Medicare annual wellness exam    2. Essential hypertension    3. Hyperlipidemia associated with type 2 diabetes mellitus    4. Other hyperlipidemia    5. BPH associated with nocturia    6. Other specified " hypothyroidism    7. Arthritis    8. Insomnia, unspecified type    9. Tachycardia    10. Type 2 diabetes mellitus with diabetic polyneuropathy, without long-term current use of insulin    11. Diet-controlled diabetes mellitus    12. BMI 29.0-29.9,adult        Plan:       Continue diet controlled diabetes  Diet, exercise, weight loss. Decrease sugar/carbs     Continue synthroid at current dose  Continue pravastatin at 20 mg, due to myalgias.      Continue losartan at 100 mg. Continue digital HTN  135/85 or less is goal     Continue flomax for BPH.   Continue finasteride (added in early 2020)     Tachycardia: EKG today, normal. Consider holter if symptoms persist? And/or ECHO as below  Murmur: consider ECHO in the future if symptomatic or worsening.      Continue cymbalta 60 mg  Continue trazodone at 100 mg.     HCPOA paperwork on file    High Dose Flu when in stock or at pharmacy.      F/u 6 months, labs prior      Encounter for Medicare annual wellness exam  -     Ambulatory Referral/Consult to Enhanced Annual Wellness Visit (eAWV)    Essential hypertension  -     losartan (COZAAR) 100 MG tablet; Take 1 tablet (100 mg total) by mouth once daily.  Dispense: 90 tablet; Refill: 1    Hyperlipidemia associated with type 2 diabetes mellitus  -     pravastatin (PRAVACHOL) 20 MG tablet; Take 1 tablet (20 mg total) by mouth every evening.  Dispense: 90 tablet; Refill: 2    Other hyperlipidemia  -     pravastatin (PRAVACHOL) 20 MG tablet; Take 1 tablet (20 mg total) by mouth every evening.  Dispense: 90 tablet; Refill: 2    BPH associated with nocturia  -     finasteride (PROSCAR) 5 mg tablet; Take 1 tablet (5 mg total) by mouth once daily.  Dispense: 90 tablet; Refill: 2  -     tamsulosin (FLOMAX) 0.4 mg Cap; Take 1 capsule (0.4 mg total) by mouth once daily.  Dispense: 90 capsule; Refill: 1    Other specified hypothyroidism  -     levothyroxine (SYNTHROID) 88 MCG tablet; Take 1 tablet (88 mcg total) by mouth before  breakfast.  Dispense: 90 tablet; Refill: 1    Arthritis  -     DULoxetine (CYMBALTA) 60 MG capsule; Take 1 capsule (60 mg total) by mouth once daily.  Dispense: 90 capsule; Refill: 3    Insomnia, unspecified type  -     traZODone (DESYREL) 100 MG tablet; Take 1 tablet (100 mg total) by mouth every evening.  Dispense: 90 tablet; Refill: 3    Tachycardia  -     IN OFFICE EKG 12-LEAD (to Muse)    Type 2 diabetes mellitus with diabetic polyneuropathy, without long-term current use of insulin  -     CBC Auto Differential; Future; Expected date: 09/07/2023  -     Comprehensive Metabolic Panel; Future; Expected date: 09/07/2023  -     Hemoglobin A1C; Future; Expected date: 09/07/2023    Diet-controlled diabetes mellitus  -     CBC Auto Differential; Future; Expected date: 09/07/2023  -     Comprehensive Metabolic Panel; Future; Expected date: 09/07/2023  -     Hemoglobin A1C; Future; Expected date: 09/07/2023    BMI 29.0-29.9,adult

## 2023-09-08 ENCOUNTER — PATIENT OUTREACH (OUTPATIENT)
Dept: ADMINISTRATIVE | Facility: HOSPITAL | Age: 73
End: 2023-09-08
Payer: MEDICARE

## 2023-09-08 NOTE — PROGRESS NOTES
09/08/2023 Gap report audit performed. Care Everywhere updates requested and reviewed  Overdue HM topic chart audit and/or requested. LINKS triggered and reconciled. Media reviewed : BOROK  08/2023

## 2024-02-06 NOTE — PROGRESS NOTES
1. HTN  Last 5 Patient Entered Readings                                      Current 30 Day Average: 119/82     Recent Readings 12/8/2019 12/8/2019 12/8/2019 12/8/2019 12/8/2019    SBP (mmHg) 117 122 123 128 127    DBP (mmHg) 72 75 82 83 84    Pulse 70 66 63 67 67        Hypertension Medications             losartan (COZAAR) 100 MG tablet TAKE 1 TABLET EVERY DAY        Called patient to follow up, reviewed BP readings. Per 2017 ACC/ AHA HTN guidelines  (goal of BP < 130/80), current 30-day average is controlled.  No answer, no VM.  Will continue to monitor. WCB in 3 months, sooner if BP begins to trend up or down.       2. DM  Last 6 Patient Entered Readings                                          Most Recent A1c: 5.8% on 9/19/2019  (Goal: 7%)     Recent Readings 12/6/2019 12/4/2019 12/4/2019 12/3/2019 12/2/2019    Blood Glucose (mg/dL) 116 93 113 110 110        Diabetes Medications             metFORMIN (GLUCOPHAGE-XR) 500 MG 24 hr tablet Take 1 tablet (500 mg total) by mouth daily with breakfast.        Called to follow up with patient, reviewed recent readings. Per AACE/ACE guidelines (goal A1C < 7%), DM is well controlled (A1C on 9/19 was 5.8%).  No answer, no VM.  Will continue to monitor. WCB in 3 months, sooner if BG begins to trend up or down.          [facility] [place] INPATIENT ENCOUNTER  DIABETES EDUCATION CONSULT NOTE      ADMISSION DATE:  2/1/2024  DATE:  2/6/2024  CURRENT HOSPITAL DAY:  Hospital Day: 6  CONSULTING SERVICE:  Diabetes education  ATTENDING PHYSICIAN:  Ted Main MD  CODE STATUS:  Full Resuscitation      SUBJECTIVE:    History of Present Illness:  I was asked to see Gianluca Rios at the request of Verónica Caldwell APNP.  Reason for Consult:     Hemoglobin A1C 11.4     Gianluca Rios is a 53 year old male patient admitted with Osteomyelitis (CMD) [M86.9]. He is receptive to education. Pt states he has had diabetes since about 2013 or 2014. He has a family history in his mother. He does not currently have a primary care provider. His last eye exam was about 3 years ago, after having cataract surgery.   Pt states he does look at food labels and looks at sugar content on the label    Current history is provided by the patient and the chart    Home Diabetic Regimen:  Current regimen is metformin 1000 mg BID, glipizide. Pt states he would only take these medications a few times per week.    Pt had not previously checked his BG levels at home.      OBJECTIVE:  LABORATORY DATA:    Hemoglobin A1C (%)   Date Value   02/02/2024 11.4 (H)     Glucose (mg/dL)   Date Value   02/05/2024 129 (H)         ASSESSMENT:  TYPE 2 diabetes mellitus- not currently at goal    EDUCATION:  Diabetes:   What is Type 1  What is Type 2, risk factors, diagnosis, and self management.     Monitoring:  Blood Glucose Meter Training with Teach Back- Pt respectfully declined to practice glucometer, stating he feels he understood. Demonstration, written materials, and video reference were provided to pt.         Complementary Meter provided: Contour Next Gen                                     Test 4 times a day before meals and before bedtime, when having any symptoms of  hypoglycemia or hyperglycemia or as instructed by provider           Blood glucose target goals  HgbA1C:  11.4%  Discussed meaning and goals    Complications of uncontrolled diabetes    Training: Insulin Injection Training with teach back                                         Insulin Pen- Pt demonstrated skill on practice block.  Reviewed injection sites, rotation of injection sites, sharps disposal, insulin storage.                                 Medications:   Discussed the onset, peak and duration of Lantus and Humalog  Reviewed sliding scale  Patient is aware discharge medications have not been determined.                    Reviewed signs and symptoms, causes, and treatment of hypoglycemia   Reviewed signs and symptoms, causes of hyperglycemia              Sick Day Management    Diabetes prevention:   Eye- Discussed importance of yearly dilated eye exam  Foot Care- Discussed importance of daily foot exams, signs/symptoms to report to provider, wearing proper footwear indoors and outdoors, avoid going barefoot, keeping feet clean/dry/moisturized, seeing a podiatrist for any foot concerns  Discussed routine follow-up with PCP, when to call provider with abnormal glucose levels    Dietary Education:   Discussed healthy plate and portion control  Label reading.   Discussed eliminating sugary beverages/food from diet.         Barriers: None identified  Comments: Patient denied having any questions. Pt is interested in SW for assistance with PCP.     PRESCRIPTIONS NEEDED PRIOR TO DISCHARGE:  Diabetes discharge medication has not been determined.              Blood glucose meter (insurance preferred)              Test strips and # times/day to test             Lancets             Pen Faxon/Insulin Pens    Education Folder:  What is Diabetes?; Goals for Your Blood Sugar; When to Check Your Blood Sugar; Hemoglobin A1C; Foot Care; Symptoms of Low Blood Sugar (hypoglycemia); Symptoms of High Blood Sugar (hyperglycemia); What To Do If You Are Sick; DKA; Learning About Diabetes Medication; Injectable Medications; Giving  Yourself Insulin (Insulin Syringe Instructions); Giving Yourself Insulin (Insulin Pen Instructions); Storage of Insulin and Other Injectables; Safe Options for Sharp Disposal; Diabetes Resources/Diabetes Websites; Living with Diabetes; Type 2 Diabetes and Adding Insulin; Plan Your Portions; Making Decisions on a Food Label   Emailed pt videos:  ASHLEY Cares: Injecting Insulin with a Pen Needle and Syringe  How to Use Your Contour Next EZ    Contact the diabetes education department at  with any questions. Discussed with RN.

## 2024-02-12 DIAGNOSIS — R35.1 BPH ASSOCIATED WITH NOCTURIA: ICD-10-CM

## 2024-02-12 DIAGNOSIS — N40.1 BPH ASSOCIATED WITH NOCTURIA: ICD-10-CM

## 2024-02-12 RX ORDER — TAMSULOSIN HYDROCHLORIDE 0.4 MG/1
1 CAPSULE ORAL DAILY
Qty: 90 CAPSULE | Refills: 1 | Status: SHIPPED | OUTPATIENT
Start: 2024-02-12 | End: 2024-03-07 | Stop reason: SDUPTHER

## 2024-02-12 NOTE — TELEPHONE ENCOUNTER
No care due was identified.  [facility] Embedded Care Due Messages. Reference number: [id number].   2/12/2024 4:52:14 AM CST

## 2024-02-12 NOTE — TELEPHONE ENCOUNTER
Cesar Simpson  is requesting a refill authorization.  Brief Assessment and Rationale for Refill:  Approve     Medication Therapy Plan:         Comments:     Note composed:10:09 AM 02/12/2024

## 2024-02-18 ENCOUNTER — PATIENT MESSAGE (OUTPATIENT)
Dept: ADMINISTRATIVE | Facility: OTHER | Age: 74
End: 2024-02-18
Payer: MEDICARE

## 2024-02-29 ENCOUNTER — PATIENT MESSAGE (OUTPATIENT)
Dept: ADMINISTRATIVE | Facility: OTHER | Age: 74
End: 2024-02-29
Payer: MEDICARE

## 2024-03-07 ENCOUNTER — OFFICE VISIT (OUTPATIENT)
Dept: FAMILY MEDICINE | Facility: CLINIC | Age: 74
End: 2024-03-07
Payer: MEDICARE

## 2024-03-07 ENCOUNTER — LAB VISIT (OUTPATIENT)
Dept: LAB | Facility: HOSPITAL | Age: 74
End: 2024-03-07
Attending: FAMILY MEDICINE
Payer: MEDICARE

## 2024-03-07 VITALS
OXYGEN SATURATION: 98 % | DIASTOLIC BLOOD PRESSURE: 72 MMHG | SYSTOLIC BLOOD PRESSURE: 102 MMHG | HEART RATE: 100 BPM | TEMPERATURE: 97 F | HEIGHT: 67 IN | BODY MASS INDEX: 28.72 KG/M2 | WEIGHT: 183 LBS

## 2024-03-07 DIAGNOSIS — E11.9 DIET-CONTROLLED DIABETES MELLITUS: ICD-10-CM

## 2024-03-07 DIAGNOSIS — E11.69 HYPERLIPIDEMIA ASSOCIATED WITH TYPE 2 DIABETES MELLITUS: ICD-10-CM

## 2024-03-07 DIAGNOSIS — N40.1 BPH ASSOCIATED WITH NOCTURIA: ICD-10-CM

## 2024-03-07 DIAGNOSIS — M19.90 ARTHRITIS: ICD-10-CM

## 2024-03-07 DIAGNOSIS — E11.42 TYPE 2 DIABETES MELLITUS WITH DIABETIC POLYNEUROPATHY, WITHOUT LONG-TERM CURRENT USE OF INSULIN: ICD-10-CM

## 2024-03-07 DIAGNOSIS — I15.2 HYPERTENSION ASSOCIATED WITH DIABETES: ICD-10-CM

## 2024-03-07 DIAGNOSIS — E78.5 HYPERLIPIDEMIA, UNSPECIFIED HYPERLIPIDEMIA TYPE: ICD-10-CM

## 2024-03-07 DIAGNOSIS — E03.8 OTHER SPECIFIED HYPOTHYROIDISM: ICD-10-CM

## 2024-03-07 DIAGNOSIS — E78.5 HYPERLIPIDEMIA ASSOCIATED WITH TYPE 2 DIABETES MELLITUS: ICD-10-CM

## 2024-03-07 DIAGNOSIS — R35.1 BPH ASSOCIATED WITH NOCTURIA: ICD-10-CM

## 2024-03-07 DIAGNOSIS — G47.00 INSOMNIA, UNSPECIFIED TYPE: ICD-10-CM

## 2024-03-07 DIAGNOSIS — R05.9 COUGH, UNSPECIFIED TYPE: ICD-10-CM

## 2024-03-07 DIAGNOSIS — Z12.11 COLON CANCER SCREENING: ICD-10-CM

## 2024-03-07 DIAGNOSIS — E11.42 TYPE 2 DIABETES MELLITUS WITH DIABETIC POLYNEUROPATHY, WITHOUT LONG-TERM CURRENT USE OF INSULIN: Primary | ICD-10-CM

## 2024-03-07 DIAGNOSIS — E11.59 HYPERTENSION ASSOCIATED WITH DIABETES: ICD-10-CM

## 2024-03-07 DIAGNOSIS — R05.3 CHRONIC COUGH: ICD-10-CM

## 2024-03-07 DIAGNOSIS — E78.49 OTHER HYPERLIPIDEMIA: ICD-10-CM

## 2024-03-07 LAB
ALBUMIN SERPL BCP-MCNC: 4 G/DL (ref 3.5–5.2)
ALP SERPL-CCNC: 66 U/L (ref 55–135)
ALT SERPL W/O P-5'-P-CCNC: 14 U/L (ref 10–44)
ANION GAP SERPL CALC-SCNC: 10 MMOL/L (ref 8–16)
AST SERPL-CCNC: 14 U/L (ref 10–40)
BASOPHILS # BLD AUTO: 0.08 K/UL (ref 0–0.2)
BASOPHILS NFR BLD: 1.1 % (ref 0–1.9)
BILIRUB SERPL-MCNC: 0.2 MG/DL (ref 0.1–1)
BUN SERPL-MCNC: 33 MG/DL (ref 8–23)
CALCIUM SERPL-MCNC: 9.2 MG/DL (ref 8.7–10.5)
CHLORIDE SERPL-SCNC: 107 MMOL/L (ref 95–110)
CHOLEST SERPL-MCNC: 135 MG/DL (ref 120–199)
CHOLEST/HDLC SERPL: 3.6 {RATIO} (ref 2–5)
CO2 SERPL-SCNC: 20 MMOL/L (ref 23–29)
CREAT SERPL-MCNC: 1.4 MG/DL (ref 0.5–1.4)
DIFFERENTIAL METHOD BLD: ABNORMAL
EOSINOPHIL # BLD AUTO: 0.4 K/UL (ref 0–0.5)
EOSINOPHIL NFR BLD: 5.7 % (ref 0–8)
ERYTHROCYTE [DISTWIDTH] IN BLOOD BY AUTOMATED COUNT: 13.4 % (ref 11.5–14.5)
EST. GFR  (NO RACE VARIABLE): 53.4 ML/MIN/1.73 M^2
ESTIMATED AVG GLUCOSE: 126 MG/DL (ref 68–131)
GLUCOSE SERPL-MCNC: 127 MG/DL (ref 70–110)
HBA1C MFR BLD: 6 % (ref 4–5.6)
HCT VFR BLD AUTO: 45.6 % (ref 40–54)
HDLC SERPL-MCNC: 37 MG/DL (ref 40–75)
HDLC SERPL: 27.4 % (ref 20–50)
HGB BLD-MCNC: 15 G/DL (ref 14–18)
IMM GRANULOCYTES # BLD AUTO: 0.05 K/UL (ref 0–0.04)
IMM GRANULOCYTES NFR BLD AUTO: 0.7 % (ref 0–0.5)
LDLC SERPL CALC-MCNC: 86.2 MG/DL (ref 63–159)
LYMPHOCYTES # BLD AUTO: 2.1 K/UL (ref 1–4.8)
LYMPHOCYTES NFR BLD: 28.3 % (ref 18–48)
MCH RBC QN AUTO: 29.5 PG (ref 27–31)
MCHC RBC AUTO-ENTMCNC: 32.9 G/DL (ref 32–36)
MCV RBC AUTO: 90 FL (ref 82–98)
MONOCYTES # BLD AUTO: 0.7 K/UL (ref 0.3–1)
MONOCYTES NFR BLD: 9.1 % (ref 4–15)
NEUTROPHILS # BLD AUTO: 4.1 K/UL (ref 1.8–7.7)
NEUTROPHILS NFR BLD: 55.1 % (ref 38–73)
NONHDLC SERPL-MCNC: 98 MG/DL
NRBC BLD-RTO: 0 /100 WBC
PLATELET # BLD AUTO: 227 K/UL (ref 150–450)
PMV BLD AUTO: 10.4 FL (ref 9.2–12.9)
POTASSIUM SERPL-SCNC: 4.1 MMOL/L (ref 3.5–5.1)
PROT SERPL-MCNC: 7.4 G/DL (ref 6–8.4)
RBC # BLD AUTO: 5.08 M/UL (ref 4.6–6.2)
SODIUM SERPL-SCNC: 137 MMOL/L (ref 136–145)
TRIGL SERPL-MCNC: 59 MG/DL (ref 30–150)
WBC # BLD AUTO: 7.39 K/UL (ref 3.9–12.7)

## 2024-03-07 PROCEDURE — 1157F ADVNC CARE PLAN IN RCRD: CPT | Mod: HCNC,CPTII,S$GLB, | Performed by: FAMILY MEDICINE

## 2024-03-07 PROCEDURE — 80061 LIPID PANEL: CPT | Mod: HCNC | Performed by: FAMILY MEDICINE

## 2024-03-07 PROCEDURE — 3074F SYST BP LT 130 MM HG: CPT | Mod: HCNC,CPTII,S$GLB, | Performed by: FAMILY MEDICINE

## 2024-03-07 PROCEDURE — 85025 COMPLETE CBC W/AUTO DIFF WBC: CPT | Mod: HCNC | Performed by: FAMILY MEDICINE

## 2024-03-07 PROCEDURE — 99214 OFFICE O/P EST MOD 30 MIN: CPT | Mod: HCNC,S$GLB,, | Performed by: FAMILY MEDICINE

## 2024-03-07 PROCEDURE — 83036 HEMOGLOBIN GLYCOSYLATED A1C: CPT | Mod: HCNC | Performed by: FAMILY MEDICINE

## 2024-03-07 PROCEDURE — 4010F ACE/ARB THERAPY RXD/TAKEN: CPT | Mod: HCNC,CPTII,S$GLB, | Performed by: FAMILY MEDICINE

## 2024-03-07 PROCEDURE — 3288F FALL RISK ASSESSMENT DOCD: CPT | Mod: HCNC,CPTII,S$GLB, | Performed by: FAMILY MEDICINE

## 2024-03-07 PROCEDURE — 3072F LOW RISK FOR RETINOPATHY: CPT | Mod: HCNC,CPTII,S$GLB, | Performed by: FAMILY MEDICINE

## 2024-03-07 PROCEDURE — 36415 COLL VENOUS BLD VENIPUNCTURE: CPT | Mod: HCNC,PO | Performed by: FAMILY MEDICINE

## 2024-03-07 PROCEDURE — 1126F AMNT PAIN NOTED NONE PRSNT: CPT | Mod: HCNC,CPTII,S$GLB, | Performed by: FAMILY MEDICINE

## 2024-03-07 PROCEDURE — 1101F PT FALLS ASSESS-DOCD LE1/YR: CPT | Mod: HCNC,CPTII,S$GLB, | Performed by: FAMILY MEDICINE

## 2024-03-07 PROCEDURE — 3008F BODY MASS INDEX DOCD: CPT | Mod: HCNC,CPTII,S$GLB, | Performed by: FAMILY MEDICINE

## 2024-03-07 PROCEDURE — 1159F MED LIST DOCD IN RCRD: CPT | Mod: HCNC,CPTII,S$GLB, | Performed by: FAMILY MEDICINE

## 2024-03-07 PROCEDURE — 3078F DIAST BP <80 MM HG: CPT | Mod: HCNC,CPTII,S$GLB, | Performed by: FAMILY MEDICINE

## 2024-03-07 PROCEDURE — 80053 COMPREHEN METABOLIC PANEL: CPT | Mod: HCNC | Performed by: FAMILY MEDICINE

## 2024-03-07 PROCEDURE — 99999 PR PBB SHADOW E&M-EST. PATIENT-LVL IV: CPT | Mod: PBBFAC,HCNC,, | Performed by: FAMILY MEDICINE

## 2024-03-07 PROCEDURE — 1160F RVW MEDS BY RX/DR IN RCRD: CPT | Mod: HCNC,CPTII,S$GLB, | Performed by: FAMILY MEDICINE

## 2024-03-07 RX ORDER — FLUTICASONE PROPIONATE 50 MCG
2 SPRAY, SUSPENSION (ML) NASAL DAILY
Qty: 16 G | Refills: 12 | Status: SHIPPED | OUTPATIENT
Start: 2024-03-07 | End: 2024-04-06

## 2024-03-07 RX ORDER — FINASTERIDE 5 MG/1
5 TABLET, FILM COATED ORAL DAILY
Qty: 90 TABLET | Refills: 2 | Status: SHIPPED | OUTPATIENT
Start: 2024-03-07

## 2024-03-07 RX ORDER — PRAVASTATIN SODIUM 20 MG/1
20 TABLET ORAL NIGHTLY
Qty: 90 TABLET | Refills: 2 | Status: SHIPPED | OUTPATIENT
Start: 2024-03-07 | End: 2024-04-10

## 2024-03-07 RX ORDER — LEVOTHYROXINE SODIUM 88 UG/1
88 TABLET ORAL
Qty: 90 TABLET | Refills: 1 | Status: SHIPPED | OUTPATIENT
Start: 2024-03-07 | End: 2024-06-11 | Stop reason: SDUPTHER

## 2024-03-07 RX ORDER — TRAZODONE HYDROCHLORIDE 100 MG/1
100 TABLET ORAL NIGHTLY
Qty: 90 TABLET | Refills: 3 | Status: SHIPPED | OUTPATIENT
Start: 2024-03-07 | End: 2024-06-11 | Stop reason: SDUPTHER

## 2024-03-07 RX ORDER — DULOXETIN HYDROCHLORIDE 60 MG/1
60 CAPSULE, DELAYED RELEASE ORAL DAILY
Qty: 90 CAPSULE | Refills: 3 | Status: SHIPPED | OUTPATIENT
Start: 2024-03-07 | End: 2024-06-11 | Stop reason: SDUPTHER

## 2024-03-07 RX ORDER — AZELASTINE 1 MG/ML
1 SPRAY, METERED NASAL 2 TIMES DAILY
Qty: 30 ML | Refills: 3 | Status: SHIPPED | OUTPATIENT
Start: 2024-03-07 | End: 2025-03-07

## 2024-03-07 RX ORDER — OLMESARTAN MEDOXOMIL 40 MG/1
20 TABLET ORAL DAILY
Qty: 90 TABLET | Refills: 1
Start: 2024-03-07 | End: 2024-03-27 | Stop reason: SDUPTHER

## 2024-03-07 RX ORDER — TAMSULOSIN HYDROCHLORIDE 0.4 MG/1
1 CAPSULE ORAL DAILY
Qty: 90 CAPSULE | Refills: 1 | Status: SHIPPED | OUTPATIENT
Start: 2024-03-07

## 2024-03-07 NOTE — PATIENT INSTRUCTIONS
Labs pending    Continue diet controlled diabetes  Diet, exercise, weight loss. Decrease sugar/carbs    Try to cut olmesartan in 1/2 due to orthostatic symptoms  Continue digital HTN.      Continue synthroid at current dose  Continue pravastatin at 20 mg, due to myalgias.   Continue flomax for BPH.   Continue finasteride (added in early 2020)    Tachycardia: resolved. Had similar symptoms apparently in 2018, and at that time cardiac workup was unrevealing.     Continue cymbalta 60 mg  Continue trazodone at 100 mg.     Cough: CT, PFT. ENT vs pulmonary? Depending on results.     F/u 3 months. Labs TBD.

## 2024-03-07 NOTE — PROGRESS NOTES
"Subjective:       Patient ID: Cesar Simpson is a 73 y.o. male.    Chief Complaint: Diabetes and Medicare AWV Follow Up    Cesar is a 73 y.o. male who presents today for fu/u    Cough: Still having chronic productive cough. Taking OTC mucinex. Does not feel like this helps. Reports ongoing for >6 months. Has had multiple courses of antibiotics at this point. Cough is improved, but still persistent. He coughs every day. Food does not trigger cough.     Family history of MI: had stress test in 2018. Normal myocardial perfusion scan with no evidence of ischemia or infarct.  Diet controlled DM. Stopped metformin at visit 9/2020. A1c at goal 4/13/2023.   Weight: stable since August 2023.     DLD: taking pravastatin, 20 mg. Decreased 6/29/2022 due to myalgias. No longer having cramps.   HTN: taking olmesartan. Changed from losartan due to BP spikes noted by digital HTN. Bp at home 98/60s. Have orthostatic sys when standing. Stated about 3 weeks ago.  States that he drinks 1.5 32 oz water bottles a day. Reports low normal at home today as well.     Insomnia: taking Cymbalta 60 mg and trazodone 100 mg. Sleeping "fine."   FABY: not using CPAP anymore    BPH: on flomax and finasteride. Nocturia has resolved.    Hypothyroidism: on synthroid. TSH 6/2022 was normal.   Tachycardia: resolved.       Review of Systems   Constitutional:  Negative for fatigue and fever.   HENT:  Negative for postnasal drip and sore throat.    Respiratory:  Positive for cough. Negative for shortness of breath and wheezing.    Cardiovascular:  Negative for chest pain, palpitations and leg swelling.   Gastrointestinal:  Negative for blood in stool, constipation, diarrhea, nausea and vomiting.   Endocrine: Negative for polyuria.   Genitourinary:  Negative for dysuria, enuresis and hematuria.   Musculoskeletal:  Negative for myalgias.   Neurological:  Positive for dizziness and light-headedness. Negative for syncope.               Results for orders placed or " "performed in visit on 09/08/23   Fecal Immunochemical Test (iFOBT)   Result Value Ref Range    Fecal Immunochemical Test (iFOBT) NEGATIVE        Objective:     Vitals:    03/07/24 1039   BP: 102/72   Pulse: 100   Temp: 97.4 °F (36.3 °C)   TempSrc: Oral   SpO2: 98%   Weight: 83 kg (182 lb 15.7 oz)   Height: 5' 7" (1.702 m)        Physical Exam  Constitutional:       General: He is not in acute distress.     Appearance: He is not ill-appearing, toxic-appearing or diaphoretic.   HENT:      Nose: Congestion present. No rhinorrhea.      Mouth/Throat:      Mouth: Mucous membranes are moist.      Pharynx: No oropharyngeal exudate or posterior oropharyngeal erythema.   Eyes:      Pupils: Pupils are equal, round, and reactive to light.   Cardiovascular:      Rate and Rhythm: Normal rate and regular rhythm.      Heart sounds: Murmur heard.   Pulmonary:      Effort: Pulmonary effort is normal. No respiratory distress.      Breath sounds: Normal breath sounds. No wheezing.   Abdominal:      Palpations: Abdomen is soft.      Tenderness: There is no abdominal tenderness.   Musculoskeletal:         General: No swelling or tenderness.   Feet:      Right foot:      Protective Sensation: 10 sites tested.  10 sites sensed.      Left foot:      Protective Sensation: 10 sites tested.  10 sites sensed.   Lymphadenopathy:      Cervical: No cervical adenopathy.   Neurological:      General: No focal deficit present.      Mental Status: He is alert and oriented to person, place, and time.   Psychiatric:         Mood and Affect: Mood normal.         Behavior: Behavior normal.         Thought Content: Thought content normal.         Judgment: Judgment normal.         Assessment:       1. Type 2 diabetes mellitus with diabetic polyneuropathy, without long-term current use of insulin    2. Diet-controlled diabetes mellitus    3. Chronic cough    4. Colon cancer screening    5. Cough, unspecified type    6. Insomnia, unspecified type    7. BPH " associated with nocturia    8. Hyperlipidemia associated with type 2 diabetes mellitus    9. Other hyperlipidemia    10. Hypertension associated with diabetes    11. Other specified hypothyroidism    12. Arthritis        Plan:         Labs pending    Continue diet controlled diabetes  Diet, exercise, weight loss. Decrease sugar/carbs    Try to cut olmesartan in 1/2 due to orthostatic symptoms  Continue digital HTN.      Continue synthroid at current dose  Continue pravastatin at 20 mg, due to myalgias.   Continue flomax for BPH.   Continue finasteride (added in early 2020)    Tachycardia: resolved. Had similar symptoms apparently in 2018, and at that time cardiac workup was unrevealing.     Continue cymbalta 60 mg  Continue trazodone at 100 mg.     Cough: CT, PFT. ENT vs pulmonary? Depending on results.     F/u 3 months. Labs TBD.     Type 2 diabetes mellitus with diabetic polyneuropathy, without long-term current use of insulin    Diet-controlled diabetes mellitus    Chronic cough  -     CT Chest Without Contrast; Future; Expected date: 03/07/2024  -     Complete PFT w/ bronchodilator; Future  -     fluticasone propionate (FLONASE) 50 mcg/actuation nasal spray; 2 sprays (100 mcg total) by Each Nostril route once daily.  Dispense: 16 g; Refill: 12  -     azelastine (ASTELIN) 137 mcg (0.1 %) nasal spray; 1 spray (137 mcg total) by Nasal route 2 (two) times daily.  Dispense: 30 mL; Refill: 3    Colon cancer screening  -     Ambulatory referral/consult to Endo Procedure ; Future; Expected date: 03/08/2024    Cough, unspecified type  -     CT Chest Without Contrast; Future; Expected date: 03/07/2024  -     Complete PFT w/ bronchodilator; Future    Insomnia, unspecified type  -     traZODone (DESYREL) 100 MG tablet; Take 1 tablet (100 mg total) by mouth every evening.  Dispense: 90 tablet; Refill: 3    BPH associated with nocturia  -     tamsulosin (FLOMAX) 0.4 mg Cap; Take 1 capsule (0.4 mg total) by mouth once  daily.  Dispense: 90 capsule; Refill: 1  -     finasteride (PROSCAR) 5 mg tablet; Take 1 tablet (5 mg total) by mouth once daily.  Dispense: 90 tablet; Refill: 2    Hyperlipidemia associated with type 2 diabetes mellitus  -     pravastatin (PRAVACHOL) 20 MG tablet; Take 1 tablet (20 mg total) by mouth every evening.  Dispense: 90 tablet; Refill: 2    Other hyperlipidemia  -     pravastatin (PRAVACHOL) 20 MG tablet; Take 1 tablet (20 mg total) by mouth every evening.  Dispense: 90 tablet; Refill: 2    Hypertension associated with diabetes  -     olmesartan (BENICAR) 40 MG tablet; Take 0.5 tablets (20 mg total) by mouth once daily.  Dispense: 90 tablet; Refill: 1    Other specified hypothyroidism  -     levothyroxine (SYNTHROID) 88 MCG tablet; Take 1 tablet (88 mcg total) by mouth before breakfast.  Dispense: 90 tablet; Refill: 1    Arthritis  -     DULoxetine (CYMBALTA) 60 MG capsule; Take 1 capsule (60 mg total) by mouth once daily.  Dispense: 90 capsule; Refill: 3            Warning signs discussed, patient to call with any further issues or worsening of symptoms. Above note may have utilized dictation, please excuse any transcription errors.

## 2024-03-08 ENCOUNTER — TELEPHONE (OUTPATIENT)
Dept: FAMILY MEDICINE | Facility: CLINIC | Age: 74
End: 2024-03-08
Payer: MEDICARE

## 2024-03-08 DIAGNOSIS — N17.9 AKI (ACUTE KIDNEY INJURY): Primary | ICD-10-CM

## 2024-03-08 NOTE — TELEPHONE ENCOUNTER
Please call patient  Kidney function is a little low. No NSAIDS such as ibuprofen or naproxen. Avoid Red meats. Drink a lot of water. I will continue monitoring kidney function    Repeat BMP in 2-3 weeks please.

## 2024-03-08 NOTE — TELEPHONE ENCOUNTER
Called patient to inform him that his labs needs to be repeated, and also go over his lab results. Patient didn't answer. Left voicemail.

## 2024-03-08 NOTE — TELEPHONE ENCOUNTER
Called patient to go over recent lab results per Dr López. No answer left voicemail for patient to return phone call to office.

## 2024-03-11 ENCOUNTER — TELEPHONE (OUTPATIENT)
Dept: FAMILY MEDICINE | Facility: CLINIC | Age: 74
End: 2024-03-11
Payer: MEDICARE

## 2024-03-11 NOTE — TELEPHONE ENCOUNTER
Called patient to go over recent labs per Dr López and set up appointment for repeat labs. Patient verbalized understanding

## 2024-03-14 ENCOUNTER — TELEPHONE (OUTPATIENT)
Dept: FAMILY MEDICINE | Facility: CLINIC | Age: 74
End: 2024-03-14
Payer: MEDICARE

## 2024-03-14 ENCOUNTER — HOSPITAL ENCOUNTER (OUTPATIENT)
Dept: RADIOLOGY | Facility: HOSPITAL | Age: 74
Discharge: HOME | End: 2024-03-14
Attending: FAMILY MEDICINE
Payer: MEDICARE

## 2024-03-14 DIAGNOSIS — R05.9 COUGH, UNSPECIFIED TYPE: ICD-10-CM

## 2024-03-14 DIAGNOSIS — J84.10 PULMONARY FIBROSIS: Primary | ICD-10-CM

## 2024-03-14 DIAGNOSIS — R05.3 CHRONIC COUGH: ICD-10-CM

## 2024-03-14 PROCEDURE — 71250 CT THORAX DX C-: CPT | Mod: 26,HCNC,, | Performed by: RADIOLOGY

## 2024-03-14 PROCEDURE — 71250 CT THORAX DX C-: CPT | Mod: TC,HCNC

## 2024-03-14 NOTE — TELEPHONE ENCOUNTER
Elsa/Xi  Can you set him up with pulmonary asap  Can you also see if we can get 6mwt the same time as his PFTs?    JM

## 2024-03-18 ENCOUNTER — HOSPITAL ENCOUNTER (OUTPATIENT)
Dept: PULMONOLOGY | Facility: HOSPITAL | Age: 74
Discharge: HOME | End: 2024-03-18
Attending: FAMILY MEDICINE
Payer: MEDICARE

## 2024-03-18 DIAGNOSIS — R05.9 COUGH, UNSPECIFIED TYPE: ICD-10-CM

## 2024-03-18 DIAGNOSIS — R05.3 CHRONIC COUGH: ICD-10-CM

## 2024-03-18 DIAGNOSIS — J84.10 PULMONARY FIBROSIS: ICD-10-CM

## 2024-03-18 PROCEDURE — 94618 PULMONARY STRESS TESTING: CPT

## 2024-03-18 PROCEDURE — 94010 BREATHING CAPACITY TEST: CPT

## 2024-03-19 NOTE — PROCEDURES
Cesar Simpson is a 73 y.o.   male patient, who presents for a 6 minute walk test ordered by Dr López .  The diagnosis is  SOB.  The patient's BMI is 28.66 kg/m2.    Predicted distance (lower limit of normal) is  319 meters.      Test Results:    The test was  completed.  The patient stopped  0 times for a total of 0 seconds.  The total time walked was 360 seconds.  During walking, the patient reported:  leg pain, sob and light headedness .    03/19/2024---------Distance:   (477m )     O2 Sat % Supplemental Oxygen Heart Rate Blood Pressure Raheem Scale   Pre-exercise  (Resting)  98  ra  93 na   1   During Exercise  96  ra  107 na   3   Post-exercise  (Recovery)  98   ra  94 na   1     Recovery Time:  45 seconds    Performing nurse/tech:  Evan STEINBERG      SUMMARY/INTERPRETATION:    Total distance walked: 477  meters  Predicted distance:  319/472 meters  Percentage predicted: 101 %    PREVIOUS STUDY:     Date:    Prior 6 minute walk:   meters  Percentage change:      The patient has not had a previous study.      CLINICAL INTERPRETATION:  Six minute walk distance is   (477m ) with moderate dyspnea.  During exercise, there was no significant desaturation while breathing room air.

## 2024-03-20 PROCEDURE — 94618 PULMONARY STRESS TESTING: CPT | Mod: 26,,, | Performed by: INTERNAL MEDICINE

## 2024-03-21 ENCOUNTER — TELEPHONE (OUTPATIENT)
Dept: FAMILY MEDICINE | Facility: CLINIC | Age: 74
End: 2024-03-21
Payer: MEDICARE

## 2024-03-21 DIAGNOSIS — M79.605 PAIN IN BOTH LOWER EXTREMITIES: Primary | ICD-10-CM

## 2024-03-21 DIAGNOSIS — R06.09 EXERTIONAL DYSPNEA: ICD-10-CM

## 2024-03-21 DIAGNOSIS — M79.604 PAIN IN BOTH LOWER EXTREMITIES: Primary | ICD-10-CM

## 2024-03-21 LAB
BRPFT: ABNORMAL
FEF 25 75 LLN: 0.9
FEF 25 75 PRE REF: 210 %
FEF 25 75 REF: 2.14
FEV1 FVC LLN: 62
FEV1 FVC PRE REF: 117 %
FEV1 FVC REF: 76
FEV1 LLN: 2.01
FEV1 PRE REF: 102 %
FEV1 REF: 2.82
FVC LLN: 2.73
FVC PRE REF: 86.9 %
FVC REF: 3.71
PEF LLN: 4.94
PEF PRE REF: 99.2 %
PEF REF: 7.37
PRE FEF 25 75: 4.5 L/S (ref 0.9–3.39)
PRE FET 100: 7.22 SEC
PRE FEV1 FVC: 89.03 % (ref 62.2–90)
PRE FEV1: 2.87 L (ref 2.01–3.62)
PRE FVC: 3.23 L (ref 2.73–4.7)
PRE PEF: 7.31 L/S (ref 4.94–9.8)

## 2024-03-21 PROCEDURE — 94010 BREATHING CAPACITY TEST: CPT | Mod: 26,,, | Performed by: INTERNAL MEDICINE

## 2024-03-22 ENCOUNTER — LAB VISIT (OUTPATIENT)
Dept: LAB | Facility: HOSPITAL | Age: 74
End: 2024-03-22
Attending: FAMILY MEDICINE
Payer: MEDICARE

## 2024-03-22 DIAGNOSIS — N17.9 AKI (ACUTE KIDNEY INJURY): ICD-10-CM

## 2024-03-22 LAB
ANION GAP SERPL CALC-SCNC: 9 MMOL/L (ref 8–16)
BUN SERPL-MCNC: 27 MG/DL (ref 8–23)
CALCIUM SERPL-MCNC: 9.1 MG/DL (ref 8.7–10.5)
CHLORIDE SERPL-SCNC: 102 MMOL/L (ref 95–110)
CO2 SERPL-SCNC: 25 MMOL/L (ref 23–29)
CREAT SERPL-MCNC: 1.1 MG/DL (ref 0.5–1.4)
EST. GFR  (NO RACE VARIABLE): >60 ML/MIN/1.73 M^2
GLUCOSE SERPL-MCNC: 128 MG/DL (ref 70–110)
POTASSIUM SERPL-SCNC: 4.4 MMOL/L (ref 3.5–5.1)
SODIUM SERPL-SCNC: 136 MMOL/L (ref 136–145)

## 2024-03-22 PROCEDURE — 80048 BASIC METABOLIC PNL TOTAL CA: CPT | Performed by: FAMILY MEDICINE

## 2024-03-22 PROCEDURE — 36415 COLL VENOUS BLD VENIPUNCTURE: CPT | Mod: PO | Performed by: FAMILY MEDICINE

## 2024-03-25 ENCOUNTER — HOSPITAL ENCOUNTER (OUTPATIENT)
Dept: RADIOLOGY | Facility: HOSPITAL | Age: 74
Discharge: HOME | End: 2024-03-25
Attending: FAMILY MEDICINE
Payer: MEDICARE

## 2024-03-25 ENCOUNTER — TELEPHONE (OUTPATIENT)
Dept: FAMILY MEDICINE | Facility: CLINIC | Age: 74
End: 2024-03-25
Payer: MEDICARE

## 2024-03-25 DIAGNOSIS — M79.604 PAIN IN BOTH LOWER EXTREMITIES: ICD-10-CM

## 2024-03-25 DIAGNOSIS — M79.605 PAIN IN BOTH LOWER EXTREMITIES: ICD-10-CM

## 2024-03-25 DIAGNOSIS — I73.9 PAD (PERIPHERAL ARTERY DISEASE): Primary | ICD-10-CM

## 2024-03-25 PROCEDURE — 93925 LOWER EXTREMITY STUDY: CPT | Mod: TC

## 2024-03-25 PROCEDURE — 93925 LOWER EXTREMITY STUDY: CPT | Mod: 26,,, | Performed by: RADIOLOGY

## 2024-03-25 RX ORDER — NAPROXEN SODIUM 220 MG/1
81 TABLET, FILM COATED ORAL DAILY
Qty: 90 TABLET | Refills: 3 | Status: SHIPPED | OUTPATIENT
Start: 2024-03-25 | End: 2025-03-25

## 2024-03-25 NOTE — TELEPHONE ENCOUNTER
Can we try to get into interventional cardiology asap?  Also tell patient to start aspirin 81 mg until f/u

## 2024-03-26 ENCOUNTER — PATIENT OUTREACH (OUTPATIENT)
Dept: ADMINISTRATIVE | Facility: HOSPITAL | Age: 74
End: 2024-03-26
Payer: MEDICARE

## 2024-03-26 ENCOUNTER — TELEPHONE (OUTPATIENT)
Dept: FAMILY MEDICINE | Facility: CLINIC | Age: 74
End: 2024-03-26
Payer: MEDICARE

## 2024-03-26 ENCOUNTER — PATIENT MESSAGE (OUTPATIENT)
Dept: ADMINISTRATIVE | Facility: HOSPITAL | Age: 74
End: 2024-03-26
Payer: MEDICARE

## 2024-03-26 ENCOUNTER — OFFICE VISIT (OUTPATIENT)
Dept: PULMONOLOGY | Facility: CLINIC | Age: 74
End: 2024-03-26
Payer: MEDICARE

## 2024-03-26 VITALS
HEART RATE: 70 BPM | WEIGHT: 186.94 LBS | DIASTOLIC BLOOD PRESSURE: 86 MMHG | OXYGEN SATURATION: 97 % | BODY MASS INDEX: 29.34 KG/M2 | SYSTOLIC BLOOD PRESSURE: 143 MMHG | HEIGHT: 67 IN

## 2024-03-26 DIAGNOSIS — R05.3 CHRONIC COUGH: ICD-10-CM

## 2024-03-26 DIAGNOSIS — J84.10 PULMONARY FIBROSIS: ICD-10-CM

## 2024-03-26 DIAGNOSIS — J84.9 ILD (INTERSTITIAL LUNG DISEASE): Primary | ICD-10-CM

## 2024-03-26 PROCEDURE — 3072F LOW RISK FOR RETINOPATHY: CPT | Mod: CPTII,S$GLB,, | Performed by: INTERNAL MEDICINE

## 2024-03-26 PROCEDURE — 4010F ACE/ARB THERAPY RXD/TAKEN: CPT | Mod: CPTII,S$GLB,, | Performed by: INTERNAL MEDICINE

## 2024-03-26 PROCEDURE — 1157F ADVNC CARE PLAN IN RCRD: CPT | Mod: CPTII,S$GLB,, | Performed by: INTERNAL MEDICINE

## 2024-03-26 PROCEDURE — 3044F HG A1C LEVEL LT 7.0%: CPT | Mod: CPTII,S$GLB,, | Performed by: INTERNAL MEDICINE

## 2024-03-26 PROCEDURE — 1126F AMNT PAIN NOTED NONE PRSNT: CPT | Mod: CPTII,S$GLB,, | Performed by: INTERNAL MEDICINE

## 2024-03-26 PROCEDURE — 1159F MED LIST DOCD IN RCRD: CPT | Mod: CPTII,S$GLB,, | Performed by: INTERNAL MEDICINE

## 2024-03-26 PROCEDURE — 99204 OFFICE O/P NEW MOD 45 MIN: CPT | Mod: S$GLB,,, | Performed by: INTERNAL MEDICINE

## 2024-03-26 PROCEDURE — 99999 PR PBB SHADOW E&M-EST. PATIENT-LVL IV: CPT | Mod: PBBFAC,,, | Performed by: INTERNAL MEDICINE

## 2024-03-26 PROCEDURE — 3079F DIAST BP 80-89 MM HG: CPT | Mod: CPTII,S$GLB,, | Performed by: INTERNAL MEDICINE

## 2024-03-26 PROCEDURE — 3008F BODY MASS INDEX DOCD: CPT | Mod: CPTII,S$GLB,, | Performed by: INTERNAL MEDICINE

## 2024-03-26 PROCEDURE — 3077F SYST BP >= 140 MM HG: CPT | Mod: CPTII,S$GLB,, | Performed by: INTERNAL MEDICINE

## 2024-03-26 RX ORDER — FAMOTIDINE 40 MG/1
40 TABLET, FILM COATED ORAL
Qty: 30 TABLET | Refills: 11 | Status: SHIPPED | OUTPATIENT
Start: 2024-03-26 | End: 2025-03-26

## 2024-03-26 RX ORDER — BENZONATATE 200 MG/1
200 CAPSULE ORAL 3 TIMES DAILY PRN
Qty: 90 CAPSULE | Refills: 11 | Status: SHIPPED | OUTPATIENT
Start: 2024-03-26 | End: 2025-03-26

## 2024-03-26 RX ORDER — ESOMEPRAZOLE MAGNESIUM 40 MG/1
40 CAPSULE, DELAYED RELEASE ORAL
Qty: 30 CAPSULE | Refills: 11 | Status: SHIPPED | OUTPATIENT
Start: 2024-03-26 | End: 2025-03-26

## 2024-03-26 NOTE — PROGRESS NOTES
Subjective:       Patient ID: Cesar Simpson is a 73 y.o. male.    Chief Complaint: No chief complaint on file.    Cough for over 1 year. Dry. Persistent. No improvement with antibiotics, nasal sprays. No associated dyspnea. Never smoker. 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  No FH lung disease but strong h/o CAD  No current or active exposures.  Some leg pain with exercise and noted to have PAD    Cough  This is a chronic problem. The current episode started more than 1 year ago. The problem has been unchanged. The problem occurs every few hours. The cough is Non-productive. Pertinent negatives include no heartburn, shortness of breath or weight loss. Nothing aggravates the symptoms. Risk factors for lung disease include occupational exposure. He has tried prescription cough suppressant and OTC cough suppressant for the symptoms. The treatment provided no relief. His past medical history is significant for environmental allergies. There is no history of asthma or COPD.   Review of Systems   Constitutional:  Negative for weight loss.   Respiratory:  Positive for cough. Negative for shortness of breath.    Gastrointestinal:  Negative for heartburn.   All other systems reviewed and are negative.      Past Medical History:   Diagnosis Date    Diabetes mellitus type II, uncontrolled 02/27/2013    High-density lipoprotein deficiency 02/27/2013    HTN (hypertension) 02/27/2013    Hyperlipidemia     Hypothyroidism     Moderate obstructive sleep apnea 6/29/2022    Normocytic anemia 06/28/2021    Obesity     Osteoarthritis 02/08/2016    Trouble in sleeping     Type 2 diabetes mellitus     Type 2 diabetes mellitus with diabetic polyneuropathy, without long-term current use of insulin         Family History   Problem Relation Age of Onset    Osteoporosis Mother     Hypertension Mother     Early death Father         Heart,  Stroke age 50    Stroke Father     Heart disease Father      Hypertension Father     Heart disease Brother     Early death Brother         Heart Att.  age 45    Hypertension Brother     No Known Problems Daughter     No Known Problems Son     Early death Paternal Uncle         Heart,  age 49    Heart disease Paternal Uncle       If not mentioned in HPI, Family history is reviewed and not contributory    Social History     Tobacco Use    Smoking status: Never     Passive exposure: Never    Smokeless tobacco: Never   Substance Use Topics    Alcohol use: Never     Comment: occasionally (maybe every 3 months)    Drug use: Never        Objective:        Vitals:    03/26/24 1122   BP: (!) 143/86   Pulse: 70     Wt Readings from Last 3 Encounters:   03/26/24 84.8 kg (186 lb 15.2 oz)   03/07/24 83 kg (182 lb 15.7 oz)   09/07/23 84.3 kg (185 lb 13.6 oz)     Temp Readings from Last 3 Encounters:   03/07/24 97.4 °F (36.3 °C) (Oral)   07/21/23 98.4 °F (36.9 °C) (Oral)   06/07/23 98.3 °F (36.8 °C)     BP Readings from Last 3 Encounters:   03/26/24 (!) 143/86   03/07/24 102/72   09/07/23 128/78     Pulse Readings from Last 3 Encounters:   03/26/24 70   03/07/24 100   09/07/23 103       Physical Exam   Constitutional: He is oriented to person, place, and time. He appears well-developed and well-nourished.   HENT:   Head: Normocephalic.   Mouth/Throat: Oropharynx is clear and moist.   Cardiovascular: Normal rate and regular rhythm.   Pulmonary/Chest: Normal expansion, symmetric chest wall expansion and effort normal. He has no wheezes. He has rales (coarse right base).   Abdominal: Soft. He exhibits no distension.   Musculoskeletal:         General: No edema. Normal range of motion.   Lymphadenopathy: No supraclavicular adenopathy is present.     He has no cervical adenopathy.   Neurological: He is alert and oriented to person, place, and time. Gait normal.   Skin: Skin is warm and dry. No rash noted.   Psychiatric: He has a normal mood and affect. His behavior is normal.  "Thought content normal.   Vitals reviewed.    CBC  Lab Results   Component Value Date    WBC 7.39 03/07/2024    HGB 15.0 03/07/2024    HCT 45.6 03/07/2024    MCV 90 03/07/2024     03/07/2024         CMP  Sodium   Date Value Ref Range Status   03/22/2024 136 136 - 145 mmol/L Final     Potassium   Date Value Ref Range Status   03/22/2024 4.4 3.5 - 5.1 mmol/L Final     Chloride   Date Value Ref Range Status   03/22/2024 102 95 - 110 mmol/L Final     CO2   Date Value Ref Range Status   03/22/2024 25 23 - 29 mmol/L Final     Glucose   Date Value Ref Range Status   03/22/2024 128 (H) 70 - 110 mg/dL Final     BUN   Date Value Ref Range Status   03/22/2024 27 (H) 8 - 23 mg/dL Final     Creatinine   Date Value Ref Range Status   03/22/2024 1.1 0.5 - 1.4 mg/dL Final     Calcium   Date Value Ref Range Status   03/22/2024 9.1 8.7 - 10.5 mg/dL Final     Total Protein   Date Value Ref Range Status   03/07/2024 7.4 6.0 - 8.4 g/dL Final     Albumin   Date Value Ref Range Status   03/07/2024 4.0 3.5 - 5.2 g/dL Final     Total Bilirubin   Date Value Ref Range Status   03/07/2024 0.2 0.1 - 1.0 mg/dL Final     Comment:     For infants and newborns, interpretation of results should be based  on gestational age, weight and in agreement with clinical  observations.    Premature Infant recommended reference ranges:  Up to 24 hours.............<8.0 mg/dL  Up to 48 hours............<12.0 mg/dL  3-5 days..................<15.0 mg/dL  6-29 days.................<15.0 mg/dL       Alkaline Phosphatase   Date Value Ref Range Status   03/07/2024 66 55 - 135 U/L Final     AST   Date Value Ref Range Status   03/07/2024 14 10 - 40 U/L Final     ALT   Date Value Ref Range Status   03/07/2024 14 10 - 44 U/L Final     Anion Gap   Date Value Ref Range Status   03/22/2024 9 8 - 16 mmol/L Final     eGFR   Date Value Ref Range Status   03/22/2024 >60.0 >60 mL/min/1.73 m^2 Final       ABG  No results found for: "PH", "PO2", "PCO2"        Personal " "Diagnostic Review  I have personally reviewed the following data and added my own interpretation as below:  CT of chest performed on 3/14/24 without contrast revealed mild peripheral ILD and bronchiectasis, subpleural in bases R>L  PFT 3/21/24 with normal spirometry  PCP notes reviewed.      3/26/2024    11:22 AM 3/7/2024    10:39 AM 9/7/2023     9:26 AM 8/11/2023     9:34 AM 7/21/2023     8:58 AM 6/16/2023     1:02 PM 6/7/2023     1:55 PM   Pulmonary Function Tests   SpO2 97 % 98 % 95 % 96 % 96 % 96 % 96 %   Height 5' 7" (1.702 m) 5' 7" (1.702 m) 5' 7" (1.702 m) 5' 7" (1.702 m) 5' 7" (1.702 m) 5' 7" (1.702 m) 5' 7" (1.702 m)   Weight 84.8 kg (186 lb 15.2 oz) 83 kg (182 lb 15.7 oz) 84.3 kg (185 lb 13.6 oz) 82.5 kg (181 lb 14.1 oz) 83.9 kg (184 lb 15.5 oz) 85.6 kg (188 lb 11.4 oz) 89.6 kg (197 lb 8.5 oz)   BMI (Calculated) 29.3 28.7 29.1 28.5 29 29.5 30.9         Assessment:       1. ILD (interstitial lung disease)    2. Pulmonary fibrosis    3. Chronic cough        Outpatient Encounter Medications as of 3/26/2024   Medication Sig Dispense Refill    ACCU-CHEK NAYA PLUS TEST STRP Strp CHECK BLOOD SUGAR DAILY AS DIRECTED 100 strip 3    aspirin 81 MG Chew Take 1 tablet (81 mg total) by mouth once daily. 90 tablet 3    azelastine (ASTELIN) 137 mcg (0.1 %) nasal spray 1 spray (137 mcg total) by Nasal route 2 (two) times daily. 30 mL 3    blood-glucose meter Misc 1 device.  Check sugar 1 times daily as directed by MD. Supply preferred brand .Dx Code: [E11.8] 1 each 0    DULoxetine (CYMBALTA) 60 MG capsule Take 1 capsule (60 mg total) by mouth once daily. 90 capsule 3    finasteride (PROSCAR) 5 mg tablet Take 1 tablet (5 mg total) by mouth once daily. 90 tablet 2    fluticasone propionate (FLONASE) 50 mcg/actuation nasal spray 2 sprays (100 mcg total) by Each Nostril route once daily. 16 g 12    lancets Misc 1 box.  Check 1x daily as directed by MD. Supply brand covered by insurance. Dx Code: [E11.8]. Accucheck " [place]. 100 each 3    levothyroxine (SYNTHROID) 88 MCG tablet Take 1 tablet (88 mcg total) by mouth before breakfast. 90 tablet 1    olmesartan (BENICAR) 40 MG tablet Take 0.5 tablets (20 mg total) by mouth once daily. 90 tablet 1    pravastatin (PRAVACHOL) 20 MG tablet Take 1 tablet (20 mg total) by mouth every evening. 90 tablet 2    tamsulosin (FLOMAX) 0.4 mg Cap Take 1 capsule (0.4 mg total) by mouth once daily. 90 capsule 1    traZODone (DESYREL) 100 MG tablet Take 1 tablet (100 mg total) by mouth every evening. 90 tablet 3    benzonatate (TESSALON) 200 MG capsule Take 1 capsule (200 mg total) by mouth 3 (three) times daily as needed for Cough. 90 capsule 11    esomeprazole (NEXIUM) 40 MG capsule Take 1 capsule (40 mg total) by mouth daily with dinner or evening meal. 30 capsule 11    famotidine (PEPCID) 40 MG tablet Take 1 tablet (40 mg total) by mouth daily with dinner or evening meal. 30 tablet 11     No facility-administered encounter medications on file as of 3/26/2024.     1. Pulmonary fibrosis  - Ambulatory referral/consult to Pulmonology    2. ILD (interstitial lung disease)    3. Chronic cough    Plan:     Problem List Items Addressed This Visit          Pulmonary    ILD (interstitial lung disease) - Primary    Current Assessment & Plan     Very mild. Preserved spirometry. Repeat PFT with lung volumes in early 2025  Repeat CT with HRCT in early 2025  Almost certainly related to old exposures at [place] but cannot rule out an early ILD process that could progress  Discussed can contribute to cough but no dedicated treatments.  PPI with dinner as prevents progression         Chronic cough    Current Assessment & Plan     With some bronchiectatic changes in gravity dependent portion of lungs, likely very sensitive to reflux. Treat with PPI (long term with ILD) and H2 blocker (short term). Add tessalon perles.          Other Visit Diagnoses       Pulmonary fibrosis                  No follow-ups on  file.    Future Appointments   Date Time Provider Department Center   4/3/2024  3:00 PM CARDIOLOGY, ECHO [facility] CARD [facility]   4/10/2024 11:20 AM Brian Vickers MD [facility] CARDIO [facility]   5/3/2024 11:00 [facility]   6/11/2024 10:40 AM Chaitanya López DO [facility]   6/26/2024  1:20 PM Wayne Johns MD [facility] PULMON [place]   9/10/2024  9:00 AM Chaitanya López DO [facility]           Wayne Johns MD

## 2024-03-26 NOTE — PROGRESS NOTES
Population Health Chart Review & Patient Outreach Details  Additional [facility] Notes:           Updates Requested / Reviewed:      Updated Care Coordination Note, Care Everywhere, and Immunizations Reconciliation Completed or Queried: [place]         Health Maintenance Topics Overdue:    Health Maintenance Due   Topic Date Due    Eye Exam  06/05/2024         Health Maintenance Topic(s) Outreach Outcomes & Actions Taken:    Eye Exam - Outreach Outcomes & Actions Taken  : outreached

## 2024-03-26 NOTE — ASSESSMENT & PLAN NOTE
Very mild. Preserved spirometry. Repeat PFT with lung volumes in early 2025  Repeat CT with HRCT in early 2025  Almost certainly related to old exposures at [place] but cannot rule out an early ILD process that could progress  Discussed can contribute to cough but no dedicated treatments.  PPI with dinner as prevents progression

## 2024-03-26 NOTE — ASSESSMENT & PLAN NOTE
With some bronchiectatic changes in gravity dependent portion of lungs, likely very sensitive to reflux. Treat with PPI (long term with ILD) and H2 blocker (short term). Add tessalon perles.

## 2024-03-27 ENCOUNTER — TELEPHONE (OUTPATIENT)
Dept: OPHTHALMOLOGY | Facility: CLINIC | Age: 74
End: 2024-03-27
Payer: MEDICARE

## 2024-03-27 ENCOUNTER — PATIENT OUTREACH (OUTPATIENT)
Dept: ADMINISTRATIVE | Facility: HOSPITAL | Age: 74
End: 2024-03-27
Payer: MEDICARE

## 2024-03-27 NOTE — PROGRESS NOTES
Population Health Chart Review & Patient Outreach Details  Additional [facility] Notes:           Updates Requested / Reviewed:      Updated Care Coordination Note, Care Everywhere, and Immunizations Reconciliation Completed or Queried: [place]         Health Maintenance Topics Overdue:    Health Maintenance Due   Topic Date Due    Eye Exam  06/05/2024         Health Maintenance Topic(s) Outreach Outcomes & Actions Taken:    Eye Exam - Outreach Outcomes & Actions Taken  : Optometry staff message sent - due to No open slots, Patient only wants Dr. Rodriguez

## 2024-04-03 ENCOUNTER — HOSPITAL ENCOUNTER (OUTPATIENT)
Dept: CARDIOLOGY | Facility: HOSPITAL | Age: 74
Discharge: HOME | End: 2024-04-03
Attending: FAMILY MEDICINE
Payer: MEDICARE

## 2024-04-03 VITALS — HEIGHT: 67 IN | BODY MASS INDEX: 29.19 KG/M2 | WEIGHT: 186 LBS

## 2024-04-03 DIAGNOSIS — R06.09 EXERTIONAL DYSPNEA: ICD-10-CM

## 2024-04-03 LAB
ASCENDING AORTA: 3.47 CM
AV INDEX (PROSTH): 0.29
AV MEAN GRADIENT: 24 MMHG
AV PEAK GRADIENT: 40 MMHG
AV VALVE AREA BY VELOCITY RATIO: 0.89 CM²
AV VALVE AREA: 0.9 CM²
AV VELOCITY RATIO: 0.29
BSA FOR ECHO PROCEDURE: 2 M2
CV ECHO LV RWT: 0.76 CM
DOP CALC AO PEAK VEL: 3.18 M/S
DOP CALC AO VTI: 59.7 CM
DOP CALC LVOT AREA: 3.1 CM2
DOP CALC LVOT DIAMETER: 1.98 CM
DOP CALC LVOT PEAK VEL: 0.92 M/S
DOP CALC LVOT STROKE VOLUME: 53.55 CM3
DOP CALC MV VTI: 31.6 CM
DOP CALCLVOT PEAK VEL VTI: 17.4 CM
E WAVE DECELERATION TIME: 161.57 MSEC
E/A RATIO: 0.63
E/E' RATIO: 7.9 M/S
ECHO LV POSTERIOR WALL: 1.4 CM (ref 0.6–1.1)
FRACTIONAL SHORTENING: 51 % (ref 28–44)
INTERVENTRICULAR SEPTUM: 1.22 CM (ref 0.6–1.1)
LA MAJOR: 4.15 CM
LA MINOR: 4.28 CM
LA WIDTH: 3.1 CM
LEFT ATRIUM SIZE: 4.09 CM
LEFT ATRIUM VOLUME INDEX MOD: 19.5 ML/M2
LEFT ATRIUM VOLUME INDEX: 23.2 ML/M2
LEFT ATRIUM VOLUME MOD: 38.17 CM3
LEFT ATRIUM VOLUME: 45.41 CM3
LEFT INTERNAL DIMENSION IN SYSTOLE: 1.8 CM (ref 2.1–4)
LEFT VENTRICLE DIASTOLIC VOLUME INDEX: 29.36 ML/M2
LEFT VENTRICLE DIASTOLIC VOLUME: 57.54 ML
LEFT VENTRICLE MASS INDEX: 85 G/M2
LEFT VENTRICLE SYSTOLIC VOLUME INDEX: 5 ML/M2
LEFT VENTRICLE SYSTOLIC VOLUME: 9.71 ML
LEFT VENTRICULAR INTERNAL DIMENSION IN DIASTOLE: 3.68 CM (ref 3.5–6)
LEFT VENTRICULAR MASS: 167.18 G
LV LATERAL E/E' RATIO: 5.93 M/S
LV SEPTAL E/E' RATIO: 11.86 M/S
LVOT MG: 1.82 MMHG
LVOT MV: 0.64 CM/S
MV MEAN GRADIENT: 3 MMHG
MV PEAK A VEL: 1.32 M/S
MV PEAK E VEL: 0.83 M/S
MV PEAK GRADIENT: 9 MMHG
MV STENOSIS PRESSURE HALF TIME: 46.55 MS
MV VALVE AREA BY CONTINUITY EQUATION: 1.69 CM2
MV VALVE AREA P 1/2 METHOD: 4.73 CM2
OHS LV EJECTION FRACTION SIMPSONS BIPLANE MOD: 58 %
PISA TR MAX VEL: 2.67 M/S
RA MAJOR: 4.59 CM
RA PRESSURE ESTIMATED: 3 MMHG
RA WIDTH: 3.54 CM
RIGHT VENTRICLE DIASTOLIC MID DIMENSION: 3.3 CM
RIGHT VENTRICULAR END-DIASTOLIC DIMENSION: 3.7 CM
RV TB RVSP: 6 MMHG
RV TISSUE DOPPLER FREE WALL SYSTOLIC VELOCITY 1 (APICAL 4 CHAMBER VIEW): 11.76 CM/S
SINUS: 3.79 CM
STJ: 3.05 CM
TDI LATERAL: 0.14 M/S
TDI SEPTAL: 0.07 M/S
TDI: 0.11 M/S
TR MAX PG: 29 MMHG
TRICUSPID ANNULAR PLANE SYSTOLIC EXCURSION: 1.78 CM
TV REST PULMONARY ARTERY PRESSURE: 32 MMHG
Z-SCORE OF LEFT VENTRICULAR DIMENSION IN END DIASTOLE: -4.22
Z-SCORE OF LEFT VENTRICULAR DIMENSION IN END SYSTOLE: -5.14

## 2024-04-03 PROCEDURE — 93306 TTE W/DOPPLER COMPLETE: CPT | Mod: 26,HCNC,, | Performed by: INTERNAL MEDICINE

## 2024-04-03 PROCEDURE — 93306 TTE W/DOPPLER COMPLETE: CPT | Mod: HCNC

## 2024-04-10 ENCOUNTER — OFFICE VISIT (OUTPATIENT)
Dept: CARDIOLOGY | Facility: CLINIC | Age: 74
End: 2024-04-10
Payer: MEDICARE

## 2024-04-10 VITALS
DIASTOLIC BLOOD PRESSURE: 77 MMHG | WEIGHT: 184.06 LBS | SYSTOLIC BLOOD PRESSURE: 117 MMHG | HEIGHT: 67 IN | OXYGEN SATURATION: 99 % | HEART RATE: 90 BPM | BODY MASS INDEX: 28.89 KG/M2

## 2024-04-10 DIAGNOSIS — E78.49 OTHER HYPERLIPIDEMIA: ICD-10-CM

## 2024-04-10 DIAGNOSIS — I25.118 CORONARY ARTERY DISEASE OF NATIVE ARTERY OF NATIVE HEART WITH STABLE ANGINA PECTORIS: Chronic | ICD-10-CM

## 2024-04-10 DIAGNOSIS — R07.9 CHEST PAIN, UNSPECIFIED TYPE: Primary | ICD-10-CM

## 2024-04-10 DIAGNOSIS — R07.9 CHEST PAIN, UNSPECIFIED TYPE: ICD-10-CM

## 2024-04-10 DIAGNOSIS — G47.33 MODERATE OBSTRUCTIVE SLEEP APNEA: ICD-10-CM

## 2024-04-10 DIAGNOSIS — I15.2 HYPERTENSION ASSOCIATED WITH DIABETES: ICD-10-CM

## 2024-04-10 DIAGNOSIS — E11.69 HYPERLIPIDEMIA ASSOCIATED WITH TYPE 2 DIABETES MELLITUS: ICD-10-CM

## 2024-04-10 DIAGNOSIS — E11.59 HYPERTENSION ASSOCIATED WITH DIABETES: ICD-10-CM

## 2024-04-10 DIAGNOSIS — I35.0 NONRHEUMATIC AORTIC VALVE STENOSIS: Primary | Chronic | ICD-10-CM

## 2024-04-10 DIAGNOSIS — I73.9 PAD (PERIPHERAL ARTERY DISEASE): ICD-10-CM

## 2024-04-10 DIAGNOSIS — E78.5 HYPERLIPIDEMIA ASSOCIATED WITH TYPE 2 DIABETES MELLITUS: ICD-10-CM

## 2024-04-10 PROCEDURE — 3072F LOW RISK FOR RETINOPATHY: CPT | Mod: CPTII,S$GLB,, | Performed by: INTERNAL MEDICINE

## 2024-04-10 PROCEDURE — 3008F BODY MASS INDEX DOCD: CPT | Mod: CPTII,S$GLB,, | Performed by: INTERNAL MEDICINE

## 2024-04-10 PROCEDURE — 99214 OFFICE O/P EST MOD 30 MIN: CPT | Mod: 25,S$GLB,, | Performed by: INTERNAL MEDICINE

## 2024-04-10 PROCEDURE — 99999 PR PBB SHADOW E&M-EST. PATIENT-LVL IV: CPT | Mod: PBBFAC,HCNC,, | Performed by: INTERNAL MEDICINE

## 2024-04-10 PROCEDURE — 4010F ACE/ARB THERAPY RXD/TAKEN: CPT | Mod: CPTII,S$GLB,, | Performed by: INTERNAL MEDICINE

## 2024-04-10 PROCEDURE — 1126F AMNT PAIN NOTED NONE PRSNT: CPT | Mod: CPTII,S$GLB,, | Performed by: INTERNAL MEDICINE

## 2024-04-10 PROCEDURE — 3044F HG A1C LEVEL LT 7.0%: CPT | Mod: CPTII,S$GLB,, | Performed by: INTERNAL MEDICINE

## 2024-04-10 PROCEDURE — 3288F FALL RISK ASSESSMENT DOCD: CPT | Mod: CPTII,S$GLB,, | Performed by: INTERNAL MEDICINE

## 2024-04-10 PROCEDURE — 93000 ELECTROCARDIOGRAM COMPLETE: CPT | Mod: S$GLB,,, | Performed by: INTERNAL MEDICINE

## 2024-04-10 PROCEDURE — 1101F PT FALLS ASSESS-DOCD LE1/YR: CPT | Mod: CPTII,S$GLB,, | Performed by: INTERNAL MEDICINE

## 2024-04-10 PROCEDURE — 1157F ADVNC CARE PLAN IN RCRD: CPT | Mod: CPTII,S$GLB,, | Performed by: INTERNAL MEDICINE

## 2024-04-10 PROCEDURE — 3074F SYST BP LT 130 MM HG: CPT | Mod: CPTII,S$GLB,, | Performed by: INTERNAL MEDICINE

## 2024-04-10 PROCEDURE — 1159F MED LIST DOCD IN RCRD: CPT | Mod: CPTII,S$GLB,, | Performed by: INTERNAL MEDICINE

## 2024-04-10 PROCEDURE — 3078F DIAST BP <80 MM HG: CPT | Mod: CPTII,S$GLB,, | Performed by: INTERNAL MEDICINE

## 2024-04-10 RX ORDER — ROSUVASTATIN CALCIUM 20 MG/1
20 TABLET, COATED ORAL DAILY
Qty: 90 TABLET | Refills: 3 | Status: SHIPPED | OUTPATIENT
Start: 2024-04-10 | End: 2025-04-10

## 2024-04-10 RX ORDER — SODIUM CHLORIDE 9 MG/ML
INJECTION, SOLUTION INTRAVENOUS CONTINUOUS
Status: CANCELLED | OUTPATIENT
Start: 2024-04-10 | End: 2024-04-10

## 2024-04-10 RX ORDER — DIPHENHYDRAMINE HCL 50 MG
50 CAPSULE ORAL ONCE
Status: CANCELLED | OUTPATIENT
Start: 2024-04-10 | End: 2024-04-10

## 2024-04-10 RX ORDER — SODIUM CHLORIDE 0.9 % (FLUSH) 0.9 %
10 SYRINGE (ML) INJECTION
Status: DISCONTINUED | OUTPATIENT
Start: 2024-04-10 | End: 2024-04-23 | Stop reason: HOSPADM

## 2024-04-10 NOTE — H&P (VIEW-ONLY)
Cardiology Clinic note    Subjective:   Patient ID:  Cesar Simpson is a 74 y.o. male who presents for evaluation of AS, PAD    75 yo M with hx of DM2, HTN, HLD, FABY present to clinic for evaluation of newly diagnosed mod-sev AS, PAD.     4/10/2024: Patient seen in clinic today, reports he's had a difficult past year. He reports over the past year he has been experiencing worsening, ongoing fatigue, dizziness, and BASILIO. Has not been very active the past year due to BASILIO, cough, and worsening fatigue; he used to ride his stationary bike and walk frequently. Long conversation with patient and Dr. Vickers concerning aortic stenosis diagnosis and noted CAD on CT last month. Patient denies CP, SOB, orthopnea, PND, syncope, palpitations, LE edema; denies any LE claudication. Endorses pre-syncope/dizziness and ongoing BASILIO/fatigue. Reports compliance and tolerance with all medications.      Echo 4/3/2024    Left Ventricle: The left ventricle is normal in size. Normal wall thickness. There is concentric remodeling. There is normal systolic function. Biplane (2D) method of discs ejection fraction is 58%. There is normal diastolic function.    Right Ventricle: Normal right ventricular cavity size. Wall thickness is normal. Right ventricle wall motion  is normal. Systolic function is normal.    Aortic Valve: There is moderate aortic valve sclerosis. Moderately restricted motion. There is moderate to severe stenosis. Aortic valve area by VTI is 0.90 cm². Aortic valve peak velocity is 3.18 m/s. Mean gradient is 24 mmHg. The dimensionless index is 0.29.    Mitral Valve: There is moderate mitral annular calcification present.    Pulmonary Artery: The estimated pulmonary artery systolic pressure is 32 mmHg.    IVC/SVC: Normal venous pressure at 3 mmHg.    US LOWER EXTREMITY ARTERIES BILATERAL 3/25/2024  FINDINGS:  Right mid BERNABE is occluded with large amount plaque.  Left proximal BERNABE is occluded with large amounts of plaque.      Impression:  BERNABE occlusions bilaterally.    Past Medical History:   Diagnosis Date    Coronary artery disease of native artery of native heart with stable angina pectoris 4/10/2024    Diabetes mellitus type II, uncontrolled 02/27/2013    High-density lipoprotein deficiency 02/27/2013    HTN (hypertension) 02/27/2013    Hyperlipidemia     Hypothyroidism     Moderate obstructive sleep apnea 6/29/2022    Normocytic anemia 06/28/2021    Obesity     Osteoarthritis 02/08/2016    PAD (peripheral artery disease) 4/10/2024    Trouble in sleeping     Type 2 diabetes mellitus     Type 2 diabetes mellitus with diabetic polyneuropathy, without long-term current use of insulin           Patient Active Problem List    Diagnosis Date Noted    Nonrheumatic aortic valve stenosis 04/10/2024     Moderate to Severe       PAD (peripheral artery disease) 04/10/2024    Coronary artery disease of native artery of native heart with stable angina pectoris 04/10/2024    ILD (interstitial lung disease) 03/26/2024    Chronic cough 03/26/2024    Moderate obstructive sleep apnea 06/29/2022    Does use hearing aid 06/28/2021    Normocytic anemia 06/28/2021    Diet-controlled diabetes mellitus 09/24/2020    BPH associated with nocturia 09/24/2020    Arthritis 09/24/2020    Insomnia 05/23/2019    Hypertension associated with diabetes 03/27/2018    BMI 29.0-29.9,adult 03/27/2018    Hyperlipidemia associated with type 2 diabetes mellitus 03/24/2017    Osteoarthritis 02/08/2016    Hypothyroidism 02/10/2015    Posterior vitreous detachment, right eye 07/07/2014    Floater, vitreous 07/07/2014    Vitreous detachment 07/07/2014    Pseudophakia 07/07/2014    Refractive error 07/07/2014    Type 2 diabetes mellitus with diabetic polyneuropathy, without long-term current use of insulin 02/27/2013    Hyperlipidemia 02/27/2013    Prominent aorta 08/13/2010     Seen on chest x-ray dated 08/13/10         Patient's Medications   New Prescriptions    ROSUVASTATIN  (CRESTOR) 20 MG TABLET    Take 1 tablet (20 mg total) by mouth once daily.   Previous Medications    ACCU-CHEK NAYA PLUS TEST STRP STRP    CHECK BLOOD SUGAR DAILY AS DIRECTED    ASPIRIN 81 MG CHEW    Take 1 tablet (81 mg total) by mouth once daily.    AZELASTINE (ASTELIN) 137 MCG (0.1 %) NASAL SPRAY    1 spray (137 mcg total) by Nasal route 2 (two) times daily.    BENZONATATE (TESSALON) 200 MG CAPSULE    Take 1 capsule (200 mg total) by mouth 3 (three) times daily as needed for Cough.    BLOOD-GLUCOSE METER MISC    1 device.  Check sugar 1 times daily as directed by MD. Supply preferred brand .Dx Code: [E11.8]    DULOXETINE (CYMBALTA) 60 MG CAPSULE    Take 1 capsule (60 mg total) by mouth once daily.    ESOMEPRAZOLE (NEXIUM) 40 MG CAPSULE    Take 1 capsule (40 mg total) by mouth daily with dinner or evening meal.    FAMOTIDINE (PEPCID) 40 MG TABLET    Take 1 tablet (40 mg total) by mouth daily with dinner or evening meal.    FINASTERIDE (PROSCAR) 5 MG TABLET    Take 1 tablet (5 mg total) by mouth once daily.    LANCETS MISC    1 box.  Check 1x daily as directed by MD. Supply brand covered by insurance. Dx Code: [E11.8]. Accucheck Naya.    LEVOTHYROXINE (SYNTHROID) 88 MCG TABLET    Take 1 tablet (88 mcg total) by mouth before breakfast.    OLMESARTAN (BENICAR) 20 MG TABLET    Take 1 tablet (20 mg total) by mouth once daily.    TAMSULOSIN (FLOMAX) 0.4 MG CAP    Take 1 capsule (0.4 mg total) by mouth once daily.    TRAZODONE (DESYREL) 100 MG TABLET    Take 1 tablet (100 mg total) by mouth every evening.   Modified Medications    No medications on file   Discontinued Medications    PRAVASTATIN (PRAVACHOL) 20 MG TABLET    Take 1 tablet (20 mg total) by mouth every evening.        Review of Systems   Constitutional: Positive for malaise/fatigue. Negative for chills, decreased appetite, diaphoresis, weight gain and weight loss.   Cardiovascular:  Positive for dyspnea on exertion and near-syncope. Negative for chest pain,  claudication, irregular heartbeat, leg swelling, orthopnea, palpitations, paroxysmal nocturnal dyspnea and syncope.   Respiratory:  Positive for cough. Negative for hemoptysis, shortness of breath and snoring.    Gastrointestinal:  Negative for bloating, abdominal pain, nausea and vomiting.   Neurological:  Negative for light-headedness and weakness.     Family History   Problem Relation Age of Onset    Osteoporosis Mother     Hypertension Mother     Early death Father         Heart,  Stroke age 50    Stroke Father     Heart disease Father     Hypertension Father     Heart disease Brother     Early death Brother         Heart Att.  age 45    Hypertension Brother     No Known Problems Daughter     No Known Problems Son     Early death Paternal Uncle         Heart,  age 49    Heart disease Paternal Uncle        Social History     Socioeconomic History    Marital status:    Tobacco Use    Smoking status: Never     Passive exposure: Never    Smokeless tobacco: Never   Substance and Sexual Activity    Alcohol use: Never     Comment: occasionally (maybe every 3 months)    Drug use: Never    Sexual activity: Not Currently     Partners: Female     Birth control/protection: None   Social History Narrative    9/7/2023: he lives alone. He has 2 kids, they live in [place] and [place]. 7 grandchildren. 2 grandchildren live nearby. No pets at home. He is retired. 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      Social Determinants of Health     Financial Resource Strain: Low Risk  (2/29/2024)    Overall Financial Resource Strain (CARDIA)     Difficulty of Paying Living Expenses: Not very hard   Food Insecurity: No Food Insecurity (2/29/2024)    Hunger Vital Sign     Worried About Running Out of Food in the Last Year: Never true     Ran Out of Food in the Last Year: Never true   Transportation Needs: No Transportation Needs (2/29/2024)    PRAPARE - Transportation     Lack of Transportation (Medical): No     Lack  "of Transportation (Non-Medical): No   Physical Activity: Insufficiently Active (2/29/2024)    Exercise Vital Sign     Days of Exercise per Week: 3 days     Minutes of Exercise per Session: 30 min   Stress: No Stress Concern Present (2/29/2024)    [employer] [place] [employer] - Occupational Stress Questionnaire     Feeling of Stress : Only a little   Social Connections: Unknown (2/29/2024)    Social Connection and Isolation Panel [NHANES]     Frequency of Communication with Friends and Family: Once a week     Frequency of Social Gatherings with Friends and Family: Once a week     Active Member of Clubs or Organizations: Yes     Attends Club or Organization Meetings: 1 to 4 times per year     Marital Status:    Housing Stability: Low Risk  (2/29/2024)    Housing Stability Vital Sign     Unable to Pay for Housing in the Last Year: No     Number of Places Lived in the Last Year: 1     Unstable Housing in the Last Year: No            Objective:   Vitals  Vitals:    04/10/24 1053 04/10/24 1056   BP: 115/76 117/77   Pulse: 90    SpO2: 99%    Weight: 83.5 kg (184 lb 1.4 oz)    Height: 5' 7" (1.702 m)           Physical Exam  Vitals and nursing note reviewed.   Constitutional:       Appearance: Normal appearance.   Cardiovascular:      Rate and Rhythm: Normal rate and regular rhythm.      Pulses:           Dorsalis pedis pulses are detected w/ Doppler on the right side and detected w/ Doppler on the left side.        Posterior tibial pulses are detected w/ Doppler on the right side and detected w/ Doppler on the left side.      Heart sounds: Murmur heard.      No gallop.   Pulmonary:      Effort: Pulmonary effort is normal.      Breath sounds: Normal breath sounds.   Abdominal:      General: Bowel sounds are normal. There is no distension.      Palpations: Abdomen is soft.      Tenderness: There is no abdominal tenderness.   Skin:     General: Skin is warm and dry.   Neurological:      Mental Status: He " is alert and oriented to person, place, and time.       Lab Results    Lab Results   Component Value Date    WBC 7.39 03/07/2024    HGB 15.0 03/07/2024    HCT 45.6 03/07/2024    MCV 90 03/07/2024       Lab Results   Component Value Date     03/07/2024    INR 1.0 11/20/2015       Lab Results   Component Value Date    K 4.4 03/22/2024    BUN 27 (H) 03/22/2024    CREATININE 1.1 03/22/2024       Lab Results   Component Value Date     (H) 03/22/2024    HGBA1C 6.0 (H) 03/07/2024       Lab Results   Component Value Date    AST 14 03/07/2024    ALT 14 03/07/2024    ALBUMIN 4.0 03/07/2024    PROT 7.4 03/07/2024       Lab Results   Component Value Date    CHOL 135 03/07/2024    HDL 37 (L) 03/07/2024    LDLCALC 86.2 03/07/2024    TRIG 59 03/07/2024       Lab Results   Component Value Date    CRP 0.6 08/11/2023    BNP <10 08/11/2023       Assessment:     1. Nonrheumatic aortic valve stenosis    2. Coronary artery disease of native artery of native heart with stable angina pectoris    3. PAD (peripheral artery disease)    4. Hyperlipidemia associated with type 2 diabetes mellitus    5. Hypertension associated with diabetes    6. Other hyperlipidemia        Plan:     AS  -noted on Echo 4/3/2024 as above  -given concerning symptoms, will plan for RHC with Dr. Vickers for evaluation    2. CAD  -noted on CT 3/14/2024  -given concerning symptoms, will plan for LHC with Dr. Vickers for eval  -continue asa  -tight lipid control, goal LDLc < 70  -will change from pravastatin to rosuvastatin 20    3. PAD  -cotninue asa, statin as above    4. HLD  -goal LDLc < 70  -recheck lipid panel, LFTs at F/U in 3m    5. HTN  -well controlled, continue medication regimen as above        -Plan for L/RHC per Dr. Vickers for evaluation of AS, CAD given concerning symptomology  -F/U 3m        No orders of the defined types were placed in this encounter.      He expressed verbal understanding and agreed with the plan    Pertinent cardiac images  and EKG reviewed independently.    Continue with current medical plan and lifestyle changes.  Return sooner for concerns or questions. If symptoms persist go to the ED.  I have reviewed all pertinent data including patient's medical history in detail and updated the computerized patient record.     Counseling included discussion regarding imaging findings, diagnosis, possibilities, treatment options, risks and benefits.    Thank you for the opportunity to care for this patient. Will be available for questions if needed.     Discussed with Dr. Vikcers . RTC in 3 months.      Signed:  Calvin Boothe DNP  04/10/2024

## 2024-04-10 NOTE — H&P (VIEW-ONLY)
Cardiology Clinic note    Subjective:   Patient ID:  Cesar Simpson is a 74 y.o. male who presents for evaluation of AS, PAD    73 yo M with hx of DM2, HTN, HLD, FABY present to clinic for evaluation of newly diagnosed mod-sev AS, PAD.     4/10/2024: Patient seen in clinic today, reports he's had a difficult past year. He reports over the past year he has been experiencing worsening, ongoing fatigue, dizziness, and BASILIO. Has not been very active the past year due to BASILIO, cough, and worsening fatigue; he used to ride his stationary bike and walk frequently. Long conversation with patient and Dr. Vickers concerning aortic stenosis diagnosis and noted CAD on CT last month. Patient denies CP, SOB, orthopnea, PND, syncope, palpitations, LE edema; denies any LE claudication. Endorses pre-syncope/dizziness and ongoing BASILIO/fatigue. Reports compliance and tolerance with all medications.      Echo 4/3/2024    Left Ventricle: The left ventricle is normal in size. Normal wall thickness. There is concentric remodeling. There is normal systolic function. Biplane (2D) method of discs ejection fraction is 58%. There is normal diastolic function.    Right Ventricle: Normal right ventricular cavity size. Wall thickness is normal. Right ventricle wall motion  is normal. Systolic function is normal.    Aortic Valve: There is moderate aortic valve sclerosis. Moderately restricted motion. There is moderate to severe stenosis. Aortic valve area by VTI is 0.90 cm². Aortic valve peak velocity is 3.18 m/s. Mean gradient is 24 mmHg. The dimensionless index is 0.29.    Mitral Valve: There is moderate mitral annular calcification present.    Pulmonary Artery: The estimated pulmonary artery systolic pressure is 32 mmHg.    IVC/SVC: Normal venous pressure at 3 mmHg.    US LOWER EXTREMITY ARTERIES BILATERAL 3/25/2024  FINDINGS:  Right mid BERNABE is occluded with large amount plaque.  Left proximal BERNABE is occluded with large amounts of plaque.      Impression:  BERNABE occlusions bilaterally.    Past Medical History:   Diagnosis Date    Coronary artery disease of native artery of native heart with stable angina pectoris 4/10/2024    Diabetes mellitus type II, uncontrolled 02/27/2013    High-density lipoprotein deficiency 02/27/2013    HTN (hypertension) 02/27/2013    Hyperlipidemia     Hypothyroidism     Moderate obstructive sleep apnea 6/29/2022    Normocytic anemia 06/28/2021    Obesity     Osteoarthritis 02/08/2016    PAD (peripheral artery disease) 4/10/2024    Trouble in sleeping     Type 2 diabetes mellitus     Type 2 diabetes mellitus with diabetic polyneuropathy, without long-term current use of insulin           Patient Active Problem List    Diagnosis Date Noted    Nonrheumatic aortic valve stenosis 04/10/2024     Moderate to Severe       PAD (peripheral artery disease) 04/10/2024    Coronary artery disease of native artery of native heart with stable angina pectoris 04/10/2024    ILD (interstitial lung disease) 03/26/2024    Chronic cough 03/26/2024    Moderate obstructive sleep apnea 06/29/2022    Does use hearing aid 06/28/2021    Normocytic anemia 06/28/2021    Diet-controlled diabetes mellitus 09/24/2020    BPH associated with nocturia 09/24/2020    Arthritis 09/24/2020    Insomnia 05/23/2019    Hypertension associated with diabetes 03/27/2018    BMI 29.0-29.9,adult 03/27/2018    Hyperlipidemia associated with type 2 diabetes mellitus 03/24/2017    Osteoarthritis 02/08/2016    Hypothyroidism 02/10/2015    Posterior vitreous detachment, right eye 07/07/2014    Floater, vitreous 07/07/2014    Vitreous detachment 07/07/2014    Pseudophakia 07/07/2014    Refractive error 07/07/2014    Type 2 diabetes mellitus with diabetic polyneuropathy, without long-term current use of insulin 02/27/2013    Hyperlipidemia 02/27/2013    Prominent aorta 08/13/2010     Seen on chest x-ray dated 08/13/10         Patient's Medications   New Prescriptions    ROSUVASTATIN  (CRESTOR) 20 MG TABLET    Take 1 tablet (20 mg total) by mouth once daily.   Previous Medications    ACCU-CHEK NAYA PLUS TEST STRP STRP    CHECK BLOOD SUGAR DAILY AS DIRECTED    ASPIRIN 81 MG CHEW    Take 1 tablet (81 mg total) by mouth once daily.    AZELASTINE (ASTELIN) 137 MCG (0.1 %) NASAL SPRAY    1 spray (137 mcg total) by Nasal route 2 (two) times daily.    BENZONATATE (TESSALON) 200 MG CAPSULE    Take 1 capsule (200 mg total) by mouth 3 (three) times daily as needed for Cough.    BLOOD-GLUCOSE METER MISC    1 device.  Check sugar 1 times daily as directed by MD. Supply preferred brand .Dx Code: [E11.8]    DULOXETINE (CYMBALTA) 60 MG CAPSULE    Take 1 capsule (60 mg total) by mouth once daily.    ESOMEPRAZOLE (NEXIUM) 40 MG CAPSULE    Take 1 capsule (40 mg total) by mouth daily with dinner or evening meal.    FAMOTIDINE (PEPCID) 40 MG TABLET    Take 1 tablet (40 mg total) by mouth daily with dinner or evening meal.    FINASTERIDE (PROSCAR) 5 MG TABLET    Take 1 tablet (5 mg total) by mouth once daily.    LANCETS MISC    1 box.  Check 1x daily as directed by MD. Supply brand covered by insurance. Dx Code: [E11.8]. Accucheck Naya.    LEVOTHYROXINE (SYNTHROID) 88 MCG TABLET    Take 1 tablet (88 mcg total) by mouth before breakfast.    OLMESARTAN (BENICAR) 20 MG TABLET    Take 1 tablet (20 mg total) by mouth once daily.    TAMSULOSIN (FLOMAX) 0.4 MG CAP    Take 1 capsule (0.4 mg total) by mouth once daily.    TRAZODONE (DESYREL) 100 MG TABLET    Take 1 tablet (100 mg total) by mouth every evening.   Modified Medications    No medications on file   Discontinued Medications    PRAVASTATIN (PRAVACHOL) 20 MG TABLET    Take 1 tablet (20 mg total) by mouth every evening.        Review of Systems   Constitutional: Positive for malaise/fatigue. Negative for chills, decreased appetite, diaphoresis, weight gain and weight loss.   Cardiovascular:  Positive for dyspnea on exertion and near-syncope. Negative for chest pain,  claudication, irregular heartbeat, leg swelling, orthopnea, palpitations, paroxysmal nocturnal dyspnea and syncope.   Respiratory:  Positive for cough. Negative for hemoptysis, shortness of breath and snoring.    Gastrointestinal:  Negative for bloating, abdominal pain, nausea and vomiting.   Neurological:  Negative for light-headedness and weakness.     Family History   Problem Relation Age of Onset    Osteoporosis Mother     Hypertension Mother     Early death Father         Heart,  Stroke age 50    Stroke Father     Heart disease Father     Hypertension Father     Heart disease Brother     Early death Brother         Heart Att.  age 45    Hypertension Brother     No Known Problems Daughter     No Known Problems Son     Early death Paternal Uncle         Heart,  age 49    Heart disease Paternal Uncle        Social History     Socioeconomic History    Marital status:    Tobacco Use    Smoking status: Never     Passive exposure: Never    Smokeless tobacco: Never   Substance and Sexual Activity    Alcohol use: Never     Comment: occasionally (maybe every 3 months)    Drug use: Never    Sexual activity: Not Currently     Partners: Female     Birth control/protection: None   Social History Narrative    9/7/2023: he lives alone. He has 2 kids, they live in [place] and [place]. 7 grandchildren. 2 grandchildren live nearby. No pets at home. He is retired. 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      Social Determinants of Health     Financial Resource Strain: Low Risk  (2/29/2024)    Overall Financial Resource Strain (CARDIA)     Difficulty of Paying Living Expenses: Not very hard   Food Insecurity: No Food Insecurity (2/29/2024)    Hunger Vital Sign     Worried About Running Out of Food in the Last Year: Never true     Ran Out of Food in the Last Year: Never true   Transportation Needs: No Transportation Needs (2/29/2024)    PRAPARE - Transportation     Lack of Transportation (Medical): No     Lack  "of Transportation (Non-Medical): No   Physical Activity: Insufficiently Active (2/29/2024)    Exercise Vital Sign     Days of Exercise per Week: 3 days     Minutes of Exercise per Session: 30 min   Stress: No Stress Concern Present (2/29/2024)    [place] [employer] [place] [employer] - Occupational Stress Questionnaire     Feeling of Stress : Only a little   Social Connections: Unknown (2/29/2024)    Social Connection and Isolation Panel [NHANES]     Frequency of Communication with Friends and Family: Once a week     Frequency of Social Gatherings with Friends and Family: Once a week     Active Member of Clubs or Organizations: Yes     Attends Club or Organization Meetings: 1 to 4 times per year     Marital Status:    Housing Stability: Low Risk  (2/29/2024)    Housing Stability Vital Sign     Unable to Pay for Housing in the Last Year: No     Number of Places Lived in the Last Year: 1     Unstable Housing in the Last Year: No            Objective:   Vitals  Vitals:    04/10/24 1053 04/10/24 1056   BP: 115/76 117/77   Pulse: 90    SpO2: 99%    Weight: 83.5 kg (184 lb 1.4 oz)    Height: 5' 7" (1.702 m)           Physical Exam  Vitals and nursing note reviewed.   Constitutional:       Appearance: Normal appearance.   Cardiovascular:      Rate and Rhythm: Normal rate and regular rhythm.      Pulses:           Dorsalis pedis pulses are detected w/ Doppler on the right side and detected w/ Doppler on the left side.        Posterior tibial pulses are detected w/ Doppler on the right side and detected w/ Doppler on the left side.      Heart sounds: Murmur heard.      No gallop.   Pulmonary:      Effort: Pulmonary effort is normal.      Breath sounds: Normal breath sounds.   Abdominal:      General: Bowel sounds are normal. There is no distension.      Palpations: Abdomen is soft.      Tenderness: There is no abdominal tenderness.   Skin:     General: Skin is warm and dry.   Neurological:      Mental Status: He " is alert and oriented to person, place, and time.       Lab Results    Lab Results   Component Value Date    WBC 7.39 03/07/2024    HGB 15.0 03/07/2024    HCT 45.6 03/07/2024    MCV 90 03/07/2024       Lab Results   Component Value Date     03/07/2024    INR 1.0 11/20/2015       Lab Results   Component Value Date    K 4.4 03/22/2024    BUN 27 (H) 03/22/2024    CREATININE 1.1 03/22/2024       Lab Results   Component Value Date     (H) 03/22/2024    HGBA1C 6.0 (H) 03/07/2024       Lab Results   Component Value Date    AST 14 03/07/2024    ALT 14 03/07/2024    ALBUMIN 4.0 03/07/2024    PROT 7.4 03/07/2024       Lab Results   Component Value Date    CHOL 135 03/07/2024    HDL 37 (L) 03/07/2024    LDLCALC 86.2 03/07/2024    TRIG 59 03/07/2024       Lab Results   Component Value Date    CRP 0.6 08/11/2023    BNP <10 08/11/2023       Assessment:     1. Nonrheumatic aortic valve stenosis    2. Coronary artery disease of native artery of native heart with stable angina pectoris    3. PAD (peripheral artery disease)    4. Hyperlipidemia associated with type 2 diabetes mellitus    5. Hypertension associated with diabetes    6. Other hyperlipidemia        Plan:     AS  -noted on Echo 4/3/2024 as above  -given concerning symptoms, will plan for RHC with Dr. Vickers for evaluation    2. CAD  -noted on CT 3/14/2024  -given concerning symptoms, will plan for LHC with Dr. Vickers for eval  -continue asa  -tight lipid control, goal LDLc < 70  -will change from pravastatin to rosuvastatin 20    3. PAD  -cotninue asa, statin as above    4. HLD  -goal LDLc < 70  -recheck lipid panel, LFTs at F/U in 3m    5. HTN  -well controlled, continue medication regimen as above        -Plan for L/RHC per Dr. Vickers for evaluation of AS, CAD given concerning symptomology  -F/U 3m        No orders of the defined types were placed in this encounter.      He expressed verbal understanding and agreed with the plan    Pertinent cardiac images  and EKG reviewed independently.    Continue with current medical plan and lifestyle changes.  Return sooner for concerns or questions. If symptoms persist go to the ED.  I have reviewed all pertinent data including patient's medical history in detail and updated the computerized patient record.     Counseling included discussion regarding imaging findings, diagnosis, possibilities, treatment options, risks and benefits.    Thank you for the opportunity to care for this patient. Will be available for questions if needed.     Discussed with Dr. Vickers . RTC in 3 months.      Signed:  Calvin Boothe DNP  04/10/2024

## 2024-04-11 LAB
OHS QRS DURATION: 110 MS
OHS QTC CALCULATION: 432 MS

## 2024-04-15 ENCOUNTER — LAB VISIT (OUTPATIENT)
Dept: LAB | Facility: HOSPITAL | Age: 74
End: 2024-04-15
Attending: INTERNAL MEDICINE
Payer: MEDICARE

## 2024-04-15 DIAGNOSIS — Z01.810 PRE-PROCEDURAL CARDIOVASCULAR EXAMINATION: ICD-10-CM

## 2024-04-15 LAB
ANION GAP SERPL CALC-SCNC: 10 MMOL/L (ref 8–16)
BASOPHILS # BLD AUTO: 0.08 K/UL (ref 0–0.2)
BASOPHILS NFR BLD: 1.2 % (ref 0–1.9)
BUN SERPL-MCNC: 20 MG/DL (ref 8–23)
CALCIUM SERPL-MCNC: 9.3 MG/DL (ref 8.7–10.5)
CHLORIDE SERPL-SCNC: 100 MMOL/L (ref 95–110)
CO2 SERPL-SCNC: 27 MMOL/L (ref 23–29)
CREAT SERPL-MCNC: 1.2 MG/DL (ref 0.5–1.4)
DIFFERENTIAL METHOD BLD: ABNORMAL
EOSINOPHIL # BLD AUTO: 0.2 K/UL (ref 0–0.5)
EOSINOPHIL NFR BLD: 3.6 % (ref 0–8)
ERYTHROCYTE [DISTWIDTH] IN BLOOD BY AUTOMATED COUNT: 13.5 % (ref 11.5–14.5)
EST. GFR  (NO RACE VARIABLE): >60 ML/MIN/1.73 M^2
GLUCOSE SERPL-MCNC: 145 MG/DL (ref 70–110)
HCT VFR BLD AUTO: 41 % (ref 40–54)
HGB BLD-MCNC: 13.7 G/DL (ref 14–18)
IMM GRANULOCYTES # BLD AUTO: 0.05 K/UL (ref 0–0.04)
IMM GRANULOCYTES NFR BLD AUTO: 0.7 % (ref 0–0.5)
LYMPHOCYTES # BLD AUTO: 1.8 K/UL (ref 1–4.8)
LYMPHOCYTES NFR BLD: 26.9 % (ref 18–48)
MCH RBC QN AUTO: 29.8 PG (ref 27–31)
MCHC RBC AUTO-ENTMCNC: 33.4 G/DL (ref 32–36)
MCV RBC AUTO: 89 FL (ref 82–98)
MONOCYTES # BLD AUTO: 0.5 K/UL (ref 0.3–1)
MONOCYTES NFR BLD: 8 % (ref 4–15)
NEUTROPHILS # BLD AUTO: 4 K/UL (ref 1.8–7.7)
NEUTROPHILS NFR BLD: 59.6 % (ref 38–73)
NRBC BLD-RTO: 0 /100 WBC
PLATELET # BLD AUTO: 217 K/UL (ref 150–450)
PMV BLD AUTO: 10.1 FL (ref 9.2–12.9)
POTASSIUM SERPL-SCNC: 4.8 MMOL/L (ref 3.5–5.1)
RBC # BLD AUTO: 4.6 M/UL (ref 4.6–6.2)
SODIUM SERPL-SCNC: 137 MMOL/L (ref 136–145)
WBC # BLD AUTO: 6.73 K/UL (ref 3.9–12.7)

## 2024-04-15 PROCEDURE — 80048 BASIC METABOLIC PNL TOTAL CA: CPT | Performed by: INTERNAL MEDICINE

## 2024-04-15 PROCEDURE — 36415 COLL VENOUS BLD VENIPUNCTURE: CPT | Performed by: INTERNAL MEDICINE

## 2024-04-15 PROCEDURE — 85025 COMPLETE CBC W/AUTO DIFF WBC: CPT | Performed by: INTERNAL MEDICINE

## 2024-04-16 ENCOUNTER — HOSPITAL ENCOUNTER (OUTPATIENT)
Facility: HOSPITAL | Age: 74
Discharge: HOME | End: 2024-04-16
Attending: INTERNAL MEDICINE | Admitting: INTERNAL MEDICINE
Payer: MEDICARE

## 2024-04-16 VITALS
BODY MASS INDEX: 28.25 KG/M2 | HEART RATE: 73 BPM | WEIGHT: 180 LBS | HEIGHT: 67 IN | DIASTOLIC BLOOD PRESSURE: 67 MMHG | TEMPERATURE: 97 F | SYSTOLIC BLOOD PRESSURE: 127 MMHG | OXYGEN SATURATION: 98 % | RESPIRATION RATE: 20 BRPM

## 2024-04-16 DIAGNOSIS — I25.10 CAD (CORONARY ARTERY DISEASE): ICD-10-CM

## 2024-04-16 DIAGNOSIS — Z01.810 PRE-PROCEDURAL CARDIOVASCULAR EXAMINATION: Primary | ICD-10-CM

## 2024-04-16 DIAGNOSIS — I25.112 CORONARY ARTERY DISEASE INVOLVING NATIVE CORONARY ARTERY OF NATIVE HEART WITH REFRACTORY ANGINA PECTORIS: Primary | ICD-10-CM

## 2024-04-16 DIAGNOSIS — Z01.818 PRE-OP EVALUATION: ICD-10-CM

## 2024-04-16 DIAGNOSIS — I35.0 NONRHEUMATIC AORTIC VALVE STENOSIS: ICD-10-CM

## 2024-04-16 DIAGNOSIS — I25.118 CORONARY ARTERY DISEASE OF NATIVE ARTERY OF NATIVE HEART WITH STABLE ANGINA PECTORIS: ICD-10-CM

## 2024-04-16 LAB
ALLENS TEST: ABNORMAL
ALLENS TEST: ABNORMAL
FIO2: 21 %
FIO2: 21 %
PCO2 BLDA: 23.9 MMHG (ref 35–45)
PCO2 BLDA: 34.2 MMHG (ref 35–45)
PH SMN: 7.18 [PH] (ref 7.35–7.45)
PH SMN: 7.29 [PH] (ref 7.35–7.45)
PO2 BLDA: 39.3 MMHG (ref 80–100)
PO2 BLDA: 45.9 MMHG (ref 80–100)
POC BASE DEFICIT: -17.7 MMOL/L (ref -2–2)
POC BASE DEFICIT: -9.2 MMOL/L (ref -2–2)
POC HCO3: 16.5 MMOL/L (ref 24–28)
POC HCO3: 9 MMOL/L (ref 24–28)
POC PERFORMED BY: ABNORMAL
POC PERFORMED BY: ABNORMAL
POC SATURATED O2: 70.4 % (ref 95–100)
POC SATURATED O2: 75 % (ref 95–100)
SPECIMEN SOURCE: ABNORMAL
SPECIMEN SOURCE: ABNORMAL

## 2024-04-16 PROCEDURE — 99152 MOD SED SAME PHYS/QHP 5/>YRS: CPT | Mod: ,,, | Performed by: INTERNAL MEDICINE

## 2024-04-16 PROCEDURE — 93571 IV DOP VEL&/PRESS C FLO 1ST: CPT | Performed by: INTERNAL MEDICINE

## 2024-04-16 PROCEDURE — 93571 IV DOP VEL&/PRESS C FLO 1ST: CPT | Mod: 26,52,, | Performed by: INTERNAL MEDICINE

## 2024-04-16 PROCEDURE — C1894 INTRO/SHEATH, NON-LASER: HCPCS | Performed by: INTERNAL MEDICINE

## 2024-04-16 PROCEDURE — 93460 R&L HRT ART/VENTRICLE ANGIO: CPT | Mod: 26,,, | Performed by: INTERNAL MEDICINE

## 2024-04-16 PROCEDURE — C1769 GUIDE WIRE: HCPCS | Performed by: INTERNAL MEDICINE

## 2024-04-16 PROCEDURE — C1887 CATHETER, GUIDING: HCPCS | Performed by: INTERNAL MEDICINE

## 2024-04-16 PROCEDURE — 63600175 PHARM REV CODE 636 W HCPCS: Performed by: INTERNAL MEDICINE

## 2024-04-16 PROCEDURE — 93460 R&L HRT ART/VENTRICLE ANGIO: CPT | Performed by: INTERNAL MEDICINE

## 2024-04-16 PROCEDURE — 27201423 OPTIME MED/SURG SUP & DEVICES STERILE SUPPLY: Performed by: INTERNAL MEDICINE

## 2024-04-16 PROCEDURE — 99153 MOD SED SAME PHYS/QHP EA: CPT | Performed by: INTERNAL MEDICINE

## 2024-04-16 PROCEDURE — 25500020 PHARM REV CODE 255: Performed by: INTERNAL MEDICINE

## 2024-04-16 PROCEDURE — 99152 MOD SED SAME PHYS/QHP 5/>YRS: CPT | Performed by: INTERNAL MEDICINE

## 2024-04-16 PROCEDURE — 25000003 PHARM REV CODE 250: Performed by: INTERNAL MEDICINE

## 2024-04-16 RX ORDER — MIDAZOLAM HYDROCHLORIDE 1 MG/ML
INJECTION, SOLUTION INTRAMUSCULAR; INTRAVENOUS
Status: DISCONTINUED | OUTPATIENT
Start: 2024-04-16 | End: 2024-04-16 | Stop reason: HOSPADM

## 2024-04-16 RX ORDER — DIPHENHYDRAMINE HCL 50 MG
50 CAPSULE ORAL ONCE
Status: CANCELLED | OUTPATIENT
Start: 2024-04-16 | End: 2024-04-16

## 2024-04-16 RX ORDER — HEPARIN SODIUM 1000 [USP'U]/ML
INJECTION, SOLUTION INTRAVENOUS; SUBCUTANEOUS
Status: DISCONTINUED | OUTPATIENT
Start: 2024-04-16 | End: 2024-04-16 | Stop reason: HOSPADM

## 2024-04-16 RX ORDER — VERAPAMIL HYDROCHLORIDE 2.5 MG/ML
INJECTION, SOLUTION INTRAVENOUS
Status: DISCONTINUED | OUTPATIENT
Start: 2024-04-16 | End: 2024-04-16 | Stop reason: HOSPADM

## 2024-04-16 RX ORDER — IODIXANOL 320 MG/ML
INJECTION, SOLUTION INTRAVASCULAR
Status: DISCONTINUED | OUTPATIENT
Start: 2024-04-16 | End: 2024-04-16 | Stop reason: HOSPADM

## 2024-04-16 RX ORDER — DIPHENHYDRAMINE HCL 25 MG
50 CAPSULE ORAL ONCE
Status: DISCONTINUED | OUTPATIENT
Start: 2024-04-16 | End: 2024-04-16

## 2024-04-16 RX ORDER — SODIUM CHLORIDE 0.9 % (FLUSH) 0.9 %
10 SYRINGE (ML) INJECTION
Status: DISCONTINUED | OUTPATIENT
Start: 2024-04-16 | End: 2024-04-23 | Stop reason: HOSPADM

## 2024-04-16 RX ORDER — SODIUM CHLORIDE 9 MG/ML
INJECTION, SOLUTION INTRAVENOUS CONTINUOUS
Status: CANCELLED | OUTPATIENT
Start: 2024-04-16 | End: 2024-04-16

## 2024-04-16 RX ORDER — CLOPIDOGREL BISULFATE 75 MG/1
75 TABLET ORAL DAILY
Qty: 30 TABLET | Refills: 11 | Status: SHIPPED | OUTPATIENT
Start: 2024-04-16 | End: 2025-04-16

## 2024-04-16 RX ORDER — FENTANYL CITRATE 50 UG/ML
INJECTION, SOLUTION INTRAMUSCULAR; INTRAVENOUS
Status: DISCONTINUED | OUTPATIENT
Start: 2024-04-16 | End: 2024-04-16 | Stop reason: HOSPADM

## 2024-04-16 RX ORDER — HEPARIN SODIUM 200 [USP'U]/100ML
INJECTION, SOLUTION INTRAVENOUS
Status: DISCONTINUED | OUTPATIENT
Start: 2024-04-16 | End: 2024-04-16 | Stop reason: HOSPADM

## 2024-04-16 RX ORDER — LIDOCAINE HYDROCHLORIDE 10 MG/ML
INJECTION INFILTRATION; PERINEURAL
Status: DISCONTINUED | OUTPATIENT
Start: 2024-04-16 | End: 2024-04-16 | Stop reason: HOSPADM

## 2024-04-16 RX ORDER — PHENYLEPHRINE HCL IN 0.9% NACL 1 MG/10 ML
SYRINGE (ML) INTRAVENOUS
Status: DISCONTINUED | OUTPATIENT
Start: 2024-04-16 | End: 2024-04-16 | Stop reason: HOSPADM

## 2024-04-16 RX ORDER — METHYLPREDNISOLONE SOD SUCC 125 MG
VIAL (EA) INJECTION
Status: DISCONTINUED | OUTPATIENT
Start: 2024-04-16 | End: 2024-04-16 | Stop reason: HOSPADM

## 2024-04-16 RX ORDER — SODIUM CHLORIDE 9 MG/ML
INJECTION, SOLUTION INTRAVENOUS CONTINUOUS
Status: ACTIVE | OUTPATIENT
Start: 2024-04-16 | End: 2024-04-16

## 2024-04-16 RX ADMIN — SODIUM CHLORIDE 100 ML/HR: 0.9 INJECTION, SOLUTION INTRAVENOUS at 08:04

## 2024-04-16 NOTE — DISCHARGE INSTRUCTIONS
Discharge Instructions:    Do not drive a car, operate heavy equipment, care for a young child, etc for the next 12-24 hours.   Avoid drinking alcohol for 24 hours.  Do not make any important decisions for 24 hours.    Drink fluids to keep hydrated. Resume your usual diet as tolerated.     Rest for today then activity as tolerated.   Do not lift anything over 5 pounds for the first 3 days after procedure.    Remove dressing tomorrow then may shower with warm soapy water. Do not scrub site. Pat dry.   May apply bandaid for 2 days.  No tub baths.  Do not submerge wound in water for 3 days.     Call MD for any unrelieved pain, excessive nausea or vomiting, redness around site, bleeding, or pus or foul smelling drainage, or any other questions or concerns.    Go to the ER for any difficulty breathing or chest pain.      If site swells or bleeds, hold direct pressure to area for 10 full minutes.   If site continues to bleed, continue to hold pressure to site and have someone bring you to the ER.

## 2024-04-16 NOTE — NURSING
Pts ride has arrived; pt able to ambulate at discharge and no complaints; pt dressed self and has belongings; drsgs cdi to right a/c and right radial access w/o any swelling nor bleeding. Patient ambulated to bathroom. Patient escorted to pov per whch with nurse at discharge.pt has instructions and rx med plavix to begin.

## 2024-04-16 NOTE — Clinical Note
The catheter was inserted into the ostium   left main. An angiography was performed of the left coronary arteries. The angiography was performed via hand injection with 40 mL of contrast.

## 2024-04-16 NOTE — Clinical Note
An angiography was performed of the right coronary arteries. The angiography was performed via hand injection with 20 mL of contrast.

## 2024-04-16 NOTE — BRIEF OP NOTE
"POST CATH NOTE    HPI:     s/p right and the left catheterization secondary to:  Severe aortic stenosis.  Significant angina equivalent. Aortic valve study    Cath Results:  Access:  Rt Radial 5-6 Fr sheath   Rt AC vein with 5-6 for RHC   LM:  FREDIS 3 flow. 0% stenosis     LAD: FREDIS 3 flow.  Prox/mid calcified 70-80% stenosis.  Mid/distal 99% stenosis.  D1 distally 99% stenosis but small caliber vessel distally  LCx:  FREDIS 3 flow.  Proximal 30-40% stenosis.  Large principal OM1 with diffuse 30% stenosis    RCA: FREDIS 3 flow.  Ostial RCA 60% stenosis IFR 0.95.  Prox mid and distal diffuse 30 40% calcified stenosis.  TAYLOR branch very distally there is 95% calcified stenosis but small caliber vessel    LVgram: LVEDP 10 mm Hg      Right heart catheterization pressures in mmHg    PCWP:  7  /   3 /    3  PA: 17   /   6 /10  RV: 21   /  -3  /2  RA: 7   /  4  /3    Oxygen saturations in%    PA:70    RA:75  AO: 100      Cardiac output:   5.52     l/min     Prema  Cardiac index :  2.86      l/min            Aortic valve area 1.03cm2  Aortic valve index 0.53 cm2/bsa  Peak to Peak  40 mmHg  Mean gradient 37.7         Intervention:  IFR RCA  Closure device:  Manual pull venous sheath/Vasc band radial access  Patient tolerated procedure well, no complications    Post Cath Exam:  /70 (BP Location: Left arm, Patient Position: Sitting)   Pulse 92   Temp 97.4 °F (36.3 °C) (Temporal)   Resp 20   Ht 5' 7" (1.702 m)   Wt 81.6 kg (180 lb)   SpO2 99%   BMI 28.19 kg/m²     Assessment:   Severe single-vessel coronary artery disease/lad mid and distal  Severe aortic stenosis with aortic valve area index 0.53 cm2/bsa    Plan:   We will discuss with our heart team.  LAD lesion appears to be too distal to attach LIMA.  If not we will bring back for PCI to LAD mid to aortic valve replacement TAVR versus SAVR  Aspirin and Plavix  High-intensity statin    "

## 2024-04-16 NOTE — NURSING
Arranged ride home for patient w/ neighbor Louise and update sent in a voice message to his daughter Shae; rx for plavix at bedside; instructions at bedside; pt cleared for discharge to home; questions answered.

## 2024-04-16 NOTE — PLAN OF CARE
Patient received in cath lab recovery and assumed care and bedside report received for Guerita nurse; Patient aao, vss, has no complaints; connected to monitors and sinus on monitor. Skin wm dry color pk; radial and dp pulses palpable and aud w/ doppler to Dp/PT pulses bilat; Right radial vasc band in place w/ brisk cap refill and no bleeding nor hematoma. Right A/c drsg at site of former iv access/access site for RHC cdi, no bleeding, no swelling.NS infusing in hand off at 125 ml per hour; Belongings at bedside. No visitor present at this time.

## 2024-04-16 NOTE — Clinical Note
The PA catheter was repositioned to the right ventricle. Hemodynamics were performed. O2 saturation was measured at 70%.

## 2024-04-17 NOTE — DISCHARGE SUMMARY
[facility] - Cath Lab (Hospital)  Discharge Note  Short Stay    Procedure(s) (LRB):  CATHETERIZATION, HEART, BOTH LEFT AND RIGHT (N/A)  ANGIOGRAM, CORONARY ARTERY (N/A)  Instantaneous Wave-Free Ratio (IFR) (N/A)      OUTCOME: Patient tolerated treatment/procedure well without complication and is now ready for discharge.    DISPOSITION: Home or Self Care    FINAL DIAGNOSIS:  <principal problem not specified>    FOLLOWUP: In clinic    DISCHARGE INSTRUCTIONS:    Discharge Procedure Orders   CBC W/ AUTO DIFFERENTIAL   Standing Status: Future Number of Occurrences: 1 Standing Exp. Date: 07/09/25     BASIC METABOLIC PANEL   Standing Status: Future Number of Occurrences: 1 Standing Exp. Date: 07/09/25         Clinical Reference Documents Added to Patient Instructions         Document    CARDIAC CATHETERIZATION DISCHARGE INSTRUCTIONS (ENGLISH)            TIME SPENT ON DISCHARGE: 30 minutes

## 2024-04-22 ENCOUNTER — LAB VISIT (OUTPATIENT)
Dept: LAB | Facility: HOSPITAL | Age: 74
End: 2024-04-22
Attending: INTERNAL MEDICINE
Payer: MEDICARE

## 2024-04-22 DIAGNOSIS — Z01.810 PRE-OPERATIVE CARDIOVASCULAR EXAMINATION: ICD-10-CM

## 2024-04-22 LAB
ANION GAP SERPL CALC-SCNC: 9 MMOL/L (ref 8–16)
BASOPHILS # BLD AUTO: 0.06 K/UL (ref 0–0.2)
BASOPHILS NFR BLD: 1 % (ref 0–1.9)
BUN SERPL-MCNC: 20 MG/DL (ref 8–23)
CALCIUM SERPL-MCNC: 9.1 MG/DL (ref 8.7–10.5)
CHLORIDE SERPL-SCNC: 104 MMOL/L (ref 95–110)
CO2 SERPL-SCNC: 26 MMOL/L (ref 23–29)
CREAT SERPL-MCNC: 1.1 MG/DL (ref 0.5–1.4)
DIFFERENTIAL METHOD BLD: ABNORMAL
EOSINOPHIL # BLD AUTO: 0.2 K/UL (ref 0–0.5)
EOSINOPHIL NFR BLD: 3.3 % (ref 0–8)
ERYTHROCYTE [DISTWIDTH] IN BLOOD BY AUTOMATED COUNT: 13.3 % (ref 11.5–14.5)
EST. GFR  (NO RACE VARIABLE): >60 ML/MIN/1.73 M^2
GLUCOSE SERPL-MCNC: 128 MG/DL (ref 70–110)
HCT VFR BLD AUTO: 40.8 % (ref 40–54)
HGB BLD-MCNC: 13.8 G/DL (ref 14–18)
IMM GRANULOCYTES # BLD AUTO: 0.07 K/UL (ref 0–0.04)
IMM GRANULOCYTES NFR BLD AUTO: 1.1 % (ref 0–0.5)
LYMPHOCYTES # BLD AUTO: 1.5 K/UL (ref 1–4.8)
LYMPHOCYTES NFR BLD: 23.4 % (ref 18–48)
MCH RBC QN AUTO: 30 PG (ref 27–31)
MCHC RBC AUTO-ENTMCNC: 33.8 G/DL (ref 32–36)
MCV RBC AUTO: 89 FL (ref 82–98)
MONOCYTES # BLD AUTO: 0.5 K/UL (ref 0.3–1)
MONOCYTES NFR BLD: 8.6 % (ref 4–15)
NEUTROPHILS # BLD AUTO: 3.9 K/UL (ref 1.8–7.7)
NEUTROPHILS NFR BLD: 62.6 % (ref 38–73)
NRBC BLD-RTO: 0 /100 WBC
PLATELET # BLD AUTO: 212 K/UL (ref 150–450)
PMV BLD AUTO: 10 FL (ref 9.2–12.9)
POTASSIUM SERPL-SCNC: 4 MMOL/L (ref 3.5–5.1)
RBC # BLD AUTO: 4.6 M/UL (ref 4.6–6.2)
SODIUM SERPL-SCNC: 139 MMOL/L (ref 136–145)
WBC # BLD AUTO: 6.29 K/UL (ref 3.9–12.7)

## 2024-04-22 PROCEDURE — 36415 COLL VENOUS BLD VENIPUNCTURE: CPT | Performed by: INTERNAL MEDICINE

## 2024-04-22 PROCEDURE — 80048 BASIC METABOLIC PNL TOTAL CA: CPT | Performed by: INTERNAL MEDICINE

## 2024-04-22 PROCEDURE — 85025 COMPLETE CBC W/AUTO DIFF WBC: CPT | Performed by: INTERNAL MEDICINE

## 2024-04-23 ENCOUNTER — HOSPITAL ENCOUNTER (OUTPATIENT)
Facility: HOSPITAL | Age: 74
Discharge: HOME | End: 2024-04-23
Attending: INTERNAL MEDICINE | Admitting: INTERNAL MEDICINE
Payer: MEDICARE

## 2024-04-23 VITALS
HEART RATE: 80 BPM | HEIGHT: 67 IN | BODY MASS INDEX: 28.25 KG/M2 | OXYGEN SATURATION: 97 % | RESPIRATION RATE: 20 BRPM | SYSTOLIC BLOOD PRESSURE: 123 MMHG | TEMPERATURE: 97 F | DIASTOLIC BLOOD PRESSURE: 68 MMHG | WEIGHT: 180 LBS

## 2024-04-23 DIAGNOSIS — Z01.810 PRE-OPERATIVE CARDIOVASCULAR EXAMINATION: Primary | ICD-10-CM

## 2024-04-23 DIAGNOSIS — I25.10 CAD (CORONARY ARTERY DISEASE): ICD-10-CM

## 2024-04-23 DIAGNOSIS — Z01.818 PRE-OP EVALUATION: ICD-10-CM

## 2024-04-23 DIAGNOSIS — I25.112 CORONARY ARTERY DISEASE INVOLVING NATIVE CORONARY ARTERY OF NATIVE HEART WITH REFRACTORY ANGINA PECTORIS: ICD-10-CM

## 2024-04-23 LAB
OHS QRS DURATION: 108 MS
OHS QTC CALCULATION: 429 MS
POC ACTIVATED CLOTTING TIME K: 282 SEC (ref 74–137)
POC ACTIVATED CLOTTING TIME K: 336 SEC (ref 74–137)
POCT GLUCOSE: 137 MG/DL (ref 70–110)
SAMPLE: ABNORMAL
SAMPLE: ABNORMAL

## 2024-04-23 PROCEDURE — 85347 COAGULATION TIME ACTIVATED: CPT | Mod: HCNC | Performed by: INTERNAL MEDICINE

## 2024-04-23 PROCEDURE — C1761 OPTIME CATH, TRANSLUMINAL INTRAVASC LITHO, CORONARY: HCPCS | Mod: HCNC | Performed by: INTERNAL MEDICINE

## 2024-04-23 PROCEDURE — 27201423 OPTIME MED/SURG SUP & DEVICES STERILE SUPPLY: Mod: HCNC | Performed by: INTERNAL MEDICINE

## 2024-04-23 PROCEDURE — 92978 ENDOLUMINL IVUS OCT C 1ST: CPT | Mod: LD,HCNC | Performed by: INTERNAL MEDICINE

## 2024-04-23 PROCEDURE — C1894 INTRO/SHEATH, NON-LASER: HCPCS | Mod: HCNC | Performed by: INTERNAL MEDICINE

## 2024-04-23 PROCEDURE — 99152 MOD SED SAME PHYS/QHP 5/>YRS: CPT | Mod: HCNC,,, | Performed by: INTERNAL MEDICINE

## 2024-04-23 PROCEDURE — 93010 ELECTROCARDIOGRAM REPORT: CPT | Mod: HCNC,,, | Performed by: INTERNAL MEDICINE

## 2024-04-23 PROCEDURE — 99152 MOD SED SAME PHYS/QHP 5/>YRS: CPT | Mod: HCNC | Performed by: INTERNAL MEDICINE

## 2024-04-23 PROCEDURE — 25000003 PHARM REV CODE 250: Mod: HCNC

## 2024-04-23 PROCEDURE — 99153 MOD SED SAME PHYS/QHP EA: CPT | Mod: HCNC | Performed by: INTERNAL MEDICINE

## 2024-04-23 PROCEDURE — C1769 GUIDE WIRE: HCPCS | Mod: HCNC | Performed by: INTERNAL MEDICINE

## 2024-04-23 PROCEDURE — 25500020 PHARM REV CODE 255: Mod: HCNC | Performed by: INTERNAL MEDICINE

## 2024-04-23 PROCEDURE — 92928 PRQ TCAT PLMT NTRAC ST 1 LES: CPT | Mod: LD,HCNC,, | Performed by: INTERNAL MEDICINE

## 2024-04-23 PROCEDURE — 63600175 PHARM REV CODE 636 W HCPCS: Mod: HCNC | Performed by: INTERNAL MEDICINE

## 2024-04-23 PROCEDURE — 92972 PERQ TRLUML CORONRY LITHOTRP: CPT | Mod: HCNC,,, | Performed by: INTERNAL MEDICINE

## 2024-04-23 PROCEDURE — C1725 CATH, TRANSLUMIN NON-LASER: HCPCS | Mod: HCNC | Performed by: INTERNAL MEDICINE

## 2024-04-23 PROCEDURE — 25000003 PHARM REV CODE 250: Mod: HCNC | Performed by: INTERNAL MEDICINE

## 2024-04-23 PROCEDURE — C9600 PERC DRUG-EL COR STENT SING: HCPCS | Mod: LD,HCNC | Performed by: INTERNAL MEDICINE

## 2024-04-23 PROCEDURE — C1874 STENT, COATED/COV W/DEL SYS: HCPCS | Mod: HCNC | Performed by: INTERNAL MEDICINE

## 2024-04-23 PROCEDURE — 92978 ENDOLUMINL IVUS OCT C 1ST: CPT | Mod: 26,LD,HCNC, | Performed by: INTERNAL MEDICINE

## 2024-04-23 PROCEDURE — C1753 CATH, INTRAVAS ULTRASOUND: HCPCS | Mod: HCNC | Performed by: INTERNAL MEDICINE

## 2024-04-23 PROCEDURE — C1887 CATHETER, GUIDING: HCPCS | Mod: HCNC | Performed by: INTERNAL MEDICINE

## 2024-04-23 PROCEDURE — 92972 PERQ TRLUML CORONRY LITHOTRP: CPT | Mod: HCNC | Performed by: INTERNAL MEDICINE

## 2024-04-23 PROCEDURE — 93005 ELECTROCARDIOGRAM TRACING: CPT | Mod: HCNC

## 2024-04-23 DEVICE — EVEROLIMUS-ELUTING PLATINUM CHROMIUM CORONARY STENT SYSTEM
Type: IMPLANTABLE DEVICE | Site: CORONARY | Status: FUNCTIONAL
Brand: SYNERGY™ XD

## 2024-04-23 RX ORDER — SODIUM CHLORIDE 9 MG/ML
INJECTION, SOLUTION INTRAVENOUS CONTINUOUS
Status: ACTIVE | OUTPATIENT
Start: 2024-04-23 | End: 2024-04-23

## 2024-04-23 RX ORDER — SODIUM CHLORIDE 9 MG/ML
INJECTION, SOLUTION INTRAVENOUS CONTINUOUS
Status: DISCONTINUED | OUTPATIENT
Start: 2024-04-23 | End: 2024-04-23 | Stop reason: HOSPADM

## 2024-04-23 RX ORDER — HEPARIN SODIUM 200 [USP'U]/100ML
INJECTION, SOLUTION INTRAVENOUS
Status: DISCONTINUED | OUTPATIENT
Start: 2024-04-23 | End: 2024-04-23 | Stop reason: HOSPADM

## 2024-04-23 RX ORDER — HEPARIN SODIUM 1000 [USP'U]/ML
INJECTION, SOLUTION INTRAVENOUS; SUBCUTANEOUS
Status: DISCONTINUED | OUTPATIENT
Start: 2024-04-23 | End: 2024-04-23 | Stop reason: HOSPADM

## 2024-04-23 RX ORDER — LIDOCAINE HYDROCHLORIDE 10 MG/ML
INJECTION INFILTRATION; PERINEURAL
Status: DISCONTINUED | OUTPATIENT
Start: 2024-04-23 | End: 2024-04-23 | Stop reason: HOSPADM

## 2024-04-23 RX ORDER — DIPHENHYDRAMINE HCL 25 MG
50 CAPSULE ORAL ONCE
Status: DISCONTINUED | OUTPATIENT
Start: 2024-04-23 | End: 2024-04-23

## 2024-04-23 RX ORDER — IODIXANOL 320 MG/ML
INJECTION, SOLUTION INTRAVASCULAR
Status: DISCONTINUED | OUTPATIENT
Start: 2024-04-23 | End: 2024-04-23 | Stop reason: HOSPADM

## 2024-04-23 RX ORDER — VERAPAMIL HYDROCHLORIDE 2.5 MG/ML
INJECTION, SOLUTION INTRAVENOUS
Status: DISCONTINUED | OUTPATIENT
Start: 2024-04-23 | End: 2024-04-23 | Stop reason: HOSPADM

## 2024-04-23 RX ORDER — MIDAZOLAM HYDROCHLORIDE 1 MG/ML
INJECTION INTRAMUSCULAR; INTRAVENOUS
Status: DISCONTINUED | OUTPATIENT
Start: 2024-04-23 | End: 2024-04-23 | Stop reason: HOSPADM

## 2024-04-23 RX ORDER — CLOPIDOGREL BISULFATE 75 MG/1
300 TABLET ORAL ONCE
Status: DISCONTINUED | OUTPATIENT
Start: 2024-04-23 | End: 2024-04-23 | Stop reason: HOSPADM

## 2024-04-23 RX ORDER — CLOPIDOGREL BISULFATE 300 MG/1
TABLET, FILM COATED ORAL
Status: DISCONTINUED | OUTPATIENT
Start: 2024-04-23 | End: 2024-04-23 | Stop reason: HOSPADM

## 2024-04-23 RX ORDER — FENTANYL CITRATE 50 UG/ML
INJECTION, SOLUTION INTRAMUSCULAR; INTRAVENOUS
Status: DISCONTINUED | OUTPATIENT
Start: 2024-04-23 | End: 2024-04-23 | Stop reason: HOSPADM

## 2024-04-23 RX ADMIN — SODIUM CHLORIDE 100 ML/HR: 0.9 INJECTION, SOLUTION INTRAVENOUS at 07:04

## 2024-04-23 NOTE — NURSING
Right radial tr band removed and sterile gauze w/ tegaderm applied, good sensation, brisk cap refill, palpable pulse, no swelling, no redness, no hematoma noted, some old bruising from previous angiogram noted.Patient sitting up in bed and lunch tray w/ po fluids served. Patient aao, no complaints at present; stent card w/ written discharge instructions at bedside and reviewed w/ pt and questions answered and verbalizes understanding; Patients daughter Shae called and updated. Belongings at bedside. Will continue to monitor. Occ dry cough as previous observed.

## 2024-04-23 NOTE — INTERVAL H&P NOTE
The patient has been examined and the H&P has been reviewed:    I concur with the findings and changes have been noted since the H&P was written:  Heart catheterization with severe LAD , had a full discussion with the patient about options including CABG and aortic valve replacement surgically versus PCI and TAVR.  Risks and benefits of each modality discussed in detail with the patient.  After shared decision patient prefers to have the less aggressive approach with PCI to the LAD and TAVR if possible.   Plan for staged PCI and TAVR evaluation which is very reasonable    Anesthesia/Surgery risks, benefits and alternative options discussed and understood by patient/family.          There are no hospital problems to display for this patient.

## 2024-04-23 NOTE — Clinical Note
The catheter was inserted into the ostium   left main. An angiography was performed of the left coronary arteries. The angiography was performed via hand injection with 7 mL of contrast.

## 2024-04-23 NOTE — Clinical Note
The catheter was inserted into the proximal   left anterior descending. Prox, Mid and distal   Ivus performed

## 2024-04-23 NOTE — PLAN OF CARE
Patient discharged to home. Patient has belongings, dressed self and ambulated to bathroom on own; right radial drsg cdi, no bleeding, no hematoma. Patient escorted to daughter pov per [facility] w/ nurse and has stent card and instructions.

## 2024-04-23 NOTE — Clinical Note
375 ml of contrast were injected throughout the case. 75 mL of contrast was the total wasted during the case. 450 mL was the total amount used during the case.

## 2024-04-23 NOTE — Clinical Note
An angiography was performed of the left coronary arteries. The angiography was performed via hand injection with 3 mL of contrast.

## 2024-04-23 NOTE — PLAN OF CARE
Pt npo and prepared for procedure and questions answered and daughter present and reviewed the procedure

## 2024-04-23 NOTE — NURSING
Patient received in cath lab recovery per stretcher w/ 2 rails up with nurse Romeo and bedside report received; Patient aao and no complaints at present, connected to monitors. Water and snack given po. Sinus on monitor w/o ectopy; skin wm dry color pk; no resp distress. Right radial tr band in place w/ good sensation, cap refill, +2 pulse and no bleeding, no hematoma. NS in progress at 75 ml per hr on pump. Family with md to update post procedure; belongings at bedside. Will continue to monitor; post procedure instructions and home care reviewed and verbalizes understanding.

## 2024-04-23 NOTE — Clinical Note
An angiography was performed of the left coronary arteries. The angiography was performed via hand injection with 5 mL of contrast.

## 2024-04-23 NOTE — PLAN OF CARE
Dr Parsonf at bedside to speak to patient post procedure and cleared for discharge to home post cath recovery and daughter in waiting room, belongings at bedside, written instructions and stent card at bedside. Patient vss, no complaints and resting quietly in bed; SR on monitor.

## 2024-04-23 NOTE — Clinical Note
An angiography was performed of the left coronary arteries. The angiography was performed via hand injection with 4 mL of contrast.

## 2024-04-23 NOTE — BRIEF OP NOTE
"POST CATH NOTE    HPI:     s/p catheterization secondary to: Severe coronary artery disease, angina class 3    Cath Results:  Access: right radial with 5-6 Afghan sheath  LM:  FREDIS 3 flow.  Mild luminal irregularities  LAD: FREDIS- flow  ostial 30-40% stenosis, proximal calcified 80% stenosis,  mid 80% stenosis heavily calcified,  distal 99% stenosis  LCx:  FREDIS  3 flow    Intervention:     Status post successful PTCA and stenting to prox mid and distal LAD with 3.0 x 3(2) mm CHAY prox and mid and 2.5 x 16 mm CHAY distally    During balloon predilatation of the mid LAD heavily calcified lesion with a 3.0 NC balloon,  the balloon ruptured resulting in dissection of the mid LAD all the way.  IVUS confirmed  confirmed contrast stasis in the subintimal space hence 3.0 cutting balloon used with  reestablishing flow in the mid  LAD.  Unfortunately we had to stent the mid LAD dissected segment with 3.0 x 38 CHAY to seal the dissection successfully    Prox LAD heavily calcified lesion pre-dilated with 3.5 shockwave balloon due to heavy  calcification    The patient was hemodynamically stable all the way through the procedure and postprocedure      Closure device:  Vasc band    Patient tolerated procedure well, no complications    Post Cath Exam:  /65 (BP Location: Left arm, Patient Position: Lying)   Pulse 69   Temp 97.1 °F (36.2 °C) (Temporal)   Resp (!) 24   Ht 5' 7" (1.702 m)   Wt 81.6 kg (180 lb)   SpO2 99%   BMI 28.19 kg/m²     Assessment:   Successful PTCA to the prox mid and distal LAD with 3 CHAY.  We had to stent mid LAD due to balloon rupture induced dissection that compromised FREDIS flow    Plan:     Continue aspirin and Plavix for 12 months without interruption   Continue high-intensity statin   Risk factors reduction   2 weeks follow up.  We will refer to well     "

## 2024-04-24 ENCOUNTER — TELEPHONE (OUTPATIENT)
Dept: CARDIOLOGY | Facility: CLINIC | Age: 74
End: 2024-04-24
Payer: MEDICARE

## 2024-04-24 NOTE — TELEPHONE ENCOUNTER
Reached out to in regards to arranging an appt with Dr Vickers     Offered and accepted May 9 at 1120

## 2024-04-24 NOTE — TELEPHONE ENCOUNTER
----- Message from Brian Vickers MD sent at 4/23/2024 10:22 AM CDT -----   I want to see him back in 2-3 weeks postprocedure follow up please.  Okay to double book.  Thank you

## 2024-05-03 ENCOUNTER — TELEPHONE (OUTPATIENT)
Dept: ENDOSCOPY | Facility: HOSPITAL | Age: 74
End: 2024-05-03

## 2024-05-03 ENCOUNTER — CLINICAL SUPPORT (OUTPATIENT)
Dept: ENDOSCOPY | Facility: HOSPITAL | Age: 74
End: 2024-05-03
Attending: FAMILY MEDICINE
Payer: MEDICARE

## 2024-05-03 DIAGNOSIS — Z12.11 COLON CANCER SCREENING: ICD-10-CM

## 2024-05-03 NOTE — TELEPHONE ENCOUNTER
Contacted the patient for PAT appointment to schedule an endoscopy procedure(s) colonoscopy. While reviewing medical history patients states he has been taking Plavix for about 1.5 weeks due to recent cardiac stent placement.  Reviewed cardiac catherization report from 4/23/24 which states patient must stay on Plavix for 12 months without interruption.Patient states he will like to hold of colonoscopy until he will be able to hold Plavix for 5 days prior to procedure. Patient will need a new referral for colonoscopy at that time.

## 2024-05-06 NOTE — TELEPHONE ENCOUNTER
May 3, 2024  Chaitanya López DO   to Me       5/3/24  2:56 PM  Thank you for letting me know.           5/3/24 11:21 AM  You routed this conversation to Chaitanya López DO  Me         5/3/24 11:20 AM  Note  LUCHO        Me         5/3/24 11:20 AM  Note  Contacted the patient for PAT appointment to schedule an endoscopy procedure(s) colonoscopy. While reviewing medical history patients states he has been taking Plavix for about 1.5 weeks due to recent cardiac stent placement.  Reviewed cardiac catherization report from 4/23/24 which states patient must stay on Plavix for 12 months without interruption.Patient states he will like to hold of colonoscopy until he will be able to hold Plavix for 5 days prior to procedure. Patient will need a new referral for colonoscopy at that time.

## 2024-05-09 ENCOUNTER — OFFICE VISIT (OUTPATIENT)
Dept: CARDIOLOGY | Facility: CLINIC | Age: 74
End: 2024-05-09
Payer: MEDICARE

## 2024-05-09 VITALS
SYSTOLIC BLOOD PRESSURE: 114 MMHG | WEIGHT: 185 LBS | BODY MASS INDEX: 29.03 KG/M2 | HEART RATE: 83 BPM | DIASTOLIC BLOOD PRESSURE: 78 MMHG | HEIGHT: 67 IN | OXYGEN SATURATION: 98 %

## 2024-05-09 DIAGNOSIS — E11.69 HYPERLIPIDEMIA ASSOCIATED WITH TYPE 2 DIABETES MELLITUS: ICD-10-CM

## 2024-05-09 DIAGNOSIS — I15.2 HYPERTENSION ASSOCIATED WITH DIABETES: ICD-10-CM

## 2024-05-09 DIAGNOSIS — I73.9 PAD (PERIPHERAL ARTERY DISEASE): Chronic | ICD-10-CM

## 2024-05-09 DIAGNOSIS — E11.59 HYPERTENSION ASSOCIATED WITH DIABETES: ICD-10-CM

## 2024-05-09 DIAGNOSIS — E11.42 TYPE 2 DIABETES MELLITUS WITH DIABETIC POLYNEUROPATHY, WITHOUT LONG-TERM CURRENT USE OF INSULIN: ICD-10-CM

## 2024-05-09 DIAGNOSIS — I35.0 NONRHEUMATIC AORTIC VALVE STENOSIS: Chronic | ICD-10-CM

## 2024-05-09 DIAGNOSIS — E78.49 OTHER HYPERLIPIDEMIA: ICD-10-CM

## 2024-05-09 DIAGNOSIS — E78.5 HYPERLIPIDEMIA ASSOCIATED WITH TYPE 2 DIABETES MELLITUS: ICD-10-CM

## 2024-05-09 DIAGNOSIS — I25.118 CORONARY ARTERY DISEASE OF NATIVE ARTERY OF NATIVE HEART WITH STABLE ANGINA PECTORIS: Primary | Chronic | ICD-10-CM

## 2024-05-09 PROCEDURE — 3072F LOW RISK FOR RETINOPATHY: CPT | Mod: HCNC,CPTII,S$GLB, | Performed by: INTERNAL MEDICINE

## 2024-05-09 PROCEDURE — 1159F MED LIST DOCD IN RCRD: CPT | Mod: HCNC,CPTII,S$GLB, | Performed by: INTERNAL MEDICINE

## 2024-05-09 PROCEDURE — 1126F AMNT PAIN NOTED NONE PRSNT: CPT | Mod: HCNC,CPTII,S$GLB, | Performed by: INTERNAL MEDICINE

## 2024-05-09 PROCEDURE — 99214 OFFICE O/P EST MOD 30 MIN: CPT | Mod: HCNC,S$GLB,, | Performed by: INTERNAL MEDICINE

## 2024-05-09 PROCEDURE — 3044F HG A1C LEVEL LT 7.0%: CPT | Mod: HCNC,CPTII,S$GLB, | Performed by: INTERNAL MEDICINE

## 2024-05-09 PROCEDURE — 3074F SYST BP LT 130 MM HG: CPT | Mod: HCNC,CPTII,S$GLB, | Performed by: INTERNAL MEDICINE

## 2024-05-09 PROCEDURE — 1101F PT FALLS ASSESS-DOCD LE1/YR: CPT | Mod: HCNC,CPTII,S$GLB, | Performed by: INTERNAL MEDICINE

## 2024-05-09 PROCEDURE — 3288F FALL RISK ASSESSMENT DOCD: CPT | Mod: HCNC,CPTII,S$GLB, | Performed by: INTERNAL MEDICINE

## 2024-05-09 PROCEDURE — 1157F ADVNC CARE PLAN IN RCRD: CPT | Mod: HCNC,CPTII,S$GLB, | Performed by: INTERNAL MEDICINE

## 2024-05-09 PROCEDURE — 3078F DIAST BP <80 MM HG: CPT | Mod: HCNC,CPTII,S$GLB, | Performed by: INTERNAL MEDICINE

## 2024-05-09 PROCEDURE — 4010F ACE/ARB THERAPY RXD/TAKEN: CPT | Mod: HCNC,CPTII,S$GLB, | Performed by: INTERNAL MEDICINE

## 2024-05-09 PROCEDURE — 3008F BODY MASS INDEX DOCD: CPT | Mod: HCNC,CPTII,S$GLB, | Performed by: INTERNAL MEDICINE

## 2024-05-09 PROCEDURE — 99999 PR PBB SHADOW E&M-EST. PATIENT-LVL IV: CPT | Mod: PBBFAC,HCNC,, | Performed by: INTERNAL MEDICINE

## 2024-05-09 NOTE — PROGRESS NOTES
Subjective:   Patient ID:  Cesar Simpson is a 74 y.o. male who presents for evaluation of AS, PAD      5/9/2023:  Full up postprocedure.  RHC/C with severe aortic stenosis based in aortic valve area index.  Severe single-vessel coronary artery disease including distal LAD.  After shared decision we elected to proceed with PCI to the LAD and refer for TAVR.  He is doing well.  Still with significant dyspnea on exertion. NYHA FC III.  He is compliant with his medications    4/10/2024: Patient seen in clinic today, reports he's had a difficult past year. He reports over the past year he has been experiencing worsening, ongoing fatigue, dizziness, and BASILIO. Has not been very active the past year due to BASILIO, cough, and worsening fatigue; he used to ride his stationary bike and walk frequently. Long conversation with patient and Dr. Vickers concerning aortic stenosis diagnosis and noted CAD on CT last month. Patient denies CP, SOB, orthopnea, PND, syncope, palpitations, LE edema; denies any LE claudication. Endorses pre-syncope/dizziness and ongoing BASILIO/fatigue. Reports compliance and tolerance with all medications.      Historically:    73 yo M with hx of DM2, HTN, HLD, FABY present to clinic for evaluation of newly diagnosed mod-sev AS, PAD.       PCI to LAD April 23, 2024  Status post successful PTCA and stenting to prox mid and distal LAD with 3.0 x 3(2) mm CHAY prox and mid and 2.5 x 16 mm CHAY distally     During balloon predilatation of the mid LAD heavily calcified lesion with a 3.0 NC balloon,  the balloon ruptured resulting in dissection of the mid LAD all the way.  IVUS confirmed  confirmed contrast stasis in the subintimal space hence 3.0 cutting balloon used with  reestablishing flow in the mid  LAD.  Unfortunately we had to stent the mid LAD dissected segment with 3.0 x 38 CHAY to seal the dissection successfully     Prox LAD heavily calcified lesion pre-dilated with 3.5 shockwave balloon due to heavy   calcification     The patient was hemodynamically stable all the way through the procedure and postprocedure    RHC/LHC April 16, 2024  Access:  Rt Radial 5-6 Fr sheath   Rt AC vein with 5-6 for RHC   LM:  FREDIS 3 flow. 0% stenosis      LAD: FREDIS 3 flow.  Prox/mid calcified 70-80% stenosis.  Mid/distal 99% stenosis.  D1 distally 99% stenosis but small caliber vessel distally  LCx:  FREDIS 3 flow.  Proximal 30-40% stenosis.  Large principal OM1 with diffuse 30% stenosis     RCA: FREDIS 3 flow.  Ostial RCA 60% stenosis IFR 0.95.  Prox mid and distal diffuse 30 40% calcified stenosis.  TAYLOR branch very distally there is 95% calcified stenosis but small caliber vessel     LVgram: LVEDP 10 mm Hg     Right heart catheterization pressures in mmHg     PCWP:  7  /   3 /    3  PA: 17   /   6 /10  RV: 21   /  -3  /2  RA: 7   /  4  /3     Oxygen saturations in%, PA:70, RA:75, AO: 100     Cardiac output:   5.52     l/min     Prema  Cardiac index :  2.86      l/min     Aortic valve area 1.03cm2  Aortic valve area index 0.53 cm2  Peak to Peak  40 mmHg  Mean gradient 37.7    Echo 4/3/2024    Left Ventricle: The left ventricle is normal in size. Normal wall thickness. There is concentric remodeling. There is normal systolic function. Biplane (2D) method of discs ejection fraction is 58%. There is normal diastolic function.    Right Ventricle: Normal right ventricular cavity size. Wall thickness is normal. Right ventricle wall motion  is normal. Systolic function is normal.    Aortic Valve: There is moderate aortic valve sclerosis. Moderately restricted motion. There is moderate to severe stenosis. Aortic valve area by VTI is 0.90 cm². Aortic valve peak velocity is 3.18 m/s. Mean gradient is 24 mmHg. The dimensionless index is 0.29.    Mitral Valve: There is moderate mitral annular calcification present.    Pulmonary Artery: The estimated pulmonary artery systolic pressure is 32 mmHg.    IVC/SVC: Normal venous pressure at 3 mmHg.    US  LOWER EXTREMITY ARTERIES BILATERAL 3/25/2024  FINDINGS:  Right mid BERNABE is occluded with large amount plaque.  Left proximal BERNABE is occluded with large amounts of plaque.     Impression:  BERNABE occlusions bilaterally.    Past Medical History:   Diagnosis Date    Coronary artery disease of native artery of native heart with stable angina pectoris 4/10/2024    Diabetes mellitus type II, uncontrolled 02/27/2013    High-density lipoprotein deficiency 02/27/2013    HTN (hypertension) 02/27/2013    Hyperlipidemia     Hypothyroidism     Moderate obstructive sleep apnea 6/29/2022    Normocytic anemia 06/28/2021    Obesity     Osteoarthritis 02/08/2016    PAD (peripheral artery disease) 4/10/2024    Trouble in sleeping     Type 2 diabetes mellitus     Type 2 diabetes mellitus with diabetic polyneuropathy, without long-term current use of insulin           Patient Active Problem List    Diagnosis Date Noted    Nonrheumatic aortic valve stenosis 04/10/2024     Moderate to Severe       PAD (peripheral artery disease) 04/10/2024    Coronary artery disease of native artery of native heart with stable angina pectoris 04/10/2024    ILD (interstitial lung disease) 03/26/2024    Chronic cough 03/26/2024    Moderate obstructive sleep apnea 06/29/2022    Does use hearing aid 06/28/2021    Normocytic anemia 06/28/2021    Diet-controlled diabetes mellitus 09/24/2020    BPH associated with nocturia 09/24/2020    Arthritis 09/24/2020    Insomnia 05/23/2019    Hypertension associated with diabetes 03/27/2018    BMI 29.0-29.9,adult 03/27/2018    Hyperlipidemia associated with type 2 diabetes mellitus 03/24/2017    Osteoarthritis 02/08/2016    Hypothyroidism 02/10/2015    Posterior vitreous detachment, right eye 07/07/2014    Floater, vitreous 07/07/2014    Vitreous detachment 07/07/2014    Pseudophakia 07/07/2014    Refractive error 07/07/2014    Type 2 diabetes mellitus with diabetic polyneuropathy, without long-term current use of insulin  02/27/2013    Hyperlipidemia 02/27/2013    Prominent aorta 08/13/2010     Seen on chest x-ray dated 08/13/10         Patient's Medications   New Prescriptions    No medications on file   Previous Medications    ACCU-CHEK NAYA PLUS TEST STRP STRP    CHECK BLOOD SUGAR DAILY AS DIRECTED    ASPIRIN 81 MG CHEW    Take 1 tablet (81 mg total) by mouth once daily.    AZELASTINE (ASTELIN) 137 MCG (0.1 %) NASAL SPRAY    1 spray (137 mcg total) by Nasal route 2 (two) times daily.    BENZONATATE (TESSALON) 200 MG CAPSULE    Take 1 capsule (200 mg total) by mouth 3 (three) times daily as needed for Cough.    BLOOD-GLUCOSE METER MISC    1 device.  Check sugar 1 times daily as directed by MD. Supply preferred brand .Dx Code: [E11.8]    CLOPIDOGREL (PLAVIX) 75 MG TABLET    Take 1 tablet (75 mg total) by mouth once daily.    DULOXETINE (CYMBALTA) 60 MG CAPSULE    Take 1 capsule (60 mg total) by mouth once daily.    ESOMEPRAZOLE (NEXIUM) 40 MG CAPSULE    Take 1 capsule (40 mg total) by mouth daily with dinner or evening meal.    FAMOTIDINE (PEPCID) 40 MG TABLET    Take 1 tablet (40 mg total) by mouth daily with dinner or evening meal.    FINASTERIDE (PROSCAR) 5 MG TABLET    Take 1 tablet (5 mg total) by mouth once daily.    LANCETS MISC    1 box.  Check 1x daily as directed by MD. Supply brand covered by insurance. Dx Code: [E11.8]. Accucheck Naya.    LEVOTHYROXINE (SYNTHROID) 88 MCG TABLET    Take 1 tablet (88 mcg total) by mouth before breakfast.    OLMESARTAN (BENICAR) 5 MG TAB    Take 2 tablets (10 mg total) by mouth once daily.    ROSUVASTATIN (CRESTOR) 20 MG TABLET    Take 1 tablet (20 mg total) by mouth once daily.    TAMSULOSIN (FLOMAX) 0.4 MG CAP    Take 1 capsule (0.4 mg total) by mouth once daily.    TRAZODONE (DESYREL) 100 MG TABLET    Take 1 tablet (100 mg total) by mouth every evening.   Modified Medications    No medications on file   Discontinued Medications    No medications on file        Review of Systems    Constitutional: Positive for malaise/fatigue. Negative for chills and fever.   HENT:  Negative for hearing loss and nosebleeds.    Eyes:  Negative for blurred vision.   Cardiovascular:  Negative for chest pain, leg swelling and palpitations.        As in HPI   Respiratory:  Negative for hemoptysis and shortness of breath.    Hematologic/Lymphatic: Negative for bleeding problem.   Skin:  Negative for itching.   Musculoskeletal:  Negative for falls.   Gastrointestinal:  Negative for abdominal pain and hematochezia.   Genitourinary:  Negative for hematuria.   Neurological:  Negative for dizziness and loss of balance.   Psychiatric/Behavioral:  Negative for altered mental status and depression.        Family History   Problem Relation Name Age of Onset    Osteoporosis Mother Aniyah     Hypertension Mother Aniyah     Early death Father Cesar         Heart,  Stroke age 50    Stroke Father Cesar     Heart disease Father Cesar     Hypertension Father Cesar     Heart disease Brother Jose Enrique     Early death Brother Jose Enrique         Heart Att.  age 45    Hypertension Brother Jose Enrique     No Known Problems Daughter      No Known Problems Son      Early death Paternal Uncle Beltran         Heart,  age 49    Heart disease Paternal Uncle Beltran        Social History     Socioeconomic History    Marital status:    Tobacco Use    Smoking status: Never     Passive exposure: Never    Smokeless tobacco: Never   Substance and Sexual Activity    Alcohol use: Never     Comment: occasionally (maybe every 3 months)    Drug use: Never    Sexual activity: Not Currently     Partners: Female     Birth control/protection: None   Social History Narrative    9/7/2023: he lives alone. He has 2 kids, they live in [place] and [place]. 7 grandchildren. 2 grandchildren live nearby. No pets at home. He is retired. 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      Social Determinants of Health     Financial Resource Strain: Low Risk  (2/29/2024)     "Overall Financial Resource Strain (CARDIA)     Difficulty of Paying Living Expenses: Not very hard   Food Insecurity: No Food Insecurity (2024)    Hunger Vital Sign     Worried About Running Out of Food in the Last Year: Never true     Ran Out of Food in the Last Year: Never true   Transportation Needs: No Transportation Needs (2024)    PRAPARE - Transportation     Lack of Transportation (Medical): No     Lack of Transportation (Non-Medical): No   Physical Activity: Insufficiently Active (2024)    Exercise Vital Sign     Days of Exercise per Week: 3 days     Minutes of Exercise per Session: 30 min   Stress: No Stress Concern Present (2024)    [employer] - Occupational Stress Questionnaire     Feeling of Stress : Only a little   Housing Stability: Low Risk  (2024)    Housing Stability Vital Sign     Unable to Pay for Housing in the Last Year: No     Number of Places Lived in the Last Year: 1     Unstable Housing in the Last Year: No            Objective:   Vitals  Vitals:    24 1157   BP: 114/78   Pulse: 83   SpO2: 98%   Weight: 83.9 kg (185 lb)   Height: 5' 7" (1.702 m)       Right Arm BP - Sittin/76  Left Arm BP - Sittin/78    Physical Exam  Constitutional:       Appearance: He is well-developed.   HENT:      Head: Normocephalic and atraumatic.   Eyes:      Conjunctiva/sclera: Conjunctivae normal.   Neck:      Vascular: No carotid bruit or JVD.   Cardiovascular:      Rate and Rhythm: Normal rate and regular rhythm.      Pulses:           Carotid pulses are 2+ on the right side and 2+ on the left side.       Radial pulses are 2+ on the right side and 2+ on the left side.      Heart sounds: Murmur heard.      No friction rub. Gallop: Grade 3 systolic murmur.   Pulmonary:      Effort: Pulmonary effort is normal. No respiratory distress.      Breath sounds: Normal breath sounds. No stridor. No wheezing.   Musculoskeletal:      Cervical back: Neck " supple.      Right lower leg: No edema.      Left lower leg: No edema.   Skin:     General: Skin is warm and dry.   Neurological:      Mental Status: He is alert and oriented to person, place, and time.   Psychiatric:         Behavior: Behavior normal.         Lab Results    Lab Results   Component Value Date    WBC 6.29 04/22/2024    HGB 13.8 (L) 04/22/2024    HCT 40.8 04/22/2024    MCV 89 04/22/2024       Lab Results   Component Value Date     04/22/2024    INR 1.0 11/20/2015       Lab Results   Component Value Date    K 4.0 04/22/2024    BUN 20 04/22/2024    CREATININE 1.1 04/22/2024       Lab Results   Component Value Date     (H) 04/22/2024    HGBA1C 6.0 (H) 03/07/2024       Lab Results   Component Value Date    AST 14 03/07/2024    ALT 14 03/07/2024    ALBUMIN 4.0 03/07/2024    PROT 7.4 03/07/2024       Lab Results   Component Value Date    CHOL 135 03/07/2024    HDL 37 (L) 03/07/2024    LDLCALC 86.2 03/07/2024    TRIG 59 03/07/2024       Lab Results   Component Value Date    CRP 0.6 08/11/2023    BNP <10 08/11/2023       Assessment:     1. Coronary artery disease of native artery of native heart with stable angina pectoris    2. Nonrheumatic aortic valve stenosis    3. PAD (peripheral artery disease)    4. Hypertension associated with diabetes    5. Hyperlipidemia associated with type 2 diabetes mellitus    6. Other hyperlipidemia    7. Type 2 diabetes mellitus with diabetic polyneuropathy, without long-term current use of insulin          Plan:   Right and left heart catheterization performed.  Valve area index is in the severe range.  He he is symptomatic.  We will refer for TAVR    Status post PCI to the LAD prox mid and distal.  Had multiple lesion 1 in the distal and we will in the prox mid segment.  We had to stent the whole mid segment and use longer stents due to balloon rupture from heavy calcification resulting in uncontrolled dissection.     Continue aspirin and Plavix without  interruption for 12 months    Continue high-intensity statin.  LDL goal lower than 70  BP is well-controlled and toward the lower side.          No orders of the defined types were placed in this encounter.      He expressed verbal understanding and agreed with the plan    Pertinent cardiac images and EKG reviewed independently.    Continue with current medical plan and lifestyle changes.  Return sooner for concerns or questions. If symptoms persist go to the ED.  I have reviewed all pertinent data including patient's medical history in detail and updated the computerized patient record.     Counseling included discussion regarding imaging findings, diagnosis, possibilities, treatment options, risks and benefits.    Thank you for the opportunity to care for this patient. Will be available for questions if needed.

## 2024-05-13 DIAGNOSIS — I35.0 NONRHEUMATIC AORTIC VALVE STENOSIS: Primary | Chronic | ICD-10-CM

## 2024-06-10 ENCOUNTER — OFFICE VISIT (OUTPATIENT)
Dept: OPTOMETRY | Facility: CLINIC | Age: 74
End: 2024-06-10
Payer: MEDICARE

## 2024-06-10 ENCOUNTER — PATIENT MESSAGE (OUTPATIENT)
Dept: OTHER | Facility: OTHER | Age: 74
End: 2024-06-10
Payer: MEDICARE

## 2024-06-10 DIAGNOSIS — Z13.5 SCREENING FOR EYE CONDITION: ICD-10-CM

## 2024-06-10 DIAGNOSIS — Z98.890 HISTORY OF REFRACTIVE SURGERY: ICD-10-CM

## 2024-06-10 DIAGNOSIS — E11.9 TYPE 2 DIABETES MELLITUS WITHOUT RETINOPATHY: ICD-10-CM

## 2024-06-10 DIAGNOSIS — Z98.41 S/P BILATERAL CATARACT EXTRACTION: ICD-10-CM

## 2024-06-10 DIAGNOSIS — Z96.1 PSEUDOPHAKIA OF BOTH EYES: ICD-10-CM

## 2024-06-10 DIAGNOSIS — Z98.42 S/P BILATERAL CATARACT EXTRACTION: ICD-10-CM

## 2024-06-10 DIAGNOSIS — Z01.00 DIABETIC EYE EXAM: Primary | ICD-10-CM

## 2024-06-10 DIAGNOSIS — H52.7 REFRACTIVE ERRORS: ICD-10-CM

## 2024-06-10 DIAGNOSIS — E11.9 DIABETIC EYE EXAM: Primary | ICD-10-CM

## 2024-06-10 PROCEDURE — 92014 COMPRE OPH EXAM EST PT 1/>: CPT | Mod: S$GLB,,, | Performed by: OPTOMETRIST

## 2024-06-10 PROCEDURE — 4010F ACE/ARB THERAPY RXD/TAKEN: CPT | Mod: CPTII,S$GLB,, | Performed by: OPTOMETRIST

## 2024-06-10 PROCEDURE — 99999 PR PBB SHADOW E&M-EST. PATIENT-LVL III: CPT | Mod: PBBFAC,,, | Performed by: OPTOMETRIST

## 2024-06-10 PROCEDURE — 1157F ADVNC CARE PLAN IN RCRD: CPT | Mod: CPTII,S$GLB,, | Performed by: OPTOMETRIST

## 2024-06-10 PROCEDURE — 1159F MED LIST DOCD IN RCRD: CPT | Mod: CPTII,S$GLB,, | Performed by: OPTOMETRIST

## 2024-06-10 PROCEDURE — 92015 DETERMINE REFRACTIVE STATE: CPT | Mod: S$GLB,,, | Performed by: OPTOMETRIST

## 2024-06-10 PROCEDURE — 2023F DILAT RTA XM W/O RTNOPTHY: CPT | Mod: CPTII,S$GLB,, | Performed by: OPTOMETRIST

## 2024-06-10 PROCEDURE — 3044F HG A1C LEVEL LT 7.0%: CPT | Mod: CPTII,S$GLB,, | Performed by: OPTOMETRIST

## 2024-06-10 NOTE — PROGRESS NOTES
HPI     Diabetic Eye Exam            Comments: Diabetic eye exam and refraction.  Has two pair of glasses - one pair single vision distance lenses, and   single vision (OTC) reading glasses - states reading glasses are +2.75   (but he did not bring reading glasses in today)           Comments    Patient's age: 73 y.o. male   Occupation: Retired   Approximate date of last eye examination: 06/05/2023 Dr. Rodriguez   City/State: [facility]  Wears glasses?  no     If yes, wears  Full-time or part-time?  N/a  Present glasses are: Bifocal, SV Distance, SV Reading? SV distance only   Approximate age of present glasses: less than a yr.    Any problem with VA with glasses?  Feels he may need new glasses due to   changes in vision.  Feels that change in vision may be secondary to changes in medication   recently   Wears CLs?:  No  Headaches?  No  Eye pain/discomfort? Vision changes .                                                                                  Flashes?  No   Floaters?  yes  Diplopia/Double vision?  No  Patient's Ocular History:         Any eye surgeries? S/p phaco [facility] (Dr. Patten)         Any eye injury?  No           Any treatment for eye disease?  No  Family history of eye disease?  No  Significant patient medical history:         1. Diabetes? Pre-diabetic for 10 + years. - Diet controlled.        If yes, IDDM or NIDDM? NIDDM  - no longer takes diabetic medication   (!)   2. HBP?  Yes, controlled with medication                ! OTC eyedrops currently using:  No   ! Prescription eye meds currently using:  No   ! Any history of allergy/adverse reaction to any eye meds used   previously?  No    ! Any history of allergy/adverse reaction to eyedrops used during prior   eye exam(s)? No    ! Any history of allergy/adverse reaction to Novacaine or similar meds?   No   ! Any history of allergy/adverse reaction to Epinephrine or similar meds?   No    ! Patient okay with use of anesthetic eyedrops to check eye  "pressure?    Yes         ! Patient okay with use of eyedrops to dilate pupils today?  Yes    !  Allergies/Medications/Medical History/Family History reviewed today?    Yes     PD =   64/60  Desired reading distance =   15"                                                                       Last edited by John Rodriguez, OD on 6/10/2024 11:51 AM.            Assessment /Plan     For exam results, see Encounter Report.    1. Diabetic eye exam        2. Type 2 diabetes mellitus without retinopathy        3. S/P bilateral cataract extraction        4. Pseudophakia of both eyes        5. History of refractive surgery        6. Refractive errors        7. Screening for eye condition                       S/P cataract surgery in both eyes, with bilateral posterior chamber intraocular lens.  S/P YAG laser posterior capsulotomy in both eyes.  Residual distance refractive error in each eye, with very satisfactory best-corrected VA in each eye.      New spectacle lens Rx issued for use as desired.     S/P RK refractive surgery in both eyes many years ago.       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      Type 2 diabetes without evidence of diabetic retinopathy in either eye.     Recheck in twelve months, or prior if any problems or changes in vision noted in the interim.             "

## 2024-06-11 ENCOUNTER — OFFICE VISIT (OUTPATIENT)
Dept: FAMILY MEDICINE | Facility: CLINIC | Age: 74
End: 2024-06-11
Payer: MEDICARE

## 2024-06-11 VITALS
BODY MASS INDEX: 29.97 KG/M2 | TEMPERATURE: 99 F | HEIGHT: 67 IN | OXYGEN SATURATION: 98 % | SYSTOLIC BLOOD PRESSURE: 110 MMHG | WEIGHT: 190.94 LBS | HEART RATE: 87 BPM | DIASTOLIC BLOOD PRESSURE: 60 MMHG

## 2024-06-11 DIAGNOSIS — I15.2 HYPERTENSION ASSOCIATED WITH DIABETES: ICD-10-CM

## 2024-06-11 DIAGNOSIS — M19.90 ARTHRITIS: ICD-10-CM

## 2024-06-11 DIAGNOSIS — Z12.11 COLON CANCER SCREENING: ICD-10-CM

## 2024-06-11 DIAGNOSIS — G47.00 INSOMNIA, UNSPECIFIED TYPE: ICD-10-CM

## 2024-06-11 DIAGNOSIS — R05.3 CHRONIC COUGH: ICD-10-CM

## 2024-06-11 DIAGNOSIS — E11.59 HYPERTENSION ASSOCIATED WITH DIABETES: ICD-10-CM

## 2024-06-11 DIAGNOSIS — E78.5 HYPERLIPIDEMIA ASSOCIATED WITH TYPE 2 DIABETES MELLITUS: ICD-10-CM

## 2024-06-11 DIAGNOSIS — I10 ESSENTIAL HYPERTENSION: ICD-10-CM

## 2024-06-11 DIAGNOSIS — E11.42 TYPE 2 DIABETES MELLITUS WITH DIABETIC POLYNEUROPATHY, WITHOUT LONG-TERM CURRENT USE OF INSULIN: Primary | ICD-10-CM

## 2024-06-11 DIAGNOSIS — E11.69 HYPERLIPIDEMIA ASSOCIATED WITH TYPE 2 DIABETES MELLITUS: ICD-10-CM

## 2024-06-11 DIAGNOSIS — E11.9 DIET-CONTROLLED DIABETES MELLITUS: ICD-10-CM

## 2024-06-11 DIAGNOSIS — E03.8 OTHER SPECIFIED HYPOTHYROIDISM: ICD-10-CM

## 2024-06-11 PROCEDURE — 99999 PR PBB SHADOW E&M-EST. PATIENT-LVL IV: CPT | Mod: PBBFAC,,, | Performed by: FAMILY MEDICINE

## 2024-06-11 RX ORDER — LEVOTHYROXINE SODIUM 88 UG/1
88 TABLET ORAL
Qty: 90 TABLET | Refills: 1 | Status: SHIPPED | OUTPATIENT
Start: 2024-06-11

## 2024-06-11 RX ORDER — TRAZODONE HYDROCHLORIDE 100 MG/1
100 TABLET ORAL NIGHTLY
Qty: 90 TABLET | Refills: 3 | Status: SHIPPED | OUTPATIENT
Start: 2024-06-11

## 2024-06-11 RX ORDER — OLMESARTAN MEDOXOMIL 5 MG/1
5 TABLET ORAL DAILY
Qty: 90 TABLET | Refills: 1 | Status: SHIPPED | OUTPATIENT
Start: 2024-06-11 | End: 2025-06-11

## 2024-06-11 RX ORDER — DULOXETIN HYDROCHLORIDE 30 MG/1
30 CAPSULE, DELAYED RELEASE ORAL DAILY
Qty: 90 CAPSULE | Refills: 1 | Status: SHIPPED | OUTPATIENT
Start: 2024-06-11

## 2024-06-11 NOTE — PATIENT INSTRUCTIONS
HTN: decrease olmesartan to 5 mg.   Goal BP: 115/70 to 130/90 or so    DM: diet control for now.     CAD: on crestor and aspirin and plavix. Followed by Dr. Vickers  DLD: continue crestor 20 mg  Cough/GERD: continue nexium with dinner and pepcid with dinner as per pulmonary.   Hypothyroidism: continue synthroid 88 mcg    BPH: continue finasteride and flomax.     Mood/Sleep: continue trazodone. Decrease cymbalta to 30 mg.     Aortic Stenosis: has apt with Dr. Forde.     Pulmonary issues: has f/u with pulmonary.     F/u 3-4 months, labs prior.

## 2024-06-11 NOTE — PROGRESS NOTES
"Subjective:       Patient ID: Cesar Simpson is a 74 y.o. male.    Chief Complaint: Cough    Cesar is a 74 y.o. male who presents today with a cough.     Has seen pulmonary. Has f/u 6/28/2024 with them.   Early ILD vs old exposures at [place]  PPI with dinner  "Still has a little cough" but not as bad as before. Coughs, 3-4 times a day. Used to be more frequent.     Diet controlled DM. Stopped metformin at visit 9/2020. A1c at goal 4/13/2023. Recheck at f/u.   Weight: weight up, due to breathing issues, heart issues.     DLD: taking crestor 20 mg.  Changed by cardiology.  CAD: s/p angiogram 4/17/2024. Successful PTCA to the prox mid and distal LAD with 3 CHAY. We had to stent mid LAD due to balloon rupture induced dissection that compromised FREDIS flow   Aortic stenosis: will be seeing Dr. Forde  Exertional dyspnea: improved slightly with PPI and s/p cardiac intervention. .     HTN: taking olmesartan 10 mg. When he takes 10 mg, he feels like his pressure is too low. He feels dizzy. Will decrease to 5 mg on 6/11/2024.   Insomnia: taking Cymbalta 60 mg and trazodone 100 mg. Sleeping "fine." Wants to decrease cymbalta. Doesn't want to decrease trazodone.     FABY: not using CPAP anymore    BPH: on flomax and finasteride. Nocturia has resolved.    Hypothyroidism: on synthroid. TSH 6/2022 was normal.           Review of Systems   Constitutional:  Negative for chills and fever.   Respiratory:  Positive for cough. Negative for chest tightness and shortness of breath.    Cardiovascular:  Negative for chest pain.   Gastrointestinal:  Negative for nausea and vomiting.   Neurological:  Positive for dizziness.   Psychiatric/Behavioral:  Negative for sleep disturbance and suicidal ideas. The patient is not nervous/anxious.            Objective:     Vitals:    06/11/24 1028   BP: 110/60   BP Location: Left arm   Patient Position: Sitting   BP Method: Medium (Manual)   Pulse: 87   Temp: 98.5 °F (36.9 °C)   SpO2: 98%   Weight: 86.6 kg " "(190 lb 14.7 oz)   Height: 5' 7" (1.702 m)        Physical Exam  Vitals and nursing note reviewed.   Constitutional:       General: He is not in acute distress.     Appearance: He is well-developed. He is not ill-appearing, toxic-appearing or diaphoretic.   Cardiovascular:      Rate and Rhythm: Normal rate and regular rhythm.      Heart sounds: Murmur heard.      Systolic murmur is present with a grade of 4/6.   Pulmonary:      Effort: Pulmonary effort is normal.      Breath sounds: Normal breath sounds. No wheezing.   Abdominal:      Palpations: Abdomen is soft.      Tenderness: There is no abdominal tenderness.   Musculoskeletal:         General: No swelling.   Neurological:      General: No focal deficit present.      Mental Status: He is alert and oriented to person, place, and time.   Psychiatric:         Mood and Affect: Mood normal.         Behavior: Behavior normal.         Thought Content: Thought content normal.         Judgment: Judgment normal.         Assessment:       1. Type 2 diabetes mellitus with diabetic polyneuropathy, without long-term current use of insulin    2. Diet-controlled diabetes mellitus    3. Chronic cough    4. Hyperlipidemia associated with type 2 diabetes mellitus    5. Essential hypertension    6. Colon cancer screening    7. Hypertension associated with diabetes    8. Insomnia, unspecified type    9. Arthritis    10. Other specified hypothyroidism        Plan:       HTN: decrease olmesartan to 5 mg.   Goal BP: 115/70 to 130/90 or so    DM: diet control for now.     CAD: on crestor and aspirin and plavix. Followed by Dr. Vickers  DLD: continue crestor 20 mg  Cough/GERD: continue nexium with dinner and pepcid with dinner as per pulmonary.   Hypothyroidism: continue synthroid 88 mcg    BPH: continue finasteride and flomax.     Mood/Sleep: continue trazodone. Decrease cymbalta to 30 mg.     Aortic Stenosis: has apt with Dr. Forde.     Pulmonary issues: has f/u with pulmonary.     F/u " 3-4 months, labs prior.     Type 2 diabetes mellitus with diabetic polyneuropathy, without long-term current use of insulin  -     Hemoglobin A1C; Future; Expected date: 06/11/2024    Diet-controlled diabetes mellitus  -     Hemoglobin A1C; Future; Expected date: 06/11/2024    Chronic cough  -     TSH; Future; Expected date: 06/11/2024    Hyperlipidemia associated with type 2 diabetes mellitus  -     TSH; Future; Expected date: 06/11/2024  -     Lipid Panel; Future; Expected date: 06/11/2024    Essential hypertension  -     CBC Auto Differential; Future; Expected date: 06/11/2024  -     Comprehensive Metabolic Panel; Future; Expected date: 06/11/2024  -     TSH; Future; Expected date: 06/11/2024    Colon cancer screening  -     Fecal Immunochemical Test (iFOBT); Future; Expected date: 06/11/2024    Hypertension associated with diabetes  -     olmesartan (BENICAR) 5 MG Tab; Take 1 tablet (5 mg total) by mouth once daily.  Dispense: 90 tablet; Refill: 1    Insomnia, unspecified type  -     traZODone (DESYREL) 100 MG tablet; Take 1 tablet (100 mg total) by mouth every evening.  Dispense: 90 tablet; Refill: 3    Arthritis  -     DULoxetine (CYMBALTA) 30 MG capsule; Take 1 capsule (30 mg total) by mouth once daily.  Dispense: 90 capsule; Refill: 1    Other specified hypothyroidism  -     levothyroxine (SYNTHROID) 88 MCG tablet; Take 1 tablet (88 mcg total) by mouth before breakfast.  Dispense: 90 tablet; Refill: 1

## 2024-06-25 ENCOUNTER — PATIENT MESSAGE (OUTPATIENT)
Dept: PULMONOLOGY | Facility: CLINIC | Age: 74
End: 2024-06-25

## 2024-06-25 ENCOUNTER — OFFICE VISIT (OUTPATIENT)
Dept: PULMONOLOGY | Facility: CLINIC | Age: 74
End: 2024-06-25
Payer: MEDICARE

## 2024-06-25 VITALS
OXYGEN SATURATION: 96 % | HEART RATE: 90 BPM | HEIGHT: 67 IN | BODY MASS INDEX: 30.38 KG/M2 | WEIGHT: 193.56 LBS | DIASTOLIC BLOOD PRESSURE: 80 MMHG | SYSTOLIC BLOOD PRESSURE: 115 MMHG

## 2024-06-25 DIAGNOSIS — I35.0 NONRHEUMATIC AORTIC VALVE STENOSIS: Chronic | ICD-10-CM

## 2024-06-25 DIAGNOSIS — J84.9 ILD (INTERSTITIAL LUNG DISEASE): ICD-10-CM

## 2024-06-25 DIAGNOSIS — R05.3 CHRONIC COUGH: Primary | ICD-10-CM

## 2024-06-25 PROCEDURE — 99999 PR PBB SHADOW E&M-EST. PATIENT-LVL III: CPT | Mod: PBBFAC,,, | Performed by: INTERNAL MEDICINE

## 2024-06-25 PROCEDURE — 3079F DIAST BP 80-89 MM HG: CPT | Mod: CPTII,S$GLB,, | Performed by: INTERNAL MEDICINE

## 2024-06-25 PROCEDURE — 3044F HG A1C LEVEL LT 7.0%: CPT | Mod: CPTII,S$GLB,, | Performed by: INTERNAL MEDICINE

## 2024-06-25 PROCEDURE — 1157F ADVNC CARE PLAN IN RCRD: CPT | Mod: CPTII,S$GLB,, | Performed by: INTERNAL MEDICINE

## 2024-06-25 PROCEDURE — 1125F AMNT PAIN NOTED PAIN PRSNT: CPT | Mod: CPTII,S$GLB,, | Performed by: INTERNAL MEDICINE

## 2024-06-25 PROCEDURE — 99214 OFFICE O/P EST MOD 30 MIN: CPT | Mod: S$GLB,,, | Performed by: INTERNAL MEDICINE

## 2024-06-25 PROCEDURE — 1159F MED LIST DOCD IN RCRD: CPT | Mod: CPTII,S$GLB,, | Performed by: INTERNAL MEDICINE

## 2024-06-25 PROCEDURE — 4010F ACE/ARB THERAPY RXD/TAKEN: CPT | Mod: CPTII,S$GLB,, | Performed by: INTERNAL MEDICINE

## 2024-06-25 PROCEDURE — 3008F BODY MASS INDEX DOCD: CPT | Mod: CPTII,S$GLB,, | Performed by: INTERNAL MEDICINE

## 2024-06-25 PROCEDURE — 3074F SYST BP LT 130 MM HG: CPT | Mod: CPTII,S$GLB,, | Performed by: INTERNAL MEDICINE

## 2024-06-25 NOTE — PROGRESS NOTES
Subjective:       Patient ID: Cesar Simpson is a 74 y.o. male.  The patient's last visit with me was on 3/26/2024.     Cough has greatly improved. Now coughing only a few times per day and usually productive. Wants to stop famotidine. Discussed PPI is for lung protection.    Feels Benzonatate is helping but not covered by isurance    Had stent to LAD with plans for TAVR soon. Low BP and poor exercise tolerance  Review of Systems    Objective:      Vitals:    06/25/24 1045   BP: 115/80   Pulse: 90     Wt Readings from Last 3 Encounters:   06/25/24 87.8 kg (193 lb 9 oz)   06/11/24 86.6 kg (190 lb 14.7 oz)   05/09/24 83.9 kg (185 lb)     Temp Readings from Last 3 Encounters:   06/11/24 98.5 °F (36.9 °C)   04/23/24 96.9 °F (36.1 °C) (Temporal)   04/16/24 96.9 °F (36.1 °C) (Temporal)     BP Readings from Last 3 Encounters:   06/25/24 115/80   06/11/24 110/60   05/09/24 114/78     Pulse Readings from Last 3 Encounters:   06/25/24 90   06/11/24 87   05/09/24 83       Physical Exam    CBC  Lab Results   Component Value Date    WBC 6.29 04/22/2024    HGB 13.8 (L) 04/22/2024    HCT 40.8 04/22/2024    MCV 89 04/22/2024     04/22/2024         CMP  Sodium   Date Value Ref Range Status   04/22/2024 139 136 - 145 mmol/L Final     Potassium   Date Value Ref Range Status   04/22/2024 4.0 3.5 - 5.1 mmol/L Final     Chloride   Date Value Ref Range Status   04/22/2024 104 95 - 110 mmol/L Final     CO2   Date Value Ref Range Status   04/22/2024 26 23 - 29 mmol/L Final     Glucose   Date Value Ref Range Status   04/22/2024 128 (H) 70 - 110 mg/dL Final     BUN   Date Value Ref Range Status   04/22/2024 20 8 - 23 mg/dL Final     Creatinine   Date Value Ref Range Status   04/22/2024 1.1 0.5 - 1.4 mg/dL Final     Calcium   Date Value Ref Range Status   04/22/2024 9.1 8.7 - 10.5 mg/dL Final     Total Protein   Date Value Ref Range Status   03/07/2024 7.4 6.0 - 8.4 g/dL Final     Albumin   Date Value Ref Range Status   03/07/2024 4.0 3.5  "- 5.2 g/dL Final     Total Bilirubin   Date Value Ref Range Status   03/07/2024 0.2 0.1 - 1.0 mg/dL Final     Comment:     For infants and newborns, interpretation of results should be based  on gestational age, weight and in agreement with clinical  observations.    Premature Infant recommended reference ranges:  Up to 24 hours.............<8.0 mg/dL  Up to 48 hours............<12.0 mg/dL  3-5 days..................<15.0 mg/dL  6-29 days.................<15.0 mg/dL       Alkaline Phosphatase   Date Value Ref Range Status   03/07/2024 66 55 - 135 U/L Final     AST   Date Value Ref Range Status   03/07/2024 14 10 - 40 U/L Final     ALT   Date Value Ref Range Status   03/07/2024 14 10 - 44 U/L Final     Anion Gap   Date Value Ref Range Status   04/22/2024 9 8 - 16 mmol/L Final     eGFR   Date Value Ref Range Status   04/22/2024 >60 >60 mL/min/1.73 m^2 Final       ABG  POC PH   Date Value Ref Range Status   04/16/2024 7.185 (LL) 7.350 - 7.450 Final     Comment:      notified  at    read back  Value below critical limit       POC PO2   Date Value Ref Range Status   04/16/2024 45.9 (LL) 80.0 - 100 mmHg Final     Comment:      notified  at    read back  Value below critical limit       POC PCO2   Date Value Ref Range Status   04/16/2024 23.9 (LL) 35.0 - 45.0 mmHg Final     Comment:      notified  at    read back  Value below critical limit             Personal Diagnostic Review  I have personally reviewed the following data and added my own interpretation as below:  PCP notes reviewed  Cardiology notes reviewed  LHC with CAD in LAD s/p PCI  RHC with normal filling pressures, no PH      6/25/2024    10:45 AM 6/11/2024    10:28 AM 5/9/2024    11:57 AM 4/23/2024     1:56 PM 4/23/2024     1:45 PM 4/23/2024     1:30 PM 4/23/2024     1:15 PM   Pulmonary Function Tests   SpO2 96 % 98 % 98 % 97 % 98 % 100 % 99 %   Height 5' 7" (1.702 m) 5' 7" (1.702 m) 5' 7" (1.702 m)       Weight 87.8 kg (193 lb 9 oz) 86.6 kg (190 lb 14.7 oz) " 83.9 kg (185 lb)       BMI (Calculated) 30.3 29.9 29             Assessment:       1. Chronic cough    2. ILD (interstitial lung disease)    3. Nonrheumatic aortic valve stenosis        Outpatient Encounter Medications as of 6/25/2024   Medication Sig Dispense Refill    ACCU-CHEK NAYA PLUS TEST STRP Strp CHECK BLOOD SUGAR DAILY AS DIRECTED 100 strip 3    aspirin 81 MG Chew Take 1 tablet (81 mg total) by mouth once daily. 90 tablet 3    azelastine (ASTELIN) 137 mcg (0.1 %) nasal spray 1 spray (137 mcg total) by Nasal route 2 (two) times daily. 30 mL 3    blood-glucose meter Misc 1 device.  Check sugar 1 times daily as directed by MD. Supply preferred brand .Dx Code: [E11.8] 1 each 0    clopidogreL (PLAVIX) 75 mg tablet Take 1 tablet (75 mg total) by mouth once daily. 30 tablet 11    DULoxetine (CYMBALTA) 30 MG capsule Take 1 capsule (30 mg total) by mouth once daily. 90 capsule 1    esomeprazole (NEXIUM) 40 MG capsule Take 1 capsule (40 mg total) by mouth daily with dinner or evening meal. 30 capsule 11    finasteride (PROSCAR) 5 mg tablet Take 1 tablet (5 mg total) by mouth once daily. 90 tablet 2    lancets Misc 1 box.  Check 1x daily as directed by MD. Supply brand covered by insurance. Dx Code: [E11.8]. Accucheck Naya. 100 each 3    levothyroxine (SYNTHROID) 88 MCG tablet Take 1 tablet (88 mcg total) by mouth before breakfast. 90 tablet 1    olmesartan (BENICAR) 5 MG Tab Take 1 tablet (5 mg total) by mouth once daily. 90 tablet 1    rosuvastatin (CRESTOR) 20 MG tablet Take 1 tablet (20 mg total) by mouth once daily. 90 tablet 3    tamsulosin (FLOMAX) 0.4 mg Cap Take 1 capsule (0.4 mg total) by mouth once daily. 90 capsule 1    traZODone (DESYREL) 100 MG tablet Take 1 tablet (100 mg total) by mouth every evening. 90 tablet 3    [DISCONTINUED] famotidine (PEPCID) 40 MG tablet Take 1 tablet (40 mg total) by mouth daily with dinner or evening meal. 30 tablet 11    benzonatate (TESSALON) 200 MG  capsule Take 1 capsule (200 mg total) by mouth 3 (three) times daily as needed for Cough. (Patient not taking: Reported on 6/25/2024) 90 capsule 11     No facility-administered encounter medications on file as of 6/25/2024.     1. Chronic cough    2. ILD (interstitial lung disease)  - Complete PFT with bronchodilator; Future  - CT Chest High Resolution Without Contrast; Future    3. Nonrheumatic aortic valve stenosis    Plan:     Problem List Items Addressed This Visit          Pulmonary    ILD (interstitial lung disease)    Current Assessment & Plan     Very mild. Preserved spirometry. Repeat PFT with lung volumes in early 2025  Repeat CT with HRCT in early 2025  Almost certainly related to old exposures at [place] but cannot rule out an early ILD process that could progress  Discussed can contribute to cough but no dedicated treatments.  PPI with dinner as prevents progression         Relevant Orders    Complete PFT with bronchodilator    CT Chest High Resolution Without Contrast    Chronic cough - Primary    Current Assessment & Plan     Improved. Stop Famotidine.  Will send Rx for Benzonatate to [facility] once patient signed up for this.            Cardiac/Vascular    Nonrheumatic aortic valve stenosis (Chronic)    Overview     Moderate to Severe          Current Assessment & Plan     Noted plans for TAVR. May be contributing to cough in addition to exercise intolerance            Please note Overview Notes are historic documentation. Please review A/P for current updates.  No follow-ups on file.    Future Appointments   Date Time Provider Department Center   6/25/2024 11:20 AM Wayne Johns MD [facility] PULMON [place]   6/26/2024 10:30 AM LAB, APPOINTMENT [facility] LAB VNP [facility]   6/26/2024 11:00 AM [facility] CT1 64- LIMIT 650 LBS [facility] CT SCAN [facility]   6/26/2024  1:20 PM Cesar Louis MD [facility] CARDVAL [facility]   8/22/2024  9:40 AM Brian Vickers MD [facility] CARDIO [facility]    9/5/2024  7:30 AM LAB, [facility] [place]   9/10/2024  9:00 AM [facility] Shantal Johns MD

## 2024-06-25 NOTE — ASSESSMENT & PLAN NOTE
Very mild. Preserved spirometry. Repeat PFT with lung volumes in early 2025  Repeat CT with HRCT in early 2025  Almost certainly related to old exposures at [place] but cannot rule out an early ILD process that could progress  Discussed can contribute to cough but no dedicated treatments.  PPI with dinner as prevents progression

## 2024-06-26 ENCOUNTER — OFFICE VISIT (OUTPATIENT)
Dept: CARDIOLOGY | Facility: CLINIC | Age: 74
End: 2024-06-26
Attending: INTERNAL MEDICINE
Payer: MEDICARE

## 2024-06-26 ENCOUNTER — HOSPITAL ENCOUNTER (OUTPATIENT)
Dept: RADIOLOGY | Facility: HOSPITAL | Age: 74
Discharge: HOME | End: 2024-06-26
Attending: INTERNAL MEDICINE
Payer: MEDICARE

## 2024-06-26 VITALS
BODY MASS INDEX: 30.72 KG/M2 | WEIGHT: 195.75 LBS | DIASTOLIC BLOOD PRESSURE: 68 MMHG | HEART RATE: 104 BPM | HEIGHT: 67 IN | SYSTOLIC BLOOD PRESSURE: 111 MMHG | OXYGEN SATURATION: 98 %

## 2024-06-26 DIAGNOSIS — I35.0 SEVERE AORTIC STENOSIS: Primary | ICD-10-CM

## 2024-06-26 DIAGNOSIS — I35.0 NONRHEUMATIC AORTIC VALVE STENOSIS: Primary | Chronic | ICD-10-CM

## 2024-06-26 DIAGNOSIS — I35.0 NONRHEUMATIC AORTIC VALVE STENOSIS: Chronic | ICD-10-CM

## 2024-06-26 LAB
CREAT SERPL-MCNC: 1.3 MG/DL (ref 0.5–1.4)
SAMPLE: NORMAL

## 2024-06-26 PROCEDURE — 3074F SYST BP LT 130 MM HG: CPT | Mod: CPTII,S$GLB,, | Performed by: INTERNAL MEDICINE

## 2024-06-26 PROCEDURE — 1159F MED LIST DOCD IN RCRD: CPT | Mod: CPTII,S$GLB,, | Performed by: INTERNAL MEDICINE

## 2024-06-26 PROCEDURE — 71275 CT ANGIOGRAPHY CHEST: CPT | Mod: 26,,, | Performed by: RADIOLOGY

## 2024-06-26 PROCEDURE — 71275 CT ANGIOGRAPHY CHEST: CPT | Mod: TC

## 2024-06-26 PROCEDURE — 3288F FALL RISK ASSESSMENT DOCD: CPT | Mod: CPTII,S$GLB,, | Performed by: INTERNAL MEDICINE

## 2024-06-26 PROCEDURE — 99999 PR PBB SHADOW E&M-EST. PATIENT-LVL IV: CPT | Mod: PBBFAC,,, | Performed by: INTERNAL MEDICINE

## 2024-06-26 PROCEDURE — 4010F ACE/ARB THERAPY RXD/TAKEN: CPT | Mod: CPTII,S$GLB,, | Performed by: INTERNAL MEDICINE

## 2024-06-26 PROCEDURE — 1101F PT FALLS ASSESS-DOCD LE1/YR: CPT | Mod: CPTII,S$GLB,, | Performed by: INTERNAL MEDICINE

## 2024-06-26 PROCEDURE — 25500020 PHARM REV CODE 255: Performed by: INTERNAL MEDICINE

## 2024-06-26 PROCEDURE — 3044F HG A1C LEVEL LT 7.0%: CPT | Mod: CPTII,S$GLB,, | Performed by: INTERNAL MEDICINE

## 2024-06-26 PROCEDURE — 74174 CTA ABD&PLVS W/CONTRAST: CPT | Mod: TC

## 2024-06-26 PROCEDURE — 1126F AMNT PAIN NOTED NONE PRSNT: CPT | Mod: CPTII,S$GLB,, | Performed by: INTERNAL MEDICINE

## 2024-06-26 PROCEDURE — 99205 OFFICE O/P NEW HI 60 MIN: CPT | Mod: S$GLB,,, | Performed by: INTERNAL MEDICINE

## 2024-06-26 PROCEDURE — 3008F BODY MASS INDEX DOCD: CPT | Mod: CPTII,S$GLB,, | Performed by: INTERNAL MEDICINE

## 2024-06-26 PROCEDURE — 74174 CTA ABD&PLVS W/CONTRAST: CPT | Mod: 26,,, | Performed by: RADIOLOGY

## 2024-06-26 PROCEDURE — 1157F ADVNC CARE PLAN IN RCRD: CPT | Mod: CPTII,S$GLB,, | Performed by: INTERNAL MEDICINE

## 2024-06-26 PROCEDURE — 3078F DIAST BP <80 MM HG: CPT | Mod: CPTII,S$GLB,, | Performed by: INTERNAL MEDICINE

## 2024-06-26 RX ORDER — BENZONATATE 200 MG/1
200 CAPSULE ORAL 3 TIMES DAILY PRN
Qty: 90 CAPSULE | Refills: 11 | Status: SHIPPED | OUTPATIENT
Start: 2024-06-26 | End: 2025-06-26

## 2024-06-26 RX ADMIN — IOHEXOL 100 ML: 350 INJECTION, SOLUTION INTRAVENOUS at 11:06

## 2024-06-26 NOTE — PROGRESS NOTES
Clinic Note  6/26/2024      Subjective:       Patient ID:  Cesar is a 74 y.o. male being seen for an established visit.    Chief Complaint: No chief complaint on file.    HPI    Cesar Simpson is a 74 y.o. male referred by Dr Vickers for evaluation of severe AS (NYHA Class III sx). He has a h/o CAD, PAD, severe AS who presents to the clinic for evaluaton for TAVR.   Reports having sxs of lightheadedness, sob and BASILIO for the past 2 years that have been progressively getting worse. TTE performed and was consistent with moderate to severe AS.      The patient has undergone the following TAVR work-up:   ECHO (Date 4/3/24): JUANITO= 0.90 cm2, MG= 24mmHg, Peak Nathaniel= 3.18 m/s, EF= 58%.   LHC (Date 4/23/24): s/p PCI of pLAD   STS: 2%   Frailty: not Frail   Iliacs are >9 on R    LVOT area by CTA is 5.66 cm2 (31 mm X 24 mm) and Avg Diameter is 26.9 per my independent review of images on Soum.  Incidental findings on CT: intermixed pulmonary nodules. Fibrotic lung changes  CT Surgery risk assessment: will need to be done  Rhythm issues: None  PFTs: FEV1 89% predicted  KCCQ/5 meter walk:   Comorbidities:       Cesar Simpson is a 29 mm  Sapian S3 valve candidate via RCFA access.        TTE: EF 58%, moderate to severe  AS with valve aortia 0.90 cm2, PV 3.18 m/s, MG 24 mmHg, DI 0.29. SVI 26.12  Angiogram on 4/23/24: s/p pci of prox and mid LAD.   CTA TAVR done and results reviewed by Dr. Forde.     Review of Systems   Constitutional:  Positive for malaise/fatigue. Negative for chills, fever and weight loss.   HENT: Negative.     Eyes:  Negative for blurred vision.   Respiratory:  Positive for cough and shortness of breath.    Cardiovascular:  Positive for orthopnea and PND. Negative for chest pain and palpitations.   Gastrointestinal:  Negative for abdominal pain, blood in stool, constipation, diarrhea, melena, nausea and vomiting.   Musculoskeletal:  Negative for myalgias.   Skin: Negative.    Neurological:  Negative for dizziness,  loss of consciousness and weakness.       Past Medical History:   Diagnosis Date    Coronary artery disease of native artery of native heart with stable angina pectoris 4/10/2024    Diabetes mellitus type II, uncontrolled 02/27/2013    High-density lipoprotein deficiency 02/27/2013    HTN (hypertension) 02/27/2013    Hyperlipidemia     Hypothyroidism     Moderate obstructive sleep apnea 6/29/2022    Normocytic anemia 06/28/2021    Obesity     Osteoarthritis 02/08/2016    PAD (peripheral artery disease) 4/10/2024    Trouble in sleeping     Type 2 diabetes mellitus     Type 2 diabetes mellitus with diabetic polyneuropathy, without long-term current use of insulin        Family History   Problem Relation Name Age of Onset    Osteoporosis Mother Aniyah     Hypertension Mother Aniyah     Early death Father Cesar         Heart,  Stroke age 50    Stroke Father Cesar     Heart disease Father Cesar     Hypertension Father Cesar     Heart disease Brother Jose Enrique     Early death Brother Jose Enrique         Heart Att.  age 45    Hypertension Brother Jose Enrique     No Known Problems Daughter      No Known Problems Son      Early death Paternal Uncle Beltran         Heart,  age 49    Heart disease Paternal Uncle Beltran         reports that he has never smoked. He has never been exposed to tobacco smoke. He has never used smokeless tobacco. He reports that he does not drink alcohol and does not use drugs.    Medication List with Changes/Refills   Current Medications    ACCU-CHEK NAYA PLUS TEST STRP STRP    CHECK BLOOD SUGAR DAILY AS DIRECTED    ASPIRIN 81 MG CHEW    Take 1 tablet (81 mg total) by mouth once daily.    AZELASTINE (ASTELIN) 137 MCG (0.1 %) NASAL SPRAY    1 spray (137 mcg total) by Nasal route 2 (two) times daily.    BENZONATATE (TESSALON) 200 MG CAPSULE    Take 1 capsule (200 mg total) by mouth 3 (three) times daily as needed for Cough. [email or web address]    BLOOD-GLUCOSE METER MISC    1 device.  Check sugar 1 times daily as directed  by MD. Supply preferred brand .Dx Code: [E11.8]    CLOPIDOGREL (PLAVIX) 75 MG TABLET    Take 1 tablet (75 mg total) by mouth once daily.    DULOXETINE (CYMBALTA) 30 MG CAPSULE    Take 1 capsule (30 mg total) by mouth once daily.    ESOMEPRAZOLE (NEXIUM) 40 MG CAPSULE    Take 1 capsule (40 mg total) by mouth daily with dinner or evening meal.    FINASTERIDE (PROSCAR) 5 MG TABLET    Take 1 tablet (5 mg total) by mouth once daily.    LANCETS MISC    1 box.  Check 1x daily as directed by MD. Supply brand covered by insurance. Dx Code: [E11.8]. Accucheck Roma.    LEVOTHYROXINE (SYNTHROID) 88 MCG TABLET    Take 1 tablet (88 mcg total) by mouth before breakfast.    OLMESARTAN (BENICAR) 5 MG TAB    Take 1 tablet (5 mg total) by mouth once daily.    ROSUVASTATIN (CRESTOR) 20 MG TABLET    Take 1 tablet (20 mg total) by mouth once daily.    TAMSULOSIN (FLOMAX) 0.4 MG CAP    Take 1 capsule (0.4 mg total) by mouth once daily.    TRAZODONE (DESYREL) 100 MG TABLET    Take 1 tablet (100 mg total) by mouth every evening.     Review of patient's allergies indicates:  No Known Allergies    Patient Active Problem List   Diagnosis    Type 2 diabetes mellitus with diabetic polyneuropathy, without long-term current use of insulin    Hyperlipidemia    Posterior vitreous detachment, right eye    Floater, vitreous    Vitreous detachment    Pseudophakia    Refractive error    Hypothyroidism    Osteoarthritis    Prominent aorta    Hyperlipidemia associated with type 2 diabetes mellitus    Hypertension associated with diabetes    BMI 29.0-29.9,adult    Insomnia    Diet-controlled diabetes mellitus    BPH associated with nocturia    Arthritis    Does use hearing aid    Normocytic anemia    Moderate obstructive sleep apnea    ILD (interstitial lung disease)    Chronic cough    Nonrheumatic aortic valve stenosis    PAD (peripheral artery disease)    Coronary artery disease of native artery of native heart with stable angina pectoris          "  Objective:      /68 (BP Location: Right arm, Patient Position: Sitting, BP Method: Large (Automatic))   Pulse 104   Ht 5' 7" (1.702 m)   Wt 88.8 kg (195 lb 12.3 oz)   SpO2 98%   BMI 30.66 kg/m²   Estimated body mass index is 30.66 kg/m² as calculated from the following:    Height as of this encounter: 5' 7" (1.702 m).    Weight as of this encounter: 88.8 kg (195 lb 12.3 oz).  Physical Exam  Constitutional:       General: He is not in acute distress.     Appearance: He is not diaphoretic.   HENT:      Head: Normocephalic and atraumatic.      Mouth/Throat:      Pharynx: No oropharyngeal exudate.   Eyes:      General: No scleral icterus.     Conjunctiva/sclera: Conjunctivae normal.   Neck:      Vascular: No JVD.      Trachea: No tracheal deviation.   Cardiovascular:      Rate and Rhythm: Normal rate and regular rhythm.      Pulses:           Carotid pulses are 2+ on the right side and 2+ on the left side.       Radial pulses are 2+ on the right side and 2+ on the left side.        Femoral pulses are 2+ on the right side and 2+ on the left side.       Dorsalis pedis pulses are 2+ on the right side and 2+ on the left side.        Posterior tibial pulses are 2+ on the right side and 2+ on the left side.      Heart sounds: Murmur heard.      Systolic murmur is present with a grade of 4/6.      No friction rub. No gallop.   Pulmonary:      Effort: Pulmonary effort is normal. No respiratory distress.      Breath sounds: Normal breath sounds. No wheezing.   Chest:      Chest wall: No tenderness.   Abdominal:      General: Bowel sounds are normal. There is no distension.      Tenderness: There is no abdominal tenderness. There is no rebound.   Musculoskeletal:         General: No deformity. Normal range of motion.      Cervical back: Normal range of motion and neck supple.      Right lower leg: No edema.      Left lower leg: No edema.   Lymphadenopathy:      Cervical: No cervical adenopathy.   Skin:     General: " Skin is warm and dry.   Neurological:      Mental Status: He is alert.   Psychiatric:         Mood and Affect: Affect normal.         Assessment and Plan:         Diagnoses and all orders for this visit:    Severe aortic stenosis    Transcatheter Aortic valve replacement   Valve: 29 mm Sapian S3 valve   ECMO:  On Stand by  TAVR access: RCFA  Valvuloplasty balloon: 18 mm  Viabahn size if needed: 9X50 mm  Antithrombotic therapy: ASA and Plavix  Temporary pacing wire: No, Will pace from TAVR wire  Pacemaker risk factors: None   Post op destination: Stepdown  Antibiotics given: (to be done during procedure)  Heart failure on admission?  CONSENTING:  The patient was told that the procedure carries around a 12.5% risk of permanent pacemaker requirement and that risk depends on the patients underlying conduction system.  Prior to the clinc visit, all available records from referring provider were reviewed.  I have personally reviewed all the lab tests available related to the patient's case  The patient's images were reviewed by myself and the procedural planning was done with my own interpretation of the iliac and aortic anatomy based on CTA, angiography or LUBNA. I have reviewed all other imaging studies relevant to the patient including echocardiography and coronary angiography.  Patient was educated abut the pathophysiology and natural history of severe aortic stenosis and was educated about the treatment options including surgical and transcatheter valve replacement. She agrees to be full code for the forseable future and to undergo workup for treatment of severe AS.   The risks, benefits, and alternatives of transcatheter aortic valve replacement were discussed with the patient. All questions were answered and informed consent was obtained. I had a detailed discussion with the patient regarding risk of stroke, MI, bleeding access site complications including limb loss, allergy, kidney failure including dialysis and  death.  The patient understands the risks and benefits and wishes to go ahead with the procedure.  The referring Cardiologist was notified of the plan  All patient's questions were answered    Shared Decision Making:  This patient was seen today by a multidisciplinary heart team involving both a Structural Heart Specialist (Interventional Cardiologist) and a Cardiothoracic Surgeon. The patient was involved in shared decision-making to define clearer goals for treatment and align health decisions with their current values.  The patient understands the risks and expected benefits of their treatment options.          No follow-ups on file.    Other Orders Placed This Visit:  No orders of the defined types were placed in this encounter.        Clarissa Lopez

## 2024-06-27 ENCOUNTER — PATIENT MESSAGE (OUTPATIENT)
Dept: CARDIOLOGY | Facility: CLINIC | Age: 74
End: 2024-06-27
Payer: MEDICARE

## 2024-06-27 DIAGNOSIS — I35.0 SEVERE AORTIC STENOSIS: Primary | ICD-10-CM

## 2024-06-27 RX ORDER — SODIUM CHLORIDE 9 MG/ML
INJECTION, SOLUTION INTRAVENOUS CONTINUOUS
OUTPATIENT
Start: 2024-06-27 | End: 2024-06-27

## 2024-06-27 RX ORDER — DIPHENHYDRAMINE HCL 50 MG
50 CAPSULE ORAL ONCE
OUTPATIENT
Start: 2024-06-27 | End: 2024-06-27

## 2024-06-27 RX ORDER — SODIUM CHLORIDE 0.9 % (FLUSH) 0.9 %
10 SYRINGE (ML) INJECTION
Status: SHIPPED | OUTPATIENT
Start: 2024-06-27

## 2024-06-28 ENCOUNTER — TELEPHONE (OUTPATIENT)
Dept: CARDIOTHORACIC SURGERY | Facility: CLINIC | Age: 74
End: 2024-06-28
Payer: MEDICARE

## 2024-06-28 NOTE — TELEPHONE ENCOUNTER
Called pt and scheduled him for surgical consult with Dr. Elias as part of his TAVR work up per request of Dr. Forde. Pt verbalized understanding of appointment date, time, and location.

## 2024-07-02 ENCOUNTER — LAB VISIT (OUTPATIENT)
Dept: LAB | Facility: HOSPITAL | Age: 74
End: 2024-07-02
Attending: FAMILY MEDICINE
Payer: MEDICARE

## 2024-07-02 DIAGNOSIS — Z12.11 COLON CANCER SCREENING: ICD-10-CM

## 2024-07-02 LAB — HEMOCCULT STL QL IA: NEGATIVE

## 2024-07-02 PROCEDURE — 82274 ASSAY TEST FOR BLOOD FECAL: CPT | Performed by: FAMILY MEDICINE

## 2024-07-03 ENCOUNTER — TELEPHONE (OUTPATIENT)
Dept: CARDIOLOGY | Facility: CLINIC | Age: 74
End: 2024-07-03
Payer: MEDICARE

## 2024-07-03 ENCOUNTER — PATIENT MESSAGE (OUTPATIENT)
Dept: CARDIOLOGY | Facility: CLINIC | Age: 74
End: 2024-07-03
Payer: MEDICARE

## 2024-07-03 ENCOUNTER — PATIENT MESSAGE (OUTPATIENT)
Dept: ADMINISTRATIVE | Facility: HOSPITAL | Age: 74
End: 2024-07-03
Payer: MEDICARE

## 2024-07-03 NOTE — TELEPHONE ENCOUNTER
Patient was scheduled for 8/22/2024, patient was moved to 09/17 @ 9:40 due to the provider being out of the office.lvm/letter

## 2024-07-22 ENCOUNTER — PATIENT MESSAGE (OUTPATIENT)
Dept: ADMINISTRATIVE | Facility: OTHER | Age: 74
End: 2024-07-22
Payer: MEDICARE

## 2024-07-25 ENCOUNTER — OFFICE VISIT (OUTPATIENT)
Dept: CARDIOTHORACIC SURGERY | Facility: CLINIC | Age: 74
End: 2024-07-25
Payer: MEDICARE

## 2024-07-25 VITALS
HEART RATE: 105 BPM | OXYGEN SATURATION: 98 % | BODY MASS INDEX: 30.76 KG/M2 | SYSTOLIC BLOOD PRESSURE: 121 MMHG | HEIGHT: 67 IN | DIASTOLIC BLOOD PRESSURE: 82 MMHG | WEIGHT: 196 LBS

## 2024-07-25 DIAGNOSIS — I35.0 NONRHEUMATIC AORTIC VALVE STENOSIS: Chronic | ICD-10-CM

## 2024-07-25 PROCEDURE — 1126F AMNT PAIN NOTED NONE PRSNT: CPT | Mod: CPTII,S$GLB,, | Performed by: THORACIC SURGERY (CARDIOTHORACIC VASCULAR SURGERY)

## 2024-07-25 PROCEDURE — 3008F BODY MASS INDEX DOCD: CPT | Mod: CPTII,S$GLB,, | Performed by: THORACIC SURGERY (CARDIOTHORACIC VASCULAR SURGERY)

## 2024-07-25 PROCEDURE — 99203 OFFICE O/P NEW LOW 30 MIN: CPT | Mod: S$GLB,,, | Performed by: THORACIC SURGERY (CARDIOTHORACIC VASCULAR SURGERY)

## 2024-07-25 PROCEDURE — 4010F ACE/ARB THERAPY RXD/TAKEN: CPT | Mod: CPTII,S$GLB,, | Performed by: THORACIC SURGERY (CARDIOTHORACIC VASCULAR SURGERY)

## 2024-07-25 PROCEDURE — 3079F DIAST BP 80-89 MM HG: CPT | Mod: CPTII,S$GLB,, | Performed by: THORACIC SURGERY (CARDIOTHORACIC VASCULAR SURGERY)

## 2024-07-25 PROCEDURE — 1157F ADVNC CARE PLAN IN RCRD: CPT | Mod: CPTII,S$GLB,, | Performed by: THORACIC SURGERY (CARDIOTHORACIC VASCULAR SURGERY)

## 2024-07-25 PROCEDURE — 3044F HG A1C LEVEL LT 7.0%: CPT | Mod: CPTII,S$GLB,, | Performed by: THORACIC SURGERY (CARDIOTHORACIC VASCULAR SURGERY)

## 2024-07-25 PROCEDURE — 99999 PR PBB SHADOW E&M-EST. PATIENT-LVL IV: CPT | Mod: PBBFAC,,, | Performed by: THORACIC SURGERY (CARDIOTHORACIC VASCULAR SURGERY)

## 2024-07-25 PROCEDURE — 3074F SYST BP LT 130 MM HG: CPT | Mod: CPTII,S$GLB,, | Performed by: THORACIC SURGERY (CARDIOTHORACIC VASCULAR SURGERY)

## 2024-07-25 PROCEDURE — 1159F MED LIST DOCD IN RCRD: CPT | Mod: CPTII,S$GLB,, | Performed by: THORACIC SURGERY (CARDIOTHORACIC VASCULAR SURGERY)

## 2024-07-25 NOTE — PROGRESS NOTES
Subjective:      Patient ID: Cesar Simpson is a 74 y.o. male.    Chief Complaint: No chief complaint on file.    HPI:  Cesar Simpson is a 74 y.o. male who presents as an initial consult for severe aortic stenosis and TAVR evaluation.  He has a medical history significant for CAD S/P PCI to D-M LAD, PAD, severe aortic stenosis, hypertension, hyperlipidemia, hypothyroidism, and diabetes mellitus.  He is referred from Dr. Vickers and Dr. Forde. He reports having lightheadedness, sob and BASILIO for the past 2 years that have been progressively getting worse. TTE performed and was consistent with moderate to severe AS and he was referred to the [facility] for evaluation.     The patient has undergone the following TAVR work-up:   ECHO (Date 4/3/24): JUANITO= 0.90 cm2, MG= 24mmHg, Peak Nathaniel= 3.18 m/s, EF= 58%.   LHC (Date 4/23/24): s/p PCI of pLAD   STS: 2%   Frailty: not Frail       Iliacs are >9 on R    LVOT area by CTA is 5.66 cm2 (31 mm X 24 mm) and Avg Diameter is 26.9 per my independent review of images on Grow.  Incidental findings on CT: intermixed pulmonary nodules. Fibrotic lung changes  CT Surgery risk assessment: will need to be done  Rhythm issues: None  PFTs: FEV1 89% predicted  KCCQ/5 meter walk:   Comorbidities:      29 mm  Sapian S3 valve candidate via RCFA access.     Family and social history reviewed    Review of patient's allergies indicates:  No Known Allergies  Past Medical History:   Diagnosis Date    Coronary artery disease of native artery of native heart with stable angina pectoris 4/10/2024    Diabetes mellitus type II, uncontrolled 02/27/2013    High-density lipoprotein deficiency 02/27/2013    HTN (hypertension) 02/27/2013    Hyperlipidemia     Hypothyroidism     Moderate obstructive sleep apnea 6/29/2022    Normocytic anemia 06/28/2021    Obesity     Osteoarthritis 02/08/2016    PAD (peripheral artery disease) 4/10/2024    Trouble in sleeping     Type 2 diabetes mellitus     Type 2 diabetes  mellitus with diabetic polyneuropathy, without long-term current use of insulin      Past Surgical History:   Procedure Laterality Date    CATARACT EXTRACTION      CATHETERIZATION OF BOTH LEFT AND RIGHT HEART N/A 4/16/2024    Procedure: CATHETERIZATION, HEART, BOTH LEFT AND RIGHT;  Surgeon: Brian Vickers MD;  Location: [facility] CATH LAB/EP;  Service: Cardiology;  Laterality: N/A;  Aortic valve study/ Saul Catheter/2 manifolds    CORONARY ANGIOGRAPHY N/A 4/16/2024    Procedure: ANGIOGRAM, CORONARY ARTERY;  Surgeon: Brian Vickers MD;  Location: [facility] CATH LAB/EP;  Service: Cardiology;  Laterality: N/A;    CORONARY ANGIOPLASTY WITH STENT PLACEMENT N/A 4/23/2024    Procedure: ANGIOPLASTY, CORONARY ARTERY, WITH STENT INSERTION;  Surgeon: Brian Vickers MD;  Location: [facility] CATH LAB/EP;  Service: Cardiology;  Laterality: N/A;    EYE SURGERY      cataracts    INSTANTANEOUS WAVE-FREE RATIO (IFR) N/A 4/16/2024    Procedure: Instantaneous Wave-Free Ratio (IFR);  Surgeon: Brian Vickers MD;  Location: [facility] CATH LAB/EP;  Service: Cardiology;  Laterality: N/A;    IVUS, CORONARY  4/23/2024    Procedure: IVUS, Coronary;  Surgeon: Brian Vickers MD;  Location: [facility] CATH LAB/EP;  Service: Cardiology;;    JOINT REPLACEMENT Left     arthroplasty    PERCUTANEOUS CORONARY INTERVENTION, ARTERY N/A 4/23/2024    Procedure: Percutaneous coronary intervention;  Surgeon: Brian Vickers MD;  Location: [facility] CATH LAB/EP;  Service: Cardiology;  Laterality: N/A;  LAD    SHOULDER SURGERY      TONSILLECTOMY      TOTAL SHOULDER ARTHROPLASTY Left      Family History       Problem Relation (Age of Onset)    Early death Father, Brother, Paternal Uncle    Heart disease Father, Brother, Paternal Uncle    Hypertension Mother, Father, Brother    No Known Problems Daughter, Son    Osteoporosis Mother    Stroke Father          Social History     Socioeconomic History    Marital status:    Tobacco Use    Smoking status: Never      Passive exposure: Never    Smokeless tobacco: Never   Substance and Sexual Activity    Alcohol use: Never     Comment: occasionally (maybe every 3 months)    Drug use: Never    Sexual activity: Not Currently     Partners: Female     Birth control/protection: None   Social History Narrative    9/7/2023: he lives alone. He has 2 kids, they live in [place] and [place]. 7 grandchildren. 2 grandchildren live nearby. No pets at home. He is retired. 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      Social Determinants of Health     Financial Resource Strain: Low Risk  (2/29/2024)    Overall Financial Resource Strain (CARDIA)     Difficulty of Paying Living Expenses: Not very hard   Food Insecurity: No Food Insecurity (2/29/2024)    Hunger Vital Sign     Worried About Running Out of Food in the Last Year: Never true     Ran Out of Food in the Last Year: Never true   Transportation Needs: No Transportation Needs (2/29/2024)    PRAPARE - Transportation     Lack of Transportation (Medical): No     Lack of Transportation (Non-Medical): No   Physical Activity: Insufficiently Active (2/29/2024)    Exercise Vital Sign     Days of Exercise per Week: 3 days     Minutes of Exercise per Session: 30 min   Stress: No Stress Concern Present (2/29/2024)    [employer] [place] [employer] - Occupational Stress Questionnaire     Feeling of Stress : Only a little   Housing Stability: Low Risk  (2/29/2024)    Housing Stability Vital Sign     Unable to Pay for Housing in the Last Year: No     Number of Places Lived in the Last Year: 1     Unstable Housing in the Last Year: No       Current Outpatient Medications:     ACCU-CHEK NAYA PLUS TEST STRP Strp, CHECK BLOOD SUGAR DAILY AS DIRECTED, Disp: 100 strip, Rfl: 3    aspirin 81 MG Chew, Take 1 tablet (81 mg total) by mouth once daily., Disp: 90 tablet, Rfl: 3    azelastine (ASTELIN) 137 mcg (0.1 %) nasal spray, 1 spray (137 mcg total) by Nasal route 2 (two) times daily.  (Patient not taking: Reported on 6/26/2024), Disp: 30 mL, Rfl: 3    benzonatate (TESSALON) 200 MG capsule, Take 1 capsule (200 mg total) by mouth 3 (three) times daily as needed for Cough. [email or web address], Disp: 90 capsule, Rfl: 11    blood-glucose meter Misc, 1 device.  Check sugar 1 times daily as directed by MD. Supply preferred brand .Dx Code: [E11.8], Disp: 1 each, Rfl: 0    clopidogreL (PLAVIX) 75 mg tablet, Take 1 tablet (75 mg total) by mouth once daily., Disp: 30 tablet, Rfl: 11    DULoxetine (CYMBALTA) 30 MG capsule, Take 1 capsule (30 mg total) by mouth once daily., Disp: 90 capsule, Rfl: 1    esomeprazole (NEXIUM) 40 MG capsule, Take 1 capsule (40 mg total) by mouth daily with dinner or evening meal., Disp: 30 capsule, Rfl: 11    finasteride (PROSCAR) 5 mg tablet, Take 1 tablet (5 mg total) by mouth once daily., Disp: 90 tablet, Rfl: 2    lancets Misc, 1 box.  Check 1x daily as directed by MD. Supply brand covered by insurance. Dx Code: [E11.8]. Accucheck Roma., Disp: 100 each, Rfl: 3    levothyroxine (SYNTHROID) 88 MCG tablet, Take 1 tablet (88 mcg total) by mouth before breakfast., Disp: 90 tablet, Rfl: 1    olmesartan (BENICAR) 5 MG Tab, Take 1 tablet (5 mg total) by mouth once daily., Disp: 90 tablet, Rfl: 1    rosuvastatin (CRESTOR) 20 MG tablet, Take 1 tablet (20 mg total) by mouth once daily., Disp: 90 tablet, Rfl: 3    tamsulosin (FLOMAX) 0.4 mg Cap, Take 1 capsule (0.4 mg total) by mouth once daily., Disp: 90 capsule, Rfl: 1    traZODone (DESYREL) 100 MG tablet, Take 1 tablet (100 mg total) by mouth every evening., Disp: 90 tablet, Rfl: 3    Current Facility-Administered Medications:     sodium chloride 0.9% flush 10 mL, 10 mL, Intravenous, PRN, Cesar Louis MD  Current medications Reviewed    Review of Systems   Constitutional:  Positive for activity change. Negative for appetite change, fatigue and fever.   HENT:  Negative for nosebleeds.    Respiratory:  Positive for shortness of  breath. Negative for cough.    Cardiovascular:  Negative for chest pain, palpitations and leg swelling.   Gastrointestinal:  Negative for abdominal distention, abdominal pain and nausea.   Genitourinary:  Negative for frequency.   Musculoskeletal:  Negative for arthralgias and myalgias.   Skin:  Negative for rash.   Neurological:  Negative for dizziness and numbness.   Hematological:  Does not bruise/bleed easily.     Objective:   Physical Exam  HENT:      Head: Normocephalic and atraumatic.   Eyes:      Extraocular Movements: Extraocular movements intact.   Cardiovascular:      Rate and Rhythm: Normal rate and regular rhythm.   Pulmonary:      Effort: Pulmonary effort is normal.   Abdominal:      General: Abdomen is flat.      Palpations: Abdomen is soft.   Musculoskeletal:         General: Normal range of motion.      Cervical back: Normal range of motion.   Skin:     General: Skin is warm and dry.      Capillary Refill: Capillary refill takes less than 2 seconds.   Neurological:      General: No focal deficit present.         Diagnostic Results: Reviewed  CATH: 4/23/2024  LM:  FREDIS 3 flow. 0% stenosis   LAD: FREDIS 3 flow.  Prox/mid calcified 70-80% stenosis.  Mid/distal 99% stenosis.  D1 distally 99% stenosis but small caliber vessel distally  LCx:  FREDIS 3 flow.  Proximal 30-40% stenosis.  Large principal OM1 with diffuse 30% stenosis  RCA: FREDIS 3 flow.  Ostial RCA 60% stenosis IFR 0.95.  Prox mid and distal diffuse 30 40% calcified stenosis.  TAYLOR branch very distally there is 95% calcified stenosis but small caliber vessel    ECHO: 4/3/2024       Left Ventricle: The left ventricle is normal in size. Normal wall thickness. There is concentric remodeling. There is normal systolic function. Biplane (2D) method of discs ejection fraction is 58%. There is normal diastolic function.    Right Ventricle: Normal right ventricular cavity size. Wall thickness is normal. Right ventricle wall motion  is normal. Systolic  function is normal.    Aortic Valve: There is moderate aortic valve sclerosis. Moderately restricted motion. There is moderate to severe stenosis. Aortic valve area by VTI is 0.90 cm². Aortic valve peak velocity is 3.18 m/s. Mean gradient is 24 mmHg. The dimensionless index is 0.29.    Mitral Valve: There is moderate mitral annular calcification present.    Pulmonary Artery: The estimated pulmonary artery systolic pressure is 32 mmHg.    IVC/SVC: Normal venous pressure at 3 mmHg.    CT TAVR: 6/26/2024  Impression:  TAVR measurements as above.  Multiple scattered pulmonary nodules. For multiple solid nodules all <6 mm, Fleischner Society 2017 guidelines recommend no routine follow up for a low risk patient, or follow up with non-contrast chest CT at 12 months after discovery in a high risk patient.  Indeterminate 1.3 cm right hepatic lobe lesion.  Ultrasound or MRI may be helpful.  Stable fibrotic changes the lungs.  Additional findings as above.  Assessment:   Severe Aortic Stenosis   Plan:     CTS Attending Note:    I have personally taken the history and examined this patient and agree with the SEDA's note as stated above.  74-year-old gentleman with severe aortic stenosis.  He is symptomatic for this.  He I discussed his situation with him and his to companions in detail.  He would be low risk for surgical valve, but he desires a transcatheter approach which I think is totally reasonable.

## 2024-07-29 ENCOUNTER — DOCUMENTATION ONLY (OUTPATIENT)
Dept: CARDIAC CATH/INVASIVE PROCEDURES | Facility: HOSPITAL | Age: 74
End: 2024-07-29
Payer: MEDICARE

## 2024-07-29 NOTE — PROGRESS NOTES
BARBI Simpson (TAVR)  74 y.o. male referred by Dr Vickers for evaluation of severe AS (NYHA Class III sx).      The patient has undergone the following TAVR work-up:   ECHO (Date 4/3/24): JUANITO= 0.90 cm2, MG= 24mmHg, Peak Nathaniel= 3.18 m/s, Svi= 26.8, AVAi= 0.45, EF= 58%.   C (Date 4/23/24): s/p PCI of pLAD   STS: 2%   Frailty: not Frail   Iliacs are >9 on R   LVOT area by CTA is 5.66 cm2 (31 mm X 24 mm) and Avg Diameter is 26.9 per my independent review of images on [facility].  Incidental findings on CT: intermixed pulmonary nodules. Fibrotic lung changes  CT Surgery risk assessment: low risk per Dr. Elias, but TAVR pt preference  Rhythm issues: NSR, 1st degree AVB  PFTs: FEV1 89% predicted  KCCQ/5 meter walk: done  Comorbidities: CAD, PAD, DM        Cesar Simpson is a 29 mm Sapian S3 valve candidate via RTF access.    Transcatheter Aortic valve replacement   Valve: 29 S3  ECMO:  On Call  TAVR access: RTF  Valvuloplasty balloon: not planned  Viabahn size if needed: 9 x 5  Antithrombotic therapy: asa/plavix  Temporary pacing wire: Yes  Pacemaker risk factors: 1st degree AVB   Post op destination: Stepdown  Antibiotics given: (to be done during procedure)  Heart failure on admission?  CONSENTING:  The patient was told that the procedure carries around a 12.5% risk of permanent pacemaker requirement and that risk depends on the patients underlying conduction system.  Prior to the clinc visit, all available records from referring provider were reviewed.  I have personally reviewed all the lab tests available related to the patient's case  The patient's images were reviewed by myself and the procedural planning was done with my own interpretation of the iliac and aortic anatomy based on CTA, angiography or LUBNA. I have reviewed all other imaging studies relevant to the patient including echocardiography and coronary angiography.  Patient was educated abut the pathophysiology and natural history of severe aortic stenosis and was  educated about the treatment options including surgical and transcatheter valve replacement. She agrees to be full code for the forseable future and to undergo workup for treatment of severe AS.   The risks, benefits, and alternatives of transcatheter aortic valve replacement were discussed with the patient. All questions were answered and informed consent was obtained. I had a detailed discussion with the patient regarding risk of stroke, MI, bleeding access site complications including limb loss, allergy, kidney failure including dialysis and death.  The patient understands the risks and benefits and wishes to go ahead with the procedure.  The referring Cardiologist was notified of the plan  All patient's questions were answered    Shared Decision Making:  This patient was seen today by a multidisciplinary heart team involving both a Structural Heart Specialist (Interventional Cardiologist) and a Cardiothoracic Surgeon. The patient was involved in shared decision-making to define clearer goals for treatment and align health decisions with their current values.  The patient understands the risks and expected benefits of their treatment options.

## 2024-08-02 ENCOUNTER — EDUCATION (OUTPATIENT)
Dept: CARDIOLOGY | Facility: CLINIC | Age: 74
End: 2024-08-02
Payer: MEDICARE

## 2024-08-02 ENCOUNTER — PATIENT MESSAGE (OUTPATIENT)
Dept: CARDIOLOGY | Facility: CLINIC | Age: 74
End: 2024-08-02
Payer: MEDICARE

## 2024-08-02 DIAGNOSIS — I35.0 SEVERE AORTIC STENOSIS: Primary | ICD-10-CM

## 2024-08-02 DIAGNOSIS — I35.0 NONRHEUMATIC AORTIC VALVE STENOSIS: Primary | Chronic | ICD-10-CM

## 2024-08-02 RX ORDER — DIPHENHYDRAMINE HCL 50 MG
50 CAPSULE ORAL ONCE
OUTPATIENT
Start: 2024-08-02 | End: 2024-08-02

## 2024-08-02 RX ORDER — SODIUM CHLORIDE 0.9 % (FLUSH) 0.9 %
10 SYRINGE (ML) INJECTION
Status: SHIPPED | OUTPATIENT
Start: 2024-08-02

## 2024-08-02 RX ORDER — SODIUM CHLORIDE 9 MG/ML
INJECTION, SOLUTION INTRAVENOUS CONTINUOUS
OUTPATIENT
Start: 2024-08-02 | End: 2024-08-02

## 2024-08-02 NOTE — PROGRESS NOTES
TAVR DISCHARGE INSTRUCTIONS      1. General Post Op Instructions:  -   No lifting greater than 5 pounds for 5 days  -   No driving or operating heavy machinery for 5 days  -   You may shower as soon as you get home but no bathing or submerging in water       (lakes, pools, tub etc) for 1 week.                -   You may remove the dressing and replace with a band-aid.      -   Keep incision dry and clean.  No lotions, oils, or creams.  You may change the band-aid daily                   until a scab has formed over              -   You may feel a small lump in your groin area due to a closure device used after the procedure.                     The site may be black and blue or swollen and pink for a few days. If the site enlarges,                    becomes painful and/or starts to bleed,please call.               -    You may return to your regular activities as tolerated.        If you develop any fever, urinary tract infection, or any other signs of infection, please call our office immediately.    2.  Preventing infection on Your heart Valve:  -   DO NOT have any dental work, surgery, or any other elective procedures for 6 months.  -   Provide a copy of this card to your Dentist or Gastroenterologist to keep in your chart.  -  You DO need to take antibiotics prior to any routine dental cleaning, GI procedure (colonoscopy, endoscopy), (cystoscopy), or respiratory procedures (bronchoscopy).                -  You DO need antibiotics if you are having a procedure that requires cutting any infected area.              -  Your Dentist or Gastroenterologist will prescribe these for you prior to your appointment.        3.  Managing your heart Failure:  -    Weigh yourself daily and keep track of your weight  -    If you are on Lasix, continue to take it as prescribed.  -    If you gain 3 pounds overnight or 5 pounds over 5 days, double Lasix for 3 days or begin taking  "Lasix once a day for 3 days                   4.  Follow Up:  -   TAVR follow up protocol requires you to return for a one month and a one-year visit. We will schedule an echo, blood work  and an appointment to see a    member of our team.      -   Please continue to see your regular Cardiologist for all other issues.  -   You will receive an ID card from the  of your valve in 4-6 weeks.  Make a copy of the card, and keep it with you at all times.    5.  Discharge:  - If you have any questions or concerns after discharge, please do not hesitate to call our office and speak with a nurse at [phone]              - If you have any problems or concerns related to a Pacemaker or Event monitor please call the Arrhythmia department at [phone].             -  Please address any other concerns with your primary Cardiologist.                   -  We answer messages sent via the ""[employer]" portal Monday - Friday 8am - 5pm.               -  Please do not send emergency messages through the portal.                  After hours and weekends, please call [phone] to speak to a Registered Nurse.                            "

## 2024-08-02 NOTE — PROGRESS NOTES
Transcatheter Valve Replacement (TAVR)    You have been scheduled for your valve replacement on Tuesday, September 17, 2024.     Please report to the Cardiology Waiting Area on the Third floor of the hospital and check in at 6 AM.   You will then be taken to the SSCU (Short Stay Cardiac Unit) and prepared for your procedure. Please be aware that this is not the time of your procedure but the time you are to arrive.     Preperations for your procedure:  Shower with Dial soap the night before and the morning of your procedure.  Nothing to eat or drink after MIDNIGHT the night before.                                            You may take your regular morning medications with as much water or clear liquid as necessary.   Bring your cane and/or walker with you to the hospital.  Bring CPAP/BiPAP, or oxygen if you are on oxygen at home.  Call the office for any signs of infection ( fever, cough, pneumonia, urinary tract, etc.) We cannot implant a device in an infected patient.    Medications:  You may take your regular scheduled medications the morning of your procedure with as much water as necessary.  If you are diabetic and on Metformin (Glucophage), do not take it the day before, the day of, and 2 days after your procedure.    If you take a weekly GLP-1 Inhibitor (Ozempic, Semaglutide, etc) You must be off that medication for one full week prior to your procedure.  Anesthesia will cancel your procedure if you do not hold this medication for a minimum of 7 days prior to TAVR.       How long will the procedure take?  The procedure takes approximately three to four hours.  After the procedure, you will go to an ICU or the Cardiac Step Down Unit.  The decision will be made by the valve team.  You will be monitored closely for the next several hours and remain overnight.       When can I go home?  Your length of stay in the hospital, will be determined by your physician.  Be prepared to stay 1-3 days.  You will be  discharged when your physician thinks it is safe for you to go home.  You must have someone to drive you home when you are discharged.      Follow Up After Valve Replacement:  You will be scheduled for follow up in one month and one year with an echo, lab work, and a clinic visit.    If you are in a research trial, your follow up will be slightly different and according to the trial requirements.  You can follow up with your regular cardiologist regarding any other heart issues.  DO NOT SCHEDULE ANY ELECTIVE PROCEDURES FOR 6 MONTHS AFTER TAVR.  THIS INCLUDES DENTAL WORK, COLONOSCOPY,ETC.         Please contact our office at [phone] for any questions.  Vikki Landrum RN          THE ABOVE INSTRUCTIONS WERE GIVEN TO THE PATIENT VERBALLY AND THEY VERBALIZED UNDERSTANDING.  THEY DO NOT REQUIRE ANY SPECIAL NEEDS AND DO NOT HAVE ANY LEARNING BARRIERS.          Directions for Reporting to Cardiology Waiting Area in the Hospital  If you park in the Parking Garage:  Take elevators to the1st floor of the parking garage.  Continue past the gift shop, coffee shop, and piano.  Take a right and go to the gold elevators. (Elevator B)  Take the elevator to the 3rd floor.  Follow the arrow on the sign on the wall that says Cath Lab Registration/EP Lab Registration.  Follow the long hallway all the way around until you come to a big open area.  This is the registration area.  Check in at Reception Desk.    OR    If family is dropping you off:  Have them drop you off at the front of the Hospital under the green overhang.  Enter through the doors and take a right.  Take the E elevators to the 3rd floor Cardiology Waiting Area.  Check in at the Reception Desk in the waiting room.

## 2024-08-23 ENCOUNTER — PATIENT OUTREACH (OUTPATIENT)
Dept: ADMINISTRATIVE | Facility: HOSPITAL | Age: 74
End: 2024-08-23
Payer: MEDICARE

## 2024-08-23 ENCOUNTER — ANESTHESIA EVENT (OUTPATIENT)
Dept: MEDSURG UNIT | Facility: HOSPITAL | Age: 74
End: 2024-08-23
Payer: MEDICARE

## 2024-08-23 ENCOUNTER — TELEPHONE (OUTPATIENT)
Dept: CARDIOLOGY | Facility: CLINIC | Age: 74
End: 2024-08-23
Payer: MEDICARE

## 2024-08-23 ENCOUNTER — PATIENT MESSAGE (OUTPATIENT)
Dept: CARDIOLOGY | Facility: CLINIC | Age: 74
End: 2024-08-23
Payer: MEDICARE

## 2024-08-23 DIAGNOSIS — E11.42 TYPE 2 DIABETES MELLITUS WITH DIABETIC POLYNEUROPATHY, WITHOUT LONG-TERM CURRENT USE OF INSULIN: Primary | ICD-10-CM

## 2024-08-23 NOTE — TELEPHONE ENCOUNTER
Attempt to reach patient, no answer, left detailed message on arrival time change.  In basket portal message sent as well.  Provided office phone number for call to confirm patient received message for surgery time change.  Awaiting patient response.

## 2024-08-23 NOTE — PROGRESS NOTES
08/23/2024  VB chart audit performed. Care Everywhere updates requested and reviewed  Overdue HM topic chart audit and/or requested. LINKS triggered and reconciled. Media reviewed Lvm/portal sent regarding overdue health topics  Health Maintenance Topic(s) Outreach Outcomes & Actions Taken:  Lab(s) - Outreach Outcomes & Actions Taken  : Overdue Lab(s) Scheduled

## 2024-08-26 RX ORDER — SODIUM CHLORIDE 0.9 % (FLUSH) 0.9 %
10 SYRINGE (ML) INJECTION
Status: CANCELLED | OUTPATIENT
Start: 2024-08-26

## 2024-08-26 RX ORDER — FENTANYL CITRATE 50 UG/ML
25 INJECTION, SOLUTION INTRAMUSCULAR; INTRAVENOUS EVERY 5 MIN PRN
Status: CANCELLED | OUTPATIENT
Start: 2024-08-26

## 2024-08-26 RX ORDER — HALOPERIDOL 5 MG/ML
0.5 INJECTION INTRAMUSCULAR EVERY 10 MIN PRN
Status: CANCELLED | OUTPATIENT
Start: 2024-08-26

## 2024-08-26 RX ORDER — GLUCAGON 1 MG
1 KIT INJECTION
Status: CANCELLED | OUTPATIENT
Start: 2024-08-26

## 2024-08-26 NOTE — ANESTHESIA PREPROCEDURE EVALUATION
[facility]  Anesthesia Pre-Operative Evaluation         Patient Name: Cesar Simpson  YOB: 1950  MRN: [id number]    SUBJECTIVE:     Pre-operative evaluation for Procedure(s) (LRB):  REPLACEMENT, AORTIC VALVE, TRANSCATHETER (TAVR) (N/A)     08/26/2024    Cesar Simpson is a 74 y.o. male w/ a significant PMHx of CAD s/p PCI to D-M LAD, PAD, HTN, HLD, hypothyroidism, DM, and severe AS.    Patient now presents for the above procedure(s).    CATH: 4/23/2024  LM:  FREDIS 3 flow. 0% stenosis   LAD: FREDIS 3 flow.  Prox/mid calcified 70-80% stenosis.  Mid/distal 99% stenosis.  D1 distally 99% stenosis but small caliber vessel distally  LCx:  FREDIS 3 flow.  Proximal 30-40% stenosis.  Large principal OM1 with diffuse 30% stenosis  RCA: FREDIS 3 flow.  Ostial RCA 60% stenosis IFR 0.95.  Prox mid and distal diffuse 30 40% calcified stenosis.  TAYLOR branch very distally there is 95% calcified stenosis but small caliber vessel    Echo 4/2024    Left Ventricle: The left ventricle is normal in size. Normal wall thickness. There is concentric remodeling. There is normal systolic function. Biplane (2D) method of discs ejection fraction is 58%. There is normal diastolic function.    Right Ventricle: Normal right ventricular cavity size. Wall thickness is normal. Right ventricle wall motion  is normal. Systolic function is normal.    Aortic Valve: There is moderate aortic valve sclerosis. Moderately restricted motion. There is moderate to severe stenosis. Aortic valve area by VTI is 0.90 cm². Aortic valve peak velocity is 3.18 m/s. Mean gradient is 24 mmHg. The dimensionless index is 0.29.    Mitral Valve: There is moderate mitral annular calcification present.    Pulmonary Artery: The estimated pulmonary artery systolic pressure is 32 mmHg.    IVC/SVC: Normal venous pressure at 3 mmHg.      Prev airway: None documented.        Patient Active Problem List   Diagnosis    Type 2 diabetes mellitus with diabetic  polyneuropathy, without long-term current use of insulin    Hyperlipidemia    Posterior vitreous detachment, right eye    Floater, vitreous    Vitreous detachment    Pseudophakia    Refractive error    Hypothyroidism    Osteoarthritis    Prominent aorta    Hyperlipidemia associated with type 2 diabetes mellitus    Hypertension associated with diabetes    BMI 29.0-29.9,adult    Insomnia    Diet-controlled diabetes mellitus    BPH associated with nocturia    Arthritis    Does use hearing aid    Normocytic anemia    Moderate obstructive sleep apnea    ILD (interstitial lung disease)    Chronic cough    Nonrheumatic aortic valve stenosis    PAD (peripheral artery disease)    Coronary artery disease of native artery of native heart with stable angina pectoris       Review of patient's allergies indicates:  No Known Allergies    Current Inpatient Medications:      Current Facility-Administered Medications on File Prior to Encounter   Medication Dose Route Frequency Provider Last Rate Last Admin    sodium chloride 0.9% flush 10 mL  10 mL Intravenous PRN Cesar Louis MD        sodium chloride 0.9% flush 10 mL  10 mL Intravenous PRN Cesar Louis MD         Current Outpatient Medications on File Prior to Encounter   Medication Sig Dispense Refill    ACCU-CHEK NAYA PLUS TEST STRP Strp CHECK BLOOD SUGAR DAILY AS DIRECTED 100 strip 3    aspirin 81 MG Chew Take 1 tablet (81 mg total) by mouth once daily. 90 tablet 3    azelastine (ASTELIN) 137 mcg (0.1 %) nasal spray 1 spray (137 mcg total) by Nasal route 2 (two) times daily. 30 mL 3    benzonatate (TESSALON) 200 MG capsule Take 1 capsule (200 mg total) by mouth 3 (three) times daily as needed for Cough. [email or web address] 90 capsule 11    blood-glucose meter Misc 1 device.  Check sugar 1 times daily as directed by MD. Supply preferred brand .Dx Code: [E11.8] 1 each 0    clopidogreL (PLAVIX) 75 mg tablet Take 1 tablet (75 mg total) by mouth once daily. 30 tablet  11    DULoxetine (CYMBALTA) 30 MG capsule Take 1 capsule (30 mg total) by mouth once daily. 90 capsule 1    esomeprazole (NEXIUM) 40 MG capsule Take 1 capsule (40 mg total) by mouth daily with dinner or evening meal. 30 capsule 11    finasteride (PROSCAR) 5 mg tablet Take 1 tablet (5 mg total) by mouth once daily. 90 tablet 2    lancets Misc 1 box.  Check 1x daily as directed by MD. Supply brand covered by insurance. Dx Code: [E11.8]. Accucheck Roma. 100 each 3    levothyroxine (SYNTHROID) 88 MCG tablet Take 1 tablet (88 mcg total) by mouth before breakfast. 90 tablet 1    olmesartan (BENICAR) 5 MG Tab Take 1 tablet (5 mg total) by mouth once daily. 90 tablet 1    rosuvastatin (CRESTOR) 20 MG tablet Take 1 tablet (20 mg total) by mouth once daily. 90 tablet 3    tamsulosin (FLOMAX) 0.4 mg Cap Take 1 capsule (0.4 mg total) by mouth once daily. 90 capsule 1    traZODone (DESYREL) 100 MG tablet Take 1 tablet (100 mg total) by mouth every evening. 90 tablet 3       Past Surgical History:   Procedure Laterality Date    CATARACT EXTRACTION      CATHETERIZATION OF BOTH LEFT AND RIGHT HEART N/A 4/16/2024    Procedure: CATHETERIZATION, HEART, BOTH LEFT AND RIGHT;  Surgeon: Brian Vickers MD;  Location: [facility] CATH LAB/EP;  Service: Cardiology;  Laterality: N/A;  Aortic valve study/ [place] Catheter/2 manifolds    CORONARY ANGIOGRAPHY N/A 4/16/2024    Procedure: ANGIOGRAM, CORONARY ARTERY;  Surgeon: Brian Vickers MD;  Location: [facility] CATH LAB/EP;  Service: Cardiology;  Laterality: N/A;    CORONARY ANGIOPLASTY WITH STENT PLACEMENT N/A 4/23/2024    Procedure: ANGIOPLASTY, CORONARY ARTERY, WITH STENT INSERTION;  Surgeon: Brian Vickers MD;  Location: [facility] CATH LAB/EP;  Service: Cardiology;  Laterality: N/A;    EYE SURGERY      cataracts    INSTANTANEOUS WAVE-FREE RATIO (IFR) N/A 4/16/2024    Procedure: Instantaneous Wave-Free Ratio (IFR);  Surgeon: Brian Vickers MD;  Location: [facility] CATH LAB/EP;  Service:  Cardiology;  Laterality: N/A;    IVUS, CORONARY  4/23/2024    Procedure: IVUS, Coronary;  Surgeon: Brian Vickers MD;  Location: [facility] CATH LAB/EP;  Service: Cardiology;;    JOINT REPLACEMENT Left     arthroplasty    PERCUTANEOUS CORONARY INTERVENTION, ARTERY N/A 4/23/2024    Procedure: Percutaneous coronary intervention;  Surgeon: Brian Vickers MD;  Location: [facility] CATH LAB/EP;  Service: Cardiology;  Laterality: N/A;  LAD    SHOULDER SURGERY      TONSILLECTOMY      TOTAL SHOULDER ARTHROPLASTY Left        Social History     Socioeconomic History    Marital status:    Tobacco Use    Smoking status: Never     Passive exposure: Never    Smokeless tobacco: Never   Substance and Sexual Activity    Alcohol use: Never     Comment: occasionally (maybe every 3 months)    Drug use: Never    Sexual activity: Not Currently     Partners: Female     Birth control/protection: None   Social History Narrative    9/7/2023: he lives alone. He has 2 kids, they live in [place] and [place]. 7 grandchildren. 2 grandchildren live nearby. No pets at home. He is retired. 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      Social Determinants of Health     Financial Resource Strain: Low Risk  (2/29/2024)    Overall Financial Resource Strain (CARDIA)     Difficulty of Paying Living Expenses: Not very hard   Food Insecurity: No Food Insecurity (2/29/2024)    Hunger Vital Sign     Worried About Running Out of Food in the Last Year: Never true     Ran Out of Food in the Last Year: Never true   Transportation Needs: No Transportation Needs (2/29/2024)    PRAPARE - Transportation     Lack of Transportation (Medical): No     Lack of Transportation (Non-Medical): No   Physical Activity: Insufficiently Active (2/29/2024)    Exercise Vital Sign     Days of Exercise per Week: 3 days     Minutes of Exercise per Session: 30 min   Stress: No Stress Concern Present (2/29/2024)    [employer] - Occupational  Stress Questionnaire     Feeling of Stress : Only a little   Housing Stability: Low Risk  (2/29/2024)    Housing Stability Vital Sign     Unable to Pay for Housing in the Last Year: No     Number of Places Lived in the Last Year: 1     Unstable Housing in the Last Year: No       OBJECTIVE:     Vital Signs Range (Last 24H):  BP: ()/()   Arterial Line BP: ()/()       Significant Labs:  Lab Results   Component Value Date    WBC 6.29 04/22/2024    HGB 13.8 (L) 04/22/2024    HCT 40.8 04/22/2024     04/22/2024    CHOL 135 03/07/2024    TRIG 59 03/07/2024    HDL 37 (L) 03/07/2024    ALT 14 03/07/2024    AST 14 03/07/2024     04/22/2024    K 4.0 04/22/2024     04/22/2024    CREATININE 1.3 06/26/2024    BUN 20 04/22/2024    CO2 26 04/22/2024    TSH 1.307 08/11/2023    PSA 0.43 12/23/2020    INR 1.0 11/20/2015    HGBA1C 6.0 (H) 03/07/2024       Diagnostic Studies: No relevant studies.    EKG:   Results for orders placed or performed during the hospital encounter of 04/23/24   EKG 12-LEAD on arrival to floor    Collection Time: 04/23/24 10:38 AM   Result Value Ref Range    QRS Duration 108 ms    OHS QTC Calculation 429 ms    Narrative    Test Reason : I25.10,    Vent. Rate : 059 BPM     Atrial Rate : 059 BPM     P-R Int : 216 ms          QRS Dur : 108 ms      QT Int : 434 ms       P-R-T Axes : 006 023 026 degrees     QTc Int : 429 ms    Sinus bradycardia with 1st degree A-V block  Otherwise normal ECG  When compared with ECG of 10-APR-2024 09:52,  [place] interval has increased  Confirmed by Chandan Liriano MD ([id number]) on 4/23/2024 1:14:50 PM    Referred By: SAYRA HAYDEN           Confirmed By:Chandan Liriano MD       2D ECHO:  TTE:  Results for orders placed or performed during the hospital encounter of 04/03/24   Echo   Result Value Ref Range    RA Width 3.54 cm    LA volume (mod) 38.17 cm3    Left Atrium Major Axis 4.15 cm    Left Atrium Minor Axis 4.28 cm    RA Major Axis 4.59 cm    LV Diastolic Volume 57.54  mL    LV Systolic Volume 9.71 mL    MV Peak A Nathaniel 1.32 m/s    MV stenosis pressure 1/2 time 46.55 ms    MV VTI 31.6 cm    TR Max Nathaniel 2.67 m/s    MV Peak E Nathaniel 0.83 m/s    MV peak gradient 9 mmHg    Ao VTI 59.70 cm    Ao peak nathaniel 3.18 m/s    LVOT peak VTI 17.40 cm    LVOT peak nathaniel 0.92 m/s    LVOT diameter 1.98 cm    E wave deceleration time 161.57 msec    MV mean gradient 3 mmHg    AV mean gradient 24 mmHg    RV S' 11.76 cm/s    TAPSE 1.78 cm    RVDD 3.70 cm    LA size 4.09 cm    Ascending aorta 3.47 cm    STJ 3.05 cm    Sinus 3.79 cm    LVIDs 1.80 (A) 2.1 - 4.0 cm    Posterior Wall 1.40 (A) 0.6 - 1.1 cm    IVS 1.22 (A) 0.6 - 1.1 cm    LVIDd 3.68 3.5 - 6.0 cm    TDI LATERAL 0.14 m/s    Left Ventricular Outflow Tract Mean Gradient 1.82 mmHg    Left Ventricular Outflow Tract Mean Velocity 0.64 cm/s    Burks's Biplane MOD Ejection Fraction 58 %    TDI SEPTAL 0.07 m/s    LV LATERAL E/E' RATIO 5.93 m/s    LV SEPTAL E/E' RATIO 11.86 m/s    FS 51 (A) 28 - 44 %    LV mass 167.18 g    Left Ventricle Relative Wall Thickness 0.76 cm    AV valve area 0.90 cm²    AV Velocity Ratio 0.29     AV index (prosthetic) 0.29     MV valve area p 1/2 method 4.73 cm2    MV valve area by continuity eq 1.69 cm2    E/A ratio 0.63     Mean e' 0.11 m/s    LVOT area 3.1 cm2    LVOT stroke volume 53.55 cm3    AV peak gradient 40 mmHg    E/E' ratio 7.90 m/s    Triscuspid Valve Regurgitation Peak Gradient 29 mmHg    JUANITO by Velocity Ratio 0.89 cm²    BSA 2 m2    LV Systolic Volume Index 5.0 mL/m2    LV Diastolic Volume Index 29.36 mL/m2    LV Mass Index 85 g/m2    LA Volume Index (Mod) 19.5 mL/m2    ZLVIDS -5.14     ZLVIDD -4.22     LA Volume Index 23.2 mL/m2    LA volume 45.41 cm3    RV-jiménez mid d 3.3 cm    LA WIDTH 3.1 cm    TV resting pulmonary artery pressure 32 mmHg    RV TB RVSP 6 mmHg    Est. RA pres 3 mmHg    Narrative      Left Ventricle: The left ventricle is normal in size. Normal wall   thickness. There is concentric remodeling. There  is normal systolic   function. Biplane (2D) method of discs ejection fraction is 58%. There is   normal diastolic function.    Right Ventricle: Normal right ventricular cavity size. Wall thickness   is normal. Right ventricle wall motion  is normal. Systolic function is   normal.    Aortic Valve: There is moderate aortic valve sclerosis. Moderately   restricted motion. There is moderate to severe stenosis. Aortic valve area   by VTI is 0.90 cm². Aortic valve peak velocity is 3.18 m/s. Mean gradient   is 24 mmHg. The dimensionless index is 0.29.    Mitral Valve: There is moderate mitral annular calcification present.    Pulmonary Artery: The estimated pulmonary artery systolic pressure is   32 mmHg.    IVC/SVC: Normal venous pressure at 3 mmHg.         LUBNA:  No results found for this or any previous visit.    ASSESSMENT/PLAN:           Pre-op Assessment    I have reviewed the Patient Summary Reports.     I have reviewed the Nursing Notes.    I have reviewed the Medications.     Review of Systems  Anesthesia Hx:  No problems with previous Anesthesia             Denies Family Hx of Anesthesia complications.    Denies Personal Hx of Anesthesia complications.                    Social:  Non-Smoker, No Alcohol Use       Hematology/Oncology:       -- Denies Anemia:                  Denies Current/Recent Cancer                Cardiovascular:     Hypertension   CAD   CABG/stent  Denies Dysrhythmias.    Denies CHF.   PVD hyperlipidemia                             Pulmonary:    Denies COPD.  Denies Asthma.     Denies Sleep Apnea.                Renal/:   Denies Chronic Renal Disease.                Hepatic/GI:      Denies GERD. Denies Liver Disease.            Musculoskeletal:  Denies Arthritis.               Neurological:    Denies CVA. Denies Neuromuscular Disease.   Denies Seizures.          Denies Chronic Pain Syndrome                         Endocrine:  Diabetes Hypothyroidism  Denies Hyperthyroidism.       Denies  Obesity / BMI > 30  Psych:   denies anxiety denies depression                Physical Exam  General: Well nourished, Cooperative, Alert and Oriented    Airway:  Mallampati: II   Mouth Opening: Normal  TM Distance: Normal  Tongue: Normal  Neck ROM: Normal ROM    Dental:  Intact        Anesthesia Plan  Type of Anesthesia, risks & benefits discussed:    Anesthesia Type: Gen ETT, Gen Supraglottic Airway, Gen Natural Airway, MAC  Intra-op Monitoring Plan: Standard ASA Monitors and Art Line  Post Op Pain Control Plan: multimodal analgesia and IV/PO Opioids PRN  Induction:  IV  Airway Plan: Direct and Video, Post-Induction  Informed Consent: Informed consent signed with the Patient and all parties understand the risks and agree with anesthesia plan.  All questions answered.   ASA Score: 4  Day of Surgery Review of History & Physical: H&P Update referred to the surgeon/provider.    Ready For Surgery From Anesthesia Perspective.     .

## 2024-08-27 ENCOUNTER — ANESTHESIA (OUTPATIENT)
Dept: SURGERY | Facility: HOSPITAL | Age: 74
End: 2024-08-27
Payer: MEDICARE

## 2024-08-27 ENCOUNTER — HOSPITAL ENCOUNTER (INPATIENT)
Facility: HOSPITAL | Age: 74
LOS: 7 days | Discharge: HOME HEALTH | DRG: 219 | End: 2024-09-03
Attending: INTERNAL MEDICINE | Admitting: INTERNAL MEDICINE
Payer: MEDICARE

## 2024-08-27 ENCOUNTER — ANESTHESIA (OUTPATIENT)
Dept: MEDSURG UNIT | Facility: HOSPITAL | Age: 74
End: 2024-08-27
Payer: MEDICARE

## 2024-08-27 ENCOUNTER — ANESTHESIA EVENT (OUTPATIENT)
Dept: SURGERY | Facility: HOSPITAL | Age: 74
End: 2024-08-27
Payer: MEDICARE

## 2024-08-27 DIAGNOSIS — I71.00 AORTIC DISSECTION: ICD-10-CM

## 2024-08-27 DIAGNOSIS — R00.1 SINUS BRADYCARDIA: ICD-10-CM

## 2024-08-27 DIAGNOSIS — E11.9 DIET-CONTROLLED TYPE 2 DIABETES MELLITUS: ICD-10-CM

## 2024-08-27 DIAGNOSIS — I49.9 IRREGULAR CARDIAC RHYTHM: ICD-10-CM

## 2024-08-27 DIAGNOSIS — Z01.812 PRE-PROCEDURE LAB EXAM: ICD-10-CM

## 2024-08-27 DIAGNOSIS — I35.0 SEVERE AORTIC STENOSIS: ICD-10-CM

## 2024-08-27 DIAGNOSIS — I71.00 DISSECTION OF AORTA, UNSPECIFIED PORTION OF AORTA: Primary | ICD-10-CM

## 2024-08-27 DIAGNOSIS — I72.9 PSEUDOANEURYSM: ICD-10-CM

## 2024-08-27 DIAGNOSIS — Z98.890 S/P AORTIC DISSECTION REPAIR: Primary | ICD-10-CM

## 2024-08-27 DIAGNOSIS — I35.0 AORTIC STENOSIS: ICD-10-CM

## 2024-08-27 DIAGNOSIS — E78.5 HYPERLIPIDEMIA, UNSPECIFIED HYPERLIPIDEMIA TYPE: ICD-10-CM

## 2024-08-27 LAB
ABO + RH BLD: NORMAL
ALBUMIN SERPL BCP-MCNC: 1.9 G/DL (ref 3.5–5.2)
ALBUMIN SERPL BCP-MCNC: 3.2 G/DL (ref 3.5–5.2)
ALLENS TEST: ABNORMAL
ALLENS TEST: NORMAL
ALP SERPL-CCNC: 25 U/L (ref 55–135)
ALP SERPL-CCNC: 30 U/L (ref 55–135)
ALT SERPL W/O P-5'-P-CCNC: 14 U/L (ref 10–44)
ALT SERPL W/O P-5'-P-CCNC: 15 U/L (ref 10–44)
ANION GAP SERPL CALC-SCNC: 10 MMOL/L (ref 8–16)
ANION GAP SERPL CALC-SCNC: 10 MMOL/L (ref 8–16)
ANION GAP SERPL CALC-SCNC: 8 MMOL/L (ref 8–16)
ANISOCYTOSIS BLD QL SMEAR: SLIGHT
APTT PPP: 33.6 SEC (ref 21–32)
APTT PPP: 36.4 SEC (ref 21–32)
APTT PPP: 46.4 SEC (ref 21–32)
AST SERPL-CCNC: 26 U/L (ref 10–40)
AST SERPL-CCNC: 27 U/L (ref 10–40)
BASOPHILS # BLD AUTO: 0.02 K/UL (ref 0–0.2)
BASOPHILS # BLD AUTO: 0.07 K/UL (ref 0–0.2)
BASOPHILS NFR BLD: 0.1 % (ref 0–1.9)
BASOPHILS NFR BLD: 0.4 % (ref 0–1.9)
BILIRUB SERPL-MCNC: 0.4 MG/DL (ref 0.1–1)
BILIRUB SERPL-MCNC: 0.5 MG/DL (ref 0.1–1)
BLD GP AB SCN CELLS X3 SERPL QL: NORMAL
BLD PROD TYP BPU: NORMAL
BLOOD UNIT EXPIRATION DATE: NORMAL
BLOOD UNIT TYPE CODE: 6200
BLOOD UNIT TYPE CODE: 7300
BLOOD UNIT TYPE: NORMAL
BNP SERPL-MCNC: <10 PG/ML (ref 0–99)
BUN SERPL-MCNC: 20 MG/DL (ref 8–23)
BUN SERPL-MCNC: 20 MG/DL (ref 8–23)
BUN SERPL-MCNC: 29 MG/DL (ref 8–23)
CA-I BLDV-SCNC: 1 MMOL/L (ref 1.06–1.42)
CALCIUM SERPL-MCNC: 8.3 MG/DL (ref 8.7–10.5)
CALCIUM SERPL-MCNC: 8.4 MG/DL (ref 8.7–10.5)
CALCIUM SERPL-MCNC: 9.7 MG/DL (ref 8.7–10.5)
CHLORIDE SERPL-SCNC: 102 MMOL/L (ref 95–110)
CHLORIDE SERPL-SCNC: 109 MMOL/L (ref 95–110)
CHLORIDE SERPL-SCNC: 109 MMOL/L (ref 95–110)
CO2 SERPL-SCNC: 20 MMOL/L (ref 23–29)
CO2 SERPL-SCNC: 21 MMOL/L (ref 23–29)
CO2 SERPL-SCNC: 23 MMOL/L (ref 23–29)
CODING SYSTEM: NORMAL
CREAT SERPL-MCNC: 0.9 MG/DL (ref 0.5–1.4)
CREAT SERPL-MCNC: 1 MG/DL (ref 0.5–1.4)
CREAT SERPL-MCNC: 1.1 MG/DL (ref 0.5–1.4)
CROSSMATCH INTERPRETATION: NORMAL
DELSYS: ABNORMAL
DIFFERENTIAL METHOD BLD: ABNORMAL
DIFFERENTIAL METHOD BLD: ABNORMAL
DISPENSE STATUS: NORMAL
EOSINOPHIL # BLD AUTO: 0 K/UL (ref 0–0.5)
EOSINOPHIL # BLD AUTO: 0.1 K/UL (ref 0–0.5)
EOSINOPHIL NFR BLD: 0.2 % (ref 0–8)
EOSINOPHIL NFR BLD: 0.6 % (ref 0–8)
ERYTHROCYTE [DISTWIDTH] IN BLOOD BY AUTOMATED COUNT: 12.9 % (ref 11.5–14.5)
ERYTHROCYTE [DISTWIDTH] IN BLOOD BY AUTOMATED COUNT: 13.1 % (ref 11.5–14.5)
ERYTHROCYTE [DISTWIDTH] IN BLOOD BY AUTOMATED COUNT: 13.1 % (ref 11.5–14.5)
ERYTHROCYTE [DISTWIDTH] IN BLOOD BY AUTOMATED COUNT: 13.2 % (ref 11.5–14.5)
ERYTHROCYTE [SEDIMENTATION RATE] IN BLOOD BY WESTERGREN METHOD: 20 MM/H
EST. GFR  (NO RACE VARIABLE): >60 ML/MIN/1.73 M^2
FIBRINOGEN PPP-MCNC: 123 MG/DL (ref 182–400)
FIBRINOGEN PPP-MCNC: 177 MG/DL (ref 182–400)
FIBRINOGEN PPP-MCNC: 233 MG/DL (ref 182–400)
FIBRINOGEN PPP-MCNC: 271 MG/DL (ref 182–400)
FIO2: 100
FIO2: 100
FIO2: 40
FIO2: 50
FIO2: 55
FIO2: 60
FIO2: 70
GLUCOSE SERPL-MCNC: 110 MG/DL (ref 70–110)
GLUCOSE SERPL-MCNC: 119 MG/DL (ref 70–110)
GLUCOSE SERPL-MCNC: 126 MG/DL (ref 70–110)
GLUCOSE SERPL-MCNC: 148 MG/DL (ref 70–110)
GLUCOSE SERPL-MCNC: 159 MG/DL (ref 70–110)
GLUCOSE SERPL-MCNC: 185 MG/DL (ref 70–110)
GLUCOSE SERPL-MCNC: 221 MG/DL (ref 70–110)
GLUCOSE SERPL-MCNC: 228 MG/DL (ref 70–110)
GLUCOSE SERPL-MCNC: 271 MG/DL (ref 70–110)
HCO3 UR-SCNC: 17.7 MMOL/L (ref 24–28)
HCO3 UR-SCNC: 20 MMOL/L (ref 24–28)
HCO3 UR-SCNC: 20.6 MMOL/L (ref 24–28)
HCO3 UR-SCNC: 20.7 MMOL/L (ref 24–28)
HCO3 UR-SCNC: 21.1 MMOL/L (ref 24–28)
HCO3 UR-SCNC: 21.4 MMOL/L (ref 24–28)
HCO3 UR-SCNC: 21.4 MMOL/L (ref 24–28)
HCO3 UR-SCNC: 21.6 MMOL/L (ref 24–28)
HCO3 UR-SCNC: 21.9 MMOL/L (ref 24–28)
HCO3 UR-SCNC: 22.4 MMOL/L (ref 24–28)
HCO3 UR-SCNC: 23.3 MMOL/L (ref 24–28)
HCO3 UR-SCNC: 23.9 MMOL/L (ref 24–28)
HCT VFR BLD AUTO: 22.7 % (ref 40–54)
HCT VFR BLD AUTO: 24.1 % (ref 40–54)
HCT VFR BLD AUTO: 26.7 % (ref 40–54)
HCT VFR BLD AUTO: 38.6 % (ref 40–54)
HCT VFR BLD CALC: 18 %PCV (ref 36–54)
HCT VFR BLD CALC: 19 %PCV (ref 36–54)
HCT VFR BLD CALC: 19 %PCV (ref 36–54)
HCT VFR BLD CALC: 21 %PCV (ref 36–54)
HCT VFR BLD CALC: 22 %PCV (ref 36–54)
HCT VFR BLD CALC: 23 %PCV (ref 36–54)
HCT VFR BLD CALC: 25 %PCV (ref 36–54)
HCT VFR BLD CALC: 26 %PCV (ref 36–54)
HCT VFR BLD CALC: 26 %PCV (ref 36–54)
HCT VFR BLD CALC: 28 %PCV (ref 36–54)
HCT VFR BLD CALC: 28 %PCV (ref 36–54)
HCT VFR BLD CALC: 31 %PCV (ref 36–54)
HGB BLD-MCNC: 13.2 G/DL (ref 14–18)
HGB BLD-MCNC: 7.6 G/DL (ref 14–18)
HGB BLD-MCNC: 8.3 G/DL (ref 14–18)
HGB BLD-MCNC: 9.3 G/DL (ref 14–18)
IMM GRANULOCYTES # BLD AUTO: 0.2 K/UL (ref 0–0.04)
IMM GRANULOCYTES # BLD AUTO: 0.39 K/UL (ref 0–0.04)
IMM GRANULOCYTES NFR BLD AUTO: 1.5 % (ref 0–0.5)
IMM GRANULOCYTES NFR BLD AUTO: 2.2 % (ref 0–0.5)
INR PPP: 1 (ref 0.8–1.2)
INR PPP: 1.2 (ref 0.8–1.2)
INR PPP: 1.4 (ref 0.8–1.2)
LDH SERPL L TO P-CCNC: 0.57 MMOL/L (ref 0.36–1.25)
LDH SERPL L TO P-CCNC: 1.13 MMOL/L (ref 0.36–1.25)
LDH SERPL L TO P-CCNC: 1.74 MMOL/L (ref 0.36–1.25)
LDH SERPL L TO P-CCNC: 2.17 MMOL/L (ref 0.36–1.25)
LDH SERPL L TO P-CCNC: 2.26 MMOL/L (ref 0.36–1.25)
LDH SERPL L TO P-CCNC: 2.39 MMOL/L (ref 0.36–1.25)
LDH SERPL L TO P-CCNC: 2.8 MMOL/L (ref 0.36–1.25)
LDH SERPL L TO P-CCNC: 3.08 MMOL/L (ref 0.36–1.25)
LDH SERPL L TO P-CCNC: 3.95 MMOL/L (ref 0.36–1.25)
LYMPHOCYTES # BLD AUTO: 1 K/UL (ref 1–4.8)
LYMPHOCYTES # BLD AUTO: 3.6 K/UL (ref 1–4.8)
LYMPHOCYTES NFR BLD: 19.8 % (ref 18–48)
LYMPHOCYTES NFR BLD: 7.7 % (ref 18–48)
MAGNESIUM SERPL-MCNC: 2.3 MG/DL (ref 1.6–2.6)
MAGNESIUM SERPL-MCNC: 2.4 MG/DL (ref 1.6–2.6)
MCH RBC QN AUTO: 29 PG (ref 27–31)
MCH RBC QN AUTO: 29.1 PG (ref 27–31)
MCH RBC QN AUTO: 29.5 PG (ref 27–31)
MCH RBC QN AUTO: 30 PG (ref 27–31)
MCHC RBC AUTO-ENTMCNC: 33.5 G/DL (ref 32–36)
MCHC RBC AUTO-ENTMCNC: 34.2 G/DL (ref 32–36)
MCHC RBC AUTO-ENTMCNC: 34.4 G/DL (ref 32–36)
MCHC RBC AUTO-ENTMCNC: 34.8 G/DL (ref 32–36)
MCV RBC AUTO: 85 FL (ref 82–98)
MCV RBC AUTO: 86 FL (ref 82–98)
MCV RBC AUTO: 86 FL (ref 82–98)
MCV RBC AUTO: 87 FL (ref 82–98)
MIN VOL: 11.8
MIN VOL: 11.8
MODE: ABNORMAL
MONOCYTES # BLD AUTO: 0.7 K/UL (ref 0.3–1)
MONOCYTES # BLD AUTO: 1.5 K/UL (ref 0.3–1)
MONOCYTES NFR BLD: 10.9 % (ref 4–15)
MONOCYTES NFR BLD: 3.8 % (ref 4–15)
NEUTROPHILS # BLD AUTO: 10.7 K/UL (ref 1.8–7.7)
NEUTROPHILS # BLD AUTO: 13.2 K/UL (ref 1.8–7.7)
NEUTROPHILS NFR BLD: 73.2 % (ref 38–73)
NEUTROPHILS NFR BLD: 79.6 % (ref 38–73)
NRBC BLD-RTO: 0 /100 WBC
NRBC BLD-RTO: 0 /100 WBC
NUM UNITS TRANS FFP: NORMAL
OHS QRS DURATION: 112 MS
OHS QTC CALCULATION: 417 MS
PCO2 BLDA: 30 MMHG (ref 35–45)
PCO2 BLDA: 32.6 MMHG (ref 35–45)
PCO2 BLDA: 34.8 MMHG (ref 35–45)
PCO2 BLDA: 35.2 MMHG (ref 35–45)
PCO2 BLDA: 36.1 MMHG (ref 35–45)
PCO2 BLDA: 36.6 MMHG (ref 35–45)
PCO2 BLDA: 37.4 MMHG (ref 35–45)
PCO2 BLDA: 38.3 MMHG (ref 35–45)
PCO2 BLDA: 38.4 MMHG (ref 35–45)
PCO2 BLDA: 40.1 MMHG (ref 35–45)
PCO2 BLDA: 48.6 MMHG (ref 35–45)
PCO2 BLDA: 50.5 MMHG (ref 35–45)
PEEP: 5
PH SMN: 7.22 [PH] (ref 7.35–7.45)
PH SMN: 7.25 [PH] (ref 7.35–7.45)
PH SMN: 7.31 [PH] (ref 7.35–7.45)
PH SMN: 7.35 [PH] (ref 7.35–7.45)
PH SMN: 7.36 [PH] (ref 7.35–7.45)
PH SMN: 7.38 [PH] (ref 7.35–7.45)
PH SMN: 7.38 [PH] (ref 7.35–7.45)
PH SMN: 7.39 [PH] (ref 7.35–7.45)
PH SMN: 7.4 [PH] (ref 7.35–7.45)
PH SMN: 7.41 [PH] (ref 7.35–7.45)
PH SMN: 7.41 [PH] (ref 7.35–7.45)
PH SMN: 7.43 [PH] (ref 7.35–7.45)
PHOSPHATE SERPL-MCNC: 2.7 MG/DL (ref 2.7–4.5)
PHOSPHATE SERPL-MCNC: 3 MG/DL (ref 2.7–4.5)
PIP: 18
PIP: 18
PLATELET # BLD AUTO: 129 K/UL (ref 150–450)
PLATELET # BLD AUTO: 154 K/UL (ref 150–450)
PLATELET # BLD AUTO: 180 K/UL (ref 150–450)
PLATELET # BLD AUTO: 183 K/UL (ref 150–450)
PLATELET BLD QL SMEAR: ABNORMAL
PMV BLD AUTO: 10.1 FL (ref 9.2–12.9)
PMV BLD AUTO: 10.4 FL (ref 9.2–12.9)
PMV BLD AUTO: 10.7 FL (ref 9.2–12.9)
PMV BLD AUTO: 9.8 FL (ref 9.2–12.9)
PO2 BLDA: 150 MMHG (ref 80–100)
PO2 BLDA: 169 MMHG (ref 80–100)
PO2 BLDA: 187 MMHG (ref 80–100)
PO2 BLDA: 190 MMHG (ref 80–100)
PO2 BLDA: 198 MMHG (ref 80–100)
PO2 BLDA: 205 MMHG (ref 80–100)
PO2 BLDA: 302 MMHG (ref 80–100)
PO2 BLDA: 340 MMHG (ref 80–100)
PO2 BLDA: 354 MMHG (ref 80–100)
PO2 BLDA: 357 MMHG (ref 80–100)
PO2 BLDA: 489 MMHG (ref 80–100)
PO2 BLDA: 60 MMHG (ref 40–60)
POC BE: -1 MMOL/L
POC BE: -2 MMOL/L
POC BE: -2 MMOL/L
POC BE: -3 MMOL/L
POC BE: -4 MMOL/L
POC BE: -6 MMOL/L
POC BE: -7 MMOL/L
POC BE: -9 MMOL/L
POC IONIZED CALCIUM: 0.86 MMOL/L (ref 1.06–1.42)
POC IONIZED CALCIUM: 0.89 MMOL/L (ref 1.06–1.42)
POC IONIZED CALCIUM: 0.89 MMOL/L (ref 1.06–1.42)
POC IONIZED CALCIUM: 0.95 MMOL/L (ref 1.06–1.42)
POC IONIZED CALCIUM: 0.96 MMOL/L (ref 1.06–1.42)
POC IONIZED CALCIUM: 1.09 MMOL/L (ref 1.06–1.42)
POC IONIZED CALCIUM: 1.11 MMOL/L (ref 1.06–1.42)
POC IONIZED CALCIUM: 1.11 MMOL/L (ref 1.06–1.42)
POC IONIZED CALCIUM: 1.13 MMOL/L (ref 1.06–1.42)
POC IONIZED CALCIUM: 1.15 MMOL/L (ref 1.06–1.42)
POC IONIZED CALCIUM: 1.15 MMOL/L (ref 1.06–1.42)
POC IONIZED CALCIUM: 1.33 MMOL/L (ref 1.06–1.42)
POC PCO2 TEMP: 25.5 MMHG
POC PCO2 TEMP: 28 MMHG
POC PCO2 TEMP: 29.2 MMHG
POC PCO2 TEMP: 32.3 MMHG
POC PCO2 TEMP: 32.9 MMHG
POC PH TEMP: 7.34
POC PH TEMP: 7.38
POC PH TEMP: 7.48
POC PH TEMP: 7.49
POC PH TEMP: 7.49
POC PO2 TEMP: 182 MMHG
POC PO2 TEMP: 293 MMHG
POC PO2 TEMP: 297 MMHG
POC PO2 TEMP: 32 MMHG
POC PO2 TEMP: 321 MMHG
POC SATURATED O2: 100 % (ref 95–100)
POC SATURATED O2: 90 % (ref 95–100)
POC SATURATED O2: 99 % (ref 95–100)
POC TCO2: 19 MMOL/L (ref 23–27)
POC TCO2: 21 MMOL/L (ref 23–27)
POC TCO2: 22 MMOL/L (ref 23–27)
POC TCO2: 23 MMOL/L (ref 23–27)
POC TCO2: 23 MMOL/L (ref 24–29)
POC TCO2: 24 MMOL/L (ref 23–27)
POC TCO2: 24 MMOL/L (ref 23–27)
POC TCO2: 25 MMOL/L (ref 23–27)
POC TEMPERATURE: ABNORMAL
POC TEMPERATURE: NORMAL
POCT GLUCOSE: 101 MG/DL (ref 70–110)
POCT GLUCOSE: 105 MG/DL (ref 70–110)
POCT GLUCOSE: 106 MG/DL (ref 70–110)
POCT GLUCOSE: 111 MG/DL (ref 70–110)
POCT GLUCOSE: 116 MG/DL (ref 70–110)
POCT GLUCOSE: 124 MG/DL (ref 70–110)
POCT GLUCOSE: 161 MG/DL (ref 70–110)
POCT GLUCOSE: 171 MG/DL (ref 70–110)
POCT GLUCOSE: 86 MG/DL (ref 70–110)
POIKILOCYTOSIS BLD QL SMEAR: SLIGHT
POLYCHROMASIA BLD QL SMEAR: ABNORMAL
POTASSIUM BLD-SCNC: 3.5 MMOL/L (ref 3.5–5.1)
POTASSIUM BLD-SCNC: 3.7 MMOL/L (ref 3.5–5.1)
POTASSIUM BLD-SCNC: 3.7 MMOL/L (ref 3.5–5.1)
POTASSIUM BLD-SCNC: 3.9 MMOL/L (ref 3.5–5.1)
POTASSIUM BLD-SCNC: 4 MMOL/L (ref 3.5–5.1)
POTASSIUM BLD-SCNC: 4 MMOL/L (ref 3.5–5.1)
POTASSIUM BLD-SCNC: 4.1 MMOL/L (ref 3.5–5.1)
POTASSIUM BLD-SCNC: 4.1 MMOL/L (ref 3.5–5.1)
POTASSIUM BLD-SCNC: 4.2 MMOL/L (ref 3.5–5.1)
POTASSIUM BLD-SCNC: 4.6 MMOL/L (ref 3.5–5.1)
POTASSIUM BLD-SCNC: 4.6 MMOL/L (ref 3.5–5.1)
POTASSIUM BLD-SCNC: 4.9 MMOL/L (ref 3.5–5.1)
POTASSIUM SERPL-SCNC: 3.8 MMOL/L (ref 3.5–5.1)
POTASSIUM SERPL-SCNC: 3.9 MMOL/L (ref 3.5–5.1)
POTASSIUM SERPL-SCNC: 4.3 MMOL/L (ref 3.5–5.1)
PROT SERPL-MCNC: 3.5 G/DL (ref 6–8.4)
PROT SERPL-MCNC: 4.8 G/DL (ref 6–8.4)
PROTHROMBIN TIME: 10.6 SEC (ref 9–12.5)
PROTHROMBIN TIME: 13.5 SEC (ref 9–12.5)
PROTHROMBIN TIME: 15.3 SEC (ref 9–12.5)
RBC # BLD AUTO: 2.62 M/UL (ref 4.6–6.2)
RBC # BLD AUTO: 2.81 M/UL (ref 4.6–6.2)
RBC # BLD AUTO: 3.1 M/UL (ref 4.6–6.2)
RBC # BLD AUTO: 4.54 M/UL (ref 4.6–6.2)
SAMPLE: ABNORMAL
SAMPLE: NORMAL
SAMPLE: NORMAL
SITE: ABNORMAL
SITE: NORMAL
SODIUM BLD-SCNC: 135 MMOL/L (ref 136–145)
SODIUM BLD-SCNC: 135 MMOL/L (ref 136–145)
SODIUM BLD-SCNC: 136 MMOL/L (ref 136–145)
SODIUM BLD-SCNC: 137 MMOL/L (ref 136–145)
SODIUM BLD-SCNC: 139 MMOL/L (ref 136–145)
SODIUM BLD-SCNC: 142 MMOL/L (ref 136–145)
SODIUM SERPL-SCNC: 135 MMOL/L (ref 136–145)
SODIUM SERPL-SCNC: 138 MMOL/L (ref 136–145)
SODIUM SERPL-SCNC: 139 MMOL/L (ref 136–145)
SP02: 100
SPECIMEN OUTDATE: NORMAL
UNIT NUMBER: NORMAL
VT: 470
WBC # BLD AUTO: 10.57 K/UL (ref 3.9–12.7)
WBC # BLD AUTO: 13.44 K/UL (ref 3.9–12.7)
WBC # BLD AUTO: 18 K/UL (ref 3.9–12.7)
WBC # BLD AUTO: 6.56 K/UL (ref 3.9–12.7)

## 2024-08-27 PROCEDURE — 82330 ASSAY OF CALCIUM: CPT

## 2024-08-27 PROCEDURE — 84100 ASSAY OF PHOSPHORUS: CPT

## 2024-08-27 PROCEDURE — 99223 1ST HOSP IP/OBS HIGH 75: CPT | Mod: 25,GC,, | Performed by: ANESTHESIOLOGY

## 2024-08-27 PROCEDURE — 27201423 OPTIME MED/SURG SUP & DEVICES STERILE SUPPLY: Performed by: INTERNAL MEDICINE

## 2024-08-27 PROCEDURE — 82803 BLOOD GASES ANY COMBINATION: CPT

## 2024-08-27 PROCEDURE — 30233N1 TRANSFUSION OF NONAUTOLOGOUS RED BLOOD CELLS INTO PERIPHERAL VEIN, PERCUTANEOUS APPROACH: ICD-10-PCS | Performed by: STUDENT IN AN ORGANIZED HEALTH CARE EDUCATION/TRAINING PROGRAM

## 2024-08-27 PROCEDURE — 83880 ASSAY OF NATRIURETIC PEPTIDE: CPT | Performed by: INTERNAL MEDICINE

## 2024-08-27 PROCEDURE — 27800903 OPTIME MED/SURG SUP & DEVICES OTHER IMPLANTS: Performed by: INTERNAL MEDICINE

## 2024-08-27 PROCEDURE — 93010 ELECTROCARDIOGRAM REPORT: CPT | Mod: ,,, | Performed by: INTERNAL MEDICINE

## 2024-08-27 PROCEDURE — 02RF08Z REPLACEMENT OF AORTIC VALVE WITH ZOOPLASTIC TISSUE, OPEN APPROACH: ICD-10-PCS | Performed by: STUDENT IN AN ORGANIZED HEALTH CARE EDUCATION/TRAINING PROGRAM

## 2024-08-27 PROCEDURE — 88305 TISSUE EXAM BY PATHOLOGIST: CPT | Mod: 26,HCNC,, | Performed by: PATHOLOGY

## 2024-08-27 PROCEDURE — 84132 ASSAY OF SERUM POTASSIUM: CPT

## 2024-08-27 PROCEDURE — 86985 SPLIT BLOOD OR PRODUCTS: CPT | Performed by: ANESTHESIOLOGY

## 2024-08-27 PROCEDURE — 85730 THROMBOPLASTIN TIME PARTIAL: CPT | Mod: 91 | Performed by: STUDENT IN AN ORGANIZED HEALTH CARE EDUCATION/TRAINING PROGRAM

## 2024-08-27 PROCEDURE — 27200953 HC CARDIOPLEGIA SYSTEM

## 2024-08-27 PROCEDURE — P9035 PLATELET PHERES LEUKOREDUCED: HCPCS | Performed by: ANESTHESIOLOGY

## 2024-08-27 PROCEDURE — 82330 ASSAY OF CALCIUM: CPT | Performed by: STUDENT IN AN ORGANIZED HEALTH CARE EDUCATION/TRAINING PROGRAM

## 2024-08-27 PROCEDURE — 37000009 HC ANESTHESIA EA ADD 15 MINS: Performed by: THORACIC SURGERY (CARDIOTHORACIC VASCULAR SURGERY)

## 2024-08-27 PROCEDURE — 63600175 PHARM REV CODE 636 W HCPCS: Performed by: STUDENT IN AN ORGANIZED HEALTH CARE EDUCATION/TRAINING PROGRAM

## 2024-08-27 PROCEDURE — 25500020 PHARM REV CODE 255: Mod: HCNC | Performed by: INTERNAL MEDICINE

## 2024-08-27 PROCEDURE — 33405 REPLACEMENT AORTIC VALVE OPN: CPT | Mod: 51,,, | Performed by: STUDENT IN AN ORGANIZED HEALTH CARE EDUCATION/TRAINING PROGRAM

## 2024-08-27 PROCEDURE — P9011 BLOOD SPLIT UNIT: HCPCS | Performed by: ANESTHESIOLOGY

## 2024-08-27 PROCEDURE — 37799 UNLISTED PX VASCULAR SURGERY: CPT

## 2024-08-27 PROCEDURE — 36592 COLLECT BLOOD FROM PICC: CPT

## 2024-08-27 PROCEDURE — 85002 BLEEDING TIME TEST: CPT

## 2024-08-27 PROCEDURE — P9016 RBC LEUKOCYTES REDUCED: HCPCS | Performed by: INTERNAL MEDICINE

## 2024-08-27 PROCEDURE — 36000713 HC OR TIME LEV V EA ADD 15 MIN: Performed by: THORACIC SURGERY (CARDIOTHORACIC VASCULAR SURGERY)

## 2024-08-27 PROCEDURE — 82962 GLUCOSE BLOOD TEST: CPT | Performed by: INTERNAL MEDICINE

## 2024-08-27 PROCEDURE — 36000712 HC OR TIME LEV V 1ST 15 MIN: Performed by: THORACIC SURGERY (CARDIOTHORACIC VASCULAR SURGERY)

## 2024-08-27 PROCEDURE — 27100088 HC CELL SAVER

## 2024-08-27 PROCEDURE — 33361 REPLACE AORTIC VALVE PERQ: CPT | Mod: Q0,HCNC | Performed by: INTERNAL MEDICINE

## 2024-08-27 PROCEDURE — 02UX0JZ SUPPLEMENT THORACIC AORTA, ASCENDING/ARCH WITH SYNTHETIC SUBSTITUTE, OPEN APPROACH: ICD-10-PCS | Performed by: STUDENT IN AN ORGANIZED HEALTH CARE EDUCATION/TRAINING PROGRAM

## 2024-08-27 PROCEDURE — 86900 BLOOD TYPING SEROLOGIC ABO: CPT | Performed by: INTERNAL MEDICINE

## 2024-08-27 PROCEDURE — 27000445 HC TEMPORARY PACEMAKER LEADS

## 2024-08-27 PROCEDURE — 93041 RHYTHM ECG TRACING: CPT

## 2024-08-27 PROCEDURE — 93005 ELECTROCARDIOGRAM TRACING: CPT | Mod: HCNC

## 2024-08-27 PROCEDURE — 63600531 PHARM REV CODE 636 NO ALT 250 W HCPCS: Mod: JZ,JG

## 2024-08-27 PROCEDURE — 80053 COMPREHEN METABOLIC PANEL: CPT

## 2024-08-27 PROCEDURE — 99900035 HC TECH TIME PER 15 MIN (STAT)

## 2024-08-27 PROCEDURE — 85610 PROTHROMBIN TIME: CPT | Mod: 91 | Performed by: STUDENT IN AN ORGANIZED HEALTH CARE EDUCATION/TRAINING PROGRAM

## 2024-08-27 PROCEDURE — 25000003 PHARM REV CODE 250: Performed by: ANESTHESIOLOGY

## 2024-08-27 PROCEDURE — 27100026 HC SHUNT SENSOR, TERUMO

## 2024-08-27 PROCEDURE — 25000003 PHARM REV CODE 250: Mod: HCNC | Performed by: INTERNAL MEDICINE

## 2024-08-27 PROCEDURE — 82800 BLOOD PH: CPT

## 2024-08-27 PROCEDURE — 36620 INSERTION CATHETER ARTERY: CPT | Mod: 59,,, | Performed by: ANESTHESIOLOGY

## 2024-08-27 PROCEDURE — C1769 GUIDE WIRE: HCPCS | Performed by: INTERNAL MEDICINE

## 2024-08-27 PROCEDURE — P9012 CRYOPRECIPITATE EACH UNIT: HCPCS | Performed by: ANESTHESIOLOGY

## 2024-08-27 PROCEDURE — 99499 UNLISTED E&M SERVICE: CPT | Mod: ,,, | Performed by: THORACIC SURGERY (CARDIOTHORACIC VASCULAR SURGERY)

## 2024-08-27 PROCEDURE — 36556 INSERT NON-TUNNEL CV CATH: CPT | Mod: 59,,, | Performed by: ANESTHESIOLOGY

## 2024-08-27 PROCEDURE — 25000003 PHARM REV CODE 250: Performed by: STUDENT IN AN ORGANIZED HEALTH CARE EDUCATION/TRAINING PROGRAM

## 2024-08-27 PROCEDURE — 30283B1 TRANSFUSION OF NONAUTOLOGOUS 4-FACTOR PROTHROMBIN COMPLEX CONCENTRATE INTO VEIN, PERCUTANEOUS APPROACH: ICD-10-PCS | Performed by: STUDENT IN AN ORGANIZED HEALTH CARE EDUCATION/TRAINING PROGRAM

## 2024-08-27 PROCEDURE — 27000175 HC ADULT BYPASS PUMP

## 2024-08-27 PROCEDURE — P9017 PLASMA 1 DONOR FRZ W/IN 8 HR: HCPCS | Performed by: INTERNAL MEDICINE

## 2024-08-27 PROCEDURE — 85025 COMPLETE CBC W/AUTO DIFF WBC: CPT | Mod: 91 | Performed by: STUDENT IN AN ORGANIZED HEALTH CARE EDUCATION/TRAINING PROGRAM

## 2024-08-27 PROCEDURE — 85730 THROMBOPLASTIN TIME PARTIAL: CPT | Mod: 91

## 2024-08-27 PROCEDURE — 86965 POOLING BLOOD PLATELETS: CPT | Performed by: ANESTHESIOLOGY

## 2024-08-27 PROCEDURE — 86901 BLOOD TYPING SEROLOGIC RH(D): CPT | Performed by: INTERNAL MEDICINE

## 2024-08-27 PROCEDURE — 25000003 PHARM REV CODE 250

## 2024-08-27 PROCEDURE — 85025 COMPLETE CBC W/AUTO DIFF WBC: CPT

## 2024-08-27 PROCEDURE — 0W3C0ZZ CONTROL BLEEDING IN MEDIASTINUM, OPEN APPROACH: ICD-10-PCS | Performed by: STUDENT IN AN ORGANIZED HEALTH CARE EDUCATION/TRAINING PROGRAM

## 2024-08-27 PROCEDURE — 80053 COMPREHEN METABOLIC PANEL: CPT | Mod: 91 | Performed by: STUDENT IN AN ORGANIZED HEALTH CARE EDUCATION/TRAINING PROGRAM

## 2024-08-27 PROCEDURE — 63600175 PHARM REV CODE 636 W HCPCS

## 2024-08-27 PROCEDURE — 94761 N-INVAS EAR/PLS OXIMETRY MLT: CPT | Mod: XB

## 2024-08-27 PROCEDURE — 27200677 HC TRANSDUCER MONITOR KIT SINGLE: Performed by: ANESTHESIOLOGY

## 2024-08-27 PROCEDURE — 88300 SURGICAL PATH GROSS: CPT | Mod: 26,HCNC,, | Performed by: PATHOLOGY

## 2024-08-27 PROCEDURE — 30233R1 TRANSFUSION OF NONAUTOLOGOUS PLATELETS INTO PERIPHERAL VEIN, PERCUTANEOUS APPROACH: ICD-10-PCS | Performed by: STUDENT IN AN ORGANIZED HEALTH CARE EDUCATION/TRAINING PROGRAM

## 2024-08-27 PROCEDURE — 88300 SURGICAL PATH GROSS: CPT | Mod: HCNC | Performed by: PATHOLOGY

## 2024-08-27 PROCEDURE — 88305 TISSUE EXAM BY PATHOLOGIST: CPT | Mod: HCNC | Performed by: PATHOLOGY

## 2024-08-27 PROCEDURE — 02RF38Z REPLACEMENT OF AORTIC VALVE WITH ZOOPLASTIC TISSUE, PERCUTANEOUS APPROACH: ICD-10-PCS | Performed by: INTERNAL MEDICINE

## 2024-08-27 PROCEDURE — C1894 INTRO/SHEATH, NON-LASER: HCPCS | Performed by: INTERNAL MEDICINE

## 2024-08-27 PROCEDURE — 33361 REPLACE AORTIC VALVE PERQ: CPT | Mod: 62,Q0,HCNC, | Performed by: INTERNAL MEDICINE

## 2024-08-27 PROCEDURE — 5A1221Z PERFORMANCE OF CARDIAC OUTPUT, CONTINUOUS: ICD-10-PCS | Performed by: STUDENT IN AN ORGANIZED HEALTH CARE EDUCATION/TRAINING PROGRAM

## 2024-08-27 PROCEDURE — 5A1223Z PERFORMANCE OF CARDIAC PACING, CONTINUOUS: ICD-10-PCS | Performed by: INTERNAL MEDICINE

## 2024-08-27 PROCEDURE — 27202608 HC CANNULA, MISC

## 2024-08-27 PROCEDURE — 36415 COLL VENOUS BLD VENIPUNCTURE: CPT | Performed by: INTERNAL MEDICINE

## 2024-08-27 PROCEDURE — 85027 COMPLETE CBC AUTOMATED: CPT | Mod: 91 | Performed by: STUDENT IN AN ORGANIZED HEALTH CARE EDUCATION/TRAINING PROGRAM

## 2024-08-27 PROCEDURE — 83605 ASSAY OF LACTIC ACID: CPT

## 2024-08-27 PROCEDURE — 85610 PROTHROMBIN TIME: CPT | Mod: 91

## 2024-08-27 PROCEDURE — C1779 LEAD, PMKR, TRANSVENOUS VDD: HCPCS | Performed by: INTERNAL MEDICINE

## 2024-08-27 PROCEDURE — 85027 COMPLETE CBC AUTOMATED: CPT | Performed by: INTERNAL MEDICINE

## 2024-08-27 PROCEDURE — 37000009 HC ANESTHESIA EA ADD 15 MINS: Performed by: INTERNAL MEDICINE

## 2024-08-27 PROCEDURE — 83735 ASSAY OF MAGNESIUM: CPT | Mod: 91 | Performed by: STUDENT IN AN ORGANIZED HEALTH CARE EDUCATION/TRAINING PROGRAM

## 2024-08-27 PROCEDURE — C1760 CLOSURE DEV, VASC: HCPCS | Performed by: INTERNAL MEDICINE

## 2024-08-27 PROCEDURE — 27000191 HC C-V MONITORING

## 2024-08-27 PROCEDURE — 25000003 PHARM REV CODE 250: Mod: HCNC

## 2024-08-27 PROCEDURE — 36430 TRANSFUSION BLD/BLD COMPNT: CPT

## 2024-08-27 PROCEDURE — 27100021 HC MULTIPORT INFUSION MANIFOLD: Performed by: ANESTHESIOLOGY

## 2024-08-27 PROCEDURE — 30233M1 TRANSFUSION OF NONAUTOLOGOUS PLASMA CRYOPRECIPITATE INTO PERIPHERAL VEIN, PERCUTANEOUS APPROACH: ICD-10-PCS | Performed by: STUDENT IN AN ORGANIZED HEALTH CARE EDUCATION/TRAINING PROGRAM

## 2024-08-27 PROCEDURE — 37000008 HC ANESTHESIA 1ST 15 MINUTES: Performed by: THORACIC SURGERY (CARDIOTHORACIC VASCULAR SURGERY)

## 2024-08-27 PROCEDURE — 20000000 HC ICU ROOM

## 2024-08-27 PROCEDURE — 37000008 HC ANESTHESIA 1ST 15 MINUTES: Performed by: INTERNAL MEDICINE

## 2024-08-27 PROCEDURE — L8670 VASCULAR GRAFT, SYNTHETIC: HCPCS | Performed by: THORACIC SURGERY (CARDIOTHORACIC VASCULAR SURGERY)

## 2024-08-27 PROCEDURE — 02PY08Z REMOVAL OF ZOOPLASTIC TISSUE FROM GREAT VESSEL, OPEN APPROACH: ICD-10-PCS | Performed by: STUDENT IN AN ORGANIZED HEALTH CARE EDUCATION/TRAINING PROGRAM

## 2024-08-27 PROCEDURE — 85384 FIBRINOGEN ACTIVITY: CPT | Mod: 91

## 2024-08-27 PROCEDURE — 33858 AS-AORT GRF F/AORTIC DSJ: CPT | Mod: ,,, | Performed by: STUDENT IN AN ORGANIZED HEALTH CARE EDUCATION/TRAINING PROGRAM

## 2024-08-27 PROCEDURE — 83735 ASSAY OF MAGNESIUM: CPT

## 2024-08-27 PROCEDURE — 33361 REPLACE AORTIC VALVE PERQ: CPT | Mod: 62,Q0,HCNC, | Performed by: STUDENT IN AN ORGANIZED HEALTH CARE EDUCATION/TRAINING PROGRAM

## 2024-08-27 PROCEDURE — 63600175 PHARM REV CODE 636 W HCPCS: Mod: HCNC | Performed by: INTERNAL MEDICINE

## 2024-08-27 PROCEDURE — 94002 VENT MGMT INPAT INIT DAY: CPT

## 2024-08-27 PROCEDURE — 02RX0JZ REPLACEMENT OF THORACIC AORTA, ASCENDING/ARCH WITH SYNTHETIC SUBSTITUTE, OPEN APPROACH: ICD-10-PCS | Performed by: STUDENT IN AN ORGANIZED HEALTH CARE EDUCATION/TRAINING PROGRAM

## 2024-08-27 PROCEDURE — 84100 ASSAY OF PHOSPHORUS: CPT | Mod: 91 | Performed by: STUDENT IN AN ORGANIZED HEALTH CARE EDUCATION/TRAINING PROGRAM

## 2024-08-27 PROCEDURE — 33866 AORTIC HEMIARCH GRAFT: CPT | Mod: ,,, | Performed by: STUDENT IN AN ORGANIZED HEALTH CARE EDUCATION/TRAINING PROGRAM

## 2024-08-27 PROCEDURE — 85014 HEMATOCRIT: CPT

## 2024-08-27 PROCEDURE — 27800595 HC HEART VALVES

## 2024-08-27 PROCEDURE — P9035 PLATELET PHERES LEUKOREDUCED: HCPCS | Performed by: STUDENT IN AN ORGANIZED HEALTH CARE EDUCATION/TRAINING PROGRAM

## 2024-08-27 PROCEDURE — 63600175 PHARM REV CODE 636 W HCPCS: Mod: JZ,JG

## 2024-08-27 PROCEDURE — 27201673 HC ANCILLARY CANNULA

## 2024-08-27 PROCEDURE — 84295 ASSAY OF SERUM SODIUM: CPT

## 2024-08-27 PROCEDURE — 85384 FIBRINOGEN ACTIVITY: CPT | Performed by: STUDENT IN AN ORGANIZED HEALTH CARE EDUCATION/TRAINING PROGRAM

## 2024-08-27 PROCEDURE — 80048 BASIC METABOLIC PNL TOTAL CA: CPT | Mod: XB | Performed by: INTERNAL MEDICINE

## 2024-08-27 PROCEDURE — 27100025 HC TUBING, SET FLUID WARMER: Performed by: ANESTHESIOLOGY

## 2024-08-27 PROCEDURE — 30233K1 TRANSFUSION OF NONAUTOLOGOUS FROZEN PLASMA INTO PERIPHERAL VEIN, PERCUTANEOUS APPROACH: ICD-10-PCS | Performed by: STUDENT IN AN ORGANIZED HEALTH CARE EDUCATION/TRAINING PROGRAM

## 2024-08-27 PROCEDURE — 99900026 HC AIRWAY MAINTENANCE (STAT)

## 2024-08-27 PROCEDURE — 86920 COMPATIBILITY TEST SPIN: CPT | Performed by: INTERNAL MEDICINE

## 2024-08-27 PROCEDURE — 27100171 HC OXYGEN HIGH FLOW UP TO 24 HOURS

## 2024-08-27 PROCEDURE — 36556 INSERT NON-TUNNEL CV CATH: CPT | Mod: XE,GC,, | Performed by: ANESTHESIOLOGY

## 2024-08-27 PROCEDURE — 27201037 HC PRESSURE MONITORING SET UP

## 2024-08-27 PROCEDURE — C1751 CATH, INF, PER/CENT/MIDLINE: HCPCS | Performed by: ANESTHESIOLOGY

## 2024-08-27 PROCEDURE — 85520 HEPARIN ASSAY: CPT

## 2024-08-27 PROCEDURE — 85384 FIBRINOGEN ACTIVITY: CPT | Mod: 91 | Performed by: STUDENT IN AN ORGANIZED HEALTH CARE EDUCATION/TRAINING PROGRAM

## 2024-08-27 PROCEDURE — P9045 ALBUMIN (HUMAN), 5%, 250 ML: HCPCS | Mod: JZ,JG

## 2024-08-27 PROCEDURE — C1894 INTRO/SHEATH, NON-LASER: HCPCS | Performed by: ANESTHESIOLOGY

## 2024-08-27 DEVICE — VALVE AVALUS AORTIC HEART 23MM: Type: IMPLANTABLE DEVICE | Site: HEART | Status: FUNCTIONAL

## 2024-08-27 DEVICE — GELWEAVE GELATIN IMPREGNATED WOVEN VASCULAR PROSTHESIS  ANTE-FLO
Type: IMPLANTABLE DEVICE | Site: AORTA | Status: FUNCTIONAL
Brand: GELWEAVE™

## 2024-08-27 DEVICE — VALVE EVOLUT TM FX AORTIC 34MM: Type: IMPLANTABLE DEVICE | Site: HEART | Status: FUNCTIONAL

## 2024-08-27 DEVICE — SYS EVOLUT FX DELIVERY 34MM: Type: IMPLANTABLE DEVICE | Site: OTHER (ADD COMMENT) | Status: FUNCTIONAL

## 2024-08-27 DEVICE — SYS EVOLUT FX LOADING 34MM: Type: IMPLANTABLE DEVICE | Site: OTHER (ADD COMMENT) | Status: FUNCTIONAL

## 2024-08-27 RX ORDER — PROPOFOL 10 MG/ML
INJECTION, EMULSION INTRAVENOUS
Status: DISPENSED
Start: 2024-08-27 | End: 2024-08-28

## 2024-08-27 RX ORDER — HEPARIN SOD,PORCINE/0.9 % NACL 1000/500ML
INTRAVENOUS SOLUTION INTRAVENOUS
Status: DISCONTINUED | OUTPATIENT
Start: 2024-08-27 | End: 2024-08-27

## 2024-08-27 RX ORDER — ACETAMINOPHEN 500 MG
1000 TABLET ORAL
Status: COMPLETED | OUTPATIENT
Start: 2024-08-27 | End: 2024-08-27

## 2024-08-27 RX ORDER — HYDROCODONE BITARTRATE AND ACETAMINOPHEN 500; 5 MG/1; MG/1
TABLET ORAL
Status: DISCONTINUED | OUTPATIENT
Start: 2024-08-27 | End: 2024-08-28

## 2024-08-27 RX ORDER — HEPARIN SODIUM 1000 [USP'U]/ML
INJECTION, SOLUTION INTRAVENOUS; SUBCUTANEOUS
Status: DISCONTINUED | OUTPATIENT
Start: 2024-08-27 | End: 2024-08-27

## 2024-08-27 RX ORDER — CEFAZOLIN SODIUM 1 G/3ML
INJECTION, POWDER, FOR SOLUTION INTRAMUSCULAR; INTRAVENOUS
Status: DISCONTINUED | OUTPATIENT
Start: 2024-08-27 | End: 2024-08-27

## 2024-08-27 RX ORDER — DOCUSATE SODIUM 100 MG/1
100 CAPSULE, LIQUID FILLED ORAL 2 TIMES DAILY
Status: DISCONTINUED | OUTPATIENT
Start: 2024-08-27 | End: 2024-08-30

## 2024-08-27 RX ORDER — PROPOFOL 10 MG/ML
VIAL (ML) INTRAVENOUS CONTINUOUS PRN
Status: DISCONTINUED | OUTPATIENT
Start: 2024-08-27 | End: 2024-08-27

## 2024-08-27 RX ORDER — OXYCODONE HYDROCHLORIDE 5 MG/1
5 TABLET ORAL EVERY 4 HOURS PRN
Status: DISCONTINUED | OUTPATIENT
Start: 2024-08-27 | End: 2024-09-03 | Stop reason: HOSPADM

## 2024-08-27 RX ORDER — ROCURONIUM BROMIDE 10 MG/ML
INJECTION, SOLUTION INTRAVENOUS
Status: DISCONTINUED | OUTPATIENT
Start: 2024-08-27 | End: 2024-08-27

## 2024-08-27 RX ORDER — FENTANYL CITRATE 50 UG/ML
INJECTION, SOLUTION INTRAMUSCULAR; INTRAVENOUS
Status: DISCONTINUED | OUTPATIENT
Start: 2024-08-27 | End: 2024-08-27

## 2024-08-27 RX ORDER — LEVOTHYROXINE SODIUM 88 UG/1
88 TABLET ORAL
Status: DISCONTINUED | OUTPATIENT
Start: 2024-08-28 | End: 2024-09-03 | Stop reason: HOSPADM

## 2024-08-27 RX ORDER — SODIUM CHLORIDE 9 MG/ML
INJECTION, SOLUTION INTRAVENOUS CONTINUOUS
Status: ACTIVE | OUTPATIENT
Start: 2024-08-27 | End: 2024-08-27

## 2024-08-27 RX ORDER — LIDOCAINE HYDROCHLORIDE 20 MG/ML
INJECTION, SOLUTION INFILTRATION; PERINEURAL
Status: DISCONTINUED | OUTPATIENT
Start: 2024-08-27 | End: 2024-08-27

## 2024-08-27 RX ORDER — MAGNESIUM SULFATE HEPTAHYDRATE 40 MG/ML
4 INJECTION, SOLUTION INTRAVENOUS
Status: DISCONTINUED | OUTPATIENT
Start: 2024-08-27 | End: 2024-08-30

## 2024-08-27 RX ORDER — TAMSULOSIN HYDROCHLORIDE 0.4 MG/1
1 CAPSULE ORAL DAILY
Status: DISCONTINUED | OUTPATIENT
Start: 2024-08-28 | End: 2024-09-03 | Stop reason: HOSPADM

## 2024-08-27 RX ORDER — ASPIRIN 325 MG
325 TABLET, DELAYED RELEASE (ENTERIC COATED) ORAL DAILY
Status: DISCONTINUED | OUTPATIENT
Start: 2024-08-28 | End: 2024-08-29

## 2024-08-27 RX ORDER — POTASSIUM CHLORIDE 14.9 MG/ML
20 INJECTION INTRAVENOUS
Status: DISCONTINUED | OUTPATIENT
Start: 2024-08-27 | End: 2024-08-30

## 2024-08-27 RX ORDER — TRANEXAMIC ACID 100 MG/ML
INJECTION, SOLUTION INTRAVENOUS
Status: DISCONTINUED | OUTPATIENT
Start: 2024-08-27 | End: 2024-08-27

## 2024-08-27 RX ORDER — LIDOCAINE HYDROCHLORIDE 20 MG/ML
INJECTION, SOLUTION EPIDURAL; INFILTRATION; INTRACAUDAL; PERINEURAL
Status: DISCONTINUED | OUTPATIENT
Start: 2024-08-27 | End: 2024-08-27

## 2024-08-27 RX ORDER — DIPHENHYDRAMINE HCL 50 MG
50 CAPSULE ORAL ONCE
Status: COMPLETED | OUTPATIENT
Start: 2024-08-27 | End: 2024-08-27

## 2024-08-27 RX ORDER — EPINEPHRINE 1 MG/ML
INJECTION, SOLUTION, CONCENTRATE INTRAVENOUS
Status: DISCONTINUED | OUTPATIENT
Start: 2024-08-27 | End: 2024-08-27

## 2024-08-27 RX ORDER — CALCIUM CHLORIDE INJECTION 100 MG/ML
INJECTION, SOLUTION INTRAVENOUS
Status: DISCONTINUED | OUTPATIENT
Start: 2024-08-27 | End: 2024-08-27

## 2024-08-27 RX ORDER — MUPIROCIN 20 MG/G
OINTMENT TOPICAL 2 TIMES DAILY
Status: COMPLETED | OUTPATIENT
Start: 2024-08-27 | End: 2024-08-29

## 2024-08-27 RX ORDER — NOREPINEPHRINE BITARTRATE/D5W 4MG/250ML
0-3 PLASTIC BAG, INJECTION (ML) INTRAVENOUS CONTINUOUS
Status: DISCONTINUED | OUTPATIENT
Start: 2024-08-27 | End: 2024-08-29

## 2024-08-27 RX ORDER — FAMOTIDINE 10 MG/ML
20 INJECTION INTRAVENOUS 2 TIMES DAILY
Status: DISCONTINUED | OUTPATIENT
Start: 2024-08-27 | End: 2024-08-28

## 2024-08-27 RX ORDER — METOCLOPRAMIDE HYDROCHLORIDE 5 MG/ML
5 INJECTION INTRAMUSCULAR; INTRAVENOUS EVERY 6 HOURS PRN
Status: DISCONTINUED | OUTPATIENT
Start: 2024-08-27 | End: 2024-09-03 | Stop reason: HOSPADM

## 2024-08-27 RX ORDER — ALBUMIN HUMAN 50 G/1000ML
25 SOLUTION INTRAVENOUS ONCE
Status: COMPLETED | OUTPATIENT
Start: 2024-08-27 | End: 2024-08-27

## 2024-08-27 RX ORDER — NOREPINEPHRINE BITARTRATE 1 MG/ML
INJECTION, SOLUTION INTRAVENOUS
Status: DISCONTINUED | OUTPATIENT
Start: 2024-08-27 | End: 2024-08-27

## 2024-08-27 RX ORDER — FENTANYL CITRATE 50 UG/ML
50 INJECTION, SOLUTION INTRAMUSCULAR; INTRAVENOUS
Status: DISCONTINUED | OUTPATIENT
Start: 2024-08-27 | End: 2024-08-28

## 2024-08-27 RX ORDER — OXYCODONE HYDROCHLORIDE 10 MG/1
10 TABLET ORAL EVERY 4 HOURS PRN
Status: DISCONTINUED | OUTPATIENT
Start: 2024-08-27 | End: 2024-09-03 | Stop reason: HOSPADM

## 2024-08-27 RX ORDER — PHENYLEPHRINE HYDROCHLORIDE 10 MG/ML
INJECTION INTRAVENOUS
Status: DISCONTINUED | OUTPATIENT
Start: 2024-08-27 | End: 2024-08-27

## 2024-08-27 RX ORDER — ONDANSETRON HYDROCHLORIDE 2 MG/ML
INJECTION, SOLUTION INTRAVENOUS
Status: DISCONTINUED | OUTPATIENT
Start: 2024-08-27 | End: 2024-08-27

## 2024-08-27 RX ORDER — PROTAMINE SULFATE 10 MG/ML
INJECTION, SOLUTION INTRAVENOUS
Status: DISCONTINUED | OUTPATIENT
Start: 2024-08-27 | End: 2024-08-27

## 2024-08-27 RX ORDER — ACETAMINOPHEN 500 MG
1000 TABLET ORAL EVERY 8 HOURS
Status: DISCONTINUED | OUTPATIENT
Start: 2024-08-27 | End: 2024-09-03 | Stop reason: HOSPADM

## 2024-08-27 RX ORDER — NOREPINEPHRINE BITARTRATE/D5W 4MG/250ML
PLASTIC BAG, INJECTION (ML) INTRAVENOUS CONTINUOUS PRN
Status: DISCONTINUED | OUTPATIENT
Start: 2024-08-27 | End: 2024-08-27

## 2024-08-27 RX ORDER — POTASSIUM CHLORIDE 14.9 MG/ML
60 INJECTION INTRAVENOUS
Status: DISCONTINUED | OUTPATIENT
Start: 2024-08-27 | End: 2024-08-30

## 2024-08-27 RX ORDER — ALBUMIN HUMAN 50 G/1000ML
25 SOLUTION INTRAVENOUS ONCE
Status: DISCONTINUED | OUTPATIENT
Start: 2024-08-27 | End: 2024-08-29

## 2024-08-27 RX ORDER — PROPOFOL 10 MG/ML
VIAL (ML) INTRAVENOUS
Status: DISCONTINUED | OUTPATIENT
Start: 2024-08-27 | End: 2024-08-27

## 2024-08-27 RX ORDER — POLYETHYLENE GLYCOL 3350 17 G/17G
17 POWDER, FOR SOLUTION ORAL DAILY
Status: DISCONTINUED | OUTPATIENT
Start: 2024-08-28 | End: 2024-09-03 | Stop reason: HOSPADM

## 2024-08-27 RX ORDER — DULOXETIN HYDROCHLORIDE 30 MG/1
30 CAPSULE, DELAYED RELEASE ORAL DAILY
Status: DISCONTINUED | OUTPATIENT
Start: 2024-08-28 | End: 2024-09-03 | Stop reason: HOSPADM

## 2024-08-27 RX ORDER — ONDANSETRON HYDROCHLORIDE 2 MG/ML
4 INJECTION, SOLUTION INTRAVENOUS EVERY 6 HOURS PRN
Status: DISCONTINUED | OUTPATIENT
Start: 2024-08-27 | End: 2024-09-03 | Stop reason: HOSPADM

## 2024-08-27 RX ORDER — MAGNESIUM SULFATE HEPTAHYDRATE 40 MG/ML
2 INJECTION, SOLUTION INTRAVENOUS
Status: DISCONTINUED | OUTPATIENT
Start: 2024-08-27 | End: 2024-08-30

## 2024-08-27 RX ORDER — HYDROCODONE BITARTRATE AND ACETAMINOPHEN 500; 5 MG/1; MG/1
TABLET ORAL
Status: DISCONTINUED | OUTPATIENT
Start: 2024-08-27 | End: 2024-08-29

## 2024-08-27 RX ORDER — PROPOFOL 10 MG/ML
0-50 INJECTION, EMULSION INTRAVENOUS CONTINUOUS
Status: DISCONTINUED | OUTPATIENT
Start: 2024-08-27 | End: 2024-08-28

## 2024-08-27 RX ORDER — ATORVASTATIN CALCIUM 40 MG/1
80 TABLET, FILM COATED ORAL DAILY
Status: DISCONTINUED | OUTPATIENT
Start: 2024-08-28 | End: 2024-09-03 | Stop reason: HOSPADM

## 2024-08-27 RX ORDER — ALBUMIN HUMAN 50 G/1000ML
SOLUTION INTRAVENOUS
Status: DISPENSED
Start: 2024-08-27 | End: 2024-08-28

## 2024-08-27 RX ORDER — POTASSIUM CHLORIDE 29.8 MG/ML
40 INJECTION INTRAVENOUS
Status: DISCONTINUED | OUTPATIENT
Start: 2024-08-27 | End: 2024-08-30

## 2024-08-27 RX ORDER — TRANEXAMIC ACID 100 MG/ML
INJECTION, SOLUTION INTRAVENOUS CONTINUOUS PRN
Status: DISCONTINUED | OUTPATIENT
Start: 2024-08-27 | End: 2024-08-27

## 2024-08-27 RX ORDER — NICARDIPINE HYDROCHLORIDE 0.2 MG/ML
INJECTION INTRAVENOUS CONTINUOUS PRN
Status: DISCONTINUED | OUTPATIENT
Start: 2024-08-27 | End: 2024-08-27

## 2024-08-27 RX ADMIN — NOREPINEPHRINE BITARTRATE 0.04 MCG/KG/MIN: 1 INJECTION, SOLUTION, CONCENTRATE INTRAVENOUS at 08:08

## 2024-08-27 RX ADMIN — MUPIROCIN: 20 OINTMENT TOPICAL at 09:08

## 2024-08-27 RX ADMIN — TRANEXAMIC ACID 900 MG: 100 INJECTION, SOLUTION INTRAVENOUS at 11:08

## 2024-08-27 RX ADMIN — FENTANYL CITRATE 50 MCG: 50 INJECTION, SOLUTION INTRAMUSCULAR; INTRAVENOUS at 05:08

## 2024-08-27 RX ADMIN — CALCIUM CHLORIDE 500 MG: 100 INJECTION, SOLUTION INTRAVENOUS at 02:08

## 2024-08-27 RX ADMIN — FENTANYL CITRATE 50 MCG: 50 INJECTION, SOLUTION INTRAMUSCULAR; INTRAVENOUS at 08:08

## 2024-08-27 RX ADMIN — PROTAMINE SULFATE 20 MG: 10 INJECTION, SOLUTION INTRAVENOUS at 09:08

## 2024-08-27 RX ADMIN — PROTAMINE SULFATE 10 MG: 10 INJECTION, SOLUTION INTRAVENOUS at 09:08

## 2024-08-27 RX ADMIN — SODIUM CHLORIDE 120 UNITS/HR: 9 INJECTION, SOLUTION INTRAVENOUS at 03:08

## 2024-08-27 RX ADMIN — PROTAMINE SULFATE 5 MG: 10 INJECTION, SOLUTION INTRAVENOUS at 09:08

## 2024-08-27 RX ADMIN — PHENYLEPHRINE HYDROCHLORIDE 200 MCG: 10 INJECTION INTRAVENOUS at 08:08

## 2024-08-27 RX ADMIN — TRANEXAMIC ACID 1 MG/KG/HR: 100 INJECTION, SOLUTION INTRAVENOUS at 12:08

## 2024-08-27 RX ADMIN — FENTANYL CITRATE 50 MCG: 50 INJECTION INTRAMUSCULAR; INTRAVENOUS at 10:08

## 2024-08-27 RX ADMIN — IOHEXOL 100 ML: 350 INJECTION, SOLUTION INTRAVENOUS at 10:08

## 2024-08-27 RX ADMIN — ROCURONIUM BROMIDE 100 MG: 10 INJECTION INTRAVENOUS at 01:08

## 2024-08-27 RX ADMIN — DIPHENHYDRAMINE HYDROCHLORIDE 50 MG: 50 CAPSULE ORAL at 07:08

## 2024-08-27 RX ADMIN — ACETAMINOPHEN 1000 MG: 500 TABLET ORAL at 09:08

## 2024-08-27 RX ADMIN — CEFAZOLIN 2 G: 330 INJECTION, POWDER, FOR SOLUTION INTRAMUSCULAR; INTRAVENOUS at 03:08

## 2024-08-27 RX ADMIN — FENTANYL CITRATE 25 MCG: 50 INJECTION, SOLUTION INTRAMUSCULAR; INTRAVENOUS at 08:08

## 2024-08-27 RX ADMIN — HEPARIN SODIUM 9000 UNITS: 1000 INJECTION, SOLUTION INTRAVENOUS; SUBCUTANEOUS at 09:08

## 2024-08-27 RX ADMIN — CEFAZOLIN 2 G: 330 INJECTION, POWDER, FOR SOLUTION INTRAMUSCULAR; INTRAVENOUS at 08:08

## 2024-08-27 RX ADMIN — EPINEPHRINE 20 MCG: 1 INJECTION, SOLUTION, CONCENTRATE INTRAVENOUS at 02:08

## 2024-08-27 RX ADMIN — ROCURONIUM BROMIDE 50 MG: 10 INJECTION INTRAVENOUS at 03:08

## 2024-08-27 RX ADMIN — PROTAMINE SULFATE 15 MG: 10 INJECTION, SOLUTION INTRAVENOUS at 10:08

## 2024-08-27 RX ADMIN — SUGAMMADEX 400 MG: 100 INJECTION, SOLUTION INTRAVENOUS at 05:08

## 2024-08-27 RX ADMIN — SODIUM CHLORIDE, SODIUM GLUCONATE, SODIUM ACETATE, POTASSIUM CHLORIDE AND MAGNESIUM CHLORIDE: 526; 502; 368; 37; 30 INJECTION, SOLUTION INTRAVENOUS at 10:08

## 2024-08-27 RX ADMIN — LIDOCAINE HYDROCHLORIDE 90 MG: 20 INJECTION, SOLUTION EPIDURAL; INFILTRATION; INTRACAUDAL at 08:08

## 2024-08-27 RX ADMIN — NOREPINEPHRINE BITARTRATE 16 MCG: 1 INJECTION, SOLUTION, CONCENTRATE INTRAVENOUS at 02:08

## 2024-08-27 RX ADMIN — EPINEPHRINE 0.08 MCG/KG/MIN: 1 INJECTION INTRAMUSCULAR; INTRAVENOUS; SUBCUTANEOUS at 02:08

## 2024-08-27 RX ADMIN — TRANEXAMIC ACID 0.06 MG: 100 INJECTION, SOLUTION INTRAVENOUS at 04:08

## 2024-08-27 RX ADMIN — ROCURONIUM BROMIDE 100 MG: 10 INJECTION INTRAVENOUS at 10:08

## 2024-08-27 RX ADMIN — CEFAZOLIN 2 G: 330 INJECTION, POWDER, FOR SOLUTION INTRAMUSCULAR; INTRAVENOUS at 11:08

## 2024-08-27 RX ADMIN — PROTAMINE SULFATE 260 MG: 10 INJECTION, SOLUTION INTRAVENOUS at 03:08

## 2024-08-27 RX ADMIN — NOREPINEPHRINE BITARTRATE 0.04 MCG/KG/MIN: 4 INJECTION, SOLUTION INTRAVENOUS at 01:08

## 2024-08-27 RX ADMIN — SODIUM CHLORIDE: 0.9 INJECTION, SOLUTION INTRAVENOUS at 08:08

## 2024-08-27 RX ADMIN — SODIUM CHLORIDE 6 UNITS/HR: 9 INJECTION, SOLUTION INTRAVENOUS at 03:08

## 2024-08-27 RX ADMIN — ALBUMIN (HUMAN) 25 G: 12.5 SOLUTION INTRAVENOUS at 06:08

## 2024-08-27 RX ADMIN — FAMOTIDINE 20 MG: 10 INJECTION, SOLUTION INTRAVENOUS at 09:08

## 2024-08-27 RX ADMIN — COAGULATION FACTOR VIIA (RECOMBINANT) 1 MG: KIT at 08:08

## 2024-08-27 RX ADMIN — NICARDIPINE HYDROCHLORIDE 2.5 MG/HR: 0.2 INJECTION, SOLUTION INTRAVENOUS at 10:08

## 2024-08-27 RX ADMIN — ACETAMINOPHEN 1000 MG: 500 TABLET ORAL at 07:08

## 2024-08-27 RX ADMIN — ONDANSETRON 4 MG: 2 INJECTION INTRAMUSCULAR; INTRAVENOUS at 05:08

## 2024-08-27 RX ADMIN — PROPOFOL 100 MG: 10 INJECTION, EMULSION INTRAVENOUS at 10:08

## 2024-08-27 RX ADMIN — FENTANYL CITRATE 100 MCG: 50 INJECTION INTRAMUSCULAR; INTRAVENOUS at 11:08

## 2024-08-27 RX ADMIN — SODIUM CHLORIDE, SODIUM GLUCONATE, SODIUM ACETATE, POTASSIUM CHLORIDE, MAGNESIUM CHLORIDE, SODIUM PHOSPHATE, DIBASIC, AND POTASSIUM PHOSPHATE: .53; .5; .37; .037; .03; .012; .00082 INJECTION, SOLUTION INTRAVENOUS at 08:08

## 2024-08-27 RX ADMIN — PROPOFOL 40 MG: 10 INJECTION, EMULSION INTRAVENOUS at 11:08

## 2024-08-27 RX ADMIN — HEPARIN SODIUM 27000 UNITS: 1000 INJECTION, SOLUTION INTRAVENOUS; SUBCUTANEOUS at 11:08

## 2024-08-27 RX ADMIN — TRANEXAMIC ACID 0.08 MG: 100 INJECTION, SOLUTION INTRAVENOUS at 04:08

## 2024-08-27 RX ADMIN — VASOPRESSIN 0.04 UNITS/MIN: 20 INJECTION INTRAVENOUS at 04:08

## 2024-08-27 RX ADMIN — PROPOFOL 30 MG: 10 INJECTION, EMULSION INTRAVENOUS at 11:08

## 2024-08-27 RX ADMIN — PROPOFOL 20 MCG/KG/MIN: 10 INJECTION, EMULSION INTRAVENOUS at 09:08

## 2024-08-27 RX ADMIN — PROPOFOL 100 MCG/KG/MIN: 10 INJECTION, EMULSION INTRAVENOUS at 07:08

## 2024-08-27 RX ADMIN — CEFAZOLIN 2 G: 2 INJECTION, POWDER, FOR SOLUTION INTRAMUSCULAR; INTRAVENOUS at 10:08

## 2024-08-27 NOTE — HPI
Cesar Simpson is a 74 y.o. male referred by Dr Vickers for evaluation of severe AS (NYHA Class III sx). He has a h/o CAD, PAD, severe AS who presents to the clinic for evaluaton for TAVR.   Reports having sxs of lightheadedness, sob and BASILIO for the past 2 years that have been progressively getting worse. TTE performed and was consistent with moderate to severe AS.    Today patient continues to have symptoms of severe AS. Limits his activity so that he does not get CP & SOB.     The patient has undergone the following TAVR work-up:   ECHO (Date 4/3/24): JUANITO= 0.90 cm2, MG= 24mmHg, Peak Nathaniel= 3.18 m/s, EF= 58%.   LHC (Date 4/23/24): s/p PCI of pLAD   STS: 2%   Frailty: not Frail       Iliacs are >9 on R    LVOT area by CTA is 5.66 cm2 (31 mm X 24 mm) and Avg Diameter is 26.9 per my independent review of images on [place].  Incidental findings on CT: intermixed pulmonary nodules. Fibrotic lung changes  CT Surgery risk assessment: will need to be done  Rhythm issues: None  PFTs: FEV1 89% predicted  KCCQ/5 meter walk:   Comorbidities:         Cesar Simpson is a 29 mm  Sapian S3 valve candidate via RCFA access.          TTE: EF 58%, moderate to severe  AS with valve aortia 0.90 cm2, PV 3.18 m/s, MG 24 mmHg, DI 0.29. SVI 26.12  Angiogram on 4/23/24: s/p pci of prox and mid LAD.   CTA TAVR done and results reviewed by Dr. Forde.

## 2024-08-27 NOTE — PLAN OF CARE
2nd contact Pt called again wants a call to r/s yayo from Feb 2/25/21 with PD   Patient arrived to room. PIV placed, labs sent. Admit assessment completed. Plan of care discussed with patient.

## 2024-08-27 NOTE — ASSESSMENT & PLAN NOTE
Transcatheter Aortic valve replacement   Valve: 29 mm Sapian S3 valve   ECMO:  On Stand by  TAVR access: [facility]  Valvuloplasty balloon: 18 mm  Viabahn size if needed: 9X50 mm  Antithrombotic therapy: ASA and Plavix  Temporary pacing wire: No, Will pace from TAVR wire  Pacemaker risk factors: None   Post op destination: Stepdown  Antibiotics given: (to be done during procedure)  Heart failure on admission?  CONSENTING:  The patient was told that the procedure carries around a 12.5% risk of permanent pacemaker requirement and that risk depends on the patients underlying conduction system.  Prior to the clin visit, all available records from referring provider were reviewed.  I have personally reviewed all the lab tests available related to the patient's case  The patient's images were reviewed by myself and the procedural planning was done with my own interpretation of the iliac and aortic anatomy based on CTA, angiography or LUBNA. I have reviewed all other imaging studies relevant to the patient including echocardiography and coronary angiography.  Patient was educated abut the pathophysiology and natural history of severe aortic stenosis and was educated about the treatment options including surgical and transcatheter valve replacement. She agrees to be full code for the forseable future and to undergo workup for treatment of severe AS.   The risks, benefits, and alternatives of transcatheter aortic valve replacement were discussed with the patient. All questions were answered and informed consent was obtained. I had a detailed discussion with the patient regarding risk of stroke, MI, bleeding access site complications including limb loss, allergy, kidney failure including dialysis and death.  The patient understands the risks and benefits and wishes to go ahead with the procedure.  The referring Cardiologist was notified of the plan  All patient's questions were answered     Shared Decision Making:  This patient was  seen today by a multidisciplinary heart team involving both a Structural Heart Specialist (Interventional Cardiologist) and a Cardiothoracic Surgeon. The patient was involved in shared decision-making to define clearer goals for treatment and align health decisions with their current values.  The patient understands the risks and expected benefits of their treatment options.

## 2024-08-27 NOTE — ANESTHESIA POSTPROCEDURE EVALUATION
Anesthesia Post Evaluation    Patient: Cesar Simpson    Procedure(s) Performed: Procedure(s) (LRB):  REPLACEMENT, AORTIC VALVE, TRANSCATHETER (TAVR) (N/A)  Cardiac Cath Cosurgeon (N/A)    Final Anesthesia Type: general      Patient location during evaluation: Cath Lab  Patient participation: Yes- Able to Participate  Level of consciousness: awake and alert and oriented  Post-procedure vital signs: reviewed and stable  Pain management: adequate  Airway patency: patent  FABY mitigation strategies: Intraoperative administration of CPAP, nasopharyngeal airway, or oral appliance during sedation  PONV status at discharge: No PONV  Anesthetic complications: yes  Perioperative Events: unplanned ICU admission      Mavis-operative Events Comments: Aortic dissection post TAVR deployment requiring transport to OR for class A repair.   Cardiovascular status: hemodynamically stable  Respiratory status: unassisted  Hydration status: euvolemic  Follow-up not needed.              Vitals Value Taken Time   /71 08/27/24 1405   Temp  08/27/24 1405   Pulse 67 08/27/24 1405   Resp 12 08/27/24 1405   SpO2 100% 08/27/24 1405         No case tracking events are documented in the log.      Pain/Sherly Score: Pain Rating Prior to Med Admin: 0 (8/27/2024  7:28 AM)

## 2024-08-27 NOTE — CARE UPDATE
Consult acknowledged. Formal consult note to follow in the AM.     Cesar Simpson is a 74 y.o. male with a h/o CAD, PAD, severe AS, HTN, HLD, Hypothyroidism, FABY, T2DM who underwent a TAVR on 8/27/24 that was complicated by an aortic dissection prompting OR intervention with CTS. The patient presents to the SICU s/p bioprosthetic aortic valve replacement, hemiarch, and ascending aorta repair with Dr. Carroll on 08/27/2024. Endocrine consulted for BG management.       Hemoglobin A1C   Date Value Ref Range Status   03/07/2024 6.0 (H) 4.0 - 5.6 % Final     Comment:     ADA Screening Guidelines:  5.7-6.4%  Consistent with prediabetes  >or=6.5%  Consistent with diabetes    High levels of fetal hemoglobin interfere with the HbA1C  assay. Heterozygous hemoglobin variants (HbS, HgC, etc)do  not significantly interfere with this assay.   However, presence of multiple variants may affect accuracy.     08/11/2023 5.8 (H) 4.0 - 5.6 % Final     Comment:     ADA Screening Guidelines:  5.7-6.4%  Consistent with prediabetes  >or=6.5%  Consistent with diabetes    High levels of fetal hemoglobin interfere with the HbA1C  assay. Heterozygous hemoglobin variants (HbS, HgC, etc)do  not significantly interfere with this assay.   However, presence of multiple variants may affect accuracy.     04/13/2023 5.7 (H) 4.0 - 5.6 % Final     Comment:     ADA Screening Guidelines:  5.7-6.4%  Consistent with prediabetes  >or=6.5%  Consistent with diabetes    High levels of fetal hemoglobin interfere with the HbA1C  assay. Heterozygous hemoglobin variants (HbS, HgC, etc)do  not significantly interfere with this assay.   However, presence of multiple variants may affect accuracy.           BG goal: 140-180     - Continue insulin infusion protocol   - POCT Glucose every hour   - Hypoglycemia protocol in place      ** Please notify Endocrine for any change and/or advance in diet**  ** Please call Endocrine for any BG related issues **     Discharge  Planning:   TBD. Please notify endocrine prior to discharge.

## 2024-08-27 NOTE — ANESTHESIA PROCEDURE NOTES
LUBNA    Diagnosis: Acute Type A Aortic Dissection  Patient location during procedure: OR  Surgery related to: Aortic Dissection Repair  Exam type: Final    Staffing  Performed: anesthesiologist and fellow     Anesthesiologist: Nupur Bonilla MD        Anesthesiologist Present  Yes      Setup & Induction  Patient preparation: bite block inserted  Probe Insertion: easy  Exam: completeDoppler Echo: 2D, 3D, color flow mapping, continuous wave Doppler and pulse wave Doppler.  Exam                         Aortic Valve:  Morphology: prosthetic valve  Prosthesis: bioprosthetic      Mitral Valve:   Morphology:normal                  Effusions    Summary    Other Findings   Intraoperative LUBNA performed at request of Dr. Carroll for Type A Aortic Dissection Repair    OG tube passed into stomach and suctioned prior to placement of probe.   Bite block placed and probe inserted without difficulty.    Baseline Exam:    Left Atrium: Normal size. No evidence of MARYCRUZ thrombus.  Left Ventricle: Systolic function is qualitatively normal. There are no regional wall motion abnormalities.  Right Atrium:  Right Ventricle: Systolic function is qualitatively normal  Aortic Valve: The aortic valve is trileaflet.   Mitral Valve: The mitral valve is structurally normal.   Tricuspid Valve: The tricuspid valve appears structurally normal. .  Pulmonic Valve: There is  Aorta:   PFO: No evidence of PFO via color flow doppler.  Effusions:      Post-Bypass Exam:    Patient now s/p hemiarch repair    Infusions:    Left Atrium:  Left Ventricle:  Right Atrium:  Right Ventricle:  Aortic Valve:  Mitral Valve:  Tricuspid Valve:  Pulmonic Valve:  Aorta:  PFO:  Effusions:    Probe removed atraumatically.   No dental or oropharyngeal damage noted.

## 2024-08-27 NOTE — TRANSFER OF CARE
"Anesthesia Transfer of Care Note    Patient: Cesar Simpson    Procedure(s) Performed:   REPLACEMENT, AORTIC VALVE  REPLACEMENT, AORTA, ASCENDING; HEMIARCH (N/A)  EXPLANT; TAVR (N/A)    Patient location: ICU    Anesthesia Type: general    Transport from OR: Upon arrival to PACU/ICU, patient attached to ventilator and auscultated to confirm bilateral breath sounds and adequate TV. Continuous ECG monitoring in transport. Continuous SpO2 monitoring in transport. Continuos invasive BP monitoring in transport. Transported from OR intubated on 100% O2  with adequate ventilation controlled by transport ventilator. Continuous CVP monitoring in transport    Post pain: adequate analgesia    Post assessment: no apparent anesthetic complications    Post vital signs: stable (on low-dose vasopressor support)    Level of consciousness: sedated    Nausea/Vomiting: no nausea/vomiting    Complications: none    Transfer of care protocol was followedComments:   Patient transported to SICU [address]15 by Anesthesia MD x 2, OR RN, and CTS MD.  Handoff given at bedside to ICU RN Reese and SICU/CTS MD Team including Dr. Tavarez.       Last vitals: Visit Vitals  /74 (BP Location: Left arm, Patient Position: Lying)   Pulse 69   Temp 36.7 °C (98.1 °F) (Temporal)   Resp (!) 30   Ht 5' 7" (1.702 m)   Wt 86.2 kg (190 lb)   SpO2 100%   BMI 29.76 kg/m²     "

## 2024-08-27 NOTE — OP NOTE
DATE OF PROCEDURE:  08/27/2024     ATTENDING SURGEON:    Melvin Carroll MD Bansal, Aditya, MD     ASSISTANT:    Johan Reed Sydney  3.  Erin Hastings     PREOPERATIVE DIAGNOSES:  1.  Acute [place] A aortic dissection during TAVR procedure  2.  Hypertension.  3. Severe Aortic Stenosis  4. Diabetes mellitus  5. Hyperlipidemia  6. Hypertension  7. Coronary artery disease with ostial right coronary artery disease     POSTOPERATIVE DIAGNOSES:  1.  Acute Ju[place] A aortic dissection during TAVR procedure  2.  Hypertension.  3. Severe Aortic Stenosis  4. Diabetes mellitus  5. Hyperlipidemia  6. Hypertension  7. Coronary artery disease with ostial right coronary artery disease     OPERATIONS PERFORMED:  1.  Hemiarch replacement with a  26 mm Gelweave Dacron tube graft under deep   hypothermic circulatory arrest, circulatory arrest time 24 minutes at 28 degrees   Celsius with antegrade cerebral perfusion.  2.  Ascending aortic replacement with a 26 mm Gelweave Dacron tube graft   3. Explantation of Evolut TAVR valve   4. Aortic valve replacement with bioprosthetic 23 mm Avalus Valve    ANESTHESIA:  General endotracheal.     ESTIMATED BLOOD LOSS:  300 mL and Cell saver was used     BRIEF HISTORY:  A 74-year-old male with known severe aortic stenosis who presented today for TAVR procedure his comorbidities for coronary artery disease peripheral artery disease, hypertension, hyperlipidemia, hypothyroidism, diabetes mellitus.  During the TAVR procedure after the implantation with the 5 appear/hematoma was suspected on the proximal ascending aorta.  We decided to do a stat CTA which confirmed Juve type a aortic dissection. A   decision was made to bring the patient to the OR for a surgical repair     PROCEDURE IN DETAIL:  After obtaining informed and written consent, the patient   was brought to the Operating Room and placed on the operating table in supine   position.  After induction of adequate general  endotracheal anesthesia, the   patient's chest, abdomen, pelvis and bilateral lower extremities were prepped   and draped in the usual sterile fashion.  A median sternotomy was performed.  A pericardial well was created. The patient was systemically heparinized.  A pursestring was taken on the distal ascending aorta and a 22 mm E a cannula was inserted in the proximal arch after verifying the guidewire in the true lumen.  A pursestring was placed around the right atrial appendage.  A three-stage venous cannula was then passed into the IVC through the right atrial appendage.  A coronary sinus catheter was placed.  The patient was then put on cardiopulmonary bypass.    Once on bypass, a left ventricular vent was placed through the right superior pulmonary vein.  We then began systemic cooling.  We  the aorta from the pulmonary artery.  We exposed the proximal and mid arch.  We continued with systemic cooling.  Aorta was crossclamped and a transverse aortotomy was performed distal to the Evolut valve.  Carefully aorta was dissected from the pulmonary artery and ascending aorta was excised.  Evolut valve was then dissected out of the LV OT and explanted.  Direct ostial cardioplegia was then given.  There was some difficulty in encasing the right coronary ostium secondary to native coronary artery disease.  Ice slush was used for topical cooling and CO2 insufflation was used for deairing.  Further mobilization of the root was done circumferentially.  The valve was exposed. The leaflets were extremely heavily calcified.  The leaflets were cut out, and the annulus was evaluated.  In addition to the annulus being heavily calcified,    Extensive annular debridement was performed.     Once the annulus was satisfactory, the ventricle was irrigated with a liter of cold saline.  The annulus was sized and a 23 mm valve was selected.  While the valve was being retrieved, multiple pledgeted 2-0 Ethibond sutures were placed  circumferentially around the annulus.  Once the sutures were all in, the valve was brought up, the sutures were passed through the sewing ring, and it was slid into position.  It seated nicely. Cor-knots were used for securing the valve.    When we reached the desired core temperature of 28 degrees Celsius, the patient was exsanguinated into the heart-lung machine circuit.    The aorta was further resected resected distally to within 1 cm of the origin of the innominate artery.  A coronary sinus catheter was placed into the innominate artery through the bovine trunk and utilized for antegrade cerebral perfusion.  Along the lesser curve of the aorta, the resection was somewhat more distal.  A 26 mm Dacron tube graft was selected.  The distal anastomosis   was then performed using a running 4-0 Prolene suture reinforced with a felt strip.  A felt pledget was fashioned and placed into the false lumen.  Once the distal anastomosis was complete, site down off the graft was used for perfusion.  perfusion was gently restarted as we de-aired the arch and the graft.  The aortic cross clamp was then applied to the Dacron tube   graft and systemic rewarming was begun.  We then turned our attention to the aortic root.  Dacron graft was then trimmed and the proximal anastomosis was done using the same graft at the level of the sinotubular junction using 4 0 Prolene.  The root vent was then inserted in the Dacron graft distal to the clamp. de-airing maneuvers were performed. The aortic cross-clamp was removed and the patient was subsequently weaned from cardiopulmonary bypass.  The patient did separate   easily from bypass.  Once off bypass, all surgical sites were inspected.  There was good surgical hemostasis, but diffuse bleeding.  The test dose of protamine was administered, and this was well tolerated.  The total dose was then given. [place] through the total dose, all cannulas were removed.  All surgical sites were again  inspected, again there was good surgical hemostasis, but diffuse bleeding.  Blood and blood products were administered by Anesthesia. Pharmacologic measures were also utilized.  The mediastinum was repeatedly packed.  Eventually, we achieved acceptable hemostasis.  Drains were placed. Ventricular pacing wires were placed.  After again confirming acceptable hemostasis, the chest was closed using #6 stainless steel wires to re-approximate the sternum.  The overlying soft tissues were reapproximated using absorbable   suture material.  The patient's chest was washed and dried, and a dry dressing was applied.  The patient tolerated the procedure well, there were no complications.      At the conclusion of the case, sponge and instrument counts were correct.   I was scrubbed and present for the entire procedure.    Chest tubes: mediastinal x 2, right pleural x 1  Pacing wires: ventricular x 2      Melvin Carroll MD  Cardiac Surgery

## 2024-08-27 NOTE — H&P
[facility] - Short Stay Cardiac Unit  Interventional Cardiology  H&P    Patient Name: Cesar Simpson  MRN: [id number]  Admission Date: 8/27/2024  Code Status: No Order   Attending Provider: Cesar Louis MD   Primary Care Physician: Chaitanya López DO  Principal Problem:<principal problem not specified>    Patient information was obtained from patient and ER records.     Subjective:     Chief Complaint:  severe AS     HPI:  Cesar Simpson is a 74 y.o. male referred by Dr Vickers for evaluation of severe AS (NYHA Class III sx). He has a h/o CAD, PAD, severe AS who presents to the clinic for evaluaton for TAVR.   Reports having sxs of lightheadedness, sob and BASILIO for the past 2 years that have been progressively getting worse. TTE performed and was consistent with moderate to severe AS.    Today patient continues to have symptoms of severe AS. Limits his activity so that he does not get CP & SOB.     The patient has undergone the following TAVR work-up:   ECHO (Date 4/3/24): JUANITO= 0.90 cm2, MG= 24mmHg, Peak Nathaniel= 3.18 m/s, EF= 58%.   LHC (Date 4/23/24): s/p PCI of pLAD   STS: 2%   Frailty: not Frail       Iliacs are >9 on R    LVOT area by CTA is 5.66 cm2 (31 mm X 24 mm) and Avg Diameter is 26.9 per my independent review of images on [employer].  Incidental findings on CT: intermixed pulmonary nodules. Fibrotic lung changes  CT Surgery risk assessment: will need to be done  Rhythm issues: None  PFTs: FEV1 89% predicted  KCCQ/5 meter walk:   Comorbidities:         Cesar Simpson is a 29 mm  Sapian S3 valve candidate via RCFA access.          TTE: EF 58%, moderate to severe  AS with valve aortia 0.90 cm2, PV 3.18 m/s, MG 24 mmHg, DI 0.29. SVI 26.12  Angiogram on 4/23/24: s/p pci of prox and mid LAD.   CTA TAVR done and results reviewed by Dr. Forde.     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    HTN (hypertension) 02/27/2013    Hyperlipidemia     Hypothyroidism     Moderate obstructive sleep apnea 6/29/2022    Normocytic anemia 06/28/2021    Obesity     Osteoarthritis 02/08/2016    PAD (peripheral artery disease) 4/10/2024    Trouble in sleeping     Type 2 diabetes mellitus     Type 2 diabetes mellitus with diabetic polyneuropathy, without long-term current use of insulin        Past Surgical History:   Procedure Laterality Date    CATARACT EXTRACTION      CATHETERIZATION OF BOTH LEFT AND RIGHT HEART N/A 4/16/2024    Procedure: CATHETERIZATION, HEART, BOTH LEFT AND RIGHT;  Surgeon: Brian Vickers MD;  Location: [facility] CATH LAB/EP;  Service: Cardiology;  Laterality: N/A;  Aortic valve study/ Saul Catheter/2 manifolds    CORONARY ANGIOGRAPHY N/A 4/16/2024    Procedure: ANGIOGRAM, CORONARY ARTERY;  Surgeon: Brian Vickers MD;  Location: [facility] CATH LAB/EP;  Service: Cardiology;  Laterality: N/A;    CORONARY ANGIOPLASTY WITH STENT PLACEMENT N/A 4/23/2024    Procedure: ANGIOPLASTY, CORONARY ARTERY, WITH STENT INSERTION;  Surgeon: Brian Vickers MD;  Location: [facility] CATH LAB/EP;  Service: Cardiology;  Laterality: N/A;    EYE SURGERY      cataracts    INSTANTANEOUS WAVE-FREE RATIO (IFR) N/A 4/16/2024    Procedure: Instantaneous Wave-Free Ratio (IFR);  Surgeon: Brian Vickers MD;  Location: [facility] CATH LAB/EP;  Service: Cardiology;  Laterality: N/A;    IVUS, CORONARY  4/23/2024    Procedure: IVUS, Coronary;  Surgeon: Brian Vickers MD;  Location: [facility] CATH LAB/EP;  Service: Cardiology;;    JOINT REPLACEMENT Left     arthroplasty    PERCUTANEOUS CORONARY INTERVENTION, ARTERY N/A 4/23/2024    Procedure: Percutaneous coronary intervention;  Surgeon: Brian Vickers MD;  Location: [facility] CATH LAB/EP;  Service: Cardiology;  Laterality: N/A;  LAD    SHOULDER SURGERY      TONSILLECTOMY      TOTAL SHOULDER ARTHROPLASTY Left         Review of patient's allergies indicates:  No Known Allergies    Facility-Administered Medications Prior to Admission   Medication    sodium chloride 0.9% flush 10 mL    sodium chloride 0.9% flush 10 mL     PTA Medications   Medication Sig    ACCU-CHEK NAYA PLUS TEST STRP Strp CHECK BLOOD SUGAR DAILY AS DIRECTED    aspirin 81 MG Chew Take 1 tablet (81 mg total) by mouth once daily.    azelastine (ASTELIN) 137 mcg (0.1 %) nasal spray 1 spray (137 mcg total) by Nasal route 2 (two) times daily.    benzonatate (TESSALON) 200 MG capsule Take 1 capsule (200 mg total) by mouth 3 (three) times daily as needed for Cough. [email or web address]    blood-glucose meter Misc 1 device.  Check sugar 1 times daily as directed by MD. Supply preferred brand .Dx Code: [E11.8]    clopidogreL (PLAVIX) 75 mg tablet Take 1 tablet (75 mg total) by mouth once daily.    DULoxetine (CYMBALTA) 30 MG capsule Take 1 capsule (30 mg total) by mouth once daily.    esomeprazole (NEXIUM) 40 MG capsule Take 1 capsule (40 mg total) by mouth daily with dinner or evening meal.    finasteride (PROSCAR) 5 mg tablet Take 1 tablet (5 mg total) by mouth once daily.    lancets Misc 1 box.  Check 1x daily as directed by MD. Supply brand covered by insurance. Dx Code: [E11.8]. Accucheck Naya.    levothyroxine (SYNTHROID) 88 MCG tablet Take 1 tablet (88 mcg total) by mouth before breakfast.    olmesartan (BENICAR) 5 MG Tab Take 1 tablet (5 mg total) by mouth once daily.    rosuvastatin (CRESTOR) 20 MG tablet Take 1 tablet (20 mg total) by mouth once daily.    tamsulosin (FLOMAX) 0.4 mg Cap Take 1 capsule (0.4 mg total) by mouth once daily.    traZODone (DESYREL) 100 MG tablet Take 1 tablet (100 mg total) by mouth every evening.     Family History       Problem Relation (Age of Onset)    Early death Father, Brother, Paternal Uncle    Heart disease Father, Brother, Paternal Uncle    Hypertension Mother, Father, Brother    No Known Problems Daughter, Son     Osteoporosis Mother    Stroke Father          Tobacco Use    Smoking status: Never     Passive exposure: Never    Smokeless tobacco: Never   Substance and Sexual Activity    Alcohol use: Never     Comment: occasionally (maybe every 3 months)    Drug use: Never    Sexual activity: Not Currently     Partners: Female     Birth control/protection: None     Review of Systems   Constitutional: Negative for chills, fever, malaise/fatigue and night sweats.   HENT:  Negative for congestion, nosebleeds, sore throat, stridor and tinnitus.    Eyes:  Negative for blurred vision, discharge, double vision, pain, vision loss in left eye, vision loss in right eye, visual disturbance and visual halos.   Cardiovascular:  Positive for dyspnea on exertion. Negative for chest pain, leg swelling, near-syncope, orthopnea, palpitations and syncope.   Respiratory:  Negative for cough, hemoptysis, shortness of breath, snoring, sputum production and wheezing.    Endocrine: Negative for cold intolerance, heat intolerance and polydipsia.   Hematologic/Lymphatic: Negative for adenopathy and bleeding problem. Does not bruise/bleed easily.   Skin:  Negative for color change, dry skin, flushing, poor wound healing and suspicious lesions.   Musculoskeletal:  Negative for arthritis, back pain, gout, joint pain and joint swelling.   Gastrointestinal:  Negative for bloating, abdominal pain, constipation and diarrhea.   Genitourinary:  Negative for dysuria, frequency and hematuria.   Neurological:  Negative for dizziness, focal weakness, headaches, light-headedness, loss of balance, numbness and weakness.   Psychiatric/Behavioral:  Negative for altered mental status, hallucinations and hypervigilance. The patient is not nervous/anxious.      Objective:     Vital Signs (Most Recent):    Vital Signs (24h Range):           There is no height or weight on file to calculate BMI.            No intake or output data in the 24 hours ending 08/27/24  0652    Lines/Drains/Airways       None                    Physical Exam  Constitutional:       General: He is not in acute distress.     Appearance: Normal appearance. He is normal weight. He is not toxic-appearing.   HENT:      Head: Normocephalic and atraumatic.   Eyes:      General:         Right eye: No discharge.         Left eye: No discharge.      Extraocular Movements: Extraocular movements intact.      Conjunctiva/sclera: Conjunctivae normal.      Pupils: Pupils are equal, round, and reactive to light.   Cardiovascular:      Rate and Rhythm: Normal rate and regular rhythm.      Pulses: Normal pulses.      Heart sounds: Normal heart sounds. No murmur heard.     No friction rub.      Comments: Systolic ejection murmur to aortic area.  Right leg: fem+, DP+, PT+  Left leg: fem+, DP+, PT+  Right arm: radial+  Left arm: radial+      Pulmonary:      Effort: Pulmonary effort is normal. No respiratory distress.      Breath sounds: Normal breath sounds. No wheezing or rales.   Abdominal:      General: Abdomen is flat. Bowel sounds are normal. There is no distension.      Palpations: Abdomen is soft.      Tenderness: There is no abdominal tenderness. There is no guarding.   Musculoskeletal:         General: No swelling, tenderness or deformity. Normal range of motion.      Cervical back: Normal range of motion and neck supple. No rigidity.      Right lower leg: No edema.      Left lower leg: No edema.   Skin:     General: Skin is warm and dry.      Capillary Refill: Capillary refill takes less than 2 seconds.      Coloration: Skin is not jaundiced or pale.      Findings: No bruising.   Neurological:      General: No focal deficit present.      Mental Status: He is alert and oriented to person, place, and time. Mental status is at baseline.   Psychiatric:         Mood and Affect: Mood normal.         Thought Content: Thought content normal.         Judgment: Judgment normal.            Significant Labs: All pertinent  lab results from the last 24 hours have been reviewed.    Significant Imaging:  None  Assessment and Plan:     Cardiac/Vascular  Severe aortic stenosis  Transcatheter Aortic valve replacement   Valve: 29 mm Sapian S3 valve   ECMO:  On Stand by  TAVR access: RCFA  Valvuloplasty balloon: 18 mm  Viabahn size if needed: 9X50 mm  Antithrombotic therapy: ASA and Plavix  Temporary pacing wire: No, Will pace from TAVR wire  Pacemaker risk factors: None   Post op destination: Stepdown  Antibiotics given: (to be done during procedure)  Heart failure on admission?  CONSENTING:  The patient was told that the procedure carries around a 12.5% risk of permanent pacemaker requirement and that risk depends on the patients underlying conduction system.  Prior to the [facility] visit, all available records from referring provider were reviewed.  I have personally reviewed all the lab tests available related to the patient's case  The patient's images were reviewed by myself and the procedural planning was done with my own interpretation of the iliac and aortic anatomy based on CTA, angiography or LUBNA. I have reviewed all other imaging studies relevant to the patient including echocardiography and coronary angiography.  Patient was educated abut the pathophysiology and natural history of severe aortic stenosis and was educated about the treatment options including surgical and transcatheter valve replacement. She agrees to be full code for the forseable future and to undergo workup for treatment of severe AS.   The risks, benefits, and alternatives of transcatheter aortic valve replacement were discussed with the patient. All questions were answered and informed consent was obtained. I had a detailed discussion with the patient regarding risk of stroke, MI, bleeding access site complications including limb loss, allergy, kidney failure including dialysis and death.  The patient understands the risks and benefits and wishes to go ahead with  the procedure.  The referring Cardiologist was notified of the plan  All patient's questions were answered     Shared Decision Making:  This patient was seen today by a multidisciplinary heart team involving both a Structural Heart Specialist (Interventional Cardiologist) and a Cardiothoracic Surgeon. The patient was involved in shared decision-making to define clearer goals for treatment and align health decisions with their current values.  The patient understands the risks and expected benefits of their treatment options.             VTE Risk Mitigation (From admission, onward)      None              Cecil Hunter MD  Interventional Cardiology   [facility] - Short Stay Cardiac Unit

## 2024-08-27 NOTE — Clinical Note
The ECG showed paced rhythm, sinus rhythm and an A-V block. The A-V block was 1st degree. The patient has a temporary pacemaker.

## 2024-08-27 NOTE — ANESTHESIA PROCEDURE NOTES
Left IJ MAC Introducer    Diagnosis: Acute Type A Aortic Dissection  Doctor requesting consult: Melvin Carroll MD  Patient location during procedure: done in OR  Procedure Urgency: Emergent  Procedure start time: 8/27/2024 10:57 AM  Timeout: 8/27/2024 10:56 AM  Procedure end time: 8/27/2024 11:07 AM      Staffing  Authorizing Provider: Nupur Bonilla MD  Performing Provider: Alex Sanches MD    Staffing  Performed: resident/CRNA   Anesthesiologist: Nupur Bonilla MD  Resident/CRNA: Alex Sanches MD  Other anesthesia staff: Ar Davies MD  Performed by: Alex Sanches MD  Authorized by: Nupur Bonilla MD    Anesthesiologist was present at the time of the procedure.  Preanesthetic Checklist  Completed: patient identified, IV checked, risks and benefits discussed, surgical consent, monitors and equipment checked, pre-op evaluation, timeout performed and anesthesia consent given  Indication   Indication: hemodynamic monitoring, vascular access, med administration     Anesthesia   general anesthesia    Central Line   Skin Prep: skin prepped with ChloraPrep, skin prep agent completely dried prior to procedure  Sterile Barriers Followed: Yes    All five maximal barriers used- gloves, gown, cap, mask, and large sterile sheet    hand hygiene performed prior to central venous catheter insertion  Location: left internal jugular.   Catheter type: introducer  Catheter Size: 14 Fr  Inserted Catheter Length: 11.5 cm  Ultrasound: vascular probe with ultrasound   Vessel Caliber: large, patent  Vascular Doppler:  not done, compressibility normal  Needle advanced into vessel with real time Ultrasound guidance.  Guidewire confirmed in vessel.  sterile gel and probe cover used in ultrasound-guided central venous catheter insertion   Manometry: Venous cannualation confirmed by visual estimation of blood vessel pressure using manometry.  Insertion Attempts: 1   Securement:line sutured, chlorhexidine patch, sterile  dressing applied and blood return through all ports    Post-Procedure    Adverse Events:none      Guidewire Guidewire removed intact.

## 2024-08-27 NOTE — Clinical Note
The DP pulses were detected with doppler bilaterally. The PT pulses were detected with doppler bilaterally. The radial pulses were +2 bilaterally.

## 2024-08-27 NOTE — ANESTHESIA PROCEDURE NOTES
RIJ TVP    Diagnosis: Aortic stenosis  Patient location during procedure: done in OR  Procedure Urgency: Routine  Procedure start time: 8/27/2024 8:20 AM  Timeout: 8/27/2024 8:20 AM  Procedure end time: 8/27/2024 8:35 AM      Staffing  Authorizing Provider: Ar Davies MD  Performing Provider: Rik Barclay MD    Staffing  Performed by: Rik Barclay MD  Authorized by: Ar Davies MD    Anesthesiologist was present at the time of the procedure.  Preanesthetic Checklist  Completed: patient identified, IV checked, site marked, risks and benefits discussed, surgical consent, monitors and equipment checked, pre-op evaluation, timeout performed and anesthesia consent given  Indication   Indication: hemodynamic monitoring, vascular access, med administration     Anesthesia   local infiltration    Central Line   Skin Prep: skin prepped with ChloraPrep, skin prep agent completely dried prior to procedure  Sterile Barriers Followed: Yes    All five maximal barriers used- gloves, gown, cap, mask, and large sterile sheet    hand hygiene performed prior to central venous catheter insertion  Location: right internal jugular.   Catheter type: single lumen  Catheter Size: 6 Fr  Ultrasound: vascular probe with ultrasound   Vessel Caliber: patent  Needle advanced into vessel with real time Ultrasound guidance.  sterile gel and probe cover used in ultrasound-guided central venous catheter insertion   Manometry: Venous cannualation confirmed by visual estimation of blood vessel pressure using manometry.  Insertion Attempts: 1   Securement:line sutured, chlorhexidine patch, sterile dressing applied and blood return through all ports    Post-Procedure        Guidewire  Guidewire removed intact, verified with nurse.

## 2024-08-27 NOTE — ANESTHESIA PROCEDURE NOTES
Left Brachial Arterial Line    Diagnosis: Acute Type A Aortic Dissection  Doctor requesting consult: Melvin Carroll MD    Patient location during procedure: done in OR  Timeout: 8/27/2024 10:52 AM  Procedure end time: 8/27/2024 11:06 AM    Staffing  Authorizing Provider: Nupur Bonilla MD  Performing Provider: Nupur Bonilla MD    Staffing  Performed by: Nupur Bonilla MD  Authorized by: Nupur Bonilla MD    Anesthesiologist was present at the time of the procedure.    Preanesthetic Checklist  Completed: patient identified, IV checked, risks and benefits discussed, surgical consent, monitors and equipment checked, pre-op evaluation, timeout performed and anesthesia consent givenLeft Brachial Arterial Line  Skin Prep: chlorhexidine gluconate  Local Infiltration: none  Orientation: left  Location: brachial    Catheter Size: 20 G  Catheter placement by Ultrasound guidance. Heme positive aspiration all ports.   Vessel Caliber: small, patent  Vascular Doppler:  not done  Needle advanced into vessel with real time Ultrasound guidance.  Sterile sheath used.Insertion Attempts: 2  Assessment  Dressing: sutured in place and taped and tegaderm  Patient: Tolerated well

## 2024-08-27 NOTE — TRANSFER OF CARE
"Anesthesia Transfer of Care Note    Patient: Cesar Simpson    Procedure(s) Performed: Procedure(s) (LRB):  REPLACEMENT, AORTIC VALVE, TRANSCATHETER (TAVR) (N/A)  Cardiac Cath Cosurgeon (N/A)    Patient location: Other: Patient transported to CT for CTA and then to OR for emergent dissection repair    Anesthesia Type: MAC and general    Transport from OR: Transported from OR on 6-10 L/min O2 by face mask with adequate spontaneous ventilation    Post pain: adequate analgesia    Post assessment: no apparent anesthetic complications and tolerated procedure well    Post vital signs: stable    Level of consciousness: awake, alert and oriented    Nausea/Vomiting: no nausea/vomiting    Complications: none    Transfer of care protocol was followed      Last vitals: Visit Vitals  /74 (BP Location: Left arm, Patient Position: Lying)   Pulse 76   Temp 36.7 °C (98.1 °F) (Temporal)   Resp 18   Ht 5' 7" (1.702 m)   Wt 86.2 kg (190 lb)   SpO2 96%   BMI 29.76 kg/m²     "

## 2024-08-27 NOTE — HPI
Cesar Simpson is a 74 y.o. male with a h/o CAD, PAD, severe AS, HTN, HLD, Hypothyroidism, FABY, T2DM who underwent a TAVR on 8/27/24 that was complicated by an aortic dissection prompting OR intervention with CTS. The patient presents to the SICU s/p bioprosthetic aortic valve replacement, hemiarch, and ascending aorta repair with Dr. Carroll on 08/27/2024. On admission, they are intubated, sedated with propofol, and in stable condition. Inotropic and vasopressor requirements upon admission are 0.00 mcg/kg/min of epinephrine, 0.04 mcg/kg/min of norepinephrine, and 0.04 units/min of vasopressin to maintain blood pressure at a MAP 60-80 and SBP < 140. Central access includes a RIJ CVC, arterial access includes a left brachial arterial line & right radial arterial line. They also have 1 R pleural and 2 mediastinal chest tubes with appropriate output.    Intraoperatively, they received 2.5 L of crystalloid, 1 L of cell saver, 2 U PRBCs, 2 FFP, 1 platelets, and 1 cryoprecipitate. Urine output intraoperatively was 2000cc. The pre-operative echocardiogram was notable for normal function. Post-operative echo was unchanged.    Immediate post-operative plans include hemodynamic stabilization and weaning of cardiac and respiratory support. Plan to wean vasopressors and inotropes as tolerated, wean ventilator support with goal of early extubation, and closely monitor fluid status with strict Is/Os and continued fluid resuscitation as needed.

## 2024-08-27 NOTE — ASSESSMENT & PLAN NOTE
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 Carroll on 8/27/24.    Neuro/Psych:   - Sedation: propofol   - Pain:     - Scheduled Tylenol 1g q8h    - Fentanyl PRN while intubated, Oxy PRN                Cardiac:   - BP Goal: MAP 60-80, SBP <140  - Pressors: Vaso 0.04, levo 0.04  - Rhythm: Sinus bradycardia; backup pacer at 45  - Cleviprex/Cardene PRN  - Anti-HTNs: Resume as appropriate   - Beta blocker: Resume as appropriate    - Statin: Atorvastatin 40 mg QD when appropriate    Pulmonary:   - Goal SpO2 >92%  - Will wean ventilator support as tolerated to extubate  - ABGs PRN  - Chest Tubes x 3 (2 Meds & 1 Pleural)      Renal:      Recent Labs   Lab 08/27/24  0716   BUN 29*   CREATININE 1.1        - Maintain Peterson, record strict Is/Os    FEN / GI:     - Daily CMP, PRN K/Mag/Phos per protocol     - Replace electrolytes as needed    - Nutrition: NPO pending extubation    - Bowel Regimen: Miralax, docusate     ID:   - Afebrile  Recent Labs   Lab 08/27/24  0716   WBC 6.56     - Abx: Complete perioperative cefazolin 2g Q8H x 5 doses    Heme/Onc:   - Hgb ** pre-operatively  Recent Labs   Lab 08/27/24  0716   HGB 13.2*        - CBC, PTT/INR daily  - DVT ppx: SCDs  - HOLD ASA 325mg daily  - HOLD plavix     Endocrine:   - CTS Goal -140  - HgbA1c: 6.0  - Endocrinology consulted for insulin management     PPx:   Feeding: NPO  Analgesia/Sedation: Prop gtt, fent pushes while intubated  Thromboembolic Prevention: SCDs  HOB >30: Yes  Stress Ulcer: famotidine  Glucose Control: Yes, insulin management per Endocrinology     Lines/Drains/Airway:  ETT  R radial arterial line, L brachial a line  RIJ CVC, LIJ CVC  Peterson  Chest Tubes: 3 (2 Mediastinal, 1 Pleural)   Pacing Wires: Temporary ventricular pacing wires     Dispo/Code Status/Palliative:    - SICU   - Full Code

## 2024-08-27 NOTE — ANESTHESIA PROCEDURE NOTES
Arterial    Diagnosis: Aortic stenosis    Patient location during procedure: done in OR  Timeout: 8/27/2024 8:05 AM  Procedure end time: 8/27/2024 8:08 AM    Staffing  Authorizing Provider: Ar Davies MD  Performing Provider: Rik Barclay MD    Staffing  Performed by: Rik Barclay MD  Authorized by: Ar Davies MD    Anesthesiologist was present at the time of the procedure.    Preanesthetic Checklist  Completed: patient identified, IV checked, site marked, risks and benefits discussed, surgical consent, monitors and equipment checked, pre-op evaluation, timeout performed and anesthesia consent givenArterial  Skin Prep: chlorhexidine gluconate  Local Infiltration: lidocaine  Orientation: right  Location: radial    Catheter Size: 20 G  Catheter placement by Ultrasound guidance. Heme positive aspiration all ports.   Vessel Caliber: patent  Needle advanced into vessel with real time Ultrasound guidance.Insertion Attempts: 1  Assessment  Dressing: secured with tape and tegaderm  Patient: Tolerated well

## 2024-08-27 NOTE — RESPIRATORY THERAPY
Patient arrived from the OR intubated with size 7.5 ETT placed 24 @ lips.  ETT is intact, patent, and secured with commercial tube graves.  Placed patient on MV set on documented settings.  Will continue to monitor.

## 2024-08-27 NOTE — EICU
Intervention Initiated From:  Bedside    Donna intervened regarding:  Documentation    Nurse Notified:  Yes    Doctor Notified:  No    Comments: Nurses Note -- 4 Eyes      8/27/2024   6:01 PM      Skin assessed during: Admit      [x] No Altered Skin Integrity Present    []Prevention Measures Documented      [] Yes- Altered Skin Integrity Present or Discovered   [] LDA Added if Not in Epic (Describe Wound)   [] New Altered Skin Integrity was Present on Admit and Documented in LDA   [] Wound Image Taken    Wound Care Consulted? No    Attending Nurse:  Reese Pandya RN/Staff Member:  Cathy SANTIAGO, eICU

## 2024-08-27 NOTE — H&P
[facility] - Surgical Intensive Care  Critical Care - Surgery  History & Physical    Patient Name: Cesar Simpson  MRN: [id number]  Admission Date: 8/27/2024  Code Status: Full Code  Attending Physician: Melvin Carroll MD   Primary Care Provider: Chaitanya López DO   Principal Problem: <principal problem not specified>    Subjective:     HPI:  Cesar Simpson is a 74 y.o. male with a h/o CAD, PAD, severe AS, HTN, HLD, Hypothyroidism, FABY, T2DM who underwent a TAVR on 8/27/24 that was complicated by an aortic dissection prompting OR intervention with CTS. The patient presents to the SICU s/p bioprosthetic aortic valve replacement, hemiarch, and ascending aorta repair with Dr. Carroll on 08/27/2024. On admission, they are intubated, sedated with propofol, and in stable condition. Inotropic and vasopressor requirements upon admission are 0.00 mcg/kg/min of epinephrine, 0.04 mcg/kg/min of norepinephrine, and 0.04 units/min of vasopressin to maintain blood pressure at a MAP 60-80 and SBP < 140. Central access includes a RIJ CVC, arterial access includes a left brachial arterial line & right radial arterial line. They also have 1 R pleural and 2 mediastinal chest tubes with appropriate output.    Intraoperatively, they received 2.5 L of crystalloid, 1 L of cell saver, 2 U PRBCs, 2 FFP, 1 platelets, and 1 cryoprecipitate. Urine output intraoperatively was 2000cc. The pre-operative echocardiogram was notable for normal function. Post-operative echo was unchanged.    Immediate post-operative plans include hemodynamic stabilization and weaning of cardiac and respiratory support. Plan to wean vasopressors and inotropes as tolerated, wean ventilator support with goal of early extubation, and closely monitor fluid status with strict Is/Os and continued fluid resuscitation as needed.     Hospital/ICU Course:  No notes on file    Follow-up For: Procedure(s) (LRB):  REPLACEMENT, AORTIC VALVE  REPLACEMENT, AORTA, ASCENDING; HEMIARCH  (N/A)  EXPLANT; TAVR (N/A)    Post-Operative Day: Day of Surgery     Past Medical History:   Diagnosis Date    Coronary artery disease of native artery of native heart with stable angina pectoris 4/10/2024    Diabetes mellitus type II, uncontrolled 02/27/2013    High-density lipoprotein deficiency 02/27/2013    HTN (hypertension) 02/27/2013    Hyperlipidemia     Hypothyroidism     Moderate obstructive sleep apnea 6/29/2022    Normocytic anemia 06/28/2021    Obesity     Osteoarthritis 02/08/2016    PAD (peripheral artery disease) 4/10/2024    Trouble in sleeping     Type 2 diabetes mellitus     Type 2 diabetes mellitus with diabetic polyneuropathy, without long-term current use of insulin        Past Surgical History:   Procedure Laterality Date    CATARACT EXTRACTION      CATHETERIZATION OF BOTH LEFT AND RIGHT HEART N/A 4/16/2024    Procedure: CATHETERIZATION, HEART, BOTH LEFT AND RIGHT;  Surgeon: Brian Vickers MD;  Location: [facility] CATH LAB/EP;  Service: Cardiology;  Laterality: N/A;  Aortic valve study/ [place] Catheter/2 manifolds    CORONARY ANGIOGRAPHY N/A 4/16/2024    Procedure: ANGIOGRAM, CORONARY ARTERY;  Surgeon: Brian Vickers MD;  Location: [facility] CATH LAB/EP;  Service: Cardiology;  Laterality: N/A;    CORONARY ANGIOPLASTY WITH STENT PLACEMENT N/A 4/23/2024    Procedure: ANGIOPLASTY, CORONARY ARTERY, WITH STENT INSERTION;  Surgeon: Brian Vickers MD;  Location: [facility] CATH LAB/EP;  Service: Cardiology;  Laterality: N/A;    EYE SURGERY      cataracts    INSTANTANEOUS WAVE-FREE RATIO (IFR) N/A 4/16/2024    Procedure: Instantaneous Wave-Free Ratio (IFR);  Surgeon: Brian Vickers MD;  Location: [facility] CATH LAB/EP;  Service: Cardiology;  Laterality: N/A;    IVUS, CORONARY  4/23/2024    Procedure: IVUS, Coronary;  Surgeon: Brian Vickers MD;  Location: [facility] CATH LAB/EP;  Service: Cardiology;;    JOINT REPLACEMENT Left     arthroplasty    PERCUTANEOUS CORONARY INTERVENTION, ARTERY N/A 4/23/2024     Procedure: Percutaneous coronary intervention;  Surgeon: Brian Vickers MD;  Location: [facility] CATH LAB/EP;  Service: Cardiology;  Laterality: N/A;  LAD    SHOULDER SURGERY      TONSILLECTOMY      TOTAL SHOULDER ARTHROPLASTY Left        Review of patient's allergies indicates:  No Known Allergies    Family History       Problem Relation (Age of Onset)    Early death Father, Brother, Paternal Uncle    Heart disease Father, Brother, Paternal Uncle    Hypertension Mother, Father, Brother    No Known Problems Daughter, Son    Osteoporosis Mother    Stroke Father          Tobacco Use    Smoking status: Never     Passive exposure: Never    Smokeless tobacco: Never   Substance and Sexual Activity    Alcohol use: Never     Comment: occasionally (maybe every 3 months)    Drug use: Never    Sexual activity: Not Currently     Partners: Female     Birth control/protection: None      Review of Systems  Objective:     Vital Signs (Most Recent):  Temp: 98.1 °F (36.7 °C) (08/27/24 0731)  Pulse: 69 (08/27/24 1729)  Resp: (!) 30 (08/27/24 1729)  BP: 117/74 (08/27/24 0732)  SpO2: 100 % (08/27/24 1729) Vital Signs (24h Range):  Temp:  [98.1 °F (36.7 °C)] 98.1 °F (36.7 °C)  Pulse:  [69-76] 69  Resp:  [18-30] 30  SpO2:  [96 %-100 %] 100 %  BP: (117-125)/(73-74) 117/74     Weight: 86.2 kg (190 lb)  Body mass index is 29.76 kg/m².      Intake/Output Summary (Last 24 hours) at 8/27/2024 1730  Last data filed at 8/27/2024 1655  Gross per 24 hour   Intake 5024 ml   Output 2050 ml   Net 2974 ml          Physical Exam  Vitals and nursing note reviewed.   Constitutional:       General: He is not in acute distress.     Appearance: He is ill-appearing.   HENT:      Mouth/Throat:      Comments: ETT  Neck:      Comments: RIJ and LIJ CVC  Cardiovascular:      Rate and Rhythm: Normal rate and regular rhythm.      Comments: RIJ CVC  LIJ CVC  R radial A line  L brachial A line  V wires  Chest tubes 1x R pleural and 2x meds  Sternal dressing  c/d/I    Pulmonary:      Effort: No respiratory distress.      Comments: intubated  Abdominal:      General: There is no distension.      Palpations: Abdomen is soft.   Genitourinary:     Comments: mirza  Musculoskeletal:      Right lower leg: No edema.      Left lower leg: No edema.   Skin:     General: Skin is warm and dry.            Vents:  Vent Mode: A/C (08/27/24 1729)  Ventilator Initiated: Yes (08/27/24 1729)  Set Rate: 20 BPM (08/27/24 1729)  Vt Set: 470 mL (08/27/24 1729)  PEEP/CPAP: 5 cmH20 (08/27/24 1729)  Oxygen Concentration (%): 100 (08/27/24 1729)  Peak Airway Pressure: 33 cmH20 (08/27/24 1729)  Plateau Pressure: 0 cmH20 (08/27/24 1729)  Total Ve: 9.39 L/m (08/27/24 1729)  Negative Inspiratory Force (cm H2O): 0 (08/27/24 1729)  F/VT Ratio<105 (RSBI): (!) 81.3 (08/27/24 1729)    Lines/Drains/Airways       Central Venous Catheter Line  Duration             Introducer 08/27/24 0835 Internal Jugular Right <1 day    Introducer 08/27/24 1105 Internal Jugular Left <1 day    Percutaneous Central Line - Triple Lumen  08/27/24 1108 Internal Jugular Left <1 day              Drain  Duration                  Chest Tube 08/27/24 1451 Tube - 1 Right Pleural 28 Fr. <1 day         Chest Tube 08/27/24 1451 Tube - 2 Anterior Mediastinal 28 Fr. <1 day         Chest Tube 08/27/24 1452 Tube - 3 Posterior Mediastinal 28 Fr. <1 day         Urethral Catheter 08/27/24 1045 Non-latex;Straight-tip;Temperature probe 16 Fr. <1 day              Airway  Duration                  Airway - Non-Surgical 08/27/24 1049 <1 day              Arterial Line  Duration             Arterial Line 08/27/24 0805 Right Radial <1 day    Arterial Line 08/27/24 1105 Left Brachial <1 day              Line  Duration                  Pacer Wires 08/27/24 1452 <1 day              Peripheral Intravenous Line  Duration                  Peripheral IV - Single Lumen 08/27/24 0727 18 G Left Hand <1 day         Peripheral IV - Single Lumen 08/27/24 0815 16 G No  Right Antecubital <1 day         Sheath 08/27/24 0955 Right <1 day                    Significant Labs:    CBC/Anemia Profile:  Recent Labs   Lab 08/27/24  0716 08/27/24  1214 08/27/24  1437 08/27/24  1500 08/27/24  1510   WBC 6.56  --   --  18.00*  --    HGB 13.2*  --   --  8.3*  --    HCT 38.6*   < > 25* 24.1* 31*     --   --  154  --    MCV 85  --   --  86  --    RDW 13.1  --   --  12.9  --     < > = values in this interval not displayed.        Chemistries:  Recent Labs   Lab 08/27/24  0716   *   K 3.8      CO2 23   BUN 29*   CREATININE 1.1   CALCIUM 9.7       All pertinent labs within the past 24 hours have been reviewed.    Significant Imaging: I have reviewed all pertinent imaging results/findings within the past 24 hours.  Assessment/Plan:     Cardiac/Vascular  Severe aortic stenosis  S/p TAVR complicated by aortic dissection followed by bioprosthetic valve replacement w/ hemiarch repair w/ Dr. Carroll on 8/27/24.    Neuro/Psych:   - Sedation: propofol   - Pain:     - Scheduled Tylenol 1g q8h    - Fentanyl PRN while intubated, Oxy PRN                Cardiac:   - BP Goal: MAP 60-80, SBP <140  - Pressors: Vaso 0.04, levo 0.04  - Rhythm: Sinus bradycardia; backup pacer at 45  - Cleviprex/Cardene PRN  - Anti-HTNs: Resume as appropriate   - Beta blocker: Resume as appropriate    - Statin: Atorvastatin 40 mg QD when appropriate    Pulmonary:   - Goal SpO2 >92%  - Will wean ventilator support as tolerated to extubate  - ABGs PRN  - Chest Tubes x 3 (2 Meds & 1 Pleural)      Renal:      Recent Labs   Lab 08/27/24  0716   BUN 29*   CREATININE 1.1        - Maintain Peterson, record strict Is/Os    FEN / GI:     - Daily CMP, PRN K/Mag/Phos per protocol     - Replace electrolytes as needed    - Nutrition: NPO pending extubation    - Bowel Regimen: Miralax, docusate     ID:   - Afebrile  Recent Labs   Lab 08/27/24  0716   WBC 6.56     - Abx: Complete perioperative cefazolin 2g Q8H x 5 doses    Heme/Onc:   -  Hgb ** pre-operatively  Recent Labs   Lab 08/27/24  0716   HGB 13.2*        - CBC, PTT/INR daily  - DVT ppx: SCDs  - HOLD ASA 325mg daily  - HOLD plavix     Endocrine:   - CTS Goal -140  - HgbA1c: 6.0  - Endocrinology consulted for insulin management     PPx:   Feeding: NPO  Analgesia/Sedation: Prop gtt, fent pushes while intubated  Thromboembolic Prevention: SCDs  HOB >30: Yes  Stress Ulcer: famotidine  Glucose Control: Yes, insulin management per Endocrinology     Lines/Drains/Airway:  ETT  R radial arterial line, L brachial a line  RIJ CVC, LIJ CVC  Peterson  Chest Tubes: 3 (2 Mediastinal, 1 Pleural)   Pacing Wires: Temporary ventricular pacing wires     Dispo/Code Status/Palliative:    - SICU   - Full Code        Juan Diego Abdi MD  Critical Care - Surgery  [facility] - Surgical Intensive Care

## 2024-08-27 NOTE — ANESTHESIA PROCEDURE NOTES
Intubation    Date/Time: 8/27/2024 10:49 AM    Performed by: Ar Davies MD  Authorized by: Ar Davies MD    Intubation:     Induction:  Intravenous    Intubated:  Postinduction    Mask Ventilation:  Easy mask    Attempts:  1    Attempted By:  Staff anesthesiologist    Method of Intubation:  Direct    Blade:  Qian 3    Laryngeal View Grade: Grade I - full view of cords      Difficult Airway Encountered?: No      Complications:  None    Airway Device:  Oral endotracheal tube    Airway Device Size:  7.5    Style/Cuff Inflation:  Cuffed (inflated to minimal occlusive pressure)    Tube secured:  23    Secured at:  The lips    Placement Verified By:  Capnometry    Complicating Factors:  None    Findings Post-Intubation:  BS equal bilateral and atraumatic/condition of teeth unchanged

## 2024-08-27 NOTE — SUBJECTIVE & OBJECTIVE
Follow-up For: Procedure(s) (LRB):  REPLACEMENT, AORTIC VALVE  REPLACEMENT, AORTA, ASCENDING; HEMIARCH (N/A)  EXPLANT; TAVR (N/A)    Post-Operative Day: Day of Surgery     Past Medical History:   Diagnosis Date    Coronary artery disease of native artery of native heart with stable angina pectoris 4/10/2024    Diabetes mellitus type II, uncontrolled 02/27/2013    High-density lipoprotein deficiency 02/27/2013    HTN (hypertension) 02/27/2013    Hyperlipidemia     Hypothyroidism     Moderate obstructive sleep apnea 6/29/2022    Normocytic anemia 06/28/2021    Obesity     Osteoarthritis 02/08/2016    PAD (peripheral artery disease) 4/10/2024    Trouble in sleeping     Type 2 diabetes mellitus     Type 2 diabetes mellitus with diabetic polyneuropathy, without long-term current use of insulin        Past Surgical History:   Procedure Laterality Date    CATARACT EXTRACTION      CATHETERIZATION OF BOTH LEFT AND RIGHT HEART N/A 4/16/2024    Procedure: CATHETERIZATION, HEART, BOTH LEFT AND RIGHT;  Surgeon: Brian Vickers MD;  Location: [facility] CATH LAB/EP;  Service: Cardiology;  Laterality: N/A;  Aortic valve study/ [place] Catheter/2 manifolds    CORONARY ANGIOGRAPHY N/A 4/16/2024    Procedure: ANGIOGRAM, CORONARY ARTERY;  Surgeon: Brian Vickers MD;  Location: [facility] CATH LAB/EP;  Service: Cardiology;  Laterality: N/A;    CORONARY ANGIOPLASTY WITH STENT PLACEMENT N/A 4/23/2024    Procedure: ANGIOPLASTY, CORONARY ARTERY, WITH STENT INSERTION;  Surgeon: Brian Vickers MD;  Location: [facility] CATH LAB/EP;  Service: Cardiology;  Laterality: N/A;    EYE SURGERY      cataracts    INSTANTANEOUS WAVE-FREE RATIO (IFR) N/A 4/16/2024    Procedure: Instantaneous Wave-Free Ratio (IFR);  Surgeon: Brian Vickers MD;  Location: [facility] CATH LAB/EP;  Service: Cardiology;  Laterality: N/A;    IVUS, CORONARY  4/23/2024    Procedure: IVUS, Coronary;  Surgeon: Brian Vickers MD;  Location: [facility] CATH LAB/EP;  Service: Cardiology;;     JOINT REPLACEMENT Left     arthroplasty    PERCUTANEOUS CORONARY INTERVENTION, ARTERY N/A 4/23/2024    Procedure: Percutaneous coronary intervention;  Surgeon: Brian Vickers MD;  Location: [facility] CATH LAB/EP;  Service: Cardiology;  Laterality: N/A;  LAD    SHOULDER SURGERY      TONSILLECTOMY      TOTAL SHOULDER ARTHROPLASTY Left        Review of patient's allergies indicates:  No Known Allergies    Family History       Problem Relation (Age of Onset)    Early death Father, Brother, Paternal Uncle    Heart disease Father, Brother, Paternal Uncle    Hypertension Mother, Father, Brother    No Known Problems Daughter, Son    Osteoporosis Mother    Stroke Father          Tobacco Use    Smoking status: Never     Passive exposure: Never    Smokeless tobacco: Never   Substance and Sexual Activity    Alcohol use: Never     Comment: occasionally (maybe every 3 months)    Drug use: Never    Sexual activity: Not Currently     Partners: Female     Birth control/protection: None      Review of Systems  Objective:     Vital Signs (Most Recent):  Temp: 98.1 °F (36.7 °C) (08/27/24 0731)  Pulse: 69 (08/27/24 1729)  Resp: (!) 30 (08/27/24 1729)  BP: 117/74 (08/27/24 0732)  SpO2: 100 % (08/27/24 1729) Vital Signs (24h Range):  Temp:  [98.1 °F (36.7 °C)] 98.1 °F (36.7 °C)  Pulse:  [69-76] 69  Resp:  [18-30] 30  SpO2:  [96 %-100 %] 100 %  BP: (117-125)/(73-74) 117/74     Weight: 86.2 kg (190 lb)  Body mass index is 29.76 kg/m².      Intake/Output Summary (Last 24 hours) at 8/27/2024 1730  Last data filed at 8/27/2024 1655  Gross per 24 hour   Intake 5024 ml   Output 2050 ml   Net 2974 ml          Physical Exam  Vitals and nursing note reviewed.   Constitutional:       General: He is not in acute distress.     Appearance: He is ill-appearing.   HENT:      Mouth/Throat:      Comments: ETT  Neck:      Comments: RIJ and LIJ CVC  Cardiovascular:      Rate and Rhythm: Normal rate and regular rhythm.      Comments: RIJ CVC  LIJ CVC  R radial  A line  L brachial A line  V wires  Chest tubes 1x R pleural and 2x meds  Sternal dressing c/d/I    Pulmonary:      Effort: No respiratory distress.      Comments: intubated  Abdominal:      General: There is no distension.      Palpations: Abdomen is soft.   Genitourinary:     Comments: mirza  Musculoskeletal:      Right lower leg: No edema.      Left lower leg: No edema.   Skin:     General: Skin is warm and dry.            Vents:  Vent Mode: A/C (08/27/24 1729)  Ventilator Initiated: Yes (08/27/24 1729)  Set Rate: 20 BPM (08/27/24 1729)  Vt Set: 470 mL (08/27/24 1729)  PEEP/CPAP: 5 cmH20 (08/27/24 1729)  Oxygen Concentration (%): 100 (08/27/24 1729)  Peak Airway Pressure: 33 cmH20 (08/27/24 1729)  Plateau Pressure: 0 cmH20 (08/27/24 1729)  Total Ve: 9.39 L/m (08/27/24 1729)  Negative Inspiratory Force (cm H2O): 0 (08/27/24 1729)  F/VT Ratio<105 (RSBI): (!) 81.3 (08/27/24 1729)    Lines/Drains/Airways       Central Venous Catheter Line  Duration             Introducer 08/27/24 0835 Internal Jugular Right <1 day    Introducer 08/27/24 1105 Internal Jugular Left <1 day    Percutaneous Central Line - Triple Lumen  08/27/24 1108 Internal Jugular Left <1 day              Drain  Duration                  Chest Tube 08/27/24 1451 Tube - 1 Right Pleural 28 Fr. <1 day         Chest Tube 08/27/24 1451 Tube - 2 Anterior Mediastinal 28 Fr. <1 day         Chest Tube 08/27/24 1452 Tube - 3 Posterior Mediastinal 28 Fr. <1 day         Urethral Catheter 08/27/24 1045 Non-latex;Straight-tip;Temperature probe 16 Fr. <1 day              Airway  Duration                  Airway - Non-Surgical 08/27/24 1049 <1 day              Arterial Line  Duration             Arterial Line 08/27/24 0805 Right Radial <1 day    Arterial Line 08/27/24 1105 Left Brachial <1 day              Line  Duration                  Pacer Wires 08/27/24 1452 <1 day              Peripheral Intravenous Line  Duration                  Peripheral IV - Single Lumen  08/27/24 0727 18 G Left Hand <1 day         Peripheral IV - Single Lumen 08/27/24 0815 16 G No Right Antecubital <1 day         Sheath 08/27/24 0955 Right <1 day                    Significant Labs:    CBC/Anemia Profile:  Recent Labs   Lab 08/27/24  0716 08/27/24  1214 08/27/24  1437 08/27/24  1500 08/27/24  1510   WBC 6.56  --   --  18.00*  --    HGB 13.2*  --   --  8.3*  --    HCT 38.6*   < > 25* 24.1* 31*     --   --  154  --    MCV 85  --   --  86  --    RDW 13.1  --   --  12.9  --     < > = values in this interval not displayed.        Chemistries:  Recent Labs   Lab 08/27/24  0716   *   K 3.8      CO2 23   BUN 29*   CREATININE 1.1   CALCIUM 9.7       All pertinent labs within the past 24 hours have been reviewed.    Significant Imaging: I have reviewed all pertinent imaging results/findings within the past 24 hours.

## 2024-08-27 NOTE — ANESTHESIA PREPROCEDURE EVALUATION
[facility]  Anesthesia Pre-Operative Evaluation         Patient Name: Cesar Simpson  YOB: 1950  MRN: [id number]    SUBJECTIVE:   Procedure(s) (LRB):  REPLACEMENT, AORTIC VALVE  REPLACEMENT, AORTA, ASCENDING; HEMIARCH (N/A)  EXPLANT; TAVR (N/A)    08/27/2024    Cesar Simpson is a 74 y.o. male w/ a significant PMHx of CAD s/p PCI to D-M LAD, PAD, HTN, HLD, hypothyroidism, DM, and severe AS.S/P evolut TAVR - now with aortic dissection    Patient now presents for the above procedure(s).    CATH: 4/23/2024  LM:  FREDIS 3 flow. 0% stenosis   LAD: FREDIS 3 flow.  Prox/mid calcified 70-80% stenosis.  Mid/distal 99% stenosis.  D1 distally 99% stenosis but small caliber vessel distally  LCx:  FREDIS 3 flow.  Proximal 30-40% stenosis.  Large principal OM1 with diffuse 30% stenosis  RCA: FREDIS 3 flow.  Ostial RCA 60% stenosis IFR 0.95.  Prox mid and distal diffuse 30 40% calcified stenosis.  TAYLOR branch very distally there is 95% calcified stenosis but small caliber vessel    Echo 4/2024    Left Ventricle: The left ventricle is normal in size. Normal wall thickness. There is concentric remodeling. There is normal systolic function. Biplane (2D) method of discs ejection fraction is 58%. There is normal diastolic function.    Right Ventricle: Normal right ventricular cavity size. Wall thickness is normal. Right ventricle wall motion  is normal. Systolic function is normal.    Aortic Valve: There is moderate aortic valve sclerosis. Moderately restricted motion. There is moderate to severe stenosis. Aortic valve area by VTI is 0.90 cm². Aortic valve peak velocity is 3.18 m/s. Mean gradient is 24 mmHg. The dimensionless index is 0.29.    Mitral Valve: There is moderate mitral annular calcification present.    Pulmonary Artery: The estimated pulmonary artery systolic pressure is 32 mmHg.    IVC/SVC: Normal venous pressure at 3 mmHg.      Prev airway: None documented.        Patient Active Problem List    Diagnosis    Type 2 diabetes mellitus with diabetic polyneuropathy, without long-term current use of insulin    Hyperlipidemia    Posterior vitreous detachment, right eye    Floater, vitreous    Vitreous detachment    Pseudophakia    Refractive error    Hypothyroidism    Osteoarthritis    Prominent aorta    Hyperlipidemia associated with type 2 diabetes mellitus    Hypertension associated with diabetes    BMI 29.0-29.9,adult    Insomnia    Diet-controlled diabetes mellitus    BPH associated with nocturia    Arthritis    Does use hearing aid    Normocytic anemia    Moderate obstructive sleep apnea    ILD (interstitial lung disease)    Chronic cough    Severe aortic stenosis    PAD (peripheral artery disease)    Coronary artery disease of native artery of native heart with stable angina pectoris       Review of patient's allergies indicates:  No Known Allergies    Current Inpatient Medications:      No current facility-administered medications on file prior to encounter.     Current Outpatient Medications on File Prior to Encounter   Medication Sig Dispense Refill    aspirin 81 MG Chew Take 1 tablet (81 mg total) by mouth once daily. 90 tablet 3    benzonatate (TESSALON) 200 MG capsule Take 1 capsule (200 mg total) by mouth 3 (three) times daily as needed for Cough. [id number] 90 capsule 11    clopidogreL (PLAVIX) 75 mg tablet Take 1 tablet (75 mg total) by mouth once daily. 30 tablet 11    DULoxetine (CYMBALTA) 30 MG capsule Take 1 capsule (30 mg total) by mouth once daily. 90 capsule 1    finasteride (PROSCAR) 5 mg tablet Take 1 tablet (5 mg total) by mouth once daily. 90 tablet 2    levothyroxine (SYNTHROID) 88 MCG tablet Take 1 tablet (88 mcg total) by mouth before breakfast. 90 tablet 1    olmesartan (BENICAR) 5 MG Tab Take 1 tablet (5 mg total) by mouth once daily. 90 tablet 1    rosuvastatin (CRESTOR) 20 MG tablet Take 1 tablet (20 mg total) by mouth once daily. 90 tablet 3    tamsulosin (FLOMAX) 0.4  mg Cap Take 1 capsule (0.4 mg total) by mouth once daily. 90 capsule 1    traZODone (DESYREL) 100 MG tablet Take 1 tablet (100 mg total) by mouth every evening. 90 tablet 3    ACCU-CHEK NAYA PLUS TEST STRP Strp CHECK BLOOD SUGAR DAILY AS DIRECTED 100 strip 3    azelastine (ASTELIN) 137 mcg (0.1 %) nasal spray 1 spray (137 mcg total) by Nasal route 2 (two) times daily. 30 mL 3    blood-glucose meter Misc 1 device.  Check sugar 1 times daily as directed by MD. Supply preferred brand .Dx Code: [E11.8] 1 each 0    esomeprazole (NEXIUM) 40 MG capsule Take 1 capsule (40 mg total) by mouth daily with dinner or evening meal. 30 capsule 11    lancets Misc 1 box.  Check 1x daily as directed by MD. Supply brand covered by insurance. Dx Code: [E11.8]. Accucheck Naya. 100 each 3       Past Surgical History:   Procedure Laterality Date    CATARACT EXTRACTION      CATHETERIZATION OF BOTH LEFT AND RIGHT HEART N/A 4/16/2024    Procedure: CATHETERIZATION, HEART, BOTH LEFT AND RIGHT;  Surgeon: Brian Vickers MD;  Location: [facility] CATH LAB/EP;  Service: Cardiology;  Laterality: N/A;  Aortic valve study/ [place] Catheter/2 manifolds    CORONARY ANGIOGRAPHY N/A 4/16/2024    Procedure: ANGIOGRAM, CORONARY ARTERY;  Surgeon: Brian Vickers MD;  Location: [facility] CATH LAB/EP;  Service: Cardiology;  Laterality: N/A;    CORONARY ANGIOPLASTY WITH STENT PLACEMENT N/A 4/23/2024    Procedure: ANGIOPLASTY, CORONARY ARTERY, WITH STENT INSERTION;  Surgeon: Brian Vickers MD;  Location: [facility] CATH LAB/EP;  Service: Cardiology;  Laterality: N/A;    EYE SURGERY      cataracts    INSTANTANEOUS WAVE-FREE RATIO (IFR) N/A 4/16/2024    Procedure: Instantaneous Wave-Free Ratio (IFR);  Surgeon: Brian Vickers MD;  Location: [facility] CATH LAB/EP;  Service: Cardiology;  Laterality: N/A;    IVUS, CORONARY  4/23/2024    Procedure: IVUS, Coronary;  Surgeon: Brian Vickers MD;  Location: [facility] CATH LAB/EP;  Service: Cardiology;;    JOINT REPLACEMENT Left      arthroplasty    PERCUTANEOUS CORONARY INTERVENTION, ARTERY N/A 4/23/2024    Procedure: Percutaneous coronary intervention;  Surgeon: Brian Vickers MD;  Location: [facility] CATH LAB/EP;  Service: Cardiology;  Laterality: N/A;  LAD    SHOULDER SURGERY      TONSILLECTOMY      TOTAL SHOULDER ARTHROPLASTY Left        Social History     Socioeconomic History    Marital status:    Tobacco Use    Smoking status: Never     Passive exposure: Never    Smokeless tobacco: Never   Substance and Sexual Activity    Alcohol use: Never     Comment: occasionally (maybe every 3 months)    Drug use: Never    Sexual activity: Not Currently     Partners: Female     Birth control/protection: None   Social History Narrative    9/7/2023: he lives alone. He has 2 kids, they live in [place] and [place]. 7 grandchildren. 2 grandchildren live nearby. No pets at home. He is retired. 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      Social Determinants of Health     Financial Resource Strain: Low Risk  (2/29/2024)    Overall Financial Resource Strain (CARDIA)     Difficulty of Paying Living Expenses: Not very hard   Food Insecurity: No Food Insecurity (2/29/2024)    Hunger Vital Sign     Worried About Running Out of Food in the Last Year: Never true     Ran Out of Food in the Last Year: Never true   Transportation Needs: No Transportation Needs (2/29/2024)    PRAPARE - Transportation     Lack of Transportation (Medical): No     Lack of Transportation (Non-Medical): No   Physical Activity: Insufficiently Active (2/29/2024)    Exercise Vital Sign     Days of Exercise per Week: 3 days     Minutes of Exercise per Session: 30 min   Stress: No Stress Concern Present (2/29/2024)    [place] [employer] - Occupational Stress Questionnaire     Feeling of Stress : Only a little   Housing Stability: Low Risk  (2/29/2024)    Housing Stability Vital Sign     Unable to Pay for Housing in the Last Year: No     Number of  Places Lived in the Last Year: 1     Unstable Housing in the Last Year: No       OBJECTIVE:     Vital Signs Range (Last 24H):  Temp:  [36.7 °C (98.1 °F)]   Pulse:  [76]   Resp:  [18]   BP: (117-125)/(73-74)   SpO2:  [96 %]       Significant Labs:  Lab Results   Component Value Date    WBC 18.00 (H) 08/27/2024    HGB 8.3 (L) 08/27/2024    HCT 31 (L) 08/27/2024     08/27/2024    CHOL 135 03/07/2024    TRIG 59 03/07/2024    HDL 37 (L) 03/07/2024    ALT 14 03/07/2024    AST 14 03/07/2024     (L) 08/27/2024    K 3.8 08/27/2024     08/27/2024    CREATININE 1.1 08/27/2024    BUN 29 (H) 08/27/2024    CO2 23 08/27/2024    TSH 1.307 08/11/2023    PSA 0.43 12/23/2020    INR 1.0 11/20/2015    HGBA1C 6.0 (H) 03/07/2024       Diagnostic Studies: No relevant studies.    EKG:   Results for orders placed or performed during the hospital encounter of 08/27/24   EKG 12-lead    Collection Time: 08/27/24  6:32 AM   Result Value Ref Range    QRS Duration 112 ms    [facility]HS QTC Calculation 417 ms    Narrative    Test Reason : Z01.812,    Vent. Rate : 078 BPM     Atrial Rate : 078 BPM     P-R Int : 208 ms          QRS Dur : 112 ms      QT Int : 366 ms       P-R-T Axes : -14 003 -02 degrees     QTc Int : 417 ms    Normal sinus rhythm  Incomplete right bundle branch block  Borderline Abnormal ECG  When compared with ECG of 23-APR-2024 10:38,  No significant change was found  Confirmed by Cesar Forde MD ([id number]) on 8/27/2024 2:26:49 PM    Referred By: CESAR JOLLY           Confirmed By:Cesar Forde MD       2D ECHO:  TTE:  Results for orders placed or performed during the hospital encounter of 04/03/24   Echo   Result Value Ref Range    RA Width 3.54 cm    LA volume (mod) 38.17 cm3    Left Atrium Major Axis 4.15 cm    Left Atrium Minor Axis 4.28 cm    RA Major Axis 4.59 cm    LV Diastolic Volume 57.54 mL    LV Systolic Volume 9.71 mL    MV Peak A Nathaniel 1.32 m/s    MV stenosis pressure 1/2 time 46.55 ms    MV VTI 31.6 cm    TR Max  Nathaniel 2.67 m/s    MV Peak E Nathaniel 0.83 m/s    MV peak gradient 9 mmHg    Ao VTI 59.70 cm    Ao peak nathaniel 3.18 m/s    LVOT peak VTI 17.40 cm    LVOT peak nathaniel 0.92 m/s    LVOT diameter 1.98 cm    E wave deceleration time 161.57 msec    MV mean gradient 3 mmHg    AV mean gradient 24 mmHg    RV S' 11.76 cm/s    TAPSE 1.78 cm    RVDD 3.70 cm    LA size 4.09 cm    Ascending aorta 3.47 cm    STJ 3.05 cm    Sinus 3.79 cm    LVIDs 1.80 (A) 2.1 - 4.0 cm    Posterior Wall 1.40 (A) 0.6 - 1.1 cm    IVS 1.22 (A) 0.6 - 1.1 cm    LVIDd 3.68 3.5 - 6.0 cm    TDI LATERAL 0.14 m/s    Left Ventricular Outflow Tract Mean Gradient 1.82 mmHg    Left Ventricular Outflow Tract Mean Velocity 0.64 cm/s    Burks's Biplane MOD Ejection Fraction 58 %    TDI SEPTAL 0.07 m/s    LV LATERAL E/E' RATIO 5.93 m/s    LV SEPTAL E/E' RATIO 11.86 m/s    FS 51 (A) 28 - 44 %    LV mass 167.18 g    Left Ventricle Relative Wall Thickness 0.76 cm    AV valve area 0.90 cm²    AV Velocity Ratio 0.29     AV index (prosthetic) 0.29     MV valve area p 1/2 method 4.73 cm2    MV valve area by continuity eq 1.69 cm2    E/A ratio 0.63     Mean e' 0.11 m/s    LVOT area 3.1 cm2    LVOT stroke volume 53.55 cm3    AV peak gradient 40 mmHg    E/E' ratio 7.90 m/s    Triscuspid Valve Regurgitation Peak Gradient 29 mmHg    JUANITO by Velocity Ratio 0.89 cm²    BSA 2 m2    LV Systolic Volume Index 5.0 mL/m2    LV Diastolic Volume Index 29.36 mL/m2    LV Mass Index 85 g/m2    LA Volume Index (Mod) 19.5 mL/m2    ZLVIDS -5.14     ZLVIDD -4.22     LA Volume Index 23.2 mL/m2    LA volume 45.41 cm3    RV-jiménez mid d 3.3 cm    LA WIDTH 3.1 cm    TV resting pulmonary artery pressure 32 mmHg    RV TB RVSP 6 mmHg    Est. RA pres 3 mmHg    Narrative      Left Ventricle: The left ventricle is normal in size. Normal wall   thickness. There is concentric remodeling. There is normal systolic   function. Biplane (2D) method of discs ejection fraction is 58%. There is   normal diastolic function.     Right Ventricle: Normal right ventricular cavity size. Wall thickness   is normal. Right ventricle wall motion  is normal. Systolic function is   normal.    Aortic Valve: There is moderate aortic valve sclerosis. Moderately   restricted motion. There is moderate to severe stenosis. Aortic valve area   by VTI is 0.90 cm². Aortic valve peak velocity is 3.18 m/s. Mean gradient   is 24 mmHg. The dimensionless index is 0.29.    Mitral Valve: There is moderate mitral annular calcification present.    Pulmonary Artery: The estimated pulmonary artery systolic pressure is   32 mmHg.    IVC/SVC: Normal venous pressure at 3 mmHg.         LUBNA:  No results found for this or any previous visit.    ASSESSMENT/PLAN:           Pre-op Assessment    I have reviewed the Patient Summary Reports.     I have reviewed the Nursing Notes.    I have reviewed the Medications.     Review of Systems  Anesthesia Hx:  No problems with previous Anesthesia             Denies Family Hx of Anesthesia complications.    Denies Personal Hx of Anesthesia complications.                    Social:  Non-Smoker, No Alcohol Use       Hematology/Oncology:       -- Denies Anemia:                  Denies Current/Recent Cancer                Cardiovascular:     Hypertension   CAD   CABG/stent  Denies Dysrhythmias.    Denies CHF.   PVD hyperlipidemia                             Pulmonary:    Denies COPD.  Denies Asthma.     Denies Sleep Apnea.                Renal/:   Denies Chronic Renal Disease.                Hepatic/GI:      Denies GERD. Denies Liver Disease.            Musculoskeletal:  Denies Arthritis.               Neurological:    Denies CVA. Denies Neuromuscular Disease.   Denies Seizures.          Denies Chronic Pain Syndrome                         Endocrine:  Diabetes Hypothyroidism  Denies Hyperthyroidism.       Denies Obesity / BMI > 30  Psych:   denies anxiety denies depression                Physical Exam  General: Well nourished, Cooperative,  Alert and Oriented    Airway:  Mallampati: II   Mouth Opening: Normal  TM Distance: Normal  Tongue: Normal  Neck ROM: Normal ROM    Dental:  Intact        Anesthesia Plan  Type of Anesthesia, risks & benefits discussed:    Anesthesia Type: Gen ETT, Gen Supraglottic Airway, Gen Natural Airway, MAC  Intra-op Monitoring Plan: Standard ASA Monitors and Art Line  Post Op Pain Control Plan: multimodal analgesia and IV/PO Opioids PRN  Induction:  IV  Airway Plan: Direct and Video, Post-Induction  Informed Consent: Informed consent signed with the Patient and all parties understand the risks and agree with anesthesia plan.  All questions answered.   ASA Score: 5 Emergent  Day of Surgery Review of History & Physical: H&P Update referred to the surgeon/provider.    Ready For Surgery From Anesthesia Perspective.     .

## 2024-08-27 NOTE — PROGRESS NOTES
Autotransfusion/Rapid Infusion Record:      08/27/2024  Autotransfusionist:  Donovan Mireles    Surgeons and Role:     * London Guillen MD - Primary     * Johan Reed DO - Fellow  Anesthesiologist:  Nupur Bonilla MD    Past Medical History:   Diagnosis Date    Coronary artery disease of native artery of native heart with stable angina pectoris 4/10/2024    Diabetes mellitus type II, uncontrolled 02/27/2013    High-density lipoprotein deficiency 02/27/2013    HTN (hypertension) 02/27/2013    Hyperlipidemia     Hypothyroidism     Moderate obstructive sleep apnea 6/29/2022    Normocytic anemia 06/28/2021    Obesity     Osteoarthritis 02/08/2016    PAD (peripheral artery disease) 4/10/2024    Trouble in sleeping     Type 2 diabetes mellitus     Type 2 diabetes mellitus with diabetic polyneuropathy, without long-term current use of insulin        Procedure(s) (LRB):  REPLACEMENT, AORTIC VALVE  REPLACEMENT, AORTA, ASCENDING; HEMIARCH (N/A)  EXPLANT; TAVR (N/A)     4:19 PM    Equipment:    Cell Saver     R.I.S.  : Lifetable Model: CATSmart or CATSplus : [employer]   Model: [id number]     Serial number: [id number]   Serial number:    Disposable lot #: [id number]   Disposable lot #:      Were extra cardiotomies used for cell saver?  no   if yes, #:      Solutions:  Anticoagulant: ACD-A   Expiration date: 8/25 Volume used: 800   Wash solution: 0.9% NaCl   Expiration date: 7/26 Volume used: 7828     Cell saver checklist  Time completed:           [x]   Circuit assembled correctly     [x]   Cell saver powered and operational     [x]   Vacuum connected, functional, adjust to max -150mmHg     [x]   Anticoagulant drip rate adjusted     [x]   Transfer bag properly labeled with patient name, expiration time, volume,       anticoagulant, OR number, and initials     [x]   Cell saver disinfected after use (completed at end of case)       Cell Saver volumes:    Total volume processed:     5256 mL     Total volume  pRBCs recovered     1572 mL     Volume pRBCs infused     1572 mL         RIS checklist   Time completed:  []   RIS circuit assembled correctly     []   RIS power and operational     []   RIS disinfected after use (completed at end of case)       RIS volumes:    Total volume infused:    (see anesthesia record for blood       product information)    mL       Additional comments:

## 2024-08-27 NOTE — SUBJECTIVE & OBJECTIVE
Past Medical History:   Diagnosis Date    Coronary artery disease of native artery of native heart with stable angina pectoris 4/10/2024    Diabetes mellitus type II, uncontrolled 02/27/2013    High-density lipoprotein deficiency 02/27/2013    HTN (hypertension) 02/27/2013    Hyperlipidemia     Hypothyroidism     Moderate obstructive sleep apnea 6/29/2022    Normocytic anemia 06/28/2021    Obesity     Osteoarthritis 02/08/2016    PAD (peripheral artery disease) 4/10/2024    Trouble in sleeping     Type 2 diabetes mellitus     Type 2 diabetes mellitus with diabetic polyneuropathy, without long-term current use of insulin        Past Surgical History:   Procedure Laterality Date    CATARACT EXTRACTION      CATHETERIZATION OF BOTH LEFT AND RIGHT HEART N/A 4/16/2024    Procedure: CATHETERIZATION, HEART, BOTH LEFT AND RIGHT;  Surgeon: Brian Vickers MD;  Location: [facility] CATH LAB/EP;  Service: Cardiology;  Laterality: N/A;  Aortic valve study/ Saul Catheter/2 manifolds    CORONARY ANGIOGRAPHY N/A 4/16/2024    Procedure: ANGIOGRAM, CORONARY ARTERY;  Surgeon: Brian Vickers MD;  Location: [facility] CATH LAB/EP;  Service: Cardiology;  Laterality: N/A;    CORONARY ANGIOPLASTY WITH STENT PLACEMENT N/A 4/23/2024    Procedure: ANGIOPLASTY, CORONARY ARTERY, WITH STENT INSERTION;  Surgeon: Brian Vickers MD;  Location: [facility] CATH LAB/EP;  Service: Cardiology;  Laterality: N/A;    EYE SURGERY      cataracts    INSTANTANEOUS WAVE-FREE RATIO (IFR) N/A 4/16/2024    Procedure: Instantaneous Wave-Free Ratio (IFR);  Surgeon: Brian Vickers MD;  Location: [facility] CATH LAB/EP;  Service: Cardiology;  Laterality: N/A;    IVUS, CORONARY  4/23/2024    Procedure: IVUS, Coronary;  Surgeon: Brian Vickers MD;  Location: [facility] CATH LAB/EP;  Service: Cardiology;;    JOINT REPLACEMENT Left     arthroplasty    PERCUTANEOUS CORONARY INTERVENTION, ARTERY N/A 4/23/2024    Procedure: Percutaneous coronary intervention;  Surgeon: Rancho  Brian STEVENS MD;  Location: [facility] CATH LAB/EP;  Service: Cardiology;  Laterality: N/A;  LAD    SHOULDER SURGERY      TONSILLECTOMY      TOTAL SHOULDER ARTHROPLASTY Left        Review of patient's allergies indicates:  No Known Allergies    Facility-Administered Medications Prior to Admission   Medication    sodium chloride 0.9% flush 10 mL    sodium chloride 0.9% flush 10 mL     PTA Medications   Medication Sig    ACCU-CHEK ROMA PLUS TEST STRP Strp CHECK BLOOD SUGAR DAILY AS DIRECTED    aspirin 81 MG Chew Take 1 tablet (81 mg total) by mouth once daily.    azelastine (ASTELIN) 137 mcg (0.1 %) nasal spray 1 spray (137 mcg total) by Nasal route 2 (two) times daily.    benzonatate (TESSALON) 200 MG capsule Take 1 capsule (200 mg total) by mouth 3 (three) times daily as needed for Cough. [email or web address]    blood-glucose meter Misc 1 device.  Check sugar 1 times daily as directed by MD. Supply preferred brand .Dx Code: [E11.8]    clopidogreL (PLAVIX) 75 mg tablet Take 1 tablet (75 mg total) by mouth once daily.    DULoxetine (CYMBALTA) 30 MG capsule Take 1 capsule (30 mg total) by mouth once daily.    esomeprazole (NEXIUM) 40 MG capsule Take 1 capsule (40 mg total) by mouth daily with dinner or evening meal.    finasteride (PROSCAR) 5 mg tablet Take 1 tablet (5 mg total) by mouth once daily.    lancets Misc 1 box.  Check 1x daily as directed by MD. Supply brand covered by insurance. Dx Code: [E11.8]. Accucheck Roma.    levothyroxine (SYNTHROID) 88 MCG tablet Take 1 tablet (88 mcg total) by mouth before breakfast.    olmesartan (BENICAR) 5 MG Tab Take 1 tablet (5 mg total) by mouth once daily.    rosuvastatin (CRESTOR) 20 MG tablet Take 1 tablet (20 mg total) by mouth once daily.    tamsulosin (FLOMAX) 0.4 mg Cap Take 1 capsule (0.4 mg total) by mouth once daily.    traZODone (DESYREL) 100 MG tablet Take 1 tablet (100 mg total) by mouth every evening.     Family History       Problem Relation (Age of Onset)    Early  death Father, Brother, Paternal Uncle    Heart disease Father, Brother, Paternal Uncle    Hypertension Mother, Father, Brother    No Known Problems Daughter, Son    Osteoporosis Mother    Stroke Father          Tobacco Use    Smoking status: Never     Passive exposure: Never    Smokeless tobacco: Never   Substance and Sexual Activity    Alcohol use: Never     Comment: occasionally (maybe every 3 months)    Drug use: Never    Sexual activity: Not Currently     Partners: Female     Birth control/protection: None     Review of Systems   Constitutional: Negative for chills, fever, malaise/fatigue and night sweats.   HENT:  Negative for congestion, nosebleeds, sore throat, stridor and tinnitus.    Eyes:  Negative for blurred vision, discharge, double vision, pain, vision loss in left eye, vision loss in right eye, visual disturbance and visual halos.   Cardiovascular:  Positive for dyspnea on exertion. Negative for chest pain, leg swelling, near-syncope, orthopnea, palpitations and syncope.   Respiratory:  Negative for cough, hemoptysis, shortness of breath, snoring, sputum production and wheezing.    Endocrine: Negative for cold intolerance, heat intolerance and polydipsia.   Hematologic/Lymphatic: Negative for adenopathy and bleeding problem. Does not bruise/bleed easily.   Skin:  Negative for color change, dry skin, flushing, poor wound healing and suspicious lesions.   Musculoskeletal:  Negative for arthritis, back pain, gout, joint pain and joint swelling.   Gastrointestinal:  Negative for bloating, abdominal pain, constipation and diarrhea.   Genitourinary:  Negative for dysuria, frequency and hematuria.   Neurological:  Negative for dizziness, focal weakness, headaches, light-headedness, loss of balance, numbness and weakness.   Psychiatric/Behavioral:  Negative for altered mental status, hallucinations and hypervigilance. The patient is not nervous/anxious.      Objective:     Vital Signs (Most Recent):    Vital  Signs (24h Range):           There is no height or weight on file to calculate BMI.            No intake or output data in the 24 hours ending 08/27/24 0652    Lines/Drains/Airways       None                    Physical Exam  Constitutional:       General: He is not in acute distress.     Appearance: Normal appearance. He is normal weight. He is not toxic-appearing.   HENT:      Head: Normocephalic and atraumatic.   Eyes:      General:         Right eye: No discharge.         Left eye: No discharge.      Extraocular Movements: Extraocular movements intact.      Conjunctiva/sclera: Conjunctivae normal.      Pupils: Pupils are equal, round, and reactive to light.   Cardiovascular:      Rate and Rhythm: Normal rate and regular rhythm.      Pulses: Normal pulses.      Heart sounds: Normal heart sounds. No murmur heard.     No friction rub.      Comments: Systolic ejection murmur to aortic area.  Right leg: fem+, DP+, PT+  Left leg: fem+, DP+, PT+  Right arm: radial+  Left arm: radial+      Pulmonary:      Effort: Pulmonary effort is normal. No respiratory distress.      Breath sounds: Normal breath sounds. No wheezing or rales.   Abdominal:      General: Abdomen is flat. Bowel sounds are normal. There is no distension.      Palpations: Abdomen is soft.      Tenderness: There is no abdominal tenderness. There is no guarding.   Musculoskeletal:         General: No swelling, tenderness or deformity. Normal range of motion.      Cervical back: Normal range of motion and neck supple. No rigidity.      Right lower leg: No edema.      Left lower leg: No edema.   Skin:     General: Skin is warm and dry.      Capillary Refill: Capillary refill takes less than 2 seconds.      Coloration: Skin is not jaundiced or pale.      Findings: No bruising.   Neurological:      General: No focal deficit present.      Mental Status: He is alert and oriented to person, place, and time. Mental status is at baseline.   Psychiatric:         Mood  and Affect: Mood normal.         Thought Content: Thought content normal.         Judgment: Judgment normal.            Significant Labs: All pertinent lab results from the last 24 hours have been reviewed.    Significant Imaging:  None

## 2024-08-28 LAB
ALBUMIN SERPL BCP-MCNC: 3 G/DL (ref 3.5–5.2)
ALLENS TEST: ABNORMAL
ALLENS TEST: NORMAL
ALP SERPL-CCNC: 26 U/L (ref 55–135)
ALT SERPL W/O P-5'-P-CCNC: 12 U/L (ref 10–44)
ANION GAP SERPL CALC-SCNC: 12 MMOL/L (ref 8–16)
ANION GAP SERPL CALC-SCNC: 8 MMOL/L (ref 8–16)
APTT PPP: 31.1 SEC (ref 21–32)
AST SERPL-CCNC: 26 U/L (ref 10–40)
BASOPHILS # BLD AUTO: 0 K/UL (ref 0–0.2)
BASOPHILS # BLD AUTO: 0.01 K/UL (ref 0–0.2)
BASOPHILS NFR BLD: 0 % (ref 0–1.9)
BASOPHILS NFR BLD: 0.2 % (ref 0–1.9)
BILIRUB SERPL-MCNC: 0.4 MG/DL (ref 0.1–1)
BUN SERPL-MCNC: 22 MG/DL (ref 8–23)
BUN SERPL-MCNC: 24 MG/DL (ref 8–23)
CALCIUM SERPL-MCNC: 7.8 MG/DL (ref 8.7–10.5)
CALCIUM SERPL-MCNC: 8 MG/DL (ref 8.7–10.5)
CHLORIDE SERPL-SCNC: 108 MMOL/L (ref 95–110)
CHLORIDE SERPL-SCNC: 108 MMOL/L (ref 95–110)
CO2 SERPL-SCNC: 19 MMOL/L (ref 23–29)
CO2 SERPL-SCNC: 23 MMOL/L (ref 23–29)
CREAT SERPL-MCNC: 1.1 MG/DL (ref 0.5–1.4)
CREAT SERPL-MCNC: 1.3 MG/DL (ref 0.5–1.4)
DIFFERENTIAL METHOD BLD: ABNORMAL
DIFFERENTIAL METHOD BLD: ABNORMAL
EOSINOPHIL # BLD AUTO: 0 K/UL (ref 0–0.5)
EOSINOPHIL # BLD AUTO: 0 K/UL (ref 0–0.5)
EOSINOPHIL NFR BLD: 0 % (ref 0–8)
EOSINOPHIL NFR BLD: 0.2 % (ref 0–8)
ERYTHROCYTE [DISTWIDTH] IN BLOOD BY AUTOMATED COUNT: 13.1 % (ref 11.5–14.5)
ERYTHROCYTE [DISTWIDTH] IN BLOOD BY AUTOMATED COUNT: 13.2 % (ref 11.5–14.5)
ERYTHROCYTE [DISTWIDTH] IN BLOOD BY AUTOMATED COUNT: 13.8 % (ref 11.5–14.5)
EST. GFR  (NO RACE VARIABLE): 58 ML/MIN/1.73 M^2
EST. GFR  (NO RACE VARIABLE): >60 ML/MIN/1.73 M^2
ESTIMATED AVG GLUCOSE: 117 MG/DL (ref 68–131)
GLUCOSE SERPL-MCNC: 130 MG/DL (ref 70–110)
GLUCOSE SERPL-MCNC: 153 MG/DL (ref 70–110)
HBA1C MFR BLD: 5.7 % (ref 4–5.6)
HCO3 UR-SCNC: 23.5 MMOL/L (ref 24–28)
HCT VFR BLD AUTO: 23.5 % (ref 40–54)
HCT VFR BLD AUTO: 23.8 % (ref 40–54)
HCT VFR BLD AUTO: 24.6 % (ref 40–54)
HCT VFR BLD CALC: 22 %PCV (ref 36–54)
HGB BLD-MCNC: 8.1 G/DL (ref 14–18)
HGB BLD-MCNC: 8.1 G/DL (ref 14–18)
HGB BLD-MCNC: 8.5 G/DL (ref 14–18)
IMM GRANULOCYTES # BLD AUTO: 0.02 K/UL (ref 0–0.04)
IMM GRANULOCYTES # BLD AUTO: 0.05 K/UL (ref 0–0.04)
IMM GRANULOCYTES NFR BLD AUTO: 0.3 % (ref 0–0.5)
IMM GRANULOCYTES NFR BLD AUTO: 0.8 % (ref 0–0.5)
INR PPP: 1 (ref 0.8–1.2)
LDH SERPL L TO P-CCNC: 0.68 MMOL/L (ref 0.36–1.25)
LYMPHOCYTES # BLD AUTO: 0.2 K/UL (ref 1–4.8)
LYMPHOCYTES # BLD AUTO: 0.6 K/UL (ref 1–4.8)
LYMPHOCYTES NFR BLD: 3.1 % (ref 18–48)
LYMPHOCYTES NFR BLD: 7.6 % (ref 18–48)
MAGNESIUM SERPL-MCNC: 2 MG/DL (ref 1.6–2.6)
MAGNESIUM SERPL-MCNC: 2.1 MG/DL (ref 1.6–2.6)
MCH RBC QN AUTO: 29.5 PG (ref 27–31)
MCH RBC QN AUTO: 29.6 PG (ref 27–31)
MCH RBC QN AUTO: 29.9 PG (ref 27–31)
MCHC RBC AUTO-ENTMCNC: 34 G/DL (ref 32–36)
MCHC RBC AUTO-ENTMCNC: 34.5 G/DL (ref 32–36)
MCHC RBC AUTO-ENTMCNC: 34.6 G/DL (ref 32–36)
MCV RBC AUTO: 85 FL (ref 82–98)
MCV RBC AUTO: 87 FL (ref 82–98)
MCV RBC AUTO: 87 FL (ref 82–98)
MONOCYTES # BLD AUTO: 0.6 K/UL (ref 0.3–1)
MONOCYTES # BLD AUTO: 0.9 K/UL (ref 0.3–1)
MONOCYTES NFR BLD: 11.1 % (ref 4–15)
MONOCYTES NFR BLD: 9.2 % (ref 4–15)
NEUTROPHILS # BLD AUTO: 5.6 K/UL (ref 1.8–7.7)
NEUTROPHILS # BLD AUTO: 6.4 K/UL (ref 1.8–7.7)
NEUTROPHILS NFR BLD: 81 % (ref 38–73)
NEUTROPHILS NFR BLD: 86.5 % (ref 38–73)
NRBC BLD-RTO: 0 /100 WBC
NRBC BLD-RTO: 0 /100 WBC
OHS QRS DURATION: 106 MS
OHS QRS DURATION: 96 MS
OHS QTC CALCULATION: 435 MS
OHS QTC CALCULATION: 469 MS
PCO2 BLDA: 38.7 MMHG (ref 35–45)
PH SMN: 7.39 [PH] (ref 7.35–7.45)
PHOSPHATE SERPL-MCNC: 2.8 MG/DL (ref 2.7–4.5)
PHOSPHATE SERPL-MCNC: 3.8 MG/DL (ref 2.7–4.5)
PLATELET # BLD AUTO: 101 K/UL (ref 150–450)
PLATELET # BLD AUTO: 106 K/UL (ref 150–450)
PLATELET # BLD AUTO: 97 K/UL (ref 150–450)
PMV BLD AUTO: 10.6 FL (ref 9.2–12.9)
PMV BLD AUTO: 10.8 FL (ref 9.2–12.9)
PMV BLD AUTO: 11.2 FL (ref 9.2–12.9)
PO2 BLDA: 130 MMHG (ref 80–100)
POC ACTIVATED CLOTTING TIME K: 262 SEC (ref 74–137)
POC BE: -1 MMOL/L
POC IONIZED CALCIUM: 1.16 MMOL/L (ref 1.06–1.42)
POC SATURATED O2: 99 % (ref 95–100)
POC TCO2: 25 MMOL/L (ref 23–27)
POCT GLUCOSE: 130 MG/DL (ref 70–110)
POCT GLUCOSE: 131 MG/DL (ref 70–110)
POCT GLUCOSE: 133 MG/DL (ref 70–110)
POCT GLUCOSE: 136 MG/DL (ref 70–110)
POCT GLUCOSE: 137 MG/DL (ref 70–110)
POCT GLUCOSE: 138 MG/DL (ref 70–110)
POCT GLUCOSE: 139 MG/DL (ref 70–110)
POCT GLUCOSE: 142 MG/DL (ref 70–110)
POCT GLUCOSE: 146 MG/DL (ref 70–110)
POCT GLUCOSE: 148 MG/DL (ref 70–110)
POCT GLUCOSE: 153 MG/DL (ref 70–110)
POTASSIUM BLD-SCNC: 4.2 MMOL/L (ref 3.5–5.1)
POTASSIUM SERPL-SCNC: 3.8 MMOL/L (ref 3.5–5.1)
POTASSIUM SERPL-SCNC: 4.4 MMOL/L (ref 3.5–5.1)
PROT SERPL-MCNC: 4.5 G/DL (ref 6–8.4)
PROTHROMBIN TIME: 10.7 SEC (ref 9–12.5)
RBC # BLD AUTO: 2.71 M/UL (ref 4.6–6.2)
RBC # BLD AUTO: 2.74 M/UL (ref 4.6–6.2)
RBC # BLD AUTO: 2.88 M/UL (ref 4.6–6.2)
SAMPLE: ABNORMAL
SAMPLE: ABNORMAL
SAMPLE: NORMAL
SITE: ABNORMAL
SITE: NORMAL
SODIUM BLD-SCNC: 140 MMOL/L (ref 136–145)
SODIUM SERPL-SCNC: 139 MMOL/L (ref 136–145)
SODIUM SERPL-SCNC: 139 MMOL/L (ref 136–145)
WBC # BLD AUTO: 6.44 K/UL (ref 3.9–12.7)
WBC # BLD AUTO: 6.56 K/UL (ref 3.9–12.7)
WBC # BLD AUTO: 7.94 K/UL (ref 3.9–12.7)

## 2024-08-28 PROCEDURE — 20000000 HC ICU ROOM

## 2024-08-28 PROCEDURE — 97530 THERAPEUTIC ACTIVITIES: CPT

## 2024-08-28 PROCEDURE — 99900026 HC AIRWAY MAINTENANCE (STAT)

## 2024-08-28 PROCEDURE — 99222 1ST HOSP IP/OBS MODERATE 55: CPT | Mod: ,,,

## 2024-08-28 PROCEDURE — 83735 ASSAY OF MAGNESIUM: CPT | Mod: 91 | Performed by: STUDENT IN AN ORGANIZED HEALTH CARE EDUCATION/TRAINING PROGRAM

## 2024-08-28 PROCEDURE — 82330 ASSAY OF CALCIUM: CPT

## 2024-08-28 PROCEDURE — 93005 ELECTROCARDIOGRAM TRACING: CPT

## 2024-08-28 PROCEDURE — 82803 BLOOD GASES ANY COMBINATION: CPT

## 2024-08-28 PROCEDURE — 93010 ELECTROCARDIOGRAM REPORT: CPT | Mod: ,,, | Performed by: INTERNAL MEDICINE

## 2024-08-28 PROCEDURE — 94003 VENT MGMT INPAT SUBQ DAY: CPT

## 2024-08-28 PROCEDURE — 80048 BASIC METABOLIC PNL TOTAL CA: CPT | Mod: XB | Performed by: STUDENT IN AN ORGANIZED HEALTH CARE EDUCATION/TRAINING PROGRAM

## 2024-08-28 PROCEDURE — 63600175 PHARM REV CODE 636 W HCPCS: Performed by: STUDENT IN AN ORGANIZED HEALTH CARE EDUCATION/TRAINING PROGRAM

## 2024-08-28 PROCEDURE — 85027 COMPLETE CBC AUTOMATED: CPT | Performed by: STUDENT IN AN ORGANIZED HEALTH CARE EDUCATION/TRAINING PROGRAM

## 2024-08-28 PROCEDURE — 94761 N-INVAS EAR/PLS OXIMETRY MLT: CPT | Mod: XB

## 2024-08-28 PROCEDURE — 85730 THROMBOPLASTIN TIME PARTIAL: CPT | Performed by: STUDENT IN AN ORGANIZED HEALTH CARE EDUCATION/TRAINING PROGRAM

## 2024-08-28 PROCEDURE — 25000003 PHARM REV CODE 250: Performed by: STUDENT IN AN ORGANIZED HEALTH CARE EDUCATION/TRAINING PROGRAM

## 2024-08-28 PROCEDURE — 83605 ASSAY OF LACTIC ACID: CPT

## 2024-08-28 PROCEDURE — 25000003 PHARM REV CODE 250

## 2024-08-28 PROCEDURE — 97162 PT EVAL MOD COMPLEX 30 MIN: CPT

## 2024-08-28 PROCEDURE — 84100 ASSAY OF PHOSPHORUS: CPT | Mod: 91 | Performed by: STUDENT IN AN ORGANIZED HEALTH CARE EDUCATION/TRAINING PROGRAM

## 2024-08-28 PROCEDURE — 63600175 PHARM REV CODE 636 W HCPCS

## 2024-08-28 PROCEDURE — 85025 COMPLETE CBC W/AUTO DIFF WBC: CPT | Performed by: STUDENT IN AN ORGANIZED HEALTH CARE EDUCATION/TRAINING PROGRAM

## 2024-08-28 PROCEDURE — 97166 OT EVAL MOD COMPLEX 45 MIN: CPT

## 2024-08-28 PROCEDURE — 99900017 HC EXTUBATION W/PARAMETERS (STAT)

## 2024-08-28 PROCEDURE — 99900035 HC TECH TIME PER 15 MIN (STAT)

## 2024-08-28 PROCEDURE — 85014 HEMATOCRIT: CPT

## 2024-08-28 PROCEDURE — 80053 COMPREHEN METABOLIC PANEL: CPT | Performed by: STUDENT IN AN ORGANIZED HEALTH CARE EDUCATION/TRAINING PROGRAM

## 2024-08-28 PROCEDURE — 83735 ASSAY OF MAGNESIUM: CPT | Performed by: STUDENT IN AN ORGANIZED HEALTH CARE EDUCATION/TRAINING PROGRAM

## 2024-08-28 PROCEDURE — 37799 UNLISTED PX VASCULAR SURGERY: CPT

## 2024-08-28 PROCEDURE — 84295 ASSAY OF SERUM SODIUM: CPT

## 2024-08-28 PROCEDURE — 83036 HEMOGLOBIN GLYCOSYLATED A1C: CPT

## 2024-08-28 PROCEDURE — 93041 RHYTHM ECG TRACING: CPT

## 2024-08-28 PROCEDURE — 27100171 HC OXYGEN HIGH FLOW UP TO 24 HOURS

## 2024-08-28 PROCEDURE — 97110 THERAPEUTIC EXERCISES: CPT

## 2024-08-28 PROCEDURE — 84132 ASSAY OF SERUM POTASSIUM: CPT

## 2024-08-28 PROCEDURE — 99233 SBSQ HOSP IP/OBS HIGH 50: CPT | Mod: GC,,, | Performed by: ANESTHESIOLOGY

## 2024-08-28 PROCEDURE — 84100 ASSAY OF PHOSPHORUS: CPT | Performed by: STUDENT IN AN ORGANIZED HEALTH CARE EDUCATION/TRAINING PROGRAM

## 2024-08-28 PROCEDURE — 82800 BLOOD PH: CPT

## 2024-08-28 PROCEDURE — 85610 PROTHROMBIN TIME: CPT | Performed by: STUDENT IN AN ORGANIZED HEALTH CARE EDUCATION/TRAINING PROGRAM

## 2024-08-28 RX ORDER — FINASTERIDE 5 MG/1
5 TABLET, FILM COATED ORAL DAILY
Status: DISCONTINUED | OUTPATIENT
Start: 2024-08-29 | End: 2024-08-28

## 2024-08-28 RX ORDER — GLUCAGON 1 MG
1 KIT INJECTION
Status: DISCONTINUED | OUTPATIENT
Start: 2024-08-28 | End: 2024-08-30

## 2024-08-28 RX ORDER — INSULIN ASPART 100 [IU]/ML
0-10 INJECTION, SOLUTION INTRAVENOUS; SUBCUTANEOUS EVERY 6 HOURS PRN
Status: DISCONTINUED | OUTPATIENT
Start: 2024-08-28 | End: 2024-08-30

## 2024-08-28 RX ORDER — DEXMEDETOMIDINE HYDROCHLORIDE 4 UG/ML
0-1.4 INJECTION, SOLUTION INTRAVENOUS CONTINUOUS
Status: DISCONTINUED | OUTPATIENT
Start: 2024-08-28 | End: 2024-08-28

## 2024-08-28 RX ORDER — FUROSEMIDE 10 MG/ML
40 INJECTION INTRAMUSCULAR; INTRAVENOUS ONCE
Status: COMPLETED | OUTPATIENT
Start: 2024-08-28 | End: 2024-08-28

## 2024-08-28 RX ORDER — HYDROMORPHONE HYDROCHLORIDE 1 MG/ML
0.5 INJECTION, SOLUTION INTRAMUSCULAR; INTRAVENOUS; SUBCUTANEOUS EVERY 4 HOURS PRN
Status: DISCONTINUED | OUTPATIENT
Start: 2024-08-28 | End: 2024-08-29

## 2024-08-28 RX ORDER — FAMOTIDINE 20 MG/1
20 TABLET, FILM COATED ORAL 2 TIMES DAILY
Status: DISCONTINUED | OUTPATIENT
Start: 2024-08-28 | End: 2024-08-29

## 2024-08-28 RX ADMIN — ACETAMINOPHEN 1000 MG: 500 TABLET ORAL at 02:08

## 2024-08-28 RX ADMIN — FAMOTIDINE 20 MG: 20 TABLET ORAL at 08:08

## 2024-08-28 RX ADMIN — LEVOTHYROXINE SODIUM 88 MCG: 88 TABLET ORAL at 05:08

## 2024-08-28 RX ADMIN — TAMSULOSIN HYDROCHLORIDE 0.4 MG: 0.4 CAPSULE ORAL at 08:08

## 2024-08-28 RX ADMIN — FAMOTIDINE 20 MG: 10 INJECTION, SOLUTION INTRAVENOUS at 08:08

## 2024-08-28 RX ADMIN — CEFAZOLIN 2 G: 2 INJECTION, POWDER, FOR SOLUTION INTRAMUSCULAR; INTRAVENOUS at 02:08

## 2024-08-28 RX ADMIN — FUROSEMIDE 40 MG: 10 INJECTION, SOLUTION INTRAVENOUS at 09:08

## 2024-08-28 RX ADMIN — OXYCODONE HYDROCHLORIDE 5 MG: 5 TABLET ORAL at 08:08

## 2024-08-28 RX ADMIN — NOREPINEPHRINE BITARTRATE 0.04 MCG/KG/MIN: 4 INJECTION, SOLUTION INTRAVENOUS at 10:08

## 2024-08-28 RX ADMIN — DULOXETINE HYDROCHLORIDE 30 MG: 30 CAPSULE, DELAYED RELEASE ORAL at 08:08

## 2024-08-28 RX ADMIN — CEFAZOLIN 2 G: 2 INJECTION, POWDER, FOR SOLUTION INTRAMUSCULAR; INTRAVENOUS at 06:08

## 2024-08-28 RX ADMIN — ATORVASTATIN CALCIUM 80 MG: 40 TABLET, FILM COATED ORAL at 08:08

## 2024-08-28 RX ADMIN — PROPOFOL 40 MCG/KG/MIN: 10 INJECTION, EMULSION INTRAVENOUS at 03:08

## 2024-08-28 RX ADMIN — OXYCODONE HYDROCHLORIDE 5 MG: 5 TABLET ORAL at 02:08

## 2024-08-28 RX ADMIN — MUPIROCIN: 20 OINTMENT TOPICAL at 08:08

## 2024-08-28 RX ADMIN — ACETAMINOPHEN 1000 MG: 500 TABLET ORAL at 11:08

## 2024-08-28 RX ADMIN — DOCUSATE SODIUM 100 MG: 100 CAPSULE, LIQUID FILLED ORAL at 08:08

## 2024-08-28 RX ADMIN — MUPIROCIN: 20 OINTMENT TOPICAL at 09:08

## 2024-08-28 RX ADMIN — ACETAMINOPHEN 1000 MG: 500 TABLET ORAL at 05:08

## 2024-08-28 RX ADMIN — ASPIRIN 325 MG: 325 TABLET, COATED ORAL at 08:08

## 2024-08-28 RX ADMIN — POLYETHYLENE GLYCOL 3350 17 G: 17 POWDER, FOR SOLUTION ORAL at 08:08

## 2024-08-28 RX ADMIN — DEXMEDETOMIDINE HYDROCHLORIDE 0.3 MCG/KG/HR: 4 INJECTION, SOLUTION INTRAVENOUS at 05:08

## 2024-08-28 NOTE — PLAN OF CARE
[facility]  Initial Discharge Assessment       Primary Care Provider: Chaitanya López DO    Admission Diagnosis: Severe aortic stenosis [I35.0]  Dissection of aorta, unspecified portion of aorta [I71.00]    Admission Date: 8/27/2024  Expected Discharge Date:     Transition of Care Barriers: None    Payor: HUMANA MANAGED MEDICARE / Plan: HUMANA MEDICARE SELECT PARTNER / Product Type: Medicare Advantage /     Extended Emergency Contact Information  Primary Emergency Contact: Aniyah Simpson  Address: [address] [place] [address] [facility]  Home Phone: [phone]  Mobile Phone: [phone]  Relation: Other  Secondary Emergency Contact: Shae Elam  Address: [address]           [place] [address] [facility]  Home Phone: [phone]  Mobile Phone: [phone]  Relation: Daughter    Discharge Plan A: Home, Home with family  Discharge Plan B: Home Health      [facility] - [facility] [address]  [address] [facility] [address]  Phone: [phone] Fax: [phone]    [facility]  [address]  [place] [address]  Phone: [phone] Fax: [phone]    [facility] [employer] [facility] - [place], [place] - [address]  [address] [place] [address]  [facility] [address]  Phone: [phone] Fax: [phone]      Initial Assessment (most recent)       Adult Discharge Assessment - 08/28/24 1129          Discharge Assessment    Assessment Type Discharge Planning Assessment     Confirmed/corrected address, phone number and insurance Yes     Confirmed Demographics Correct on Facesheet     Source of Information patient     Communicated HOLDEN with patient/caregiver Date not available/Unable to determine     People in Home alone     Do you expect to return to your current living situation? Yes     Do you have help at home or someone to help you manage your care at home? Yes     Who are  your caregiver(s) and their phone number(s)? Shae Elam ( Daughter)     Prior to hospitilization cognitive status: Alert/Oriented     Current cognitive status: Alert/Oriented     Walking or Climbing Stairs Difficulty no     Dressing/Bathing Difficulty no     Equipment Currently Used at Home none     Patient currently being followed by outpatient case management? No     Do you currently have service(s) that help you manage your care at home? No     Do you take prescription medications? Yes     Do you have prescription coverage? Yes     Coverage Payor: HUMANA MANAGED MEDICARE - HUMANA MEDICARE SELECT PARTNER - CAPITATED     Is the patient taking medications as prescribed? yes     Who is going to help you get home at discharge? Shae Elam     Are you on dialysis? No     Do you take coumadin? No     Discharge Plan A Home;Home with family     Discharge Plan B Home Health     DME Needed Upon Discharge  other (see comments)   TBD    Discharge Plan discussed with: Patient     Transition of Care Barriers None        Physical Activity    On average, how many days per week do you engage in moderate to strenuous exercise (like a brisk walk)? Patient declined     On average, how many minutes do you engage in exercise at this level? Patient declined        Financial Resource Strain    How hard is it for you to pay for the very basics like food, housing, medical care, and heating? Patient declined        Housing Stability    In the last 12 months, was there a time when you were not able to pay the mortgage or rent on time? Patient declined     At any time in the past 12 months, were you homeless or living in a shelter (including now)? Patient declined        Transportation Needs    Has the lack of transportation kept you from medical appointments, meetings, work or from getting things needed for daily living? Patient declined        Food Insecurity    Within the past 12 months, you worried that your food would run out  before you got the money to buy more. Patient declined     Within the past 12 months, the food you bought just didn't last and you didn't have money to get more. Patient declined        Stress    Do you feel stress - tense, restless, nervous, or anxious, or unable to sleep at night because your mind is troubled all the time - these days? Patient declined        Social Isolation    How often do you feel lonely or isolated from those around you?  Patient declined        Alcohol Use    Q1: How often do you have a drink containing alcohol? Patient declined     Q2: How many drinks containing alcohol do you have on a typical day when you are drinking? Patient declined     Q3: How often do you have six or more drinks on one occasion? Patient declined        Utilities    In the past 12 months has the electric, gas, oil, or water company threatened to shut off services in your home? Patient declined        Health Literacy    How often do you need to have someone help you when you read instructions, pamphlets, or other written material from your doctor or pharmacy? Patient declines to respond                      This SW met with patient at bedside to complete DPA. Questions answered / contact numbers provided.  Use PREFERRED PHARMACY / BEDSIDE DELIVERY for any necessary medications at time of discharge. The patient is independent with all ADLs - does not use DME, In-home equipment, is not on HD, Coumadin or home oxygen.The patient's family will be assisting with help upon discharge. The patient's daughter  will be providing transportation home. The patient was asleep and did not wish to answer any additional questions.      Will continue to follow for course of hospitalization.     Rod Hagan LCSW  Case Management [facility]

## 2024-08-28 NOTE — PLAN OF CARE
SICU PLAN OF CARE    Dx: Ascending aorta and Nuno arch repair; ascending aorta replacement    Goals of Care: SBP < 140; MAP 60-80    Vital Signs (last 12 hours):   Temp:  [96.4 °F (35.8 °C)-100.2 °F (37.9 °C)]   Pulse:  [51-80]   Resp:  [19-39]   BP: (85-97)/(38-57)   SpO2:  [90 %-100 %]   Arterial Line BP: ()/(36-54)      Neuro: AO x self    Cardiac: NSR    Respiratory:  4L Nasal Cannula     Urine Output: Urethral Catheter  555   mL/shift    Drains: Chest Tube, total output 470 mL/shift    Diet: NPO      Labs/Accuchecks: q1 accuchecks, daily labs     Skin:  Midsternal chest inx. No skin breakdown noted this shift. Patient turned q2h, bony prominences protected, and mattress inflated/working correctly.     Shift Events: . Extubated this am, tolerating well. 2 units of pRBCs administered. Cardiology resident, at bedside to remove right femoral sheath. See note for further details. See flowsheet for further assessment/details.  Family updated on current condition/plan of care, questions answered, and emotional support provided.      Nurses Note -- 4 Eyes    8/28/2024   6:13 AM    Skin assessed during: Q Shift Change    [x] No Altered Skin Integrity Present    []Prevention Measures Documented    [] Yes- Altered Skin Integrity Present or Discovered   [] LDA Added if Not in Epic (Describe Wound)   [] New Altered Skin Integrity was Present on Admit and Documented in LDA   [] Wound Image Taken    Wound Care Consulted? No    Attending Nurse:  JACK Chávez    Second RN/Staff Member:  JACK Headley

## 2024-08-28 NOTE — NURSING
Cardiology resident, MD Penny at bedside to remove right femoral sheath. Sheath pulled and pressure held by cardiology resident x 15 minutes. Hemostasis obtained. Prior to sheath removal, right femoral site soft. While holding pressure, large hematoma formed. MD Ab notified. Pulses dopplerable.

## 2024-08-28 NOTE — PROGRESS NOTES
[facility]  Critical Care - Surgery  Progress Note    Patient Name: Cesar Simpson  MRN: [id number]  Admission Date: 8/27/2024  Hospital Length of Stay: 1 days  Code Status: Full Code  Attending Provider: Melvin Carroll MD  Primary Care Provider: Chaitanya López DO   Principal Problem: <principal problem not specified>    Subjective:     Hospital/ICU Course:  No notes on file    Interval History/Significant Events:   Overnight patient received 1 L of albumin, 2 pRBCs, and a dose of factor 7. He had an episode of sinus bradycardia with heart rate around 54 and required a short interval of pacing. He is currently in NSR with heart rate in the 60s. Requiring 0.02 of levo to keep MAPs>65, suspect vasoplegia. He was extubated this morning and is on RA. Noted to have a hematoma in the right groin after sheath was removed. Hematoma is not expanding and is stable. Radial pulses are palpable and lower extremity pulses are palpable.    Follow-up For: Procedure(s) (LRB):  REPLACEMENT, AORTIC VALVE  REPLACEMENT, AORTA, ASCENDING; HEMIARCH (N/A)  EXPLANT; TAVR (N/A)    Post-Operative Day: 1 Day Post-Op    Objective:     Vital Signs (Most Recent):  Temp: 99.9 °F (37.7 °C) (08/28/24 0822)  Pulse: 64 (08/28/24 0900)  Resp: (!) 28 (08/28/24 0900)  BP: (!) 108/53 (08/28/24 0900)  SpO2: 97 % (08/28/24 0900) Vital Signs (24h Range):  Temp:  [96.4 °F (35.8 °C)-100.4 °F (38 °C)] 99.9 °F (37.7 °C)  Pulse:  [51-80] 64  Resp:  [19-39] 28  SpO2:  [90 %-100 %] 97 %  BP: ()/(38-59) 108/53  Arterial Line BP: ()/(31-54) 118/43     Weight: 94 kg (207 lb 3.7 oz)  Body mass index is 32.46 kg/m².      Intake/Output Summary (Last 24 hours) at 8/28/2024 0941  Last data filed at 8/28/2024 0900  Gross per 24 hour   Intake 6794.5 ml   Output 3665 ml   Net 3129.5 ml          Physical Exam  Vitals and nursing note reviewed.   Constitutional:       General: He is not in acute distress.  Eyes:      Extraocular Movements:  Extraocular movements intact.      Conjunctiva/sclera: Conjunctivae normal.   Neck:      Comments: RIJ and LIJ CVC  Cardiovascular:      Rate and Rhythm: Normal rate and regular rhythm.      Comments: RIJ CVC  LIJ CVC  R radial A line  L brachial A line  V wires  Chest tubes 1x R pleural and 2x meds  Sternal dressing c/d/I    Pulmonary:      Effort: No respiratory distress.   Abdominal:      General: There is no distension.      Palpations: Abdomen is soft.   Genitourinary:     Comments: mirza  Musculoskeletal:      Right lower leg: No edema.      Left lower leg: No edema.   Skin:     General: Skin is warm and dry.   Neurological:      Comments: Expressive aphasia             Lines/Drains/Airways       Central Venous Catheter Line  Duration             Introducer 08/27/24 0835 Internal Jugular Right 1 day    Introducer 08/27/24 1105 Internal Jugular Left <1 day    Percutaneous Central Line - Triple Lumen  08/27/24 1108 Internal Jugular Left <1 day              Drain  Duration                  Chest Tube 08/27/24 1451 Tube - 1 Right Pleural 28 Fr. <1 day         Chest Tube 08/27/24 1451 Tube - 2 Anterior Mediastinal 28 Fr. <1 day         Chest Tube 08/27/24 1452 Tube - 3 Posterior Mediastinal 28 Fr. <1 day         Urethral Catheter 08/27/24 1045 Non-latex;Straight-tip;Temperature probe 16 Fr. <1 day              Arterial Line  Duration             Arterial Line 08/27/24 0805 Right Radial 1 day              Line  Duration                  Pacer Wires 08/27/24 1452 <1 day              Peripheral Intravenous Line  Duration                  Peripheral IV - Single Lumen 08/27/24 0727 18 G Left Hand 1 day         Peripheral IV - Single Lumen 08/27/24 0815 16 G No Right Antecubital 1 day                    Significant Labs:    CBC/Anemia Profile:  Recent Labs   Lab 08/27/24  1913 08/27/24  1953 08/27/24  2214 08/27/24  2305 08/28/24  0206 08/28/24  0329   WBC 13.44*  --  6.44  --  6.56  --    HGB 7.6*  --  8.1*  --  8.5*   --    HCT 22.7*   < > 23.8* 19* 24.6* 22*     --  97*  --  106*  --    MCV 87  --  87  --  85  --    RDW 13.2  --  13.2  --  13.1  --     < > = values in this interval not displayed.        Chemistries:  Recent Labs   Lab 08/27/24 1723 08/27/24 1913 08/28/24  0206    139 139   K 3.9 4.3 4.4    109 108   CO2 21* 20* 19*   BUN 20 20 22   CREATININE 0.9 1.0 1.1   CALCIUM 8.3* 8.4* 8.0*   ALBUMIN 1.9* 3.2* 3.0*   PROT 3.5* 4.8* 4.5*   BILITOT 0.4 0.5 0.4   ALKPHOS 25* 30* 26*   ALT 15 14 12   AST 27 26 26   MG 2.4 2.3 2.1   PHOS 2.7 3.0 2.8       All pertinent labs within the past 24 hours have been reviewed.    Significant Imaging:  I have reviewed all pertinent imaging results/findings within the past 24 hours.  Assessment/Plan:     Cardiac/Vascular  Severe aortic stenosis  S/p TAVR complicated by aortic dissection followed by bioprosthetic valve replacement w/ hemiarch repair w/ Dr. Carroll on 8/27/24.    Neuro/Psych:   - Sedation: None  - Pain:     - Scheduled Tylenol 1g q8h    - Oxy PRN    - Home duloxetine restarted                Cardiac:   - BP Goal: MAP 60-80, SBP <140  - Pressors: Levo 0.02  - Rhythm: NSR  - Cleviprex/Cardene PRN  - Anti-HTNs: Resume as appropriate   - Beta blocker: Resume as appropriate    - Statin: Atorvastatin 40 mg QD when appropriate    Pulmonary:   - Goal SpO2 >92%  - Will wean ventilator support as tolerated to extubate  - ABGs PRN  - Chest Tubes x 3 (2 Meds & 1 Pleural)  - Will remove pleural chest tube      Renal:      Recent Labs   Lab 08/27/24 1723 08/27/24 1913 08/28/24  0206   BUN 20 20 22   CREATININE 0.9 1.0 1.1          - Maintain Peterson, record strict Is/Os    - Lasix 40 mg IV    -  cc overnight    - Net +3 L    - Flomax    FEN / GI:     - Daily CMP, PRN K/Mag/Phos per protocol     - Replace electrolytes as needed    - Nutrition: CLD, after swallow evaluation    - Bowel Regimen: Miralax, docusate     ID:   - Afebrile  Recent Labs   Lab 08/27/24  9178  08/27/24  2214 08/28/24  0206   WBC 13.44* 6.44 6.56       - Abx: Complete perioperative cefazolin 2g Q8H x 5 doses    Heme/Onc:   - Hgb 8.5  Recent Labs   Lab 08/27/24  1913 08/27/24  2214 08/27/24  2226 08/28/24  0206   HGB 7.6* 8.1*  --  8.5*    97*  --  106*   APTT 33.6*  --  46.4* 31.1   INR 1.2  --  1.0 1.0       - CBC, PTT/INR daily  - DVT ppx: SCDs  - ASA 325mg daily  - HOLD plavix     Endocrine:   - CTS Goal -140  - HgbA1c: 6.0  - Endocrinology consulted for insulin management  - Home Levothyroxine     PPx:   Feeding: CLD  Analgesia/Sedation: Tylenol, Oxy PRN  Thromboembolic Prevention: SCDs  HOB >30: Yes  Stress Ulcer: famotidine  Glucose Control: Yes, insulin management per Endocrinology     Lines/Drains/Airway:  ETT  R radial arterial line, L brachial a line  RIJ CVC, LIJ CVC  Peterson  Chest Tubes: 3 (2 Mediastinal, 1 Pleural)   Pacing Wires: Temporary ventricular pacing wires     Dispo/Code Status/Palliative:    - SICU   - Full Code          Critical care was time spent personally by me on the following activities: development of treatment plan with patient or surrogate and bedside caregivers, discussions with consultants, evaluation of patient's response to treatment, examination of patient, ordering and performing treatments and interventions, ordering and review of laboratory studies, ordering and review of radiographic studies, pulse oximetry, re-evaluation of patient's condition.  This critical care time did not overlap with that of any other provider or involve time for any procedures.     Alton Contreras MD  Critical Care - Surgery  [facility]

## 2024-08-28 NOTE — CONSULTS
[facility]  Endocrinology  Diabetes Consult Note    Consult Requested by: Melvin Carroll MD   Reason for admit: <principal problem not specified>    HISTORY OF PRESENT ILLNESS:  Reason for Consult: Management of T2DM, Hyperglycemia     Surgical Procedure and Date: bioprosthetic aortic valve replacement, hemiarch, and ascending aorta repair 08/27/2024     Diabetes diagnosis year: 2013, now in pre-diabetes range    Lab Results   Component Value Date    HGBA1C 6.0 (H) 03/07/2024     Home Diabetes Medications:  diet-controlled    How often checking glucose at home?  0x/day    BG readings on regimen: Does not check blood glucose  Hypoglycemia on the regimen?  No  Missed doses on regimen?  diet-controlled    Diabetes Complications include:     Hyperglycemia    Complicating diabetes co morbidities:   CAD, HTN, HLD      HPI:   Patient is a 74 y.o. male with CAD, PAD, severe AS, HTN, HLD, Hypothyroidism, FABY, T2DM who underwent a TAVR on 8/27/24 that was complicated by an aortic dissection prompting OR intervention with CTS. The patient presented to the SICU s/p bioprosthetic aortic valve replacement, hemiarch, and ascending aorta repair with Dr. Carroll on 08/27/2024.  Per chart review, patient is T2DM and was previously on Metformin. Diabetes diet-controlled.  Endo consulted for BG management.          Interval HPI:   No acute events overnight. Patient in room 60034/72363 A. Blood glucose stable. BG at goal on current insulin regimen (IIP). Steroid use- None. 1 Day Post-Op  Renal function- Normal   Lab Results   Component Value Date    CREATININE 1.1 08/28/2024     Vasopressors-  Norepinephrine     Endocrine will continue to follow and manage insulin orders inpatient.     Diet NPO Except for: Medication     Eating:   NPO  Nausea: ALEXA  Hypoglycemia and intervention: No  Fever: No  TPN and/or TF: No  If yes, type of TF/TPN and rate: N/A    PMH, PSH, FH, SH updated and reviewed     ROS:  Unable to obtain  due to: Sedation and Intubation    Current Medications and/or Treatments Impacting Glycemic Control  Immunotherapy:    Immunosuppressants       None          Steroids:   Hormones (From admission, onward)      Start     Stop Route Frequency Ordered    08/27/24 1800  vasopressin (PITRESSIN) 0.2 Units/mL in D5W 100 mL infusion         -- IV Continuous 08/27/24 1702          Pressors:    Autonomic Drugs (From admission, onward)      Start     Stop Route Frequency Ordered    08/27/24 1815  NORepinephrine 4 mg in dextrose 5% 250 mL infusion (premix)        Question Answer Comment   Begin at (in mcg/kg/min): 0.02    Titrate by: (in mcg/kg/min (RANGE PREFERRED) 0.02 - 0.2    Titrate interval: (in minutes) (RANGE PREFERRED) 2 - 5    Titrate to maintain: (MAP or SBP) MAP    Titrate to keep MAP within the range or GREATER than (if single number): (in mmHg) OTHER    Other 60-80    Maximum dose: (in mcg/kg/min) 0.1        -- IV Continuous 08/27/24 1702          Hyperglycemia/Diabetes Medications:   Antihyperglycemics (From admission, onward)      Start     Stop Route Frequency Ordered    08/28/24 1014  insulin aspart U-100 pen 0-10 Units         -- SubQ Every 6 hours PRN 08/28/24 0914             PHYSICAL EXAMINATION:  Vitals:    08/28/24 0900   BP: (!) 108/53   Pulse: 64   Resp: (!) 28   Temp:      Body mass index is 32.46 kg/m².     Physical Exam   Constitutional:       General: He is not in acute distress.     Appearance: Normal appearance. He is not ill-appearing.   HENT:      Head: Normocephalic and atraumatic.      Nose: Nose normal.   Cardiovascular:      Rate and Rhythm: Normal rate and regular rhythm.   Pulmonary:      Effort: No respiratory distress.      Comments: Intubated    Abdominal:      General: There is no distension.   Skin:     Coloration: Skin is not jaundiced.      Findings: No erythema.      Comments: Mid-sternal incision with telfa island dressing, CDI.  CT in place   Neurological:      Comments: Sedated  "      Labs Reviewed and Include   Recent Labs   Lab 08/28/24  0206   *   CALCIUM 8.0*   ALBUMIN 3.0*   PROT 4.5*      K 4.4   CO2 19*      BUN 22   CREATININE 1.1   ALKPHOS 26*   ALT 12   AST 26   BILITOT 0.4     Lab Results   Component Value Date    WBC 6.56 08/28/2024    HGB 8.5 (L) 08/28/2024    HCT 22 (L) 08/28/2024    MCV 85 08/28/2024     (L) 08/28/2024     No results for input(s): "TSH", "FREET4" in the last 168 hours.  Lab Results   Component Value Date    HGBA1C 5.7 (H) 08/28/2024       Nutritional status:   Body mass index is 32.46 kg/m².  Lab Results   Component Value Date    ALBUMIN 3.0 (L) 08/28/2024    ALBUMIN 3.2 (L) 08/27/2024    ALBUMIN 1.9 (L) 08/27/2024     No results found for: "PREALBUMIN"    Estimated Creatinine Clearance: 65.4 mL/min (based on SCr of 1.1 mg/dL).    Accu-Checks  Recent Labs     08/27/24  2309 08/28/24  0003 08/28/24  0104 08/28/24  0205 08/28/24  0314 08/28/24  0403 08/28/24  0516 08/28/24  0600 08/28/24  0738 08/28/24  0912   POCTGLUCOSE 105 137* 138* 153* 148* 133* 146* 136* 139* 142*        ASSESSMENT and PLAN    Cardiac/Vascular  Hyperlipidemia  On statin per ADA recommendations  May increase insulin resistance        Severe aortic stenosis  S/p bioprosthetic aortic valve replacement, hemiarch, and ascending aorta repair with Dr. Carroll 08/27/2024  Managed by primary team  Optimize blood glucose control      Endocrine  Diet-controlled diabetes mellitus  Endocrinology consulted for BG management.   BG goal 140-180    - Discontinue insulin infusion protocol   - Start Novolog (Insulin Aspart) prn for BG excursions [facility] SSI (150/25)  - BG checks q6hr while NPO  - Hypoglycemia protocol in place    ** Please notify Endocrine for any change and/or advance in diet**  ** Please call Endocrine for any BG related issues **    Discharge Planning:   TBD. Please notify endocrinology prior to discharge.            Plan discussed with patient, family, and RN at " bedside.     Jacqui Nunez PA-C  Endocrinology  [facility]

## 2024-08-28 NOTE — HPI
Reason for Consult: Management of T2DM, Hyperglycemia     Surgical Procedure and Date: bioprosthetic aortic valve replacement, hemiarch, and ascending aorta repair 08/27/2024     Diabetes diagnosis year: 2013, now in pre-diabetes range    Lab Results   Component Value Date    HGBA1C 6.0 (H) 03/07/2024     Home Diabetes Medications:  diet-controlled    How often checking glucose at home?  0x/day    BG readings on regimen: Does not check blood glucose  Hypoglycemia on the regimen?  No  Missed doses on regimen?  diet-controlled    Diabetes Complications include:     Hyperglycemia    Complicating diabetes co morbidities:   CAD, HTN, HLD      HPI:   Patient is a 74 y.o. male with CAD, PAD, severe AS, HTN, HLD, Hypothyroidism, FABY, T2DM who underwent a TAVR on 8/27/24 that was complicated by an aortic dissection prompting OR intervention with CTS. The patient presented to the SICU s/p bioprosthetic aortic valve replacement, hemiarch, and ascending aorta repair with Dr. Carroll on 08/27/2024.  Per chart review, patient is T2DM and was previously on Metformin. Diabetes diet-controlled.  Endo consulted for BG management.

## 2024-08-28 NOTE — PT/OT/SLP EVAL
Occupational Therapy   Co-Evaluation    Name: Cesar Simpson  MRN: [id number]  Admitting Diagnosis: <principal problem not specified>  Recent Surgery: Procedure(s) (LRB):  REPLACEMENT, AORTIC VALVE  REPLACEMENT, AORTA, ASCENDING; HEMIARCH (N/A)  EXPLANT; TAVR (N/A) 1 Day Post-Op    Recommendations:     Discharge Recommendations: High Intensity Therapy  Discharge Equipment Recommendations:  shower chair  Barriers to discharge:  Other (Comment) (increased skilled A required)    Assessment:     Cesar Simpson is a 74 y.o. male with a medical diagnosis of <principal problem not specified>.  He presents with fair motivation/participation.   Pt with overall fatigue, impaired cognition, and decreased activity tolerance compared to his PLOF. Pt with minimal verbalizations, however all communication appropriate to situation. Pt with very poor understanding and adherence to sternal precautions, requiring max verbal and tactile cues for redirection. Pt presents with appropriate activity tolerance and motivation to participate in 3 hrs of therapy 5x a week. Pt is currently not at his PLOF and would greatly benefit from continued skilled OT intervention in efforts to participate in meaningful occupations of choice at the highest level of independence. Pt's VSS and MAP within parameters during today's session.     Pt would greatly benefit from high intensity OT services upon discharge from this facility in efforts to progress to PLOF regarding meaningful occupations and home/community reintegration.        Performance deficits affecting function: weakness, impaired endurance, impaired self care skills, impaired functional mobility, gait instability, impaired balance, impaired cognition, impaired coordination, decreased coordination, decreased upper extremity function, decreased lower extremity function, decreased safety awareness, impaired cardiopulmonary response to activity.      Rehab Prognosis: Good; patient would benefit from acute  "skilled OT services to address these deficits and reach maximum level of function.       Plan:     Patient to be seen 5 x/week to address the above listed problems via self-care/home management, therapeutic activities, therapeutic exercises, neuromuscular re-education, cognitive retraining  Plan of Care Expires: 09/28/24  Plan of Care Reviewed with: patient, friend    Subjective     Chief Complaint: dizziness and fatigue   Patient/Family Comments/goals: "yes" and "no" to social history questions    Occupational Profile:  Living Environment: pt lives alone in a [facility] with 0 [address] and [facility] with built in bench  Previous level of function: ind with ADL/IADLs/functional mobility   Roles and Routines: none stated  Equipment Used at Home: grab bar  Assistance upon Discharge: none stated    Pain/Comfort:  Pain Rating 1: 0/10  Pain Rating Post-Intervention 1: 0/10    Patients cultural, spiritual, Denominational conflicts given the current situation: no    Objective:   Co-evaluation/treatment performed due to patient's multiple deficits requiring two skilled therapists to appropriately and safely assess patient's strength, endurance, functional mobility, and ADL performance while facilitating functional tasks in addition to accommodating for patient's activity tolerance and medical acuity.   Communicated with: RN prior to session.  Patient found up in chair with blood pressure cuff, pulse ox (continuous), telemetry, mirza catheter, chest tube, arterial line upon OT entry to room.    General Precautions: Standard, fall, sternal  Orthopedic Precautions: N/A  Braces: N/A  Respiratory Status: Room air    Occupational Performance:    Bed Mobility:    Not performed, pt found in bedside chair and returned to bedside chair       Functional Mobility/Transfers:  Patient completed Sit <> Stand Transfer with minimum assistance and of 2 persons  with  hand-held assist for 2 trials  Functional Mobility: deferred 2/2 impaired standing tolerance and " weakness    Activities of Daily Living:  Toileting: dependence mirza in place  Pt politely declined further ADL participation    Cognitive/Visual Perceptual:  Cognitive/Psychosocial Skills:     -       Oriented to: Person, Place, Time, and Situation   -       Follows Commands/attention:Easily distracted  -       Communication: expressive aphasia and receptive aphasia  -       Memory: No Deficits noted  -       Safety awareness/insight to disability: impaired   -       Mood/Affect/Coping skills/emotional control: Cooperative    Physical Exam:  Balance:    -       impaired static and dynamic standing balance  Skin integrity: Visible skin intact  Edema:  None noted  Upper Extremity Range of Motion:     -       Right Upper Extremity: WFL  -       Left Upper Extremity: WFL  Upper Extremity Strength:    -       Right Upper Extremity: NT 2/2 sternal precautions  -       Left Upper Extremity: NT 2/2 sternal precautions    Strength:    -       Right Upper Extremity: WFL  -       Left Upper Extremity: WFL  Gross motor coordination:   WFL    AMPAC 6 Click ADL:  AMPAC Total Score: 15    Treatment & Education:  Provided education regarding role of OT, POC, & discharge recommendations with pt and friend verbalizing understanding.  Pt had no further questions & when asked whether there were any concerns pt reported none.   Pt educated on the benefits of completing OOB ADL/functional mobility tasks with hospital staff present, pt with good understanding.   Pt educated on sternal precautions when participating in preferred occupations of choice (no pushing, pulling, picking up, or shoulder abduction).  Whiteboard updated with OT name, sternal precautions, safe mobility protocol, and level of assistance required.     Patient left up in chair with all lines intact, call button in reach, RN notified, and friend present    GOALS:   Multidisciplinary Problems       Occupational Therapy Goals          Problem: Occupational Therapy     Goal Priority Disciplines Outcome Interventions   Occupational Therapy Goal     OT, PT/OT Progressing    Description: Goals to be met by: 9/28/2024     Patient will increase functional independence with ADLs by performing:    Feeding with Set-up Assistance.  UE Dressing with [place].  LE Dressing with [place].  Grooming while standing at sink with [place].  Toileting from toilet with [place] for hygiene and clothing management.   Toilet transfer to toilet with Amite.                         History:     Past Medical History:   Diagnosis Date    Coronary artery disease of native artery of native heart with stable angina pectoris 4/10/2024    Diabetes mellitus type II, uncontrolled 02/27/2013    High-density lipoprotein deficiency 02/27/2013    HTN (hypertension) 02/27/2013    Hyperlipidemia     Hypothyroidism     Moderate obstructive sleep apnea 6/29/2022    Normocytic anemia 06/28/2021    Obesity     Osteoarthritis 02/08/2016    PAD (peripheral artery disease) 4/10/2024    Trouble in sleeping     Type 2 diabetes mellitus     Type 2 diabetes mellitus with diabetic polyneuropathy, without long-term current use of insulin          Past Surgical History:   Procedure Laterality Date    AORTIC VALVE REPLACEMENT  8/27/2024    Procedure: REPLACEMENT, AORTIC VALVE;  Surgeon: London Guillen MD;  Location: [facility] OR [address] [place] [address];  Service: Cardiovascular;;    CARDIAC CATH COSURGEON N/A 8/27/2024    Procedure: Cardiac Cath Cosurgeon;  Surgeon: Melvin Carroll MD;  Location: [facility] CATH LAB;  Service: Cardiology;  Laterality: N/A;    CATARACT EXTRACTION      CATHETERIZATION OF BOTH LEFT AND RIGHT HEART N/A 4/16/2024    Procedure: CATHETERIZATION, HEART, BOTH LEFT AND RIGHT;  Surgeon: Brian Vickers MD;  Location: [facility] CATH LAB/EP;  Service: Cardiology;  Laterality: N/A;  Aortic valve study/ Saul Catheter/2 manifolds    CORONARY ANGIOGRAPHY N/A 4/16/2024    Procedure: ANGIOGRAM, CORONARY ARTERY;  Surgeon:  Brian Vickers MD;  Location: [facility] CATH LAB/EP;  Service: Cardiology;  Laterality: N/A;    CORONARY ANGIOPLASTY WITH STENT PLACEMENT N/A 4/23/2024    Procedure: ANGIOPLASTY, CORONARY ARTERY, WITH STENT INSERTION;  Surgeon: Brian Vickers MD;  Location: [facility] CATH LAB/EP;  Service: Cardiology;  Laterality: N/A;    EXPLANT N/A 8/27/2024    Procedure: EXPLANT; TAVR;  Surgeon: London Guillen MD;  [facility];  Service: Cardiovascular;  Laterality: N/A;    EYE SURGERY      cataracts    INSTANTANEOUS WAVE-FREE RATIO (IFR) N/A 4/16/2024    Procedure: Instantaneous Wave-Free Ratio (IFR);  Surgeon: Brian Vickers MD;  Location: [facility] CATH LAB/EP;  Service: Cardiology;  Laterality: N/A;    IVUS, CORONARY  4/23/2024    Procedure: IVUS, Coronary;  Surgeon: Brian Vickers MD;  Location: [facility] CATH LAB/EP;  Service: Cardiology;;    JOINT REPLACEMENT Left     arthroplasty    PERCUTANEOUS CORONARY INTERVENTION, ARTERY N/A 4/23/2024    Procedure: Percutaneous coronary intervention;  Surgeon: Brian Vickers MD;  Location: [facility] CATH LAB/EP;  Service: Cardiology;  Laterality: N/A;  LAD    REPLACEMENT OF ASCENDING AORTA N/A 8/27/2024    Procedure: REPLACEMENT, AORTA, ASCENDING; HEMIARCH;  Surgeon: London Guillen MD;  [facility];  Service: Cardiovascular;  Laterality: N/A;    SHOULDER SURGERY      TONSILLECTOMY      TOTAL SHOULDER ARTHROPLASTY Left     TRANSCATHETER AORTIC VALVE REPLACEMENT (TAVR) N/A 8/27/2024    Procedure: REPLACEMENT, AORTIC VALVE, TRANSCATHETER (TAVR);  Surgeon: Cesar Louis MD;  Location: [facility] CATH LAB;  Service: Cardiology;  Laterality: N/A;       Time Tracking:     OT Date of Treatment: 08/28/24  OT Start Time: 1144  OT Stop Time: 1208  OT Total Time (min): 24 min    Billable Minutes:Evaluation 10  Therapeutic Activity 14    8/28/2024

## 2024-08-28 NOTE — PT/OT/SLP EVAL
Physical Therapy Co-Evaluation and Co-Treatment    Patient Name:  Cesar Simpson   MRN:  [id number]  Admit Date: 8/27/2024  Admitting Diagnosis:  <principal problem not specified>   Length of Stay: 1 days  Recent Surgery: Procedure(s) (LRB):  REPLACEMENT, AORTIC VALVE  REPLACEMENT, AORTA, ASCENDING; HEMIARCH (N/A)  EXPLANT; TAVR (N/A) 1 Day Post-Op    Recommendations:     Discharge Recommendations:  High Intensity Therapy     Discharge Equipment Recommendations: none   Barriers to discharge: Decreased caregiver support and increased need for skilled assistance    Plan:     During this hospitalization, patient to be seen 5 x/week to address the identified rehab impairments via gait training, therapeutic activities, therapeutic exercises, neuromuscular re-education and progress towards the established goals.  Plan of Care Expires:  09/28/24  Plan of Care Reviewed with: patient    Assessment:     Cesar Simpson is a 74 y.o. male admitted with a medical diagnosis of severe aortic stenosis. Pt POD1 s/p TAVR resulting in aortic valve replacement. Pt AAOx4 but with expressive speech deficits noted during evaluation. RN notified to closely monitor BP this session using cuff BP. Pt completed sit to stand transfers with min A x2 this session. BP obtained in standing during second standing attempt with MAP >70, but pt reports some dizziness and BLE weakness requesting to sit back down. Difficult to determine pt symptoms 2/2 expressive deficits so gait deferred this date. Patient would benefit from skilled therapy services to maximize safety and independence, increase activity tolerance, decrease fall risk, decrease caregiver burden, improve QOL, improve patient's functional mobility, and decrease risk of contractures and pressure sores. Patient presents with good participation and motivation to return to prior level of function with high intensity therapy.  The patient demonstrates appropriate endurance, strength, and pain control to  participate in up to 3 hours or 15hrs of combined therapy post acute.      Problem List: weakness, impaired endurance, impaired functional mobility, impaired self care skills, gait instability, impaired balance, decreased upper extremity function, decreased lower extremity function, decreased safety awareness, pain, impaired cardiopulmonary response to activity, impaired cognition.  Rehab Prognosis: Good; patient would benefit from acute skilled PT services to address these deficits and reach maximum level of function.      Goals:   Multidisciplinary Problems       Physical Therapy Goals          Problem: Physical Therapy    Goal Priority Disciplines Outcome Goal Variances Interventions   Physical Therapy Goal     PT, PT/OT Progressing     Description: Goals to be met by: 24     Patient will increase functional independence with mobility by performin. Supine to sit with Contact Guard Assistance  2. Sit to stand transfer with Contact Guard Assistance  3. Bed to chair transfer with Contact Guard Assistance using LRAD  4. Gait  x 200 feet with Contact Guard Assistance using LRAD.   5. Stand for 10 minutes with Contact Guard Assistance using LRAD                         Subjective     RN notified prior to session. Friend present upon PT entrance into room. Patient agreeable to PT evaluation.    Chief Complaint: Dizziness; weakness  Patient/Family Comments/goals: none stated  Pain/Comfort:  Pain Rating 1: 0/10  Pain Rating Post-Intervention 1: 0/10    Social History:  Residence: lives alone 1-story house with no WINIFRED. Pt's bathroom has a WIS with a built in bench and grab bars.  Support available: family  Equipment Owned (not using): none  Equipment Used: None  Prior level of function: independent; retired; driving    Patient reports they will have assistance from family upon discharge.    Objective:     Additional staff present: OT; OT for co-evaluation due to suspected patient need for two skilled therapists  "to appropriately assess patient's functional deficits as well as ensure patient safety, accommodate for limited activity tolerance, and provide appropriate, skilled assistance to maximize functional potential during evaluation.    Patient found up in chair with: blood pressure cuff, telemetry, pulse ox (continuous), mirza catheter, chest tube, central line, arterial line     General Precautions: Standard, Cardiac fall, sternal   Orthopedic Precautions:N/A   Braces: N/A   Body mass index is 32.46 kg/m².  Oxygen Device: Room Air  Vitals: BP (!) 107/53   Pulse 71   Temp 100 °F (37.8 °C) (Core Bladder)   Resp (!) 35   Ht 5' 7" (1.702 m)   Wt 94 kg (207 lb 3.7 oz)   SpO2 (!) 92%   BMI 32.46 kg/m²       Exams:    Cognition:  Alert and Cooperative  Command following: Follows one-step verbal commands  Communication: expressive and receptive language deficits    Sensation:   Light touch sensation: Intact BLEs    Gross Motor Coordination: No deficits noted during functional mobility tasks     Edema/Skin Integrity: None noted; Visible skin intact    Postural examination/scapula alignment: Rounded shoulder and Head forward    Lower Extremity Range of Motion:  Right Lower Extremity: WFL  Left Lower Extremity: WFL    Lower Extremity Strength:  Right Lower Extremity: WFL  Left Lower Extremity: WFL      Functional Mobility:    Bed Mobility:  Pt found/returned to bedside chair    Transfers:   Sit <> Stand Transfer: Minimal Assistance and x2 persons  x 2 trials from chair with no AD   Unable to obtain BP reading during first stand attempt before pt needing to return to sitting.  MAP 74 during second stand attempt but pt reports some LE weakness and dizziness, somewhat difficult to determine pt's symptoms 2/2 expressive deficits. Pt completed x5 reps R/L standing marches with knee block provided before returning to sitting in chair              Gait:  Not performed 2/2 pt safet    Balance:  Standing:  Static: minimum " assistance  Dynamic: minimum assistance and of 2 persons    Therapeutic exercise performed in the form of bed mobility, sitting balance, and transfers to increase patient's activity tolerance and postural control.     Outcome Measures:  AM-PAC 6 CLICK MOBILITY  Turning over in bed (including adjusting bedclothes, sheets and blankets)?: 3  Sitting down on and standing up from a chair with arms (e.g., wheelchair, bedside commode, etc.): 3  Moving from lying on back to sitting on the side of the bed?: 3  Moving to and from a bed to a chair (including a wheelchair)?: 3  Need to walk in hospital room?: 3  Climbing 3-5 steps with a railing?: 2  Basic Mobility Total Score: 17     Education:  Time provided for education, counseling and discussion of health disposition in regards to patient's current status  All questions answered within PT scope of practice and to patient's satisfaction  PT role in POC to address current functional deficits  Call nursing/pct to transfer to chair/use bathroom. Pt stated understanding.  Educated on sternal precautions- needs reinforcement!    Patient left up in chair with all lines intact, call button in reach, RN notified, and family present.      History:     Past Medical History:   Diagnosis Date    Coronary artery disease of native artery of native heart with stable angina pectoris 4/10/2024    Diabetes mellitus type II, uncontrolled 02/27/2013    High-density lipoprotein deficiency 02/27/2013    HTN (hypertension) 02/27/2013    Hyperlipidemia     Hypothyroidism     Moderate obstructive sleep apnea 6/29/2022    Normocytic anemia 06/28/2021    Obesity     Osteoarthritis 02/08/2016    PAD (peripheral artery disease) 4/10/2024    Trouble in sleeping     Type 2 diabetes mellitus     Type 2 diabetes mellitus with diabetic polyneuropathy, without long-term current use of insulin        Past Surgical History:   Procedure Laterality Date    AORTIC VALVE REPLACEMENT  8/27/2024    Procedure:  REPLACEMENT, AORTIC VALVE;  Surgeon: London Guillen MD;  Location: [facility] OR 2ND FLR;  Service: Cardiovascular;;    CATARACT EXTRACTION      CATHETERIZATION OF BOTH LEFT AND RIGHT HEART N/A 4/16/2024    Procedure: CATHETERIZATION, HEART, BOTH LEFT AND RIGHT;  Surgeon: Brian Vickers MD;  Location: [facility] CATH LAB/EP;  Service: Cardiology;  Laterality: N/A;  Aortic valve study/ Cyril Catheter/2 manifolds    CORONARY ANGIOGRAPHY N/A 4/16/2024    Procedure: ANGIOGRAM, CORONARY ARTERY;  Surgeon: Brian Vickers MD;  Location: [facility] CATH LAB/EP;  Service: Cardiology;  Laterality: N/A;    CORONARY ANGIOPLASTY WITH STENT PLACEMENT N/A 4/23/2024    Procedure: ANGIOPLASTY, CORONARY ARTERY, WITH STENT INSERTION;  Surgeon: Brian Vickers MD;  Location: [facility] CATH LAB/EP;  Service: Cardiology;  Laterality: N/A;    EXPLANT N/A 8/27/2024    Procedure: EXPLANT; TAVR;  Surgeon: London Guillen MD;  [facility];  Service: Cardiovascular;  Laterality: N/A;    EYE SURGERY      cataracts    INSTANTANEOUS WAVE-FREE RATIO (IFR) N/A 4/16/2024    Procedure: Instantaneous Wave-Free Ratio (IFR);  Surgeon: Brian Vickers MD;  Location: [facility] CATH LAB/EP;  Service: Cardiology;  Laterality: N/A;    IVUS, CORONARY  4/23/2024    Procedure: IVUS, Coronary;  Surgeon: Brian Vickers MD;  Location: [facility] CATH LAB/EP;  Service: Cardiology;;    JOINT REPLACEMENT Left     arthroplasty    PERCUTANEOUS CORONARY INTERVENTION, ARTERY N/A 4/23/2024    Procedure: Percutaneous coronary intervention;  Surgeon: Brian Vickers MD;  Location: [facility] CATH LAB/EP;  Service: Cardiology;  Laterality: N/A;  LAD    REPLACEMENT OF ASCENDING AORTA N/A 8/27/2024    Procedure: REPLACEMENT, AORTA, ASCENDING; HEMIARCH;  Surgeon: London Guillen MD;  Location: [facility] OR 2ND FLR;  Service: Cardiovascular;  Laterality: N/A;    SHOULDER SURGERY      TONSILLECTOMY      TOTAL SHOULDER ARTHROPLASTY Left        Family History   Problem Relation Name Age  of Onset    Osteoporosis Mother Aniyah     Hypertension Mother Aniyah     Early death Father Cesar         Heart,  Stroke age 50    Stroke Father Cesar     Heart disease Father Cesar     Hypertension Father Cesar     Heart disease Brother Jose Enrique     Early death Brother Jose Enrique         Heart Att.  age 45    Hypertension Brother Jose Enrique     No Known Problems Daughter      No Known Problems Son      Early death Paternal Uncle Beltran         Heart,  age 49    Heart disease Paternal Uncle Beltran        Social History     Socioeconomic History    Marital status:    Tobacco Use    Smoking status: Never     Passive exposure: Never    Smokeless tobacco: Never   Substance and Sexual Activity    Alcohol use: Never     Comment: occasionally (maybe every 3 months)    Drug use: Never    Sexual activity: Not Currently     Partners: Female     Birth control/protection: None   Social History Narrative    9/7/2023: he lives alone. He has 2 kids, they live in [place] and [place]. 7 grandchildren. 2 grandchildren live nearby. No pets at home. He is retired. He used to work in a [employer]. He retired, around 2013.      Social Determinants of Health     Financial Resource Strain: Patient Declined (8/28/2024)    Overall Financial Resource Strain (CARDIA)     Difficulty of Paying Living Expenses: Patient declined   Food Insecurity: Patient Declined (8/28/2024)    Hunger Vital Sign     Worried About Running Out of Food in the Last Year: Patient declined     Ran Out of Food in the Last Year: Patient declined   Transportation Needs: Patient Declined (8/28/2024)    TRANSPORTATION NEEDS     Transportation : Patient declined   Physical Activity: Patient Declined (8/28/2024)    Exercise Vital Sign     Days of Exercise per Week: Patient declined     Minutes of Exercise per Session: Patient declined   Stress: Patient Declined (8/28/2024)    [employer] - Occupational Stress Questionnaire     Feeling of Stress : Patient  declined   Housing Stability: Patient Declined (8/28/2024)    Housing Stability Vital Sign     Unable to Pay for Housing in the Last Year: Patient declined     Homeless in the Last Year: Patient declined       Time Tracking:     PT Received On: 08/28/24  PT Start Time: 1144     PT Stop Time: 1208  PT Total Time (min): 24 min     Billable Minutes: Evaluation 14 minutes and Therapeutic Exercise 10 minutes    8/28/2024

## 2024-08-28 NOTE — ASSESSMENT & PLAN NOTE
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 Carroll on 8/27/24.    Neuro/Psych:   - Sedation: None  - Pain:     - Scheduled Tylenol 1g q8h    - Oxy PRN    - Home duloxetine restarted                Cardiac:   - BP Goal: MAP 60-80, SBP <140  - Pressors: Levo 0.02  - Rhythm: NSR  - Cleviprex/Cardene PRN  - Anti-HTNs: Resume as appropriate   - Beta blocker: Resume as appropriate    - Statin: Atorvastatin 40 mg QD when appropriate    Pulmonary:   - Goal SpO2 >92%  - Will wean ventilator support as tolerated to extubate  - ABGs PRN  - Chest Tubes x 3 (2 Meds & 1 Pleural)  - Will remove pleural chest tube      Renal:      Recent Labs   Lab 08/27/24  1723 08/27/24  1913 08/28/24  0206   BUN 20 20 22   CREATININE 0.9 1.0 1.1          - Maintain Peterson, record strict Is/Os    - Lasix 40 mg IV    -  cc overnight    - Net +3 L    - Flomax    FEN / GI:     - Daily CMP, PRN K/Mag/Phos per protocol     - Replace electrolytes as needed    - Nutrition: CLD, after swallow evaluation    - Bowel Regimen: Miralax, docusate     ID:   - Afebrile  Recent Labs   Lab 08/27/24 1913 08/27/24 2214 08/28/24  0206   WBC 13.44* 6.44 6.56       - Abx: Complete perioperative cefazolin 2g Q8H x 5 doses    Heme/Onc:   - Hgb 8.5  Recent Labs   Lab 08/27/24 1913 08/27/24 2214 08/27/24  2226 08/28/24  0206   HGB 7.6* 8.1*  --  8.5*    97*  --  106*   APTT 33.6*  --  46.4* 31.1   INR 1.2  --  1.0 1.0       - CBC, PTT/INR daily  - DVT ppx: SCDs  - ASA 325mg daily  - HOLD plavix     Endocrine:   - CTS Goal -140  - HgbA1c: 6.0  - Endocrinology consulted for insulin management  - Home Levothyroxine     PPx:   Feeding: CLD  Analgesia/Sedation: Tylenol, Oxy PRN  Thromboembolic Prevention: SCDs  HOB >30: Yes  Stress Ulcer: famotidine  Glucose Control: Yes, insulin management per Endocrinology     Lines/Drains/Airway:  ETT  R radial arterial line, L brachial a line  RIJ CVC,  GUMAROJ CVC  Peterson  Chest Tubes: 3 (2 Mediastinal, 1 Pleural)   Pacing Wires: Temporary ventricular pacing wires     Dispo/Code Status/Palliative:    - SICU   - Full Code

## 2024-08-28 NOTE — PLAN OF CARE
PT evaluation completed- see note for details. PT POC and goals established.    Problem: Physical Therapy  Goal: Physical Therapy Goal  Description: Goals to be met by: 24     Patient will increase functional independence with mobility by performin. Supine to sit with Contact Guard Assistance  2. Sit to stand transfer with Contact Guard Assistance  3. Bed to chair transfer with Contact Guard Assistance using LRAD  4. Gait  x 200 feet with Contact Guard Assistance using LRAD.   5. Stand for 10 minutes with Contact Guard Assistance using LRAD    Outcome: Progressing

## 2024-08-28 NOTE — NURSING
SICU PLAN OF CARE    Dx: S/P AVR Ascending Aorta and hemiarch replacement    Goals of Care: Wean pressors as tolerated    Vital Signs (last 12 hours):   Temp:  [99.7 °F (37.6 °C)-100.2 °F (37.9 °C)]   Pulse:  [63-78]   Resp:  [15-37]   BP: ()/(50-59)   SpO2:  [88 %-100 %]   Arterial Line BP: ()/(23-48)      Neuro: Follows commands , Moves all extremities spontaneously , and Confused    Cardiac: normal sinus rhythm    Respiratory:  1L Nasal Cannula     Gtts: Norepinephrine    Urine Output: Urethral Catheter  1360 mL/shift    Drains: Chest Tube, total output 330mL/shift    Diet: Full Liquid and 1500mL Fluid Restriction     Labs/Accuchecks: Daily labs; Accu q6h    Skin:  No new skin breakdown noted.  Patient turned q2h, bony prominences protected, and mattress inflated/working correctly.   Blayne Score: 17. If Blayne Score is 16 or less, complete 4EYES note each shift.    Shift Events:  Pt noted to have difficulty expressing words; nodding, gesturing, and answering yes/no questions correctly. CTS team made aware this morning on rounds. Patient with equal  strength in upper extremities, and equal dorsi and plantar flexion in lower extremities. Can complete serial tasks. R pleural CT removed by MD at bedside. Lasix IVP x1 w/ appropriate response. Levo gtt continued to maintain MAP 60-80. OOBTC throughout shift.  See flowsheet for further assessment/details.  Family updated on current condition/plan of care, questions answered, and emotional support provided.  MD updated on current condition, vitals, labs, and gtts.        Nurses Note -- 4 Eyes  8/28/2024   5:25 PM      Skin assessed during: Q Shift  [x] No Altered Skin Integrity Present    [x]Prevention Measures Documented  [] Yes- Altered Skin Integrity Present or Discovered   [] LDA Added if Not in Epic (Describe Wound)   [] New Altered Skin Integrity was Present on Admit and Documented in LDA   [] Wound Image Taken  Wound Care Consulted? No  Attending  Nurse:  Reese Pandya RN/Staff Member:  Jarrod

## 2024-08-28 NOTE — NURSING
"Pt transported to SICU [id number] with portable telemetryAmbubag" in use. Pt connected to ICU monitor Ventilator. RT, Charge RN, CTS MD team called to bedside for patient arrival. Report received from Anesthesia. New orders received and implemented. Pt assessed, immediate needs met. Family brought to bedside, updated on the patient's current condition and PoC for remainder of shift. Family also given ICU Welcome packet and educated on visiting hours. All questions answered, emotional support provided.     SICU PLAN OF CARE    Dx: <principal problem not specified>    Goals of Care: Wean pressor and ventilatory support as tolerated    Vital Signs (last 12 hours):   Temp:  [96.4 °F (35.8 °C)-97.5 °F (36.4 °C)]   Pulse:  [55-80]   Resp:  [19-32]   BP: (85-94)/(38-57)   SpO2:  [98 %-100 %]   Arterial Line BP: ()/(36-47)      Neuro: Intubated  and Sedated    Cardiac: normal sinus rhythm    Respiratory: Ventilator; Mode: A/C, Volume Control, 50% FiO2, 5 PEEP    Gtts: Epinephrine , Norepinephrine, Vasopressin, Propofol, and Insulin    Urine Output: Urethral Catheter  2135 mL/shift    Drains: Chest Tube, total output 200mL/shift    Diet: NPO      Labs/Accuchecks: Trending labs    Skin:  No skin breakdown noted on admit.  Patient turned q2h, bony prominences protected, and mattress inflated/working correctly.   Blayne Score: 14. If Blayne Score is 16 or less, complete 4EYES note each shift.    Shift Events:  Pt admitted to SICU, multiple blood products ordered and administered per MD order. Titrated vasopressors to maintain MAP 60-80. Dr Carroll updated at end of shift on current drips, lab results, and patient assessments.  See flowsheet for further assessment/details.  Family updated on current condition/plan of care, questions answered, and emotional support provided.  MD updated on current condition, vitals, labs, and gtts.        Nurses Note -- 4 Eyes  8/27/2024   7:32 PM      Skin assessed during: Q Shift  [x] No Altered " Skin Integrity Present    [x]Prevention Measures Documented  [] Yes- Altered Skin Integrity Present or Discovered   [] LDA Added if Not in Epic (Describe Wound)   [] New Altered Skin Integrity was Present on Admit and Documented in LDA   [] Wound Image Taken  Wound Care Consulted? No  Attending Nurse:  Saira Pandya RN/Staff Member:   Reese

## 2024-08-28 NOTE — SUBJECTIVE & OBJECTIVE
Interval History/Significant Events:   Overnight patient received 1 L of albumin, 2 pRBCs, and a dose of factor 7. He had an episode of sinus bradycardia with heart rate around 54 and required a short interval of pacing. He is currently in NSR with heart rate in the 60s. Requiring 0.02 of levo to keep MAPs>65, suspect vasoplegia. He was extubated this morning and is on RA. Noted to have a hematoma in the right groin after sheath was removed. Hematoma is not expanding and is stable. Radial pulses are palpable and lower extremity pulses are palpable.    Follow-up For: Procedure(s) (LRB):  REPLACEMENT, AORTIC VALVE  REPLACEMENT, AORTA, ASCENDING; HEMIARCH (N/A)  EXPLANT; TAVR (N/A)    Post-Operative Day: 1 Day Post-Op    Objective:     Vital Signs (Most Recent):  Temp: 99.9 °F (37.7 °C) (08/28/24 0822)  Pulse: 64 (08/28/24 0900)  Resp: (!) 28 (08/28/24 0900)  BP: (!) 108/53 (08/28/24 0900)  SpO2: 97 % (08/28/24 0900) Vital Signs (24h Range):  Temp:  [96.4 °F (35.8 °C)-100.4 °F (38 °C)] 99.9 °F (37.7 °C)  Pulse:  [51-80] 64  Resp:  [19-39] 28  SpO2:  [90 %-100 %] 97 %  BP: ()/(38-59) 108/53  Arterial Line BP: ()/(31-54) 118/43     Weight: 94 kg (207 lb 3.7 oz)  Body mass index is 32.46 kg/m².      Intake/Output Summary (Last 24 hours) at 8/28/2024 0941  Last data filed at 8/28/2024 0900  Gross per 24 hour   Intake 6794.5 ml   Output 3665 ml   Net 3129.5 ml          Physical Exam  Vitals and nursing note reviewed.   Constitutional:       General: He is not in acute distress.  Eyes:      Extraocular Movements: Extraocular movements intact.      Conjunctiva/sclera: Conjunctivae normal.   Neck:      Comments: RIPIO and JUAN MIGUEL CVC  Cardiovascular:      Rate and Rhythm: Normal rate and regular rhythm.      Comments: RIJ CVC  LIJ CVC  R radial A line  L brachial A line  V wires  Chest tubes 1x R pleural and 2x meds  Sternal dressing c/d/I    Pulmonary:      Effort: No respiratory distress.   Abdominal:      General:  There is no distension.      Palpations: Abdomen is soft.   Genitourinary:     Comments: mirza  Musculoskeletal:      Right lower leg: No edema.      Left lower leg: No edema.   Skin:     General: Skin is warm and dry.   Neurological:      Comments: Expressive aphasia             Lines/Drains/Airways       Central Venous Catheter Line  Duration             Introducer 08/27/24 0835 Internal Jugular Right 1 day    Introducer 08/27/24 1105 Internal Jugular Left <1 day    Percutaneous Central Line - Triple Lumen  08/27/24 1108 Internal Jugular Left <1 day              Drain  Duration                  Chest Tube 08/27/24 1451 Tube - 1 Right Pleural 28 Fr. <1 day         Chest Tube 08/27/24 1451 Tube - 2 Anterior Mediastinal 28 Fr. <1 day         Chest Tube 08/27/24 1452 Tube - 3 Posterior Mediastinal 28 Fr. <1 day         Urethral Catheter 08/27/24 1045 Non-latex;Straight-tip;Temperature probe 16 Fr. <1 day              Arterial Line  Duration             Arterial Line 08/27/24 0805 Right Radial 1 day              Line  Duration                  Pacer Wires 08/27/24 1452 <1 day              Peripheral Intravenous Line  Duration                  Peripheral IV - Single Lumen 08/27/24 0727 18 G Left Hand 1 day         Peripheral IV - Single Lumen 08/27/24 0815 16 G No Right Antecubital 1 day                    Significant Labs:    CBC/Anemia Profile:  Recent Labs   Lab 08/27/24  1913 08/27/24  1953 08/27/24  2214 08/27/24  2305 08/28/24  0206 08/28/24  0329   WBC 13.44*  --  6.44  --  6.56  --    HGB 7.6*  --  8.1*  --  8.5*  --    HCT 22.7*   < > 23.8* 19* 24.6* 22*     --  97*  --  106*  --    MCV 87  --  87  --  85  --    RDW 13.2  --  13.2  --  13.1  --     < > = values in this interval not displayed.        Chemistries:  Recent Labs   Lab 08/27/24  1723 08/27/24 1913 08/28/24  0206    139 139   K 3.9 4.3 4.4    109 108   CO2 21* 20* 19*   BUN 20 20 22   CREATININE 0.9 1.0 1.1   CALCIUM 8.3* 8.4*  8.0*   ALBUMIN 1.9* 3.2* 3.0*   PROT 3.5* 4.8* 4.5*   BILITOT 0.4 0.5 0.4   ALKPHOS 25* 30* 26*   ALT 15 14 12   AST 27 26 26   MG 2.4 2.3 2.1   PHOS 2.7 3.0 2.8       All pertinent labs within the past 24 hours have been reviewed.    Significant Imaging:  I have reviewed all pertinent imaging results/findings within the past 24 hours.

## 2024-08-28 NOTE — CONSULTS
"[facility]  Adult Nutrition  Consult Note    SUMMARY     Recommendations    1. Advance PO diet order to Cardiac as tolerated (fluid restriction per MD).  2. If PO intake poor >48 hrs, add Boost Glucose Control ONS BID to supplement intake.   3. RD to monitor & follow-up.    Goals: Meet % EEN, EPN by RD f/u date  Nutrition Goal Status: new  Communication of RD Recs: reviewed with RN    Assessment and Plan    Nutrition Problem:  Increased nutrient needs    Related to (etiology):   Physiological causes    Signs and Symptoms (as evidenced by):   S/p cardiac surgery     Interventions(treatment strategy):  Collaboration of nutrition care w/ other providers    Nutrition Diagnosis Status:   New     Reason for Assessment    Reason For Assessment: consult  Diagnosis: other (see comments) (Severe aortic stenosis)  Relevant Medical History: DM  Interdisciplinary Rounds: did not attend    General Information Comments: S/p AVR, TAVR explant, ascending aorta replacement. Pt extubated this AM & diet just advanced to clear liquids. Pt appears nourished w/ UBW of 190# - no indicators of malnutrition noted.  Nutrition Discharge Planning: Adequate PO intake    Nutrition/Diet History    Spiritual, Cultural Beliefs, Tenriism Practices, Values that Affect Care: no  Food Allergies: NKFA  Factors Affecting Nutritional Intake: clear liquid diet    Anthropometrics    Temp: 100 °F (37.8 °C)  Height Method: Stated  Height: 5' 7" (170.2 cm)  Height (inches): 67 in  Weight Method: Bed Scale  Weight: 94 kg (207 lb 3.7 oz)  Weight (lb): 207.23 lb  Ideal Body Weight (IBW), Male: 148 lb  % Ideal Body Weight, Male (lb): 140.02 %  BMI (Calculated): 32.4  BMI Grade: 30 - 34.9- obesity - grade I    Lab/Procedures/Meds    Pertinent Labs Reviewed: reviewed  Pertinent Labs Comments: A1C 6  Pertinent Medications Reviewed: reviewed  Pertinent Medications Comments: Insulin    Estimated/Assessed Needs    Weight Used For Calorie " Calculations: 94 kg (207 lb 3.7 oz)    Energy Calorie Requirements (kcal): 1973 kcal/d  Energy Need Method: Zapata-St Jeor (1.2 PAL)    Protein Requirements:  g/d (.9-1.1 g/kg)  Weight Used For Protein Calculations: 94 kg (207 lb 3.7 oz)    Estimated Fluid Requirement Method: other (see comments) (Per MD)  RDA Method (mL): 1973    Nutrition Prescription Ordered    Current Diet Order: Clear liquids  Nutrition Order Comments: 1500 mL FR    Evaluation of Received Nutrient/Fluid Intake    I/O: +3.2L since admit    Comments: LBM: 8/27    Nutrition Risk    Level of Risk/Frequency of Follow-up:  (1x/week)     Monitor and Evaluation    Food and Nutrient Intake: food and beverage intake, energy intake  Food and Nutrient Adminstration: diet order  Physical Activity and Function: nutrition-related ADLs and IADLs  Anthropometric Measurements: weight, weight change  Biochemical Data, Medical Tests and Procedures: inflammatory profile, lipid profile, glucose/endocrine profile, gastrointestinal profile, electrolyte and renal panel  Nutrition-Focused Physical Findings: overall appearance     Nutrition Follow-Up    RD Follow-up?: Yes

## 2024-08-28 NOTE — PLAN OF CARE
OT ang complete and goals appropriate   Problem: Occupational Therapy  Goal: Occupational Therapy Goal  Description: Goals to be met by: 9/28/2024     Patient will increase functional independence with ADLs by performing:    Feeding with Set-up Assistance.  UE Dressing with Jenkins.  LE Dressing with Jenkins.  Grooming while standing at sink with Jenkins.  Toileting from toilet with Jenkins for hygiene and clothing management.   Toilet transfer to toilet with Jenkins.    Outcome: Progressing

## 2024-08-28 NOTE — ASSESSMENT & PLAN NOTE
S/p bioprosthetic aortic valve replacement, hemiarch, and ascending aorta repair with Dr. Carroll 08/27/2024  Managed by primary team  Optimize blood glucose control

## 2024-08-28 NOTE — PLAN OF CARE
Recommendations     1. Advance PO diet order to Cardiac as tolerated (fluid restriction per MD).  2. If PO intake poor >48 hrs, add Boost Glucose Control ONS BID to supplement intake.   3. RD to monitor & follow-up.     Goals: Meet % EEN, EPN by RD f/u date  Nutrition Goal Status: new  Communication of RD Recs: reviewed with RN

## 2024-08-28 NOTE — ANESTHESIA POSTPROCEDURE EVALUATION
Anesthesia Post Evaluation    Patient: Cesar Simpson    Procedure(s) Performed: Procedure(s) (LRB):  REPLACEMENT, AORTIC VALVE  REPLACEMENT, AORTA, ASCENDING; HEMIARCH (N/A)  EXPLANT; TAVR (N/A)    Final Anesthesia Type: general      Patient location during evaluation: ICU  Patient participation: Yes- Able to Participate  Level of consciousness: oriented, lethargic and awake  Post-procedure vital signs: reviewed and stable  Pain management: adequate  Airway patency: patent    PONV status at discharge: No PONV  Anesthetic complications: no      Cardiovascular status: blood pressure returned to baseline and hemodynamically stable  Respiratory status: unassisted and spontaneous ventilation  Hydration status: euvolemic  Follow-up not needed.              Vitals Value Taken Time   /59 08/28/24 0806   Temp 37.7 °C (99.86 °F) 08/28/24 0845   Pulse 68 08/28/24 0845   Resp 30 08/28/24 0845   SpO2 99 % 08/28/24 0845   Vitals shown include unfiled device data.      No case tracking events are documented in the log.      Pain/Sherly Score: Pain Rating Prior to Med Admin: 4 (8/28/2024  8:06 AM)  Pain Rating Post Med Admin: -- (ALEXA- pt. sedated/intubated) (8/28/2024  5:45 AM)

## 2024-08-28 NOTE — SUBJECTIVE & OBJECTIVE
Interval HPI:   No acute events overnight. Patient in room 19521/40812 A. Blood glucose stable. BG at goal on current insulin regimen (IIP). Steroid use- None. 1 Day Post-Op  Renal function- Normal   Lab Results   Component Value Date    CREATININE 1.1 08/28/2024     Vasopressors-  Norepinephrine     Endocrine will continue to follow and manage insulin orders inpatient.     Diet NPO Except for: Medication     Eating:   NPO  Nausea: ALEXA  Hypoglycemia and intervention: No  Fever: No  TPN and/or TF: No  If yes, type of TF/TPN and rate: N/A    PMH, PSH, FH, SH updated and reviewed     ROS:  Unable to obtain due to: Sedation and Intubation    Current Medications and/or Treatments Impacting Glycemic Control  Immunotherapy:    Immunosuppressants       None          Steroids:   Hormones (From admission, onward)      Start     Stop Route Frequency Ordered    08/27/24 1800  vasopressin (PITRESSIN) 0.2 Units/mL in D5W 100 mL infusion         -- IV Continuous 08/27/24 1702          Pressors:    Autonomic Drugs (From admission, onward)      Start     Stop Route Frequency Ordered    08/27/24 1815  NORepinephrine 4 mg in dextrose 5% 250 mL infusion (premix)        Question Answer Comment   Begin at (in mcg/kg/min): 0.02    Titrate by: (in mcg/kg/min (RANGE PREFERRED) 0.02 - 0.2    Titrate interval: (in minutes) (RANGE PREFERRED) 2 - 5    Titrate to maintain: (MAP or SBP) MAP    Titrate to keep MAP within the range or GREATER than (if single number): (in mmHg) OTHER    Other 60-80    Maximum dose: (in mcg/kg/min) 0.1        -- IV Continuous 08/27/24 1702          Hyperglycemia/Diabetes Medications:   Antihyperglycemics (From admission, onward)      Start     Stop Route Frequency Ordered    08/28/24 1014  insulin aspart U-100 pen 0-10 Units         -- SubQ Every 6 hours PRN 08/28/24 0914             PHYSICAL EXAMINATION:  Vitals:    08/28/24 0900   BP: (!) 108/53   Pulse: 64   Resp: (!) 28   Temp:      Body mass index is 32.46  kg/m².     Physical Exam   Constitutional:       General: He is not in acute distress.     Appearance: Normal appearance. He is not ill-appearing.   HENT:      Head: Normocephalic and atraumatic.      Nose: Nose normal.   Cardiovascular:      Rate and Rhythm: Normal rate and regular rhythm.   Pulmonary:      Effort: No respiratory distress.      Comments: Intubated    Abdominal:      General: There is no distension.   Skin:     Coloration: Skin is not jaundiced.      Findings: No erythema.      Comments: Mid-sternal incision with telfa island dressing, CDI.  CT in place   Neurological:      Comments: Sedated

## 2024-08-29 DIAGNOSIS — Z98.890 S/P AORTIC DISSECTION REPAIR: Primary | ICD-10-CM

## 2024-08-29 LAB
ALBUMIN SERPL BCP-MCNC: 2.9 G/DL (ref 3.5–5.2)
ALP SERPL-CCNC: 33 U/L (ref 55–135)
ALT SERPL W/O P-5'-P-CCNC: 6 U/L (ref 10–44)
ANION GAP SERPL CALC-SCNC: 8 MMOL/L (ref 8–16)
APTT PPP: 32.9 SEC (ref 21–32)
AST SERPL-CCNC: 29 U/L (ref 10–40)
BILIRUB SERPL-MCNC: 0.4 MG/DL (ref 0.1–1)
BLD PROD TYP BPU: NORMAL
BLOOD UNIT EXPIRATION DATE: NORMAL
BLOOD UNIT TYPE CODE: 6200
BLOOD UNIT TYPE: NORMAL
BUN SERPL-MCNC: 24 MG/DL (ref 8–23)
CALCIUM SERPL-MCNC: 7.8 MG/DL (ref 8.7–10.5)
CHLORIDE SERPL-SCNC: 106 MMOL/L (ref 95–110)
CO2 SERPL-SCNC: 23 MMOL/L (ref 23–29)
CODING SYSTEM: NORMAL
CREAT SERPL-MCNC: 1.1 MG/DL (ref 0.5–1.4)
CROSSMATCH INTERPRETATION: NORMAL
DISPENSE STATUS: NORMAL
ERYTHROCYTE [DISTWIDTH] IN BLOOD BY AUTOMATED COUNT: 14 % (ref 11.5–14.5)
ERYTHROCYTE [DISTWIDTH] IN BLOOD BY AUTOMATED COUNT: 14.1 % (ref 11.5–14.5)
EST. GFR  (NO RACE VARIABLE): >60 ML/MIN/1.73 M^2
GLUCOSE SERPL-MCNC: 137 MG/DL (ref 70–110)
HCT VFR BLD AUTO: 22 % (ref 40–54)
HCT VFR BLD AUTO: 28.8 % (ref 40–54)
HGB BLD-MCNC: 7.6 G/DL (ref 14–18)
HGB BLD-MCNC: 9.9 G/DL (ref 14–18)
INR PPP: 1.2 (ref 0.8–1.2)
MAGNESIUM SERPL-MCNC: 2.1 MG/DL (ref 1.6–2.6)
MCH RBC QN AUTO: 29.9 PG (ref 27–31)
MCH RBC QN AUTO: 30.1 PG (ref 27–31)
MCHC RBC AUTO-ENTMCNC: 34.4 G/DL (ref 32–36)
MCHC RBC AUTO-ENTMCNC: 34.5 G/DL (ref 32–36)
MCV RBC AUTO: 87 FL (ref 82–98)
MCV RBC AUTO: 88 FL (ref 82–98)
NUM UNITS TRANS PACKED RBC: NORMAL
PHOSPHATE SERPL-MCNC: 3 MG/DL (ref 2.7–4.5)
PLATELET # BLD AUTO: 80 K/UL (ref 150–450)
PLATELET # BLD AUTO: 95 K/UL (ref 150–450)
PMV BLD AUTO: 10.9 FL (ref 9.2–12.9)
PMV BLD AUTO: 11.3 FL (ref 9.2–12.9)
POCT GLUCOSE: 128 MG/DL (ref 70–110)
POCT GLUCOSE: 150 MG/DL (ref 70–110)
POCT GLUCOSE: 155 MG/DL (ref 70–110)
POTASSIUM SERPL-SCNC: 3.5 MMOL/L (ref 3.5–5.1)
PROT SERPL-MCNC: 4.9 G/DL (ref 6–8.4)
PROTHROMBIN TIME: 12.7 SEC (ref 9–12.5)
RBC # BLD AUTO: 2.54 M/UL (ref 4.6–6.2)
RBC # BLD AUTO: 3.29 M/UL (ref 4.6–6.2)
SODIUM SERPL-SCNC: 137 MMOL/L (ref 136–145)
WBC # BLD AUTO: 9 K/UL (ref 3.9–12.7)
WBC # BLD AUTO: 9.33 K/UL (ref 3.9–12.7)

## 2024-08-29 PROCEDURE — P9016 RBC LEUKOCYTES REDUCED: HCPCS | Performed by: INTERNAL MEDICINE

## 2024-08-29 PROCEDURE — 93005 ELECTROCARDIOGRAM TRACING: CPT

## 2024-08-29 PROCEDURE — 97530 THERAPEUTIC ACTIVITIES: CPT

## 2024-08-29 PROCEDURE — 92610 EVALUATE SWALLOWING FUNCTION: CPT

## 2024-08-29 PROCEDURE — 36430 TRANSFUSION BLD/BLD COMPNT: CPT

## 2024-08-29 PROCEDURE — 99232 SBSQ HOSP IP/OBS MODERATE 35: CPT | Mod: ,,, | Performed by: NURSE PRACTITIONER

## 2024-08-29 PROCEDURE — 63600175 PHARM REV CODE 636 W HCPCS: Performed by: STUDENT IN AN ORGANIZED HEALTH CARE EDUCATION/TRAINING PROGRAM

## 2024-08-29 PROCEDURE — 94761 N-INVAS EAR/PLS OXIMETRY MLT: CPT

## 2024-08-29 PROCEDURE — 94799 UNLISTED PULMONARY SVC/PX: CPT

## 2024-08-29 PROCEDURE — 80053 COMPREHEN METABOLIC PANEL: CPT | Performed by: STUDENT IN AN ORGANIZED HEALTH CARE EDUCATION/TRAINING PROGRAM

## 2024-08-29 PROCEDURE — 94640 AIRWAY INHALATION TREATMENT: CPT

## 2024-08-29 PROCEDURE — 99900035 HC TECH TIME PER 15 MIN (STAT)

## 2024-08-29 PROCEDURE — 85730 THROMBOPLASTIN TIME PARTIAL: CPT | Performed by: STUDENT IN AN ORGANIZED HEALTH CARE EDUCATION/TRAINING PROGRAM

## 2024-08-29 PROCEDURE — 97535 SELF CARE MNGMENT TRAINING: CPT

## 2024-08-29 PROCEDURE — 63600175 PHARM REV CODE 636 W HCPCS

## 2024-08-29 PROCEDURE — 25000003 PHARM REV CODE 250: Performed by: STUDENT IN AN ORGANIZED HEALTH CARE EDUCATION/TRAINING PROGRAM

## 2024-08-29 PROCEDURE — 27000221 HC OXYGEN, UP TO 24 HOURS

## 2024-08-29 PROCEDURE — 25000242 PHARM REV CODE 250 ALT 637 W/ HCPCS

## 2024-08-29 PROCEDURE — 85027 COMPLETE CBC AUTOMATED: CPT | Performed by: STUDENT IN AN ORGANIZED HEALTH CARE EDUCATION/TRAINING PROGRAM

## 2024-08-29 PROCEDURE — 85610 PROTHROMBIN TIME: CPT | Performed by: STUDENT IN AN ORGANIZED HEALTH CARE EDUCATION/TRAINING PROGRAM

## 2024-08-29 PROCEDURE — 25000003 PHARM REV CODE 250

## 2024-08-29 PROCEDURE — 93010 ELECTROCARDIOGRAM REPORT: CPT | Mod: ,,, | Performed by: INTERNAL MEDICINE

## 2024-08-29 PROCEDURE — 85027 COMPLETE CBC AUTOMATED: CPT | Mod: 91

## 2024-08-29 PROCEDURE — 99291 CRITICAL CARE FIRST HOUR: CPT | Mod: 95,,, | Performed by: STUDENT IN AN ORGANIZED HEALTH CARE EDUCATION/TRAINING PROGRAM

## 2024-08-29 PROCEDURE — 86920 COMPATIBILITY TEST SPIN: CPT

## 2024-08-29 PROCEDURE — 97116 GAIT TRAINING THERAPY: CPT

## 2024-08-29 PROCEDURE — 84100 ASSAY OF PHOSPHORUS: CPT | Performed by: STUDENT IN AN ORGANIZED HEALTH CARE EDUCATION/TRAINING PROGRAM

## 2024-08-29 PROCEDURE — 83735 ASSAY OF MAGNESIUM: CPT | Performed by: STUDENT IN AN ORGANIZED HEALTH CARE EDUCATION/TRAINING PROGRAM

## 2024-08-29 PROCEDURE — 20000000 HC ICU ROOM

## 2024-08-29 RX ORDER — FUROSEMIDE 10 MG/ML
40 INJECTION INTRAMUSCULAR; INTRAVENOUS 2 TIMES DAILY
Status: DISCONTINUED | OUTPATIENT
Start: 2024-08-29 | End: 2024-08-30

## 2024-08-29 RX ORDER — FUROSEMIDE 10 MG/ML
20 INJECTION INTRAMUSCULAR; INTRAVENOUS ONCE
Status: COMPLETED | OUTPATIENT
Start: 2024-08-29 | End: 2024-08-29

## 2024-08-29 RX ORDER — PANTOPRAZOLE SODIUM 40 MG/1
40 TABLET, DELAYED RELEASE ORAL DAILY
Status: DISCONTINUED | OUTPATIENT
Start: 2024-08-29 | End: 2024-09-03 | Stop reason: HOSPADM

## 2024-08-29 RX ORDER — HYDROCODONE BITARTRATE AND ACETAMINOPHEN 500; 5 MG/1; MG/1
TABLET ORAL
Status: DISCONTINUED | OUTPATIENT
Start: 2024-08-29 | End: 2024-09-03 | Stop reason: HOSPADM

## 2024-08-29 RX ORDER — HYDROCODONE BITARTRATE AND ACETAMINOPHEN 500; 5 MG/1; MG/1
TABLET ORAL
Status: DISCONTINUED | OUTPATIENT
Start: 2024-08-29 | End: 2024-08-29

## 2024-08-29 RX ORDER — TRAZODONE HYDROCHLORIDE 100 MG/1
100 TABLET ORAL NIGHTLY
Status: DISCONTINUED | OUTPATIENT
Start: 2024-08-29 | End: 2024-08-29

## 2024-08-29 RX ORDER — IPRATROPIUM BROMIDE AND ALBUTEROL SULFATE 2.5; .5 MG/3ML; MG/3ML
3 SOLUTION RESPIRATORY (INHALATION)
Status: DISCONTINUED | OUTPATIENT
Start: 2024-08-29 | End: 2024-09-03 | Stop reason: HOSPADM

## 2024-08-29 RX ORDER — LIDOCAINE 50 MG/G
3 PATCH TOPICAL
Status: DISCONTINUED | OUTPATIENT
Start: 2024-08-29 | End: 2024-09-03 | Stop reason: HOSPADM

## 2024-08-29 RX ORDER — QUETIAPINE FUMARATE 25 MG/1
25 TABLET, FILM COATED ORAL NIGHTLY
Status: COMPLETED | OUTPATIENT
Start: 2024-08-29 | End: 2024-08-29

## 2024-08-29 RX ORDER — TRAZODONE HYDROCHLORIDE 50 MG/1
50 TABLET ORAL NIGHTLY
Status: DISCONTINUED | OUTPATIENT
Start: 2024-08-29 | End: 2024-08-30

## 2024-08-29 RX ADMIN — TAMSULOSIN HYDROCHLORIDE 0.4 MG: 0.4 CAPSULE ORAL at 08:08

## 2024-08-29 RX ADMIN — IPRATROPIUM BROMIDE AND ALBUTEROL SULFATE 3 ML: 2.5; .5 SOLUTION RESPIRATORY (INHALATION) at 08:08

## 2024-08-29 RX ADMIN — IPRATROPIUM BROMIDE AND ALBUTEROL SULFATE 3 ML: 2.5; .5 SOLUTION RESPIRATORY (INHALATION) at 01:08

## 2024-08-29 RX ADMIN — MUPIROCIN: 20 OINTMENT TOPICAL at 08:08

## 2024-08-29 RX ADMIN — POLYETHYLENE GLYCOL 3350 17 G: 17 POWDER, FOR SOLUTION ORAL at 08:08

## 2024-08-29 RX ADMIN — NOREPINEPHRINE BITARTRATE 0.06 MCG/KG/MIN: 4 INJECTION, SOLUTION INTRAVENOUS at 01:08

## 2024-08-29 RX ADMIN — LIDOCAINE 3 PATCH: 50 PATCH CUTANEOUS at 09:08

## 2024-08-29 RX ADMIN — DOCUSATE SODIUM 100 MG: 100 CAPSULE, LIQUID FILLED ORAL at 08:08

## 2024-08-29 RX ADMIN — DOCUSATE SODIUM 100 MG: 100 CAPSULE, LIQUID FILLED ORAL at 09:08

## 2024-08-29 RX ADMIN — PANTOPRAZOLE SODIUM 40 MG: 40 TABLET, DELAYED RELEASE ORAL at 02:08

## 2024-08-29 RX ADMIN — TRAZODONE HYDROCHLORIDE 50 MG: 50 TABLET ORAL at 09:08

## 2024-08-29 RX ADMIN — OXYCODONE HYDROCHLORIDE 5 MG: 5 TABLET ORAL at 12:08

## 2024-08-29 RX ADMIN — FAMOTIDINE 20 MG: 20 TABLET ORAL at 08:08

## 2024-08-29 RX ADMIN — DULOXETINE HYDROCHLORIDE 30 MG: 30 CAPSULE, DELAYED RELEASE ORAL at 08:08

## 2024-08-29 RX ADMIN — QUETIAPINE FUMARATE 25 MG: 25 TABLET ORAL at 09:08

## 2024-08-29 RX ADMIN — ACETAMINOPHEN 1000 MG: 500 TABLET ORAL at 06:08

## 2024-08-29 RX ADMIN — INSULIN ASPART 1 UNITS: 100 INJECTION, SOLUTION INTRAVENOUS; SUBCUTANEOUS at 12:08

## 2024-08-29 RX ADMIN — MUPIROCIN: 20 OINTMENT TOPICAL at 09:08

## 2024-08-29 RX ADMIN — ACETAMINOPHEN 1000 MG: 500 TABLET ORAL at 01:08

## 2024-08-29 RX ADMIN — OXYCODONE HYDROCHLORIDE 10 MG: 10 TABLET ORAL at 09:08

## 2024-08-29 RX ADMIN — FUROSEMIDE 40 MG: 10 INJECTION, SOLUTION INTRAVENOUS at 05:08

## 2024-08-29 RX ADMIN — FUROSEMIDE 40 MG: 10 INJECTION, SOLUTION INTRAVENOUS at 08:08

## 2024-08-29 RX ADMIN — LEVOTHYROXINE SODIUM 88 MCG: 88 TABLET ORAL at 06:08

## 2024-08-29 RX ADMIN — ATORVASTATIN CALCIUM 80 MG: 40 TABLET, FILM COATED ORAL at 08:08

## 2024-08-29 RX ADMIN — FUROSEMIDE 20 MG: 10 INJECTION, SOLUTION INTRAVENOUS at 02:08

## 2024-08-29 RX ADMIN — POTASSIUM CHLORIDE 20 MEQ: 200 INJECTION, SOLUTION INTRAVENOUS at 08:08

## 2024-08-29 RX ADMIN — HYDROMORPHONE HYDROCHLORIDE 0.5 MG: 0.5 INJECTION, SOLUTION INTRAMUSCULAR; INTRAVENOUS; SUBCUTANEOUS at 01:08

## 2024-08-29 NOTE — SUBJECTIVE & OBJECTIVE
Interval History/Significant Events:   No acute events overnight. Patient complaining of some mild pain at his incision.  Denies any headaches or vision changes.  Tolerated full liquid diet no nausea or vomiting. Radial pulses are palpable and DP pulses with biphasic signal. 1800 cc of urine output over the past 24 hours and is net negative 1200 cc.  Off levo this morning with maps greater than 60 based off the cuff. Patient with some mild delirium this morning.    Follow-up For: Procedure(s) (LRB):  REPLACEMENT, AORTIC VALVE  REPLACEMENT, AORTA, ASCENDING; HEMIARCH (N/A)  EXPLANT; TAVR (N/A)    Post-Operative Day: 2 Days Post-Op    Objective:     Vital Signs (Most Recent):  Temp: 99.9 °F (37.7 °C) (08/29/24 0600)  Pulse: 82 (08/29/24 0600)  Resp: (!) 26 (08/29/24 0600)  BP: (!) 122/59 (08/29/24 0600)  SpO2: 98 % (08/29/24 0600) Vital Signs (24h Range):  Temp:  [98.6 °F (37 °C)-100 °F (37.8 °C)] 99.9 °F (37.7 °C)  Pulse:  [63-84] 82  Resp:  [] 26  SpO2:  [88 %-100 %] 98 %  BP: ()/(50-59) 122/59  Arterial Line BP: ()/(23-48) 130/44     Weight: 94 kg (207 lb 3.7 oz)  Body mass index is 32.46 kg/m².      Intake/Output Summary (Last 24 hours) at 8/29/2024 0632  Last data filed at 8/29/2024 0600  Gross per 24 hour   Intake 1018.85 ml   Output 2145 ml   Net -1126.15 ml          Physical Exam  Vitals and nursing note reviewed.   Constitutional:       General: He is not in acute distress.     Appearance: Normal appearance.   Eyes:      Extraocular Movements: Extraocular movements intact.      Conjunctiva/sclera: Conjunctivae normal.   Neck:      Comments: RIJ and LIJ CVC  Cardiovascular:      Rate and Rhythm: Normal rate and regular rhythm.      Comments: LIJ CVC  R radial A line  V wires  Chest tubes 2x meds  Sternal dressing c/d/I    Radial pulses 2+ bilaterally  DP pulses with biphasic signal  Pulmonary:      Effort: No respiratory distress.   Abdominal:      General: There is no distension.       Palpations: Abdomen is soft.   Genitourinary:     Comments: mirza  Musculoskeletal:      Right lower leg: No edema.      Left lower leg: No edema.   Skin:     General: Skin is warm and dry.   Neurological:      Mental Status: He is alert.      Comments: Expressive aphasia, improving              Lines/Drains/Airways       Central Venous Catheter Line  Duration             Introducer 08/27/24 1105 Internal Jugular Left 1 day    Percutaneous Central Line - Triple Lumen  08/27/24 1108 Internal Jugular Left 1 day              Drain  Duration                  Chest Tube 08/27/24 1451 Tube - 2 Anterior Mediastinal 28 Fr. 1 day         Chest Tube 08/27/24 1452 Tube - 3 Posterior Mediastinal 28 Fr. 1 day         Urethral Catheter 08/27/24 1045 Non-latex;Straight-tip;Temperature probe 16 Fr. 1 day              Arterial Line  Duration             Arterial Line 08/27/24 0805 Right Radial 1 day              Line  Duration                  Pacer Wires 08/27/24 1452 1 day              Peripheral Intravenous Line  Duration                  Peripheral IV - Single Lumen 08/27/24 0815 16 G No Right Antecubital 1 day                    Significant Labs:    CBC/Anemia Profile:  Recent Labs   Lab 08/28/24  0206 08/28/24  0329 08/28/24  1415 08/29/24  0401   WBC 6.56  --  7.94 9.33   HGB 8.5*  --  8.1* 7.6*   HCT 24.6* 22* 23.5* 22.0*   *  --  101* 95*   MCV 85  --  87 87   RDW 13.1  --  13.8 14.0        Chemistries:  Recent Labs   Lab 08/27/24  1913 08/28/24  0206 08/28/24  1415 08/29/24  0401    139 139 137   K 4.3 4.4 3.8 3.5    108 108 106   CO2 20* 19* 23 23   BUN 20 22 24* 24*   CREATININE 1.0 1.1 1.3 1.1   CALCIUM 8.4* 8.0* 7.8* 7.8*   ALBUMIN 3.2* 3.0*  --  2.9*   PROT 4.8* 4.5*  --  4.9*   BILITOT 0.5 0.4  --  0.4   ALKPHOS 30* 26*  --  33*   ALT 14 12  --  6*   AST 26 26  --  29   MG 2.3 2.1 2.0 2.1   PHOS 3.0 2.8 3.8 3.0       All pertinent labs within the past 24 hours have been reviewed.    Significant  Imaging:  I have reviewed all pertinent imaging results/findings within the past 24 hours.

## 2024-08-29 NOTE — PROGRESS NOTES
Handoff received from JOHNY Whaley RN.  Levo gtt continued.  Patient experiencing expressive aphasia, team aware.  LIJ introducer and Quad in place. PIV x1 intact. R radial A-Line intact. Meds chest tube x2 in place, see flowsheet for output. No redness or swelling noted. Peterson in place. UO WNL, see flowsheet. Skin handoff done. Will continue to monitor.

## 2024-08-29 NOTE — PROGRESS NOTES
"[facility]  Endocrinology  Progress Note    Admit Date: 2024     Reason for Consult: Management of T2DM, Hyperglycemia     Surgical Procedure and Date: bioprosthetic aortic valve replacement, hemiarch, and ascending aorta repair 2024     Diabetes diagnosis year: , now in pre-diabetes range    Lab Results   Component Value Date    HGBA1C 6.0 (H) 2024     Home Diabetes Medications:  diet-controlled    How often checking glucose at home?  0x/day    BG readings on regimen: Does not check blood glucose  Hypoglycemia on the regimen?  No  Missed doses on regimen?  diet-controlled    Diabetes Complications include:     Hyperglycemia    Complicating diabetes co morbidities:   CAD, HTN, HLD      HPI:   Patient is a 74 y.o. male with CAD, PAD, severe AS, HTN, HLD, Hypothyroidism, FABY, T2DM who underwent a TAVR on 24 that was complicated by an aortic dissection prompting OR intervention with CTS. The patient presented to the SICU s/p bioprosthetic aortic valve replacement, hemiarch, and ascending aorta repair with Dr. Carroll on 2024.  Per chart review, patient is T2DM and was previously on Metformin. Diabetes diet-controlled.  Endo consulted for BG management.          Interval HPI:   Overnight events: NAEON. POD 2. Remains in SICU. BG stable on prn SQ insulin correction scale. Diet Full Liquid Fluid - 1500mL; Standard Tray    Eatin%  Nausea: No  Hypoglycemia and intervention: No  Fever: No  TPN and/or TF: No  If yes, type of TF/TPN and rate: n/a    BP (!) 124/57   Pulse 101   Temp (!) 100.6 °F (38.1 °C) (Core Bladder)   Resp (!) 40   Ht 5' 7" (1.702 m)   Wt 94 kg (207 lb 3.7 oz)   SpO2 (!) 92%   BMI 32.46 kg/m²     Labs Reviewed and Include    Recent Labs   Lab 24  0401   *   CALCIUM 7.8*   ALBUMIN 2.9*   PROT 4.9*      K 3.5   CO2 23      BUN 24*   CREATININE 1.1   ALKPHOS 33*   ALT 6*   AST 29   BILITOT 0.4     Lab Results " "  Component Value Date    WBC 9.33 08/29/2024    HGB 7.6 (L) 08/29/2024    HCT 22.0 (L) 08/29/2024    MCV 87 08/29/2024    PLT 95 (L) 08/29/2024     No results for input(s): "TSH", "FREET4" in the last 168 hours.  Lab Results   Component Value Date    HGBA1C 5.7 (H) 08/28/2024       Nutritional status:   Body mass index is 32.46 kg/m².  Lab Results   Component Value Date    ALBUMIN 2.9 (L) 08/29/2024    ALBUMIN 3.0 (L) 08/28/2024    ALBUMIN 3.2 (L) 08/27/2024     No results found for: "PREALBUMIN"    Estimated Creatinine Clearance: 65.4 mL/min (based on SCr of 1.1 mg/dL).    Accu-Checks  Recent Labs     08/28/24  0205 08/28/24  0314 08/28/24  0403 08/28/24  0516 08/28/24  0600 08/28/24  0738 08/28/24  0912 08/28/24  1106 08/28/24  1705 08/29/24  0008   POCTGLUCOSE 153* 148* 133* 146* 136* 139* 142* 131* 130* 155*       Current Medications and/or Treatments Impacting Glycemic Control  Immunotherapy:    Immunosuppressants       None          Steroids:   Hormones (From admission, onward)      None          Pressors:    Autonomic Drugs (From admission, onward)      Start     Stop Route Frequency Ordered    08/27/24 1815  NORepinephrine 4 mg in dextrose 5% 250 mL infusion (premix)        Question Answer Comment   Begin at (in mcg/kg/min): 0.02    Titrate by: (in mcg/kg/min (RANGE PREFERRED) 0.02 - 0.2    Titrate interval: (in minutes) (RANGE PREFERRED) 2 - 5    Titrate to maintain: (MAP or SBP) MAP    Titrate to keep MAP within the range or GREATER than (if single number): (in mmHg) OTHER    Other 60-80    Maximum dose: (in mcg/kg/min) 0.1        -- IV Continuous 08/27/24 1702          Hyperglycemia/Diabetes Medications:   Antihyperglycemics (From admission, onward)      Start     Stop Route Frequency Ordered    08/28/24 1014  insulin aspart U-100 pen 0-10 Units         -- SubQ Every 6 hours PRN 08/28/24 0914            ASSESSMENT and PLAN    Cardiac/Vascular  * Severe aortic stenosis  S/p bioprosthetic aortic valve " replacement, hemiarch, and ascending aorta repair with Dr. Carroll 08/27/2024  Managed by primary team  Optimize blood glucose control      Hyperlipidemia  On statin per ADA recommendations  May increase insulin resistance        Endocrine  Diet-controlled diabetes mellitus  Endocrinology consulted for BG management.   BG goal 140-180    - Continue Novolog (Insulin Aspart) prn for BG excursions LDC SSI (150/25)  - Change BG monitoring to ac/hs   - Hypoglycemia protocol in place    ** Please call Endocrine for any BG related issues **    Discharge Planning:   TBD. Please notify endocrinology prior to discharge.            China Felton NP  Endocrinology  [facility]

## 2024-08-29 NOTE — PLAN OF CARE
Received 2 units PRBC  Lasix BID plus extra dose after blood transfusion  OOB to chair  Reinforced sternal precautions and IS  Remains off of levophed    Problem: Adult Inpatient Plan of Care  Goal: Plan of Care Review  Outcome: Progressing  Goal: Patient-Specific Goal (Individualized)  Outcome: Progressing  Goal: Absence of Hospital-Acquired Illness or Injury  Outcome: Progressing  Goal: Optimal Comfort and Wellbeing  Outcome: Progressing  Goal: Readiness for Transition of Care  Outcome: Progressing     Problem: Diabetes Comorbidity  Goal: Blood Glucose Level Within Targeted Range  Outcome: Progressing     Problem: Wound  Goal: Optimal Coping  Outcome: Progressing  Goal: Optimal Functional Ability  Outcome: Progressing  Goal: Absence of Infection Signs and Symptoms  Outcome: Progressing  Goal: Improved Oral Intake  Outcome: Progressing  Goal: Optimal Pain Control and Function  Outcome: Progressing  Goal: Skin Health and Integrity  Outcome: Progressing  Goal: Optimal Wound Healing  Outcome: Progressing     Problem: Infection  Goal: Absence of Infection Signs and Symptoms  Outcome: Progressing     Problem: Fall Injury Risk  Goal: Absence of Fall and Fall-Related Injury  Outcome: Progressing     Problem: Skin Injury Risk Increased  Goal: Skin Health and Integrity  Outcome: Progressing     Problem: Restraint, Nonviolent  Goal: Absence of Harm or Injury  Outcome: Progressing     Problem: Artificial Airway  Goal: Effective Communication  Outcome: Progressing  Goal: Optimal Device Function  Outcome: Progressing  Goal: Absence of Device-Related Skin or Tissue Injury  Outcome: Progressing     Problem: Noninvasive Ventilation Acute  Goal: Effective Unassisted Ventilation and Oxygenation  Outcome: Progressing

## 2024-08-29 NOTE — ASSESSMENT & PLAN NOTE
HPI   Chief Complaint   Patient presents with    Knee Pain     Pt is coming in today because she is having left knee pain, she states that she managed to twist it when moving it and it became painful. She recently gotten steroid shot in her knee and she has been complaining of increasing discomfort. She isnt able to bear any weight on it at the moment and has pain when touching it       HPI: 75-year-old female with a history of osteoarthritis of the left knee presents with acute on chronic left knee pain and a fall.  The patient normally walks with a walker due to her osteoarthritis.  She underwent a cortisone injection into her left knee with Dr. Cooper from orthopedic surgery last week.  Today she was maneuvering with her walker trying to get dressed when she lost her balance and fell onto her left hip.  She is localizing pain to her left hip and left knee.  She denies chest pain or shortness of breath before or after the incident.  She did not hit her head.      Limitations to history: None  Independent Historians: Patient  External Records Reviewed: HIE, outpatient notes, inpatient notes  ------------------------------------------------------------------------------------------------------------------------------------------  ROS: a ten point review of systems was performed and was negative except as per HPI.  ------------------------------------------------------------------------------------------------------------------------------------------  PMH / PSH: as per HPI, otherwise reviewed in EMR  MEDS: as per HPI, otherwise reviewed in EMR  ALLERGIES: as per HPI, otherwise reviewed in EMR  SocH:  as per HPI, otherwise reviewed in EMR  FH:  as per HPI, otherwise reviewed in EMR  ------------------------------------------------------------------------------------------------------------------------------------------  Physical Exam:  VS: As documented in the triage note and EMR flowsheet from this visit was  On statin per ADA recommendations  May increase insulin resistance       reviewed  General: Well appearing. No acute distress.   Eyes:  Extraocular movements grossly intact. No scleral icterus. No discharge  HEENT:  Normocephalic.  Atraumatic  Neck: Moves neck freely. No gross masses  CV: Regular rhythm. No murmurs, rubs or gallops   Resp: Clear to auscultation bilaterally. No respiratory distress.    GI: Soft, no masses, nontender. No rebound tenderness or guarding  MSK: Symmetric muscle bulk. No deformities. No lower extremity edema.    Skin: Warm, dry, intact.   Neuro: No focal deficits.  A&O x3.   Psych: Appropriate for situation  ------------------------------------------------------------------------------------------------------------------------------------------  Hospital Course / Medical Decision Making:  Independent Interpretations: Pelvic x-ray, femur x-ray, knee x-ray  EKG as interpreted by me: THANH    MDM: This is a 75-year-old female with a history of osteoarthritis in the left knee presenting with acute on chronic knee pain following a fall.  X-ray does show a nondisplaced distal femur fracture.  The patient was evaluated by the physician assistant from orthopedic surgery who consulted with the on-call orthopedist, Dr. Gardner, who recommended that the patient be transferred to [facility] for further evaluation because he is concerned for a tumor that could have led to a pathologic fracture.  The patient was transferred to [facility] in hemodynamically stable condition    Discussion of Management with Other Providers:   I discussed the patient/results with: Emergency medicine team    Final diagnosis and disposition as below.          XR femur left 2+ views   Final Result    1. The distal femoral metaphysis has comminuted fracture extending    from the medial metaphysis inferiorly to the intracondylar notch with    an additional fracture line extending laterally superior to the    lateral femoral condyle. The condylar fragment is minimally  displaced    laterally.          2. An ill-defined calcifications/ossification inferior to the left    hip joint is of uncertain etiology; no donor site is noted. While    this may represents prior injury with soft tissue calcification,    acute fracture cannot be excluded.                MACRO:    None          Signed by: Dl Gordillo 11/15/2023 1:18 PM    Dictation workstation:   [id number]     XR hip left 2 or 3 views   Final Result    1. The distal femoral metaphysis has comminuted fracture extending    from the medial metaphysis inferiorly to the intracondylar notch with    an additional fracture line extending laterally superior to the    lateral femoral condyle. The condylar fragment is minimally displaced    laterally.          2. An ill-defined calcifications/ossification inferior to the left    hip joint is of uncertain etiology; no donor site is noted. While    this may represents prior injury with soft tissue calcification,    acute fracture cannot be excluded.                MACRO:    None          Signed by: Dl Gordillo 11/15/2023 1:18 PM    Dictation workstation:   [id number]     XR knee left 1-2 views   Final Result    1. The distal femoral metaphysis has comminuted fracture extending    from the medial metaphysis inferiorly to the intracondylar notch with    an additional fracture line extending laterally superior to the    lateral femoral condyle. The condylar fragment is minimally displaced    laterally.          2. An ill-defined calcifications/ossification inferior to the left    hip joint is of uncertain etiology; no donor site is noted. While    this may represents prior injury with soft tissue calcification,    acute fracture cannot be excluded.                MACRO:    None          Signed by: Dl Gordillo 11/15/2023 1:18 PM    Dictation workstation:   [id number]                                 No data recorded                Patient History   Past Medical History:   Diagnosis Date     Abnormal levels of other serum enzymes 08/31/2016    Abnormal liver enzymes    Acute maxillary sinusitis, unspecified 11/30/2016    Acute non-recurrent maxillary sinusitis    Acute pharyngitis, unspecified 05/06/2015    Sore throat    Cardiomegaly 08/31/2016    Enlarged LA (left atrium)    Encounter for gynecological examination (general) (routine) without abnormal findings 08/31/2016    Encounter for routine gynecological examination    Infectious gastroenteritis and colitis, unspecified 06/23/2017    Diarrhea of infectious origin    Medial epicondylitis, left elbow 01/23/2017    Epicondylitis elbow, medial, left    Nausea 09/02/2014    Nausea    Otalgia, bilateral 05/06/2015    Otalgia of both ears    Other abnormal and inconclusive findings on diagnostic imaging of breast 08/31/2016    Abnormal mammogram    Other conditions influencing health status     Normal endoscopy    Palpitations 08/31/2016    Heart palpitations    Paroxysmal atrial fibrillation (CMS/HCC) 05/29/2020    Paroxysmal atrial fibrillation    Personal history of other diseases of the circulatory system 08/31/2016    History of angina pectoris    Personal history of other diseases of the circulatory system 08/31/2016    History of atrial fibrillation    Personal history of other diseases of the digestive system     History of gastrointestinal hemorrhage    Personal history of other diseases of the digestive system 03/30/2015    History of rectal bleeding    Personal history of other diseases of the nervous system and sense organs 11/30/2016    History of serous otitis media    Personal history of other diseases of the respiratory system 11/30/2016    History of acute bronchitis    Personal history of other diseases of the respiratory system 07/01/2015    History of sore throat    Personal history of other diseases of the respiratory system 05/06/2015    History of streptococcal pharyngitis    Personal history of other diseases of the respiratory  system 08/31/2016    History of acute pharyngitis    Personal history of other infectious and parasitic diseases 08/31/2016    History of candidiasis of mouth    Personal history of other medical treatment 01/19/2016    History of screening mammography    Personal history of other specified conditions     History of dizziness    Right upper quadrant pain 06/19/2017    Abdominal pain, right upper quadrant    Superficial mycosis, unspecified 06/23/2017    Fungal dermatitis     No past surgical history on file.  Family History   Problem Relation Name Age of Onset    Diabetes Mother      Stroke Mother      Heart attack Father       Social History     Tobacco Use    Smoking status: Never     Passive exposure: Never    Smokeless tobacco: Never   Substance Use Topics    Alcohol use: Never    Drug use: Never       Physical Exam   ED Triage Vitals [11/15/23 1143]   Temp Heart Rate Resp BP   36.1 °C (97 °F) 99 16 153/74      SpO2 Temp Source Heart Rate Source Patient Position   99 % Temporal -- --      BP Location FiO2 (%)     -- --       Physical Exam    ED Course & MDM   Diagnoses as of 11/18/23 1549   Closed fracture of left femur, unspecified fracture morphology, unspecified portion of femur, initial encounter (CMS/[facility])       Medical Decision Making      Procedure  Procedures     Stanford Masterson,   11/18/23 155

## 2024-08-29 NOTE — ASSESSMENT & PLAN NOTE
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 Carroll on 8/27/24.    Neuro/Psych:   - Sedation: None  - Pain:     - Scheduled Tylenol 1g q8h    - Oxy 5 and 10 PRN    - Lidocaine patch    - Home duloxetine restarted    - Seroquel 25 mg tonight for delirium                Cardiac:   - BP Goal: MAP 60-80, SBP <140, based off cuff given dampened a-line  - Pressors: off levo  - Rhythm: NSR, EKG this morning  - Cleviprex/Cardene PRN  - Anti-HTNs: Resume as appropriate   - Beta blocker: Resume as appropriate    - Statin: Atorvastatin 40 mg QD when appropriate    Pulmonary:   - Goal SpO2 >92%  - Will wean ventilator support as tolerated to extubate  - ABGs PRN  - Chest Tubes x 2 (meds)  - Pleural chest tube removed 8/28  - On 2 L NC      Renal:      Recent Labs   Lab 08/28/24  0206 08/28/24  1415 08/29/24  0401   BUN 22 24* 24*   CREATININE 1.1 1.3 1.1          - Maintain Peterson, record strict Is/Os    - Lasix 40 mg IV BID    - UOP 1800 cc over 24 hours, 400 cc overnight    - Net -1.1 L/24 hrs    - Flomax    - Goal net negative 1.5 to 2 L    FEN / GI:     - Daily CMP, PRN K/Mag/Phos per protocol     - Replace electrolytes as needed    - Nutrition: Full liquid, boost with every meal    - Bowel Regimen: Miralax, docusate     ID:   - Afebrile  Recent Labs   Lab 08/28/24  0206 08/28/24  1415 08/29/24  0401   WBC 6.56 7.94 9.33       - Abx: Complete perioperative cefazolin 2g Q8H x 5 doses    Heme/Onc:   - Hgb 7.6 this morning, will give two units of blood  Recent Labs   Lab 08/27/24  2226 08/28/24  0206 08/28/24  1415 08/29/24  0401   HGB  --  8.5* 8.1* 7.6*   PLT  --  106* 101* 95*   APTT 46.4* 31.1  --  32.9*   INR 1.0 1.0  --  1.2       - CBC, PTT/INR daily  - DVT ppx: SCDs  - HOLD ASA  - HOLD plavix     Endocrine:   - CTS Goal -140  - HgbA1c: 6.0  - Endocrinology consulted for insulin management  - Home Levothyroxine     PPx:   Feeding: Full  liquid  Analgesia/Sedation: Tylenol, Oxy PRN  Thromboembolic Prevention: SCDs  HOB >30: Yes  Stress Ulcer: famotidine  Glucose Control: Yes, insulin management per Endocrinology     Lines/Drains/Airway:  R radial arterial line  LIJ CVC  Peterson  Chest Tubes: 2 Mediastinal  Pacing Wires: Temporary ventricular pacing wires     Dispo/Code Status/Palliative:    - SICU   - Full Code

## 2024-08-29 NOTE — PT/OT/SLP PROGRESS
Physical Therapy Co-Treatment    Patient Name:  Cesar Simpson   MRN:  [id number]  Admitting Diagnosis:  Severe aortic stenosis   Recent Surgery: Procedure(s) (LRB):  REPLACEMENT, AORTIC VALVE  REPLACEMENT, AORTA, ASCENDING; HEMIARCH (N/A)  EXPLANT; TAVR (N/A) 2 Days Post-Op  Admit Date: 8/27/2024  Length of Stay: 2 days    Recommendations:     Discharge Recommendations:  High Intensity Therapy     Discharge Equipment Recommendations: other (see comments) (TBD pending progress)   Barriers to discharge: Decreased caregiver support and increased need for caregiver assistance    Plan:     During this hospitalization, patient to be seen 5 x/week to address the identified rehab impairments via gait training, therapeutic activities, neuromuscular re-education, therapeutic exercises and progress towards the established goals.  Plan of Care Expires:  09/28/24  Plan of Care Reviewed with: patient, grandchild(nghia)    Assessment:     Cesar Simpson is a 74 y.o. male admitted with a medical diagnosis of Severe aortic stenosis. Pt found upright in chair agreeable to therapy session. Pt restless and distractible with decreased safety awareness during session requiring verbal cueing to slow pace and for safety with medical lines. Pt not able to recall sternal precautions so pt re-educated but required increased cueing throughout session for compliance. Pt did demonstrate improvements as he completed standing balance trial as well as short gait trial in room this session, but with fatigue noted after short distance despite VSS. Pt continues to require min A x2 persons for all mobility for safety. Patient would benefit from skilled therapy services to maximize safety and independence, increase activity tolerance, decrease fall risk, decrease caregiver burden, improve QOL, improve patient's functional mobility, and decrease risk of contractures and pressure sores. Patient has demonstrated sufficient progression to warrant high intensity  therapy evidenced by objectives noted below.     Problem List: weakness, impaired endurance, impaired self care skills, gait instability, impaired functional mobility, impaired balance, decreased upper extremity function, decreased lower extremity function, decreased safety awareness, pain, impaired cardiopulmonary response to activity, impaired cognition.  Rehab Prognosis: Good; patient would benefit from acute skilled PT services to address these deficits and reach maximum level of function.      Goals:   Multidisciplinary Problems       Physical Therapy Goals          Problem: Physical Therapy    Goal Priority Disciplines Outcome Goal Variances Interventions   Physical Therapy Goal     PT, PT/OT Progressing     Description: Goals to be met by: 24     Patient will increase functional independence with mobility by performin. Supine to sit with Contact Guard Assistance  2. Sit to stand transfer with Contact Guard Assistance  3. Bed to chair transfer with Contact Guard Assistance using LRAD  4. Gait  x 200 feet with Contact Guard Assistance using LRAD.   5. Stand for 10 minutes with Contact Guard Assistance using LRAD                         Subjective     RN notified prior to session. Grandchildren present upon PT entrance into room. Patient agreeable to PT treatment session.    Chief Complaint: none stated  Patient/Family Comments/goals: return to PLOF  Pain/Comfort:  Pain Rating 1: other (see comments) (unrated)  Location - Orientation 1: generalized  Location 1: abdomen  Pain Addressed 1: Reposition, Distraction      Objective:     Additional staff present: OT; OT for cotx due to pt's multiple medical comorbidities and functional deficits req'ing two skilled therapists to appropriately maximize functional potential by progressing pt's musculoskeletal strength and endurance, facilitating neuromuscular postural balance and control ,and accommodating for patient's impaired cardiopulmonary tolerance to  "activities.     Patient found up in chair with: blood pressure cuff, telemetry, pulse ox (continuous), mirza catheter, chest tube, oxygen, arterial line, peripheral IV, central line, external pacer, Other (comments) (active blood infusion through central lines  Cognition:   Alert and Distractible  General Precautions: Standard, Cardiac fall, sternal   Orthopedic Precautions:N/A   Braces: N/A   Body mass index is 32.46 kg/m².  Oxygen Device: Nasal Cannula 5L  Vitals: BP (!) 112/52 (BP Location: Left arm, Patient Position: Lying)   Pulse 91   Temp (!) 100.6 °F (38.1 °C)   Resp (!) 29   Ht 5' 7" (1.702 m)   Wt 94 kg (207 lb 3.7 oz)   SpO2 (!) 94%   BMI 32.46 kg/m²     Outcome Measures:  AM-PAC 6 CLICK MOBILITY  Turning over in bed (including adjusting bedclothes, sheets and blankets)?: 3  Sitting down on and standing up from a chair with arms (e.g., wheelchair, bedside commode, etc.): 2  Moving from lying on back to sitting on the side of the bed?: 3  Moving to and from a bed to a chair (including a wheelchair)?: 2  Need to walk in hospital room?: 2  Climbing 3-5 steps with a railing?: 1  Basic Mobility Total Score: 13     Functional Mobility:    Bed Mobility:   Pt found/returned to bedside chair    Transfers:   Sit <> Stand Transfer: minimum assistance and of 2 persons with no assistive device x2 trials from chair     Balance:  Standing:  Static: minimum assistance and of 2 persons  Pt worked on standing balance and endurance while completing ADLs with OT at chair with bedside table in front.   Dynamic: minimum assistance and of 2 persons      Gait:  Patient ambulated: 8'   Patient required: minimal assist and of 2 persons  Patient used:  no assistive device and hand-held assist   Gait Deviation(s): unsteady gait, decreased step length, narrow base of support, flexed posture, decreased augustine, and decreased arm swing  all lines remained intact throughout ambulation trial  Comments: Patient required cues to " slow pace, increased DANNA and increase step length. Pt unsteady but no overt LOB. Pt with increased fatigue at end of trial with increasing BLE instability.    Education:  Time provided for education, counseling and discussion of health disposition in regards to patient's current status  All questions answered within PT scope of practice and to patient's satisfaction  PT role in POC to address current functional deficits  Call nursing/pct to transfer to chair/use bathroom. Pt stated understanding.  Sternal Precautions: patient able to voice 0/3 precautions without assistance. Patient able to maintain precautions throughout session with max verbal cues.- NEEDS REINFORCEMENT    Patient left up in chair with all lines intact, call button in reach, and RN and family present.    Time Tracking:     PT Received On: 08/29/24  PT Start Time: 1106     PT Stop Time: 1137  PT Total Time (min): 31 min     Billable Minutes:   Gait Training 15 minutes and Therapeutic Activity 10 minutes       PT/PTA: PT       8/29/2024

## 2024-08-29 NOTE — ASSESSMENT & PLAN NOTE
Endocrinology consulted for BG management.   BG goal 140-180    - Continue Novolog (Insulin Aspart) prn for BG excursions LDC SSI (150/25)  - Change BG monitoring to ac/hs   - Hypoglycemia protocol in place    ** Please call Endocrine for any BG related issues **    Discharge Planning:   TBD. Please notify endocrinology prior to discharge.

## 2024-08-29 NOTE — PROGRESS NOTES
[facility]  Critical Care - Surgery  Progress Note    Patient Name: Cesar Simpson  MRN: [id number]  Admission Date: 8/27/2024  Hospital Length of Stay: 2 days  Code Status: Full Code  Attending Provider: Melvin Carroll MD  Primary Care Provider: Chaitanya López DO   Principal Problem: <principal problem not specified>    Subjective:     Hospital/ICU Course:  No notes on file    Interval History/Significant Events:   No acute events overnight. Patient complaining of some mild pain at his incision.  Denies any headaches or vision changes.  Tolerated full liquid diet no nausea or vomiting. Radial pulses are palpable and DP pulses with biphasic signal. 1800 cc of urine output over the past 24 hours and is net negative 1200 cc.  Off levo this morning with maps greater than 60 based off the cuff. Patient with some mild delirium this morning.    Follow-up For: Procedure(s) (LRB):  REPLACEMENT, AORTIC VALVE  REPLACEMENT, AORTA, ASCENDING; HEMIARCH (N/A)  EXPLANT; TAVR (N/A)    Post-Operative Day: 2 Days Post-Op    Objective:     Vital Signs (Most Recent):  Temp: 99.9 °F (37.7 °C) (08/29/24 0600)  Pulse: 82 (08/29/24 0600)  Resp: (!) 26 (08/29/24 0600)  BP: (!) 122/59 (08/29/24 0600)  SpO2: 98 % (08/29/24 0600) Vital Signs (24h Range):  Temp:  [98.6 °F (37 °C)-100 °F (37.8 °C)] 99.9 °F (37.7 °C)  Pulse:  [63-84] 82  Resp:  [] 26  SpO2:  [88 %-100 %] 98 %  BP: ()/(50-59) 122/59  Arterial Line BP: ()/(23-48) 130/44     Weight: 94 kg (207 lb 3.7 oz)  Body mass index is 32.46 kg/m².      Intake/Output Summary (Last 24 hours) at 8/29/2024 0632  Last data filed at 8/29/2024 0600  Gross per 24 hour   Intake 1018.85 ml   Output 2145 ml   Net -1126.15 ml          Physical Exam  Vitals and nursing note reviewed.   Constitutional:       General: He is not in acute distress.     Appearance: Normal appearance.   Eyes:      Extraocular Movements: Extraocular movements intact.      Conjunctiva/sclera:  Conjunctivae normal.   Neck:      Comments: RIJ and LIJ CVC  Cardiovascular:      Rate and Rhythm: Normal rate and regular rhythm.      Comments: LIJ CVC  R radial A line  V wires  Chest tubes 2x meds  Sternal dressing c/d/I    Radial pulses 2+ bilaterally  DP pulses with biphasic signal  Right groin hematoma stable  Pulmonary:      Effort: No respiratory distress.   Abdominal:      General: There is no distension.      Palpations: Abdomen is soft.   Genitourinary:     Comments: mirza  Musculoskeletal:      Right lower leg: No edema.      Left lower leg: No edema.   Skin:     General: Skin is warm and dry.   Neurological:      Mental Status: He is alert.      Comments: Expressive aphasia, improving              Lines/Drains/Airways       Central Venous Catheter Line  Duration             Introducer 08/27/24 1105 Internal Jugular Left 1 day    Percutaneous Central Line - Triple Lumen  08/27/24 1108 Internal Jugular Left 1 day              Drain  Duration                  Chest Tube 08/27/24 1451 Tube - 2 Anterior Mediastinal 28 Fr. 1 day         Chest Tube 08/27/24 1452 Tube - 3 Posterior Mediastinal 28 Fr. 1 day         Urethral Catheter 08/27/24 1045 Non-latex;Straight-tip;Temperature probe 16 Fr. 1 day              Arterial Line  Duration             Arterial Line 08/27/24 0805 Right Radial 1 day              Line  Duration                  Pacer Wires 08/27/24 1452 1 day              Peripheral Intravenous Line  Duration                  Peripheral IV - Single Lumen 08/27/24 0815 16 G No Right Antecubital 1 day                    Significant Labs:    CBC/Anemia Profile:  Recent Labs   Lab 08/28/24  0206 08/28/24  0329 08/28/24  1415 08/29/24  0401   WBC 6.56  --  7.94 9.33   HGB 8.5*  --  8.1* 7.6*   HCT 24.6* 22* 23.5* 22.0*   *  --  101* 95*   MCV 85  --  87 87   RDW 13.1  --  13.8 14.0        Chemistries:  Recent Labs   Lab 08/27/24  1913 08/28/24  0206 08/28/24  1415 08/29/24  0401    139 139 137    K 4.3 4.4 3.8 3.5    108 108 106   CO2 20* 19* 23 23   BUN 20 22 24* 24*   CREATININE 1.0 1.1 1.3 1.1   CALCIUM 8.4* 8.0* 7.8* 7.8*   ALBUMIN 3.2* 3.0*  --  2.9*   PROT 4.8* 4.5*  --  4.9*   BILITOT 0.5 0.4  --  0.4   ALKPHOS 30* 26*  --  33*   ALT 14 12  --  6*   AST 26 26  --  29   MG 2.3 2.1 2.0 2.1   PHOS 3.0 2.8 3.8 3.0       All pertinent labs within the past 24 hours have been reviewed.    Significant Imaging:  I have reviewed all pertinent imaging results/findings within the past 24 hours.  Assessment/Plan:     Cardiac/Vascular  Severe aortic stenosis  S/p TAVR complicated by aortic dissection followed by bioprosthetic valve replacement w/ hemiarch repair w/ Dr. Carroll on 8/27/24.    Neuro/Psych:   - Sedation: None  - Pain:     - Scheduled Tylenol 1g q8h    - Oxy 5 and 10 PRN    - Lidocaine patch    - Home duloxetine restarted    - Seroquel 25 mg tonight for delirium                Cardiac:   - BP Goal: MAP 60-80, SBP <140, based off cuff given dampened a-line  - Pressors: off levo  - Rhythm: NSR, EKG this morning  - Cleviprex/Cardene PRN  - Anti-HTNs: Resume as appropriate   - Beta blocker: Resume as appropriate    - Statin: Atorvastatin 40 mg QD when appropriate    Pulmonary:   - Goal SpO2 >92%  - Will wean ventilator support as tolerated to extubate  - ABGs PRN  - Chest Tubes x 2 (meds)  - Pleural chest tube removed 8/28  - On 2 L NC      Renal:      Recent Labs   Lab 08/28/24  0206 08/28/24  1415 08/29/24  0401   BUN 22 24* 24*   CREATININE 1.1 1.3 1.1          - Maintain Peterson, record strict Is/Os    - Lasix 40 mg IV BID    - UOP 1800 cc over 24 hours, 400 cc overnight    - Net -1.1 L/24 hrs    - Flomax    - Goal net negative 1.5 to 2 L    FEN / GI:     - Daily CMP, PRN K/Mag/Phos per protocol     - Replace electrolytes as needed    - Nutrition: Full liquid, boost with every meal    - Bowel Regimen: Miralax, docusate     ID:   - Afebrile  Recent Labs   Lab 08/28/24  0206 08/28/24  141  08/29/24  0401   WBC 6.56 7.94 9.33       - Abx: Complete perioperative cefazolin 2g Q8H x 5 doses    Heme/Onc:   - Hgb 7.6 this morning, will give two units of blood  Recent Labs   Lab 08/27/24  2226 08/28/24  0206 08/28/24  1415 08/29/24  0401   HGB  --  8.5* 8.1* 7.6*   PLT  --  106* 101* 95*   APTT 46.4* 31.1  --  32.9*   INR 1.0 1.0  --  1.2       - CBC, PTT/INR daily  - DVT ppx: SCDs  - HOLD ASA  - HOLD plavix     Endocrine:   - CTS Goal -140  - HgbA1c: 6.0  - Endocrinology consulted for insulin management  - Home Levothyroxine     PPx:   Feeding: Full liquid  Analgesia/Sedation: Tylenol, Oxy PRN  Thromboembolic Prevention: SCDs  HOB >30: Yes  Stress Ulcer: famotidine  Glucose Control: Yes, insulin management per Endocrinology     Lines/Drains/Airway:  R radial arterial line  LIJ CVC  Peterson  Chest Tubes: 2 Mediastinal  Pacing Wires: Temporary ventricular pacing wires     Dispo/Code Status/Palliative:    - SICU   - Full Code          Critical care was time spent personally by me on the following activities: development of treatment plan with patient or surrogate and bedside caregivers, discussions with consultants, evaluation of patient's response to treatment, examination of patient, ordering and performing treatments and interventions, ordering and review of laboratory studies, ordering and review of radiographic studies, pulse oximetry, re-evaluation of patient's condition.  This critical care time did not overlap with that of any other provider or involve time for any procedures.     Alton Contreras MD  Critical Care - Surgery  [facility]

## 2024-08-29 NOTE — SUBJECTIVE & OBJECTIVE
"Interval HPI:   Overnight events: NAEON. POD 2. Remains in SICU. BG stable on prn SQ insulin correction scale. Diet Full Liquid Fluid - 1500mL; Standard Tray    Eatin%  Nausea: No  Hypoglycemia and intervention: No  Fever: No  TPN and/or TF: No  If yes, type of TF/TPN and rate: n/a    BP (!) 124/57   Pulse 101   Temp (!) 100.6 °F (38.1 °C) (Core Bladder)   Resp (!) 40   Ht 5' 7" (1.702 m)   Wt 94 kg (207 lb 3.7 oz)   SpO2 (!) 92%   BMI 32.46 kg/m²     Labs Reviewed and Include    Recent Labs   Lab 24  0401   *   CALCIUM 7.8*   ALBUMIN 2.9*   PROT 4.9*      K 3.5   CO2 23      BUN 24*   CREATININE 1.1   ALKPHOS 33*   ALT 6*   AST 29   BILITOT 0.4     Lab Results   Component Value Date    WBC 9.33 2024    HGB 7.6 (L) 2024    HCT 22.0 (L) 2024    MCV 87 2024    PLT 95 (L) 2024     No results for input(s): "TSH", "FREET4" in the last 168 hours.  Lab Results   Component Value Date    HGBA1C 5.7 (H) 2024       Nutritional status:   Body mass index is 32.46 kg/m².  Lab Results   Component Value Date    ALBUMIN 2.9 (L) 2024    ALBUMIN 3.0 (L) 2024    ALBUMIN 3.2 (L) 2024     No results found for: "PREALBUMIN"    Estimated Creatinine Clearance: 65.4 mL/min (based on SCr of 1.1 mg/dL).    Accu-Checks  Recent Labs     24  0205 24  0314 24  0403 24  0516 24  0600 24  0738 24  0912 24  1106 24  1705 24  0008   POCTGLUCOSE 153* 148* 133* 146* 136* 139* 142* 131* 130* 155*       Current Medications and/or Treatments Impacting Glycemic Control  Immunotherapy:    Immunosuppressants       None          Steroids:   Hormones (From admission, onward)      None          Pressors:    Autonomic Drugs (From admission, onward)      Start     Stop Route Frequency Ordered    24 1801  NORepinephrine 4 mg in dextrose 5% 250 mL infusion (premix)        Question Answer Comment   Begin at (in " mcg/kg/min): 0.02    Titrate by: (in mcg/kg/min (RANGE PREFERRED) 0.02 - 0.2    Titrate interval: (in minutes) (RANGE PREFERRED) 2 - 5    Titrate to maintain: (MAP or SBP) MAP    Titrate to keep MAP within the range or GREATER than (if single number): (in mmHg) OTHER    Other 60-80    Maximum dose: (in mcg/kg/min) 0.1        -- IV Continuous 08/27/24 1702          Hyperglycemia/Diabetes Medications:   Antihyperglycemics (From admission, onward)      Start     Stop Route Frequency Ordered    08/28/24 1014  insulin aspart U-100 pen 0-10 Units         -- SubQ Every 6 hours PRN 08/28/24 0914

## 2024-08-29 NOTE — PT/OT/SLP EVAL
Speech Language Pathology Evaluation  Bedside Swallow    Patient Name:  Cesar Simpson   MRN:  [id number]  Admitting Diagnosis: Severe aortic stenosis    Recommendations:                 General Recommendations:  Dysphagia therapy  Diet recommendations:  Minced & Moist Diet - IDDSI Level 5, Thin liquids - IDDSI Level 0   Aspiration Precautions: 1 bite/sip at a time, Alternating bites/sips, Assistance with meals, Eliminate distractions, Feed only when awake/alert, Meds whole 1 at a time, Monitor for s/s of aspiration, and Standard aspiration precautions   General Precautions: Standard,    Communication strategies:   Pt speaks both Amharic and English     Assessment:     Cesar Simpson is a 74 y.o. male with an SLP diagnosis of Dysphagia largely related to confusion. No evidence of oral and/or pharyngeal phase deficits. Full liquid diet remains safest at this time and speech with plan to increased diet as confusion resolves.     History:     Past Medical History:   Diagnosis Date    Coronary artery disease of native artery of native heart with stable angina pectoris 4/10/2024    Diabetes mellitus type II, uncontrolled 02/27/2013    High-density lipoprotein deficiency 02/27/2013    HTN (hypertension) 02/27/2013    Hyperlipidemia     Hypothyroidism     Moderate obstructive sleep apnea 6/29/2022    Normocytic anemia 06/28/2021    Obesity     Osteoarthritis 02/08/2016    PAD (peripheral artery disease) 4/10/2024    Trouble in sleeping     Type 2 diabetes mellitus     Type 2 diabetes mellitus with diabetic polyneuropathy, without long-term current use of insulin        Past Surgical History:   Procedure Laterality Date    AORTIC VALVE REPLACEMENT  8/27/2024    Procedure: REPLACEMENT, AORTIC VALVE;  Surgeon: London Guillen MD;  Location: [facility] OR [address] [place] [address];  Service: Cardiovascular;;    CARDIAC CATH COSURGEON N/A 8/27/2024    Procedure: Cardiac Cath Cosurgeon;  Surgeon: Melvin Carroll MD;  Location: [facility] CATH LAB;  Service:  Cardiology;  Laterality: N/A;    CATARACT EXTRACTION      CATHETERIZATION OF BOTH LEFT AND RIGHT HEART N/A 4/16/2024    Procedure: CATHETERIZATION, HEART, BOTH LEFT AND RIGHT;  Surgeon: Brian Vickers MD;  Location: [facility] CATH LAB/EP;  Service: Cardiology;  Laterality: N/A;  Aortic valve study/ Saul Catheter/2 manifolds    CORONARY ANGIOGRAPHY N/A 4/16/2024    Procedure: ANGIOGRAM, CORONARY ARTERY;  Surgeon: Brian Vickers MD;  Location: [facility] CATH LAB/EP;  Service: Cardiology;  Laterality: N/A;    CORONARY ANGIOPLASTY WITH STENT PLACEMENT N/A 4/23/2024    Procedure: ANGIOPLASTY, CORONARY ARTERY, WITH STENT INSERTION;  Surgeon: Brian Vickers MD;  Location: [facility] CATH LAB/EP;  Service: Cardiology;  Laterality: N/A;    EXPLANT N/A 8/27/2024    Procedure: EXPLANT; TAVR;  Surgeon: London Guillen MD;  [facility];  Service: Cardiovascular;  Laterality: N/A;    EYE SURGERY      cataracts    INSTANTANEOUS WAVE-FREE RATIO (IFR) N/A 4/16/2024    Procedure: Instantaneous Wave-Free Ratio (IFR);  Surgeon: Brian Vickers MD;  Location: [facility] CATH LAB/EP;  Service: Cardiology;  Laterality: N/A;    IVUS, CORONARY  4/23/2024    Procedure: IVUS, Coronary;  Surgeon: Brian Vickers MD;  Location: [facility] CATH LAB/EP;  Service: Cardiology;;    JOINT REPLACEMENT Left     arthroplasty    PERCUTANEOUS CORONARY INTERVENTION, ARTERY N/A 4/23/2024    Procedure: Percutaneous coronary intervention;  Surgeon: Brian Vickers MD;  Location: [facility] CATH LAB/EP;  Service: Cardiology;  Laterality: N/A;  LAD    REPLACEMENT OF ASCENDING AORTA N/A 8/27/2024    Procedure: REPLACEMENT, AORTA, ASCENDING; HEMIARCH;  Surgeon: London Guillen MD;  [facility];  Service: Cardiovascular;  Laterality: N/A;    SHOULDER SURGERY      TONSILLECTOMY      TOTAL SHOULDER ARTHROPLASTY Left     TRANSCATHETER AORTIC VALVE REPLACEMENT (TAVR) N/A 8/27/2024    Procedure: REPLACEMENT, AORTIC VALVE, TRANSCATHETER (TAVR);   Surgeon: Cesar Louis MD;  Location: [facility] CATH LAB;  Service: Cardiology;  Laterality: N/A;   S/p TAVR complicated by aortic dissection followed by bioprosthetic valve replacement w/ hemiarch repair w/ Dr. Carroll on 8/27/24.     Prior Intubation HX:   8/26-8/27    Prior diet: regular unrestricted diet at baseline, currently on full liquid diet per medical team     Subjective     Pt awake yet with evident confusion     Pain/Comfort:  Pain Rating 1: 0/10  Pain Rating Post-Intervention 1: 0/10    Respiratory Status: Nasal cannula, flow 5 L/min    Objective:     Oral Musculature Evaluation  Oral Musculature: WFL  Dentition: present and adequate  Secretion Management: adequate  Mucosal Quality: good  Oral Labial Strength and Mobility: WFL  Lingual Strength and Mobility: WNL  Volitional Cough: strong (strong)  Volitional Swallow: prompt  Voice Prior to PO Intake: strong and clear    Bedside Swallow Eval:   Consistencies Assessed:  Thin liquids small single sips from open cup   Puree x2  Solids x2      Oral Phase:   WFL    Pharyngeal Phase:   no overt clinical signs/symptoms of aspiration  no overt clinical signs/symptoms of pharyngeal dysphagia    Compensatory Strategies  None    Treatment:  Education provided to Pt re: SLP role in acute care setting, overall impressions and therapeutic goals. Whiteboard updated.    Pt with evident confusion this date. SLP reviewed with pt all necessary orientation information. Pt with clear fluent speech however disoriented and appearing overwhelmed re: overall medical status. Pt inquiring re: readiness to discharge home and SLP offered education within SLP scope. Team at the bedside and reviewed with pt medical status. SLP dicussed given pt fluctuating confusion and max coaxing to participate in small volumes of PO trials  to keep on current full liquid diet. SLP will plan to continue to monitor cognitive-linguistic status and readiness to advance diet.  Speech to continue to  closely follow.     Goals:   Multidisciplinary Problems       SLP Goals          Problem: SLP    Goal Priority Disciplines Outcome   SLP Goal     SLP    Description: Speech Language Pathology Goals  Goals expected to be met by 9/6    1. Pt will participate in ongoing swallow assessment to help determine least restrictive diet                          Plan:     Patient to be seen:  4 x/week   Plan of Care expires:     Plan of Care reviewed with:      SLP Follow-Up:  Yes       Discharge recommendations:  High Intensity Therapy   Barriers to Discharge:  Safety Awareness and confusion     Time Tracking:     SLP Treatment Date:   08/29/24  Speech Start Time:  0805  Speech Stop Time:  0834     Speech Total Time (min):  29 min    Billable Minutes: Eval Swallow and Oral Function 10 and Self Care/Home Management Training 19 08/29/2024

## 2024-08-29 NOTE — PLAN OF CARE
Patient afebrile and on 2L NC. Levo gtt continued. MAP goal 60-80.  Chest tube output WNL, see flowsheet. UO WNL, see flowsheet. CVP 8-10. Plan of care discussed with patient. Fall precautions in place. Pain management continued. Discussed medications and care. Will continue to monitor.

## 2024-08-29 NOTE — PT/OT/SLP PROGRESS
Occupational Therapy  Co Treatment    Name: Cesar Simpson  MRN: [id number]  Admitting Diagnosis:  Severe aortic stenosis  2 Days Post-Op    Recommendations:     Discharge Recommendations: High Intensity Therapy  Discharge Equipment Recommendations:  shower chair  Barriers to discharge:  Other (Comment) (Increased skilled (A) needed)    Assessment:     Cesar Simpson is a 74 y.o. male with a medical diagnosis of Severe aortic stenosis.  He presents with decreased self-care and functional mobility 2/2 acute medical status. Pt with limited verbalizations t/o session 2/2 expressive difficulties, pt w/ increased responses to simple commands. Pt with decreased safety awareness and impulsivity this date, requiring maximal verbal and tactile cues for sternal precaution adherence (pt unable to verbalize sternal precautions). Pt completed functional mobility activity of decreased duration 2/2 nursing present to set up blood transfusion, pt w/ increased signs of fatigue after though vital signs remaining stable. Pt noted to require increased assistance for object manipulation this date 2/2 decreased FMC and problem solving. Performance deficits affecting function are weakness, decreased upper extremity function, gait instability, impaired cardiopulmonary response to activity, impaired balance, impaired endurance, decreased lower extremity function, impaired coordination, decreased safety awareness, impaired fine motor, impaired self care skills, impaired cognition, pain, impaired functional mobility, decreased coordination. Patient has demonstrated sufficient progression to warrant high intensity therapy evidenced by objectives noted below.     Co-Treatment conducted w/ PT due to patient medical instability and to promote patient safety.      This date vital signs remaining stable t/o session.    Rehab Prognosis:  Good; patient would benefit from acute skilled OT services to address these deficits and reach maximum level of  "function.       Plan:     Patient to be seen 5 x/week to address the above listed problems via self-care/home management, therapeutic activities, therapeutic exercises, neuromuscular re-education  Plan of Care Expires: 09/28/24  Plan of Care Reviewed with: patient, grandchild(nghia)    Subjective     Chief Complaint: "Yes. Stomach" - Pt response when asked if he had pain and where  Patient/Family Comments/goals: n/a  Pain/Comfort:  Pain Rating 1:  (Unrated abdominal pain)  Pain Addressed 1: Reposition, Distraction    Objective:     Communicated with: Nursing prior to session.  Patient found up in chair with telemetry, arterial line, blood pressure cuff, central line, chest tube, mirza catheter, oxygen, peripheral IV, pulse ox (continuous) upon OT entry to room.    General Precautions: Standard, sternal, fall    Orthopedic Precautions:N/A  Braces: N/A  Respiratory Status: Room air, 5L O2 via NC for activity     Occupational Performance:     Bed Mobility:    Deferred 2/2 pt found up in chair    Functional Mobility/Transfers:  Patient completed Sit <> Stand Transfer with minimum assistance and of 2 persons  with  hand-held assist x 2 trials. Pt benefiting from maximal verbal and tactile cues for sternal precautions 2/2 decreased safety awareness and impulsivity.  Functional Mobility: Pt engaged in functional mobility to simulate household/community distances of 5 feet with minimal assistance of 2 persons in order to maximize functional endurance and standing balance required for engagement in occupations of choice. Pt w/ increased signs of fatigue, though vital signs remaining stable     Activities of Daily Living:  Grooming: moderate assistance for container management and to apply toothpaste to toothbrush 2/2 decreased motor planning and FMC. Pt completing activity while static standing at bedside table w/ minimum assistance of 2 persons 2/2 decreased postural control and safety awareness.      [facility] 6 Click ADL: " 15    Treatment & Education:  - Session this date included self-care and functional mobility in room to continue increasing patients tolerance for OOB activity and functional independence for self-care.  - Pt educated on sternal precautions, requiring maximal verbal and tactile cues for adherence   - Pt educated on call button use for transfers and assistance     Patient left up in chair with all lines intact, call button in reach, and nursing present    GOALS:   Multidisciplinary Problems       Occupational Therapy Goals          Problem: Occupational Therapy    Goal Priority Disciplines Outcome Interventions   Occupational Therapy Goal     OT, PT/OT Progressing    Description: Goals to be met by: 9/28/2024     Patient will increase functional independence with ADLs by performing:    Feeding with Set-up Assistance.  UE Dressing with [place].  LE Dressing with [place].  Grooming while standing at sink with [place].  Toileting from toilet with [place] for hygiene and clothing management.   Toilet transfer to toilet with [place].                         Time Tracking:     OT Date of Treatment: 08/29/24  OT Start Time: 1105  OT Stop Time: 1138  OT Total Time (min): 33 min    Billable Minutes:Self Care/Home Management 12  Therapeutic Activity 21               8/29/2024

## 2024-08-30 LAB
ALBUMIN SERPL BCP-MCNC: 2.7 G/DL (ref 3.5–5.2)
ALP SERPL-CCNC: 36 U/L (ref 55–135)
ALT SERPL W/O P-5'-P-CCNC: 6 U/L (ref 10–44)
ANION GAP SERPL CALC-SCNC: 7 MMOL/L (ref 8–16)
ANION GAP SERPL CALC-SCNC: 9 MMOL/L (ref 8–16)
APTT PPP: 35.1 SEC (ref 21–32)
AST SERPL-CCNC: 25 U/L (ref 10–40)
BILIRUB SERPL-MCNC: 0.7 MG/DL (ref 0.1–1)
BUN SERPL-MCNC: 22 MG/DL (ref 8–23)
BUN SERPL-MCNC: 27 MG/DL (ref 8–23)
CALCIUM SERPL-MCNC: 7.8 MG/DL (ref 8.7–10.5)
CALCIUM SERPL-MCNC: 8.2 MG/DL (ref 8.7–10.5)
CHLORIDE SERPL-SCNC: 100 MMOL/L (ref 95–110)
CHLORIDE SERPL-SCNC: 103 MMOL/L (ref 95–110)
CO2 SERPL-SCNC: 26 MMOL/L (ref 23–29)
CO2 SERPL-SCNC: 31 MMOL/L (ref 23–29)
CREAT SERPL-MCNC: 0.9 MG/DL (ref 0.5–1.4)
CREAT SERPL-MCNC: 1.1 MG/DL (ref 0.5–1.4)
ERYTHROCYTE [DISTWIDTH] IN BLOOD BY AUTOMATED COUNT: 14.2 % (ref 11.5–14.5)
EST. GFR  (NO RACE VARIABLE): >60 ML/MIN/1.73 M^2
EST. GFR  (NO RACE VARIABLE): >60 ML/MIN/1.73 M^2
GLUCOSE SERPL-MCNC: 103 MG/DL (ref 70–110)
GLUCOSE SERPL-MCNC: 123 MG/DL (ref 70–110)
HCT VFR BLD AUTO: 27 % (ref 40–54)
HGB BLD-MCNC: 9.3 G/DL (ref 14–18)
INR PPP: 1.2 (ref 0.8–1.2)
MAGNESIUM SERPL-MCNC: 2 MG/DL (ref 1.6–2.6)
MAGNESIUM SERPL-MCNC: 2 MG/DL (ref 1.6–2.6)
MCH RBC QN AUTO: 29.7 PG (ref 27–31)
MCHC RBC AUTO-ENTMCNC: 34.4 G/DL (ref 32–36)
MCV RBC AUTO: 86 FL (ref 82–98)
OHS QRS DURATION: 100 MS
OHS QRS DURATION: 100 MS
OHS QTC CALCULATION: 423 MS
OHS QTC CALCULATION: 447 MS
PHOSPHATE SERPL-MCNC: 2.2 MG/DL (ref 2.7–4.5)
PHOSPHATE SERPL-MCNC: 2.5 MG/DL (ref 2.7–4.5)
PLATELET # BLD AUTO: 76 K/UL (ref 150–450)
PMV BLD AUTO: 10.9 FL (ref 9.2–12.9)
POCT GLUCOSE: 113 MG/DL (ref 70–110)
POCT GLUCOSE: 119 MG/DL (ref 70–110)
POCT GLUCOSE: 88 MG/DL (ref 70–110)
POTASSIUM SERPL-SCNC: 2.9 MMOL/L (ref 3.5–5.1)
POTASSIUM SERPL-SCNC: 4.1 MMOL/L (ref 3.5–5.1)
PROT SERPL-MCNC: 5.1 G/DL (ref 6–8.4)
PROTHROMBIN TIME: 12.6 SEC (ref 9–12.5)
RBC # BLD AUTO: 3.13 M/UL (ref 4.6–6.2)
SODIUM SERPL-SCNC: 138 MMOL/L (ref 136–145)
SODIUM SERPL-SCNC: 138 MMOL/L (ref 136–145)
WBC # BLD AUTO: 7.21 K/UL (ref 3.9–12.7)

## 2024-08-30 PROCEDURE — 25000003 PHARM REV CODE 250: Performed by: STUDENT IN AN ORGANIZED HEALTH CARE EDUCATION/TRAINING PROGRAM

## 2024-08-30 PROCEDURE — 92526 ORAL FUNCTION THERAPY: CPT

## 2024-08-30 PROCEDURE — 25000242 PHARM REV CODE 250 ALT 637 W/ HCPCS

## 2024-08-30 PROCEDURE — 25000003 PHARM REV CODE 250

## 2024-08-30 PROCEDURE — 27000221 HC OXYGEN, UP TO 24 HOURS

## 2024-08-30 PROCEDURE — 84100 ASSAY OF PHOSPHORUS: CPT | Mod: 91

## 2024-08-30 PROCEDURE — 63600175 PHARM REV CODE 636 W HCPCS: Performed by: STUDENT IN AN ORGANIZED HEALTH CARE EDUCATION/TRAINING PROGRAM

## 2024-08-30 PROCEDURE — 97535 SELF CARE MNGMENT TRAINING: CPT

## 2024-08-30 PROCEDURE — 84100 ASSAY OF PHOSPHORUS: CPT | Performed by: STUDENT IN AN ORGANIZED HEALTH CARE EDUCATION/TRAINING PROGRAM

## 2024-08-30 PROCEDURE — 99291 CRITICAL CARE FIRST HOUR: CPT | Mod: 95,,, | Performed by: STUDENT IN AN ORGANIZED HEALTH CARE EDUCATION/TRAINING PROGRAM

## 2024-08-30 PROCEDURE — 97530 THERAPEUTIC ACTIVITIES: CPT

## 2024-08-30 PROCEDURE — 85730 THROMBOPLASTIN TIME PARTIAL: CPT | Performed by: STUDENT IN AN ORGANIZED HEALTH CARE EDUCATION/TRAINING PROGRAM

## 2024-08-30 PROCEDURE — 80048 BASIC METABOLIC PNL TOTAL CA: CPT | Mod: XB

## 2024-08-30 PROCEDURE — 99900035 HC TECH TIME PER 15 MIN (STAT)

## 2024-08-30 PROCEDURE — 63600175 PHARM REV CODE 636 W HCPCS

## 2024-08-30 PROCEDURE — 85027 COMPLETE CBC AUTOMATED: CPT | Performed by: STUDENT IN AN ORGANIZED HEALTH CARE EDUCATION/TRAINING PROGRAM

## 2024-08-30 PROCEDURE — 94761 N-INVAS EAR/PLS OXIMETRY MLT: CPT

## 2024-08-30 PROCEDURE — 94640 AIRWAY INHALATION TREATMENT: CPT

## 2024-08-30 PROCEDURE — 85610 PROTHROMBIN TIME: CPT | Performed by: STUDENT IN AN ORGANIZED HEALTH CARE EDUCATION/TRAINING PROGRAM

## 2024-08-30 PROCEDURE — 97116 GAIT TRAINING THERAPY: CPT

## 2024-08-30 PROCEDURE — 20600001 HC STEP DOWN PRIVATE ROOM

## 2024-08-30 PROCEDURE — 99231 SBSQ HOSP IP/OBS SF/LOW 25: CPT | Mod: ,,, | Performed by: NURSE PRACTITIONER

## 2024-08-30 PROCEDURE — 83735 ASSAY OF MAGNESIUM: CPT | Performed by: STUDENT IN AN ORGANIZED HEALTH CARE EDUCATION/TRAINING PROGRAM

## 2024-08-30 PROCEDURE — 80053 COMPREHEN METABOLIC PANEL: CPT | Performed by: STUDENT IN AN ORGANIZED HEALTH CARE EDUCATION/TRAINING PROGRAM

## 2024-08-30 PROCEDURE — 83735 ASSAY OF MAGNESIUM: CPT | Mod: 91

## 2024-08-30 RX ORDER — METOPROLOL TARTRATE 25 MG/1
12.5 TABLET ORAL 2 TIMES DAILY
Status: DISCONTINUED | OUTPATIENT
Start: 2024-08-30 | End: 2024-09-02

## 2024-08-30 RX ORDER — GLUCAGON 1 MG
1 KIT INJECTION
Status: DISCONTINUED | OUTPATIENT
Start: 2024-08-30 | End: 2024-08-31

## 2024-08-30 RX ORDER — ASPIRIN 81 MG/1
81 TABLET ORAL DAILY
Status: DISCONTINUED | OUTPATIENT
Start: 2024-08-30 | End: 2024-09-03 | Stop reason: HOSPADM

## 2024-08-30 RX ORDER — IBUPROFEN 200 MG
24 TABLET ORAL
Status: DISCONTINUED | OUTPATIENT
Start: 2024-08-30 | End: 2024-08-31

## 2024-08-30 RX ORDER — AMOXICILLIN 250 MG
1 CAPSULE ORAL 2 TIMES DAILY
Status: DISCONTINUED | OUTPATIENT
Start: 2024-08-30 | End: 2024-09-03 | Stop reason: HOSPADM

## 2024-08-30 RX ORDER — SODIUM,POTASSIUM PHOSPHATES 280-250MG
2 POWDER IN PACKET (EA) ORAL
Status: DISCONTINUED | OUTPATIENT
Start: 2024-08-30 | End: 2024-08-30

## 2024-08-30 RX ORDER — FUROSEMIDE 10 MG/ML
40 INJECTION INTRAMUSCULAR; INTRAVENOUS 2 TIMES DAILY
Status: DISCONTINUED | OUTPATIENT
Start: 2024-08-30 | End: 2024-09-02

## 2024-08-30 RX ORDER — TRAZODONE HYDROCHLORIDE 50 MG/1
50 TABLET ORAL NIGHTLY
Status: DISCONTINUED | OUTPATIENT
Start: 2024-08-30 | End: 2024-09-03 | Stop reason: HOSPADM

## 2024-08-30 RX ORDER — IBUPROFEN 200 MG
16 TABLET ORAL
Status: DISCONTINUED | OUTPATIENT
Start: 2024-08-30 | End: 2024-08-31

## 2024-08-30 RX ORDER — CLOPIDOGREL BISULFATE 75 MG/1
75 TABLET ORAL DAILY
Status: DISCONTINUED | OUTPATIENT
Start: 2024-08-30 | End: 2024-08-30

## 2024-08-30 RX ORDER — FUROSEMIDE 10 MG/ML
40 INJECTION INTRAMUSCULAR; INTRAVENOUS ONCE
Status: COMPLETED | OUTPATIENT
Start: 2024-08-30 | End: 2024-08-30

## 2024-08-30 RX ORDER — INSULIN ASPART 100 [IU]/ML
0-5 INJECTION, SOLUTION INTRAVENOUS; SUBCUTANEOUS
Status: DISCONTINUED | OUTPATIENT
Start: 2024-08-30 | End: 2024-08-31

## 2024-08-30 RX ORDER — QUETIAPINE FUMARATE 25 MG/1
25 TABLET, FILM COATED ORAL NIGHTLY
Status: DISCONTINUED | OUTPATIENT
Start: 2024-08-30 | End: 2024-08-30

## 2024-08-30 RX ORDER — QUETIAPINE FUMARATE 25 MG/1
25 TABLET, FILM COATED ORAL NIGHTLY
Status: COMPLETED | OUTPATIENT
Start: 2024-08-30 | End: 2024-08-31

## 2024-08-30 RX ORDER — BENZONATATE 100 MG/1
200 CAPSULE ORAL 3 TIMES DAILY PRN
Status: DISCONTINUED | OUTPATIENT
Start: 2024-08-30 | End: 2024-09-03 | Stop reason: HOSPADM

## 2024-08-30 RX ORDER — BISACODYL 10 MG/1
10 SUPPOSITORY RECTAL ONCE
Status: COMPLETED | OUTPATIENT
Start: 2024-08-30 | End: 2024-08-30

## 2024-08-30 RX ADMIN — DULOXETINE HYDROCHLORIDE 30 MG: 30 CAPSULE, DELAYED RELEASE ORAL at 08:08

## 2024-08-30 RX ADMIN — BISACODYL 10 MG: 10 SUPPOSITORY RECTAL at 11:08

## 2024-08-30 RX ADMIN — FUROSEMIDE 40 MG: 10 INJECTION, SOLUTION INTRAVENOUS at 05:08

## 2024-08-30 RX ADMIN — IPRATROPIUM BROMIDE AND ALBUTEROL SULFATE 3 ML: 2.5; .5 SOLUTION RESPIRATORY (INHALATION) at 08:08

## 2024-08-30 RX ADMIN — OXYCODONE HYDROCHLORIDE 5 MG: 5 TABLET ORAL at 06:08

## 2024-08-30 RX ADMIN — FUROSEMIDE 40 MG: 10 INJECTION, SOLUTION INTRAVENOUS at 08:08

## 2024-08-30 RX ADMIN — ACETAMINOPHEN 1000 MG: 500 TABLET ORAL at 06:08

## 2024-08-30 RX ADMIN — POTASSIUM & SODIUM PHOSPHATES POWDER PACK 280-160-250 MG 2 PACKET: 280-160-250 PACK at 04:08

## 2024-08-30 RX ADMIN — LIDOCAINE 3 PATCH: 50 PATCH CUTANEOUS at 08:08

## 2024-08-30 RX ADMIN — IPRATROPIUM BROMIDE AND ALBUTEROL SULFATE 3 ML: 2.5; .5 SOLUTION RESPIRATORY (INHALATION) at 07:08

## 2024-08-30 RX ADMIN — PANTOPRAZOLE SODIUM 40 MG: 40 TABLET, DELAYED RELEASE ORAL at 08:08

## 2024-08-30 RX ADMIN — TAMSULOSIN HYDROCHLORIDE 0.4 MG: 0.4 CAPSULE ORAL at 08:08

## 2024-08-30 RX ADMIN — SENNOSIDES AND DOCUSATE SODIUM 1 TABLET: 50; 8.6 TABLET ORAL at 08:08

## 2024-08-30 RX ADMIN — LEVOTHYROXINE SODIUM 88 MCG: 88 TABLET ORAL at 06:08

## 2024-08-30 RX ADMIN — POTASSIUM BICARBONATE 35 MEQ: 391 TABLET, EFFERVESCENT ORAL at 06:08

## 2024-08-30 RX ADMIN — QUETIAPINE FUMARATE 25 MG: 25 TABLET ORAL at 09:08

## 2024-08-30 RX ADMIN — BENZONATATE 200 MG: 100 CAPSULE ORAL at 09:08

## 2024-08-30 RX ADMIN — POTASSIUM & SODIUM PHOSPHATES POWDER PACK 280-160-250 MG 2 PACKET: 280-160-250 PACK at 06:08

## 2024-08-30 RX ADMIN — POTASSIUM CHLORIDE 20 MEQ: 200 INJECTION, SOLUTION INTRAVENOUS at 04:08

## 2024-08-30 RX ADMIN — POTASSIUM & SODIUM PHOSPHATES POWDER PACK 280-160-250 MG 2 PACKET: 280-160-250 PACK at 12:08

## 2024-08-30 RX ADMIN — IPRATROPIUM BROMIDE AND ALBUTEROL SULFATE 3 ML: 2.5; .5 SOLUTION RESPIRATORY (INHALATION) at 01:08

## 2024-08-30 RX ADMIN — METOPROLOL TARTRATE 12.5 MG: 25 TABLET, FILM COATED ORAL at 08:08

## 2024-08-30 RX ADMIN — OXYCODONE HYDROCHLORIDE 5 MG: 5 TABLET ORAL at 04:08

## 2024-08-30 RX ADMIN — ACETAMINOPHEN 1000 MG: 500 TABLET ORAL at 09:08

## 2024-08-30 RX ADMIN — POLYETHYLENE GLYCOL 3350 17 G: 17 POWDER, FOR SOLUTION ORAL at 08:08

## 2024-08-30 RX ADMIN — OXYCODONE HYDROCHLORIDE 5 MG: 5 TABLET ORAL at 09:08

## 2024-08-30 RX ADMIN — TRAZODONE HYDROCHLORIDE 50 MG: 50 TABLET ORAL at 09:08

## 2024-08-30 RX ADMIN — METOPROLOL TARTRATE 12.5 MG: 25 TABLET, FILM COATED ORAL at 09:08

## 2024-08-30 RX ADMIN — ASPIRIN 81 MG: 81 TABLET, COATED ORAL at 08:08

## 2024-08-30 RX ADMIN — ATORVASTATIN CALCIUM 80 MG: 40 TABLET, FILM COATED ORAL at 08:08

## 2024-08-30 NOTE — PLAN OF CARE
Patient afebrile and on 4L NC. MAP goal 60-80.  Chest tube output WNL, see flowsheet. UO WNL, see flowsheet. CVP 8-13. Plan of care discussed with patient. Fall precautions in place. Pain management continued. Discussed medications and care. Will continue to monitor

## 2024-08-30 NOTE — CARE UPDATE
Endocrinology consulted for BG management.   BG goal 140-180     Remains in SICU. POD 3. BG stable with minimal prn SQ insulin requirements. HX of T2DM diet controlled.     Plan:   - Continue Novolog (Insulin Aspart) prn for BG excursions LDC SSI (150/25)  - BG monitoring to ac/hs   - Hypoglycemia protocol in place     ** Please call Endocrine for any BG related issues **     Discharge Planning:   TBD. Please notify endocrinology prior to discharge.    Lab Results   Component Value Date    HGBA1C 5.7 (H) 08/28/2024

## 2024-08-30 NOTE — PROGRESS NOTES
[facility]  Critical Care - Surgery  Progress Note    Patient Name: Cesar Simpson  MRN: [id number]  Admission Date: 8/27/2024  Hospital Length of Stay: 3 days  Code Status: Full Code  Attending Provider: Melvin Carroll MD  Primary Care Provider: Chaitanya López DO   Principal Problem: Severe aortic stenosis    Subjective:     Hospital/ICU Course:  No notes on file    Interval History/Significant Events:   No acute events overnight. AF and HDS. Not on any pressors. Mentation is much improved. Patient states his pain is well controlled. He states he slept well. He is AOx4 and up in the chair this morning. His expressive aphasia continues to improve. He denies any headaches. Pulses are present in all extremities. He is on 2L NC satting appropriately. 5L of UOP over the past 24 hours. Net -4L of output. Hgb stable at 9. Platelets slowly downtrending, will continue to monitor.    Follow-up For: Procedure(s) (LRB):  REPLACEMENT, AORTIC VALVE  REPLACEMENT, AORTA, ASCENDING; HEMIARCH (N/A)  EXPLANT; TAVR (N/A)    Post-Operative Day: 3 Days Post-Op    Objective:     Vital Signs (Most Recent):  Temp: 100 °F (37.8 °C) (08/30/24 0600)  Pulse: 84 (08/30/24 0600)  Resp: (!) 22 (08/30/24 0601)  BP: (!) 91/48 (08/30/24 0600)  SpO2: 99 % (08/30/24 0600) Vital Signs (24h Range):  Temp:  [99.1 °F (37.3 °C)-100.9 °F (38.3 °C)] 100 °F (37.8 °C)  Pulse:  [] 84  Resp:  [15-40] 22  SpO2:  [91 %-100 %] 99 %  BP: ()/(48-73) 91/48  Arterial Line BP: ()/(32-54) 115/46     Weight: 90.9 kg (200 lb 8.1 oz)  Body mass index is 31.4 kg/m².      Intake/Output Summary (Last 24 hours) at 8/30/2024 0759  Last data filed at 8/30/2024 0600  Gross per 24 hour   Intake 1216.3 ml   Output 5300 ml   Net -4083.7 ml          Physical Exam  Vitals and nursing note reviewed.   Constitutional:       General: He is not in acute distress.     Appearance: Normal appearance.   Eyes:      Extraocular Movements: Extraocular  movements intact.      Conjunctiva/sclera: Conjunctivae normal.   Neck:      Comments: RIJ and LIJ CVC  Cardiovascular:      Rate and Rhythm: Normal rate and regular rhythm.      Comments: LIJ CVC  R radial A line  V wires  Chest tubes 2x meds  Sternal dressing c/d/I    Radial pulses 2+ bilaterally  DP pulses with biphasic signal  Pulmonary:      Effort: No respiratory distress.   Abdominal:      General: There is no distension.      Palpations: Abdomen is soft.   Genitourinary:     Comments: mirza  Musculoskeletal:      Right lower leg: No edema.      Left lower leg: No edema.      Comments: Right groin hematoma, continues to improve   Skin:     General: Skin is warm and dry.   Neurological:      General: No focal deficit present.      Mental Status: He is alert and oriented to person, place, and time.      Comments: Expressive aphasia, improving            Vents:  Vent Mode: Spont (08/28/24 0553)  Ventilator Initiated: Yes (08/27/24 1807)  Set Rate: 20 BPM (08/28/24 0415)  Vt Set: 470 mL (08/28/24 0415)  Pressure Support: 10 cmH20 (08/28/24 0553)  PEEP/CPAP: 5 cmH20 (08/28/24 0553)  Oxygen Concentration (%): 40 (08/28/24 0600)  Peak Airway Pressure: 16 cmH20 (08/28/24 0553)  Plateau Pressure: 0 cmH20 (08/28/24 0553)  Total Ve: 11 L/m (08/28/24 0553)  Negative Inspiratory Force (cm H2O): -31 (08/28/24 0553)  F/VT Ratio<105 (RSBI): (!) 43.32 (08/28/24 0553)    Lines/Drains/Airways       Central Venous Catheter Line  Duration             Introducer 08/27/24 1105 Internal Jugular Left 2 days    Percutaneous Central Line - Triple Lumen  08/27/24 1108 Internal Jugular Left 2 days              Drain  Duration                  Chest Tube 08/27/24 1451 Tube - 2 Anterior Mediastinal 28 Fr. 2 days         Chest Tube 08/27/24 1452 Tube - 3 Posterior Mediastinal 28 Fr. 2 days         Urethral Catheter 08/27/24 1045 Non-latex;Straight-tip;Temperature probe 16 Fr. 2 days              Arterial Line  Duration             Arterial  Line 08/27/24 0805 Right Radial 2 days              Line  Duration                  Pacer Wires 08/27/24 1452 2 days                    Significant Labs:    CBC/Anemia Profile:  Recent Labs   Lab 08/29/24  0401 08/29/24  1436 08/30/24  0403   WBC 9.33 9.00 7.21   HGB 7.6* 9.9* 9.3*   HCT 22.0* 28.8* 27.0*   PLT 95* 80* 76*   MCV 87 88 86   RDW 14.0 14.1 14.2        Chemistries:  Recent Labs   Lab 08/28/24  1415 08/29/24  0401 08/30/24  0403    137 138   K 3.8 3.5 2.9*    106 103   CO2 23 23 26   BUN 24* 24* 22   CREATININE 1.3 1.1 0.9   CALCIUM 7.8* 7.8* 7.8*   ALBUMIN  --  2.9* 2.7*   PROT  --  4.9* 5.1*   BILITOT  --  0.4 0.7   ALKPHOS  --  33* 36*   ALT  --  6* 6*   AST  --  29 25   MG 2.0 2.1 2.0   PHOS 3.8 3.0 2.5*       All pertinent labs within the past 24 hours have been reviewed.    Significant Imaging:  I have reviewed all pertinent imaging results/findings within the past 24 hours.  Assessment/Plan:     Cardiac/Vascular  * Severe aortic stenosis  S/p TAVR complicated by aortic dissection followed by bioprosthetic valve replacement w/ hemiarch repair w/ Dr. Carroll on 8/27/24.    Neuro/Psych:   - Sedation: None  - Pain:     - Scheduled Tylenol 1g q8h    - Oxy 5 and 10 PRN    - Lidocaine patch    - Home duloxetine restarted    - 25 mg Seroquel at night                Cardiac:   - BP Goal: MAP 60-80, SBP <140, based off cuff given dampened a-line  - Pressors:None  - Rhythm: NSR  - Cleviprex/Cardene PRN  - Anti-HTNs: Resume as appropriate   - Beta blocker: Metoprolol 12.5 mg BID   - Statin: Atorvastatin 80 mg QID  - D/C A-line  - D/C CVC    Pulmonary:   - Goal SpO2 >92%  - Will wean ventilator support as tolerated to extubate  - ABGs PRN  - Chest Tubes x 2 (meds)  - Pleural chest tube removed 8/28  - On 2 L NC  - CT output 250/24 hours      Renal:      Recent Labs   Lab 08/28/24  1415 08/29/24  0401 08/30/24  0403   BUN 24* 24* 22   CREATININE 1.3 1.1 0.9          - Record strict Is/Os    - Lasix 40  mg IV BID    - UOP 5L cc over 24 hours, 1.4 L overnight    - Net -4 L/24 hrs    - Flomax    - Goal net negative 1.5 to 2 L    - D/C mirza    FEN / GI:     - Daily CMP, PRN K/Mag/Phos per protocol     - Replace electrolytes as needed    - Nutrition: Full liquid, boost with every meal    - Bowel Regimen: Miralax, docusate     ID:   - Afebrile  Recent Labs   Lab 08/29/24  0401 08/29/24  1436 08/30/24  0403   WBC 9.33 9.00 7.21       - Abx: Complete perioperative cefazolin 2g Q8H x 5 doses    Heme/Onc:   - Hgb 9.3 this morning  Recent Labs   Lab 08/28/24  0206 08/28/24  1415 08/29/24  0401 08/29/24  1436 08/30/24  0403   HGB 8.5*   < > 7.6* 9.9* 9.3*   *   < > 95* 80* 76*   APTT 31.1  --  32.9*  --  35.1*   INR 1.0  --  1.2  --  1.2    < > = values in this interval not displayed.       - CBC, PTT/INR daily  - DVT ppx: SCDs  - ASA 81 mg  - HOLD Plavix    Endocrine:   - CTS Goal -140  - HgbA1c: 6.0  - Endocrinology consulted for insulin management  - Home Levothyroxine     PPx:   Feeding: Full liquid  Analgesia/Sedation: Tylenol, Oxy PRN  Thromboembolic Prevention: SCDs  HOB >30: Yes  Stress Ulcer: famotidine  Glucose Control: Yes, insulin management per Endocrinology  De-escalation: De-line, d/c mirza, stepdown     Lines/Drains/Airway:  R radial arterial line  LIJ CVC  Mirza  Chest Tubes: 2 Mediastinal  Pacing Wires: Temporary ventricular pacing wires     Dispo/Code Status/Palliative:    - SICU, stepdown this afternoon   - Full Code          Critical care was time spent personally by me on the following activities: development of treatment plan with patient or surrogate and bedside caregivers, discussions with consultants, evaluation of patient's response to treatment, examination of patient, ordering and performing treatments and interventions, ordering and review of laboratory studies, ordering and review of radiographic studies, pulse oximetry, re-evaluation of patient's condition.  This critical care  time did not overlap with that of any other provider or involve time for any procedures.     Alton Contreras MD  Critical Care - Surgery  [facility]

## 2024-08-30 NOTE — PROGRESS NOTES
Handoff received from SD Pino RN.Patient experiencing expressive aphasia, team aware.  LIJ introducer and Quad in place. R radial A-Line intact. Meds chest tube x2 in place, see flowsheet for output. No redness or swelling noted. Peterson in place. UO WNL, see flowsheet. Skin handoff done. Will continue to monitor.

## 2024-08-30 NOTE — ASSESSMENT & PLAN NOTE
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 Carroll on 8/27/24.    Neuro/Psych:   - Sedation: None  - Pain:     - Scheduled Tylenol 1g q8h    - Oxy 5 and 10 PRN    - Lidocaine patch    - Home duloxetine restarted    - Seroquel 25 mg                Cardiac:   - BP Goal: MAP 60-80, SBP <140, based off cuff given dampened a-line  - Pressors:None  - Rhythm: NSR  - Cleviprex/Cardene PRN  - Anti-HTNs: Resume as appropriate   - Beta blocker: Metoprolol 12.5 mg BID   - Statin: Atorvastatin 40 mg QD when appropriate  - D/C A-line    Pulmonary:   - Goal SpO2 >92%  - Will wean ventilator support as tolerated to extubate  - ABGs PRN  - Chest Tubes x 2 (meds)  - Pleural chest tube removed 8/28  - On 2 L NC  - CT output 250/24 hours      Renal:      Recent Labs   Lab 08/28/24  1415 08/29/24  0401 08/30/24  0403   BUN 24* 24* 22   CREATININE 1.3 1.1 0.9          - Record strict Is/Os    - Lasix 40 mg IV BID    - UOP 5L cc over 24 hours, 1.4 L overnight    - Net -4 L/24 hrs    - Flomax    - Goal net negative 1.5 to 2 L    - D/C hermes    FEN / GI:     - Daily CMP, PRN K/Mag/Phos per protocol     - Replace electrolytes as needed    - Nutrition: Full liquid, boost with every meal    - Bowel Regimen: Miralax, docusate     ID:   - Afebrile  Recent Labs   Lab 08/29/24  0401 08/29/24  1436 08/30/24  0403   WBC 9.33 9.00 7.21       - Abx: Complete perioperative cefazolin 2g Q8H x 5 doses    Heme/Onc:   - Hgb 9.3 this morning  Recent Labs   Lab 08/28/24  0206 08/28/24  1415 08/29/24  0401 08/29/24  1436 08/30/24  0403   HGB 8.5*   < > 7.6* 9.9* 9.3*   *   < > 95* 80* 76*   APTT 31.1  --  32.9*  --  35.1*   INR 1.0  --  1.2  --  1.2    < > = values in this interval not displayed.       - CBC, PTT/INR daily  - DVT ppx: SCDs  - ASA 81 mg  - Plavix 75 mg    Endocrine:   - CTS Goal -140  - HgbA1c: 6.0  - Endocrinology consulted for insulin management  - Home  Levothyroxine     PPx:   Feeding: Full liquid  Analgesia/Sedation: Tylenol, Oxy PRN  Thromboembolic Prevention: SCDs  HOB >30: Yes  Stress Ulcer: famotidine  Glucose Control: Yes, insulin management per Endocrinology  De-escalation: De-line, d/c mirza, stepdown     Lines/Drains/Airway:  R radial arterial line  LIJ CVC  Mirza  Chest Tubes: 2 Mediastinal  Pacing Wires: Temporary ventricular pacing wires     Dispo/Code Status/Palliative:    - SICU, stepdown this afternoon   - Full Code

## 2024-08-30 NOTE — PT/OT/SLP PROGRESS
Physical Therapy Co-Treatment    Patient Name:  Cesar Simpson   MRN:  [id number]  Admitting Diagnosis:  Severe aortic stenosis   Recent Surgery: Procedure(s) (LRB):  REPLACEMENT, AORTIC VALVE  REPLACEMENT, AORTA, ASCENDING; HEMIARCH (N/A)  EXPLANT; TAVR (N/A) 3 Days Post-Op  Admit Date: 8/27/2024  Length of Stay: 3 days    Recommendations:     Discharge Recommendations:   High Intensity Therapy    Discharge Equipment Recommendations: other (see comments) (TBD)   Barriers to discharge: Decreased caregiver support and increased need for skilled assistance    Plan:     During this hospitalization, patient to be seen 5 x/week to address the identified rehab impairments via therapeutic activities, gait training, therapeutic exercises, neuromuscular re-education and progress towards the established goals.  Plan of Care Expires:  09/28/24  Plan of Care Reviewed with: patient    Assessment:     Cesar Simpson is a 74 y.o. male admitted with a medical diagnosis of Severe aortic stenosis. Pt found upright in chair agreeable to therapy session. Pt demo'd improvements as he increased distance gait trained this session. Pt continues to require min A x2 persons for gait 2/2 instability. Pt required max cues for sternal precaution compliance during session and cues to slow pace for safety with medical lines. Pt with VSS throughout but increased RR during gait trial. Patient remains limited by decreased endurance, impaired balance and decreased safety awareness. Patient would benefit from skilled therapy services to maximize safety and independence, increase activity tolerance, decrease fall risk, decrease caregiver burden, improve QOL, improve patient's functional mobility, and decrease risk of contractures and pressure sores. Patient has demonstrated sufficient progression to warrant high intensity therapy evidenced by objectives noted below.     Problem List: weakness, impaired endurance, impaired self care skills, impaired functional  "mobility, gait instability, impaired balance, decreased upper extremity function, decreased safety awareness.  Rehab Prognosis: Good; patient would benefit from acute skilled PT services to address these deficits and reach maximum level of function.      Goals:   Multidisciplinary Problems       Physical Therapy Goals          Problem: Physical Therapy    Goal Priority Disciplines Outcome Goal Variances Interventions   Physical Therapy Goal     PT, PT/OT Progressing     Description: Goals to be met by: 24     Patient will increase functional independence with mobility by performin. Supine to sit with Contact Guard Assistance  2. Sit to stand transfer with Contact Guard Assistance  3. Bed to chair transfer with Contact Guard Assistance using LRAD  4. Gait  x 200 feet with Contact Guard Assistance using LRAD.   5. Stand for 10 minutes with Contact Guard Assistance using LRAD                         Subjective     RN notified prior to session. Family present upon PT entrance into room. Patient agreeable to PT treatment session.    Chief Complaint: "Are we going walk a mile?"  Patient/Family Comments/goals: go home  Pain/Comfort:  Pain Rating 1: 0/10  Pain Rating Post-Intervention 1: 0/10      Objective:     Additional staff present: OT; OT for cotx due to pt's multiple medical comorbidities and functional deficits req'ing two skilled therapists to appropriately maximize functional potential by progressing pt's musculoskeletal strength and endurance, facilitating neuromuscular postural balance and control ,and accommodating for patient's impaired cardiopulmonary tolerance to activities.     Patient found up in chair with: blood pressure cuff, telemetry, pulse ox (continuous), chest tube, mirza catheter, central line   Cognition:   Alert, Distractible, and Cooperative  General Precautions: Standard, Cardiac fall, sternal   Orthopedic Precautions:N/A   Braces: N/A   Body mass index is 31.4 kg/m².  Oxygen " "Device: Nasal Cannula 2L  Vitals: BP (!) 104/54   Pulse 82   Temp 99.7 °F (37.6 °C)   Resp (!) 34   Ht 5' 7" (1.702 m)   Wt 90.9 kg (200 lb 8.1 oz)   SpO2 99%   BMI 31.40 kg/m²     Outcome Measures:  AM-PAC 6 CLICK MOBILITY  Turning over in bed (including adjusting bedclothes, sheets and blankets)?: 3  Sitting down on and standing up from a chair with arms (e.g., wheelchair, bedside commode, etc.): 3  Moving from lying on back to sitting on the side of the bed?: 3  Moving to and from a bed to a chair (including a wheelchair)?: 3  Need to walk in hospital room?: 2  Climbing 3-5 steps with a railing?: 2  Basic Mobility Total Score: 16     Functional Mobility:    Bed Mobility:   Pt found/returned to bedside chair    Transfers:   Sit <> Stand Transfer: contact guard assistance with no assistive device x3 trials from chair, x1 trial from BSC over toilet (pt attempted to use UE support during all trials despite max cues to not use UE)  Step transfer from toilet to chair: Minimum assistance with no assistive device    Balance:  Standing:  Static: contact guard assistance  Pt worked on standing balance and endurance while completing ADLs with OT at the sink x2 times (x1 for oral care at beginning of session, x1 for hand hygiene at end of session)  Dynamic: minimum assistance and of 2 persons      Gait:  Patient ambulated: 8' to sink, seated rest break + 42' with with 1 standing rest break  Patient required: minimal assist and of 2 persons  Patient used:  no assistive device and hand-held assist   Gait Deviation(s): unsteady gait, decreased step length, narrow base of support, decreased weight shift, flexed posture, decreased arm swing, and increase BLE external rotation  Portable monitor utilized  all lines remained intact throughout ambulation trial  Nurse present for vital sign monitoring  Chair follow for patient safety  Comments: Patient with instability throughout and required verbal cues to slow pace, increase " DANNA, fwd gaze, upright stance. Pt with slouched posture that increased with fatigue. Pt with VSS throughout but increased RR (~36) so cue to take rest break provided.     Education:  Time provided for education, counseling and discussion of health disposition in regards to patient's current status  All questions answered within PT scope of practice and to patient's satisfaction  PT role in POC to address current functional deficits  Call nursing/pct to transfer to chair/use bathroom. Pt stated understanding.  Sternal Precautions: patient able to voice 1/3 precautions without assistance. Patient able to maintain precautions throughout session with maximum verbal cues.    Patient left up in chair with all lines intact, call button in reach, and RN and family present.    Time Tracking:     PT Received On: 08/30/24  PT Start Time: 1145     PT Stop Time: 1223  PT Total Time (min): 38 min     Billable Minutes:   Gait Training 15 minutes and Therapeutic Activity 23 minutes       PT/PTA: PT       8/30/2024

## 2024-08-30 NOTE — PT/OT/SLP PROGRESS
Speech Language Pathology Treatment    Patient Name:  Cesar Simpson   MRN:  [id number]  Admitting Diagnosis: Severe aortic stenosis    Recommendations:                 General Recommendations:  Dysphagia therapy  Diet recommendations:  Regular Diet - IDDSI Level 7, Liquid Diet Level: Thin liquids - IDDSI Level 0   Aspiration Precautions: Standard aspiration precautions   General Precautions: Standard,    Communication strategies:  none    Assessment:     Cesar Simpson is a 74 y.o. male with an SLP diagnosis of  resolving dysphagia appearing at or near baseline to continue regular unrestricted diet.  .    Subjective     Pt awake alert and upright in bedside chair   RN in agreement with SLP seeing at this time     Pain/Comfort:  Pain Rating 1: 0/10  Pain Rating Post-Intervention 1: 0/10    Respiratory Status: Nasal cannula, flow 4 L/min    Objective:     Has the patient been evaluated by SLP for swallowing?   Yes  Keep patient NPO? No     Pt seen for ongoing dysphagia treatment/follow up. Pt more awake and alert compared to previous visit. Pt oriented to self location and situation independently. Pt had been transitioned by medical team to a regular cardiac diet but had not yet received regular diet. AM meal at bedside was still from previous full liquid diet orders. Pt observed to consume thin liquids via single and consecutive straw sips without overt concerns for aspiration. Pt then observed to consume regular solid x4. Pt with timely and thorough mastication patterns to manage regular solids. Pt appearing safe to advance to regular diet. Whiteboard updated and SLP reviewed with pt ongoing POC. SLP will plan to check diet tolerance x1 whole pt remains in hospitalized. SLP reviewed with pt standard aspiration precautions. All parties aware of and in agreement with plan.       Goals:   Multidisciplinary Problems       SLP Goals          Problem: SLP    Goal Priority Disciplines Outcome   SLP Goal     SLP    Description:  Speech Language Pathology Goals  Goals expected to be met by 9/6    1. Pt will participate in ongoing swallow assessment to help determine least restrictive diet                          Plan:     Patient to be seen:  3 x/week   Plan of Care expires:     Plan of Care reviewed with:      SLP Follow-Up:  Yes       Discharge recommendations:  High Intensity Therapy   Barriers to Discharge:  None    Time Tracking:     SLP Treatment Date:   08/30/24  Speech Start Time:  1002  Speech Stop Time:  1016     Speech Total Time (min):  14 min    Billable Minutes: Treatment Swallowing Dysfunction 14    08/30/2024

## 2024-08-30 NOTE — SUBJECTIVE & OBJECTIVE
Interval History/Significant Events:   No acute events overnight. AF and HDS. Not on any pressors. Mentation is much improved. Patient states his pain is well controlled. He states he slept well. He is AOx4 and up in the chair this morning. His expressive aphasia continues to improve. He denies any headaches. Pulses are present in all extremities. He is on 2L NC satting appropriately. 5L of UOP over the past 24 hours. Net -4L of output. Hgb stable at 9. Platelets slowly downtrending, will continue to monitor.    Follow-up For: Procedure(s) (LRB):  REPLACEMENT, AORTIC VALVE  REPLACEMENT, AORTA, ASCENDING; HEMIARCH (N/A)  EXPLANT; TAVR (N/A)    Post-Operative Day: 3 Days Post-Op    Objective:     Vital Signs (Most Recent):  Temp: 100 °F (37.8 °C) (08/30/24 0600)  Pulse: 84 (08/30/24 0600)  Resp: (!) 22 (08/30/24 0601)  BP: (!) 91/48 (08/30/24 0600)  SpO2: 99 % (08/30/24 0600) Vital Signs (24h Range):  Temp:  [99.1 °F (37.3 °C)-100.9 °F (38.3 °C)] 100 °F (37.8 °C)  Pulse:  [] 84  Resp:  [15-40] 22  SpO2:  [91 %-100 %] 99 %  BP: ()/(48-73) 91/48  Arterial Line BP: ()/(32-54) 115/46     Weight: 90.9 kg (200 lb 8.1 oz)  Body mass index is 31.4 kg/m².      Intake/Output Summary (Last 24 hours) at 8/30/2024 0759  Last data filed at 8/30/2024 0600  Gross per 24 hour   Intake 1216.3 ml   Output 5300 ml   Net -4083.7 ml          Physical Exam  Vitals and nursing note reviewed.   Constitutional:       General: He is not in acute distress.     Appearance: Normal appearance.   Eyes:      Extraocular Movements: Extraocular movements intact.      Conjunctiva/sclera: Conjunctivae normal.   Neck:      Comments: RIJ and LIJ CVC  Cardiovascular:      Rate and Rhythm: Normal rate and regular rhythm.      Comments: LIJ CVC  R radial A line  V wires  Chest tubes 2x meds  Sternal dressing c/d/I    Radial pulses 2+ bilaterally  DP pulses with biphasic signal  Pulmonary:      Effort: No respiratory distress.   Abdominal:       General: There is no distension.      Palpations: Abdomen is soft.   Genitourinary:     Comments: mirza  Musculoskeletal:      Right lower leg: No edema.      Left lower leg: No edema.      Comments: Right groin hematoma, continues to improve   Skin:     General: Skin is warm and dry.   Neurological:      General: No focal deficit present.      Mental Status: He is alert and oriented to person, place, and time.      Comments: Expressive aphasia, improving            Vents:  Vent Mode: Spont (08/28/24 0553)  Ventilator Initiated: Yes (08/27/24 1807)  Set Rate: 20 BPM (08/28/24 0415)  Vt Set: 470 mL (08/28/24 0415)  Pressure Support: 10 cmH20 (08/28/24 0553)  PEEP/CPAP: 5 cmH20 (08/28/24 0553)  Oxygen Concentration (%): 40 (08/28/24 0600)  Peak Airway Pressure: 16 cmH20 (08/28/24 0553)  Plateau Pressure: 0 cmH20 (08/28/24 0553)  Total Ve: 11 L/m (08/28/24 0553)  Negative Inspiratory Force (cm H2O): -31 (08/28/24 0553)  F/VT Ratio<105 (RSBI): (!) 43.32 (08/28/24 0553)    Lines/Drains/Airways       Central Venous Catheter Line  Duration             Introducer 08/27/24 1105 Internal Jugular Left 2 days    Percutaneous Central Line - Triple Lumen  08/27/24 1108 Internal Jugular Left 2 days              Drain  Duration                  Chest Tube 08/27/24 1451 Tube - 2 Anterior Mediastinal 28 Fr. 2 days         Chest Tube 08/27/24 1452 Tube - 3 Posterior Mediastinal 28 Fr. 2 days         Urethral Catheter 08/27/24 1045 Non-latex;Straight-tip;Temperature probe 16 Fr. 2 days              Arterial Line  Duration             Arterial Line 08/27/24 0805 Right Radial 2 days              Line  Duration                  Pacer Wires 08/27/24 1452 2 days                    Significant Labs:    CBC/Anemia Profile:  Recent Labs   Lab 08/29/24  0401 08/29/24  1436 08/30/24  0403   WBC 9.33 9.00 7.21   HGB 7.6* 9.9* 9.3*   HCT 22.0* 28.8* 27.0*   PLT 95* 80* 76*   MCV 87 88 86   RDW 14.0 14.1 14.2        Chemistries:  Recent Labs    Lab 08/28/24  1415 08/29/24  0401 08/30/24  0403    137 138   K 3.8 3.5 2.9*    106 103   CO2 23 23 26   BUN 24* 24* 22   CREATININE 1.3 1.1 0.9   CALCIUM 7.8* 7.8* 7.8*   ALBUMIN  --  2.9* 2.7*   PROT  --  4.9* 5.1*   BILITOT  --  0.4 0.7   ALKPHOS  --  33* 36*   ALT  --  6* 6*   AST  --  29 25   MG 2.0 2.1 2.0   PHOS 3.8 3.0 2.5*       All pertinent labs within the past 24 hours have been reviewed.    Significant Imaging:  I have reviewed all pertinent imaging results/findings within the past 24 hours.

## 2024-08-30 NOTE — PT/OT/SLP PROGRESS
Occupational Therapy  Co Treatment    Name: Cesar Simpson  MRN: [id number]  Admitting Diagnosis:  Severe aortic stenosis  3 Days Post-Op    Recommendations:     Discharge Recommendations: High Intensity Therapy  Discharge Equipment Recommendations:   (to be determined 2/2 progress)  Barriers to discharge:  Other (Comment) (increased skilled (A) needed)    Assessment:     Cesar Simpson is a 74 y.o. male with a medical diagnosis of Severe aortic stenosis.  He presents with decreased self-care and functional mobility 2/2 acute medical status. Pt continues to display decreased safety awareness and min-mod impulsivity w/ transfers, functional mobility, and sternal precautions. Pt benefited from moderate to maximal verbal and tactile cues t/o session for sternal precaution adherence and safety w/ mobility/transfers. Pt able to recall 1/3 sternal precautions at initiation of session this date requiring re-education and cues for adherence. This date pt displayed improved functional mobility w/ increased activity duration AEB distances below. Pt also w/ increased activity tolerance AEB completing increased duration of standing ADLs. Pt vital signs monitored t/o session, remaining stable. Nursing present for hallway ambulation this date w/ portable monitor for vital signs. Performance deficits affecting function are weakness, gait instability, impaired endurance, impaired balance, decreased lower extremity function, impaired coordination, decreased upper extremity function, impaired cardiopulmonary response to activity, decreased safety awareness, impaired self care skills, impaired functional mobility. Patient has demonstrated sufficient progression to warrant high intensity therapy evidenced by objectives noted below.     Co-Treatment conducted w/ PT due to patient medical instability and to promote patient safety.      Rehab Prognosis:  Good; patient would benefit from acute skilled OT services to address these deficits and  "reach maximum level of function.       Plan:     Patient to be seen 5 x/week to address the above listed problems via self-care/home management, therapeutic activities, therapeutic exercises, neuromuscular re-education  Plan of Care Expires: 09/28/24  Plan of Care Reviewed with: patient, family    Subjective     Chief Complaint: "I need to use the toilet"  Patient/Family Comments/goals: Increase independence  Pain/Comfort:  Pain Rating 1: 0/10    Objective:     Communicated with: Nursing prior to session.  Patient found up in chair with blood pressure cuff, telemetry, pulse ox (continuous), chest tube, mirza catheter, central line upon OT entry to room.    General Precautions: Standard, fall, sternal    Orthopedic Precautions:N/A  Braces: N/A  Respiratory Status: Nasal cannula, flow 3 L/min     Occupational Performance:     Bed Mobility:    Deferred 2/2 pt found UIC upon OT/PT entry for session    Functional Mobility/Transfers:  Patient completed Sit <> Stand Transfer with contact guard assistance  with  hand-held assist x3 trials  Patient completed Bed <> Chair Transfer using Step Transfer technique with minimum assistance and of 2 persons with hand-held assist  Patient completed Toilet Transfer w/ Step Transfer technique with contact guard assistance with  hand-held assist  Functional Mobility:   Pt engaged in functional mobility to simulate household/community distances  in order to maximize functional endurance and standing balance required for engagement in occupations of choice.   x3 trials w/ minimal assistance of 2 persons& HHA Pt benefited from rest breaks between trials. Pt noted to display continued gait instability, decreased gait width, and decreased gait speed though gait speed improved from previous session  10ft (w/ seated rest break)  30 ft & 12ft (w/ static standing rest break between distances 2/2 increased RR noted and seated rest break after trials)  8ft (w/ seated rest break after " trial)    Activities of Daily Living:  Grooming: Pt completing oral hygiene and hand washing while standing at sink w/ no assistance for self-care tasks and SBA to CGA for static standing balance .  Upper Body Dressing: maximal assistance to nel gown as robe 2/2 line management  Toileting: contact guard assistance while pt completing standing naomi-care 2/2 decreased safety awareness, increased time required 2/2 pt w/ BM      AMPAC 6 Click ADL: 17    Treatment & Education:  - Session this date included self-care and functional mobility to continue improving pts tolerance for OOB activity and increase independence w/ self-care  - Pt educated on sternal precautions and adherence w/ activities  - Pt educated on safety w/ functional mobility and transfers  -Pt educated on OT poc    Patient left up in chair with all lines intact, call button in reach, and nursing & family members present    GOALS:   Multidisciplinary Problems       Occupational Therapy Goals          Problem: Occupational Therapy    Goal Priority Disciplines Outcome Interventions   Occupational Therapy Goal     OT, PT/OT Progressing    Description: Goals to be met by: 9/28/2024     Patient will increase functional independence with ADLs by performing:    Feeding with Set-up Assistance.  UE Dressing with [place].  LE Dressing with [place].  Grooming while standing at sink with [place].  Toileting from toilet with [place] for hygiene and clothing management.   Toilet transfer to toilet with [place].                         Time Tracking:     OT Date of Treatment: 08/30/24  OT Start Time: 1145  OT Stop Time: 1223  OT Total Time (min): 38 min    Billable Minutes:Self Care/Home Management 24  Therapeutic Activity 14               8/30/2024

## 2024-08-31 LAB
ALBUMIN SERPL BCP-MCNC: 2.6 G/DL (ref 3.5–5.2)
ALP SERPL-CCNC: 41 U/L (ref 55–135)
ALT SERPL W/O P-5'-P-CCNC: 5 U/L (ref 10–44)
ANION GAP SERPL CALC-SCNC: 9 MMOL/L (ref 8–16)
APTT PPP: 27.4 SEC (ref 21–32)
AST SERPL-CCNC: 24 U/L (ref 10–40)
BILIRUB SERPL-MCNC: 0.9 MG/DL (ref 0.1–1)
BLD PROD TYP BPU: NORMAL
BLOOD UNIT EXPIRATION DATE: NORMAL
BLOOD UNIT TYPE CODE: 600
BLOOD UNIT TYPE CODE: 600
BLOOD UNIT TYPE CODE: 6200
BLOOD UNIT TYPE: NORMAL
BUN SERPL-MCNC: 26 MG/DL (ref 8–23)
CALCIUM SERPL-MCNC: 7.8 MG/DL (ref 8.7–10.5)
CHLORIDE SERPL-SCNC: 99 MMOL/L (ref 95–110)
CO2 SERPL-SCNC: 29 MMOL/L (ref 23–29)
CODING SYSTEM: NORMAL
CREAT SERPL-MCNC: 1 MG/DL (ref 0.5–1.4)
CROSSMATCH INTERPRETATION: NORMAL
DISPENSE STATUS: NORMAL
ERYTHROCYTE [DISTWIDTH] IN BLOOD BY AUTOMATED COUNT: 14.5 % (ref 11.5–14.5)
EST. GFR  (NO RACE VARIABLE): >60 ML/MIN/1.73 M^2
GLUCOSE SERPL-MCNC: 103 MG/DL (ref 70–110)
HCT VFR BLD AUTO: 31.5 % (ref 40–54)
HGB BLD-MCNC: 10.5 G/DL (ref 14–18)
INR PPP: 1.1 (ref 0.8–1.2)
MAGNESIUM SERPL-MCNC: 2 MG/DL (ref 1.6–2.6)
MCH RBC QN AUTO: 29.7 PG (ref 27–31)
MCHC RBC AUTO-ENTMCNC: 33.3 G/DL (ref 32–36)
MCV RBC AUTO: 89 FL (ref 82–98)
NUM UNITS TRANS FFP: NORMAL
NUM UNITS TRANS PACKED RBC: NORMAL
PHOSPHATE SERPL-MCNC: 3.1 MG/DL (ref 2.7–4.5)
PLATELET # BLD AUTO: 99 K/UL (ref 150–450)
PMV BLD AUTO: 11.1 FL (ref 9.2–12.9)
POCT GLUCOSE: 136 MG/DL (ref 70–110)
POCT GLUCOSE: 176 MG/DL (ref 70–110)
POCT GLUCOSE: 177 MG/DL (ref 70–110)
POCT GLUCOSE: 99 MG/DL (ref 70–110)
POTASSIUM SERPL-SCNC: 3.7 MMOL/L (ref 3.5–5.1)
PROT SERPL-MCNC: 5.6 G/DL (ref 6–8.4)
PROTHROMBIN TIME: 11.9 SEC (ref 9–12.5)
RBC # BLD AUTO: 3.53 M/UL (ref 4.6–6.2)
SODIUM SERPL-SCNC: 137 MMOL/L (ref 136–145)
WBC # BLD AUTO: 6.49 K/UL (ref 3.9–12.7)

## 2024-08-31 PROCEDURE — 25000003 PHARM REV CODE 250: Mod: HCNC

## 2024-08-31 PROCEDURE — 25000242 PHARM REV CODE 250 ALT 637 W/ HCPCS: Mod: HCNC

## 2024-08-31 PROCEDURE — 25000003 PHARM REV CODE 250: Mod: HCNC | Performed by: STUDENT IN AN ORGANIZED HEALTH CARE EDUCATION/TRAINING PROGRAM

## 2024-08-31 PROCEDURE — 36415 COLL VENOUS BLD VENIPUNCTURE: CPT | Mod: HCNC | Performed by: STUDENT IN AN ORGANIZED HEALTH CARE EDUCATION/TRAINING PROGRAM

## 2024-08-31 PROCEDURE — 84100 ASSAY OF PHOSPHORUS: CPT | Mod: HCNC | Performed by: STUDENT IN AN ORGANIZED HEALTH CARE EDUCATION/TRAINING PROGRAM

## 2024-08-31 PROCEDURE — 94761 N-INVAS EAR/PLS OXIMETRY MLT: CPT | Mod: HCNC

## 2024-08-31 PROCEDURE — 80053 COMPREHEN METABOLIC PANEL: CPT | Mod: HCNC | Performed by: STUDENT IN AN ORGANIZED HEALTH CARE EDUCATION/TRAINING PROGRAM

## 2024-08-31 PROCEDURE — 83735 ASSAY OF MAGNESIUM: CPT | Mod: HCNC | Performed by: STUDENT IN AN ORGANIZED HEALTH CARE EDUCATION/TRAINING PROGRAM

## 2024-08-31 PROCEDURE — 85610 PROTHROMBIN TIME: CPT | Mod: HCNC | Performed by: STUDENT IN AN ORGANIZED HEALTH CARE EDUCATION/TRAINING PROGRAM

## 2024-08-31 PROCEDURE — 94640 AIRWAY INHALATION TREATMENT: CPT | Mod: HCNC

## 2024-08-31 PROCEDURE — 27000221 HC OXYGEN, UP TO 24 HOURS: Mod: HCNC

## 2024-08-31 PROCEDURE — 85730 THROMBOPLASTIN TIME PARTIAL: CPT | Mod: HCNC | Performed by: STUDENT IN AN ORGANIZED HEALTH CARE EDUCATION/TRAINING PROGRAM

## 2024-08-31 PROCEDURE — 85027 COMPLETE CBC AUTOMATED: CPT | Mod: HCNC | Performed by: STUDENT IN AN ORGANIZED HEALTH CARE EDUCATION/TRAINING PROGRAM

## 2024-08-31 PROCEDURE — 99900035 HC TECH TIME PER 15 MIN (STAT): Mod: HCNC

## 2024-08-31 PROCEDURE — 20600001 HC STEP DOWN PRIVATE ROOM: Mod: HCNC

## 2024-08-31 PROCEDURE — 63600175 PHARM REV CODE 636 W HCPCS: Mod: HCNC

## 2024-08-31 RX ORDER — POTASSIUM CHLORIDE 20 MEQ/1
40 TABLET, EXTENDED RELEASE ORAL ONCE
Status: COMPLETED | OUTPATIENT
Start: 2024-08-31 | End: 2024-08-31

## 2024-08-31 RX ADMIN — FUROSEMIDE 40 MG: 10 INJECTION, SOLUTION INTRAVENOUS at 08:08

## 2024-08-31 RX ADMIN — DULOXETINE HYDROCHLORIDE 30 MG: 30 CAPSULE, DELAYED RELEASE ORAL at 08:08

## 2024-08-31 RX ADMIN — SENNOSIDES AND DOCUSATE SODIUM 1 TABLET: 50; 8.6 TABLET ORAL at 08:08

## 2024-08-31 RX ADMIN — IPRATROPIUM BROMIDE AND ALBUTEROL SULFATE 3 ML: 2.5; .5 SOLUTION RESPIRATORY (INHALATION) at 09:08

## 2024-08-31 RX ADMIN — TAMSULOSIN HYDROCHLORIDE 0.4 MG: 0.4 CAPSULE ORAL at 08:08

## 2024-08-31 RX ADMIN — ATORVASTATIN CALCIUM 80 MG: 40 TABLET, FILM COATED ORAL at 08:08

## 2024-08-31 RX ADMIN — POLYETHYLENE GLYCOL 3350 17 G: 17 POWDER, FOR SOLUTION ORAL at 08:08

## 2024-08-31 RX ADMIN — ACETAMINOPHEN 1000 MG: 500 TABLET ORAL at 01:08

## 2024-08-31 RX ADMIN — LIDOCAINE 3 PATCH: 50 PATCH CUTANEOUS at 08:08

## 2024-08-31 RX ADMIN — ACETAMINOPHEN 1000 MG: 500 TABLET ORAL at 06:08

## 2024-08-31 RX ADMIN — IPRATROPIUM BROMIDE AND ALBUTEROL SULFATE 3 ML: 2.5; .5 SOLUTION RESPIRATORY (INHALATION) at 07:08

## 2024-08-31 RX ADMIN — OXYCODONE HYDROCHLORIDE 5 MG: 5 TABLET ORAL at 06:08

## 2024-08-31 RX ADMIN — TRAZODONE HYDROCHLORIDE 50 MG: 50 TABLET ORAL at 08:08

## 2024-08-31 RX ADMIN — FUROSEMIDE 40 MG: 10 INJECTION, SOLUTION INTRAVENOUS at 05:08

## 2024-08-31 RX ADMIN — POTASSIUM CHLORIDE 40 MEQ: 1500 TABLET, EXTENDED RELEASE ORAL at 01:08

## 2024-08-31 RX ADMIN — PANTOPRAZOLE SODIUM 40 MG: 40 TABLET, DELAYED RELEASE ORAL at 08:08

## 2024-08-31 RX ADMIN — ASPIRIN 81 MG: 81 TABLET, COATED ORAL at 08:08

## 2024-08-31 RX ADMIN — BENZONATATE 200 MG: 100 CAPSULE ORAL at 01:08

## 2024-08-31 RX ADMIN — METOPROLOL TARTRATE 12.5 MG: 25 TABLET, FILM COATED ORAL at 08:08

## 2024-08-31 RX ADMIN — METOPROLOL TARTRATE 12.5 MG: 25 TABLET, FILM COATED ORAL at 09:08

## 2024-08-31 RX ADMIN — QUETIAPINE FUMARATE 25 MG: 25 TABLET ORAL at 08:08

## 2024-08-31 RX ADMIN — LEVOTHYROXINE SODIUM 88 MCG: 88 TABLET ORAL at 06:08

## 2024-08-31 NOTE — CARE UPDATE
Endocrinology consulted for BG management.   BG goal 140-180     Remains in CSU. POD 4. BG stable without prn SQ insulin requirements. HX of T2DM diet controlled.     Plan:   - Discontinue Novolog (Insulin Aspart) prn for BG excursions LDC SSI (150/25) and BG monitoring to ac/hs   - Recommend continuing to monitor BG with AM labs          Lab Results   Component Value Date    HGBA1C 5.7 (H) 08/28/2024       Endocrine will sign off at this time. Patient without insulin needs while tolerating diet. Please reconsult if needed.

## 2024-08-31 NOTE — PROGRESS NOTES
08/31/24 1700        Wound 08/27/24 1449 Incision midline;medial Chest vertical   Date First Assessed/Time First Assessed: 08/27/24 1449   Present on Original Admission: No  Primary Wound Type: Incision  Orientation: midline;medial  Location: Chest  Incision Type: vertical  Closure Method: Sutures;Steri-strips   Incision WDL WDL   Dressing Appearance Clean;Dry;Intact   Drainage Amount None   Drainage Characteristics/Odor No odor   Appearance Intact;Pink;Dry   Periwound Area Intact;Dry   Wound Edges Approximated   Care Cleansed with:;Antimicrobial agent   Dressing Applied;Island/border     Wound care completed on MSI. Patient tolerated well

## 2024-08-31 NOTE — PLAN OF CARE
Pt slept well this shift. A&Ox4. VSS. Medicated for pain per MAR. 2 L NC in place. Chest tube site and pacer wires reinforced- 40 mL of output over night. Persistently educating pt on midsternal precautions and splinting chest with pillow. Safety precautions in place. Call light in reach. No further concerns noted at this time.    Problem: Adult Inpatient Plan of Care  Goal: Plan of Care Review  Outcome: Progressing  Goal: Patient-Specific Goal (Individualized)  Outcome: Progressing  Goal: Absence of Hospital-Acquired Illness or Injury  Outcome: Progressing  Goal: Optimal Comfort and Wellbeing  Outcome: Progressing  Goal: Readiness for Transition of Care  Outcome: Progressing     Problem: Diabetes Comorbidity  Goal: Blood Glucose Level Within Targeted Range  Outcome: Progressing     Problem: Wound  Goal: Optimal Coping  Outcome: Progressing  Goal: Optimal Functional Ability  Outcome: Progressing  Goal: Absence of Infection Signs and Symptoms  Outcome: Progressing  Goal: Improved Oral Intake  Outcome: Progressing  Goal: Optimal Pain Control and Function  Outcome: Progressing  Goal: Skin Health and Integrity  Outcome: Progressing  Goal: Optimal Wound Healing  Outcome: Progressing     Problem: Infection  Goal: Absence of Infection Signs and Symptoms  Outcome: Progressing     Problem: Fall Injury Risk  Goal: Absence of Fall and Fall-Related Injury  Outcome: Progressing     Problem: Skin Injury Risk Increased  Goal: Skin Health and Integrity  Outcome: Progressing     Problem: Artificial Airway  Goal: Effective Communication  Outcome: Progressing  Goal: Optimal Device Function  Outcome: Progressing  Goal: Absence of Device-Related Skin or Tissue Injury  Outcome: Progressing     Problem: Noninvasive Ventilation Acute  Goal: Effective Unassisted Ventilation and Oxygenation  Outcome: Progressing

## 2024-08-31 NOTE — PROGRESS NOTES
[facility] - Cardiology Stepdown  Cardiothoracic Surgery  Progress Note    Patient Name: Cesar Simpson  MRN: [id number]  Admission Date: 8/27/2024  Hospital Length of Stay: 4 days  Code Status: Full Code   Attending Physician: Melvin Carroll MD   Referring Provider: Cesar Louis MD  Principal Problem:Severe aortic stenosis            Subjective:     Post-Op Info:  Procedure(s) (LRB):  REPLACEMENT, AORTIC VALVE  REPLACEMENT, AORTA, ASCENDING; HEMIARCH (N/A)  EXPLANT; TAVR (N/A)   4 Days Post-Op     Interval History: Pt s/e this AM. NAEO. Afebrile. Ambulating some. Pain well controlled.    Review of Systems   Constitutional: Negative.   HENT: Negative.     Eyes: Negative.    Cardiovascular:  Negative for chest pain and dyspnea on exertion.   Respiratory:  Negative for shortness of breath.    Skin: Negative.    Musculoskeletal: Negative.    Genitourinary: Negative.    Neurological: Negative.      Medications:  Continuous Infusions:  Scheduled Meds:   acetaminophen  1,000 mg Oral Q8H    albuterol-ipratropium  3 mL Nebulization Q6H WAKE    aspirin  81 mg Oral Daily    atorvastatin  80 mg Oral Daily    DULoxetine  30 mg Oral Daily    furosemide (LASIX) injection  40 mg Intravenous BID    levothyroxine  88 mcg Oral Before breakfast    LIDOcaine  3 patch Transdermal Q24H    metoprolol tartrate  12.5 mg Oral BID    pantoprazole  40 mg Oral Daily    polyethylene glycol  17 g Oral Daily    QUEtiapine  25 mg Oral QHS    senna-docusate 8.6-50 mg  1 tablet Oral BID    tamsulosin  1 capsule Oral Daily    traZODone  50 mg Oral QHS     PRN Meds:  Current Facility-Administered Medications:     0.9%  NaCl infusion (for blood administration), , Intravenous, Q24H PRN    benzonatate, 200 mg, Oral, TID PRN    dextrose 10%, 12.5 g, Intravenous, PRN    dextrose 10%, 25 g, Intravenous, PRN    metoclopramide, 5 mg, Intravenous, Q6H PRN    ondansetron, 4 mg, Intravenous, Q6H PRN    oxyCODONE, 5 mg, Oral, Q4H PRN    oxyCODONE, 10 mg, Oral, Q4H  PRN     Objective:     Vital Signs (Most Recent):  Temp: 97.6 °F (36.4 °C) (08/31/24 1110)  Pulse: 85 (08/31/24 1200)  Resp: 18 (08/31/24 1110)  BP: (!) 97/58 (08/31/24 1110)  SpO2: 95 % (08/31/24 1110) Vital Signs (24h Range):  Temp:  [97.5 °F (36.4 °C)-98.4 °F (36.9 °C)] 97.6 °F (36.4 °C)  Pulse:  [75-98] 85  Resp:  [16-34] 18  SpO2:  [93 %-100 %] 95 %  BP: ()/(50-67) 97/58     Weight: 91.6 kg (201 lb 15.1 oz)  Body mass index is 31.63 kg/m².    SpO2: 95 %       Intake/Output - Last 3 Shifts         08/29 0700 08/30 0659 08/30 0700 08/31 0659 08/31 0700  09/01 0659    P.O. 360 660 240    I.V. (mL/kg)       Blood 703      IV Piggyback 153.3      Total Intake(mL/kg) 1216.3 (13.4) 660 (7.2) 240 (2.6)    Urine (mL/kg/hr) 5095 (2.3) 2635 (1.2) 225 (0.4)    Stool  0     Chest Tube 250 130     Total Output 5345 2765 225    Net -4128.7 -2105 +15           Urine Occurrence  121 x     Stool Occurrence  1 x             Lines/Drains/Airways       Drain  Duration                  Chest Tube 08/27/24 1451 Tube - 2 Anterior Mediastinal 28 Fr. 3 days         Chest Tube 08/27/24 1452 Tube - 3 Posterior Mediastinal 28 Fr. 3 days              Line  Duration                  Pacer Wires 08/27/24 1452 3 days              Peripheral Intravenous Line  Duration                  Peripheral IV - Single Lumen 08/30/24 1750 20 G No Anterior;Right Forearm <1 day         Peripheral IV - Single Lumen 08/30/24 1800 18 G Anterior;Left Forearm <1 day                     Physical Exam  Constitutional:       General: He is not in acute distress.  HENT:      Head: Normocephalic and atraumatic.   Cardiovascular:      Rate and Rhythm: Normal rate and regular rhythm.      Comments: Pacing wires  Chest tubes in place   Surgical incision site with dressing in place c/d/i  Pulmonary:      Effort: No respiratory distress.   Abdominal:      General: There is no distension.      Palpations: Abdomen is soft.   Musculoskeletal:         General: No  swelling or tenderness.   Skin:     General: Skin is warm and dry.   Neurological:      General: No focal deficit present.      Mental Status: He is alert.            Significant Labs:  CBC:   Recent Labs   Lab 08/31/24  0456   WBC 6.49   RBC 3.53*   HGB 10.5*   HCT 31.5*   PLT 99*   MCV 89   MCH 29.7   MCHC 33.3     CMP:   Recent Labs   Lab 08/31/24  0456      CALCIUM 7.8*   ALBUMIN 2.6*   PROT 5.6*      K 3.7   CO2 29   CL 99   BUN 26*   CREATININE 1.0   ALKPHOS 41*   ALT 5*   AST 24   BILITOT 0.9       Significant Diagnostics:  I have reviewed all pertinent imaging results/findings within the past 24 hours.  Assessment/Plan:     * Severe aortic stenosis  Pt is a 73 yo male who presented for TAVR on 8/27/24 which was c/b development of type A aortic dissection for which he was taken to OR for management of and is now s/p hemiarch replacement, ascending aorta replacement, explantation of TAVR and placement of bioprosthetic valve.     - Continue asa   - Will plan to restart plavix tomorrow, platelets improved today  - Statin and BB  - Lasix 40mg IV BID  - CXR pending, f/u  - Continue chest tube until ambulation improves  - Replace lytes PRN  - Flomox  - Continue cardiac diet  - Bowel regimen  - PPI  - Continue home trazodone   - Seroquel nightly for delirium  - PT/OT  - Ambulation encouraged  - IS   - Continue step-down care           Shagufta Yanez MD  Cardiothoracic Surgery  [facility] - [facility] Step[facility]

## 2024-08-31 NOTE — SUBJECTIVE & OBJECTIVE
Interval History: Pt s/e this AM. NAEO. Afebrile. Ambulating some. Pain well controlled.    Review of Systems   Constitutional: Negative.   HENT: Negative.     Eyes: Negative.    Cardiovascular:  Negative for chest pain and dyspnea on exertion.   Respiratory:  Negative for shortness of breath.    Skin: Negative.    Musculoskeletal: Negative.    Genitourinary: Negative.    Neurological: Negative.      Medications:  Continuous Infusions:  Scheduled Meds:   acetaminophen  1,000 mg Oral Q8H    albuterol-ipratropium  3 mL Nebulization Q6H WAKE    aspirin  81 mg Oral Daily    atorvastatin  80 mg Oral Daily    DULoxetine  30 mg Oral Daily    furosemide (LASIX) injection  40 mg Intravenous BID    levothyroxine  88 mcg Oral Before breakfast    LIDOcaine  3 patch Transdermal Q24H    metoprolol tartrate  12.5 mg Oral BID    pantoprazole  40 mg Oral Daily    polyethylene glycol  17 g Oral Daily    QUEtiapine  25 mg Oral QHS    senna-docusate 8.6-50 mg  1 tablet Oral BID    tamsulosin  1 capsule Oral Daily    traZODone  50 mg Oral QHS     PRN Meds:  Current Facility-Administered Medications:     0.9%  NaCl infusion (for blood administration), , Intravenous, Q24H PRN    benzonatate, 200 mg, Oral, TID PRN    dextrose 10%, 12.5 g, Intravenous, PRN    dextrose 10%, 25 g, Intravenous, PRN    metoclopramide, 5 mg, Intravenous, Q6H PRN    ondansetron, 4 mg, Intravenous, Q6H PRN    oxyCODONE, 5 mg, Oral, Q4H PRN    oxyCODONE, 10 mg, Oral, Q4H PRN     Objective:     Vital Signs (Most Recent):  Temp: 97.6 °F (36.4 °C) (08/31/24 1110)  Pulse: 85 (08/31/24 1200)  Resp: 18 (08/31/24 1110)  BP: (!) 97/58 (08/31/24 1110)  SpO2: 95 % (08/31/24 1110) Vital Signs (24h Range):  Temp:  [97.5 °F (36.4 °C)-98.4 °F (36.9 °C)] 97.6 °F (36.4 °C)  Pulse:  [75-98] 85  Resp:  [16-34] 18  SpO2:  [93 %-100 %] 95 %  BP: ()/(50-67) 97/58     Weight: 91.6 kg (201 lb 15.1 oz)  Body mass index is 31.63 kg/m².    SpO2: 95 %       Intake/Output - Last 3  Shifts         08/29 0700 08/30 0659 08/30 0700 08/31 0659 08/31 0700 09/01 0659    P.O. 360 660 240    I.V. (mL/kg)       Blood 703      IV Piggyback 153.3      Total Intake(mL/kg) 1216.3 (13.4) 660 (7.2) 240 (2.6)    Urine (mL/kg/hr) 5095 (2.3) 2635 (1.2) 225 (0.4)    Stool  0     Chest Tube 250 130     Total Output 5345 2765 225    Net -4128.7 -2105 +15           Urine Occurrence  121 x     Stool Occurrence  1 x             Lines/Drains/Airways       Drain  Duration                  Chest Tube 08/27/24 1451 Tube - 2 Anterior Mediastinal 28 Fr. 3 days         Chest Tube 08/27/24 1452 Tube - 3 Posterior Mediastinal 28 Fr. 3 days              Line  Duration                  Pacer Wires 08/27/24 1452 3 days              Peripheral Intravenous Line  Duration                  Peripheral IV - Single Lumen 08/30/24 1750 20 G No Anterior;Right Forearm <1 day         Peripheral IV - Single Lumen 08/30/24 1800 18 G Anterior;Left Forearm <1 day                     Physical Exam  Constitutional:       General: He is not in acute distress.  HENT:      Head: Normocephalic and atraumatic.   Cardiovascular:      Rate and Rhythm: Normal rate and regular rhythm.      Comments: Pacing wires  Chest tubes in place   Surgical incision site with dressing in place c/d/i  Pulmonary:      Effort: No respiratory distress.   Abdominal:      General: There is no distension.      Palpations: Abdomen is soft.   Musculoskeletal:         General: No swelling or tenderness.   Skin:     General: Skin is warm and dry.   Neurological:      General: No focal deficit present.      Mental Status: He is alert.            Significant Labs:  CBC:   Recent Labs   Lab 08/31/24 0456   WBC 6.49   RBC 3.53*   HGB 10.5*   HCT 31.5*   PLT 99*   MCV 89   MCH 29.7   MCHC 33.3     CMP:   Recent Labs   Lab 08/31/24 0456      CALCIUM 7.8*   ALBUMIN 2.6*   PROT 5.6*      K 3.7   CO2 29   CL 99   BUN 26*   CREATININE 1.0   ALKPHOS 41*   ALT 5*   AST 24    BILITOT 0.9       Significant Diagnostics:  I have reviewed all pertinent imaging results/findings within the past 24 hours.

## 2024-08-31 NOTE — ASSESSMENT & PLAN NOTE
Pt is a 75 yo male who presented for TAVR on 8/27/24 which was c/b development of type A aortic dissection for which he was taken to OR for management of and is now s/p hemiarch replacement, ascending aorta replacement, explantation of TAVR and placement of bioprosthetic valve.     - Continue asa   - Will plan to restart plavix tomorrow, platelets improved today  - Statin and BB  - Lasix 40mg IV BID  - CXR pending, f/u  - Continue chest tube until ambulation improves  - Replace lytes PRN  - Flomox  - Continue cardiac diet  - Bowel regimen  - PPI  - Continue home trazodone   - Seroquel nightly for delirium  - PT/OT  - Ambulation encouraged  - IS   - Continue step-down care

## 2024-08-31 NOTE — PLAN OF CARE
Problem: Adult Inpatient Plan of Care  Goal: Patient-Specific Goal (Individualized)  Outcome: Progressing  Flowsheets (Taken 8/31/2024 1609)  Individualized Care Needs: updated patient on POC  Anxieties, Fears or Concerns: none stated     Problem: Diabetes Comorbidity  Goal: Blood Glucose Level Within Targeted Range  Outcome: Progressing  Intervention: Monitor and Manage Glycemia  Flowsheets (Taken 8/31/2024 1609)  Glycemic Management: blood glucose monitored     Problem: Infection  Goal: Absence of Infection Signs and Symptoms  Outcome: Progressing  Intervention: Prevent or Manage Infection  Flowsheets (Taken 8/31/2024 1609)  Infection Management: aseptic technique maintained  Isolation Precautions:   precautions maintained   protective     Problem: Fall Injury Risk  Goal: Absence of Fall and Fall-Related Injury  Outcome: Progressing  Intervention: Identify and Manage Contributors  Flowsheets (Taken 8/31/2024 1609)  Self-Care Promotion:   independence encouraged   BADL personal objects within reach   BADL personal routines maintained  Medication Review/Management: medications reviewed  Intervention: Promote Injury-Free Environment  Flowsheets (Taken 8/31/2024 1609)  Safety Promotion/Fall Prevention:   assistive device/personal item within reach   commode/urinal/bedpan at bedside   Fall Risk reviewed with patient/family   Fall Risk signage in place   instructed to call staff for mobility   nonskid shoes/socks when out of bed   room near unit station   medications reviewed   AAOX4 but very forgetful and confused. ,VSS,O2 sats >90% on 1LNC Plan of care discussed with patient. Patient has no complaints of chest pain/SOB/palpitations, fall precautions in place,no falls/injuries through the shift.Discussed medications and care.Patient has no questions at this time.Pt resting comfortably with no acute distress.Call light within reach,bed at lowest position. Patient educated on sternal precautions and fluid restriction.

## 2024-09-01 LAB
ALBUMIN SERPL BCP-MCNC: 2.7 G/DL (ref 3.5–5.2)
ALP SERPL-CCNC: 50 U/L (ref 55–135)
ALT SERPL W/O P-5'-P-CCNC: 10 U/L (ref 10–44)
ANION GAP SERPL CALC-SCNC: 10 MMOL/L (ref 8–16)
APTT PPP: 32.2 SEC (ref 21–32)
AST SERPL-CCNC: 22 U/L (ref 10–40)
BILIRUB SERPL-MCNC: 0.9 MG/DL (ref 0.1–1)
BUN SERPL-MCNC: 25 MG/DL (ref 8–23)
CALCIUM SERPL-MCNC: 8.3 MG/DL (ref 8.7–10.5)
CHLORIDE SERPL-SCNC: 97 MMOL/L (ref 95–110)
CO2 SERPL-SCNC: 29 MMOL/L (ref 23–29)
CREAT SERPL-MCNC: 1 MG/DL (ref 0.5–1.4)
ERYTHROCYTE [DISTWIDTH] IN BLOOD BY AUTOMATED COUNT: 14.6 % (ref 11.5–14.5)
EST. GFR  (NO RACE VARIABLE): >60 ML/MIN/1.73 M^2
GLUCOSE SERPL-MCNC: 114 MG/DL (ref 70–110)
HCT VFR BLD AUTO: 37.5 % (ref 40–54)
HGB BLD-MCNC: 11.9 G/DL (ref 14–18)
INR PPP: 1.1 (ref 0.8–1.2)
MAGNESIUM SERPL-MCNC: 2 MG/DL (ref 1.6–2.6)
MCH RBC QN AUTO: 30.4 PG (ref 27–31)
MCHC RBC AUTO-ENTMCNC: 31.7 G/DL (ref 32–36)
MCV RBC AUTO: 96 FL (ref 82–98)
PHOSPHATE SERPL-MCNC: 2.7 MG/DL (ref 2.7–4.5)
PLATELET # BLD AUTO: 136 K/UL (ref 150–450)
PMV BLD AUTO: 10.3 FL (ref 9.2–12.9)
POCT GLUCOSE: 124 MG/DL (ref 70–110)
POCT GLUCOSE: 156 MG/DL (ref 70–110)
POTASSIUM SERPL-SCNC: 3.8 MMOL/L (ref 3.5–5.1)
PROT SERPL-MCNC: 5.9 G/DL (ref 6–8.4)
PROTHROMBIN TIME: 12.3 SEC (ref 9–12.5)
RBC # BLD AUTO: 3.91 M/UL (ref 4.6–6.2)
SODIUM SERPL-SCNC: 136 MMOL/L (ref 136–145)
WBC # BLD AUTO: 7.27 K/UL (ref 3.9–12.7)

## 2024-09-01 PROCEDURE — 36415 COLL VENOUS BLD VENIPUNCTURE: CPT | Mod: HCNC | Performed by: STUDENT IN AN ORGANIZED HEALTH CARE EDUCATION/TRAINING PROGRAM

## 2024-09-01 PROCEDURE — 94761 N-INVAS EAR/PLS OXIMETRY MLT: CPT | Mod: HCNC

## 2024-09-01 PROCEDURE — 25000003 PHARM REV CODE 250: Mod: HCNC

## 2024-09-01 PROCEDURE — 84100 ASSAY OF PHOSPHORUS: CPT | Mod: HCNC | Performed by: STUDENT IN AN ORGANIZED HEALTH CARE EDUCATION/TRAINING PROGRAM

## 2024-09-01 PROCEDURE — 92526 ORAL FUNCTION THERAPY: CPT | Mod: HCNC

## 2024-09-01 PROCEDURE — 85730 THROMBOPLASTIN TIME PARTIAL: CPT | Mod: HCNC | Performed by: STUDENT IN AN ORGANIZED HEALTH CARE EDUCATION/TRAINING PROGRAM

## 2024-09-01 PROCEDURE — 27000221 HC OXYGEN, UP TO 24 HOURS: Mod: HCNC

## 2024-09-01 PROCEDURE — 20600001 HC STEP DOWN PRIVATE ROOM: Mod: HCNC

## 2024-09-01 PROCEDURE — 99900035 HC TECH TIME PER 15 MIN (STAT): Mod: HCNC

## 2024-09-01 PROCEDURE — 25000003 PHARM REV CODE 250: Mod: HCNC | Performed by: STUDENT IN AN ORGANIZED HEALTH CARE EDUCATION/TRAINING PROGRAM

## 2024-09-01 PROCEDURE — 80053 COMPREHEN METABOLIC PANEL: CPT | Mod: HCNC | Performed by: STUDENT IN AN ORGANIZED HEALTH CARE EDUCATION/TRAINING PROGRAM

## 2024-09-01 PROCEDURE — 25000242 PHARM REV CODE 250 ALT 637 W/ HCPCS: Mod: HCNC

## 2024-09-01 PROCEDURE — 94640 AIRWAY INHALATION TREATMENT: CPT | Mod: HCNC

## 2024-09-01 PROCEDURE — 83735 ASSAY OF MAGNESIUM: CPT | Mod: HCNC | Performed by: STUDENT IN AN ORGANIZED HEALTH CARE EDUCATION/TRAINING PROGRAM

## 2024-09-01 PROCEDURE — 85027 COMPLETE CBC AUTOMATED: CPT | Mod: HCNC | Performed by: STUDENT IN AN ORGANIZED HEALTH CARE EDUCATION/TRAINING PROGRAM

## 2024-09-01 PROCEDURE — 85610 PROTHROMBIN TIME: CPT | Mod: HCNC | Performed by: STUDENT IN AN ORGANIZED HEALTH CARE EDUCATION/TRAINING PROGRAM

## 2024-09-01 RX ORDER — GUAIFENESIN AND DEXTROMETHORPHAN HYDROBROMIDE 10; 100 MG/5ML; MG/5ML
SYRUP ORAL EVERY 4 HOURS PRN
Status: CANCELLED | OUTPATIENT
Start: 2024-09-01

## 2024-09-01 RX ORDER — PROMETHAZINE HYDROCHLORIDE AND CODEINE PHOSPHATE 6.25; 1 MG/5ML; MG/5ML
5 SOLUTION ORAL
Status: COMPLETED | OUTPATIENT
Start: 2024-09-01 | End: 2024-09-02

## 2024-09-01 RX ORDER — CLOPIDOGREL BISULFATE 75 MG/1
75 TABLET ORAL DAILY
Status: DISCONTINUED | OUTPATIENT
Start: 2024-09-01 | End: 2024-09-03 | Stop reason: HOSPADM

## 2024-09-01 RX ADMIN — CLOPIDOGREL BISULFATE 75 MG: 75 TABLET ORAL at 12:09

## 2024-09-01 RX ADMIN — IPRATROPIUM BROMIDE AND ALBUTEROL SULFATE 3 ML: 2.5; .5 SOLUTION RESPIRATORY (INHALATION) at 01:09

## 2024-09-01 RX ADMIN — DULOXETINE HYDROCHLORIDE 30 MG: 30 CAPSULE, DELAYED RELEASE ORAL at 08:09

## 2024-09-01 RX ADMIN — POLYETHYLENE GLYCOL 3350 17 G: 17 POWDER, FOR SOLUTION ORAL at 08:09

## 2024-09-01 RX ADMIN — TAMSULOSIN HYDROCHLORIDE 0.4 MG: 0.4 CAPSULE ORAL at 08:09

## 2024-09-01 RX ADMIN — PANTOPRAZOLE SODIUM 40 MG: 40 TABLET, DELAYED RELEASE ORAL at 08:09

## 2024-09-01 RX ADMIN — METOPROLOL TARTRATE 12.5 MG: 25 TABLET, FILM COATED ORAL at 08:09

## 2024-09-01 RX ADMIN — SENNOSIDES AND DOCUSATE SODIUM 1 TABLET: 50; 8.6 TABLET ORAL at 08:09

## 2024-09-01 RX ADMIN — LEVOTHYROXINE SODIUM 88 MCG: 88 TABLET ORAL at 05:09

## 2024-09-01 RX ADMIN — LIDOCAINE 3 PATCH: 50 PATCH CUTANEOUS at 08:09

## 2024-09-01 RX ADMIN — POTASSIUM PHOSPHATE, MONOBASIC AND POTASSIUM PHOSPHATE, DIBASIC 15 MMOL: 224; 236 INJECTION, SOLUTION, CONCENTRATE INTRAVENOUS at 12:09

## 2024-09-01 RX ADMIN — ACETAMINOPHEN 1000 MG: 500 TABLET ORAL at 05:09

## 2024-09-01 RX ADMIN — ASPIRIN 81 MG: 81 TABLET, COATED ORAL at 08:09

## 2024-09-01 RX ADMIN — IPRATROPIUM BROMIDE AND ALBUTEROL SULFATE 3 ML: 2.5; .5 SOLUTION RESPIRATORY (INHALATION) at 09:09

## 2024-09-01 RX ADMIN — BENZONATATE 200 MG: 100 CAPSULE ORAL at 04:09

## 2024-09-01 RX ADMIN — ATORVASTATIN CALCIUM 80 MG: 40 TABLET, FILM COATED ORAL at 08:09

## 2024-09-01 RX ADMIN — IPRATROPIUM BROMIDE AND ALBUTEROL SULFATE 3 ML: 2.5; .5 SOLUTION RESPIRATORY (INHALATION) at 08:09

## 2024-09-01 RX ADMIN — TRAZODONE HYDROCHLORIDE 50 MG: 50 TABLET ORAL at 08:09

## 2024-09-01 RX ADMIN — PROMETHAZINE HYDROCHLORIDE AND CODEINE PHOSPHATE 5 ML: 6.25; 1 SOLUTION ORAL at 01:09

## 2024-09-01 RX ADMIN — PROMETHAZINE HYDROCHLORIDE AND CODEINE PHOSPHATE 5 ML: 6.25; 1 SOLUTION ORAL at 08:09

## 2024-09-01 RX ADMIN — OXYCODONE HYDROCHLORIDE 5 MG: 5 TABLET ORAL at 12:09

## 2024-09-01 RX ADMIN — ACETAMINOPHEN 1000 MG: 500 TABLET ORAL at 01:09

## 2024-09-01 NOTE — PT/OT/SLP PROGRESS
Speech Language Pathology Treatment  Discharge    Patient Name:  Cesar Simpson   MRN:  [id number]  321/321 A    Admitting Diagnosis: Severe aortic stenosis    Recommendations:                 General Recommendations:  no follow up indicated for swallowing  Diet recommendations:  Regular Diet - IDDSI Level 7, Liquid Diet Level: Thin liquids - IDDSI Level 0   Aspiration Precautions: Standard aspiration precautions, upright for all intake  General Precautions: Standard, fall, sternal  Communication strategies:  none    Assessment:     Cesar Simpson is a 74 y.o. male with adequate oral clearance and no overt s/s of aspiration with regular solids and thin liquids. No further skilled acute Speech Therapy services warranted at this time for swallowing. Please re-consult as needed.       Subjective     Patient awake with breakfast tray present in room.   RN present.     Respiratory Status: Nasal cannula, flow 4 L/min    Objective:     Has the patient been evaluated by SLP for swallowing?   Yes  Keep patient NPO? No     Pt seen for ongoing dysphagia treatment/follow up. HOB elevated as high as bed mechanics allowed. Patient repositioned self in bed. Patient with regular solid breakfast tray present in room. He reported frustration with fluid restriction. SLP assessed Mr. Simpson with self fed bites of regular solid breakfast tray including a whole banana eaten in 3 bites and sips of water and Boost via cup and straw sips. He presents with coughing prior to po intake and x1 during po trials. Coughing not suspected to be related to swallowing. Patient denies difficulties with meals. Vocal quality remained clear. He required cues to take smaller, slower bites of food at a time. SLP provided patient education on SLP recommendations, SLP role, s/s and risks of aspiration, safe swallow precautions, and d/c from ST services. Patient would benefit from ongoing reinforcement of safe swallowing precautions.         Goals:    Multidisciplinary Problems       SLP Goals       Not on file              Multidisciplinary Problems (Resolved)          Problem: SLP    Goal Priority Disciplines Outcome   SLP Goal   (Resolved)     SLP Met   Description: Speech Language Pathology Goals  Goals expected to be met by 9/6    1. Pt will participate in ongoing swallow assessment to help determine least restrictive diet                          Plan:       Plan of Care reviewed with:  patient   SLP Follow-Up:  Yes       Discharge recommendations:   (no ST needs for swallowing)   Barriers to Discharge:  None    Time Tracking:     SLP Treatment Date:   09/01/24  Speech Start Time:  0850  Speech Stop Time:  0900     Speech Total Time (min):  10 min    Billable Minutes: Treatment Swallowing Dysfunction 10    09/01/2024

## 2024-09-01 NOTE — PLAN OF CARE
Problem: SLP  Goal: SLP Goal  Description: Speech Language Pathology Goals  Goals expected to be met by 9/6    1. Pt will participate in ongoing swallow assessment to help determine least restrictive diet     Outcome: Met  Patient tolerating regular solids and thin liquids with adequate oral clearance and no overt s/s of aspiration. No further skilled acute Speech Therapy services warranted at this time. Please re-consult as needed.

## 2024-09-01 NOTE — PLAN OF CARE
Problem: Adult Inpatient Plan of Care  Goal: Patient-Specific Goal (Individualized)  Outcome: Progressing  Flowsheets (Taken 9/1/2024 1541)  Individualized Care Needs: updated patient on POC  Anxieties, Fears or Concerns: none stated     Problem: Infection  Goal: Absence of Infection Signs and Symptoms  Outcome: Progressing  Intervention: Prevent or Manage Infection  Flowsheets (Taken 9/1/2024 1541)  Infection Management: aseptic technique maintained  Isolation Precautions:   precautions maintained   protective     Problem: Fall Injury Risk  Goal: Absence of Fall and Fall-Related Injury  Outcome: Progressing  Intervention: Identify and Manage Contributors  Flowsheets (Taken 9/1/2024 1541)  Self-Care Promotion:   independence encouraged   BADL personal objects within reach   BADL personal routines maintained   meal set-up provided  Medication Review/Management: medications reviewed     Problem: Wound  Goal: Optimal Wound Healing  Outcome: Progressing  Intervention: Promote Wound Healing  Flowsheets (Taken 9/1/2024 1541)  Sleep/Rest Enhancement:   awakenings minimized   consistent schedule promoted   family presence promoted   natural light exposure provided   AAOX4,VSS. Plan of care discussed with patient. Patient has no complaints of chest pain/SOB/palpitations. Pt ambulating  with assist x 1, fall precautions in place,no falls/injuries through the shift.Discussed medications and care.Patient has no questions at this time.Pt resting comfortably with no acute distress.Call light within reach,bed at lowest position. Sternal precautions in place.

## 2024-09-01 NOTE — PROGRESS NOTES
[facility] - Cardiology Stepdown  Cardiothoracic Surgery  Progress Note    Patient Name: Cesar Simpson  MRN: [id number]  Admission Date: 8/27/2024  Hospital Length of Stay: 5 days  Code Status: Full Code   Attending Physician: Melvin Carroll MD   Referring Provider: Cesar Louis MD  Principal Problem:Severe aortic stenosis            Subjective:     Post-Op Info:  Procedure(s) (LRB):  REPLACEMENT, AORTIC VALVE  REPLACEMENT, AORTA, ASCENDING; HEMIARCH (N/A)  EXPLANT; TAVR (N/A)   5 Days Post-Op     Interval History: Pt s/e this AM. NAEO. Afebrile over last 24 hours. Borderline low SBP (93-96) this morning and lasix held. Chest tubes with low output (50cc over last 24 hours). Plt count recovering (136 this AM). Pain well controlled. Ambulating very minimal amount. Dry cough present.    Review of Systems   Constitutional: Negative for chills and fever.   HENT: Negative.     Eyes: Negative.    Cardiovascular:  Negative for chest pain, dyspnea on exertion and irregular heartbeat.   Respiratory:  Positive for cough.    Endocrine: Negative.    Hematologic/Lymphatic: Negative.    Skin: Negative.    Musculoskeletal: Negative.    Gastrointestinal: Negative.    Neurological: Negative.      Medications:  Continuous Infusions:  Scheduled Meds:   acetaminophen  1,000 mg Oral Q8H    albuterol-ipratropium  3 mL Nebulization Q6H WAKE    aspirin  81 mg Oral Daily    atorvastatin  80 mg Oral Daily    DULoxetine  30 mg Oral Daily    furosemide (LASIX) injection  40 mg Intravenous BID    levothyroxine  88 mcg Oral Before breakfast    LIDOcaine  3 patch Transdermal Q24H    metoprolol tartrate  12.5 mg Oral BID    pantoprazole  40 mg Oral Daily    polyethylene glycol  17 g Oral Daily    senna-docusate 8.6-50 mg  1 tablet Oral BID    tamsulosin  1 capsule Oral Daily    traZODone  50 mg Oral QHS     PRN Meds:  Current Facility-Administered Medications:     0.9%  NaCl infusion (for blood administration), , Intravenous, Q24H PRN     benzonatate, 200 mg, Oral, TID PRN    dextrose 10%, 12.5 g, Intravenous, PRN    dextrose 10%, 25 g, Intravenous, PRN    influenza 65up-adj, 0.5 mL, Intramuscular, vaccine x 1 dose    metoclopramide, 5 mg, Intravenous, Q6H PRN    ondansetron, 4 mg, Intravenous, Q6H PRN    oxyCODONE, 5 mg, Oral, Q4H PRN    oxyCODONE, 10 mg, Oral, Q4H PRN     Objective:     Vital Signs (Most Recent):  Temp: 98.2 °F (36.8 °C) (09/01/24 0728)  Pulse: 86 (09/01/24 0835)  Resp: 16 (09/01/24 0835)  BP: (!) 96/54 (09/01/24 0728)  SpO2: (!) 92 % (09/01/24 0728) Vital Signs (24h Range):  Temp:  [97 °F (36.1 °C)-98.5 °F (36.9 °C)] 98.2 °F (36.8 °C)  Pulse:  [82-96] 86  Resp:  [16-20] 16  SpO2:  [89 %-97 %] 92 %  BP: ()/(54-61) 96/54     Weight: 86.7 kg (191 lb 2.2 oz)  Body mass index is 29.94 kg/m².    SpO2: (!) 92 %       Intake/Output - Last 3 Shifts         08/30 0700 08/31 0659 08/31 0700 09/01 0659 09/01 0700 09/02 0659    P.O. 660 1120 240    Blood       IV Piggyback       Total Intake(mL/kg) 660 (7.2) 1120 (12.9) 240 (2.8)    Urine (mL/kg/hr) 2635 (1.2) 2725 (1.3) 250 (0.8)    Stool 0      Chest Tube 130 50     Total Output 2765 2775 250    Net -2105 -1655 -10           Urine Occurrence 121 x      Stool Occurrence 1 x              Lines/Drains/Airways       Drain  Duration                  Chest Tube 08/27/24 1451 Tube - 2 Anterior Mediastinal 28 Fr. 4 days         Chest Tube 08/27/24 1452 Tube - 3 Posterior Mediastinal 28 Fr. 4 days              Line  Duration                  Pacer Wires 08/27/24 1452 4 days              Peripheral Intravenous Line  Duration                  Peripheral IV - Single Lumen 08/30/24 1750 20 G No Anterior;Right Forearm 1 day         Peripheral IV - Single Lumen 08/30/24 1800 18 G Anterior;Left Forearm 1 day                     Physical Exam  Constitutional:       General: He is not in acute distress.  HENT:      Head: Normocephalic and atraumatic.   Cardiovascular:      Rate and Rhythm: Normal  rate and regular rhythm.   Pulmonary:      Effort: Pulmonary effort is normal.      Comments: Heavy dry cough during exam.  Abdominal:      General: There is no distension.      Palpations: Abdomen is soft.      Tenderness: There is no abdominal tenderness.   Musculoskeletal:         General: No swelling.      Right lower leg: No edema.      Left lower leg: No edema.   Skin:     General: Skin is warm and dry.   Neurological:      General: No focal deficit present.      Mental Status: He is alert.            Significant Labs:  CBC:   Recent Labs   Lab 09/01/24  0352   WBC 7.27   RBC 3.91*   HGB 11.9*   HCT 37.5*   *   MCV 96   MCH 30.4   MCHC 31.7*     CMP:   Recent Labs   Lab 09/01/24  0352   *   CALCIUM 8.3*   ALBUMIN 2.7*   PROT 5.9*      K 3.8   CO2 29   CL 97   BUN 25*   CREATININE 1.0   ALKPHOS 50*   ALT 10   AST 22   BILITOT 0.9       Significant Diagnostics:  CXR: I have reviewed all pertinent results/findings within the past 24 hours  I have reviewed all pertinent imaging results/findings within the past 24 hours.  Assessment/Plan:     * Severe aortic stenosis  Pt is a 75 yo male who presented for TAVR on 8/27/24 which was c/b development of type A aortic dissection for which he was taken to OR for management of and is now s/p hemiarch replacement, ascending aorta replacement, explantation of TAVR and placement of bioprosthetic valve.     - Continue asa   - Restart plavix today  - Statin and BB  - Lasix 40mg IV BID held today due to low BP  - CXR daily  - Chest tubes removed today  - Pacing wires removed today  - Echo ordered  - Codeine syrup for cough ordered   - Replace lytes PRN  - Flomox  - Continue cardiac diet  - Bowel regimen  - PPI  - Continue home trazodone   - Seroquel nightly for delirium  - PT/OT  - Ambulation encouraged  - IS   - Continue step-down care           Shagufta Yanez MD  Cardiothoracic Surgery  [facility] - [facility] Step[facility]    CTS Attending Note:    I have  personally taken the history and examined this patient and agree with the Fellow's note as stated above.     Melvin Carroll MD  Cardiac Surgery

## 2024-09-01 NOTE — ASSESSMENT & PLAN NOTE
Pt is a 73 yo male who presented for TAVR on 8/27/24 which was c/b development of type A aortic dissection for which he was taken to OR for management of and is now s/p hemiarch replacement, ascending aorta replacement, explantation of TAVR and placement of bioprosthetic valve.     - Continue asa   - Restart plavix today  - Statin and BB  - Lasix 40mg IV BID held today due to low BP  - CXR daily  - Chest tubes removed today  - Pacing wires removed today  - Echo ordered  - Codeine syrup for cough ordered   - Replace lytes PRN  - Flomox  - Continue cardiac diet  - Bowel regimen  - PPI  - Continue home trazodone   - Seroquel nightly for delirium  - PT/OT  - Ambulation encouraged  - IS   - Continue step-down care

## 2024-09-01 NOTE — SUBJECTIVE & OBJECTIVE
Interval History: Pt s/e this AM. NAEO. Afebrile over last 24 hours. Borderline low SBP (93-96) this morning and lasix held. Chest tubes with low output (50cc over last 24 hours). Plt count recovering (136 this AM). Pain well controlled. Ambulating very minimal amount. Dry cough present.    Review of Systems   Constitutional: Negative for chills and fever.   HENT: Negative.     Eyes: Negative.    Cardiovascular:  Negative for chest pain, dyspnea on exertion and irregular heartbeat.   Respiratory:  Positive for cough.    Endocrine: Negative.    Hematologic/Lymphatic: Negative.    Skin: Negative.    Musculoskeletal: Negative.    Gastrointestinal: Negative.    Neurological: Negative.      Medications:  Continuous Infusions:  Scheduled Meds:   acetaminophen  1,000 mg Oral Q8H    albuterol-ipratropium  3 mL Nebulization Q6H WAKE    aspirin  81 mg Oral Daily    atorvastatin  80 mg Oral Daily    DULoxetine  30 mg Oral Daily    furosemide (LASIX) injection  40 mg Intravenous BID    levothyroxine  88 mcg Oral Before breakfast    LIDOcaine  3 patch Transdermal Q24H    metoprolol tartrate  12.5 mg Oral BID    pantoprazole  40 mg Oral Daily    polyethylene glycol  17 g Oral Daily    senna-docusate 8.6-50 mg  1 tablet Oral BID    tamsulosin  1 capsule Oral Daily    traZODone  50 mg Oral QHS     PRN Meds:  Current Facility-Administered Medications:     0.9%  NaCl infusion (for blood administration), , Intravenous, Q24H PRN    benzonatate, 200 mg, Oral, TID PRN    dextrose 10%, 12.5 g, Intravenous, PRN    dextrose 10%, 25 g, Intravenous, PRN    influenza 65up-adj, 0.5 mL, Intramuscular, vaccine x 1 dose    metoclopramide, 5 mg, Intravenous, Q6H PRN    ondansetron, 4 mg, Intravenous, Q6H PRN    oxyCODONE, 5 mg, Oral, Q4H PRN    oxyCODONE, 10 mg, Oral, Q4H PRN     Objective:     Vital Signs (Most Recent):  Temp: 98.2 °F (36.8 °C) (09/01/24 0728)  Pulse: 86 (09/01/24 0835)  Resp: 16 (09/01/24 0835)  BP: (!) 96/54 (09/01/24  0728)  SpO2: (!) 92 % (09/01/24 0728) Vital Signs (24h Range):  Temp:  [97 °F (36.1 °C)-98.5 °F (36.9 °C)] 98.2 °F (36.8 °C)  Pulse:  [82-96] 86  Resp:  [16-20] 16  SpO2:  [89 %-97 %] 92 %  BP: ()/(54-61) 96/54     Weight: 86.7 kg (191 lb 2.2 oz)  Body mass index is 29.94 kg/m².    SpO2: (!) 92 %       Intake/Output - Last 3 Shifts         08/30 0700 08/31 0659 08/31 0700 09/01 0659 09/01 0700 09/02 0659    P.O. 660 1120 240    Blood       IV Piggyback       Total Intake(mL/kg) 660 (7.2) 1120 (12.9) 240 (2.8)    Urine (mL/kg/hr) 2635 (1.2) 2725 (1.3) 250 (0.8)    Stool 0      Chest Tube 130 50     Total Output 2765 2775 250    Net -2105 -1655 -10           Urine Occurrence 121 x      Stool Occurrence 1 x              Lines/Drains/Airways       Drain  Duration                  Chest Tube 08/27/24 1451 Tube - 2 Anterior Mediastinal 28 Fr. 4 days         Chest Tube 08/27/24 1452 Tube - 3 Posterior Mediastinal 28 Fr. 4 days              Line  Duration                  Pacer Wires 08/27/24 1452 4 days              Peripheral Intravenous Line  Duration                  Peripheral IV - Single Lumen 08/30/24 1750 20 G No Anterior;Right Forearm 1 day         Peripheral IV - Single Lumen 08/30/24 1800 18 G Anterior;Left Forearm 1 day                     Physical Exam  Constitutional:       General: He is not in acute distress.  HENT:      Head: Normocephalic and atraumatic.   Cardiovascular:      Rate and Rhythm: Normal rate and regular rhythm.   Pulmonary:      Effort: Pulmonary effort is normal.      Comments: Heavy dry cough during exam.  Abdominal:      General: There is no distension.      Palpations: Abdomen is soft.      Tenderness: There is no abdominal tenderness.   Musculoskeletal:         General: No swelling.      Right lower leg: No edema.      Left lower leg: No edema.   Skin:     General: Skin is warm and dry.   Neurological:      General: No focal deficit present.      Mental Status: He is alert.             Significant Labs:  CBC:   Recent Labs   Lab 09/01/24  0352   WBC 7.27   RBC 3.91*   HGB 11.9*   HCT 37.5*   *   MCV 96   MCH 30.4   MCHC 31.7*     CMP:   Recent Labs   Lab 09/01/24  0352   *   CALCIUM 8.3*   ALBUMIN 2.7*   PROT 5.9*      K 3.8   CO2 29   CL 97   BUN 25*   CREATININE 1.0   ALKPHOS 50*   ALT 10   AST 22   BILITOT 0.9       Significant Diagnostics:  CXR: I have reviewed all pertinent results/findings within the past 24 hours  I have reviewed all pertinent imaging results/findings within the past 24 hours.

## 2024-09-01 NOTE — NURSING
Patient profusely sweating. VSS stable. BG stable. Patient asymptomatic. Notified MD Shagufta Yanez. No new orders.

## 2024-09-02 LAB
ALBUMIN SERPL BCP-MCNC: 2.7 G/DL (ref 3.5–5.2)
ALP SERPL-CCNC: 55 U/L (ref 55–135)
ALT SERPL W/O P-5'-P-CCNC: 14 U/L (ref 10–44)
ANION GAP SERPL CALC-SCNC: 11 MMOL/L (ref 8–16)
APTT PPP: 30.3 SEC (ref 21–32)
AST SERPL-CCNC: 25 U/L (ref 10–40)
BILIRUB SERPL-MCNC: 1.1 MG/DL (ref 0.1–1)
BUN SERPL-MCNC: 25 MG/DL (ref 8–23)
CALCIUM SERPL-MCNC: 8.6 MG/DL (ref 8.7–10.5)
CHLORIDE SERPL-SCNC: 96 MMOL/L (ref 95–110)
CO2 SERPL-SCNC: 28 MMOL/L (ref 23–29)
CREAT SERPL-MCNC: 1 MG/DL (ref 0.5–1.4)
ERYTHROCYTE [DISTWIDTH] IN BLOOD BY AUTOMATED COUNT: 14.4 % (ref 11.5–14.5)
EST. GFR  (NO RACE VARIABLE): >60 ML/MIN/1.73 M^2
GLUCOSE SERPL-MCNC: 119 MG/DL (ref 70–110)
HCT VFR BLD AUTO: 36 % (ref 40–54)
HGB BLD-MCNC: 11.7 G/DL (ref 14–18)
INR PPP: 1.1 (ref 0.8–1.2)
MAGNESIUM SERPL-MCNC: 2 MG/DL (ref 1.6–2.6)
MCH RBC QN AUTO: 29.3 PG (ref 27–31)
MCHC RBC AUTO-ENTMCNC: 32.5 G/DL (ref 32–36)
MCV RBC AUTO: 90 FL (ref 82–98)
PHOSPHATE SERPL-MCNC: 2.5 MG/DL (ref 2.7–4.5)
PLATELET # BLD AUTO: 155 K/UL (ref 150–450)
PMV BLD AUTO: 10 FL (ref 9.2–12.9)
POCT GLUCOSE: 129 MG/DL (ref 70–110)
POCT GLUCOSE: 165 MG/DL (ref 70–110)
POTASSIUM SERPL-SCNC: 3.8 MMOL/L (ref 3.5–5.1)
PROT SERPL-MCNC: 6.2 G/DL (ref 6–8.4)
PROTHROMBIN TIME: 11.8 SEC (ref 9–12.5)
RBC # BLD AUTO: 3.99 M/UL (ref 4.6–6.2)
SODIUM SERPL-SCNC: 135 MMOL/L (ref 136–145)
WBC # BLD AUTO: 8.22 K/UL (ref 3.9–12.7)

## 2024-09-02 PROCEDURE — 85610 PROTHROMBIN TIME: CPT | Mod: HCNC | Performed by: STUDENT IN AN ORGANIZED HEALTH CARE EDUCATION/TRAINING PROGRAM

## 2024-09-02 PROCEDURE — 20600001 HC STEP DOWN PRIVATE ROOM: Mod: HCNC

## 2024-09-02 PROCEDURE — 25000003 PHARM REV CODE 250: Mod: HCNC | Performed by: STUDENT IN AN ORGANIZED HEALTH CARE EDUCATION/TRAINING PROGRAM

## 2024-09-02 PROCEDURE — 97530 THERAPEUTIC ACTIVITIES: CPT | Mod: HCNC,CQ

## 2024-09-02 PROCEDURE — 85027 COMPLETE CBC AUTOMATED: CPT | Mod: HCNC | Performed by: STUDENT IN AN ORGANIZED HEALTH CARE EDUCATION/TRAINING PROGRAM

## 2024-09-02 PROCEDURE — 25500020 PHARM REV CODE 255: Mod: HCNC | Performed by: STUDENT IN AN ORGANIZED HEALTH CARE EDUCATION/TRAINING PROGRAM

## 2024-09-02 PROCEDURE — 94761 N-INVAS EAR/PLS OXIMETRY MLT: CPT | Mod: HCNC

## 2024-09-02 PROCEDURE — 36415 COLL VENOUS BLD VENIPUNCTURE: CPT | Mod: HCNC | Performed by: STUDENT IN AN ORGANIZED HEALTH CARE EDUCATION/TRAINING PROGRAM

## 2024-09-02 PROCEDURE — 85730 THROMBOPLASTIN TIME PARTIAL: CPT | Mod: HCNC | Performed by: STUDENT IN AN ORGANIZED HEALTH CARE EDUCATION/TRAINING PROGRAM

## 2024-09-02 PROCEDURE — 97116 GAIT TRAINING THERAPY: CPT | Mod: HCNC,CQ

## 2024-09-02 PROCEDURE — 94640 AIRWAY INHALATION TREATMENT: CPT | Mod: HCNC

## 2024-09-02 PROCEDURE — 25000003 PHARM REV CODE 250: Mod: HCNC

## 2024-09-02 PROCEDURE — 84100 ASSAY OF PHOSPHORUS: CPT | Mod: HCNC | Performed by: STUDENT IN AN ORGANIZED HEALTH CARE EDUCATION/TRAINING PROGRAM

## 2024-09-02 PROCEDURE — 83735 ASSAY OF MAGNESIUM: CPT | Mod: HCNC | Performed by: STUDENT IN AN ORGANIZED HEALTH CARE EDUCATION/TRAINING PROGRAM

## 2024-09-02 PROCEDURE — 99900035 HC TECH TIME PER 15 MIN (STAT): Mod: HCNC

## 2024-09-02 PROCEDURE — 80053 COMPREHEN METABOLIC PANEL: CPT | Mod: HCNC | Performed by: STUDENT IN AN ORGANIZED HEALTH CARE EDUCATION/TRAINING PROGRAM

## 2024-09-02 PROCEDURE — 25000242 PHARM REV CODE 250 ALT 637 W/ HCPCS: Mod: HCNC

## 2024-09-02 RX ORDER — SODIUM,POTASSIUM PHOSPHATES 280-250MG
2 POWDER IN PACKET (EA) ORAL ONCE
Status: COMPLETED | OUTPATIENT
Start: 2024-09-02 | End: 2024-09-02

## 2024-09-02 RX ORDER — POTASSIUM CHLORIDE 20 MEQ/1
20 TABLET, EXTENDED RELEASE ORAL ONCE
Status: COMPLETED | OUTPATIENT
Start: 2024-09-02 | End: 2024-09-02

## 2024-09-02 RX ORDER — METOPROLOL TARTRATE 25 MG/1
25 TABLET, FILM COATED ORAL 2 TIMES DAILY
Status: DISCONTINUED | OUTPATIENT
Start: 2024-09-02 | End: 2024-09-03 | Stop reason: HOSPADM

## 2024-09-02 RX ADMIN — TRAZODONE HYDROCHLORIDE 50 MG: 50 TABLET ORAL at 08:09

## 2024-09-02 RX ADMIN — METOPROLOL TARTRATE 25 MG: 25 TABLET, FILM COATED ORAL at 08:09

## 2024-09-02 RX ADMIN — ACETAMINOPHEN 1000 MG: 500 TABLET ORAL at 01:09

## 2024-09-02 RX ADMIN — SENNOSIDES AND DOCUSATE SODIUM 1 TABLET: 50; 8.6 TABLET ORAL at 08:09

## 2024-09-02 RX ADMIN — PANTOPRAZOLE SODIUM 40 MG: 40 TABLET, DELAYED RELEASE ORAL at 08:09

## 2024-09-02 RX ADMIN — LIDOCAINE 3 PATCH: 50 PATCH CUTANEOUS at 08:09

## 2024-09-02 RX ADMIN — TAMSULOSIN HYDROCHLORIDE 0.4 MG: 0.4 CAPSULE ORAL at 08:09

## 2024-09-02 RX ADMIN — CLOPIDOGREL BISULFATE 75 MG: 75 TABLET ORAL at 08:09

## 2024-09-02 RX ADMIN — LEVOTHYROXINE SODIUM 88 MCG: 88 TABLET ORAL at 05:09

## 2024-09-02 RX ADMIN — BENZONATATE 200 MG: 100 CAPSULE ORAL at 08:09

## 2024-09-02 RX ADMIN — ATORVASTATIN CALCIUM 80 MG: 40 TABLET, FILM COATED ORAL at 08:09

## 2024-09-02 RX ADMIN — PROMETHAZINE HYDROCHLORIDE AND CODEINE PHOSPHATE 5 ML: 6.25; 1 SOLUTION ORAL at 08:09

## 2024-09-02 RX ADMIN — IOHEXOL 100 ML: 350 INJECTION, SOLUTION INTRAVENOUS at 12:09

## 2024-09-02 RX ADMIN — DULOXETINE HYDROCHLORIDE 30 MG: 30 CAPSULE, DELAYED RELEASE ORAL at 08:09

## 2024-09-02 RX ADMIN — IPRATROPIUM BROMIDE AND ALBUTEROL SULFATE 3 ML: 2.5; .5 SOLUTION RESPIRATORY (INHALATION) at 08:09

## 2024-09-02 RX ADMIN — ACETAMINOPHEN 1000 MG: 500 TABLET ORAL at 05:09

## 2024-09-02 RX ADMIN — POTASSIUM & SODIUM PHOSPHATES POWDER PACK 280-160-250 MG 2 PACKET: 280-160-250 PACK at 08:09

## 2024-09-02 RX ADMIN — ASPIRIN 81 MG: 81 TABLET, COATED ORAL at 08:09

## 2024-09-02 RX ADMIN — POTASSIUM CHLORIDE 20 MEQ: 1500 TABLET, EXTENDED RELEASE ORAL at 08:09

## 2024-09-02 RX ADMIN — IPRATROPIUM BROMIDE AND ALBUTEROL SULFATE 3 ML: 2.5; .5 SOLUTION RESPIRATORY (INHALATION) at 07:09

## 2024-09-02 RX ADMIN — POLYETHYLENE GLYCOL 3350 17 G: 17 POWDER, FOR SOLUTION ORAL at 08:09

## 2024-09-02 RX ADMIN — BENZONATATE 200 MG: 100 CAPSULE ORAL at 01:09

## 2024-09-02 RX ADMIN — IPRATROPIUM BROMIDE AND ALBUTEROL SULFATE 3 ML: 2.5; .5 SOLUTION RESPIRATORY (INHALATION) at 01:09

## 2024-09-02 NOTE — PLAN OF CARE
Problem: Adult Inpatient Plan of Care  Goal: Patient-Specific Goal (Individualized)  Outcome: Progressing  Flowsheets (Taken 9/2/2024 1555)  Individualized Care Needs: updated patient on POC  Anxieties, Fears or Concerns: none stated     Problem: Fall Injury Risk  Goal: Absence of Fall and Fall-Related Injury  Outcome: Progressing  Intervention: Identify and Manage Contributors  Flowsheets (Taken 9/2/2024 1555)  Self-Care Promotion:   independence encouraged   BADL personal objects within reach   BADL personal routines maintained  Medication Review/Management: medications reviewed  Intervention: Promote Injury-Free Environment  Flowsheets (Taken 9/2/2024 1555)  Safety Promotion/Fall Prevention:   assistive device/personal item within reach   Fall Risk reviewed with patient/family   Fall Risk signage in place   instructed to call staff for mobility   medications reviewed   nonskid shoes/socks when out of bed   patient expresses understanding of fall risk and prevention   side rails raised x 2   room near unit station   commode/urinal/bedpan at bedside     Problem: Skin Injury Risk Increased  Goal: Skin Health and Integrity  Outcome: Progressing  Intervention: Optimize Skin Protection  Flowsheets (Taken 9/2/2024 1555)  Pressure Reduction Techniques: frequent weight shift encouraged  Pressure Reduction Devices: positioning supports utilized  Skin Protection: incontinence pads utilized  Activity Management:   Up in chair - L3   Ambulated -L4  Head of Bed (HOB) Positioning: HOB elevated  Intervention: Promote and Optimize Oral Intake  Flowsheets (Taken 9/2/2024 1555)  Oral Nutrition Promotion:   rest periods promoted   physical activity promoted   AAOX4,VSS. Plan of care discussed with patient. Patient has no complaints of chest pain/SOB/palpitations. Pt ambulating with standby assistance. Pt ambulated twice around unit with RN today. Sternal precautions in place. Educated patient on importance of sternal precautions.  Patient also educated on fluid restriction. fall precautions in place,no falls/injuries through the shift.Discussed medications and care. Patient has no questions at this time. Pt resting comfortably with no acute distress. Call light within reach,bed at lowest position.

## 2024-09-02 NOTE — PLAN OF CARE
Patient free of falls or injuries. Chest tubes and pacer wires removed 9/1, gauze and tape dressing in place. Mid sternal incision ARRON, no active bleeding or drainage noted. Spo2 maintained > 91% on RA. Non productive cough being treated with scheduled medication and nebulizer treatments. Normal sinus and sinus tach on cardiac monitor. No other events overnight. Plan of care reviewed, patient verbalized understanding, all questions and concerns addressed.       Problem: Adult Inpatient Plan of Care  Goal: Plan of Care Review  Outcome: Progressing  Goal: Patient-Specific Goal (Individualized)  Outcome: Progressing  Goal: Absence of Hospital-Acquired Illness or Injury  Outcome: Progressing  Goal: Optimal Comfort and Wellbeing  Outcome: Progressing  Goal: Readiness for Transition of Care  Outcome: Progressing     Problem: Wound  Goal: Optimal Coping  Outcome: Progressing  Goal: Optimal Functional Ability  Outcome: Progressing  Goal: Absence of Infection Signs and Symptoms  Outcome: Progressing  Goal: Improved Oral Intake  Outcome: Progressing  Goal: Optimal Pain Control and Function  Outcome: Progressing  Goal: Skin Health and Integrity  Outcome: Progressing  Goal: Optimal Wound Healing  Outcome: Progressing     Problem: Infection  Goal: Absence of Infection Signs and Symptoms  Outcome: Progressing     Problem: Fall Injury Risk  Goal: Absence of Fall and Fall-Related Injury  Outcome: Progressing

## 2024-09-02 NOTE — PT/OT/SLP PROGRESS
Occupational Therapy      Patient Name:  Cesar Simpson   MRN:  [id number]    Patient not seen today secondary to ambulating in hallway with nursing upon first attempt in AM. Testing/imaging (xray/CT/MRI) upon second attempt in PM. Will follow-up per POC.    9/2/2024

## 2024-09-02 NOTE — PT/OT/SLP PROGRESS
"Physical Therapy Treatment    Patient Name:  Cesar Simpson   MRN:  [id number]    Recommendations:     Discharge Recommendations: High Intensity Therapy  Discharge Equipment Recommendations: to be determined by next level of care  Barriers to discharge: Decreased caregiver support and Increased need for skilled assistance    Assessment:     Cesar Simpson is a 74 y.o. male admitted with a medical diagnosis of Severe aortic stenosis.  He presents with the following impairments/functional limitations: weakness, gait instability, impaired endurance, impaired balance, decreased lower extremity function, impaired coordination, decreased upper extremity function, impaired cardiopulmonary response to activity, decreased safety awareness, impaired self care skills, impaired functional mobility.    Pt met with HOB elevated and agreeable to session. Pt tolerates session well with emphasis on bed mobility, transfers, and gait training. Pt making progress towards therapy goals as indicated by increased total ambulation distance. Pt recalls 2/3 sternal precautions and requires verbal and tactile cues to maintain precautions during sit <>stand transfers. Pt will continue to benefit from skilled therapy services.    Rehab Prognosis: Good; patient would benefit from acute skilled PT services to address these deficits and reach maximum level of function.    Recent Surgery: Procedure(s) (LRB):  REPLACEMENT, AORTIC VALVE  REPLACEMENT, AORTA, ASCENDING; HEMIARCH (N/A)  EXPLANT; TAVR (N/A) 6 Days Post-Op    Plan:     During this hospitalization, patient to be seen 5 x/week to address the identified rehab impairments via therapeutic activities, gait training, therapeutic exercises, neuromuscular re-education and progress toward the following goals:    Plan of Care Expires:  09/28/24    Subjective     Chief Complaint: 6/10 chest pain  Patient/Family Comments/goals: "I gotta get stronger so I can go home"  Pain/Comfort:  Pain Rating 1: " 6/10  Location - Orientation 1: generalized  Location 1: chest  Pain Addressed 1: Reposition, Distraction  Pain Rating Post-Intervention 1: other (see comments) (no further mention of pain)      Objective:     Communicated with RN (Elma) prior to session.  Patient found left sidelying with telemetry upon PTA entry to room.     General Precautions: Standard, fall, sternal  Orthopedic Precautions: N/A  Braces: N/A  Respiratory Status: Room air     Functional Mobility:  Bed Mobility:     Supine to Sit: stand by assistance  Transfers:     Sit to Stand:  from EOB stand by assistance with no AD  Verbal and tactile cues required for hand placement and safety to maintain sternal precautions.  Gait:   Pt ambulates x2 trials 120 feet contact guard assistance and  no AD  Deviations noted: decreased gait speed, decreased step length, decreased augustine, unsteady gait, no noted LOB  All lines remained intact and gait belt utilized.        AM-PAC 6 CLICK MOBILITY  Turning over in bed (including adjusting bedclothes, sheets and blankets)?: 3  Sitting down on and standing up from a chair with arms (e.g., wheelchair, bedside commode, etc.): 3  Moving from lying on back to sitting on the side of the bed?: 3  Moving to and from a bed to a chair (including a wheelchair)?: 3  Need to walk in hospital room?: 3  Climbing 3-5 steps with a railing?: 2  Basic Mobility Total Score: 17       Treatment & Education:  Patient provided with daily orientation and goals of this PT session.     Pt educated on appropriate BLE therex to perform while sitting in bedside chair:  X20 AP, LAQ, Hip flexion    Pt educated to call for assistance and to transfer with hospital staff only.  Also, pt was educated on the effects of prolonged immobility and the importance of performing OOB activity and exercises to promote healing and reduce recovery time.    Patient left up in chair with all lines intact, call button in reach, and RN notified.    GOALS:    Multidisciplinary Problems       Physical Therapy Goals          Problem: Physical Therapy    Goal Priority Disciplines Outcome Goal Variances Interventions   Physical Therapy Goal     PT, PT/OT Progressing     Description: Goals to be met by: 24     Patient will increase functional independence with mobility by performin. Supine to sit with Contact Guard Assistance  2. Sit to stand transfer with Contact Guard Assistance  3. Bed to chair transfer with Contact Guard Assistance using LRAD  4. Gait  x 200 feet with Contact Guard Assistance using LRAD.   5. Stand for 10 minutes with Contact Guard Assistance using LRAD                         Time Tracking:     PT Received On: 24  PT Start Time: 1409     PT Stop Time: 1437  PT Total Time (min): 28 min     Billable Minutes: Gait Training 15 and Therapeutic Activity 13    Treatment Type: Treatment  PT/PTA: PTA     Number of PTA visits since last PT visit: 2024

## 2024-09-02 NOTE — ASSESSMENT & PLAN NOTE
Pt is a 73 yo male who presented for TAVR on 8/27/24 which was c/b development of type A aortic dissection for which he was taken to OR for management of and is now s/p hemiarch replacement, ascending aorta replacement, explantation of TAVR and placement of bioprosthetic valve.     - ASA, plavix, statin  - Increased metoprolol to 25 BID  - Discontinue lasix, euvolemic  - Chest tubes and wires are out  - CTA chest/abdomen/pelvis tomorrow  - Codeine syrup for cough ordered   - Replace lytes PRN  - Flomax  - Continue cardiac diet  - Bowel regimen  - PPI  - Continue home trazodone   - Seroquel nightly for delirium  - PT/OT  - Ambulation encouraged  - IS     - Dispo: potential DC home tomorrow or Wednesday pending timing of CTA

## 2024-09-02 NOTE — PROGRESS NOTES
[facility] - [facility] Step[facility]  Cardiothoracic Surgery  Progress Note    Patient Name: Cesar Simpson  MRN: [id number]  Admission Date: 8/27/2024  Hospital Length of Stay: 6 days  Code Status: Full Code   Attending Physician: Melvin Carroll MD   Referring Provider: Cesar Louis MD  Principal Problem:Severe aortic stenosis      Subjective:     Post-Op Info:  Procedure(s) (LRB):  REPLACEMENT, AORTIC VALVE  REPLACEMENT, AORTA, ASCENDING; HEMIARCH (N/A)  EXPLANT; TAVR (N/A)   6 Days Post-Op     Interval History: NAEO, VSS on RA. Improved cough with education. Reports he has been ambulating. BP stable after holding lasix yesterday, euvolemic on exam today.      Medications:  Continuous Infusions:  Scheduled Meds:   acetaminophen  1,000 mg Oral Q8H    albuterol-ipratropium  3 mL Nebulization Q6H WAKE    aspirin  81 mg Oral Daily    atorvastatin  80 mg Oral Daily    clopidogreL  75 mg Oral Daily    DULoxetine  30 mg Oral Daily    levothyroxine  88 mcg Oral Before breakfast    LIDOcaine  3 patch Transdermal Q24H    metoprolol tartrate  25 mg Oral BID    pantoprazole  40 mg Oral Daily    polyethylene glycol  17 g Oral Daily    senna-docusate 8.6-50 mg  1 tablet Oral BID    tamsulosin  1 capsule Oral Daily    traZODone  50 mg Oral QHS     PRN Meds:  Current Facility-Administered Medications:     0.9%  NaCl infusion (for blood administration), , Intravenous, Q24H PRN    benzonatate, 200 mg, Oral, TID PRN    dextrose 10%, 12.5 g, Intravenous, PRN    dextrose 10%, 25 g, Intravenous, PRN    influenza 65up-adj, 0.5 mL, Intramuscular, vaccine x 1 dose    metoclopramide, 5 mg, Intravenous, Q6H PRN    ondansetron, 4 mg, Intravenous, Q6H PRN    oxyCODONE, 5 mg, Oral, Q4H PRN    oxyCODONE, 10 mg, Oral, Q4H PRN     Objective:     Vital Signs (Most Recent):  Temp: 97.6 °F (36.4 °C) (09/02/24 1025)  Pulse: 82 (09/02/24 1025)  Resp: 18 (09/02/24 1025)  BP: 112/61 (09/02/24 1025)  SpO2: 97 % (09/02/24 1025) Vital Signs (24h Range):  Temp:   [97.5 °F (36.4 °C)-99.5 °F (37.5 °C)] 97.6 °F (36.4 °C)  Pulse:  [] 82  Resp:  [16-18] 18  SpO2:  [93 %-97 %] 97 %  BP: (101-123)/(56-93) 112/61     Weight: 86.8 kg (191 lb 5.8 oz)  Body mass index is 29.97 kg/m².    SpO2: 97 %       Intake/Output - Last 3 Shifts         08/31 0700  09/01 0659 09/01 0700  09/02 0659 09/02 0700  09/03 0659    P.O. 1120 720 222    Total Intake(mL/kg) 1120 (12.9) 720 (8.3) 222 (2.6)    Urine (mL/kg/hr) 2725 (1.3) 950 (0.5)     Stool       Chest Tube 50 40     Total Output 2775 990     Net -1655 -270 +222           Urine Occurrence  0 x             Lines/Drains/Airways       Peripheral Intravenous Line  Duration                  Peripheral IV - Single Lumen 08/30/24 1750 20 G No Anterior;Right Forearm 2 days         Peripheral IV - Single Lumen 08/30/24 1800 18 G Anterior;Left Forearm 2 days                     Physical Exam  Constitutional:       General: He is not in acute distress.  HENT:      Head: Normocephalic and atraumatic.   Cardiovascular:      Rate and Rhythm: Normal rate and regular rhythm.      Comments: MSI CDI  Pulmonary:      Effort: Pulmonary effort is normal.      Comments: Heavy dry cough during exam.  Abdominal:      General: There is no distension.      Palpations: Abdomen is soft.      Tenderness: There is no abdominal tenderness.   Musculoskeletal:         General: No swelling.      Right lower leg: No edema.      Left lower leg: No edema.   Skin:     General: Skin is warm and dry.   Neurological:      General: No focal deficit present.      Mental Status: He is alert.            Significant Labs:  All pertinent labs from the last 24 hours have been reviewed.    Significant Diagnostics:  I have reviewed all pertinent imaging results/findings within the past 24 hours.  Assessment/Plan:     * Severe aortic stenosis  Pt is a 75 yo male who presented for TAVR on 8/27/24 which was c/b development of type A aortic dissection for which he was taken to OR for  management of and is now s/p hemiarch replacement, ascending aorta replacement, explantation of TAVR and placement of bioprosthetic valve.     - ASA, plavix, statin  - Increased metoprolol to 25 BID  - Discontinue lasix, euvolemic  - Chest tubes and wires are out  - CTA chest/abdomen/pelvis tomorrow  - Codeine syrup for cough ordered   - Replace lytes PRN  - Flomax  - Continue cardiac diet  - Bowel regimen  - PPI  - Continue home trazodone   - Seroquel nightly for delirium  - PT/OT  - Ambulation encouraged  - IS     - Dispo: potential DC home tomorrow or Wednesday pending timing of CTA            Johan Reed, DO  Cardiothoracic Surgery  [facility] - [facility]

## 2024-09-02 NOTE — SUBJECTIVE & OBJECTIVE
Interval History: NAEO, VSS on RA. Improved cough with education. Reports he has been ambulating. BP stable after holding lasix yesterday, euvolemic on exam today.      Medications:  Continuous Infusions:  Scheduled Meds:   acetaminophen  1,000 mg Oral Q8H    albuterol-ipratropium  3 mL Nebulization Q6H WAKE    aspirin  81 mg Oral Daily    atorvastatin  80 mg Oral Daily    clopidogreL  75 mg Oral Daily    DULoxetine  30 mg Oral Daily    levothyroxine  88 mcg Oral Before breakfast    LIDOcaine  3 patch Transdermal Q24H    metoprolol tartrate  25 mg Oral BID    pantoprazole  40 mg Oral Daily    polyethylene glycol  17 g Oral Daily    senna-docusate 8.6-50 mg  1 tablet Oral BID    tamsulosin  1 capsule Oral Daily    traZODone  50 mg Oral QHS     PRN Meds:  Current Facility-Administered Medications:     0.9%  NaCl infusion (for blood administration), , Intravenous, Q24H PRN    benzonatate, 200 mg, Oral, TID PRN    dextrose 10%, 12.5 g, Intravenous, PRN    dextrose 10%, 25 g, Intravenous, PRN    influenza 65up-adj, 0.5 mL, Intramuscular, vaccine x 1 dose    metoclopramide, 5 mg, Intravenous, Q6H PRN    ondansetron, 4 mg, Intravenous, Q6H PRN    oxyCODONE, 5 mg, Oral, Q4H PRN    oxyCODONE, 10 mg, Oral, Q4H PRN     Objective:     Vital Signs (Most Recent):  Temp: 97.6 °F (36.4 °C) (09/02/24 1025)  Pulse: 82 (09/02/24 1025)  Resp: 18 (09/02/24 1025)  BP: 112/61 (09/02/24 1025)  SpO2: 97 % (09/02/24 1025) Vital Signs (24h Range):  Temp:  [97.5 °F (36.4 °C)-99.5 °F (37.5 °C)] 97.6 °F (36.4 °C)  Pulse:  [] 82  Resp:  [16-18] 18  SpO2:  [93 %-97 %] 97 %  BP: (101-123)/(56-93) 112/61     Weight: 86.8 kg (191 lb 5.8 oz)  Body mass index is 29.97 kg/m².    SpO2: 97 %       Intake/Output - Last 3 Shifts         08/31 0700 09/01 0659 09/01 0700 09/02 0659 09/02 0700 09/03 0659    P.O. 1120 720 222    Total Intake(mL/kg) 1120 (12.9) 720 (8.3) 222 (2.6)    Urine (mL/kg/hr) 2725 (1.3) 950 (0.5)     Stool       Chest Tube 50 40      Total Output 2775 990     Net -1655 -270 +222           Urine Occurrence  0 x             Lines/Drains/Airways       Peripheral Intravenous Line  Duration                  Peripheral IV - Single Lumen 08/30/24 1750 20 G No Anterior;Right Forearm 2 days         Peripheral IV - Single Lumen 08/30/24 1800 18 G Anterior;Left Forearm 2 days                     Physical Exam  Constitutional:       General: He is not in acute distress.  HENT:      Head: Normocephalic and atraumatic.   Cardiovascular:      Rate and Rhythm: Normal rate and regular rhythm.      Comments: MSI CDI  Pulmonary:      Effort: Pulmonary effort is normal.      Comments: Heavy dry cough during exam.  Abdominal:      General: There is no distension.      Palpations: Abdomen is soft.      Tenderness: There is no abdominal tenderness.   Musculoskeletal:         General: No swelling.      Right lower leg: No edema.      Left lower leg: No edema.   Skin:     General: Skin is warm and dry.   Neurological:      General: No focal deficit present.      Mental Status: He is alert.            Significant Labs:  All pertinent labs from the last 24 hours have been reviewed.    Significant Diagnostics:  I have reviewed all pertinent imaging results/findings within the past 24 hours.

## 2024-09-03 ENCOUNTER — TELEPHONE (OUTPATIENT)
Dept: CARDIOTHORACIC SURGERY | Facility: CLINIC | Age: 74
End: 2024-09-03
Payer: MEDICARE

## 2024-09-03 ENCOUNTER — TELEPHONE (OUTPATIENT)
Dept: CARDIOLOGY | Facility: CLINIC | Age: 74
End: 2024-09-03
Payer: MEDICARE

## 2024-09-03 ENCOUNTER — DOCUMENTATION ONLY (OUTPATIENT)
Dept: CARDIOTHORACIC SURGERY | Facility: CLINIC | Age: 74
End: 2024-09-03
Payer: MEDICARE

## 2024-09-03 VITALS
WEIGHT: 190.06 LBS | OXYGEN SATURATION: 98 % | HEART RATE: 96 BPM | SYSTOLIC BLOOD PRESSURE: 141 MMHG | RESPIRATION RATE: 20 BRPM | DIASTOLIC BLOOD PRESSURE: 75 MMHG | HEIGHT: 67 IN | TEMPERATURE: 98 F | BODY MASS INDEX: 29.83 KG/M2

## 2024-09-03 DIAGNOSIS — Z98.890 S/P AORTIC DISSECTION REPAIR: Primary | ICD-10-CM

## 2024-09-03 PROBLEM — Z95.2 S/P AVR (AORTIC VALVE REPLACEMENT) AND AORTOPLASTY: Status: ACTIVE | Noted: 2024-09-03

## 2024-09-03 PROBLEM — I72.4 PSEUDOANEURYSM OF FEMORAL ARTERY: Status: ACTIVE | Noted: 2024-09-03

## 2024-09-03 LAB
ALBUMIN SERPL BCP-MCNC: 2.7 G/DL (ref 3.5–5.2)
ALP SERPL-CCNC: 57 U/L (ref 55–135)
ALT SERPL W/O P-5'-P-CCNC: 23 U/L (ref 10–44)
ANION GAP SERPL CALC-SCNC: 12 MMOL/L (ref 8–16)
AST SERPL-CCNC: 38 U/L (ref 10–40)
BILIRUB SERPL-MCNC: 1 MG/DL (ref 0.1–1)
BUN SERPL-MCNC: 25 MG/DL (ref 8–23)
CALCIUM SERPL-MCNC: 8.6 MG/DL (ref 8.7–10.5)
CHLORIDE SERPL-SCNC: 98 MMOL/L (ref 95–110)
CO2 SERPL-SCNC: 24 MMOL/L (ref 23–29)
CREAT SERPL-MCNC: 1 MG/DL (ref 0.5–1.4)
ERYTHROCYTE [DISTWIDTH] IN BLOOD BY AUTOMATED COUNT: 14.6 % (ref 11.5–14.5)
EST. GFR  (NO RACE VARIABLE): >60 ML/MIN/1.73 M^2
GLUCOSE SERPL-MCNC: 115 MG/DL (ref 70–110)
HCT VFR BLD AUTO: 34.7 % (ref 40–54)
HGB BLD-MCNC: 11.2 G/DL (ref 14–18)
MAGNESIUM SERPL-MCNC: 2.2 MG/DL (ref 1.6–2.6)
MCH RBC QN AUTO: 29.2 PG (ref 27–31)
MCHC RBC AUTO-ENTMCNC: 32.3 G/DL (ref 32–36)
MCV RBC AUTO: 90 FL (ref 82–98)
PHOSPHATE SERPL-MCNC: 3 MG/DL (ref 2.7–4.5)
PLATELET # BLD AUTO: 173 K/UL (ref 150–450)
PMV BLD AUTO: 10 FL (ref 9.2–12.9)
POTASSIUM SERPL-SCNC: 4.1 MMOL/L (ref 3.5–5.1)
PROT SERPL-MCNC: 6.3 G/DL (ref 6–8.4)
RBC # BLD AUTO: 3.84 M/UL (ref 4.6–6.2)
SODIUM SERPL-SCNC: 134 MMOL/L (ref 136–145)
WBC # BLD AUTO: 9.26 K/UL (ref 3.9–12.7)

## 2024-09-03 PROCEDURE — 97530 THERAPEUTIC ACTIVITIES: CPT | Mod: HCNC

## 2024-09-03 PROCEDURE — 25000003 PHARM REV CODE 250: Mod: HCNC

## 2024-09-03 PROCEDURE — 94761 N-INVAS EAR/PLS OXIMETRY MLT: CPT | Mod: HCNC

## 2024-09-03 PROCEDURE — 25000242 PHARM REV CODE 250 ALT 637 W/ HCPCS: Mod: HCNC

## 2024-09-03 PROCEDURE — 36415 COLL VENOUS BLD VENIPUNCTURE: CPT | Mod: HCNC | Performed by: STUDENT IN AN ORGANIZED HEALTH CARE EDUCATION/TRAINING PROGRAM

## 2024-09-03 PROCEDURE — 94799 UNLISTED PULMONARY SVC/PX: CPT | Mod: HCNC

## 2024-09-03 PROCEDURE — 94640 AIRWAY INHALATION TREATMENT: CPT | Mod: HCNC

## 2024-09-03 PROCEDURE — 80053 COMPREHEN METABOLIC PANEL: CPT | Mod: HCNC | Performed by: STUDENT IN AN ORGANIZED HEALTH CARE EDUCATION/TRAINING PROGRAM

## 2024-09-03 PROCEDURE — 84100 ASSAY OF PHOSPHORUS: CPT | Mod: HCNC | Performed by: STUDENT IN AN ORGANIZED HEALTH CARE EDUCATION/TRAINING PROGRAM

## 2024-09-03 PROCEDURE — 25000003 PHARM REV CODE 250: Mod: HCNC | Performed by: STUDENT IN AN ORGANIZED HEALTH CARE EDUCATION/TRAINING PROGRAM

## 2024-09-03 PROCEDURE — 85027 COMPLETE CBC AUTOMATED: CPT | Mod: HCNC | Performed by: STUDENT IN AN ORGANIZED HEALTH CARE EDUCATION/TRAINING PROGRAM

## 2024-09-03 PROCEDURE — 99900035 HC TECH TIME PER 15 MIN (STAT): Mod: HCNC

## 2024-09-03 PROCEDURE — 99024 POSTOP FOLLOW-UP VISIT: CPT | Mod: HCNC,,, | Performed by: PHYSICIAN ASSISTANT

## 2024-09-03 PROCEDURE — 83735 ASSAY OF MAGNESIUM: CPT | Mod: HCNC | Performed by: STUDENT IN AN ORGANIZED HEALTH CARE EDUCATION/TRAINING PROGRAM

## 2024-09-03 RX ORDER — POLYETHYLENE GLYCOL 3350 17 G/17G
17 POWDER, FOR SOLUTION ORAL 2 TIMES DAILY PRN
Qty: 510 G | Refills: 0 | Status: SHIPPED | OUTPATIENT
Start: 2024-09-03 | End: 2024-09-23

## 2024-09-03 RX ORDER — ACETAMINOPHEN 500 MG
1000 TABLET ORAL EVERY 6 HOURS PRN
Qty: 30 TABLET | Refills: 0 | Status: SHIPPED | OUTPATIENT
Start: 2024-09-03

## 2024-09-03 RX ORDER — AMOXICILLIN 250 MG
1 CAPSULE ORAL 2 TIMES DAILY PRN
Qty: 30 TABLET | Refills: 0 | Status: SHIPPED | OUTPATIENT
Start: 2024-09-03 | End: 2024-09-23

## 2024-09-03 RX ORDER — OXYCODONE HYDROCHLORIDE 5 MG/1
5 TABLET ORAL EVERY 4 HOURS PRN
Qty: 42 TABLET | Refills: 0 | Status: SHIPPED | OUTPATIENT
Start: 2024-09-03 | End: 2024-09-25 | Stop reason: ALTCHOICE

## 2024-09-03 RX ORDER — METOPROLOL TARTRATE 25 MG/1
25 TABLET, FILM COATED ORAL 2 TIMES DAILY
Qty: 180 TABLET | Refills: 3 | Status: SHIPPED | OUTPATIENT
Start: 2024-09-03 | End: 2024-09-25

## 2024-09-03 RX ORDER — ATORVASTATIN CALCIUM 80 MG/1
80 TABLET, FILM COATED ORAL DAILY
Qty: 30 TABLET | Refills: 11 | Status: SHIPPED | OUTPATIENT
Start: 2024-09-04 | End: 2025-09-04

## 2024-09-03 RX ORDER — LIDOCAINE 50 MG/G
2 PATCH TOPICAL DAILY
Qty: 14 PATCH | Refills: 0 | Status: SHIPPED | OUTPATIENT
Start: 2024-09-03

## 2024-09-03 RX ORDER — BENZONATATE 200 MG/1
200 CAPSULE ORAL 3 TIMES DAILY PRN
Qty: 30 CAPSULE | Refills: 0 | Status: SHIPPED | OUTPATIENT
Start: 2024-09-03 | End: 2024-09-25 | Stop reason: ALTCHOICE

## 2024-09-03 RX ADMIN — ACETAMINOPHEN 1000 MG: 500 TABLET ORAL at 05:09

## 2024-09-03 RX ADMIN — LIDOCAINE 3 PATCH: 50 PATCH CUTANEOUS at 08:09

## 2024-09-03 RX ADMIN — LEVOTHYROXINE SODIUM 88 MCG: 88 TABLET ORAL at 05:09

## 2024-09-03 RX ADMIN — IPRATROPIUM BROMIDE AND ALBUTEROL SULFATE 3 ML: 2.5; .5 SOLUTION RESPIRATORY (INHALATION) at 07:09

## 2024-09-03 RX ADMIN — TAMSULOSIN HYDROCHLORIDE 0.4 MG: 0.4 CAPSULE ORAL at 08:09

## 2024-09-03 RX ADMIN — SENNOSIDES AND DOCUSATE SODIUM 1 TABLET: 50; 8.6 TABLET ORAL at 08:09

## 2024-09-03 RX ADMIN — METOPROLOL TARTRATE 25 MG: 25 TABLET, FILM COATED ORAL at 08:09

## 2024-09-03 RX ADMIN — ATORVASTATIN CALCIUM 80 MG: 40 TABLET, FILM COATED ORAL at 08:09

## 2024-09-03 RX ADMIN — PANTOPRAZOLE SODIUM 40 MG: 40 TABLET, DELAYED RELEASE ORAL at 08:09

## 2024-09-03 RX ADMIN — ASPIRIN 81 MG: 81 TABLET, COATED ORAL at 09:09

## 2024-09-03 RX ADMIN — POLYETHYLENE GLYCOL 3350 17 G: 17 POWDER, FOR SOLUTION ORAL at 08:09

## 2024-09-03 RX ADMIN — CLOPIDOGREL BISULFATE 75 MG: 75 TABLET ORAL at 08:09

## 2024-09-03 RX ADMIN — DULOXETINE HYDROCHLORIDE 30 MG: 30 CAPSULE, DELAYED RELEASE ORAL at 08:09

## 2024-09-03 RX ADMIN — ACETAMINOPHEN 1000 MG: 500 TABLET ORAL at 01:09

## 2024-09-03 NOTE — PT/OT/SLP PROGRESS
"Occupational Therapy   Treatment    Name: Cesar Simpson  MRN: [id number]  Admitting Diagnosis:  Severe aortic stenosis  7 Days Post-Op    Recommendations:     Discharge Recommendations: High Intensity Therapy  Discharge Equipment Recommendations:  none  Barriers to discharge:  None    Assessment:     Cesar Simpson is a 74 y.o. male with a medical diagnosis of Severe aortic stenosis.  He presents with Performance deficits affecting function are impaired self care skills, gait instability, impaired cardiopulmonary response to activity, orthopedic precautions. Pt presents EOB reports he feels "a little dizzy and off" but is agreeable to participation. Pt reports no worsening of light dizziness when he stands and ambulates in stephenson >200ft no AD given SBA. Pt provided reinforced education on px, and also value of full UE ROM In promoting normalized UE movement despite sternal px limits. Pt is attentive and agreeable. Edu on UE Rom exercises to complete and left up in chair with lunch.     Rehab Prognosis:  Good; patient would benefit from acute skilled OT services to address these deficits and reach maximum level of function.       Plan:     Patient to be seen 5 x/week to address the above listed problems via self-care/home management, therapeutic activities, therapeutic exercises, neuromuscular re-education  Plan of Care Expires: 09/28/24  Plan of Care Reviewed with: patient    Subjective     Chief Complaint: Feeling a little dizzy  Patient/Family Comments/goals: To go home  Pain/Comfort:  Pain Rating 1: 0/10  Pain Rating Post-Intervention 1: 0/10    Objective:     Communicated with: Nsg prior to session.  Patient found HOB elevated with telemetry upon OT entry to room.    General Precautions: Standard, sternal, fall    Orthopedic Precautions:N/A  Braces: N/A  Respiratory Status: Room air     Occupational Performance:     Bed Mobility:    Patient completed Scooting/Bridging with supervision   Sitting EOB SUP " static    Functional Mobility/Transfers:  Patient completed Sit <> Stand Transfer with stand by assistance  with  no assistive device   Functional Mobility: Pt ambulates in room and stephenson >200ft with no AD given SBA for safety. No LOB, good augustine, decreased stride length. Pt reports no SOB but audible breath sounds when returns to seated in room after ambulation.     Activities of Daily Living:  Lower Body Dressing: contact guard assistance to manage slippers EOB      AMPAC 6 Click ADL: 21    Treatment & Education:  -Sternal precautions and application to functional tasks / ADLs reviewed:  including no lifting > 5 lbs, pulling or pushing with B UEs; Modifying daily activities and functional mobility tasks to maintain sternal precautions appropriately; Importance of participation in therapy and engaging in increased OOB mobility with assistance to improve endurance     Pt educated on (and demo given) of A ROM provided to BUE; pt completes 3 of each in all planes of motion to facilitate normalized movement to increase performance during functional activities.     Pt had no further questions & when asked whether there were any concerns pt reported none.     Patient left up in chair with all lines intact and call button in reach    GOALS:   Multidisciplinary Problems       Occupational Therapy Goals          Problem: Occupational Therapy    Goal Priority Disciplines Outcome Interventions   Occupational Therapy Goal     OT, PT/OT Progressing    Description: Goals to be met by: 9/28/2024     Patient will increase functional independence with ADLs by performing:    Feeding with Set-up Assistance.  UE Dressing with Hemlock.  LE Dressing with Hemlock.  Grooming while standing at sink with Hemlock.  Toileting from toilet with Hemlock for hygiene and clothing management.   Toilet transfer to toilet with Hemlock.                         Time Tracking:     OT Date of Treatment: 09/03/24  OT Start Time:  1110  OT Stop Time: 1121  OT Total Time (min): 11 min    Billable Minutes:Therapeutic Activity 11    OT/ARRON: OT          9/3/2024

## 2024-09-03 NOTE — TELEPHONE ENCOUNTER
Reached back out to patient, No answer, I left him V/ message to please let us know in detailed  what We can help him with.    Nw                              ----- Message from Gina Thorpe sent at 9/3/2024  2:42 PM CDT -----  Regarding: returning missed call  Contact: Shae @ [phone] (Mobile)  Missed Callback     Pt returning call from missed call. Requesting to speak with somebody in the office. Please call to further advise. Thank you for all you are doing.

## 2024-09-03 NOTE — PROGRESS NOTES
Brief HPI:  A 74-year-old male who is status post aortic dissection repair.  He is postop day 5 and ready for discharge from cardiac surgery perspective.  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    Plan  Will ask our vascular surgery colleagues to evaluate him for his pseudoaneurysm and manage accordingly.    Melvin Carroll MD  Cardiac Surgery

## 2024-09-03 NOTE — ASSESSMENT & PLAN NOTE
Cesar Simpson is a 74 y.o. male S/P TAVR on 8/27/24 complicated by an aortic dissection, now S/P bioprosthetic aortic valve replacement, hemiarch, and ascending aorta repair with Dr. Carroll on 08/27/2024. He is now ready for discharge from  CT surgery standpoint. 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 Notes review recount that he developed a hematoma after removal of Femoral Sheath. Imaging reviewed and plan discussed with staff. Likely to embolize pseudoaneurysm without intervention. No need for urgent surgical intervention, plan for outpatient follow up and imaging.     - No need for urgent surgical intervention per vascular surgery  - Will schedule 3 week outpatient follow up with Dr bass with Arterial duplex imaging of RLE  - rest of care per primary

## 2024-09-03 NOTE — TELEPHONE ENCOUNTER
Called patient's daughter per pt's request to review d/c instructions. Left detailed message with call back number.    Julie Haase RN  CTS Nurse Navigator [phone]

## 2024-09-03 NOTE — HOSPITAL COURSE
On 8/27/24, the patient was taken to the Operating Room for the above stated procedure. Please see the previously dictated operative report for complete details. Postoperatively, the patient was taken from the  Operating Room to the ICU where the vital signs were monitored and pain was kept under control. The patient was weaned from the drips and extubated in the ICU per protocol. Once hemodynamically stable, the patient was transferred to the Cardiac Step-Down floor for continued strengthening and ambulation. On postoperative day 7, the patient was ready for discharge to home. At the time of discharge, the patient was ambulating unassisted, >200 feet in the stephenson with therapy with only stand by assist. A right femoral artery pseudoaneurysm was noted on his post op CTA. Vascular surgery was consulted and recommend follow up in 3 weeks with Dr. Landeros with an ultrasound. Pain was well controlled with oral analgesics and the patient was tolerating the diet.     MOBILITY AND ACTIVITY: As tolerated. Patient may shower. No heavy lifting of greater than 5 pounds and no driving.     DIET: An 1800-calorie ADA with a 1500 mL fluid restriction.     WOUND CARE INSTRUCTIONS: Check for redness, swelling and drainage around the  incision or wound. Patient is to call for any obvious bleeding, drainage, pus from the wound, unusual problems or difficulties or temperature of greater than 101   degrees.     FOLLOWUP: Follow up with Dr. Carroll in approximately 3 weeks. Prior to this  appointment, the patient will have a chest x-ray and EKG.     Patient not placed on Ace-Inhibitor at the time of discharge due to potential for hypotension     DISCHARGE CONDITION: At the time of discharge, the patient was in sinus rhythm and afebrile with stable vital signs.

## 2024-09-03 NOTE — HPI
A 74-year-old male with known severe aortic stenosis who presented today for TAVR procedure his comorbidities for coronary artery disease peripheral artery disease, hypertension, hyperlipidemia, hypothyroidism, diabetes mellitus.  During the TAVR procedure after the implantation with the 5 appear/hematoma was suspected on the proximal ascending aorta.  We decided to do a stat CTA which confirmed Juve type a aortic dissection. A   decision was made to bring the patient to the OR for a surgical repair

## 2024-09-03 NOTE — CONSULTS
[facility] - Cardiology Stepdown  Vascular Surgery  Consult Note    Consults  Subjective:     Chief Complaint/Reason for Admission: severe AS    History of Present Illness: Cesar Simpson is a 74 y.o. male with a h/o CAD, PAD, severe AS, HTN, HLD, Hypothyroidism, FABY, T2DM who underwent a TAVR on 8/27/24 that was complicated by an aortic dissection prompting OR intervention with CTS. He is S/P bioprosthetic aortic valve replacement, hemiarch, and ascending aorta repair with Dr. Carroll on 08/27/2024. He was admitted to the SICU for post operative management and was extubated POD1 and was stable for stepdown to CSU on POD3. He is now ready for discharge from  CT surgery standpoint. 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 This finding likely related to removal of R femoral arterial sheath. Vascular Surgery consulted for management of pseudoaneurysm    Facility-Administered Medications Prior to Admission   Medication Dose Route Frequency Provider Last Rate Last Admin    [DISCONTINUED] sodium chloride 0.9% flush 10 mL  10 mL Intravenous PRN Cesar Louis MD        [DISCONTINUED] sodium chloride 0.9% flush 10 mL  10 mL Intravenous PRN Cesar Louis MD         Medications Prior to Admission   Medication Sig Dispense Refill Last Dose    aspirin 81 MG Chew Take 1 tablet (81 mg total) by mouth once daily. 90 tablet 3 8/27/2024 at 0430    clopidogreL (PLAVIX) 75 mg tablet Take 1 tablet (75 mg total) by mouth once daily. 30 tablet 11 8/27/2024 at 0430    DULoxetine (CYMBALTA) 30 MG capsule Take 1 capsule (30 mg total) by mouth once daily. 90 capsule 1 8/26/2024 at 2000    finasteride (PROSCAR) 5 mg tablet Take 1 tablet (5 mg total) by mouth once daily. 90 tablet 2 8/26/2024 at 2000    levothyroxine (SYNTHROID) 88 MCG tablet Take 1 tablet (88 mcg total) by mouth before breakfast. 90 tablet 1 8/26/2024 at 0800     tamsulosin (FLOMAX) 0.4 mg Cap Take 1 capsule (0.4 mg total) by mouth once daily. 90 capsule 1 8/26/2024 at 2000    traZODone (DESYREL) 100 MG tablet Take 1 tablet (100 mg total) by mouth every evening. 90 tablet 3 8/26/2024 at 2000    [DISCONTINUED] benzonatate (TESSALON) 200 MG capsule Take 1 capsule (200 mg total) by mouth 3 (three) times daily as needed for Cough. [id number] 90 capsule 11 8/26/2024 at 2000    [DISCONTINUED] olmesartan (BENICAR) 5 MG Tab Take 1 tablet (5 mg total) by mouth once daily. 90 tablet 1 Past Week    [DISCONTINUED] rosuvastatin (CRESTOR) 20 MG tablet Take 1 tablet (20 mg total) by mouth once daily. 90 tablet 3 8/26/2024 at 2000    ACCU-CHEK NAYA PLUS TEST STRP Strp CHECK BLOOD SUGAR DAILY AS DIRECTED 100 strip 3 Unknown    blood-glucose meter Misc 1 device.  Check sugar 1 times daily as directed by MD. Supply preferred brand .Dx Code: [E11.8] 1 each 0     esomeprazole (NEXIUM) 40 MG capsule Take 1 capsule (40 mg total) by mouth daily with dinner or evening meal. 30 capsule 11 More than a month    lancets Misc 1 box.  Check 1x daily as directed by MD. Supply brand covered by insurance. Dx Code: [E11.8]. Accucheck Naya. 100 each 3 Unknown       Review of patient's allergies indicates:  No Known Allergies    Past Medical History:   Diagnosis Date    Coronary artery disease of native artery of native heart with stable angina pectoris 4/10/2024    Diabetes mellitus type II, uncontrolled 02/27/2013    High-density lipoprotein deficiency 02/27/2013    HTN (hypertension) 02/27/2013    Hyperlipidemia     Hypothyroidism     Moderate obstructive sleep apnea 6/29/2022    Normocytic anemia 06/28/2021    Obesity     Osteoarthritis 02/08/2016    PAD (peripheral artery disease) 4/10/2024    Trouble in sleeping     Type 2 diabetes mellitus     Type 2 diabetes mellitus with diabetic polyneuropathy, without long-term current use of insulin      Past Surgical History:   Procedure Laterality Date     AORTIC VALVE REPLACEMENT  8/27/2024    Procedure: REPLACEMENT, AORTIC VALVE;  Surgeon: London Guillen MD;  [facility];  Service: Cardiovascular;;    CARDIAC CATH COSURGEON N/A 8/27/2024    Procedure: Cardiac Cath Cosurgeon;  Surgeon: Melvin Carroll MD;  Location: [facility] CATH LAB;  Service: Cardiology;  Laterality: N/A;    CATARACT EXTRACTION      CATHETERIZATION OF BOTH LEFT AND RIGHT HEART N/A 4/16/2024    Procedure: CATHETERIZATION, HEART, BOTH LEFT AND RIGHT;  Surgeon: Brian Vickers MD;  Location: [facility] CATH LAB/EP;  Service: Cardiology;  Laterality: N/A;  Aortic valve study/ Blanchard Catheter/2 manifolds    CORONARY ANGIOGRAPHY N/A 4/16/2024    Procedure: ANGIOGRAM, CORONARY ARTERY;  Surgeon: Brian Vickers MD;  Location: [facility] CATH LAB/EP;  Service: Cardiology;  Laterality: N/A;    CORONARY ANGIOPLASTY WITH STENT PLACEMENT N/A 4/23/2024    Procedure: ANGIOPLASTY, CORONARY ARTERY, WITH STENT INSERTION;  Surgeon: Brian Vickers MD;  Location: [facility] CATH LAB/EP;  Service: Cardiology;  Laterality: N/A;    EXPLANT N/A 8/27/2024    Procedure: EXPLANT; TAVR;  Surgeon: London Guillen MD;  [facility];  Service: Cardiovascular;  Laterality: N/A;    EYE SURGERY      cataracts    INSTANTANEOUS WAVE-FREE RATIO (IFR) N/A 4/16/2024    Procedure: Instantaneous Wave-Free Ratio (IFR);  Surgeon: Brian Vickers MD;  Location: [facility] CATH LAB/EP;  Service: Cardiology;  Laterality: N/A;    IVUS, CORONARY  4/23/2024    Procedure: IVUS, Coronary;  Surgeon: Brian Vickers MD;  Location: [facility] CATH LAB/EP;  Service: Cardiology;;    JOINT REPLACEMENT Left     arthroplasty    PERCUTANEOUS CORONARY INTERVENTION, ARTERY N/A 4/23/2024    Procedure: Percutaneous coronary intervention;  Surgeon: Brian Vickers MD;  Location: [facility] CATH LAB/EP;  Service: Cardiology;  Laterality: N/A;  LAD    REPLACEMENT OF ASCENDING AORTA N/A 8/27/2024    Procedure: REPLACEMENT, AORTA, ASCENDING; HEMIARCH;   Surgeon: London Guillen MD;  Location: [facility] OR [address] [place] [address];  Service: Cardiovascular;  Laterality: N/A;    SHOULDER SURGERY      TONSILLECTOMY      TOTAL SHOULDER ARTHROPLASTY Left     TRANSCATHETER AORTIC VALVE REPLACEMENT (TAVR) N/A 8/27/2024    Procedure: REPLACEMENT, AORTIC VALVE, TRANSCATHETER (TAVR);  Surgeon: Cesar Louis MD;  Location: [facility] CATH LAB;  Service: Cardiology;  Laterality: N/A;     Family History       Problem Relation (Age of Onset)    Early death Father, Brother, Paternal Uncle    Heart disease Father, Brother, Paternal Uncle    Hypertension Mother, Father, Brother    No Known Problems Daughter, Son    Osteoporosis Mother    Stroke Father          Tobacco Use    Smoking status: Never     Passive exposure: Never    Smokeless tobacco: Never   Substance and Sexual Activity    Alcohol use: Never     Comment: occasionally (maybe every 3 months)    Drug use: Never    Sexual activity: Not Currently     Partners: Female     Birth control/protection: None     Review of Systems  Objective:     Vital Signs (Most Recent):  Temp: 98.3 °F (36.8 °C) (09/03/24 1125)  Pulse: 96 (09/03/24 1125)  Resp: 20 (09/03/24 1125)  BP: (!) 141/75 (09/03/24 1125)  SpO2: 98 % (09/03/24 1125) Vital Signs (24h Range):  Temp:  [97.5 °F (36.4 °C)-98.8 °F (37.1 °C)] 98.3 °F (36.8 °C)  Pulse:  [] 96  Resp:  [16-20] 20  SpO2:  [92 %-98 %] 98 %  BP: (105-141)/(53-75) 141/75     Weight: 86.2 kg (190 lb 0.6 oz)  Body mass index is 29.76 kg/m².      Physical Exam  Constitutional:       Appearance: He is not ill-appearing.   Cardiovascular:      Rate and Rhythm: Normal rate.      Pulses: Normal pulses.      Heart sounds: No murmur heard.  Pulmonary:      Effort: Pulmonary effort is normal. No respiratory distress.   Neurological:      Mental Status: He is alert.        Significant Labs:  CBC:   Recent Labs   Lab 09/03/24  0212   WBC 9.26   RBC 3.84*   HGB 11.2*   HCT 34.7*      MCV 90   MCH 29.2   MCHC 32.3     CMP:    Recent Labs   Lab 09/03/24  0212   *   CALCIUM 8.6*   ALBUMIN 2.7*   PROT 6.3   *   K 4.1   CO2 24   CL 98   BUN 25*   CREATININE 1.0   ALKPHOS 57   ALT 23   AST 38   BILITOT 1.0       Significant Diagnostics:  I have reviewed all pertinent imaging results/findings within the past 24 hours.  Pseudoaneurysm of right femoral artery measuring 1.3 cm  Assessment/Plan:     Pseudoaneurysm of femoral artery  Cesar Simpson is a 74 y.o. male S/P TAVR on 8/27/24 complicated by an aortic dissection, now S/P bioprosthetic aortic valve replacement, hemiarch, and ascending aorta repair with Dr. Carroll on 08/27/2024. He is now ready for discharge from  CT surgery standpoint. 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 Notes review recount that he developed a hematoma after removal of Femoral Sheath. Imaging reviewed and plan discussed with staff. Likely to embolize pseudoaneurysm without intervention. No need for urgent surgical intervention, plan for outpatient follow up and imaging.     - No need for urgent surgical intervention per vascular surgery  - Will schedule 3 week outpatient follow up with Dr bass with Arterial duplex imaging of RLE  - rest of care per primary        Thank you for your consult. I will follow-up with patient. Please contact us if you have any additional questions.    Tracie Magallon MD  Vascular Surgery  [facility] - Cardiology Stepdown

## 2024-09-03 NOTE — PROGRESS NOTES
[facility] - Cardiology Stepdown  Cardiothoracic Surgery  Progress Note    Patient Name: Cesar Simpson  MRN: [id number]  Admission Date: 8/27/2024  Hospital Length of Stay: 7 days  Code Status: Full Code   Attending Physician: Melvin Carroll MD   Referring Provider: Cesar Louis MD  Principal Problem:Severe aortic stenosis        Subjective:     Post-Op Info:  Procedure(s) (LRB):  REPLACEMENT, AORTIC VALVE  REPLACEMENT, AORTA, ASCENDING; HEMIARCH (N/A)  EXPLANT; TAVR (N/A)   7 Days Post-Op     Interval History: NAEON. Ambulating above household distances. Walked 120 feet with therapy and two laps in the [place] with nursing yesterday. Was planning to discharge but CTA from yesterday evening noted a 1.3cm right femoral artery pseudoaneurysm. Will ask vascular surgery for input and plan discharge accordingly. Patient says that his daughter will be staying with him.     Review of Systems   Constitutional: Negative for malaise/fatigue.   Cardiovascular:  Negative for chest pain and dyspnea on exertion.   Respiratory:  Negative for shortness of breath.    Hematologic/Lymphatic: Negative for bleeding problem.   Neurological:  Negative for weakness.     Medications:  Continuous Infusions:  Scheduled Meds:   acetaminophen  1,000 mg Oral Q8H    albuterol-ipratropium  3 mL Nebulization Q6H WAKE    aspirin  81 mg Oral Daily    atorvastatin  80 mg Oral Daily    clopidogreL  75 mg Oral Daily    DULoxetine  30 mg Oral Daily    levothyroxine  88 mcg Oral Before breakfast    LIDOcaine  3 patch Transdermal Q24H    metoprolol tartrate  25 mg Oral BID    pantoprazole  40 mg Oral Daily    polyethylene glycol  17 g Oral Daily    senna-docusate 8.6-50 mg  1 tablet Oral BID    tamsulosin  1 capsule Oral Daily    traZODone  50 mg Oral QHS     PRN Meds:  Current Facility-Administered Medications:     0.9%  NaCl infusion (for blood administration), , Intravenous, Q24H PRN    benzonatate, 200 mg, Oral, TID PRN    dextrose 10%, 12.5 g,  Intravenous, PRN    dextrose 10%, 25 g, Intravenous, PRN    influenza 65up-adj, 0.5 mL, Intramuscular, vaccine x 1 dose    metoclopramide, 5 mg, Intravenous, Q6H PRN    ondansetron, 4 mg, Intravenous, Q6H PRN    oxyCODONE, 5 mg, Oral, Q4H PRN    oxyCODONE, 10 mg, Oral, Q4H PRN     Objective:     Vital Signs (Most Recent):  Temp: 98.3 °F (36.8 °C) (09/03/24 1125)  Pulse: 96 (09/03/24 1125)  Resp: 20 (09/03/24 1125)  BP: (!) 141/75 (09/03/24 1125)  SpO2: 98 % (09/03/24 1125) Vital Signs (24h Range):  Temp:  [97.5 °F (36.4 °C)-98.8 °F (37.1 °C)] 98.3 °F (36.8 °C)  Pulse:  [] 96  Resp:  [16-20] 20  SpO2:  [92 %-98 %] 98 %  BP: (105-141)/(53-75) 141/75     Weight: 86.2 kg (190 lb 0.6 oz)  Body mass index is 29.76 kg/m².    SpO2: 98 %       Intake/Output - Last 3 Shifts         09/01 0700  09/02 0659 09/02 0700  09/03 0659 09/03 0700  09/04 0659    P.O. 720 1062     Total Intake(mL/kg) 720 (8.3) 1062 (12.3)     Urine (mL/kg/hr) 950 (0.5) 200 (0.1)     Chest Tube 40      Total Output 990 200     Net -270 +862            Urine Occurrence 0 x 3 x     Stool Occurrence  1 x             Lines/Drains/Airways       Peripheral Intravenous Line  Duration                  Peripheral IV - Single Lumen 08/30/24 1750 20 G No Anterior;Right Forearm 3 days         Peripheral IV - Single Lumen 08/30/24 1800 18 G Anterior;Left Forearm 3 days                     Physical Exam  Constitutional:       General: He is not in acute distress.  HENT:      Head: Normocephalic and atraumatic.   Eyes:      Pupils: Pupils are equal, round, and reactive to light.   Cardiovascular:      Rate and Rhythm: Normal rate and regular rhythm.   Pulmonary:      Effort: Pulmonary effort is normal. No respiratory distress.   Abdominal:      General: There is no distension.   Musculoskeletal:         General: No swelling. Normal range of motion.      Cervical back: Normal range of motion.   Skin:     Coloration: Skin is not pale.      Comments: Midline sternal  "incision c/d/i   Neurological:      General: No focal deficit present.      Mental Status: He is alert.   Psychiatric:         Mood and Affect: Mood normal.         Behavior: Behavior normal.        Significant Labs:  Amylase: No results for input(s): "AMYLASE" in the last 48 hours.  BMP:   Recent Labs   Lab 09/03/24 0212   *   *   K 4.1   CL 98   CO2 24   BUN 25*   CREATININE 1.0   CALCIUM 8.6*   MG 2.2     Cardiac markers: No results for input(s): "CKMB", "CPKMB", "TROPONINT", "TROPONINI", "MYOGLOBIN" in the last 48 hours.  CBC:   Recent Labs   Lab 09/03/24 0212   WBC 9.26   RBC 3.84*   HGB 11.2*   HCT 34.7*      MCV 90   MCH 29.2   MCHC 32.3     CMP:   Recent Labs   Lab 09/03/24 0212   *   CALCIUM 8.6*   ALBUMIN 2.7*   PROT 6.3   *   K 4.1   CO2 24   CL 98   BUN 25*   CREATININE 1.0   ALKPHOS 57   ALT 23   AST 38   BILITOT 1.0     Coagulation:   Recent Labs   Lab 09/02/24  0550   INR 1.1   APTT 30.3       Significant Diagnostics:  I have reviewed all pertinent imaging results/findings within the past 24 hours.  Assessment/Plan:     * Severe aortic stenosis  Pt is a 75 yo male who presented for TAVR on 8/27/24 which was c/b development of type A aortic dissection for which he was taken to OR for management of and is now s/p hemiarch replacement, ascending aorta replacement, explantation of TAVR and placement of bioprosthetic valve.     - ASA, plavix, statin  - metoprolol 25 BID  - lasix d/c yesterday   - Chest tubes and wires are out  - CTA chest/abdomen/pelvis with femoral artery aneurysm. Will ask vascular surgery for their input   - Codeine syrup for cough ordered   - Replace lytes PRN  - Flomax  - Continue cardiac diet  - Bowel regimen  - PPI  - Continue home trazodone   - Seroquel nightly for delirium  - PT/OT  - Ambulation encouraged  - IS             S/P AVR (aortic valve replacement) and aortoplasty  See severe aortic stenosis     Diet-controlled type 2 diabetes " mellitus  Endocrine following     Hyperlipidemia  Statin       Dispo: D/C pending vascular input     Susan Oakley PA-C  Cardiothoracic Surgery  [facility] Stepdown

## 2024-09-03 NOTE — PLAN OF CARE
Patient free of falls or injuries. Denied any pain or discomfort. Vitals WNL. Mid sternal incision ARRON, no active bleeding or drainage noted. Chest tube sites covered with gauze and Tegaderm, dressing CDI. Incentive spirometer encouraged. Non productive cough present but decreased with PRN tessalon Perles. Ambulated in room with 1 person assist and sat in chair, tolerated well. No events overnight. Plan of care reviewed, all questions and concerns addressed.       Problem: Adult Inpatient Plan of Care  Goal: Plan of Care Review  9/3/2024 0434 by Maia Matta RN  Outcome: Progressing  9/3/2024 0434 by Maia Matta RN  Outcome: Progressing  Goal: Patient-Specific Goal (Individualized)  9/3/2024 0434 by Maia Matta RN  Outcome: Progressing  9/3/2024 0434 by Maia Matta RN  Outcome: Progressing  Goal: Absence of Hospital-Acquired Illness or Injury  9/3/2024 0434 by Maia Matta RN  Outcome: Progressing  9/3/2024 0434 by Maia Matta RN  Outcome: Progressing  Goal: Optimal Comfort and Wellbeing  9/3/2024 0434 by Maia Matta RN  Outcome: Progressing  9/3/2024 0434 by Maia Matta RN  Outcome: Progressing  Goal: Readiness for Transition of Care  9/3/2024 0434 by Maia Matta RN  Outcome: Progressing  9/3/2024 0434 by Maia Matta RN  Outcome: Progressing     Problem: Diabetes Comorbidity  Goal: Blood Glucose Level Within Targeted Range  9/3/2024 0434 by Maia Matta RN  Outcome: Progressing  9/3/2024 0434 by Maia Matta RN  Outcome: Progressing     Problem: Wound  Goal: Optimal Coping  9/3/2024 0434 by Maia Matta RN  Outcome: Progressing  9/3/2024 0434 by Maia Matta RN  Outcome: Progressing  Goal: Optimal Functional Ability  9/3/2024 0434 by Maia Matta RN  Outcome: Progressing  9/3/2024 0434 by Maia Matta RN  Outcome: Progressing  Goal: Absence of Infection Signs and Symptoms  9/3/2024 0434 by Maia Matta RN  Outcome: Progressing  9/3/2024 0434 by Maia Matta RN  Outcome:  Progressing  Goal: Improved Oral Intake  9/3/2024 0434 by Maia Matta RN  Outcome: Progressing  9/3/2024 0434 by Maia Matta RN  Outcome: Progressing  Goal: Optimal Pain Control and Function  9/3/2024 0434 by Maia Matta RN  Outcome: Progressing  9/3/2024 0434 by Maia Matta RN  Outcome: Progressing  Goal: Skin Health and Integrity  9/3/2024 0434 by Maia Matta RN  Outcome: Progressing  9/3/2024 0434 by Maia Matta RN  Outcome: Progressing  Goal: Optimal Wound Healing  9/3/2024 0434 by Maia Matta RN  Outcome: Progressing  9/3/2024 0434 by Maia Matta RN  Outcome: Progressing

## 2024-09-03 NOTE — PLAN OF CARE
[facility] Stepdown  Discharge Final Note    Primary Care Provider: Chaitanya López DO    Expected Discharge Date: 9/3/2024    Final Discharge Note (most recent)       Final Note - 09/03/24 1347          Final Note    Assessment Type Final Discharge Note     Anticipated Discharge Disposition Home-Health Care [facility]     What phone number can be called within the next 1-3 days to see how you are doing after discharge? [phone]     Hospital Resources/Appts/Education Provided Provided patient/caregiver with written discharge plan information        Post-Acute Status    Post-Acute Authorization Home Health     Home Health Status Set-up Complete/Auth obtained     Patient choice form signed by patient/caregiver List with quality metrics by geographic area provided     Discharge Delays None known at this time                     Important Message from Medicare  Important Message from Medicare regarding Discharge Appeal Rights: Explained to patient/caregiver, Given to patient/caregiver, Signed/date by patient/caregiver     Date IMM was signed: 09/03/24  Time IMM was signed: 1200          Patient medically ready for discharge to home with home health. Family/patient aware of discharge.    Future Appointments   Date Time Provider Department Center   9/17/2024  9:40 AM Brian Vickers MD [facility] CARDIO [facility]   9/25/2024  9:00 AM [facility] OIC-XRAY [facility]   9/25/2024  9:30 AM EKG, APPT [facility] EKG [facility]   9/25/2024 10:00 AM Melvin Carroll MD NOM CARDVAS [facility]   3/5/2025  9:00 AM NOM CT1 64- LIMIT 650 LBS [facility] CT SCAN Joel Barry LMSW  - [facility]

## 2024-09-03 NOTE — HPI
Cesar Simpson is a 74 y.o. male with a h/o CAD, PAD, severe AS, HTN, HLD, Hypothyroidism, FABY, T2DM who underwent a TAVR on 8/27/24 that was complicated by an aortic dissection prompting OR intervention with CTS. He is S/P bioprosthetic aortic valve replacement, hemiarch, and ascending aorta repair with Dr. Carroll on 08/27/2024. He was admitted to the SICU for post operative management and was extubated POD1 and was stable for stepdown to CSU on POD3. He is now ready for discharge from  CT surgery standpoint. 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 This finding likely related to removal of R femoral arterial sheath. Vascular Surgery consulted for management of pseudoaneurysm

## 2024-09-03 NOTE — PLAN OF CARE
09/03/24 1232   Post-Acute Status   Post-Acute Authorization Home Health   Home Health Status Referrals Sent   Discharge Delays None known at this time   Discharge Plan   Discharge Plan A Home Health;Home with family     Pt is requiring home health services upon discharge. SEVERIANO faxed home health referral  to agencies -  via [employer] for review to the following:    [facility] [place] [employer] [phone]) [address] [place] [address] -  9/3/2024 14:31 (CT)  -  -  -  -       [facility] Phone: [phone] [address] [place] [address] -  9/3/2024 14:31 (CT)  -  -  -  -       [facility] Phone: [phone]   [address], [address] [place] [address] -  9/3/2024 14:31 (CT)  -  -  -  -       [facility] [place] ([facility]) Phone: [phone]   [address] [place] [address] -  9/3/2024 14:31 (CT)  -  -  -  -       [facility] Phone: [phone]   [address] [place] [address] -  9/3/2024 14:31 (CT)  -  -  -  -       [facility] [place] [facility] Phone: [phone] [address] [place] [address] -  9/3/2024 14:31 (CT)  -  -  -  -       [facility] Phone: [phone] [address] [place] [address] -  9/3/2024 14:31 (CT)  -  -  -  -       [facility] Phone: [phone]   [address] [place] [address] -  9/3/2024 14:31 (CT)  -  -  -  -       [facility] [place] Phone: [phone] [address] [place] [address] -  9/3/2024 14:31 (CT)  -  -  -  -       [facility] Phone: [phone] [address] [facility] [place] [address] -  9/3/2024 14:31 (CT)  -  -  -  -             SW will continue to follow patient.              Eva Barry, MORAIMA  - [facility]

## 2024-09-03 NOTE — PLAN OF CARE
09/03/24 1344   Post-Acute Status   Post-Acute Authorization Home Health   Home Health Status Set-up Complete/Auth obtained   Hospital Resources/Appts/Education Provided Provided patient/caregiver with written discharge plan information   Discharge Delays None known at this time   Discharge Plan   Discharge Plan A Home with family;Home Health     SW faxed HH Orders to [facility] [employer] ([id number])  -  via [employer] for review.       [facility] [employer] ([id number])   -Yes, willing to accept patient Comments: Please let us know if we have been selected, thanks! Also, do you have HH orders    Discharge Plan A and Plan B have been determined by review of patient's clinical status, future medical and therapeutic needs, and coverage/benefits for post-acute care in coordination with multidisciplinary team members.       Pt has no other issues pending.        Eva Barry, MORAIMA  - [facility]

## 2024-09-03 NOTE — DISCHARGE SUMMARY
[facility] - Cardiology Stepdown  Cardiothoracic Surgery  Discharge Summary      Patient Name: Cesar Simpson  MRN: [id number]  Admission Date: 8/27/2024  Hospital Length of Stay: 7 days  Discharge Date and Time:  09/03/2024 2:46 PM  Attending Physician: Melvin Carroll MD   Discharging Provider: Susan Oakley PA-C  Primary Care Provider: Chaitanya López DO    HPI:   A 74-year-old male with known severe aortic stenosis who presented today for TAVR procedure his comorbidities for coronary artery disease peripheral artery disease, hypertension, hyperlipidemia, hypothyroidism, diabetes mellitus.  During the TAVR procedure after the implantation with the 5 appear/hematoma was suspected on the proximal ascending aorta.  We decided to do a stat CTA which confirmed Juve type a aortic dissection. A   decision was made to bring the patient to the OR for a surgical repair    Procedure(s) (LRB):  REPLACEMENT, AORTIC VALVE  REPLACEMENT, AORTA, ASCENDING; HEMIARCH (N/A)  EXPLANT; TAVR (N/A)      Indwelling Lines/Drains at time of discharge:   Lines/Drains/Airways       None                 Hospital Course: On 8/27/24, the patient was taken to the Operating Room for the above stated procedure. Please see the previously dictated operative report for complete details. Postoperatively, the patient was taken from the  Operating Room to the ICU where the vital signs were monitored and pain was kept under control. The patient was weaned from the drips and extubated in the ICU per protocol. Once hemodynamically stable, the patient was transferred to the Cardiac Step-Down floor for continued strengthening and ambulation. On postoperative day 7, the patient was ready for discharge to home. At the time of discharge, the patient was ambulating unassisted, >200 feet in the stephenson with therapy with only stand by assist. A right femoral artery pseudoaneurysm was noted on his post op CTA. Vascular surgery was consulted and recommend follow up  in 3 weeks with Dr. Landeros with an ultrasound. Pain was well controlled with oral analgesics and the patient was tolerating the diet.     MOBILITY AND ACTIVITY: As tolerated. Patient may shower. No heavy lifting of greater than 5 pounds and no driving.     DIET: An 1800-calorie ADA with a 1500 mL fluid restriction.     WOUND CARE INSTRUCTIONS: Check for redness, swelling and drainage around the  incision or wound. Patient is to call for any obvious bleeding, drainage, pus from the wound, unusual problems or difficulties or temperature of greater than 101   degrees.     FOLLOWUP: Follow up with Dr. Carroll in approximately 3 weeks. Prior to this  appointment, the patient will have a chest x-ray and EKG.     Patient not placed on Ace-Inhibitor at the time of discharge due to potential for hypotension     DISCHARGE CONDITION: At the time of discharge, the patient was in sinus rhythm and afebrile with stable vital signs.      Goals of Care Treatment Preferences:  Code Status: Full Code    Living Will: Yes              Consults (From admission, onward)          Status Ordering Provider     Inpatient consult to Vascular Surgery  Once        Provider:  (Not yet assigned)    Ordered STEFAN TURCIOS     Consult to Endocrinology  Once        Provider:  (Not yet assigned)    Completed NADIR LEWIS     Inpatient consult to Registered Dietitian/Nutritionist  Once        Provider:  (Not yet assigned)    Completed NADIR LEWIS            Significant Diagnostic Studies:     Pending Diagnostic Studies:       Procedure Component Value Units Date/Time    Specimen to Pathology, Surgery Cardiovascular [[id number]] Collected: 08/27/24 1351    Order Status: Sent Lab Status: In process Updated: 08/27/24 2019    Specimen: Tissue             No new Assessment & Plan notes have been filed under this hospital service since the last note was generated.  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] 09/03/2024 Unknown    Diet-controlled type 2 diabetes mellitus [E11.9] 09/24/2020 Yes    Hyperlipidemia [E78.5] 02/27/2013 Yes      Problems Resolved During this Admission:      Discharged Condition: stable    Disposition: Home or Self Care    Follow Up:    Patient Instructions:      Ambulatory referral/consult to Home Health   Standing Status: Future   Referral Priority: Routine Referral Type: Home Health   Referral Reason: Specialty Services Required   Requested Specialty: Home Health Services   Number of Visits Requested: 1     Medications:  Reconciled Home Medications:      Medication List        START taking these medications      acetaminophen 500 MG tablet  Commonly known as: TYLENOL  Take 2 tablets (1,000 mg total) by mouth every 6 (six) hours as needed for Pain.     atorvastatin 80 MG tablet  Commonly known as: LIPITOR  Take 1 tablet (80 mg total) by mouth once daily.  Start taking on: September 4, 2024  Replaces: rosuvastatin 20 MG tablet     GAVILAX 17 gram/dose powder  Generic drug: polyethylene glycol  Use cap to measure 17 grams, mix in liquid and take by mouth 2 (two) times daily as needed for Constipation.     LIDOcaine 5 %  Commonly known as: LIDODERM  Place 2 patches onto the skin once daily. Remove & Discard patch within 12 hours or as directed by MD     metoprolol tartrate 25 MG tablet  Commonly known as: LOPRESSOR  Take 1 tablet (25 mg total) by mouth 2 (two) times daily.     oxyCODONE 5 MG immediate release tablet  Commonly known as: ROXICODONE  Take 1 tablet (5 mg total) by mouth every 4 (four) hours as needed for Pain.     STOOL SOFTENER-LAXATIVE 8.6-50 mg per tablet  Generic drug: senna-docusate 8.6-50 mg  Take 1 tablet by mouth 2 (two) times daily as needed for Constipation.             CHANGE how you take these medications      benzonatate 200 MG capsule  Commonly known as: TESSALON  Take 1 capsule (200 mg total) by mouth 3 (three) times daily as needed for Cough.  What changed: additional instructions            CONTINUE taking these medications      ACCU-CHEK NAYA PLUS TEST STRP Strp  Generic drug: blood sugar diagnostic  CHECK BLOOD SUGAR DAILY AS DIRECTED     aspirin 81 MG Chew  Take 1 tablet (81 mg total) by mouth once daily.     blood-glucose meter Misc  1 device.  Check sugar 1 times daily as directed by MD. Supply preferred brand .Dx Code: [E11.8]     clopidogreL 75 mg tablet  Commonly known as: PLAVIX  Take 1 tablet (75 mg total) by mouth once daily.     DULoxetine 30 MG capsule  Commonly known as: CYMBALTA  Take 1 capsule (30 mg total) by mouth once daily.     esomeprazole 40 MG capsule  Commonly known as: NEXIUM  Take 1 capsule (40 mg total) by mouth daily with dinner or evening meal.     finasteride 5 mg tablet  Commonly known as: PROSCAR  Take 1 tablet (5 mg total) by mouth once daily.     lancets Misc  1 box.  Check 1x daily as directed by MD. Supply brand covered by insurance. Dx Code: [E11.8]. Accucheck Naya.     levothyroxine 88 MCG tablet  Commonly known as: SYNTHROID  Take 1 tablet (88 mcg total) by mouth before breakfast.     tamsulosin 0.4 mg Cap  Commonly known as: FLOMAX  Take 1 capsule (0.4 mg total) by mouth once daily.     traZODone 100 MG tablet  Commonly known as: DESYREL  Take 1 tablet (100 mg total) by mouth every evening.            STOP taking these medications      olmesartan 5 MG Tab  Commonly known as: BENICAR     rosuvastatin 20 MG tablet  Commonly known as: CRESTOR  Replaced by: atorvastatin 80 MG tablet            Time spent on the discharge of patient: 30 minutes    Susan Oakley PA-C  Cardiothoracic Surgery  [facility] - Cardiology Stepdown

## 2024-09-03 NOTE — PROGRESS NOTES
Met with pt at bedside and reviewed wound care instructions for pt's midsternal and chest tube site incisions.      Pt given a copy of home care instructions.      Reviewed daily inspection and cleaning of surgical incisions and instructed pt to call the clinic with any questions or concerns.  Pt provided with a hand-out (see below) which contains detailed instructions on daily wound care inspection and care.  Pt reminded of 5 pound lifting, pushing, and pulling restrictions for the first 4 weeks following his surgery and to refrain from driving until released by Dr. Carroll.  Pt informed of post-op appts on 9/25 and was provided with a copy of his appts for that day.  Pt verbalized understanding to all instructions.      Future Appointments   Date Time Provider Department Center   9/17/2024  9:40 AM Brian Vickers MD [facility] CARDIO [facility]   9/25/2024  9:00 AM [facility] OIC-XRAY [facility] XRAY [facility]   9/25/2024  9:30 AM EKG, APPT [facility] EKG [facility]   9/25/2024 10:00 AM Melvin Carroll MD [facility] CARDVAS [facility]     No questions at this time.    Julie Haase RN  CTS Nurse Navigator [phone]          Showering:   If your incisions are healing and there is no drainage - it is ok to take a shower.  Shower with your back to the shower spray.  It is ok for your incision to get wet, but the shower spray should not directly hit your chest.  Do not soak in a tub.  The water temperature should be warm -- not too hot or cold. Extreme water temperatures can cause you to feel faint.  It is expected that you wash your incisions when you shower, however only use soap and water to cleanse the sites.  Use normal bar soap, not perfumed soap or body wash. During your recovery, do not try a new brand of soap.  Place soapy water on your hand or a clean washcloth and gently wash your incisions using an up-and-down motion.  Do not apply ointments, oils, or salves to your incisions.  Pat the skin gently to dry.    Wound Inspection:    Wash your hands before and after caring for or touching your incisions.   Look in the mirror daily. Inspect your incisions for redness, drainage and warmth. Your incisions should be healing; there should NOT be an increase in opening.   Gently wash your incisions everyday with soap and warm water using a clean washcloth, or your hand and light touch.   Gently pat dry with a clean towel.   You do not need to cover your incisions unless they are draining.    If you are experiencing drainage, it is important to call your doctor.  Monday - Friday, from 8:00am - 5pm, call [phone].  Outside of these hours, please call [phone] and ask for cardiothoracic surgeon on call.      When to call your doctor:  Increased drainage or oozing from the incision(s)  Increased opening of the incision line(s) - there should not be gaps or pulling apart areas of the incision(s)  Redness along the incision(s) - if the incisions were red or pink when you left the hospital, they should be improving and not becoming more red  Warmth along the incision line(s)  Increased body temperature / Fever - greater than 101 degrees Fahrenheit  If you have diabetes and your blood sugar levels begin to vary       Julie Haase RN  CTS Nurse Navigator [phone]

## 2024-09-03 NOTE — ASSESSMENT & PLAN NOTE
Pt is a 73 yo male who presented for TAVR on 8/27/24 which was c/b development of type A aortic dissection for which he was taken to OR for management of and is now s/p hemiarch replacement, ascending aorta replacement, explantation of TAVR and placement of bioprosthetic valve.     - ASA, plavix, statin  - metoprolol 25 BID  - lasix d/c yesterday   - Chest tubes and wires are out  - CTA chest/abdomen/pelvis with femoral artery aneurysm. Will ask vascular surgery for their input   - Codeine syrup for cough ordered   - Replace lytes PRN  - Flomax  - Continue cardiac diet  - Bowel regimen  - PPI  - Continue home trazodone   - Seroquel nightly for delirium  - PT/OT  - Ambulation encouraged  - IS

## 2024-09-03 NOTE — PLAN OF CARE
Problem: Adult Inpatient Plan of Care  Goal: Plan of Care Review  Outcome: Progressing  Flowsheets (Taken 9/3/2024 0922)  Plan of Care Reviewed With: patient  Goal: Patient-Specific Goal (Individualized)  Outcome: Progressing  Goal: Absence of Hospital-Acquired Illness or Injury  Outcome: Progressing  Intervention: Identify and Manage Fall Risk  Flowsheets (Taken 9/3/2024 0922)  Safety Promotion/Fall Prevention: assistive device/personal item within reach  Goal: Optimal Comfort and Wellbeing  Outcome: Progressing  Goal: Readiness for Transition of Care  Outcome: Progressing     Problem: Diabetes Comorbidity  Goal: Blood Glucose Level Within Targeted Range  Outcome: Progressing     Problem: Wound  Goal: Optimal Coping  Outcome: Progressing  Goal: Optimal Functional Ability  Outcome: Progressing  Goal: Absence of Infection Signs and Symptoms  Outcome: Progressing  Goal: Improved Oral Intake  Outcome: Progressing  Goal: Optimal Pain Control and Function  Outcome: Progressing  Goal: Skin Health and Integrity  Outcome: Progressing  Goal: Optimal Wound Healing  Outcome: Progressing     Problem: Infection  Goal: Absence of Infection Signs and Symptoms  Outcome: Progressing     Problem: Fall Injury Risk  Goal: Absence of Fall and Fall-Related Injury  Outcome: Progressing     Problem: Skin Injury Risk Increased  Goal: Skin Health and Integrity  Outcome: Progressing     Problem: Artificial Airway  Goal: Effective Communication  Outcome: Progressing  Goal: Optimal Device Function  Outcome: Progressing  Goal: Absence of Device-Related Skin or Tissue Injury  Outcome: Progressing     Problem: Noninvasive Ventilation Acute  Goal: Effective Unassisted Ventilation and Oxygenation  Outcome: Progressing

## 2024-09-03 NOTE — SUBJECTIVE & OBJECTIVE
Facility-Administered Medications Prior to Admission   Medication Dose Route Frequency Provider Last Rate Last Admin    [DISCONTINUED] sodium chloride 0.9% flush 10 mL  10 mL Intravenous PRN Cesar Louis MD        [DISCONTINUED] sodium chloride 0.9% flush 10 mL  10 mL Intravenous PRN Cesar Louis MD         Medications Prior to Admission   Medication Sig Dispense Refill Last Dose    aspirin 81 MG Chew Take 1 tablet (81 mg total) by mouth once daily. 90 tablet 3 8/27/2024 at 0430    clopidogreL (PLAVIX) 75 mg tablet Take 1 tablet (75 mg total) by mouth once daily. 30 tablet 11 8/27/2024 at 0430    DULoxetine (CYMBALTA) 30 MG capsule Take 1 capsule (30 mg total) by mouth once daily. 90 capsule 1 8/26/2024 at 2000    finasteride (PROSCAR) 5 mg tablet Take 1 tablet (5 mg total) by mouth once daily. 90 tablet 2 8/26/2024 at 2000    levothyroxine (SYNTHROID) 88 MCG tablet Take 1 tablet (88 mcg total) by mouth before breakfast. 90 tablet 1 8/26/2024 at 0800    tamsulosin (FLOMAX) 0.4 mg Cap Take 1 capsule (0.4 mg total) by mouth once daily. 90 capsule 1 8/26/2024 at 2000    traZODone (DESYREL) 100 MG tablet Take 1 tablet (100 mg total) by mouth every evening. 90 tablet 3 8/26/2024 at 2000    [DISCONTINUED] benzonatate (TESSALON) 200 MG capsule Take 1 capsule (200 mg total) by mouth 3 (three) times daily as needed for Cough. [email or web address] 90 capsule 11 8/26/2024 at 2000    [DISCONTINUED] olmesartan (BENICAR) 5 MG Tab Take 1 tablet (5 mg total) by mouth once daily. 90 tablet 1 Past Week    [DISCONTINUED] rosuvastatin (CRESTOR) 20 MG tablet Take 1 tablet (20 mg total) by mouth once daily. 90 tablet 3 8/26/2024 at 2000    ACCU-CHEK NAYA PLUS TEST STRP Strp CHECK BLOOD SUGAR DAILY AS DIRECTED 100 strip 3 Unknown    blood-glucose meter Misc 1 device.  Check sugar 1 times daily as directed by MD. Supply preferred brand .Dx Code: [E11.8] 1 each 0     esomeprazole (NEXIUM) 40 MG capsule Take 1 capsule (40 mg  total) by mouth daily with dinner or evening meal. 30 capsule 11 More than a month    lancets Misc 1 box.  Check 1x daily as directed by MD. Supply brand covered by insurance. Dx Code: [E11.8]. Grace Brandon. 100 each 3 Unknown       Review of patient's allergies indicates:  No Known Allergies    Past Medical History:   Diagnosis Date    Coronary artery disease of native artery of native heart with stable angina pectoris 4/10/2024    Diabetes mellitus type II, uncontrolled 02/27/2013    High-density lipoprotein deficiency 02/27/2013    HTN (hypertension) 02/27/2013    Hyperlipidemia     Hypothyroidism     Moderate obstructive sleep apnea 6/29/2022    Normocytic anemia 06/28/2021    Obesity     Osteoarthritis 02/08/2016    PAD (peripheral artery disease) 4/10/2024    Trouble in sleeping     Type 2 diabetes mellitus     Type 2 diabetes mellitus with diabetic polyneuropathy, without long-term current use of insulin      Past Surgical History:   Procedure Laterality Date    AORTIC VALVE REPLACEMENT  8/27/2024    Procedure: REPLACEMENT, AORTIC VALVE;  Surgeon: London Guillen MD;  Location: [facility] OR [address] [place] [address];  Service: Cardiovascular;;    CARDIAC CATH COSURGEON N/A 8/27/2024    Procedure: Cardiac Cath Cosurgeon;  Surgeon: Melvin Carroll MD;  Location: [facility] CATH LAB;  Service: Cardiology;  Laterality: N/A;    CATARACT EXTRACTION      CATHETERIZATION OF BOTH LEFT AND RIGHT HEART N/A 4/16/2024    Procedure: CATHETERIZATION, HEART, BOTH LEFT AND RIGHT;  Surgeon: Brian Vickers MD;  Location: [facility] CATH LAB/EP;  Service: Cardiology;  Laterality: N/A;  Aortic valve study/ [place] Catheter/2 manifolds    CORONARY ANGIOGRAPHY N/A 4/16/2024    Procedure: ANGIOGRAM, CORONARY ARTERY;  Surgeon: Brian Vickers MD;  Location: [facility] CATH LAB/EP;  Service: Cardiology;  Laterality: N/A;    CORONARY ANGIOPLASTY WITH STENT PLACEMENT N/A 4/23/2024    Procedure: ANGIOPLASTY, CORONARY ARTERY, WITH STENT INSERTION;  Surgeon: Rancho  Brian STEVENS MD;  Location: [facility] CATH LAB/EP;  Service: Cardiology;  Laterality: N/A;    EXPLANT N/A 8/27/2024    Procedure: EXPLANT; TAVR;  Surgeon: London Guillen MD;  Location: [facility] OR [facility] FLR;  Service: Cardiovascular;  Laterality: N/A;    EYE SURGERY      cataracts    INSTANTANEOUS WAVE-FREE RATIO (IFR) N/A 4/16/2024    Procedure: Instantaneous Wave-Free Ratio (IFR);  Surgeon: Brian Vickers MD;  Location: [facility] CATH LAB/EP;  Service: Cardiology;  Laterality: N/A;    IVUS, CORONARY  4/23/2024    Procedure: IVUS, Coronary;  Surgeon: Brian Vickers MD;  Location: [facility] CATH LAB/EP;  Service: Cardiology;;    JOINT REPLACEMENT Left     arthroplasty    PERCUTANEOUS CORONARY INTERVENTION, ARTERY N/A 4/23/2024    Procedure: Percutaneous coronary intervention;  Surgeon: Brian Vickers MD;  Location: [facility] CATH LAB/EP;  Service: Cardiology;  Laterality: N/A;  LAD    REPLACEMENT OF ASCENDING AORTA N/A 8/27/2024    Procedure: REPLACEMENT, AORTA, ASCENDING; HEMIARCH;  Surgeon: London Guillen MD;  Location: [facility] OR 2ND FLR;  Service: Cardiovascular;  Laterality: N/A;    SHOULDER SURGERY      TONSILLECTOMY      TOTAL SHOULDER ARTHROPLASTY Left     TRANSCATHETER AORTIC VALVE REPLACEMENT (TAVR) N/A 8/27/2024    Procedure: REPLACEMENT, AORTIC VALVE, TRANSCATHETER (TAVR);  Surgeon: Cesar Louis MD;  Location: [facility] CATH LAB;  Service: Cardiology;  Laterality: N/A;     Family History       Problem Relation (Age of Onset)    Early death Father, Brother, Paternal Uncle    Heart disease Father, Brother, Paternal Uncle    Hypertension Mother, Father, Brother    No Known Problems Daughter, Son    Osteoporosis Mother    Stroke Father          Tobacco Use    Smoking status: Never     Passive exposure: Never    Smokeless tobacco: Never   Substance and Sexual Activity    Alcohol use: Never     Comment: occasionally (maybe every 3 months)    Drug use: Never    Sexual activity: Not Currently     Partners: Female     Birth  control/protection: None     Review of Systems  Objective:     Vital Signs (Most Recent):  Temp: 98.3 °F (36.8 °C) (09/03/24 1125)  Pulse: 96 (09/03/24 1125)  Resp: 20 (09/03/24 1125)  BP: (!) 141/75 (09/03/24 1125)  SpO2: 98 % (09/03/24 1125) Vital Signs (24h Range):  Temp:  [97.5 °F (36.4 °C)-98.8 °F (37.1 °C)] 98.3 °F (36.8 °C)  Pulse:  [] 96  Resp:  [16-20] 20  SpO2:  [92 %-98 %] 98 %  BP: (105-141)/(53-75) 141/75     Weight: 86.2 kg (190 lb 0.6 oz)  Body mass index is 29.76 kg/m².      Physical Exam  Constitutional:       Appearance: He is not ill-appearing.   Cardiovascular:      Rate and Rhythm: Normal rate.      Pulses: Normal pulses.      Heart sounds: No murmur heard.  Pulmonary:      Effort: Pulmonary effort is normal. No respiratory distress.   Neurological:      Mental Status: He is alert.        Significant Labs:  CBC:   Recent Labs   Lab 09/03/24 0212   WBC 9.26   RBC 3.84*   HGB 11.2*   HCT 34.7*      MCV 90   MCH 29.2   MCHC 32.3     CMP:   Recent Labs   Lab 09/03/24 0212   *   CALCIUM 8.6*   ALBUMIN 2.7*   PROT 6.3   *   K 4.1   CO2 24   CL 98   BUN 25*   CREATININE 1.0   ALKPHOS 57   ALT 23   AST 38   BILITOT 1.0       Significant Diagnostics:  I have reviewed all pertinent imaging results/findings within the past 24 hours.  Pseudoaneurysm of right femoral artery measuring 1.3 cm

## 2024-09-03 NOTE — SUBJECTIVE & OBJECTIVE
Interval History: NAEON. Ambulating above household distances. Walked 120 feet with therapy and two laps in the [place] with nursing yesterday. Was planning to discharge but CTA from yesterday evening noted a 1.3cm right femoral artery pseudoaneurysm. Will ask vascular surgery for input and plan discharge accordingly. Patient says that his daughter will be staying with him.     Review of Systems   Constitutional: Negative for malaise/fatigue.   Cardiovascular:  Negative for chest pain and dyspnea on exertion.   Respiratory:  Negative for shortness of breath.    Hematologic/Lymphatic: Negative for bleeding problem.   Neurological:  Negative for weakness.     Medications:  Continuous Infusions:  Scheduled Meds:   acetaminophen  1,000 mg Oral Q8H    albuterol-ipratropium  3 mL Nebulization Q6H WAKE    aspirin  81 mg Oral Daily    atorvastatin  80 mg Oral Daily    clopidogreL  75 mg Oral Daily    DULoxetine  30 mg Oral Daily    levothyroxine  88 mcg Oral Before breakfast    LIDOcaine  3 patch Transdermal Q24H    metoprolol tartrate  25 mg Oral BID    pantoprazole  40 mg Oral Daily    polyethylene glycol  17 g Oral Daily    senna-docusate 8.6-50 mg  1 tablet Oral BID    tamsulosin  1 capsule Oral Daily    traZODone  50 mg Oral QHS     PRN Meds:  Current Facility-Administered Medications:     0.9%  NaCl infusion (for blood administration), , Intravenous, Q24H PRN    benzonatate, 200 mg, Oral, TID PRN    dextrose 10%, 12.5 g, Intravenous, PRN    dextrose 10%, 25 g, Intravenous, PRN    influenza 65up-adj, 0.5 mL, Intramuscular, vaccine x 1 dose    metoclopramide, 5 mg, Intravenous, Q6H PRN    ondansetron, 4 mg, Intravenous, Q6H PRN    oxyCODONE, 5 mg, Oral, Q4H PRN    oxyCODONE, 10 mg, Oral, Q4H PRN     Objective:     Vital Signs (Most Recent):  Temp: 98.3 °F (36.8 °C) (09/03/24 1125)  Pulse: 96 (09/03/24 1125)  Resp: 20 (09/03/24 1125)  BP: (!) 141/75 (09/03/24 1125)  SpO2: 98 % (09/03/24 1125) Vital Signs (24h Range):  Temp:  " [97.5 °F (36.4 °C)-98.8 °F (37.1 °C)] 98.3 °F (36.8 °C)  Pulse:  [] 96  Resp:  [16-20] 20  SpO2:  [92 %-98 %] 98 %  BP: (105-141)/(53-75) 141/75     Weight: 86.2 kg (190 lb 0.6 oz)  Body mass index is 29.76 kg/m².    SpO2: 98 %       Intake/Output - Last 3 Shifts         09/01 0700  09/02 0659 09/02 0700 09/03 0659 09/03 0700  09/04 0659    P.O. 720 1062     Total Intake(mL/kg) 720 (8.3) 1062 (12.3)     Urine (mL/kg/hr) 950 (0.5) 200 (0.1)     Chest Tube 40      Total Output 990 200     Net -270 +862            Urine Occurrence 0 x 3 x     Stool Occurrence  1 x             Lines/Drains/Airways       Peripheral Intravenous Line  Duration                  Peripheral IV - Single Lumen 08/30/24 1750 20 G No Anterior;Right Forearm 3 days         Peripheral IV - Single Lumen 08/30/24 1800 18 G Anterior;Left Forearm 3 days                     Physical Exam  Constitutional:       General: He is not in acute distress.  HENT:      Head: Normocephalic and atraumatic.   Eyes:      Pupils: Pupils are equal, round, and reactive to light.   Cardiovascular:      Rate and Rhythm: Normal rate and regular rhythm.   Pulmonary:      Effort: Pulmonary effort is normal. No respiratory distress.   Abdominal:      General: There is no distension.   Musculoskeletal:         General: No swelling. Normal range of motion.      Cervical back: Normal range of motion.   Skin:     Coloration: Skin is not pale.      Comments: Midline sternal incision c/d/i   Neurological:      General: No focal deficit present.      Mental Status: He is alert.   Psychiatric:         Mood and Affect: Mood normal.         Behavior: Behavior normal.        Significant Labs:  Amylase: No results for input(s): "AMYLASE" in the last 48 hours.  BMP:   Recent Labs   Lab 09/03/24  0212   *   *   K 4.1   CL 98   CO2 24   BUN 25*   CREATININE 1.0   CALCIUM 8.6*   MG 2.2     Cardiac markers: No results for input(s): "CKMB", "CPKMB", "TROPONINT", " ""TROPONINI", "MYOGLOBIN" in the last 48 hours.  CBC:   Recent Labs   Lab 09/03/24 0212   WBC 9.26   RBC 3.84*   HGB 11.2*   HCT 34.7*      MCV 90   MCH 29.2   MCHC 32.3     CMP:   Recent Labs   Lab 09/03/24 0212   *   CALCIUM 8.6*   ALBUMIN 2.7*   PROT 6.3   *   K 4.1   CO2 24   CL 98   BUN 25*   CREATININE 1.0   ALKPHOS 57   ALT 23   AST 38   BILITOT 1.0     Coagulation:   Recent Labs   Lab 09/02/24  0550   INR 1.1   APTT 30.3       Significant Diagnostics:  I have reviewed all pertinent imaging results/findings within the past 24 hours.  "

## 2024-09-04 DIAGNOSIS — I72.4 PSEUDOANEURYSM OF FEMORAL ARTERY: Primary | ICD-10-CM

## 2024-09-05 ENCOUNTER — TELEPHONE (OUTPATIENT)
Dept: CARDIOTHORACIC SURGERY | Facility: CLINIC | Age: 74
End: 2024-09-05
Payer: MEDICARE

## 2024-09-05 ENCOUNTER — PATIENT OUTREACH (OUTPATIENT)
Dept: ADMINISTRATIVE | Facility: CLINIC | Age: 74
End: 2024-09-05
Payer: MEDICARE

## 2024-09-05 LAB
GLUCOSE SERPL-MCNC: 118 MG/DL (ref 70–110)
GLUCOSE SERPL-MCNC: 217 MG/DL (ref 70–110)
GLUCOSE SERPL-MCNC: 264 MG/DL (ref 70–110)
HCO3 UR-SCNC: 23.5 MMOL/L (ref 24–28)
HCO3 UR-SCNC: 24 MMOL/L (ref 24–28)
HCO3 UR-SCNC: 24.5 MMOL/L (ref 24–28)
HCT VFR BLD CALC: 26 %PCV (ref 36–54)
HCT VFR BLD CALC: 27 %PCV (ref 36–54)
HCT VFR BLD CALC: 34 %PCV (ref 36–54)
PCO2 BLDA: 37.7 MMHG (ref 35–45)
PCO2 BLDA: 41.8 MMHG (ref 35–45)
PCO2 BLDA: 42.9 MMHG (ref 35–45)
PH SMN: 7.36 [PH] (ref 7.35–7.45)
PH SMN: 7.36 [PH] (ref 7.35–7.45)
PH SMN: 7.41 [PH] (ref 7.35–7.45)
PO2 BLDA: 360 MMHG (ref 80–100)
PO2 BLDA: 401 MMHG (ref 80–100)
PO2 BLDA: 430 MMHG (ref 80–100)
POC BE: -1 MMOL/L
POC BE: -1 MMOL/L
POC BE: -2 MMOL/L
POC IONIZED CALCIUM: 0.98 MMOL/L (ref 1.06–1.42)
POC IONIZED CALCIUM: 1.04 MMOL/L (ref 1.06–1.42)
POC IONIZED CALCIUM: 1.09 MMOL/L (ref 1.06–1.42)
POC SATURATED O2: 100 % (ref 95–100)
POC TCO2: 25 MMOL/L (ref 23–27)
POC TCO2: 25 MMOL/L (ref 23–27)
POC TCO2: 26 MMOL/L (ref 23–27)
POTASSIUM BLD-SCNC: 3.5 MMOL/L (ref 3.5–5.1)
POTASSIUM BLD-SCNC: 3.9 MMOL/L (ref 3.5–5.1)
POTASSIUM BLD-SCNC: 3.9 MMOL/L (ref 3.5–5.1)
SAMPLE: ABNORMAL
SODIUM BLD-SCNC: 135 MMOL/L (ref 136–145)
SODIUM BLD-SCNC: 138 MMOL/L (ref 136–145)
SODIUM BLD-SCNC: 139 MMOL/L (ref 136–145)

## 2024-09-05 NOTE — TELEPHONE ENCOUNTER
Called pt and daughter to review post op instructions, wound care, and sternal precautions. Left voice mail for patient with call back number.    Julie Haase RN  CTS Nurse Navigator [phone]

## 2024-09-05 NOTE — PROGRESS NOTES
C3 nurse attempted to contact Cesar Simpson for a TCC post hospital discharge follow up call. No answer. Left voicemail with callback information. The patient does not have a scheduled HOSFU appointment. Message sent to PCP staff for assistance with scheduling visit with patient.

## 2024-09-06 ENCOUNTER — TELEPHONE (OUTPATIENT)
Dept: CARDIOTHORACIC SURGERY | Facility: CLINIC | Age: 74
End: 2024-09-06
Payer: MEDICARE

## 2024-09-06 ENCOUNTER — PATIENT MESSAGE (OUTPATIENT)
Dept: CARDIOTHORACIC SURGERY | Facility: CLINIC | Age: 74
End: 2024-09-06
Payer: MEDICARE

## 2024-09-06 NOTE — TELEPHONE ENCOUNTER
Called patient and daughter to review post op instructions, wound care, and sternal precautions. Left messages for both.    Julie Haase RN  [facility] Nurse Navigator [phone]

## 2024-09-06 NOTE — PROGRESS NOTES
2nd attempt made to reach patient for TCC call. Left voicemail please call [phone] and leave first name, last name and  for Santos. I will return your call.

## 2024-09-06 NOTE — PROGRESS NOTES
3rd Attempt made to reach patient for TCC call. Left voicemail please call [phone] leave first name, last name, and  for Santos.  I will return your call.

## 2024-09-10 LAB
FINAL PATHOLOGIC DIAGNOSIS: NORMAL
GROSS: NORMAL
Lab: NORMAL
MICROSCOPIC EXAM: NORMAL

## 2024-09-11 DIAGNOSIS — R35.1 BPH ASSOCIATED WITH NOCTURIA: ICD-10-CM

## 2024-09-11 DIAGNOSIS — N40.1 BPH ASSOCIATED WITH NOCTURIA: ICD-10-CM

## 2024-09-11 RX ORDER — TAMSULOSIN HYDROCHLORIDE 0.4 MG/1
1 CAPSULE ORAL
Qty: 90 CAPSULE | Refills: 0 | Status: SHIPPED | OUTPATIENT
Start: 2024-09-11

## 2024-09-16 ENCOUNTER — TELEPHONE (OUTPATIENT)
Dept: CARDIOTHORACIC SURGERY | Facility: CLINIC | Age: 74
End: 2024-09-16
Payer: MEDICARE

## 2024-09-16 NOTE — TELEPHONE ENCOUNTER
Called [employer] and left message for Lucy stating that 23mm valve was used. Left call back number.    Julie Haase RN  CTS Nurse Navigator [phone]      ----- Message from Carmen Shi sent at 9/16/2024 10:20 AM CDT -----  Regarding: Verification needed  Contact: [phone] ext [phone]  Hi, Mrs. Ayala from [employer] called to request a call to verify which of the pt's Aorta Valves are active? Either the 34mm or 23mm? Pls call [employer] at [phone] ext [phone].    Thank you.

## 2024-09-17 ENCOUNTER — OFFICE VISIT (OUTPATIENT)
Dept: CARDIOLOGY | Facility: CLINIC | Age: 74
End: 2024-09-17
Payer: MEDICARE

## 2024-09-17 ENCOUNTER — PATIENT OUTREACH (OUTPATIENT)
Dept: ADMINISTRATIVE | Facility: HOSPITAL | Age: 74
End: 2024-09-17
Payer: MEDICARE

## 2024-09-17 VITALS
HEIGHT: 67 IN | BODY MASS INDEX: 29.35 KG/M2 | WEIGHT: 187 LBS | DIASTOLIC BLOOD PRESSURE: 77 MMHG | HEART RATE: 91 BPM | SYSTOLIC BLOOD PRESSURE: 127 MMHG

## 2024-09-17 DIAGNOSIS — I25.118 CORONARY ARTERY DISEASE OF NATIVE ARTERY OF NATIVE HEART WITH STABLE ANGINA PECTORIS: Chronic | ICD-10-CM

## 2024-09-17 DIAGNOSIS — E11.42 TYPE 2 DIABETES MELLITUS WITH DIABETIC POLYNEUROPATHY, WITHOUT LONG-TERM CURRENT USE OF INSULIN: ICD-10-CM

## 2024-09-17 DIAGNOSIS — I72.4 PSEUDOANEURYSM OF FEMORAL ARTERY: ICD-10-CM

## 2024-09-17 DIAGNOSIS — Z95.2 S/P AVR (AORTIC VALVE REPLACEMENT) AND AORTOPLASTY: Primary | ICD-10-CM

## 2024-09-17 DIAGNOSIS — R60.9 SWELLING: ICD-10-CM

## 2024-09-17 DIAGNOSIS — E78.49 OTHER HYPERLIPIDEMIA: ICD-10-CM

## 2024-09-17 DIAGNOSIS — E11.59 HYPERTENSION ASSOCIATED WITH DIABETES: ICD-10-CM

## 2024-09-17 DIAGNOSIS — I15.2 HYPERTENSION ASSOCIATED WITH DIABETES: ICD-10-CM

## 2024-09-17 DIAGNOSIS — I73.9 PAD (PERIPHERAL ARTERY DISEASE): Chronic | ICD-10-CM

## 2024-09-17 PROCEDURE — 3074F SYST BP LT 130 MM HG: CPT | Mod: HCNC,CPTII,S$GLB, | Performed by: INTERNAL MEDICINE

## 2024-09-17 PROCEDURE — 3078F DIAST BP <80 MM HG: CPT | Mod: HCNC,CPTII,S$GLB, | Performed by: INTERNAL MEDICINE

## 2024-09-17 PROCEDURE — 3044F HG A1C LEVEL LT 7.0%: CPT | Mod: HCNC,CPTII,S$GLB, | Performed by: INTERNAL MEDICINE

## 2024-09-17 PROCEDURE — 99999 PR PBB SHADOW E&M-EST. PATIENT-LVL IV: CPT | Mod: PBBFAC,HCNC,, | Performed by: INTERNAL MEDICINE

## 2024-09-17 PROCEDURE — 3008F BODY MASS INDEX DOCD: CPT | Mod: HCNC,CPTII,S$GLB, | Performed by: INTERNAL MEDICINE

## 2024-09-17 PROCEDURE — 1159F MED LIST DOCD IN RCRD: CPT | Mod: HCNC,CPTII,S$GLB, | Performed by: INTERNAL MEDICINE

## 2024-09-17 PROCEDURE — 1157F ADVNC CARE PLAN IN RCRD: CPT | Mod: HCNC,CPTII,S$GLB, | Performed by: INTERNAL MEDICINE

## 2024-09-17 PROCEDURE — 1160F RVW MEDS BY RX/DR IN RCRD: CPT | Mod: HCNC,CPTII,S$GLB, | Performed by: INTERNAL MEDICINE

## 2024-09-17 PROCEDURE — 99215 OFFICE O/P EST HI 40 MIN: CPT | Mod: HCNC,S$GLB,, | Performed by: INTERNAL MEDICINE

## 2024-09-17 PROCEDURE — 1101F PT FALLS ASSESS-DOCD LE1/YR: CPT | Mod: HCNC,CPTII,S$GLB, | Performed by: INTERNAL MEDICINE

## 2024-09-17 PROCEDURE — 1126F AMNT PAIN NOTED NONE PRSNT: CPT | Mod: HCNC,CPTII,S$GLB, | Performed by: INTERNAL MEDICINE

## 2024-09-17 PROCEDURE — 3288F FALL RISK ASSESSMENT DOCD: CPT | Mod: HCNC,CPTII,S$GLB, | Performed by: INTERNAL MEDICINE

## 2024-09-17 PROCEDURE — 4010F ACE/ARB THERAPY RXD/TAKEN: CPT | Mod: HCNC,CPTII,S$GLB, | Performed by: INTERNAL MEDICINE

## 2024-09-17 PROCEDURE — 1111F DSCHRG MED/CURRENT MED MERGE: CPT | Mod: HCNC,CPTII,S$GLB, | Performed by: INTERNAL MEDICINE

## 2024-09-17 RX ORDER — FUROSEMIDE 20 MG/1
20 TABLET ORAL EVERY OTHER DAY
Qty: 15 TABLET | Refills: 11 | Status: SHIPPED | OUTPATIENT
Start: 2024-09-17 | End: 2025-09-17

## 2024-09-17 NOTE — PROGRESS NOTES
Population Health Chart Review & Patient Outreach Details      Additional [facility] Health Notes:               Updates Requested / Reviewed:      Updated Care Coordination Note, Care Everywhere, and Immunizations Reconciliation Completed or Queried: [facility] Topics Overdue:      [facility] Score: 0     Patient is not due for any topics at this time.    Influenza Vaccine                  Health Maintenance Topic(s) Outreach Outcomes & Actions Taken:    Lab(s) - Outreach Outcomes & Actions Taken  : Overdue Lab(s) Scheduled

## 2024-09-17 NOTE — PROGRESS NOTES
Subjective:   Patient ID:  Cesar Simpson is a 74 y.o. male who presents for evaluation of AS, PAD    9/2024:  Follow up.  He was referred for TAVR.  TAVR was complicated by ascending aortic dissection type A.  Had emergent brenton arch/ascending aortic replacement and explanation of the TAVR valve with reimplantation of bioprosthetic 23 mm Avalus valve.  He developed right common femoral artery pseudoaneurysms 1.3 cm that was noted during postop CTA.  Vascular surgery consulted and recommended follow up as an outpatient.  He is doing well today.  He is recovering very well actually.  Still reports cough when he lays flat.  Lower extremity edema has end of the day.       5/9/2023:  Full up postprocedure.  RHC/LHC with severe aortic stenosis based in aortic valve area index.  Severe single-vessel coronary artery disease including distal LAD.  After shared decision we elected to proceed with PCI to the LAD and refer for TAVR.  He is doing well.  Still with significant dyspnea on exertion. NYHA FC III.  He is compliant with his medications    4/10/2024: Patient seen in clinic today, reports he's had a difficult past year. He reports over the past year he has been experiencing worsening, ongoing fatigue, dizziness, and BASILIO. Has not been very active the past year due to BASILIO, cough, and worsening fatigue; he used to ride his stationary bike and walk frequently. Long conversation with patient and Dr. Vickers concerning aortic stenosis diagnosis and noted CAD on CT last month. Patient denies CP, SOB, orthopnea, PND, syncope, palpitations, LE edema; denies any LE claudication. Endorses pre-syncope/dizziness and ongoing BASILIO/fatigue. Reports compliance and tolerance with all medications.      Historically:    73 yo M with hx of DM2, HTN, HLD, FABY present to clinic for evaluation of newly diagnosed mod-sev AS, PAD.       8/27/2024:  TAVR was complicated by ascending aortic dissection type A.  Had emergent brenton arch/ascending  aortic replacement and explanation of the TAVR valve with reimplantation of bioprosthetic 23 mm Avalus valve    OPERATIONS PERFORMED:  1.  Hemiarch replacement with a  26 mm [employer]ave Dacron tube graft under deep   hypothermic circulatory arrest, circulatory arrest time 24 minutes at 28 degrees   Celsius with antegrade cerebral perfusion.  2.  Ascending aortic replacement with a 26 mm Gelweave Dacron tube graft   3. Explantation of Evolut TAVR valve   4. Aortic valve replacement with bioprosthetic 23 mm Avalus Valve    PCI to LAD April 23, 2024  Status post successful PTCA and stenting to prox mid and distal LAD with 3.0 x 3(2) mm CHAY prox and mid and 2.5 x 16 mm CHAY distally     During balloon predilatation of the mid LAD heavily calcified lesion with a 3.0 NC balloon,  the balloon ruptured resulting in dissection of the mid LAD all the way.  IVUS confirmed  confirmed contrast stasis in the subintimal space hence 3.0 cutting balloon used with  reestablishing flow in the mid  LAD.  Unfortunately we had to stent the mid LAD dissected segment with 3.0 x 38 CHAY to seal the dissection successfully     Prox LAD heavily calcified lesion pre-dilated with 3.5 shockwave balloon due to heavy  calcification     The patient was hemodynamically stable all the way through the procedure and postprocedure    [facility] April 16, 2024  Access:  Rt Radial 5-6 Fr sheath   Rt AC vein with 5-6 for RHC   LM:  FREDIS 3 flow. 0% stenosis      LAD: FREDIS 3 flow.  Prox/mid calcified 70-80% stenosis.  Mid/distal 99% stenosis.  D1 distally 99% stenosis but small caliber vessel distally  LCx:  FREDIS 3 flow.  Proximal 30-40% stenosis.  Large principal OM1 with diffuse 30% stenosis     RCA: FREDIS 3 flow.  Ostial RCA 60% stenosis IFR 0.95.  Prox mid and distal diffuse 30 40% calcified stenosis.  TAYLOR branch very distally there is 95% calcified stenosis but small caliber vessel     LVgram: LVEDP 10 mm Hg     Right heart catheterization pressures in mmHg      PCWP:  7  /   3 /    3  PA: 17   /   6 /10  RV: 21   /  -3  /2  RA: 7   /  4  /3     Oxygen saturations in%, PA:70, RA:75, AO: 100     Cardiac output:   5.52     l/min     Prema  Cardiac index :  2.86      l/min     Aortic valve area 1.03cm2  Aortic valve area index 0.53 cm2  Peak to Peak  40 mmHg  Mean gradient 37.7    Echo 4/3/2024    Left Ventricle: The left ventricle is normal in size. Normal wall thickness. There is concentric remodeling. There is normal systolic function. Biplane (2D) method of discs ejection fraction is 58%. There is normal diastolic function.    Right Ventricle: Normal right ventricular cavity size. Wall thickness is normal. Right ventricle wall motion  is normal. Systolic function is normal.    Aortic Valve: There is moderate aortic valve sclerosis. Moderately restricted motion. There is moderate to severe stenosis. Aortic valve area by VTI is 0.90 cm². Aortic valve peak velocity is 3.18 m/s. Mean gradient is 24 mmHg. The dimensionless index is 0.29.    Mitral Valve: There is moderate mitral annular calcification present.    Pulmonary Artery: The estimated pulmonary artery systolic pressure is 32 mmHg.    IVC/SVC: Normal venous pressure at 3 mmHg.    US LOWER EXTREMITY ARTERIES BILATERAL 3/25/2024  FINDINGS:  Right mid BERNABE is occluded with large amount plaque.  Left proximal BERNABE is occluded with large amounts of plaque.     Impression:  BERNABE occlusions bilaterally.    Past Medical History:   Diagnosis Date    Coronary artery disease of native artery of native heart with stable angina pectoris 4/10/2024    Diabetes mellitus type II, uncontrolled 02/27/2013    High-density lipoprotein deficiency 02/27/2013    HTN (hypertension) 02/27/2013    Hyperlipidemia     Hypothyroidism     Moderate obstructive sleep apnea 6/29/2022    Normocytic anemia 06/28/2021    Obesity     Osteoarthritis 02/08/2016    PAD (peripheral artery disease) 4/10/2024    Trouble in sleeping     Type 2 diabetes mellitus      Type 2 diabetes mellitus with diabetic polyneuropathy, without long-term current use of insulin           Patient Active Problem List    Diagnosis Date Noted    S/P AVR (aortic valve replacement) and aortoplasty 09/03/2024    Pseudoaneurysm of femoral artery 09/03/2024    Severe aortic stenosis 04/10/2024     Moderate to Severe       PAD (peripheral artery disease) 04/10/2024    Coronary artery disease of native artery of native heart with stable angina pectoris 04/10/2024    ILD (interstitial lung disease) 03/26/2024    Chronic cough 03/26/2024    Moderate obstructive sleep apnea 06/29/2022    Does use hearing aid 06/28/2021    Normocytic anemia 06/28/2021    Diet-controlled type 2 diabetes mellitus 09/24/2020    BPH associated with nocturia 09/24/2020    Arthritis 09/24/2020    Insomnia 05/23/2019    Hypertension associated with diabetes 03/27/2018    BMI 29.0-29.9,adult 03/27/2018    Hyperlipidemia associated with type 2 diabetes mellitus 03/24/2017    Osteoarthritis 02/08/2016    Hypothyroidism 02/10/2015    Posterior vitreous detachment, right eye 07/07/2014    Floater, vitreous 07/07/2014    Vitreous detachment 07/07/2014    Pseudophakia 07/07/2014    Refractive error 07/07/2014    Type 2 diabetes mellitus with diabetic polyneuropathy, without long-term current use of insulin 02/27/2013    Hyperlipidemia 02/27/2013    Prominent aorta 08/13/2010     Seen on chest x-ray dated 08/13/10         Patient's Medications   New Prescriptions    FUROSEMIDE (LASIX) 20 MG TABLET    Take 1 tablet (20 mg total) by mouth every other day.   Previous Medications    ACCU-CHEK NAYA PLUS TEST STRP STRP    CHECK BLOOD SUGAR DAILY AS DIRECTED    ACETAMINOPHEN (TYLENOL) 500 MG TABLET    Take 2 tablets (1,000 mg total) by mouth every 6 (six) hours as needed for Pain.    ASPIRIN 81 MG CHEW    Take 1 tablet (81 mg total) by mouth once daily.    ATORVASTATIN (LIPITOR) 80 MG TABLET    Take 1 tablet (80 mg total) by mouth once daily.     BLOOD-GLUCOSE METER MISC    1 device.  Check sugar 1 times daily as directed by MD. Supply preferred brand .Dx Code: [E11.8]    CLOPIDOGREL (PLAVIX) 75 MG TABLET    Take 1 tablet (75 mg total) by mouth once daily.    DULOXETINE (CYMBALTA) 30 MG CAPSULE    Take 1 capsule (30 mg total) by mouth once daily.    ESOMEPRAZOLE (NEXIUM) 40 MG CAPSULE    Take 1 capsule (40 mg total) by mouth daily with dinner or evening meal.    FINASTERIDE (PROSCAR) 5 MG TABLET    Take 1 tablet (5 mg total) by mouth once daily.    LANCETS MISC    1 box.  Check 1x daily as directed by MD. Supply brand covered by insurance. Dx Code: [E11.8]. Accucheck Roma.    LEVOTHYROXINE (SYNTHROID) 88 MCG TABLET    Take 1 tablet (88 mcg total) by mouth before breakfast.    LIDOCAINE (LIDODERM) 5 %    Place 2 patches onto the skin once daily. Remove & Discard patch within 12 hours or as directed by MD    METOPROLOL TARTRATE (LOPRESSOR) 25 MG TABLET    Take 1 tablet (25 mg total) by mouth 2 (two) times daily.    POLYETHYLENE GLYCOL (GLYCOLAX) 17 GRAM/DOSE POWDER    Use cap to measure 17 grams, mix in liquid and take by mouth 2 (two) times daily as needed for Constipation.    SENNA-DOCUSATE 8.6-50 MG (PERICOLACE) 8.6-50 MG PER TABLET    Take 1 tablet by mouth 2 (two) times daily as needed for Constipation.    TAMSULOSIN (FLOMAX) 0.4 MG CAP    TAKE 1 CAPSULE ONE TIME DAILY    TRAZODONE (DESYREL) 100 MG TABLET    Take 1 tablet (100 mg total) by mouth every evening.   Modified Medications    No medications on file   Discontinued Medications    No medications on file        Review of Systems   Constitutional: Positive for malaise/fatigue. Negative for chills and fever.   HENT:  Negative for hearing loss and nosebleeds.    Eyes:  Negative for blurred vision.   Cardiovascular:         As in HPI   Respiratory:  Negative for hemoptysis and shortness of breath.    Hematologic/Lymphatic: Negative for bleeding problem.   Skin:  Negative for itching.   Musculoskeletal:          Mild pain at the incision site   Gastrointestinal:  Negative for abdominal pain and hematochezia.   Genitourinary:  Negative for hematuria.   Neurological:  Negative for dizziness and loss of balance.   Psychiatric/Behavioral:  Negative for altered mental status and depression.        Family History   Problem Relation Name Age of Onset    Osteoporosis Mother Aniyah     Hypertension Mother Aniyah     Early death Father Cesar         Heart,  Stroke age 50    Stroke Father Cesar     Heart disease Father Cesar     Hypertension Father Cesar     Heart disease Brother Jose Enrique     Early death Brother Jose Enrique         Heart Att.  age 45    Hypertension Brother Jose Enrique     No Known Problems Daughter      No Known Problems Son      Early death Paternal Uncle Beltran         Heart,  age 49    Heart disease Paternal Uncle Beltran        Social History     Socioeconomic History    Marital status:    Tobacco Use    Smoking status: Never     Passive exposure: Never    Smokeless tobacco: Never   Substance and Sexual Activity    Alcohol use: Never     Comment: occasionally (maybe every 3 months)    Drug use: Never    Sexual activity: Not Currently     Partners: Female     Birth control/protection: None   Social History Narrative    9/7/2023: he lives alone. He has 2 kids, they live in [place] and [place]. 7 grandchildren. 2 grandchildren live nearby. No pets at home. He is retired. 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      Social Determinants of Health     Financial Resource Strain: Patient Declined (8/28/2024)    Overall Financial Resource Strain (CARDIA)     Difficulty of Paying Living Expenses: Patient declined   Food Insecurity: Patient Declined (8/28/2024)    Hunger Vital Sign     Worried About Running Out of Food in the Last Year: Patient declined     Ran Out of Food in the Last Year: Patient declined   Transportation Needs: Patient Declined (8/28/2024)    TRANSPORTATION NEEDS     Transportation : Patient  "declined   Physical Activity: Patient Declined (2024)    Exercise Vital Sign     Days of Exercise per Week: Patient declined     Minutes of Exercise per Session: Patient declined   Stress: Patient Declined (2024)    [employer] - Occupational Stress Questionnaire     Feeling of Stress : Patient declined   Housing Stability: Patient Declined (2024)    Housing Stability Vital Sign     Unable to Pay for Housing in the Last Year: Patient declined     Homeless in the Last Year: Patient declined            Objective:   Vitals  Vitals:    24 0955   BP: 127/77   Pulse: 91   Weight: 84.8 kg (187 lb)   Height: 5' 7" (1.702 m)         Right Arm BP - Sittin/77  Left Arm BP - Sittin/73    Physical Exam  Constitutional:       Appearance: He is well-developed.   HENT:      Head: Normocephalic and atraumatic.   Eyes:      Conjunctiva/sclera: Conjunctivae normal.   Neck:      Vascular: No carotid bruit or JVD.   Cardiovascular:      Rate and Rhythm: Normal rate and regular rhythm.      Pulses:           Carotid pulses are 2+ on the right side and 2+ on the left side.       Radial pulses are 2+ on the right side and 2+ on the left side.        Femoral pulses are 2+ on the right side.     Heart sounds: No murmur heard.     No friction rub. Gallop: Grade 3 systolic murmur.   Pulmonary:      Effort: Pulmonary effort is normal. No respiratory distress.      Breath sounds: Normal breath sounds. No stridor. No wheezing.   Abdominal:      General: Abdomen is flat.      Palpations: Abdomen is soft.   Musculoskeletal:      Cervical back: Neck supple.      Right lower leg: No edema.      Left lower leg: No edema.   Skin:     Comments: Surgical wound healing well   Neurological:      Mental Status: He is alert and oriented to person, place, and time.   Psychiatric:         Behavior: Behavior normal.         Lab Results    Lab Results   Component Value Date    WBC 9.26 2024    " HGB 11.2 (L) 09/03/2024    HCT 34.7 (L) 09/03/2024    HCT 22 (L) 08/28/2024    MCV 90 09/03/2024       Lab Results   Component Value Date     09/03/2024    INR 1.1 09/02/2024       Lab Results   Component Value Date    K 4.1 09/03/2024    MG 2.2 09/03/2024    BUN 25 (H) 09/03/2024    CREATININE 1.0 09/03/2024       Lab Results   Component Value Date     (H) 09/03/2024    HGBA1C 5.7 (H) 08/28/2024       Lab Results   Component Value Date    AST 38 09/03/2024    ALT 23 09/03/2024    ALBUMIN 2.7 (L) 09/03/2024    PROT 6.3 09/03/2024       Lab Results   Component Value Date    CHOL 135 03/07/2024    HDL 37 (L) 03/07/2024    LDLCALC 86.2 03/07/2024    TRIG 59 03/07/2024       Lab Results   Component Value Date    CRP 0.6 08/11/2023    BNP <10 08/27/2024       Assessment:     1. S/P AVR (aortic valve replacement) and aortoplasty    2. Pseudoaneurysm of femoral artery    3. PAD (peripheral artery disease)    4. Hypertension associated with diabetes    5. Swelling    6. Coronary artery disease of native artery of native heart with stable angina pectoris    7. Other hyperlipidemia    8. Type 2 diabetes mellitus with diabetic polyneuropathy, without long-term current use of insulin            Plan:     Status post PCI to the LAD prox mid and distal.  Had multiple lesion 1 in the distal and we will in the prox mid segment.  We had to stent the whole mid segment and use longer stents due to balloon rupture from heavy calcification resulting in uncontrolled dissection.     Continue aspirin and Plavix without interruption for 12 months    Attempt for TAVR complicated by type a aortic dissection.  Emergent open aortic repair with hemiarch replacement and implantation of new aortic bioprosthesis.  2D echocardiogram before next visit    Right common femoral access site examined.  It did not appreciate significant bruit or swelling.  Pending arterial ultrasound.  Possibly the pseudo aneurysm healed    Continue  high-intensity statin    Initiate Lasix every other day    Check venous ultrasound insufficiency protocol    Compression stockings and leg elevation        Continue high-intensity statin.  LDL goal lower than 70  BP is well-controlled and toward the lower side.      -In today's visit, at least 4 established conditions that pose a risk to life or bodily function have been addressed and the conditions are severe.    -In today's visit, monitoring for drug toxicity was accomplished.           Orders Placed This Encounter   Procedures    CV US Lower Extremity Veins Bilateral Insufficiency     Standing Status:   Future     Standing Expiration Date:   9/17/2025     Order Specific Question:   Release to patient     Answer:   Immediate    Echo     Standing Status:   Future     Standing Expiration Date:   9/17/2025     Order Specific Question:   Release to patient     Answer:   Immediate       He expressed verbal understanding and agreed with the plan    Pertinent cardiac images and EKG reviewed independently.    Continue with current medical plan and lifestyle changes.  Return sooner for concerns or questions. If symptoms persist go to the ED.  I have reviewed all pertinent data including patient's medical history in detail and updated the computerized patient record.     Counseling included discussion regarding imaging findings, diagnosis, possibilities, treatment options, risks and benefits.    Thank you for the opportunity to care for this patient. Will be available for questions if needed.

## 2024-09-20 ENCOUNTER — DOCUMENTATION ONLY (OUTPATIENT)
Dept: CARDIOTHORACIC SURGERY | Facility: CLINIC | Age: 74
End: 2024-09-20
Payer: MEDICARE

## 2024-09-20 NOTE — PROGRESS NOTES
Spoke with [employer] rep Ayala and confirmed that 23mm valve was implanted.    Julie Haase RN  CTS Nurse Navigator [phone]

## 2024-09-23 ENCOUNTER — OFFICE VISIT (OUTPATIENT)
Dept: VASCULAR SURGERY | Facility: CLINIC | Age: 74
End: 2024-09-23
Attending: SURGERY
Payer: MEDICARE

## 2024-09-23 ENCOUNTER — HOSPITAL ENCOUNTER (OUTPATIENT)
Dept: VASCULAR SURGERY | Facility: CLINIC | Age: 74
Discharge: HOME | End: 2024-09-23
Attending: SURGERY
Payer: MEDICARE

## 2024-09-23 VITALS
HEART RATE: 107 BPM | DIASTOLIC BLOOD PRESSURE: 78 MMHG | SYSTOLIC BLOOD PRESSURE: 158 MMHG | HEIGHT: 67 IN | WEIGHT: 178.56 LBS | BODY MASS INDEX: 28.02 KG/M2 | TEMPERATURE: 98 F

## 2024-09-23 DIAGNOSIS — I72.4 PSEUDOANEURYSM OF FEMORAL ARTERY: ICD-10-CM

## 2024-09-23 DIAGNOSIS — I72.4 PSEUDOANEURYSM OF FEMORAL ARTERY: Primary | ICD-10-CM

## 2024-09-23 PROCEDURE — 1101F PT FALLS ASSESS-DOCD LE1/YR: CPT | Mod: HCNC,CPTII,S$GLB, | Performed by: SURGERY

## 2024-09-23 PROCEDURE — 3066F NEPHROPATHY DOC TX: CPT | Mod: HCNC,CPTII,S$GLB, | Performed by: SURGERY

## 2024-09-23 PROCEDURE — 3288F FALL RISK ASSESSMENT DOCD: CPT | Mod: HCNC,CPTII,S$GLB, | Performed by: SURGERY

## 2024-09-23 PROCEDURE — 3008F BODY MASS INDEX DOCD: CPT | Mod: HCNC,CPTII,S$GLB, | Performed by: SURGERY

## 2024-09-23 PROCEDURE — 1111F DSCHRG MED/CURRENT MED MERGE: CPT | Mod: HCNC,CPTII,S$GLB, | Performed by: SURGERY

## 2024-09-23 PROCEDURE — 1157F ADVNC CARE PLAN IN RCRD: CPT | Mod: HCNC,CPTII,S$GLB, | Performed by: SURGERY

## 2024-09-23 PROCEDURE — 99203 OFFICE O/P NEW LOW 30 MIN: CPT | Mod: HCNC,S$GLB,, | Performed by: SURGERY

## 2024-09-23 PROCEDURE — 93926 LOWER EXTREMITY STUDY: CPT | Mod: HCNC,S$GLB,, | Performed by: SURGERY

## 2024-09-23 PROCEDURE — 1125F AMNT PAIN NOTED PAIN PRSNT: CPT | Mod: HCNC,CPTII,S$GLB, | Performed by: SURGERY

## 2024-09-23 PROCEDURE — 3044F HG A1C LEVEL LT 7.0%: CPT | Mod: HCNC,CPTII,S$GLB, | Performed by: SURGERY

## 2024-09-23 PROCEDURE — 3061F NEG MICROALBUMINURIA REV: CPT | Mod: HCNC,CPTII,S$GLB, | Performed by: SURGERY

## 2024-09-23 PROCEDURE — 99999 PR PBB SHADOW E&M-EST. PATIENT-LVL III: CPT | Mod: PBBFAC,HCNC,, | Performed by: SURGERY

## 2024-09-23 PROCEDURE — 4010F ACE/ARB THERAPY RXD/TAKEN: CPT | Mod: HCNC,CPTII,S$GLB, | Performed by: SURGERY

## 2024-09-23 PROCEDURE — 3077F SYST BP >= 140 MM HG: CPT | Mod: HCNC,CPTII,S$GLB, | Performed by: SURGERY

## 2024-09-23 PROCEDURE — 3078F DIAST BP <80 MM HG: CPT | Mod: HCNC,CPTII,S$GLB, | Performed by: SURGERY

## 2024-09-23 RX ORDER — FAMOTIDINE 40 MG/1
TABLET, FILM COATED ORAL
COMMUNITY
Start: 2024-09-11

## 2024-09-23 NOTE — PROGRESS NOTES
VASCULAR SURGERY NOTE    Patient ID: Cesar Simpson is a 74 y.o. male.    I. HISTORY     Chief Complaint:  Follow up for pseudoaneurysm right artery    HPI: Cesar Simpson is a 74 y.o. male who is here today for follow up appointment. He has a h/o CAD, PAD, severe AS, HTN, HLD, Hypothyroidism, FABY, T2DM .  He recently under underwent a TAVR on 8/27/24 that was complicated by an aortic dissection prompting OR intervention with CTS. He is S/P bioprosthetic aortic valve replacement, hemiarch, and ascending aorta repair with Dr. Carroll on 08/27/2024.  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  At that time no need for surgical intervention was indicated as pseudoaneurysm was likely to thrombose on its own.  Comes for follow up appointment any problems with tingling, numbness or motor function in his right lower extremity.      Past Medical History:   Diagnosis Date    Coronary artery disease of native artery of native heart with stable angina pectoris 4/10/2024    Diabetes mellitus type II, uncontrolled 02/27/2013    High-density lipoprotein deficiency 02/27/2013    HTN (hypertension) 02/27/2013    Hyperlipidemia     Hypothyroidism     Moderate obstructive sleep apnea 6/29/2022    Normocytic anemia 06/28/2021    Obesity     Osteoarthritis 02/08/2016    PAD (peripheral artery disease) 4/10/2024    Trouble in sleeping     Type 2 diabetes mellitus     Type 2 diabetes mellitus with diabetic polyneuropathy, without long-term current use of insulin         Past Surgical History:   Procedure Laterality Date    AORTIC VALVE REPLACEMENT  8/27/2024    Procedure: REPLACEMENT, AORTIC VALVE;  Surgeon: London Guillen MD;  Location: [facility] OR [address] [place] [address];  Service: Cardiovascular;;    CARDIAC CATH COSURGEON N/A 8/27/2024    Procedure: Cardiac Cath Cosurgeon;  Surgeon: Melvin Carroll MD;   Location: [facility] CATH LAB;  Service: Cardiology;  Laterality: N/A;    CATARACT EXTRACTION      CATHETERIZATION OF BOTH LEFT AND RIGHT HEART N/A 4/16/2024    Procedure: CATHETERIZATION, HEART, BOTH LEFT AND RIGHT;  Surgeon: Brian Vickers MD;  Location: [facility] CATH LAB/EP;  Service: Cardiology;  Laterality: N/A;  Aortic valve study/ [place] Catheter/2 manifolds    CORONARY ANGIOGRAPHY N/A 4/16/2024    Procedure: ANGIOGRAM, CORONARY ARTERY;  Surgeon: Brian Vickers MD;  Location: [facility] CATH LAB/EP;  Service: Cardiology;  Laterality: N/A;    CORONARY ANGIOPLASTY WITH STENT PLACEMENT N/A 4/23/2024    Procedure: ANGIOPLASTY, CORONARY ARTERY, WITH STENT INSERTION;  Surgeon: Brian Vickers MD;  Location: [facility] CATH LAB/EP;  Service: Cardiology;  Laterality: N/A;    EXPLANT N/A 8/27/2024    Procedure: EXPLANT; TAVR;  Surgeon: London Guillen MD;  [facility];  Service: Cardiovascular;  Laterality: N/A;    EYE SURGERY      cataracts    INSTANTANEOUS WAVE-FREE RATIO (IFR) N/A 4/16/2024    Procedure: Instantaneous Wave-Free Ratio (IFR);  Surgeon: Brian Vickers MD;  Location: [facility] CATH LAB/EP;  Service: Cardiology;  Laterality: N/A;    IVUS, CORONARY  4/23/2024    Procedure: IVUS, Coronary;  Surgeon: Brian Vickers MD;  Location: [facility] CATH LAB/EP;  Service: Cardiology;;    JOINT REPLACEMENT Left     arthroplasty    PERCUTANEOUS CORONARY INTERVENTION, ARTERY N/A 4/23/2024    Procedure: Percutaneous coronary intervention;  Surgeon: Brian Vickers MD;  Location: [facility] CATH LAB/EP;  Service: Cardiology;  Laterality: N/A;  LAD    REPLACEMENT OF ASCENDING AORTA N/A 8/27/2024    Procedure: REPLACEMENT, AORTA, ASCENDING; HEMIARCH;  Surgeon: London Guillen MD;  [facility];  Service: Cardiovascular;  Laterality: N/A;    SHOULDER SURGERY      TONSILLECTOMY      TOTAL SHOULDER ARTHROPLASTY Left     TRANSCATHETER AORTIC VALVE REPLACEMENT (TAVR) N/A 8/27/2024    Procedure: REPLACEMENT, AORTIC  VALVE, TRANSCATHETER (TAVR);  Surgeon: Cesar Louis MD;  Location: [facility] CATH LAB;  Service: Cardiology;  Laterality: N/A;       Social History     Occupational History    Not on file   Tobacco Use    Smoking status: Never     Passive exposure: Never    Smokeless tobacco: Never   Substance and Sexual Activity    Alcohol use: Never     Comment: occasionally (maybe every 3 months)    Drug use: Never    Sexual activity: Not Currently     Partners: Female     Birth control/protection: None         Review of Systems   Constitutional: Negative for weight loss.   HENT:  Negative for ear pain and nosebleeds.    Eyes:  Negative for discharge and pain.   Cardiovascular:  Negative for chest pain and palpitations.   Respiratory:  Negative for cough, shortness of breath and wheezing.    Endocrine: Negative for cold intolerance, heat intolerance and polyphagia.   Hematologic/Lymphatic: Negative for adenopathy. Does not bruise/bleed easily.   Skin:  Negative for itching and rash.   Musculoskeletal:  Negative for joint swelling and muscle cramps.   Gastrointestinal:  Negative for abdominal pain, diarrhea, nausea and vomiting.   Genitourinary:  Negative for dysuria and flank pain.   Neurological:  Negative for numbness and seizures.         II. PHYSICAL EXAM     Physical Exam  Constitutional:       General: He is not in acute distress.     Appearance: Normal appearance. He is normal weight. He is not ill-appearing or diaphoretic.   HENT:      Head: Normocephalic and atraumatic.      Mouth/Throat:      Mouth: Mucous membranes are moist.   Eyes:      General: No scleral icterus.        Right eye: No discharge.         Left eye: No discharge.      Extraocular Movements: Extraocular movements intact.      Conjunctiva/sclera: Conjunctivae normal.   Cardiovascular:      Rate and Rhythm: Normal rate and regular rhythm.      Pulses:           Femoral pulses are 2+ on the right side and 2+ on the left side.     Comments: No swelling  in right groin  Pulmonary:      Effort: Pulmonary effort is normal. No respiratory distress.   Abdominal:      General: Abdomen is flat. There is no distension.      Palpations: Abdomen is soft.      Tenderness: There is no abdominal tenderness.   Musculoskeletal:         General: Normal range of motion.      Cervical back: Normal range of motion and neck supple.      Right lower leg: No edema.      Left lower leg: No edema.   Skin:     General: Skin is warm and dry.      Coloration: Skin is not jaundiced or pale.      Findings: No erythema or rash.   Neurological:      General: No focal deficit present.      Mental Status: He is alert and oriented to person, place, and time.   Psychiatric:         Mood and Affect: Mood normal.         Behavior: Behavior normal.           III. ASSESSMENT & PLAN (MEDICAL DECISION MAKING)       Imaging Results: (I have personally reviewed all images and provided interpretation below)   RLE arterial duplex 9/23/24:  No evidence of pseudoaneurysms in the right groin.  Normal triphasic waveforms throughout without evidence of stenosis    CTA 9/2/24: small right femoral pseudoaneurysm max diameter 1.3cm      Assessment/Diagnosis and Plan:      1. Pseudoaneurysm of femoral artery        74 y.o. male with prior right femoral pseudoaneurysm after removal of femoral sheath after attempted TAVR. No evidence of pseudoaneurysms on today's follow-up ultrasound.  No indication for any surgical intervention or ongoing surveillance.  I explained the results and findings with the patient who expressed understanding and was thankful for the information.      - Follow up p.r.n.    Tracie Magallon MD   General Surgery PGY-3

## 2024-09-24 ENCOUNTER — TELEPHONE (OUTPATIENT)
Dept: CARDIOTHORACIC SURGERY | Facility: CLINIC | Age: 74
End: 2024-09-24

## 2024-09-24 ENCOUNTER — TELEPHONE (OUTPATIENT)
Dept: FAMILY MEDICINE | Facility: CLINIC | Age: 74
End: 2024-09-24
Payer: MEDICARE

## 2024-09-24 ENCOUNTER — PATIENT MESSAGE (OUTPATIENT)
Dept: FAMILY MEDICINE | Facility: CLINIC | Age: 74
End: 2024-09-24
Payer: MEDICARE

## 2024-09-24 NOTE — TELEPHONE ENCOUNTER
Attempted to contact pt to confirm 9/25 appt with Dr. Carroll. No answer, lvm with post-op appt times and locations. Contact number was provided.

## 2024-09-24 NOTE — PROGRESS NOTES
Pt instructed to follow-up with his cardiologist, Dr. Vickers.      Explained to pt he is still under a 20lb lifting restriction from 6 weeks to 12 weeks post op  After 12 weeks he will have no further restrictions.    Pt reminded to continue washing incisions everyday with soap and water.  Pt was provided with a copy of AVS and instructed on medication changes.  Pt verbalized understanding of all instructions.    Julie Haase RN  [facility] Nurse Navigator [phone]

## 2024-09-24 NOTE — TELEPHONE ENCOUNTER
----- Message from Gloria Paulino PA-C sent at 9/24/2024 11:36 AM CDT -----  Regarding: appointment  Please contact pt to schedule appt with Dr. López  ----- Message -----  From: [facility] Interface  Sent: 9/23/2024  11:53 PM CDT  To: Chaitanya López DO

## 2024-09-24 NOTE — PROGRESS NOTES
Patient seen and examined. Patient is progressively increasing activity.      Sternum: stable, incision CDI  Chest xray: Acceptable post op chest  EKG: NSR     Assessment:  S/P the following procedures on 8/27/24  1.  Hemiarch replacement with a  26 mm Gelweave Dacron tube graft under deep   hypothermic circulatory arrest, circulatory arrest time 24 minutes at 28 degrees   Celsius with antegrade cerebral perfusion.  2.  Ascending aortic replacement with a 26 mm Gelweave Dacron tube graft   3. Explantation of Evolut TAVR valve   4. Aortic valve replacement with bioprosthetic 23 mm Avalus Valve     Plan:  Can begin driving as long as he has power steering  Can begin cardiac rehab in the next couple of weeks  We will refer to cardiology to assume care   Metop increased to 37.5 BID for tachycardia. Patient reports HR sustaining at 126 for 15 minutes while sleeping  Blood pressure has also been running high. Start Norvasc 5mg   Follow up in 6 months with repeat CTA     No scheduled appointment, RTC prn

## 2024-09-25 ENCOUNTER — OFFICE VISIT (OUTPATIENT)
Dept: CARDIOTHORACIC SURGERY | Facility: CLINIC | Age: 74
End: 2024-09-25
Attending: STUDENT IN AN ORGANIZED HEALTH CARE EDUCATION/TRAINING PROGRAM
Payer: MEDICARE

## 2024-09-25 ENCOUNTER — HOSPITAL ENCOUNTER (OUTPATIENT)
Dept: RADIOLOGY | Facility: HOSPITAL | Age: 74
Discharge: HOME | End: 2024-09-25
Attending: STUDENT IN AN ORGANIZED HEALTH CARE EDUCATION/TRAINING PROGRAM
Payer: MEDICARE

## 2024-09-25 ENCOUNTER — HOSPITAL ENCOUNTER (OUTPATIENT)
Dept: CARDIOLOGY | Facility: CLINIC | Age: 74
Discharge: HOME | End: 2024-09-25
Attending: STUDENT IN AN ORGANIZED HEALTH CARE EDUCATION/TRAINING PROGRAM
Payer: MEDICARE

## 2024-09-25 VITALS
HEIGHT: 67 IN | BODY MASS INDEX: 28.48 KG/M2 | DIASTOLIC BLOOD PRESSURE: 76 MMHG | OXYGEN SATURATION: 98 % | WEIGHT: 181.44 LBS | SYSTOLIC BLOOD PRESSURE: 141 MMHG | HEART RATE: 96 BPM

## 2024-09-25 DIAGNOSIS — Z98.890 S/P AORTIC DISSECTION REPAIR: ICD-10-CM

## 2024-09-25 DIAGNOSIS — I71.019 ACUTE THORACIC AORTIC DISSECTION: ICD-10-CM

## 2024-09-25 DIAGNOSIS — Z95.2 S/P AVR (AORTIC VALVE REPLACEMENT) AND AORTOPLASTY: Primary | ICD-10-CM

## 2024-09-25 LAB
OHS QRS DURATION: 100 MS
OHS QTC CALCULATION: 447 MS

## 2024-09-25 PROCEDURE — 93005 ELECTROCARDIOGRAM TRACING: CPT | Mod: HCNC,S$GLB,, | Performed by: STUDENT IN AN ORGANIZED HEALTH CARE EDUCATION/TRAINING PROGRAM

## 2024-09-25 PROCEDURE — 71046 X-RAY EXAM CHEST 2 VIEWS: CPT | Mod: TC,HCNC

## 2024-09-25 PROCEDURE — 3077F SYST BP >= 140 MM HG: CPT | Mod: HCNC,CPTII,S$GLB, | Performed by: STUDENT IN AN ORGANIZED HEALTH CARE EDUCATION/TRAINING PROGRAM

## 2024-09-25 PROCEDURE — 99999 PR PBB SHADOW E&M-EST. PATIENT-LVL IV: CPT | Mod: PBBFAC,HCNC,, | Performed by: STUDENT IN AN ORGANIZED HEALTH CARE EDUCATION/TRAINING PROGRAM

## 2024-09-25 PROCEDURE — 71046 X-RAY EXAM CHEST 2 VIEWS: CPT | Mod: 26,HCNC,, | Performed by: RADIOLOGY

## 2024-09-25 PROCEDURE — 99024 POSTOP FOLLOW-UP VISIT: CPT | Mod: HCNC,S$GLB,, | Performed by: STUDENT IN AN ORGANIZED HEALTH CARE EDUCATION/TRAINING PROGRAM

## 2024-09-25 PROCEDURE — 93010 ELECTROCARDIOGRAM REPORT: CPT | Mod: HCNC,S$GLB,, | Performed by: INTERNAL MEDICINE

## 2024-09-25 PROCEDURE — 3061F NEG MICROALBUMINURIA REV: CPT | Mod: HCNC,CPTII,S$GLB, | Performed by: STUDENT IN AN ORGANIZED HEALTH CARE EDUCATION/TRAINING PROGRAM

## 2024-09-25 PROCEDURE — 3078F DIAST BP <80 MM HG: CPT | Mod: HCNC,CPTII,S$GLB, | Performed by: STUDENT IN AN ORGANIZED HEALTH CARE EDUCATION/TRAINING PROGRAM

## 2024-09-25 PROCEDURE — 3066F NEPHROPATHY DOC TX: CPT | Mod: HCNC,CPTII,S$GLB, | Performed by: STUDENT IN AN ORGANIZED HEALTH CARE EDUCATION/TRAINING PROGRAM

## 2024-09-25 PROCEDURE — 3044F HG A1C LEVEL LT 7.0%: CPT | Mod: HCNC,CPTII,S$GLB, | Performed by: STUDENT IN AN ORGANIZED HEALTH CARE EDUCATION/TRAINING PROGRAM

## 2024-09-25 PROCEDURE — 1157F ADVNC CARE PLAN IN RCRD: CPT | Mod: HCNC,CPTII,S$GLB, | Performed by: STUDENT IN AN ORGANIZED HEALTH CARE EDUCATION/TRAINING PROGRAM

## 2024-09-25 PROCEDURE — 4010F ACE/ARB THERAPY RXD/TAKEN: CPT | Mod: HCNC,CPTII,S$GLB, | Performed by: STUDENT IN AN ORGANIZED HEALTH CARE EDUCATION/TRAINING PROGRAM

## 2024-09-25 PROCEDURE — 1125F AMNT PAIN NOTED PAIN PRSNT: CPT | Mod: HCNC,CPTII,S$GLB, | Performed by: STUDENT IN AN ORGANIZED HEALTH CARE EDUCATION/TRAINING PROGRAM

## 2024-09-25 RX ORDER — AMLODIPINE BESYLATE 5 MG/1
5 TABLET ORAL DAILY
Qty: 90 TABLET | Refills: 3 | Status: SHIPPED | OUTPATIENT
Start: 2024-09-25 | End: 2025-09-25

## 2024-09-25 RX ORDER — AMLODIPINE BESYLATE 5 MG/1
5 TABLET ORAL DAILY
Qty: 90 TABLET | Refills: 3 | Status: SHIPPED | OUTPATIENT
Start: 2024-09-25 | End: 2024-09-25

## 2024-09-25 RX ORDER — METOPROLOL TARTRATE 25 MG/1
37.5 TABLET, FILM COATED ORAL 2 TIMES DAILY
Qty: 270 TABLET | Refills: 3 | Status: SHIPPED | OUTPATIENT
Start: 2024-09-25 | End: 2024-09-25

## 2024-09-25 RX ORDER — METOPROLOL TARTRATE 25 MG/1
37.5 TABLET, FILM COATED ORAL 2 TIMES DAILY
Qty: 270 TABLET | Refills: 3 | Status: SHIPPED | OUTPATIENT
Start: 2024-09-25 | End: 2025-09-25

## 2024-09-26 ENCOUNTER — OFFICE VISIT (OUTPATIENT)
Dept: URGENT CARE | Facility: CLINIC | Age: 74
End: 2024-09-26
Payer: MEDICARE

## 2024-09-26 VITALS
OXYGEN SATURATION: 97 % | HEIGHT: 67 IN | HEART RATE: 116 BPM | TEMPERATURE: 98 F | RESPIRATION RATE: 20 BRPM | BODY MASS INDEX: 28.41 KG/M2 | SYSTOLIC BLOOD PRESSURE: 129 MMHG | DIASTOLIC BLOOD PRESSURE: 74 MMHG | WEIGHT: 181 LBS

## 2024-09-26 DIAGNOSIS — S61.431A PUNCTURE WOUND OF RIGHT HAND WITHOUT FOREIGN BODY, INITIAL ENCOUNTER: ICD-10-CM

## 2024-09-26 DIAGNOSIS — Z23 NEED FOR TD VACCINE: ICD-10-CM

## 2024-09-26 DIAGNOSIS — Z95.2 S/P AVR (AORTIC VALVE REPLACEMENT) AND AORTOPLASTY: Primary | ICD-10-CM

## 2024-09-26 DIAGNOSIS — Z98.890 S/P AORTIC DISSECTION REPAIR: ICD-10-CM

## 2024-09-26 DIAGNOSIS — S61.451A DOG BITE OF RIGHT HAND, INITIAL ENCOUNTER: Primary | ICD-10-CM
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
## 2024-09-26 PROCEDURE — 73130 X-RAY EXAM OF HAND: CPT | Mod: FY,RT,S$GLB, | Performed by: RADIOLOGY

## 2024-09-26 RX ORDER — AMOXICILLIN AND CLAVULANATE POTASSIUM 875; 125 MG/1; MG/1
1 TABLET, FILM COATED ORAL EVERY 12 HOURS
Qty: 20 TABLET | Refills: 0 | Status: SHIPPED | OUTPATIENT
Start: 2024-09-26 | End: 2024-10-07

## 2024-09-26 NOTE — PROGRESS NOTES
"Subjective:      Patient ID: Cesar Simpson is a 74 y.o. male.    Vitals:  height is 5' 7" (1.702 m) and weight is 82.1 kg (181 lb). His oral temperature is 98.2 °F (36.8 °C). His blood pressure is 129/74 and his pulse is 116 (abnormal). His respiration is 20 and oxygen saturation is 97%.     Chief Complaint: Animal Bite    Pt present with a dog bite injury to his Right hand that happened last night. Pt stated he didn't think it was that bad, but now it keeps bleeding. Pt states the dog [place] loose in the park.   Provider note below:  This is a 74 y.o. male who presents today with a chief complaint of dog bite of right hand that happened yesterday, patient reports he was in [facility] eating his sandwich and drinking water and dog jumped and grabbed sandwich from his hand causing the bite on his right hand, patient reports he does not know what kind of dog was it, patient is on blood thinners and reports bleeding from the dog bite, patient only had the Band-Aid onwhen he came to the clinic, patient reports pain to right hand, patient reports dog owner unknown but reports dog did had the collar on but did not had the leash on, last tetanus 2016, patient reports swelling to right hand, patient reports he does have arthritis in his right hand, denies fever, body aches or chills, denies cough, wheezing or shortness of breath, denies nausea, vomiting, diarrhea or abdominal pain, denies chest pain or dizziness positional lightheadedness, denies sore throat or trouble swallowing, denies loss of taste or smell, or any other symptoms       Animal Bite   The incident occurred yesterday. Head/neck injury location: Right Hand. There is an injury to the Right hand. The pain is moderate. It is unlikely that a foreign body is present. There have been no prior injuries to these areas. He is Right-handed. His tetanus status is UTD.       Musculoskeletal:  Positive for pain and trauma.      Past Medical History:   Diagnosis Date    " Coronary artery disease of native artery of native heart with stable angina pectoris 4/10/2024    Diabetes mellitus type II, uncontrolled 02/27/2013    High-density lipoprotein deficiency 02/27/2013    HTN (hypertension) 02/27/2013    Hyperlipidemia     Hypothyroidism     Moderate obstructive sleep apnea 6/29/2022    Normocytic anemia 06/28/2021    Obesity     Osteoarthritis 02/08/2016    PAD (peripheral artery disease) 4/10/2024    Trouble in sleeping     Type 2 diabetes mellitus     Type 2 diabetes mellitus with diabetic polyneuropathy, without long-term current use of insulin        Objective:     Physical Exam   Constitutional: He is oriented to person, place, and time. He appears well-developed. He is cooperative.  Non-toxic appearance. He does not appear ill. No distress.   HENT:   Head: Normocephalic and atraumatic. Head is without abrasion, without contusion and without laceration.   Ears:   Right Ear: Hearing, tympanic membrane, external ear and ear canal normal. No hemotympanum.   Left Ear: Hearing, tympanic membrane, external ear and ear canal normal. No hemotympanum.   Nose: Nose normal. No mucosal edema, rhinorrhea or nasal deformity. No epistaxis. Right sinus exhibits no maxillary sinus tenderness and no frontal sinus tenderness. Left sinus exhibits no maxillary sinus tenderness and no frontal sinus tenderness.   Mouth/Throat: Uvula is midline, oropharynx is clear and moist and mucous membranes are normal. No trismus in the jaw. Normal dentition. No uvula swelling. No posterior oropharyngeal erythema.   Eyes: Conjunctivae, EOM and lids are normal. Pupils are equal, round, and reactive to light. Right eye exhibits no discharge. Left eye exhibits no discharge. No scleral icterus.   Neck: Trachea normal and phonation normal. Neck supple. No tracheal deviation present. No neck rigidity present. No spinous process tenderness present. No muscular tenderness present.   Cardiovascular: Normal rate, regular  rhythm, normal heart sounds and normal pulses.   Pulmonary/Chest: Effort normal and breath sounds normal. No respiratory distress.   Abdominal: Normal appearance and bowel sounds are normal. He exhibits no distension and no mass. Soft. There is no abdominal tenderness.   Musculoskeletal:         General: No deformity.      Right hand: He exhibits decreased range of motion, tenderness and swelling.      Comments: Multiple puncture wound noted to right hand  palmar aspect and one puncture wound with abrasion noted to dorsal aspect of right hand as in pic, One puncture wound with oozing blood on dorsal aspect (pt on plavix), no erythema noted, Cap refill 2 seconds, right radial pulse 2+, sensation intact, ROm intact of right hand/fingers       Neurological: He is alert and oriented to person, place, and time. He has normal strength. No cranial nerve deficit or sensory deficit. He exhibits normal muscle tone. He displays no seizure activity. Coordination normal. GCS eye subscore is 4. GCS verbal subscore is 5. GCS motor subscore is 6.   Skin: Skin is warm, dry, intact, not diaphoretic and not pale. Capillary refill takes less than 2 seconds. No abrasion, No burn, No bruising and No ecchymosis   Psychiatric: His speech is normal and behavior is normal. Judgment and thought content normal.   Nursing note and vitals reviewed.            XR HAND COMPLETE 3 VIEW RIGHT    Result Date: 9/26/2024  EXAMINATION: XR HAND COMPLETE 3 VIEW RIGHT CLINICAL HISTORY: Open bite of right hand, initial encounter TECHNIQUE: PA, lateral, and oblique views of the right hand were performed. COMPARISON: 07/30/2010 FINDINGS: No radiopaque foreign body.  No fracture or dislocation.  There is multifocal degenerative change of the right hand with progression relative to the prior study.  Includes severe 1st carpometacarpal osteoarthritis.     No fracture, dislocation, or radiopaque foreign body. Electronically signed by: Alton Devlin  Date:    09/26/2024 Time:    11:52    Patient in no acute distress.  Vitals reassuring.  Discussed results/diagnosis/plan in depth with patient in clinic. Strict precautions given to patient to monitor for worsening signs and symptoms. Advised to follow up with primary.All questions answered. Strict ER precautions given. If your symptoms worsens or fail to improve you should go to the Emergency Room. Discharge and follow-up instructions given verbally/printed. Discharge and follow-up instructions discussed with the patient who expressed understanding and willingness to comply with my recommendations.Patient voiced understanding and in agreement with current treatment plan.     Please be advised this text was dictated with Peel software and may contain errors due to translation.   Assessment:     1. Dog bite of right hand, initial encounter    2. Puncture wound of right hand without foreign body, initial encounter    3. Need for Td vaccine        Plan:       Dog bite of right hand, initial encounter  -     XR HAND COMPLETE 3 VIEW RIGHT; Future; Expected date: 09/26/2024  -     diptheria-tetanus toxoids 2-2 Lf unit/0.5 mL injection 0.5 mL  -     amoxicillin-clavulanate 875-125mg (AUGMENTIN) 875-125 mg per tablet; Take 1 tablet by mouth every 12 (twelve) hours. for 10 days  Dispense: 20 tablet; Refill: 0    Puncture wound of right hand without foreign body, initial encounter  -     XR HAND COMPLETE 3 VIEW RIGHT; Future; Expected date: 09/26/2024  -     amoxicillin-clavulanate 875-125mg (AUGMENTIN) 875-125 mg per tablet; Take 1 tablet by mouth every 12 (twelve) hours. for 10 days  Dispense: 20 tablet; Refill: 0    Need for Td vaccine  -     diptheria-tetanus toxoids 2-2 Lf unit/0.5 mL injection 0.5 mL          Medical Decision Making:   History:   Old Medical Records: I decided to obtain old medical records.  Old Records Summarized: records from clinic visits and records from previous admission(s).  Clinical Tests:    Radiological Study: Ordered and Reviewed  Urgent Care Management:  Patient in no acute distress.  Vitals reassuring.  On exam, patient is nontoxic appearing and afebrile.  Lungs CTA.  Physical examination as above.  Patient with multiple puncture wound to his right hand dorsal and palmar aspect secondary to dog bite.  One puncture wound to dorsal aspect of right hand with blood oozing.  Patient with Band-Aid on when he came to the clinic.  Wounds cleaned in clinic.  Pressure dressing applied in clinic.  Patient is on Plavix.  Elevation of the affected extremity recommended.  I had lengthy discussion with patient regarding to monitor his oozing of blood from his right hand puncture wound closely, monitor pressure dressing  and if continuously oozing/bleeding he will need to go to the emergency room for further evaluation.  X-ray obtained.  x-ray results discussed with patient.  Also discussed patient's plan of care with Dr. marti will also agreed that if bleeding does not stop with pressure dressing he will need to go to the emergency room for further evaluation as patient is on Plavix.  Tetanus updated in clinic.  Medication prescribed and over-the-counter medication discussed with patient at length.  Proper hydration advised.  I reiterated the importance of further evaluation if no improvement symptoms and follow-up with primary. Patient voiced understanding and in agreement with current treatment plan.    Attempted multiple times by RT Carla and me to [facility] animal control to notify about the incident, unable to get in touch with the authorities to report the incident as phone call is going directly to the message.  Also called the after hours line with the same message.  09/27/2024:  Again attempted multiple times to call [facility] normal [facility] and there emergency service line to report the incident, no answer unable to get in touch with anyone at the [facility] anymore  control.      Other:   I have discussed this case with another health care provider.       <> Summary of the Discussion: Dr. Cortes           Patient Instructions                                                 Animal Bites  If your condition worsens or fails to improve we recommend that you receive another evaluation at the ER immediately or contact your PCP to discuss your concerns or return here. You must understand that you've received an urgent care treatment only and that you may be released before all your medical problems are known or treated. You the patient will arrange for follouwp care as instructed.     Use cool compresses for 20 minutes 3-5 times a day. Avoid picking or manipulating the wound. Clean the wound twice a day with soap and water.  Apply antibiotic ointment on it. You should seek ER treatment if fever, increase pain, swelling or other signs of increasing infection.   If you were prescribed antibiotics, please take them to completion  If you are female and on BCP use additional methods to prevent pregnancy while on antibiotics and for one cycle after.   If you were given narcotics do not drive or operate heavy equipment or machinery while taking these medications.   Tylenol or ibuprofen can also be used as directed for pain unless you have an allergy to them or medical condition such as stomach ulcers, kidney or liver disease or blood thinners etc for which you should not be taking these type of medications.   Please return here in 1-3 days for a recheck of your wound.   If not allergic, please take over the counter Tylenol (Acetaminophen) and/or Motrin (Ibuprofen) as directed for control of pain and/or fever.

## 2024-09-26 NOTE — PATIENT INSTRUCTIONS
Animal Bites  If your condition worsens or fails to improve we recommend that you receive another evaluation at the ER immediately or contact your PCP to discuss your concerns or return here. You must understand that you've received an urgent care treatment only and that you may be released before all your medical problems are known or treated. You the patient will arrange for follouwp care as instructed.     Use cool compresses for 20 minutes 3-5 times a day. Avoid picking or manipulating the wound. Clean the wound twice a day with soap and water.  Apply antibiotic ointment on it. You should seek ER treatment if fever, increase pain, swelling or other signs of increasing infection.   If you were prescribed antibiotics, please take them to completion  If you are female and on BCP use additional methods to prevent pregnancy while on antibiotics and for one cycle after.   If you were given narcotics do not drive or operate heavy equipment or machinery while taking these medications.   Tylenol or ibuprofen can also be used as directed for pain unless you have an allergy to them or medical condition such as stomach ulcers, kidney or liver disease or blood thinners etc for which you should not be taking these type of medications.   Please return here in 1-3 days for a recheck of your wound.   If not allergic, please take over the counter Tylenol (Acetaminophen) and/or Motrin (Ibuprofen) as directed for control of pain and/or fever.

## 2024-09-27 ENCOUNTER — TELEPHONE (OUTPATIENT)
Dept: CARDIAC REHAB | Facility: CLINIC | Age: 74
End: 2024-09-27
Payer: MEDICARE

## 2024-09-27 PROBLEM — I71.019 ACUTE THORACIC AORTIC DISSECTION: Status: RESOLVED | Noted: 2024-09-27 | Resolved: 2024-09-27

## 2024-09-27 NOTE — TELEPHONE ENCOUNTER
Letter regarding Phase II cardiac rehab was sent to patient.  Will contact patient in 2 weeks to see if interested.  Also, information letter sent to [facility].  Any Manning RN  Cardiac Rehab Nurse

## 2024-09-27 NOTE — LETTER
September 27, 2024    Cesar Simpson  [address] [facility] [address]             [facility]  [address] [facility]  [place] [address]  Phone: [phone]                                  [facility]   [address] [facility]   [place] [address]  [phone]         [facility]   [address] [place] [address] [phone]         [facility]    [address] [facility] [address] [place] [id number] [phone]   Re: Cesar Simpson  Clinic number: [id number]    Dear Mr. Simpson:    You were recently admitted to an [facility] facility for cardiac (heart) problem.  Your physician has referred you to [facility].  Cardiac Rehab Phase 2 is an educational and exercise program, conducted in a outpatient setting, proven to help reduce your risk for recurrent heart events.    Cardiac rehab has two major parts:    1. Exercise training to help you achieve cardiovascular fitness while learning how to exercise safely and improve muscle strength and endurance.  Your exercise prescription will be based on the results of the cardiopulmonary stress test (CPX) which will be done before entering the program and at completion.  2. Education, counseling and training to help you understand your heart condition and find ways to reduce your risk of future heart problems.  The cardiac rehab team will help you learn how to cope with the stress of adjusting to a new lifestyle and to deal with your fears about the future.    Phase 2 is a 36-session program, meeting 3 times a week for 12 weeks.  Each session consists of an hour of exercise and half-hour dedicated to the educational topic of the day.  Class days vary per location.  Please contact your nearest facility for details.    Through cardiac rehab you will learn:  About your heart condition, medical therapies, and medication  Risk factors in y our lifestyle contributing to heart  disease  New strategies to modify your risk factors  About a healthy diet that can lower your blood cholesterol, control weight, help prevent or control high blood pressure, and diabetes  How to stop smoking  How to manage stress    If you are interested in getting started, call the [facility] of your choosing.     Sincerely,     [facility]

## 2024-09-30 ENCOUNTER — EXTERNAL HOME HEALTH (OUTPATIENT)
Dept: HOME HEALTH SERVICES | Facility: HOSPITAL | Age: 74
End: 2024-09-30
Payer: MEDICARE

## 2024-10-07 ENCOUNTER — TELEPHONE (OUTPATIENT)
Dept: CARDIAC REHAB | Facility: CLINIC | Age: 74
End: 2024-10-07
Payer: MEDICARE

## 2024-10-08 DIAGNOSIS — R73.9 BLOOD GLUCOSE ELEVATED: ICD-10-CM

## 2024-10-08 DIAGNOSIS — E78.00 PURE HYPERCHOLESTEROLEMIA: ICD-10-CM

## 2024-10-08 DIAGNOSIS — Z95.2 HEART VALVE REPLACED BY TRANSPLANT: Primary | ICD-10-CM

## 2024-10-08 DIAGNOSIS — Z98.890 PERSONAL HISTORY OF SURGERY TO HEART AND GREAT VESSELS, PRESENTING HAZARDS TO HEALTH: ICD-10-CM

## 2024-10-14 ENCOUNTER — HOSPITAL ENCOUNTER (OUTPATIENT)
Dept: CARDIOLOGY | Facility: HOSPITAL | Age: 74
Discharge: HOME | End: 2024-10-14
Attending: STUDENT IN AN ORGANIZED HEALTH CARE EDUCATION/TRAINING PROGRAM
Payer: MEDICARE

## 2024-10-14 VITALS
DIASTOLIC BLOOD PRESSURE: 62 MMHG | HEART RATE: 79 BPM | HEIGHT: 67 IN | SYSTOLIC BLOOD PRESSURE: 117 MMHG | BODY MASS INDEX: 27.62 KG/M2 | WEIGHT: 176 LBS

## 2024-10-14 DIAGNOSIS — Z98.890 PERSONAL HISTORY OF SURGERY TO HEART AND GREAT VESSELS, PRESENTING HAZARDS TO HEALTH: ICD-10-CM

## 2024-10-14 DIAGNOSIS — Z95.2 HEART VALVE REPLACED BY TRANSPLANT: ICD-10-CM

## 2024-10-14 LAB
CV STRESS BASE HR: 79 BPM
DIASTOLIC BLOOD PRESSURE: 62 MMHG
OHS CV CPX 1 MINUTE RECOVERY HEART RATE: 112 BPM
OHS CV CPX 85 PERCENT MAX PREDICTED HEART RATE MALE: 125
OHS CV CPX ABDOMINAL GIRTH: 43.5 CM
OHS CV CPX ANAEROBIC THRESHOLD DIASTOLIC BLOOD PRESSURE: 60 MMHG
OHS CV CPX ANAEROBIC THRESHOLD HEART RATE: 105
OHS CV CPX ANAEROBIC THRESHOLD RATE PRESSURE PRODUCT: NORMAL
OHS CV CPX ANAEROBIC THRESHOLD SYSTOLIC BLOOD PRESSURE: 109
OHS CV CPX DATA GRADE - AT: 8.4
OHS CV CPX DATA GRADE - PEAK: 11
OHS CV CPX DATA O2 SAT - PEAK: 98
OHS CV CPX DATA O2 SAT - REST: 97
OHS CV CPX DATA SPEED - AT: 2.6
OHS CV CPX DATA SPEED - PEAK: 3.1
OHS CV CPX DATA TIME - AT: 4.27
OHS CV CPX DATA TIME - PEAK: 5.3
OHS CV CPX DATA VE/VCO2 - AT: 42
OHS CV CPX DATA VE/VCO2 - PEAK: 43
OHS CV CPX DATA VE/VO2 - AT: 45
OHS CV CPX DATA VE/VO2 - PEAK: 45
OHS CV CPX DATA VO2 - AT: 12.1
OHS CV CPX DATA VO2 - PEAK: 14.8
OHS CV CPX DATA VO2 - REST: 3.8
OHS CV CPX ESTIMATED METS: 9
OHS CV CPX FEV1/FVC: 0.88
OHS CV CPX FORCED EXPIRATORY VOLUME: 2.48
OHS CV CPX FORCED VITAL CAPACITY (FVC): 2.82
OHS CV CPX HIGHEST VO: 29.4
OHS CV CPX MAX PREDICTED HEART RATE: 147
OHS CV CPX MAXIMAL VOLUNTARY VENTILATION (MVV) PREDICTED: 99.2
OHS CV CPX MAXIMAL VOLUNTARY VENTILATION (MVV): 100
OHS CV CPX MAXIUMUM EXERCISE VENTILATION (VE MAX): 70.2
OHS CV CPX PATIENT AGE: 73
OHS CV CPX PATIENT HEIGHT IN: 67
OHS CV CPX PATIENT IS FEMALE AGE 11-19: 0
OHS CV CPX PATIENT IS FEMALE AGE GREATER THAN 19: 0
OHS CV CPX PATIENT IS FEMALE AGE LESS THAN 11: 0
OHS CV CPX PATIENT IS FEMALE: 0
OHS CV CPX PATIENT IS MALE AGE 11-25: 0
OHS CV CPX PATIENT IS MALE AGE GREATER THAN 25: 1
OHS CV CPX PATIENT IS MALE AGE LESS THAN 11: 0
OHS CV CPX PATIENT IS MALE GREATER THAN 18: 1
OHS CV CPX PATIENT IS MALE LESS THAN OR EQUAL TO 18: 0
OHS CV CPX PATIENT IS MALE: 1
OHS CV CPX PATIENT WEIGHT RETURNED IN OZ: 2816
OHS CV CPX PEAK DIASTOLIC BLOOD PRESSURE: 59 MMHG
OHS CV CPX PEAK HEAR RATE: 120 BPM
OHS CV CPX PEAK RATE PRESSURE PRODUCT: NORMAL
OHS CV CPX PEAK SYSTOLIC BLOOD PRESSURE: 114 MMHG
OHS CV CPX PERCENT BODY FAT: 20
OHS CV CPX PERCENT MAX PREDICTED HEART RATE ACHIEVED: 82
OHS CV CPX PREDICTED VO2: 29.4 ML/KG/MIN
OHS CV CPX RATE PRESSURE PRODUCT PRESENTING: 9243
OHS CV CPX REST PET CO2: 26
OHS CV CPX VE/VCO2 SLOPE: 40.5
STRESS ECHO POST EXERCISE DUR MIN: 5 MINUTES
STRESS ECHO POST EXERCISE DUR SEC: 18 SECONDS
SYSTOLIC BLOOD PRESSURE: 117 MMHG

## 2024-10-14 PROCEDURE — 94621 CARDIOPULM EXERCISE TESTING: CPT | Mod: HCNC

## 2024-10-14 PROCEDURE — 94621 CARDIOPULM EXERCISE TESTING: CPT | Mod: 26,HCNC,, | Performed by: INTERNAL MEDICINE

## 2024-10-14 NOTE — PROGRESS NOTES
Session: Orientation     Cardiac Rehab Individual Treatment Plan - Initial Assessment      Patient Name: Cesar Simpson MRN: [id number]   : 1950   Age: 74 y.o.   Primary Diagnosis: AV REPLACEMENT  Date of Event: 24  EF: 58%  Risk Stratification: high  Referring Physician: JACKELYN   Exercise Assessment:     CPX/TM Date: 10-14-24 Results   RHR 79   Max    Peak VO2 (CPX only) 14.8   Actual METS (CPX only) 4.2   Estimated METS 9.0     Anthropometrics    Height 67 inches   Weight 176 lbs   BMI 27.6   Abdominal Girth 43.5   Body Composition 20.0%     ST Depression noted on Stress Test?:No  Angina with exercise?: No   Fall Risk: Yes   Assistive Devices:  independent   Currently exercising? No   Mr. Guerrero stated there were no limitations to exercise but during a range of motion assessment it was noted he has right shoulder limitations.  Exercise modalities will be modified to meet the patient's needs and capabilities.     Exercise Plan:   Goals:  CR Exercise Goals: Attend Cardiac Rehab 3 times/week: In Progress  Home Aerobic Exercise: 2 additional days/week for 30-60 minutes: In Progress  Intensity of 12-15 on the Rate of Perceived Exertion (RPE) scale: In Progress  30% increase in entry estimated METS: 11.7 : In Progress  5 days/week for 30-60 minutes: In Progress  Demonstrate proper pulse taking technique: In Progress    Intervention:   Discussed importance of regular attendance to cardiac rehab class    Exercise Prescription:  THR Range 100-114   Mode: Treadmill  Recumbent Bike  Upright Bike  Nustep  Elliptical   Frequency:  3 days/week   Duration:  30 - 60 minutes   Intensity:  12 - 15 RPE   Resistance Training:  Yes: 0 to 5 lb weights with 10-15 reps based on strength and range of motion assessment     Home Prescription:  Mode Aerobic   Frequency: 2- 3 days/week   Duration: 30-60 minutes   Resistance Training: None        Education:  Orientation to Equipment; verbalizes understanding; Date:  10-17-24  Exercise Recommendations; verbalizes understanding; Date: 10-17-24  Exercise Safety; verbalizes understanding; Date: 10-17-24  Class Preparation: verbalizes understanding; Date: 10-17-24  Signs and symptoms to report: verbalizes understanding; Date: 10-17-24  Caffeine/Hydration: verbalizes understanding; Date: 10-17-24  Exercise Terminology: verbalizes understanding; Date: 10-17-24  Resistance Training: verbalizes understanding; Date: 10-17-24    Comments:  I encouraged Mr. Guerrero to begin thinking about some type of aerobic exercise he can participate in at least 2 non-rehab days per week for at least 30 minutes in addition to attending Phase II cardiac rehab classes 3 days per week.  He stated understanding.    All consent forms were signed, proper attire and shoes were discussed.       Mr. Guerrero will begin Cardiac Rehab on Monday, October 21 at 10:00 am.    The exercise prescription will be adjusted based on tolerance of exercise intensity by patient.    Ching Kaur., CEP

## 2024-10-14 NOTE — PROGRESS NOTES
HISTORY: S/P AV REPLACEMENT (24), SEVERE AORTIC STENOSIS, CAD, HTN, HLP, PAD, DM, HX OF PTCA/STENT (24), EF=58%(4-3-24)    ANTHROPOMETRICS:     PRE   Height (in) 67   Weight (lb) 176   BMI 27.6   Abdominal Girth (in) 43.65   % Body Fat 20.0%       LAB RESULTS:    Lab Results   Component Value Date    HGB 12.3 (L) 10/14/2024     Lab Results   Component Value Date    HCT 37.6 (L) 10/14/2024     Lab Results   Component Value Date    MPV 9.6 10/14/2024       Lab Results   Component Value Date    CHOL 140 10/14/2024     Lab Results   Component Value Date    HDL 29 (L) 10/14/2024     Lab Results   Component Value Date    LDLCALC 86.8 10/14/2024     Lab Results   Component Value Date    TRIG 121 10/14/2024     Lab Results   Component Value Date    CHOLHDL 20.7 10/14/2024       Lab Results   Component Value Date    GLUF 113 (H) 10/14/2024     Lab Results   Component Value Date    HGBA1C 5.6 10/14/2024        Lab Results   Component Value Date    HSCRP 0.42 10/14/2024         PSYCHOSOCIAL SCORES:    PIPE    Subscales  Well-being subscale: Friendliness: 2  Well-being subscale: Physical Well Bein  Well-being subscale: Contentment: 2  Well-being subscale: Relaxation: 2  Symptom subscale: Hostility: 1  Symptom subscale: Somatic Symptoms: 1  Symptom subscale: Depression: 0  Symptom subscale: Anxiety: 1    Pipe Ending Score  Total Score: Anxiety: 3  Total Score: Depression: 2  Total Score: Somatization: 6  Total Score: Hostility: 3            SF-36  36-Item Short Form Survey (SF-36) Scoring  Physical Functionin  Role limitations due to physical health: 0  Role limitations due to emotional problems: 33.33  Energy/fatigue: 30  Emotional well-bein  Social functionin.5  Pain: 57.5  General health: 60            PHQ-9:      10/17/2024     9:14 AM   PHQ-9 Depression Patient Health Questionnaire   Over the last two weeks how often have you been bothered by little interest or pleasure in doing things 0    Over the last two weeks how often have you been bothered by feeling down, depressed or hopeless 0   Over the last two weeks how often have you been bothered by trouble falling or staying asleep, or sleeping too much 0   Over the last two weeks how often have you been bothered by feeling tired or having little energy 1   Over the last two weeks how often have you been bothered by a poor appetite or overeating 0   Over the last two weeks how often have you been bothered by feeling bad about yourself - or that you are a failure or have let yourself or your family down 0   Over the last two weeks how often have you been bothered by trouble concentrating on things, such as reading the newspaper or watching television 0   Over the last two weeks how often have you been bothered by moving or speaking so slowly that other people could have noticed. 0   Over the last two weeks how often have you been bothered by thoughts that you would be better off dead, or of hurting yourself 0   If you checked off any problems, how difficult have these problems made it for you to do your work, take care of things at home or get along with other people? Somewhat difficult   PHQ-9 Score 1                   EDUCATION SCORES:     PRE   Education Score 70

## 2024-10-16 DIAGNOSIS — N40.1 BPH ASSOCIATED WITH NOCTURIA: ICD-10-CM

## 2024-10-16 DIAGNOSIS — R35.1 BPH ASSOCIATED WITH NOCTURIA: ICD-10-CM

## 2024-10-16 DIAGNOSIS — M19.90 ARTHRITIS: ICD-10-CM

## 2024-10-16 NOTE — TELEPHONE ENCOUNTER
Care Due:                  Date            Visit Type   Department     Provider  --------------------------------------------------------------------------------                                EP -                              PRIMARY      [facility]  Last Visit: 06-      CARE ([facility])   MEDICINE       Chaitanya López  Next Visit: None Scheduled  None         None Found                                                            Last  Test          Frequency    Reason                     Performed    Due Date  --------------------------------------------------------------------------------    TSH.........  12 months..  levothyroxine............  08- 08-    [facility] Embedded Care Due Messages. Reference number: [id number].   10/16/2024 10:36:02 AM CDT

## 2024-10-17 ENCOUNTER — CLINICAL SUPPORT (OUTPATIENT)
Dept: CARDIAC REHAB | Facility: CLINIC | Age: 74
End: 2024-10-17
Payer: MEDICARE

## 2024-10-17 VITALS — HEART RATE: 82 BPM | OXYGEN SATURATION: 98 % | DIASTOLIC BLOOD PRESSURE: 68 MMHG | SYSTOLIC BLOOD PRESSURE: 124 MMHG

## 2024-10-17 DIAGNOSIS — R35.1 BPH ASSOCIATED WITH NOCTURIA: ICD-10-CM

## 2024-10-17 DIAGNOSIS — N40.1 BPH ASSOCIATED WITH NOCTURIA: ICD-10-CM

## 2024-10-17 DIAGNOSIS — Z95.2 S/P AVR (AORTIC VALVE REPLACEMENT) AND AORTOPLASTY: ICD-10-CM

## 2024-10-17 DIAGNOSIS — Z98.890 S/P AORTIC DISSECTION REPAIR: ICD-10-CM

## 2024-10-17 DIAGNOSIS — M19.90 ARTHRITIS: ICD-10-CM

## 2024-10-17 PROCEDURE — 99999 PR PBB SHADOW E&M-EST. PATIENT-LVL III: CPT | Mod: PBBFAC,HCNC,,

## 2024-10-17 RX ORDER — TAMSULOSIN HYDROCHLORIDE 0.4 MG/1
1 CAPSULE ORAL DAILY
Qty: 90 CAPSULE | Refills: 2 | Status: SHIPPED | OUTPATIENT
Start: 2024-10-17 | End: 2024-10-17 | Stop reason: SDUPTHER

## 2024-10-17 RX ORDER — FINASTERIDE 5 MG/1
5 TABLET, FILM COATED ORAL DAILY
Qty: 90 TABLET | Refills: 2 | Status: SHIPPED | OUTPATIENT
Start: 2024-10-17 | End: 2024-10-17 | Stop reason: SDUPTHER

## 2024-10-17 RX ORDER — DULOXETIN HYDROCHLORIDE 30 MG/1
30 CAPSULE, DELAYED RELEASE ORAL DAILY
Qty: 90 CAPSULE | Refills: 2 | Status: SHIPPED | OUTPATIENT
Start: 2024-10-17 | End: 2024-10-17 | Stop reason: SDUPTHER

## 2024-10-17 NOTE — PROGRESS NOTES
Session: Orientation   Cardiac Rehab Individual Treatment Plan - Initial Assessment      Patient Name: Cesar Simpson MRN: [id number]   : 1950   Age: 74 y.o.   Date of Event: 24   Primary Diagnosis: S/P AV Replacement    EF: 58% (4/3/24)    Physical Assessment:   /68 (BP Location: Left arm)   Pulse 82   SpO2 98%     ASSESSMENT:  Heart Sounds: regular rate and rhythm, S1, S2 normal, no murmur, click, rub or gallop  Prosthetic Valve: Yes  Lung Sounds: normal air entry, lungs clear to auscultation  Capillary Refill: normal  Left Radial Pulse: Normal (+2)  Right Radial Pulse: Normal (+2)  Left Pedal Pulse: Normal (+2)  Right Pedal Pulse: Normal (+2)  Right Edema: none  Left Edema none  Strength: normal  Range of Motion: diminished range of motion Right Shoulder  Existing Limitations:      Site   [x] Arthritis, bursitis Bilateral shoulders   [] Amputation, atrophy    [] Other:    []       Diabetic patient's foot examination comments: Normal -  Bilateral  Incisional site: healing well  Special needs: pt reports being Hard of Hearing and utilizes hearing aids which he did not wear today. Pt was instructed to wear his hearing aids when attending cardiac rehab so he could hear instructions and follow. Patient verbalized understanding.    Psychosocial Assessment:   Outcome Survey Tools:    PIPE SCORES:    Subscales  Well-being subscale: Friendliness: 2  Well-being subscale: Physical Well Bein  Well-being subscale: Contentment: 2  Well-being subscale: Relaxation: 2  Symptom subscale: Hostility: 1  Symptom subscale: Somatic Symptoms: 1  Symptom subscale: Depression: 0  Symptom subscale: Anxiety: 1    Pipe Ending Score  Total Score: Anxiety: 3  Total Score: Depression: 2  Total Score: Somatization: 6  Total Score: Hostility: 3            SF-36  36-Item Short Form Survey (SF-36) Scoring  Physical Functionin  Role limitations due to physical health: 0  Role limitations due to emotional problems:  33.33  Energy/fatigue: 30  Emotional well-bein  Social functionin.5  Pain: 57.5  General health: 60            PHQ-9:      10/17/2024     9:14 AM   PHQ-9 Depression Patient Health Questionnaire   Over the last two weeks how often have you been bothered by little interest or pleasure in doing things 0   Over the last two weeks how often have you been bothered by feeling down, depressed or hopeless 0   Over the last two weeks how often have you been bothered by trouble falling or staying asleep, or sleeping too much 0   Over the last two weeks how often have you been bothered by feeling tired or having little energy 1   Over the last two weeks how often have you been bothered by a poor appetite or overeating 0   Over the last two weeks how often have you been bothered by feeling bad about yourself - or that you are a failure or have let yourself or your family down 0   Over the last two weeks how often have you been bothered by trouble concentrating on things, such as reading the newspaper or watching television 0   Over the last two weeks how often have you been bothered by moving or speaking so slowly that other people could have noticed. 0   Over the last two weeks how often have you been bothered by thoughts that you would be better off dead, or of hurting yourself 0   If you checked off any problems, how difficult have these problems made it for you to do your work, take care of things at home or get along with other people? Somewhat difficult   PHQ-9 Score 1              Living Arrangements: Lives alone  Family Support: children daughter lives near patient and assists   Self Reported: Anxiety and Anger/Hostility  Displays: calmness  Medication:  cymbalta daily    Psychosocial Plan:   Goals:  Improved psychosocial coping strategies  Reduce manifestation of anxiety  Reduce manifestation of anger/hostility  Maintain positive support system  Maintain positive outlook  Improve overall quality of  life    Interventions/Recommendations:  Discussed Results of Surveys  Patient to Self Report Emotional Changes at Session Check In  Recommend Physical Activity  Recommend Attending Education Lectures  Notify MD: No  Program Referral: No  Pharmaceutical Intervention/Therapy: Yes  Other Needs: not applicable  Stage of Readiness to Change: Contemplation    Education:  Stress Management; verbalizes understanding; Date: 10/17/24  Stress; verbalizes understanding; Date: 10/17/24  Risk Factors; verbalizes understanding; Date: 10/17/24    Comments:  Screening tools and results discussed with patient. Patient had a TAVR scheduled and resulted in an AV replacement with chest incision. Patient still upset over the experience which has caused him some anxiety and stress. Discussed effects of stress on cardiac health pt verbalized understandingPatient has been instructed to notify staff in the event that circumstances worsen.  Patient verbalizes understanding.    Other Core Components/Risk Factors Assessment:   RISK FACTORS:  diabetes, hyperlipidemia, hypertension, positive family history, sedentary lifestyle, PAD, Hx PTCA/Stent    Learning Barriers: Hearing Impairment, emotional    Education Level:  Attended College/Technical School    Pre-test Score: 70    Medication Compliance: has been compliant with taking medications    Other Core Components/Risk Factors Plan:   Goals:  Decrease cholesterol level: In Progress  Increase exercise tolerance: In Progress  Increase knowledge of CAD: In Progress  Decrease blood pressure: In Progress  Weight loss: In Progress  Identify and manage personal areas of stress: In Progress  Control diabetes by adjusting diet and exercise: In Progress  Learn more about healthy eating: In Progress    Interventions/Recommendations:  Recommend regular attendance for Cardiac Rehab: Exercise and Education Lectures  Encourage medication compliance  Individual Education/ Counseling: Yes  Physician Referral:  No    Education:    blood pressure meds, verbalizes understanding; Date: 10/17/24  risk factors, verbalizes understanding; Date: 10/17/24  stress, verbalizes understanding; Date: 10/17/24         Education method adapted to patients education level and preferred method of learning.  Method: explanation    Comments:  Discussed risk factor modification to improve cardiac health pt verbalized understanding. Discussed medication list, dosing, side effects, pt verbalized understanding.     Other Core Components/Hypertension Assessment:   Resting BP:   BP Readings from Last 1 Encounters:   10/17/24 124/68         BP Diagnosis: Hypertensive  Patient reported symptoms: none    Medications:  Medication Prescribed? Adherent? Exception   Beta-blocker [x]Yes  []No  []Unknown [x]Yes  []No  []Unknown    ACEI/ARB []Yes  []No  []Unknown []Yes  []No  []Unknown    Calcium Channel Blocker [x]Yes  []No  []Unknown [x]Yes  []No  []Unknown    Diuretic [x]Yes  []No  []Unknown [x]Yes  []No  []Unknown        Other Core Components/Hypertension Plan:   Goals:  Blood Pressure <130/80    Interventions/Recommendations:  Med Card Reconciled: Yes  Encourage medication compliance  Encourage sodium reduction  Reduce alcohol consumption  Encourage weight loss  Recommend physical activity  Educate on contributory factors  Reduce stress, anxiety, anger, depression, and/or chronic pain  Encourage home blood pressure monitoring  Recommend daily weights    Education:    Risk Factors; verbalizes understanding; Date: 10/17/24  Stress; verbalizes understanding; Date: 10/17/24         Comments:  Patient reports he is participating in the digital hypertension program.       Does the patient have Heart Failure? No    Other Core Components/Tobacco Cessation Assessment:   Smoking Status: Lifetime Non-smoker  Primary Tobacco Type: N/A  Tobacco Usage: no  Smoking Cessation Barriers:  NA  Stage of Readiness to Change: Maintenance    Other Core Components/Tobacco  Cessation Plan:   Goals:  Maintain non-smoking status    Interventions:  Maintains non-smoking status    Education:    Risk Factors; verbalizes understanding; Date: 10/17/24  Benefits of Cardiac Rehab; verbalizes understanding; Date: 10/17/24         Comments:  Discussed screening tools and results, pt admits to anxiety and hostility from his exerience with his surgery. He is currently enrolled in digital hypertension and diabetic program. Pt reports he is no longer on any diabetes medications and does not monitor his glucose. DIscussed stress management techniques pt verbalized understanding. Pt instructed to wear his hearing aids to all classes as pt was noted to had difficulty hearing/understanding converstation. Discussed Cardiac Rehab program in depth with patient.  Medication list updated per patient & marked as reviewed.  Patient has been instructed to notify staff of any problems while attending rehab (ie: chest pain, shortness of breath, lightheadedness, dizziness).  Patient has been instructed to monitor blood pressure readings outside of rehab & to keep a daily log of the readings.  Patient verbalizes understanding.    Joseph Man RN  Cardiac Rehab Nurse

## 2024-10-17 NOTE — TELEPHONE ENCOUNTER
No care due was identified.  [facility] Embedded Care Due Messages. Reference number: [id number].   10/17/2024 4:19:58 PM CDT

## 2024-10-17 NOTE — TELEPHONE ENCOUNTER
Refill Routing Note   Medication(s) are not appropriate for processing by [facility] for the following reason(s):        ED/Hospital Visit since last OV with provider    [facility] action(s):  Defer     Requires labs : Yes             Appointments  past 12m or future 3m with PCP    Date Provider   Last Visit   6/11/2024 Chaitanya López DO   Next Visit   Visit date not found Chaitanya López DO   ED visits in past 90 days: 0        Note composed:12:04 PM 10/17/2024

## 2024-10-18 RX ORDER — TAMSULOSIN HYDROCHLORIDE 0.4 MG/1
1 CAPSULE ORAL DAILY
Qty: 90 CAPSULE | Refills: 2 | Status: SHIPPED | OUTPATIENT
Start: 2024-10-18

## 2024-10-18 RX ORDER — FINASTERIDE 5 MG/1
5 TABLET, FILM COATED ORAL DAILY
Qty: 90 TABLET | Refills: 2 | Status: SHIPPED | OUTPATIENT
Start: 2024-10-18

## 2024-10-18 RX ORDER — DULOXETIN HYDROCHLORIDE 30 MG/1
30 CAPSULE, DELAYED RELEASE ORAL DAILY
Qty: 90 CAPSULE | Refills: 2 | Status: SHIPPED | OUTPATIENT
Start: 2024-10-18

## 2024-10-18 NOTE — TELEPHONE ENCOUNTER
Refill Routing Note   Medication(s) are not appropriate for processing by [facility] for the following reason(s):        No active prescription written by provider    [facility] action(s):  Defer      Medication Therapy Plan: Rx's electronically cancelled 10/17/24 by Demarcus Solorio MD please advise      Appointments  past 12m or future 3m with PCP    Date Provider   Last Visit   6/11/2024 Chaitanya López DO   Next Visit   Visit date not found Chaitanya López DO   ED visits in past 90 days: 0        Note composed:1:09 PM 10/18/2024

## 2024-10-21 ENCOUNTER — CLINICAL SUPPORT (OUTPATIENT)
Dept: CARDIAC REHAB | Facility: CLINIC | Age: 74
End: 2024-10-21
Payer: MEDICARE

## 2024-10-21 DIAGNOSIS — I35.0 NODULAR CALCIFIC AORTIC VALVE STENOSIS: ICD-10-CM

## 2024-10-21 DIAGNOSIS — Z95.2 HEART VALVE REPLACED BY TRANSPLANT: Primary | ICD-10-CM

## 2024-10-21 PROCEDURE — 93798 PHYS/QHP OP CAR RHAB W/ECG: CPT | Mod: HCNC,S$GLB,, | Performed by: INTERNAL MEDICINE

## 2024-10-21 NOTE — PROGRESS NOTES
Patient arrived for cardiac rehab session. Pt reports to have eaten prior to exercise session.  The patient was on continuous EKG monitoring throughout the session. The patient tolerated exercise session well with no complaints.  Today is his 1st day of cardiac rehab.  Any Manning RN  Cardiac Rehab Nurse

## 2024-10-23 ENCOUNTER — CLINICAL SUPPORT (OUTPATIENT)
Dept: CARDIAC REHAB | Facility: CLINIC | Age: 74
End: 2024-10-23
Payer: MEDICARE

## 2024-10-23 DIAGNOSIS — Z95.2 HEART VALVE REPLACED BY TRANSPLANT: Primary | ICD-10-CM

## 2024-10-23 DIAGNOSIS — I35.0 NODULAR CALCIFIC AORTIC VALVE STENOSIS: ICD-10-CM

## 2024-10-23 PROCEDURE — 93798 PHYS/QHP OP CAR RHAB W/ECG: CPT | Mod: HCNC,S$GLB,, | Performed by: INTERNAL MEDICINE

## 2024-10-23 RX ORDER — ESOMEPRAZOLE MAGNESIUM 40 MG/1
40 CAPSULE, DELAYED RELEASE ORAL
Qty: 30 CAPSULE | Refills: 11 | Status: SHIPPED | OUTPATIENT
Start: 2024-10-23 | End: 2025-10-23

## 2024-10-24 ENCOUNTER — TELEPHONE (OUTPATIENT)
Dept: CARDIOLOGY | Facility: CLINIC | Age: 74
End: 2024-10-24
Payer: MEDICARE

## 2024-10-24 NOTE — TELEPHONE ENCOUNTER
Medical Cardiac Clearance Form was received and faxed back to [facility]    Fax: [phone]             /  Ph:[phone]    Medical Clearance Form was also scanned in Epic / Media        Nw

## 2024-10-25 ENCOUNTER — CLINICAL SUPPORT (OUTPATIENT)
Dept: CARDIAC REHAB | Facility: CLINIC | Age: 74
End: 2024-10-25
Payer: MEDICARE

## 2024-10-25 DIAGNOSIS — I25.10 CORONARY ATHEROSCLEROSIS OF NATIVE CORONARY ARTERY: ICD-10-CM

## 2024-10-25 DIAGNOSIS — Z95.2 HEART VALVE REPLACED BY TRANSPLANT: Primary | ICD-10-CM

## 2024-10-25 NOTE — PROGRESS NOTES
Patient arrived for cardiac rehab session. Pt reports to have eaten prior to exercise session.  The patient was on continuous EKG monitoring throughout the session. The patient tolerated exercise session fairly well with no complaints but was noticed to have an unsteady gait.  Blood pressure was taken and blood sugar was checked.  Both were normal but a pre blood sugar was not obtained because it was determined it didn't need to be checked prior to exercise because he isn't on medication.  He stated he thinks it was because he started the elliptical for the first time today.  I suggested we eliminate the elliptical until he has been in rehab a little longer but he insisted he be able to try again.  I stated we would see during his next class.  He stated understanding.

## 2024-11-06 ENCOUNTER — PATIENT MESSAGE (OUTPATIENT)
Dept: FAMILY MEDICINE | Facility: CLINIC | Age: 74
End: 2024-11-06
Payer: MEDICARE

## 2024-11-06 ENCOUNTER — OFFICE VISIT (OUTPATIENT)
Dept: FAMILY MEDICINE | Facility: CLINIC | Age: 74
End: 2024-11-06
Payer: MEDICARE

## 2024-11-06 ENCOUNTER — LAB VISIT (OUTPATIENT)
Dept: LAB | Facility: HOSPITAL | Age: 74
End: 2024-11-06
Attending: FAMILY MEDICINE
Payer: MEDICARE

## 2024-11-06 VITALS
BODY MASS INDEX: 27.86 KG/M2 | OXYGEN SATURATION: 98 % | SYSTOLIC BLOOD PRESSURE: 114 MMHG | HEART RATE: 110 BPM | WEIGHT: 177.5 LBS | DIASTOLIC BLOOD PRESSURE: 70 MMHG | HEIGHT: 67 IN

## 2024-11-06 DIAGNOSIS — E11.9 DIET-CONTROLLED TYPE 2 DIABETES MELLITUS: Primary | ICD-10-CM

## 2024-11-06 DIAGNOSIS — R35.1 BPH ASSOCIATED WITH NOCTURIA: ICD-10-CM

## 2024-11-06 DIAGNOSIS — E78.5 HYPERLIPIDEMIA ASSOCIATED WITH TYPE 2 DIABETES MELLITUS: ICD-10-CM

## 2024-11-06 DIAGNOSIS — D64.9 NORMOCYTIC ANEMIA: ICD-10-CM

## 2024-11-06 DIAGNOSIS — Z12.5 SCREENING PSA (PROSTATE SPECIFIC ANTIGEN): ICD-10-CM

## 2024-11-06 DIAGNOSIS — E03.8 OTHER SPECIFIED HYPOTHYROIDISM: ICD-10-CM

## 2024-11-06 DIAGNOSIS — E78.49 OTHER HYPERLIPIDEMIA: ICD-10-CM

## 2024-11-06 DIAGNOSIS — E11.59 HYPERTENSION ASSOCIATED WITH DIABETES: ICD-10-CM

## 2024-11-06 DIAGNOSIS — I25.118 CORONARY ARTERY DISEASE OF NATIVE ARTERY OF NATIVE HEART WITH STABLE ANGINA PECTORIS: Chronic | ICD-10-CM

## 2024-11-06 DIAGNOSIS — E11.69 HYPERLIPIDEMIA ASSOCIATED WITH TYPE 2 DIABETES MELLITUS: ICD-10-CM

## 2024-11-06 DIAGNOSIS — Z79.899 ENCOUNTER FOR LONG-TERM CURRENT USE OF MEDICATION: ICD-10-CM

## 2024-11-06 DIAGNOSIS — M19.90 ARTHRITIS: ICD-10-CM

## 2024-11-06 DIAGNOSIS — N40.1 BPH ASSOCIATED WITH NOCTURIA: ICD-10-CM

## 2024-11-06 DIAGNOSIS — I15.2 HYPERTENSION ASSOCIATED WITH DIABETES: ICD-10-CM

## 2024-11-06 DIAGNOSIS — Z95.2 S/P AVR (AORTIC VALVE REPLACEMENT) AND AORTOPLASTY: ICD-10-CM

## 2024-11-06 DIAGNOSIS — Z95.2 HEART VALVE REPLACED BY TRANSPLANT: ICD-10-CM

## 2024-11-06 LAB
BACTERIA #/AREA URNS AUTO: ABNORMAL /HPF
BILIRUB UR QL STRIP: NEGATIVE
CLARITY UR REFRACT.AUTO: CLEAR
COLOR UR AUTO: YELLOW
GLUCOSE UR QL STRIP: NEGATIVE
HGB UR QL STRIP: ABNORMAL
KETONES UR QL STRIP: NEGATIVE
LEUKOCYTE ESTERASE UR QL STRIP: ABNORMAL
MICROSCOPIC COMMENT: ABNORMAL
NITRITE UR QL STRIP: NEGATIVE
PH UR STRIP: 6 [PH] (ref 5–8)
PROT UR QL STRIP: NEGATIVE
RBC #/AREA URNS AUTO: 7 /HPF (ref 0–4)
SP GR UR STRIP: 1.02 (ref 1–1.03)
SQUAMOUS #/AREA URNS AUTO: 1 /HPF
URN SPEC COLLECT METH UR: ABNORMAL
WBC #/AREA URNS AUTO: 26 /HPF (ref 0–5)

## 2024-11-06 PROCEDURE — 1159F MED LIST DOCD IN RCRD: CPT | Mod: HCNC,CPTII,S$GLB, | Performed by: FAMILY MEDICINE

## 2024-11-06 PROCEDURE — 3066F NEPHROPATHY DOC TX: CPT | Mod: HCNC,CPTII,S$GLB, | Performed by: FAMILY MEDICINE

## 2024-11-06 PROCEDURE — 3074F SYST BP LT 130 MM HG: CPT | Mod: HCNC,CPTII,S$GLB, | Performed by: FAMILY MEDICINE

## 2024-11-06 PROCEDURE — 87086 URINE CULTURE/COLONY COUNT: CPT | Mod: HCNC | Performed by: FAMILY MEDICINE

## 2024-11-06 PROCEDURE — 3008F BODY MASS INDEX DOCD: CPT | Mod: HCNC,CPTII,S$GLB, | Performed by: FAMILY MEDICINE

## 2024-11-06 PROCEDURE — 1101F PT FALLS ASSESS-DOCD LE1/YR: CPT | Mod: HCNC,CPTII,S$GLB, | Performed by: FAMILY MEDICINE

## 2024-11-06 PROCEDURE — 99214 OFFICE O/P EST MOD 30 MIN: CPT | Mod: HCNC,S$GLB,, | Performed by: FAMILY MEDICINE

## 2024-11-06 PROCEDURE — 1126F AMNT PAIN NOTED NONE PRSNT: CPT | Mod: HCNC,CPTII,S$GLB, | Performed by: FAMILY MEDICINE

## 2024-11-06 PROCEDURE — 3288F FALL RISK ASSESSMENT DOCD: CPT | Mod: HCNC,CPTII,S$GLB, | Performed by: FAMILY MEDICINE

## 2024-11-06 PROCEDURE — 3044F HG A1C LEVEL LT 7.0%: CPT | Mod: HCNC,CPTII,S$GLB, | Performed by: FAMILY MEDICINE

## 2024-11-06 PROCEDURE — 1157F ADVNC CARE PLAN IN RCRD: CPT | Mod: HCNC,CPTII,S$GLB, | Performed by: FAMILY MEDICINE

## 2024-11-06 PROCEDURE — 81001 URINALYSIS AUTO W/SCOPE: CPT | Mod: HCNC | Performed by: FAMILY MEDICINE

## 2024-11-06 PROCEDURE — 3061F NEG MICROALBUMINURIA REV: CPT | Mod: HCNC,CPTII,S$GLB, | Performed by: FAMILY MEDICINE

## 2024-11-06 PROCEDURE — 3078F DIAST BP <80 MM HG: CPT | Mod: HCNC,CPTII,S$GLB, | Performed by: FAMILY MEDICINE

## 2024-11-06 PROCEDURE — 99999 PR PBB SHADOW E&M-EST. PATIENT-LVL IV: CPT | Mod: PBBFAC,HCNC,, | Performed by: FAMILY MEDICINE

## 2024-11-06 PROCEDURE — 4010F ACE/ARB THERAPY RXD/TAKEN: CPT | Mod: HCNC,CPTII,S$GLB, | Performed by: FAMILY MEDICINE

## 2024-11-06 RX ORDER — AMLODIPINE BESYLATE 5 MG/1
5 TABLET ORAL DAILY
Qty: 90 TABLET | Refills: 3 | Status: SHIPPED | OUTPATIENT
Start: 2024-11-06 | End: 2025-11-06

## 2024-11-06 RX ORDER — LEVOTHYROXINE SODIUM 88 UG/1
88 TABLET ORAL
Qty: 90 TABLET | Refills: 0 | Status: SHIPPED | OUTPATIENT
Start: 2024-11-06

## 2024-11-06 RX ORDER — LEVOTHYROXINE SODIUM 88 UG/1
88 TABLET ORAL
Qty: 90 TABLET | Refills: 0 | Status: SHIPPED | OUTPATIENT
Start: 2024-11-06 | End: 2024-11-06 | Stop reason: SDUPTHER

## 2024-11-06 RX ORDER — ESOMEPRAZOLE MAGNESIUM 40 MG/1
40 CAPSULE, DELAYED RELEASE ORAL
Qty: 30 CAPSULE | Refills: 11 | Status: SHIPPED | OUTPATIENT
Start: 2024-11-06 | End: 2025-11-06

## 2024-11-06 RX ORDER — TAMSULOSIN HYDROCHLORIDE 0.4 MG/1
0.8 CAPSULE ORAL DAILY
Qty: 180 CAPSULE | Refills: 3 | Status: SHIPPED | OUTPATIENT
Start: 2024-11-06 | End: 2025-11-06

## 2024-11-06 RX ORDER — DULOXETIN HYDROCHLORIDE 30 MG/1
30 CAPSULE, DELAYED RELEASE ORAL DAILY
Qty: 90 CAPSULE | Refills: 2 | Status: SHIPPED | OUTPATIENT
Start: 2024-11-06

## 2024-11-06 RX ORDER — METOPROLOL TARTRATE 25 MG/1
25 TABLET, FILM COATED ORAL 2 TIMES DAILY
Qty: 180 TABLET | Refills: 3 | Status: SHIPPED | OUTPATIENT
Start: 2024-11-06 | End: 2025-11-06

## 2024-11-06 RX ORDER — EZETIMIBE 10 MG/1
10 TABLET ORAL DAILY
Qty: 90 TABLET | Refills: 3 | Status: SHIPPED | OUTPATIENT
Start: 2024-11-06 | End: 2025-11-06

## 2024-11-06 NOTE — TELEPHONE ENCOUNTER
No care due was identified.  [facility] Embedded Care Due Messages. Reference number: [id number].   11/06/2024 2:05:45 AM CST

## 2024-11-06 NOTE — TELEPHONE ENCOUNTER
Refill Routing Note   Medication(s) are not appropriate for processing by [facility] for the following reason(s):        ED/Hospital Visit since last OV with provider  Required labs outdated    [facility] action(s):  Defer             Appointments  past 12m or future 3m with PCP    Date Provider   Last Visit   6/11/2024 Chaitanya López DO   Next Visit   Visit date not found Chaitanya López DO   ED visits in past 90 days: 0        Note composed:7:59 AM 11/06/2024

## 2024-11-06 NOTE — PROGRESS NOTES
"Subjective:       Patient ID: Cesar Simpson is a 74 y.o. male.    Chief Complaint: Diabetes    Cesar is a 74 y.o. male who presents today for f/u    Per EMR, " He recently under underwent a TAVR on 8/27/24 that was complicated by an aortic dissection prompting OR intervention with CTS."    S/P the following procedures on 8/27/24  1.  Hemiarch replacement with a  26 mm Gelweave Dacron tube graft under deep   hypothermic circulatory arrest, circulatory arrest time 24 minutes at 28 degrees   Celsius with antegrade cerebral perfusion.  2.  Ascending aortic replacement with a 26 mm Gelweave Dacron tube graft   3. Explantation of Evolut TAVR valve   4. Aortic valve replacement with bioprosthetic 23 mm Avalus Valve    CAD: s/p angiogram 4/17/2024. Successful PTCA to the prox mid and distal LAD with 3 CHAY. We had to stent mid LAD due to balloon rupture induced dissection that compromised FREDIS flow     He is feeling better currently. He just started driving again.     Diet controlled DM: A1c normal 10/14/2024. He reports however that fasting sugars have been higher at home. >130s. Has seen 145 as well. He reports that his appetite has improved since his last A1c was checked. He is trying to avoid carbs.   DLD: LDL not at goal of <70.   HTN: on metoprolol 25 mg BID. Not taking 37.5 mg BID. Also taking amlodipine 5 mg. No dizziness at home.   Insomnia: on cymbalta and trazodone.   FABY: not using CPAP anymore    BPH: on flomax and finasteride. Nocturia has returned. This returned 3 months ago.   Hypothyroidism: on synthroid 88 mcg  ILD/GERD: on nexium 40 mg.     "I hardly ever cough anymore."        Review of Systems   Constitutional:  Negative for chills and fever.   Respiratory:  Negative for cough, chest tightness and shortness of breath.    Cardiovascular:  Negative for chest pain.   Gastrointestinal:  Negative for nausea and vomiting.   Neurological:  Negative for dizziness and headaches.           Objective:     Vitals:    " "11/06/24 1326   BP: 114/70   BP Location: Left arm   Patient Position: Sitting   Pulse: 110   SpO2: 98%   Weight: 80.5 kg (177 lb 7.5 oz)   Height: 5' 7" (1.702 m)        Physical Exam  Vitals and nursing note reviewed.   Constitutional:       General: He is not in acute distress.     Appearance: He is not ill-appearing, toxic-appearing or diaphoretic.   Cardiovascular:      Rate and Rhythm: Normal rate and regular rhythm.      Heart sounds: Murmur heard.   Pulmonary:      Effort: Pulmonary effort is normal. No respiratory distress.      Breath sounds: Normal breath sounds. No wheezing or rales.   Chest:          Comments: Surgical scar noted  Abdominal:      Palpations: Abdomen is soft.      Tenderness: There is no abdominal tenderness.   Musculoskeletal:         General: No swelling.   Neurological:      General: No focal deficit present.      Mental Status: He is alert.   Psychiatric:         Mood and Affect: Mood normal.         Behavior: Behavior normal.         Thought Content: Thought content normal.         Judgment: Judgment normal.         Assessment:       1. Diet-controlled type 2 diabetes mellitus    2. Other specified hypothyroidism    3. S/P AVR (aortic valve replacement) and aortoplasty    4. Hypertension associated with diabetes    5. Hyperlipidemia associated with type 2 diabetes mellitus    6. Other hyperlipidemia    7. Coronary artery disease of native artery of native heart with stable angina pectoris    8. Heart valve replaced by transplant    9. Normocytic anemia    10. Encounter for long-term current use of medication    11. Arthritis    12. BPH associated with nocturia    13. Screening PSA (prostate specific antigen)        Plan:       LDL >70, add zetia  Continue atorvastatin    Diet controlled diabetes: monitor sugar closely. Has recently weight loss likely 2/2 to acute medical issues  If A1c higher then 6.5 at f/u, consider staring jardiance 10 mg?    HTN: continue metoprolol, lasix EOD, " amlodipine  CAD/AS: meds per cardiology  ILD/GERD: continue nexium  BPH: continue flomax and finasteride. Increase flomax to 0.8 mg. Check urine today. PSA at f/u. Consider urology?  Mood/Insomnia: continue cymbalta and trazodone.     Could not tolerate cardiac rehab.     F/u 2 months, labs prior, f/u with Gloria.     F/u 6 months with me. Labs TBD based on visit with Gloria.     Diet-controlled type 2 diabetes mellitus  -     Hemoglobin A1C; Future; Expected date: 11/06/2024    Other specified hypothyroidism  -     T4, Free; Future; Expected date: 11/06/2024  -     TSH; Future; Expected date: 11/06/2024  -     levothyroxine (SYNTHROID) 88 MCG tablet; Take 1 tablet (88 mcg total) by mouth before breakfast.  Dispense: 90 tablet; Refill: 0    S/P AVR (aortic valve replacement) and aortoplasty    Hypertension associated with diabetes  -     metoprolol tartrate (LOPRESSOR) 25 MG tablet; Take 1 tablet (25 mg total) by mouth 2 (two) times daily.  Dispense: 180 tablet; Refill: 3  -     amLODIPine (NORVASC) 5 MG tablet; Take 1 tablet (5 mg total) by mouth once daily.  Dispense: 90 tablet; Refill: 3    Hyperlipidemia associated with type 2 diabetes mellitus  -     Lipid Panel; Future; Expected date: 11/06/2024    Other hyperlipidemia  -     Lipid Panel; Future; Expected date: 11/06/2024  -     ezetimibe (ZETIA) 10 mg tablet; Take 1 tablet (10 mg total) by mouth once daily.  Dispense: 90 tablet; Refill: 3    Coronary artery disease of native artery of native heart with stable angina pectoris  -     ezetimibe (ZETIA) 10 mg tablet; Take 1 tablet (10 mg total) by mouth once daily.  Dispense: 90 tablet; Refill: 3    Heart valve replaced by transplant    Normocytic anemia  -     CBC Auto Differential; Future; Expected date: 11/06/2024  -     Iron and TIBC; Future; Expected date: 11/06/2024  -     Ferritin; Future; Expected date: 11/06/2024  -     Comprehensive Metabolic Panel; Future; Expected date: 11/06/2024    Encounter for  long-term current use of medication  -     Vitamin B12; Future; Expected date: 11/06/2024  -     Vitamin D; Future; Expected date: 11/06/2024    Arthritis  -     DULoxetine (CYMBALTA) 30 MG capsule; Take 1 capsule (30 mg total) by mouth once daily.  Dispense: 90 capsule; Refill: 2    BPH associated with nocturia  -     tamsulosin (FLOMAX) 0.4 mg Cap; Take 2 capsules (0.8 mg total) by mouth once daily.  Dispense: 180 capsule; Refill: 3  -     Urine culture; Future; Expected date: 11/06/2024  -     Urinalysis; Future; Expected date: 11/06/2024  -     PSA, Screening; Future; Expected date: 11/06/2024    Screening PSA (prostate specific antigen)  -     PSA, Screening; Future; Expected date: 11/06/2024    Other orders  -     esomeprazole (NEXIUM) 40 MG capsule; Take 1 capsule (40 mg total) by mouth daily with dinner or evening meal.  Dispense: 30 capsule; Refill: 11

## 2024-11-06 NOTE — PATIENT INSTRUCTIONS
LDL >70, add zetia  Continue atorvastatin    Diet controlled diabetes: monitor sugar closely. Has recently weight loss likely 2/2 to acute medical issues  If A1c higher then 6.5 at f/u, consider staring jardiance 10 mg?    HTN: continue metoprolol, lasix EOD, amlodipine  CAD/AS: meds per cardiology  ILD/GERD: continue nexium  BPH: continue flomax and finasteride. Increase flomax to 0.8 mg. Check urine today. PSA at f/u. Consider urology?  Mood/Insomnia: continue cymbalta and trazodone.     F/u 2 months, labs prior, f/u with Gloria.     F/u 6 months with me. Labs TBD based on visit with Gloria.

## 2024-11-07 ENCOUNTER — DOCUMENTATION ONLY (OUTPATIENT)
Dept: CARDIAC REHAB | Facility: CLINIC | Age: 74
End: 2024-11-07
Payer: MEDICARE

## 2024-11-07 LAB — BACTERIA UR CULT: NORMAL

## 2024-11-07 NOTE — PROGRESS NOTES
Mr. Guerrero has completed 4 out of 36 exercise session of Phase II cardiac rehab.  He stopped attending classes and has not returned calls.  He was dropped from the program.    Session: Orientation     Cardiac Rehab Individual Treatment Plan - Initial Assessment      Patient Name: Cesar Simpson MRN: [id number]   : 1950   Age: 74 y.o.   Primary Diagnosis: AV REPLACEMENT  Date of Event: 24  EF: 58%  Risk Stratification: high  Referring Physician: JACKELYN   Exercise Assessment:     CPX/TM Date: 10-14-24 Results   RHR 79   Max    Peak VO2 (CPX only) 14.8   Actual METS (CPX only) 4.2   Estimated METS 9.0     Anthropometrics    Height 67 inches   Weight 176 lbs   BMI 27.6   Abdominal Girth 43.5   Body Composition 20.0%     ST Depression noted on Stress Test?:No  Angina with exercise?: No   Fall Risk: Yes   Assistive Devices:  independent   Currently exercising? No   Mr. Guerrero stated there were no limitations to exercise but during a range of motion assessment it was noted he has right shoulder limitations.  Exercise modalities will be modified to meet the patient's needs and capabilities.     Exercise Plan:   Goals:  CR Exercise Goals: Attend Cardiac Rehab 3 times/week: In Progress  Home Aerobic Exercise: 2 additional days/week for 30-60 minutes: In Progress  Intensity of 12-15 on the Rate of Perceived Exertion (RPE) scale: In Progress  30% increase in entry estimated METS: 11.7 : In Progress  5 days/week for 30-60 minutes: In Progress  Demonstrate proper pulse taking technique: In Progress    Intervention:   Discussed importance of regular attendance to cardiac rehab class    Exercise Prescription:  THR Range 100-114   Mode: Treadmill  Recumbent Bike  Upright Bike  Nustep  Elliptical   Frequency:  3 days/week   Duration:  30 - 60 minutes   Intensity:  12 - 15 RPE   Resistance Training:  Yes: 0 to 5 lb weights with 10-15 reps based on strength and range of motion assessment     Home Prescription:  Mode Aerobic    Frequency: 2- 3 days/week   Duration: 30-60 minutes   Resistance Training: None        Education:  Orientation to Equipment; verbalizes understanding; Date: 10-17-24  Exercise Recommendations; verbalizes understanding; Date: 10-17-24  Exercise Safety; verbalizes understanding; Date: 10-17-24  Class Preparation: verbalizes understanding; Date: 10-17-24  Signs and symptoms to report: verbalizes understanding; Date: 10-17-24  Caffeine/Hydration: verbalizes understanding; Date: 10-17-24  Exercise Terminology: verbalizes understanding; Date: 10-17-24  Resistance Training: verbalizes understanding; Date: 10-17-24    Comments:  I encouraged Mr. Guerrero to begin thinking about some type of aerobic exercise he can participate in at least 2 non-rehab days per week for at least 30 minutes in addition to attending Phase II cardiac rehab classes 3 days per week.  He stated understanding.    All consent forms were signed, proper attire and shoes were discussed.       Mr. Guerrero will begin Cardiac Rehab on  at 10:00 am.    The exercise prescription will be adjusted based on tolerance of exercise intensity by patient.    Cassie Kaur, CEP    Session: Orientation   Cardiac Rehab Individual Treatment Plan - Initial Assessment      Patient Name: Cesar Simpson MRN: [id number]   : 1950   Age: 74 y.o.   Date of Event: 24   Primary Diagnosis: S/P AV Replacement    EF: 58% (4/3/24)    Physical Assessment:   There were no vitals taken for this visit.    ASSESSMENT:  Heart Sounds: regular rate and rhythm, S1, S2 normal, no murmur, click, rub or gallop  Prosthetic Valve: Yes  Lung Sounds: normal air entry, lungs clear to auscultation  Capillary Refill: normal  Left Radial Pulse: Normal (+2)  Right Radial Pulse: Normal (+2)  Left Pedal Pulse: Normal (+2)  Right Pedal Pulse: Normal (+2)  Right Edema: none  Left Edema none  Strength: normal  Range of Motion: diminished range of motion Right Shoulder  Existing  Limitations:      Site   [x] Arthritis, bursitis Bilateral shoulders   [] Amputation, atrophy    [] Other:    []       Diabetic patient's foot examination comments: Normal -  Bilateral  Incisional site: healing well  Special needs: pt reports being Hard of Hearing and utilizes hearing aids which he did not wear today. Pt was instructed to wear his hearing aids when attending cardiac rehab so he could hear instructions and follow. Patient verbalized understanding.    Psychosocial Assessment:   Outcome Survey Tools:    PIPE SCORES:               SF-36             PHQ-9:      10/17/2024     9:14 AM   PHQ-9 Depression Patient Health Questionnaire   Over the last two weeks how often have you been bothered by little interest or pleasure in doing things 0   Over the last two weeks how often have you been bothered by feeling down, depressed or hopeless 0   Over the last two weeks how often have you been bothered by trouble falling or staying asleep, or sleeping too much 0   Over the last two weeks how often have you been bothered by feeling tired or having little energy 1   Over the last two weeks how often have you been bothered by a poor appetite or overeating 0   Over the last two weeks how often have you been bothered by feeling bad about yourself - or that you are a failure or have let yourself or your family down 0   Over the last two weeks how often have you been bothered by trouble concentrating on things, such as reading the newspaper or watching television 0   Over the last two weeks how often have you been bothered by moving or speaking so slowly that other people could have noticed. 0   Over the last two weeks how often have you been bothered by thoughts that you would be better off dead, or of hurting yourself 0   If you checked off any problems, how difficult have these problems made it for you to do your work, take care of things at home or get along with other people? Somewhat difficult   PHQ-9 Score 1               Living Arrangements: Lives alone  Family Support: children daughter lives near patient and assists   Self Reported: Anxiety and Anger/Hostility  Displays: calmness  Medication:  cymbalta daily    Psychosocial Plan:   Goals:  Improved psychosocial coping strategies  Reduce manifestation of anxiety  Reduce manifestation of anger/hostility  Maintain positive support system  Maintain positive outlook  Improve overall quality of life    Interventions/Recommendations:  Discussed Results of Surveys  Patient to Self Report Emotional Changes at Session Check In  Recommend Physical Activity  Recommend Attending Education Lectures  Notify MD: No  Program Referral: No  Pharmaceutical Intervention/Therapy: Yes  Other Needs: not applicable  Stage of Readiness to Change: Contemplation    Education:  Stress Management; verbalizes understanding; Date: 10/17/24  Stress; verbalizes understanding; Date: 10/17/24  Risk Factors; verbalizes understanding; Date: 10/17/24    Comments:  Screening tools and results discussed with patient. Patient had a TAVR scheduled and resulted in an AV replacement with chest incision. Patient still upset over the experience which has caused him some anxiety and stress. Discussed effects of stress on cardiac health pt verbalized understandingPatient has been instructed to notify staff in the event that circumstances worsen.  Patient verbalizes understanding.    Other Core Components/Risk Factors Assessment:   RISK FACTORS:  diabetes, hyperlipidemia, hypertension, positive family history, sedentary lifestyle, PAD, Hx PTCA/Stent    Learning Barriers: Hearing Impairment, emotional    Education Level:  Attended College/Technical School    Pre-test Score: 70    Medication Compliance: has been compliant with taking medications    Other Core Components/Risk Factors Plan:   Goals:  Decrease cholesterol level: In Progress  Increase exercise tolerance: In Progress  Increase knowledge of CAD: In  Progress  Decrease blood pressure: In Progress  Weight loss: In Progress  Identify and manage personal areas of stress: In Progress  Control diabetes by adjusting diet and exercise: In Progress  Learn more about healthy eating: In Progress    Interventions/Recommendations:  Recommend regular attendance for Cardiac Rehab: Exercise and Education Lectures  Encourage medication compliance  Individual Education/ Counseling: Yes  Physician Referral: No    Education:    blood pressure meds, verbalizes understanding; Date: 10/17/24  risk factors, verbalizes understanding; Date: 10/17/24  stress, verbalizes understanding; Date: 10/17/24         Education method adapted to patients education level and preferred method of learning.  Method: explanation    Comments:  Discussed risk factor modification to improve cardiac health pt verbalized understanding. Discussed medication list, dosing, side effects, pt verbalized understanding.     Other Core Components/Hypertension Assessment:   Resting BP:   BP Readings from Last 1 Encounters:   11/06/24 114/70         BP Diagnosis: Hypertensive  Patient reported symptoms: none    Medications:  Medication Prescribed? Adherent? Exception   Beta-blocker [x]Yes  []No  []Unknown [x]Yes  []No  []Unknown    ACEI/ARB []Yes  []No  []Unknown []Yes  []No  []Unknown    Calcium Channel Blocker [x]Yes  []No  []Unknown [x]Yes  []No  []Unknown    Diuretic [x]Yes  []No  []Unknown [x]Yes  []No  []Unknown        Other Core Components/Hypertension Plan:   Goals:  Blood Pressure <130/80    Interventions/Recommendations:  Med Card Reconciled: Yes  Encourage medication compliance  Encourage sodium reduction  Reduce alcohol consumption  Encourage weight loss  Recommend physical activity  Educate on contributory factors  Reduce stress, anxiety, anger, depression, and/or chronic pain  Encourage home blood pressure monitoring  Recommend daily weights    Education:    Risk Factors; verbalizes understanding; Date:  10/17/24  Stress; verbalizes understanding; Date: 10/17/24         Comments:  Patient reports he is participating in the digital hypertension program.       Does the patient have Heart Failure? No    Other Core Components/Tobacco Cessation Assessment:   Smoking Status: Lifetime Non-smoker  Primary Tobacco Type: N/A  Tobacco Usage: no  Smoking Cessation Barriers:  NA  Stage of Readiness to Change: Maintenance    Other Core Components/Tobacco Cessation Plan:   Goals:  Maintain non-smoking status    Interventions:  Maintains non-smoking status    Education:    Risk Factors; verbalizes understanding; Date: 10/17/24  Benefits of Cardiac Rehab; verbalizes understanding; Date: 10/17/24         Comments:  Discussed screening tools and results, pt admits to anxiety and hostility from his exerience with his surgery. He is currently enrolled in digital hypertension and diabetic program. Pt reports he is no longer on any diabetes medications and does not monitor his glucose. DIscussed stress management techniques pt verbalized understanding. Pt instructed to wear his hearing aids to all classes as pt was noted to had difficulty hearing/understanding converstation. Discussed Cardiac Rehab program in depth with patient.  Medication list updated per patient & marked as reviewed.  Patient has been instructed to notify staff of any problems while attending rehab (ie: chest pain, shortness of breath, lightheadedness, dizziness).  Patient has been instructed to monitor blood pressure readings outside of rehab & to keep a daily log of the readings.  Patient verbalizes understanding.    Joseph Man RN  Cardiac Rehab Nurse

## 2024-11-08 ENCOUNTER — TELEPHONE (OUTPATIENT)
Dept: FAMILY MEDICINE | Facility: CLINIC | Age: 74
End: 2024-11-08
Payer: MEDICARE

## 2024-11-08 DIAGNOSIS — R31.21 ASYMPTOMATIC MICROSCOPIC HEMATURIA: Primary | ICD-10-CM

## 2024-11-27 ENCOUNTER — DOCUMENTATION ONLY (OUTPATIENT)
Dept: CARDIAC REHAB | Facility: CLINIC | Age: 74
End: 2024-11-27
Payer: MEDICARE

## 2024-11-27 NOTE — PROGRESS NOTES
Mr. Guerrero has completed 4 out of 36 exercise session of Phase II cardiac rehab.  He stopped attending classes and has not returned calls.  He was dropped from the program.    Session: Orientation     Cardiac Rehab Individual Treatment Plan - Initial Assessment      Patient Name: Cesar Simpson MRN: [id number]   : 1950   Age: 74 y.o.   Primary Diagnosis: AV REPLACEMENT  Date of Event: 24  EF: 58%  Risk Stratification: high  Referring Physician: JACKELYN   Exercise Assessment:     CPX/TM Date: 10-14-24 Results   RHR 79   Max    Peak VO2 (CPX only) 14.8   Actual METS (CPX only) 4.2   Estimated METS 9.0     Anthropometrics    Height 67 inches   Weight 176 lbs   BMI 27.6   Abdominal Girth 43.5   Body Composition 20.0%     ST Depression noted on Stress Test?:No  Angina with exercise?: No   Fall Risk: Yes   Assistive Devices:  independent   Currently exercising? No   Mr. Guerrero stated there were no limitations to exercise but during a range of motion assessment it was noted he has right shoulder limitations.  Exercise modalities will be modified to meet the patient's needs and capabilities.     Exercise Plan:   Goals:  CR Exercise Goals: Attend Cardiac Rehab 3 times/week: In Progress  Home Aerobic Exercise: 2 additional days/week for 30-60 minutes: In Progress  Intensity of 12-15 on the Rate of Perceived Exertion (RPE) scale: In Progress  30% increase in entry estimated METS: 11.7 : In Progress  5 days/week for 30-60 minutes: In Progress  Demonstrate proper pulse taking technique: In Progress    Intervention:   Discussed importance of regular attendance to cardiac rehab class    Exercise Prescription:  THR Range 100-114   Mode: Treadmill  Recumbent Bike  Upright Bike  Nustep  Elliptical   Frequency:  3 days/week   Duration:  30 - 60 minutes   Intensity:  12 - 15 RPE   Resistance Training:  Yes: 0 to 5 lb weights with 10-15 reps based on strength and range of motion assessment     Home Prescription:  Mode Aerobic    Frequency: 2- 3 days/week   Duration: 30-60 minutes   Resistance Training: None        Education:  Orientation to Equipment; verbalizes understanding; Date: 10-17-24  Exercise Recommendations; verbalizes understanding; Date: 10-17-24  Exercise Safety; verbalizes understanding; Date: 10-17-24  Class Preparation: verbalizes understanding; Date: 10-17-24  Signs and symptoms to report: verbalizes understanding; Date: 10-17-24  Caffeine/Hydration: verbalizes understanding; Date: 10-17-24  Exercise Terminology: verbalizes understanding; Date: 10-17-24  Resistance Training: verbalizes understanding; Date: 10-17-24    Comments:  I encouraged Mr. Guerrero to begin thinking about some type of aerobic exercise he can participate in at least 2 non-rehab days per week for at least 30 minutes in addition to attending Phase II cardiac rehab classes 3 days per week.  He stated understanding.    All consent forms were signed, proper attire and shoes were discussed.       Mr. Guerrero will begin Cardiac Rehab on  at 10:00 am.    The exercise prescription will be adjusted based on tolerance of exercise intensity by patient.    Cassie Kaur, CEP    Session: Orientation   Cardiac Rehab Individual Treatment Plan - Initial Assessment      Patient Name: Cesar Simpson MRN: [id number]   : 1950   Age: 74 y.o.   Date of Event: 24   Primary Diagnosis: S/P AV Replacement    EF: 58% (4/3/24)    Physical Assessment:   There were no vitals taken for this visit.    ASSESSMENT:  Heart Sounds: regular rate and rhythm, S1, S2 normal, no murmur, click, rub or gallop  Prosthetic Valve: Yes  Lung Sounds: normal air entry, lungs clear to auscultation  Capillary Refill: normal  Left Radial Pulse: Normal (+2)  Right Radial Pulse: Normal (+2)  Left Pedal Pulse: Normal (+2)  Right Pedal Pulse: Normal (+2)  Right Edema: none  Left Edema none  Strength: normal  Range of Motion: diminished range of motion Right Shoulder  Existing  Limitations:      Site   [x] Arthritis, bursitis Bilateral shoulders   [] Amputation, atrophy    [] Other:    []       Diabetic patient's foot examination comments: Normal -  Bilateral  Incisional site: healing well  Special needs: pt reports being Hard of Hearing and utilizes hearing aids which he did not wear today. Pt was instructed to wear his hearing aids when attending cardiac rehab so he could hear instructions and follow. Patient verbalized understanding.    Psychosocial Assessment:   Outcome Survey Tools:    PIPE SCORES:               SF-36             PHQ-9:      10/17/2024     9:14 AM   PHQ-9 Depression Patient Health Questionnaire   Over the last two weeks how often have you been bothered by little interest or pleasure in doing things 0   Over the last two weeks how often have you been bothered by feeling down, depressed or hopeless 0   Over the last two weeks how often have you been bothered by trouble falling or staying asleep, or sleeping too much 0   Over the last two weeks how often have you been bothered by feeling tired or having little energy 1   Over the last two weeks how often have you been bothered by a poor appetite or overeating 0   Over the last two weeks how often have you been bothered by feeling bad about yourself - or that you are a failure or have let yourself or your family down 0   Over the last two weeks how often have you been bothered by trouble concentrating on things, such as reading the newspaper or watching television 0   Over the last two weeks how often have you been bothered by moving or speaking so slowly that other people could have noticed. 0   Over the last two weeks how often have you been bothered by thoughts that you would be better off dead, or of hurting yourself 0   If you checked off any problems, how difficult have these problems made it for you to do your work, take care of things at home or get along with other people? Somewhat difficult   PHQ-9 Score 1               Living Arrangements: Lives alone  Family Support: children daughter lives near patient and assists   Self Reported: Anxiety and Anger/Hostility  Displays: calmness  Medication:  cymbalta daily    Psychosocial Plan:   Goals:  Improved psychosocial coping strategies  Reduce manifestation of anxiety  Reduce manifestation of anger/hostility  Maintain positive support system  Maintain positive outlook  Improve overall quality of life    Interventions/Recommendations:  Discussed Results of Surveys  Patient to Self Report Emotional Changes at Session Check In  Recommend Physical Activity  Recommend Attending Education Lectures  Notify MD: No  Program Referral: No  Pharmaceutical Intervention/Therapy: Yes  Other Needs: not applicable  Stage of Readiness to Change: Contemplation    Education:  Stress Management; verbalizes understanding; Date: 10/17/24  Stress; verbalizes understanding; Date: 10/17/24  Risk Factors; verbalizes understanding; Date: 10/17/24    Comments:  Screening tools and results discussed with patient. Patient had a TAVR scheduled and resulted in an AV replacement with chest incision. Patient still upset over the experience which has caused him some anxiety and stress. Discussed effects of stress on cardiac health pt verbalized understandingPatient has been instructed to notify staff in the event that circumstances worsen.  Patient verbalizes understanding.    Other Core Components/Risk Factors Assessment:   RISK FACTORS:  diabetes, hyperlipidemia, hypertension, positive family history, sedentary lifestyle, PAD, Hx PTCA/Stent    Learning Barriers: Hearing Impairment, emotional    Education Level:  Attended College/Technical School    Pre-test Score: 70    Medication Compliance: has been compliant with taking medications    Other Core Components/Risk Factors Plan:   Goals:  Decrease cholesterol level: In Progress  Increase exercise tolerance: In Progress  Increase knowledge of CAD: In  Progress  Decrease blood pressure: In Progress  Weight loss: In Progress  Identify and manage personal areas of stress: In Progress  Control diabetes by adjusting diet and exercise: In Progress  Learn more about healthy eating: In Progress    Interventions/Recommendations:  Recommend regular attendance for Cardiac Rehab: Exercise and Education Lectures  Encourage medication compliance  Individual Education/ Counseling: Yes  Physician Referral: No    Education:    blood pressure meds, verbalizes understanding; Date: 10/17/24  risk factors, verbalizes understanding; Date: 10/17/24  stress, verbalizes understanding; Date: 10/17/24         Education method adapted to patients education level and preferred method of learning.  Method: explanation    Comments:  Discussed risk factor modification to improve cardiac health pt verbalized understanding. Discussed medication list, dosing, side effects, pt verbalized understanding.     Other Core Components/Hypertension Assessment:   Resting BP:   BP Readings from Last 1 Encounters:   11/06/24 114/70         BP Diagnosis: Hypertensive  Patient reported symptoms: none    Medications:  Medication Prescribed? Adherent? Exception   Beta-blocker [x]Yes  []No  []Unknown [x]Yes  []No  []Unknown    ACEI/ARB []Yes  []No  []Unknown []Yes  []No  []Unknown    Calcium Channel Blocker [x]Yes  []No  []Unknown [x]Yes  []No  []Unknown    Diuretic [x]Yes  []No  []Unknown [x]Yes  []No  []Unknown        Other Core Components/Hypertension Plan:   Goals:  Blood Pressure <130/80    Interventions/Recommendations:  Med Card Reconciled: Yes  Encourage medication compliance  Encourage sodium reduction  Reduce alcohol consumption  Encourage weight loss  Recommend physical activity  Educate on contributory factors  Reduce stress, anxiety, anger, depression, and/or chronic pain  Encourage home blood pressure monitoring  Recommend daily weights    Education:    Risk Factors; verbalizes understanding; Date:  10/17/24  Stress; verbalizes understanding; Date: 10/17/24         Comments:  Patient reports he is participating in the digital hypertension program.       Does the patient have Heart Failure? No    Other Core Components/Tobacco Cessation Assessment:   Smoking Status: Lifetime Non-smoker  Primary Tobacco Type: N/A  Tobacco Usage: no  Smoking Cessation Barriers:  NA  Stage of Readiness to Change: Maintenance    Other Core Components/Tobacco Cessation Plan:   Goals:  Maintain non-smoking status    Interventions:  Maintains non-smoking status    Education:    Risk Factors; verbalizes understanding; Date: 10/17/24  Benefits of Cardiac Rehab; verbalizes understanding; Date: 10/17/24         Comments:  Discussed screening tools and results, pt admits to anxiety and hostility from his exerience with his surgery. He is currently enrolled in digital hypertension and diabetic program. Pt reports he is no longer on any diabetes medications and does not monitor his glucose. DIscussed stress management techniques pt verbalized understanding. Pt instructed to wear his hearing aids to all classes as pt was noted to had difficulty hearing/understanding converstation. Discussed Cardiac Rehab program in depth with patient.  Medication list updated per patient & marked as reviewed.  Patient has been instructed to notify staff of any problems while attending rehab (ie: chest pain, shortness of breath, lightheadedness, dizziness).  Patient has been instructed to monitor blood pressure readings outside of rehab & to keep a daily log of the readings.  Patient verbalizes understanding.    Joseph Man RN  Cardiac Rehab Nurse

## 2024-12-02 ENCOUNTER — PATIENT MESSAGE (OUTPATIENT)
Dept: CARDIOLOGY | Facility: CLINIC | Age: 74
End: 2024-12-02
Payer: MEDICARE

## 2024-12-13 ENCOUNTER — HOSPITAL ENCOUNTER (OUTPATIENT)
Dept: CARDIOLOGY | Facility: HOSPITAL | Age: 74
Discharge: HOME | End: 2024-12-13
Attending: INTERNAL MEDICINE
Payer: MEDICARE

## 2024-12-13 VITALS — WEIGHT: 177 LBS | HEIGHT: 67 IN | BODY MASS INDEX: 27.78 KG/M2

## 2024-12-13 DIAGNOSIS — Z95.2 S/P AVR (AORTIC VALVE REPLACEMENT) AND AORTOPLASTY: ICD-10-CM

## 2024-12-13 LAB
APICAL FOUR CHAMBER EJECTION FRACTION: 62 %
APICAL TWO CHAMBER EJECTION FRACTION: 40 %
AV INDEX (PROSTH): 0.4
AV MEAN GRADIENT: 11.4 MMHG
AV PEAK GRADIENT: 19.4 MMHG
AV VALVE AREA BY VELOCITY RATIO: 1.3 CM²
AV VALVE AREA: 1.3 CM²
AV VELOCITY RATIO: 0.41
BSA FOR ECHO PROCEDURE: 1.95 M2
CV ECHO LV RWT: 0.36 CM
DOP CALC AO PEAK VEL: 2.2 M/S
DOP CALC AO VTI: 43.7 CM
DOP CALC LVOT AREA: 3.1 CM2
DOP CALC LVOT DIAMETER: 2 CM
DOP CALC LVOT PEAK VEL: 0.9 M/S
DOP CALC LVOT STROKE VOLUME: 54.6 CM3
DOP CALC MV VTI: 33.8 CM
DOP CALCLVOT PEAK VEL VTI: 17.4 CM
E WAVE DECELERATION TIME: 174 MSEC
E/A RATIO: 0.65
E/E' RATIO: 7.62 M/S
ECHO LV POSTERIOR WALL: 0.8 CM (ref 0.6–1.1)
FRACTIONAL SHORTENING: 36.4 % (ref 28–44)
INTERVENTRICULAR SEPTUM: 0.8 CM (ref 0.6–1.1)
LEFT ATRIUM AREA SYSTOLIC (APICAL 2 CHAMBER): 18.59 CM2
LEFT ATRIUM AREA SYSTOLIC (APICAL 4 CHAMBER): 19.91 CM2
LEFT ATRIUM VOLUME INDEX MOD: 27.9 ML/M2
LEFT ATRIUM VOLUME MOD: 53.64 ML
LEFT INTERNAL DIMENSION IN SYSTOLE: 2.8 CM (ref 2.1–4)
LEFT VENTRICLE DIASTOLIC VOLUME INDEX: 45.53 ML/M2
LEFT VENTRICLE DIASTOLIC VOLUME: 87.41 ML
LEFT VENTRICLE END DIASTOLIC VOLUME APICAL 2 CHAMBER: 55.98 ML
LEFT VENTRICLE END DIASTOLIC VOLUME APICAL 4 CHAMBER: 109.2 ML
LEFT VENTRICLE END SYSTOLIC VOLUME APICAL 2 CHAMBER: 54.24 ML
LEFT VENTRICLE END SYSTOLIC VOLUME APICAL 4 CHAMBER: 50.2 ML
LEFT VENTRICLE MASS INDEX: 57 G/M2
LEFT VENTRICLE SYSTOLIC VOLUME INDEX: 14.8 ML/M2
LEFT VENTRICLE SYSTOLIC VOLUME: 28.42 ML
LEFT VENTRICULAR INTERNAL DIMENSION IN DIASTOLE: 4.4 CM (ref 3.5–6)
LEFT VENTRICULAR MASS: 109.4 G
LV LATERAL E/E' RATIO: 5.71 M/S
LV SEPTAL E/E' RATIO: 11.43 M/S
LVED V (TEICH): 87.41 ML
LVES V (TEICH): 28.42 ML
LVOT MG: 1.67 MMHG
LVOT MV: 0.62 CM/S
MV MEAN GRADIENT: 3 MMHG
MV PEAK A VEL: 1.24 M/S
MV PEAK E VEL: 0.8 M/S
MV PEAK GRADIENT: 7 MMHG
MV STENOSIS PRESSURE HALF TIME: 61.45 MS
MV VALVE AREA BY CONTINUITY EQUATION: 1.62 CM2
MV VALVE AREA P 1/2 METHOD: 3.58 CM2
OHS CV RV/LV RATIO: 0.82 CM
OHS LV EJECTION FRACTION SIMPSONS BIPLANE MOD: 52 %
PISA AR MAX VEL: 2.85 M/S
PISA MRMAX VEL: 5.59 M/S
PISA TR MAX VEL: 2.33 M/S
RA VOL SYS: 26.19 ML
RIGHT ATRIAL AREA: 11.8 CM2
RIGHT ATRIUM VOLUME AREA LENGTH APICAL 4 CHAMBER: 26.26 ML
RIGHT VENTRICLE DIASTOLIC BASEL DIMENSION: 3.6 CM
RV TISSUE DOPPLER FREE WALL SYSTOLIC VELOCITY 1 (APICAL 4 CHAMBER VIEW): 10.2 CM/S
SINUS: 3.59 CM
STJ: 2.88 CM
TDI LATERAL: 0.14 M/S
TDI SEPTAL: 0.07 M/S
TDI: 0.11 M/S
TR MAX PG: 22 MMHG
TRICUSPID ANNULAR PLANE SYSTOLIC EXCURSION: 1.01 CM
Z-SCORE OF LEFT VENTRICULAR DIMENSION IN END DIASTOLE: -2.08
Z-SCORE OF LEFT VENTRICULAR DIMENSION IN END SYSTOLE: -1.39

## 2024-12-13 PROCEDURE — 93306 TTE W/DOPPLER COMPLETE: CPT | Mod: 26,HCNC,, | Performed by: STUDENT IN AN ORGANIZED HEALTH CARE EDUCATION/TRAINING PROGRAM

## 2024-12-13 PROCEDURE — 93306 TTE W/DOPPLER COMPLETE: CPT | Mod: HCNC

## 2024-12-18 ENCOUNTER — HOSPITAL ENCOUNTER (OUTPATIENT)
Dept: RADIOLOGY | Facility: HOSPITAL | Age: 74
Discharge: HOME | End: 2024-12-18
Attending: INTERNAL MEDICINE
Payer: MEDICARE

## 2024-12-18 ENCOUNTER — HOSPITAL ENCOUNTER (OUTPATIENT)
Dept: PULMONOLOGY | Facility: HOSPITAL | Age: 74
Discharge: HOME | End: 2024-12-18
Attending: INTERNAL MEDICINE
Payer: MEDICARE

## 2024-12-18 DIAGNOSIS — J84.9 ILD (INTERSTITIAL LUNG DISEASE): ICD-10-CM

## 2024-12-18 LAB
APICAL FOUR CHAMBER EJECTION FRACTION: 62 %
APICAL TWO CHAMBER EJECTION FRACTION: 40 %
AV INDEX (PROSTH): 0.4
AV MEAN GRADIENT: 11.4 MMHG
AV PEAK GRADIENT: 19.4 MMHG
AV VALVE AREA BY VELOCITY RATIO: 1.3 CM²
AV VALVE AREA: 1.3 CM²
AV VELOCITY RATIO: 0.41
BSA FOR ECHO PROCEDURE: 1.95 M2
CV ECHO LV RWT: 0.36 CM
DOP CALC AO PEAK VEL: 2.2 M/S
DOP CALC AO VTI: 43.7 CM
DOP CALC LVOT AREA: 3.1 CM2
DOP CALC LVOT DIAMETER: 2 CM
DOP CALC LVOT PEAK VEL: 0.9 M/S
DOP CALC LVOT STROKE VOLUME: 54.6 CM3
DOP CALC MV VTI: 33.8 CM
DOP CALCLVOT PEAK VEL VTI: 17.4 CM
E WAVE DECELERATION TIME: 174 MSEC
E/A RATIO: 0.65
E/E' RATIO: 7.62 M/S
ECHO LV POSTERIOR WALL: 0.8 CM (ref 0.6–1.1)
FRACTIONAL SHORTENING: 36.4 % (ref 28–44)
INTERVENTRICULAR SEPTUM: 0.8 CM (ref 0.6–1.1)
LEFT ATRIUM AREA SYSTOLIC (APICAL 2 CHAMBER): 18.59 CM2
LEFT ATRIUM AREA SYSTOLIC (APICAL 4 CHAMBER): 19.91 CM2
LEFT ATRIUM VOLUME INDEX MOD: 27.9 ML/M2
LEFT ATRIUM VOLUME MOD: 53.64 ML
LEFT INTERNAL DIMENSION IN SYSTOLE: 2.8 CM (ref 2.1–4)
LEFT VENTRICLE DIASTOLIC VOLUME INDEX: 45.53 ML/M2
LEFT VENTRICLE DIASTOLIC VOLUME: 87.41 ML
LEFT VENTRICLE END DIASTOLIC VOLUME APICAL 2 CHAMBER: 55.98 ML
LEFT VENTRICLE END DIASTOLIC VOLUME APICAL 4 CHAMBER: 109.2 ML
LEFT VENTRICLE END SYSTOLIC VOLUME APICAL 2 CHAMBER: 54.24 ML
LEFT VENTRICLE END SYSTOLIC VOLUME APICAL 4 CHAMBER: 50.2 ML
LEFT VENTRICLE MASS INDEX: 57 G/M2
LEFT VENTRICLE SYSTOLIC VOLUME INDEX: 14.8 ML/M2
LEFT VENTRICLE SYSTOLIC VOLUME: 28.42 ML
LEFT VENTRICULAR INTERNAL DIMENSION IN DIASTOLE: 4.4 CM (ref 3.5–6)
LEFT VENTRICULAR MASS: 109.4 G
LV LATERAL E/E' RATIO: 5.71 M/S
LV SEPTAL E/E' RATIO: 11.43 M/S
LVED V (TEICH): 87.41 ML
LVES V (TEICH): 28.42 ML
LVOT MG: 1.67 MMHG
LVOT MV: 0.62 CM/S
MV MEAN GRADIENT: 3 MMHG
MV PEAK A VEL: 1.24 M/S
MV PEAK E VEL: 0.8 M/S
MV PEAK GRADIENT: 7 MMHG
MV STENOSIS PRESSURE HALF TIME: 61.45 MS
MV VALVE AREA BY CONTINUITY EQUATION: 1.62 CM2
MV VALVE AREA P 1/2 METHOD: 3.58 CM2
OHS CV RV/LV RATIO: 0.82 CM
OHS LV EJECTION FRACTION SIMPSONS BIPLANE MOD: 52 %
PISA AR MAX VEL: 2.85 M/S
PISA MRMAX VEL: 5.59 M/S
PISA TR MAX VEL: 2.33 M/S
RA VOL SYS: 26.19 ML
RIGHT ATRIAL AREA: 11.8 CM2
RIGHT ATRIUM VOLUME AREA LENGTH APICAL 4 CHAMBER: 26.26 ML
RIGHT VENTRICLE DIASTOLIC BASEL DIMENSION: 3.6 CM
RV TISSUE DOPPLER FREE WALL SYSTOLIC VELOCITY 1 (APICAL 4 CHAMBER VIEW): 10.2 CM/S
SINUS: 3.59 CM
STJ: 2.88 CM
TASV: 10 CM/S
TDI LATERAL: 0.14 M/S
TDI SEPTAL: 0.07 M/S
TDI: 0.11 M/S
TR MAX PG: 22 MMHG
Z-SCORE OF LEFT VENTRICULAR DIMENSION IN END DIASTOLE: -2.08
Z-SCORE OF LEFT VENTRICULAR DIMENSION IN END SYSTOLE: -1.39

## 2024-12-18 PROCEDURE — 94729 DIFFUSING CAPACITY: CPT | Performed by: INTERNAL MEDICINE

## 2024-12-18 PROCEDURE — 71250 CT THORAX DX C-: CPT | Mod: 26,,, | Performed by: RADIOLOGY

## 2024-12-18 PROCEDURE — 94726 PLETHYSMOGRAPHY LUNG VOLUMES: CPT | Performed by: INTERNAL MEDICINE

## 2024-12-18 PROCEDURE — 94010 BREATHING CAPACITY TEST: CPT | Performed by: INTERNAL MEDICINE

## 2024-12-18 PROCEDURE — 71250 CT THORAX DX C-: CPT | Mod: TC

## 2024-12-19 ENCOUNTER — DOCUMENTATION ONLY (OUTPATIENT)
Dept: CARDIAC REHAB | Facility: CLINIC | Age: 74
End: 2024-12-19
Payer: MEDICARE

## 2024-12-19 LAB
DLCO SINGLE BREATH LLN: 16.29
DLCO SINGLE BREATH PRE REF: 62.9 %
DLCO SINGLE BREATH REF: 23.22
DLCOC SBVA LLN: 2.39
DLCOC SBVA REF: 3.68
DLCOC SINGLE BREATH LLN: 16.29
DLCOC SINGLE BREATH REF: 23.22
DLCOCSBVAULN: 4.97
DLCOCSINGLEBREATHULN: 30.15
DLCOSINGLEBREATHULN: 30.15
DLCOSINGLEBREATHZSCORE: -2.04
DLCOVA LLN: 2.39
DLCOVA PRE REF: 90.1 %
DLCOVA PRE: 3.31 ML/(MIN*MMHG*L) (ref 2.39–4.97)
DLCOVA REF: 3.68
DLCOVAULN: 4.97
ERV LLN: -16449.08
ERV PRE REF: 105.2 %
ERV REF: 0.92
ERVULN: ABNORMAL
FEF 25 75 LLN: 1.1
FEF 25 75 PRE REF: 180.8 %
FEF 25 75 REF: 2.81
FEV05 LLN: 1.08
FEV05 REF: 2.22
FEV1 FVC LLN: 62
FEV1 FVC PRE REF: 120.5 %
FEV1 FVC REF: 76
FEV1 LLN: 1.93
FEV1 PRE REF: 107.7 %
FEV1 REF: 2.71
FEV1FVCZSCORE: 2.08
FEV1ZSCORE: 0.46
FRCPLETH LLN: 2.5
FRCPLETH PREREF: 71.6 %
FRCPLETH REF: 3.49
FRCPLETHULN: 4.48
FVC LLN: 2.62
FVC PRE REF: 89 %
FVC REF: 3.57
FVCZSCORE: -0.68
IVC PRE: 2.75 L (ref 2.62–4.53)
IVC SINGLE BREATH LLN: 2.62
IVC SINGLE BREATH PRE REF: 77.2 %
IVC SINGLE BREATH REF: 3.57
IVCSINGLEBREATHULN: 4.53
LLN IC: -9999997.39
PEF LLN: 4.66
PEF PRE REF: 103.3 %
PEF REF: 7.02
PHYSICIAN COMMENT: ABNORMAL
PRE DLCO: 14.61 ML/(MIN*MMHG) (ref 16.29–30.15)
PRE ERV: 0.97 L (ref -16449.08–16450.92)
PRE FEF 25 75: 5.09 L/S (ref 1.1–4.52)
PRE FET 100: 6.15 SEC
PRE FEV05 REF: 114.5 %
PRE FEV1 FVC: 91.8 % (ref 62.13–88.69)
PRE FEV1: 2.91 L (ref 1.93–3.43)
PRE FEV5: 2.54 L (ref 1.08–3.35)
PRE FRC PL: 2.5 L (ref 2.5–4.48)
PRE FVC: 3.17 L (ref 2.62–4.53)
PRE IC: 2.21 L (ref -9999997.39–#######.####)
PRE PEF: 7.26 L/S (ref 4.66–9.38)
PRE REF IC: 84.5 %
PRE RV: 1.53 L (ref 1.9–3.25)
PRE TLC: 4.71 L (ref 5.16–7.47)
RAW PRE REF: 74.3 %
RAW PRE: 2.27 CMH2O*S/L (ref 3.06–3.06)
RAW REF: 3.06
REF IC: 2.61
RV LLN: 1.9
RV PRE REF: 59.6 %
RV REF: 2.57
RVTLC LLN: 33
RVTLC PRE REF: 76.8 %
RVTLC PRE: 32.58 % (ref 33.45–51.41)
RVTLC REF: 42
RVTLCULN: 51
RVULN: 3.25
SGAW PRE REF: 178.8 %
SGAW PRE: 0.15 1/(CMH2O*S) (ref 0.08–0.08)
SGAW REF: 0.08
TLC LLN: 5.16
TLC PRE REF: 74.6 %
TLC REF: 6.31
TLC ULN: 7.47
TLCZSCORE: -2.3
ULN IC: ABNORMAL
VA PRE: 4.41 L (ref 6.16–6.16)
VA SINGLE BREATH LLN: 6.16
VA SINGLE BREATH PRE REF: 71.5 %
VA SINGLE BREATH REF: 6.16
VASINGLEBREATHULN: 6.16
VC LLN: 2.62
VC PRE REF: 89 %
VC PRE: 3.17 L (ref 2.62–4.53)
VC REF: 3.57
VC ULN: 4.53

## 2024-12-19 NOTE — PROGRESS NOTES
Mr. Guerrero has completed 4 out of 36 exercise session of Phase II cardiac rehab.  He stopped attending classes and has not returned calls.  He was dropped from the program.    Session: Orientation     Cardiac Rehab Individual Treatment Plan - Initial Assessment      Patient Name: Cesar Simpson MRN: [id number]   : 1950   Age: 74 y.o.   Primary Diagnosis: AV REPLACEMENT  Date of Event: 24  EF: 58%  Risk Stratification: high  Referring Physician: JACKELYN   Exercise Assessment:     CPX/TM Date: 10-14-24 Results   RHR 79   Max    Peak VO2 (CPX only) 14.8   Actual METS (CPX only) 4.2   Estimated METS 9.0     Anthropometrics    Height 67 inches   Weight 176 lbs   BMI 27.6   Abdominal Girth 43.5   Body Composition 20.0%     ST Depression noted on Stress Test?:No  Angina with exercise?: No   Fall Risk: Yes   Assistive Devices:  independent   Currently exercising? No   Mr. Guerrero stated there were no limitations to exercise but during a range of motion assessment it was noted he has right shoulder limitations.  Exercise modalities will be modified to meet the patient's needs and capabilities.     Exercise Plan:   Goals:  CR Exercise Goals: Attend Cardiac Rehab 3 times/week: In Progress  Home Aerobic Exercise: 2 additional days/week for 30-60 minutes: In Progress  Intensity of 12-15 on the Rate of Perceived Exertion (RPE) scale: In Progress  30% increase in entry estimated METS: 11.7 : In Progress  5 days/week for 30-60 minutes: In Progress  Demonstrate proper pulse taking technique: In Progress    Intervention:   Discussed importance of regular attendance to cardiac rehab class    Exercise Prescription:  THR Range 100-114   Mode: Treadmill  Recumbent Bike  Upright Bike  Nustep  Elliptical   Frequency:  3 days/week   Duration:  30 - 60 minutes   Intensity:  12 - 15 RPE   Resistance Training:  Yes: 0 to 5 lb weights with 10-15 reps based on strength and range of motion assessment     Home Prescription:  Mode Aerobic    Frequency: 2- 3 days/week   Duration: 30-60 minutes   Resistance Training: None        Education:  Orientation to Equipment; verbalizes understanding; Date: 10-17-24  Exercise Recommendations; verbalizes understanding; Date: 10-17-24  Exercise Safety; verbalizes understanding; Date: 10-17-24  Class Preparation: verbalizes understanding; Date: 10-17-24  Signs and symptoms to report: verbalizes understanding; Date: 10-17-24  Caffeine/Hydration: verbalizes understanding; Date: 10-17-24  Exercise Terminology: verbalizes understanding; Date: 10-17-24  Resistance Training: verbalizes understanding; Date: 10-17-24    Comments:  I encouraged Mr. Guerrero to begin thinking about some type of aerobic exercise he can participate in at least 2 non-rehab days per week for at least 30 minutes in addition to attending Phase II cardiac rehab classes 3 days per week.  He stated understanding.    All consent forms were signed, proper attire and shoes were discussed.       Mr. Guerrero will begin Cardiac Rehab on  at 10:00 am.    The exercise prescription will be adjusted based on tolerance of exercise intensity by patient.    Cassie Kaur, CEP    Session: Orientation   Cardiac Rehab Individual Treatment Plan - Initial Assessment      Patient Name: Cesar Simpson MRN: [id number]   : 1950   Age: 74 y.o.   Date of Event: 24   Primary Diagnosis: S/P AV Replacement    EF: 58% (4/3/24)    Physical Assessment:   There were no vitals taken for this visit.    ASSESSMENT:  Heart Sounds: regular rate and rhythm, S1, S2 normal, no murmur, click, rub or gallop  Prosthetic Valve: Yes  Lung Sounds: normal air entry, lungs clear to auscultation  Capillary Refill: normal  Left Radial Pulse: Normal (+2)  Right Radial Pulse: Normal (+2)  Left Pedal Pulse: Normal (+2)  Right Pedal Pulse: Normal (+2)  Right Edema: none  Left Edema none  Strength: normal  Range of Motion: diminished range of motion Right Shoulder  Existing  Limitations:      Site   [x] Arthritis, bursitis Bilateral shoulders   [] Amputation, atrophy    [] Other:    []       Diabetic patient's foot examination comments: Normal -  Bilateral  Incisional site: healing well  Special needs: pt reports being Hard of Hearing and utilizes hearing aids which he did not wear today. Pt was instructed to wear his hearing aids when attending cardiac rehab so he could hear instructions and follow. Patient verbalized understanding.    Psychosocial Assessment:   Outcome Survey Tools:    PIPE SCORES:               SF-36             PHQ-9:      10/17/2024     9:14 AM   PHQ-9 Depression Patient Health Questionnaire   Over the last two weeks how often have you been bothered by little interest or pleasure in doing things 0   Over the last two weeks how often have you been bothered by feeling down, depressed or hopeless 0   Over the last two weeks how often have you been bothered by trouble falling or staying asleep, or sleeping too much 0   Over the last two weeks how often have you been bothered by feeling tired or having little energy 1   Over the last two weeks how often have you been bothered by a poor appetite or overeating 0   Over the last two weeks how often have you been bothered by feeling bad about yourself - or that you are a failure or have let yourself or your family down 0   Over the last two weeks how often have you been bothered by trouble concentrating on things, such as reading the newspaper or watching television 0   Over the last two weeks how often have you been bothered by moving or speaking so slowly that other people could have noticed. 0   Over the last two weeks how often have you been bothered by thoughts that you would be better off dead, or of hurting yourself 0   If you checked off any problems, how difficult have these problems made it for you to do your work, take care of things at home or get along with other people? Somewhat difficult   PHQ-9 Score 1               Living Arrangements: Lives alone  Family Support: children daughter lives near patient and assists   Self Reported: Anxiety and Anger/Hostility  Displays: calmness  Medication:  cymbalta daily    Psychosocial Plan:   Goals:  Improved psychosocial coping strategies  Reduce manifestation of anxiety  Reduce manifestation of anger/hostility  Maintain positive support system  Maintain positive outlook  Improve overall quality of life    Interventions/Recommendations:  Discussed Results of Surveys  Patient to Self Report Emotional Changes at Session Check In  Recommend Physical Activity  Recommend Attending Education Lectures  Notify MD: No  Program Referral: No  Pharmaceutical Intervention/Therapy: Yes  Other Needs: not applicable  Stage of Readiness to Change: Contemplation    Education:  Stress Management; verbalizes understanding; Date: 10/17/24  Stress; verbalizes understanding; Date: 10/17/24  Risk Factors; verbalizes understanding; Date: 10/17/24    Comments:  Screening tools and results discussed with patient. Patient had a TAVR scheduled and resulted in an AV replacement with chest incision. Patient still upset over the experience which has caused him some anxiety and stress. Discussed effects of stress on cardiac health pt verbalized understandingPatient has been instructed to notify staff in the event that circumstances worsen.  Patient verbalizes understanding.    Other Core Components/Risk Factors Assessment:   RISK FACTORS:  diabetes, hyperlipidemia, hypertension, positive family history, sedentary lifestyle, PAD, Hx PTCA/Stent    Learning Barriers: Hearing Impairment, emotional    Education Level:  Attended College/Technical School    Pre-test Score: 70    Medication Compliance: has been compliant with taking medications    Other Core Components/Risk Factors Plan:   Goals:  Decrease cholesterol level: In Progress  Increase exercise tolerance: In Progress  Increase knowledge of CAD: In  Progress  Decrease blood pressure: In Progress  Weight loss: In Progress  Identify and manage personal areas of stress: In Progress  Control diabetes by adjusting diet and exercise: In Progress  Learn more about healthy eating: In Progress    Interventions/Recommendations:  Recommend regular attendance for Cardiac Rehab: Exercise and Education Lectures  Encourage medication compliance  Individual Education/ Counseling: Yes  Physician Referral: No    Education:    blood pressure meds, verbalizes understanding; Date: 10/17/24  risk factors, verbalizes understanding; Date: 10/17/24  stress, verbalizes understanding; Date: 10/17/24         Education method adapted to patients education level and preferred method of learning.  Method: explanation    Comments:  Discussed risk factor modification to improve cardiac health pt verbalized understanding. Discussed medication list, dosing, side effects, pt verbalized understanding.     Other Core Components/Hypertension Assessment:   Resting BP:   BP Readings from Last 1 Encounters:   11/06/24 114/70         BP Diagnosis: Hypertensive  Patient reported symptoms: none    Medications:  Medication Prescribed? Adherent? Exception   Beta-blocker [x]Yes  []No  []Unknown [x]Yes  []No  []Unknown    ACEI/ARB []Yes  []No  []Unknown []Yes  []No  []Unknown    Calcium Channel Blocker [x]Yes  []No  []Unknown [x]Yes  []No  []Unknown    Diuretic [x]Yes  []No  []Unknown [x]Yes  []No  []Unknown        Other Core Components/Hypertension Plan:   Goals:  Blood Pressure <130/80    Interventions/Recommendations:  Med Card Reconciled: Yes  Encourage medication compliance  Encourage sodium reduction  Reduce alcohol consumption  Encourage weight loss  Recommend physical activity  Educate on contributory factors  Reduce stress, anxiety, anger, depression, and/or chronic pain  Encourage home blood pressure monitoring  Recommend daily weights    Education:    Risk Factors; verbalizes understanding; Date:  10/17/24  Stress; verbalizes understanding; Date: 10/17/24         Comments:  Patient reports he is participating in the digital hypertension program.       Does the patient have Heart Failure? No    Other Core Components/Tobacco Cessation Assessment:   Smoking Status: Lifetime Non-smoker  Primary Tobacco Type: N/A  Tobacco Usage: no  Smoking Cessation Barriers:  NA  Stage of Readiness to Change: Maintenance    Other Core Components/Tobacco Cessation Plan:   Goals:  Maintain non-smoking status    Interventions:  Maintains non-smoking status    Education:    Risk Factors; verbalizes understanding; Date: 10/17/24  Benefits of Cardiac Rehab; verbalizes understanding; Date: 10/17/24         Comments:  Discussed screening tools and results, pt admits to anxiety and hostility from his exerience with his surgery. He is currently enrolled in digital hypertension and diabetic program. Pt reports he is no longer on any diabetes medications and does not monitor his glucose. DIscussed stress management techniques pt verbalized understanding. Pt instructed to wear his hearing aids to all classes as pt was noted to had difficulty hearing/understanding converstation. Discussed Cardiac Rehab program in depth with patient.  Medication list updated per patient & marked as reviewed.  Patient has been instructed to notify staff of any problems while attending rehab (ie: chest pain, shortness of breath, lightheadedness, dizziness).  Patient has been instructed to monitor blood pressure readings outside of rehab & to keep a daily log of the readings.  Patient verbalizes understanding.    Joseph Man RN  Cardiac Rehab Nurse

## 2024-12-20 ENCOUNTER — TELEPHONE (OUTPATIENT)
Dept: CARDIOLOGY | Facility: CLINIC | Age: 74
End: 2024-12-20
Payer: MEDICARE

## 2024-12-20 NOTE — PROGRESS NOTES
Your echocardiogram results is stable.  No major issues.  We will discuss in detail during your next appointment

## 2024-12-20 NOTE — TELEPHONE ENCOUNTER
Called pt to let him know what his test results were so I lvm for pt with his results and stated to him to call with any concerns        ad          ----- Message from Brian Vickers MD sent at 12/19/2024  6:10 PM CST -----  Your echocardiogram results is stable.  No major issues.  We will discuss in detail during your next appointment

## 2024-12-23 ENCOUNTER — OFFICE VISIT (OUTPATIENT)
Dept: PULMONOLOGY | Facility: CLINIC | Age: 74
End: 2024-12-23
Payer: MEDICARE

## 2024-12-23 VITALS
OXYGEN SATURATION: 97 % | WEIGHT: 177 LBS | BODY MASS INDEX: 27.78 KG/M2 | HEIGHT: 67 IN | HEART RATE: 98 BPM | DIASTOLIC BLOOD PRESSURE: 74 MMHG | SYSTOLIC BLOOD PRESSURE: 109 MMHG

## 2024-12-23 DIAGNOSIS — I35.0 SEVERE AORTIC STENOSIS: ICD-10-CM

## 2024-12-23 DIAGNOSIS — J84.9 ILD (INTERSTITIAL LUNG DISEASE): Primary | ICD-10-CM

## 2024-12-23 PROCEDURE — 99999 PR PBB SHADOW E&M-EST. PATIENT-LVL IV: CPT | Mod: PBBFAC,,, | Performed by: INTERNAL MEDICINE

## 2024-12-23 PROCEDURE — 1101F PT FALLS ASSESS-DOCD LE1/YR: CPT | Mod: CPTII,S$GLB,, | Performed by: INTERNAL MEDICINE

## 2024-12-23 PROCEDURE — 1159F MED LIST DOCD IN RCRD: CPT | Mod: CPTII,S$GLB,, | Performed by: INTERNAL MEDICINE

## 2024-12-23 PROCEDURE — 3061F NEG MICROALBUMINURIA REV: CPT | Mod: CPTII,S$GLB,, | Performed by: INTERNAL MEDICINE

## 2024-12-23 PROCEDURE — 99214 OFFICE O/P EST MOD 30 MIN: CPT | Mod: S$GLB,,, | Performed by: INTERNAL MEDICINE

## 2024-12-23 PROCEDURE — 1126F AMNT PAIN NOTED NONE PRSNT: CPT | Mod: CPTII,S$GLB,, | Performed by: INTERNAL MEDICINE

## 2024-12-23 PROCEDURE — 1157F ADVNC CARE PLAN IN RCRD: CPT | Mod: CPTII,S$GLB,, | Performed by: INTERNAL MEDICINE

## 2024-12-23 PROCEDURE — 3008F BODY MASS INDEX DOCD: CPT | Mod: CPTII,S$GLB,, | Performed by: INTERNAL MEDICINE

## 2024-12-23 PROCEDURE — 3074F SYST BP LT 130 MM HG: CPT | Mod: CPTII,S$GLB,, | Performed by: INTERNAL MEDICINE

## 2024-12-23 PROCEDURE — 3044F HG A1C LEVEL LT 7.0%: CPT | Mod: CPTII,S$GLB,, | Performed by: INTERNAL MEDICINE

## 2024-12-23 PROCEDURE — 3066F NEPHROPATHY DOC TX: CPT | Mod: CPTII,S$GLB,, | Performed by: INTERNAL MEDICINE

## 2024-12-23 PROCEDURE — 4010F ACE/ARB THERAPY RXD/TAKEN: CPT | Mod: CPTII,S$GLB,, | Performed by: INTERNAL MEDICINE

## 2024-12-23 PROCEDURE — 3288F FALL RISK ASSESSMENT DOCD: CPT | Mod: CPTII,S$GLB,, | Performed by: INTERNAL MEDICINE

## 2024-12-23 PROCEDURE — G2211 COMPLEX E/M VISIT ADD ON: HCPCS | Mod: S$GLB,,, | Performed by: INTERNAL MEDICINE

## 2024-12-23 PROCEDURE — 3078F DIAST BP <80 MM HG: CPT | Mod: CPTII,S$GLB,, | Performed by: INTERNAL MEDICINE

## 2024-12-23 NOTE — ASSESSMENT & PLAN NOTE
Very mild. Preserved spirometry. Repeat PFT with lung volumes in 1 year  Repeat CT in 1 year  Almost certainly related to old exposures at [facility]   Discussed can contribute to cough but no dedicated treatments.  PPI with dinner as prevents progression

## 2024-12-23 NOTE — PROGRESS NOTES
Subjective:       Patient ID: Cesar Simpson is a 74 y.o. male.  The patient's last visit with me was on 6/25/2024.     Patient reports that his cough is persistent but remains much improved from his initial assessment. No SOB or BASILIO. He is using exercie bike 40-60 minutes daily. 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    Follow-up  Review of Systems    Objective:      Vitals:    12/23/24 1422   BP: 109/74   Pulse: 98     Wt Readings from Last 3 Encounters:   12/23/24 80.3 kg (177 lb 0.5 oz)   12/13/24 80.3 kg (177 lb)   11/06/24 80.5 kg (177 lb 7.5 oz)     Temp Readings from Last 3 Encounters:   09/26/24 98.2 °F (36.8 °C) (Oral)   09/23/24 98.3 °F (36.8 °C) (Oral)   09/03/24 98.3 °F (36.8 °C) (Oral)     BP Readings from Last 3 Encounters:   12/23/24 109/74   11/06/24 114/70   10/17/24 124/68     Pulse Readings from Last 3 Encounters:   12/23/24 98   11/06/24 110   10/17/24 82       Physical Exam   Constitutional: He is oriented to person, place, and time. He appears well-developed and well-nourished.   HENT:   Head: Normocephalic.   Mouth/Throat: Oropharynx is clear and moist.   Cardiovascular: Normal rate and regular rhythm.   Pulmonary/Chest: Normal expansion, symmetric chest wall expansion, effort normal and breath sounds normal. He has no wheezes. He has no rales.   Abdominal: Soft. He exhibits no distension.   Musculoskeletal:         General: No edema. Normal range of motion.   Lymphadenopathy: No supraclavicular adenopathy is present.     He has no cervical adenopathy.   Neurological: He is alert and oriented to person, place, and time. Gait normal.   Skin: Skin is warm and dry. No rash noted.   Psychiatric: He has a normal mood and affect. His behavior is normal. Thought content normal.   Vitals reviewed.    CBC  Lab Results   Component Value Date    WBC 4.29 10/14/2024    HGB 12.3 (L) 10/14/2024    HCT 37.6 (L) 10/14/2024    MCV 91 10/14/2024     10/14/2024          CMP  Sodium   Date Value Ref Range Status   09/03/2024 134 (L) 136 - 145 mmol/L Final     Potassium   Date Value Ref Range Status   09/03/2024 4.1 3.5 - 5.1 mmol/L Final     Chloride   Date Value Ref Range Status   09/03/2024 98 95 - 110 mmol/L Final     CO2   Date Value Ref Range Status   09/03/2024 24 23 - 29 mmol/L Final     Glucose   Date Value Ref Range Status   09/03/2024 115 (H) 70 - 110 mg/dL Final     BUN   Date Value Ref Range Status   09/03/2024 25 (H) 8 - 23 mg/dL Final     Creatinine   Date Value Ref Range Status   09/03/2024 1.0 0.5 - 1.4 mg/dL Final     Calcium   Date Value Ref Range Status   09/03/2024 8.6 (L) 8.7 - 10.5 mg/dL Final     Total Protein   Date Value Ref Range Status   09/03/2024 6.3 6.0 - 8.4 g/dL Final     Albumin   Date Value Ref Range Status   09/03/2024 2.7 (L) 3.5 - 5.2 g/dL Final     Total Bilirubin   Date Value Ref Range Status   09/03/2024 1.0 0.1 - 1.0 mg/dL Final     Comment:     For infants and newborns, interpretation of results should be based  on gestational age, weight and in agreement with clinical  observations.    Premature Infant recommended reference ranges:  Up to 24 hours.............<8.0 mg/dL  Up to 48 hours............<12.0 mg/dL  3-5 days..................<15.0 mg/dL  6-29 days.................<15.0 mg/dL       Alkaline Phosphatase   Date Value Ref Range Status   09/03/2024 57 55 - 135 U/L Final     AST   Date Value Ref Range Status   09/03/2024 38 10 - 40 U/L Final     ALT   Date Value Ref Range Status   09/03/2024 23 10 - 44 U/L Final     Anion Gap   Date Value Ref Range Status   09/03/2024 12 8 - 16 mmol/L Final     eGFR   Date Value Ref Range Status   09/03/2024 >60.0 >60 mL/min/1.73 m^2 Final       ABG  POC PH   Date Value Ref Range Status   08/28/2024 7.392 7.35 - 7.45 Final     POC PO2   Date Value Ref Range Status   08/28/2024 130 (H) 80 - 100 mmHg Final     POC PCO2   Date Value Ref Range Status   08/28/2024 38.7 35 - 45 mmHg Final  "          Personal Diagnostic Review  I have personally reviewed the following data and added my own interpretation as below:  HRCT with stable mild ILD  PFTs stable with spirometry reduction  Echo with improved valve function      12/23/2024     2:22 PM 12/13/2024    10:37 AM 11/6/2024     1:26 PM 10/17/2024    10:30 AM 10/14/2024    10:29 AM 9/26/2024    11:04 AM 9/25/2024    10:07 AM   Pulmonary Function Tests   SpO2 97 %  98 % 98 %  97 % 98 %   Height 5' 7" (1.702 m) 5' 7" (1.702 m) 5' 7" (1.702 m)  5' 7" (1.702 m) 5' 7" (1.702 m) 5' 7" (1.702 m)   Weight 80.3 kg (177 lb 0.5 oz) 80.3 kg (177 lb) 80.5 kg (177 lb 7.5 oz)  79.8 kg (176 lb) 82.1 kg (181 lb) 82.3 kg (181 lb 7 oz)   BMI (Calculated) 27.7 27.7 27.8  27.6 28.3 28.4         Assessment:       1. ILD (interstitial lung disease)    2. Severe aortic stenosis        Outpatient Encounter Medications as of 12/23/2024   Medication Sig Dispense Refill    acetaminophen (TYLENOL) 500 MG tablet Take 2 tablets (1,000 mg total) by mouth every 6 (six) hours as needed for Pain. 30 tablet 0    amLODIPine (NORVASC) 5 MG tablet Take 1 tablet (5 mg total) by mouth once daily. 90 tablet 3    aspirin 81 MG Chew Take 1 tablet (81 mg total) by mouth once daily. 90 tablet 3    atorvastatin (LIPITOR) 80 MG tablet Take 1 tablet (80 mg total) by mouth once daily. 30 tablet 11    blood-glucose meter Misc 1 device.  Check sugar 1 times daily as directed by MD. Supply preferred brand .Dx Code: [E11.8] 1 each 0    clopidogreL (PLAVIX) 75 mg tablet Take 1 tablet (75 mg total) by mouth once daily. 30 tablet 11    DULoxetine (CYMBALTA) 30 MG capsule Take 1 capsule (30 mg total) by mouth once daily. 90 capsule 2    esomeprazole (NEXIUM) 40 MG capsule Take 1 capsule (40 mg total) by mouth daily with dinner or evening meal. 30 capsule 11    ezetimibe (ZETIA) 10 mg tablet Take 1 tablet (10 mg total) by mouth once daily. 90 tablet 3    finasteride (PROSCAR) 5 mg tablet Take 1 tablet " (5 mg total) by mouth once daily. 90 tablet 2    furosemide (LASIX) 20 MG tablet Take 1 tablet (20 mg total) by mouth every other day. 15 tablet 11    levothyroxine (SYNTHROID) 88 MCG tablet Take 1 tablet (88 mcg total) by mouth before breakfast. 90 tablet 0    metoprolol tartrate (LOPRESSOR) 25 MG tablet Take 1 tablet (25 mg total) by mouth 2 (two) times daily. 180 tablet 3    tamsulosin (FLOMAX) 0.4 mg Cap Take 2 capsules (0.8 mg total) by mouth once daily. 180 capsule 3    traZODone (DESYREL) 100 MG tablet Take 1 tablet (100 mg total) by mouth every evening. 90 tablet 3    lancets Misc 1 box.  Check 1x daily as directed by MD. Supply brand covered by insurance. Dx Code: [E11.8]. Grace Brandon. (Patient not taking: Reported on 10/17/2024) 100 each 3     No facility-administered encounter medications on file as of 12/23/2024.     1. ILD (interstitial lung disease)  - Complete PFT with bronchodilator; Future  - CT Chest Without Contrast; Future    2. Severe aortic stenosis    Plan:     Problem List Items Addressed This Visit          Pulmonary    ILD (interstitial lung disease) - Primary    Current Assessment & Plan     Very mild. Preserved spirometry. Repeat PFT with lung volumes in 1 year  Repeat CT in 1 year  Almost certainly related to old exposures at [facility]   Discussed can contribute to cough but no dedicated treatments.  PPI with dinner as prevents progression         Relevant Orders    Complete PFT with bronchodilator    CT Chest Without Contrast       Cardiac/Vascular    Severe aortic stenosis    Overview     Moderate to Severe          Current Assessment & Plan     S/p AVR            Please note Overview Notes are historic documentation. Please review A/P for current updates.  No follow-ups on file.    Future Appointments   Date Time Provider Department Center   1/13/2025 10:15 AM LAB, [facility]   1/15/2025 11:00 AM Gloria Paulino PA-C [facility]   1/17/2025  8:00  AM SPECIMEN, [facility] SPECLAB [place]   2/25/2025  9:00 AM Brian Vickers MD [facility] CARDIO [facility]   3/5/2025  9:00 AM [facility] CT1 64- LIMIT 650 LBS [facility] CT SCAN [facility]   5/7/2025  1:20 PM Chaitanya López DO [facility]         Wayne Johns MD

## 2025-01-06 DIAGNOSIS — E78.49 OTHER HYPERLIPIDEMIA: ICD-10-CM

## 2025-01-06 DIAGNOSIS — I25.118 CORONARY ARTERY DISEASE OF NATIVE ARTERY OF NATIVE HEART WITH STABLE ANGINA PECTORIS: Chronic | ICD-10-CM

## 2025-01-06 RX ORDER — EZETIMIBE 10 MG/1
10 TABLET ORAL DAILY
Qty: 90 TABLET | Refills: 3 | Status: SHIPPED | OUTPATIENT
Start: 2025-01-06 | End: 2026-01-06

## 2025-01-07 NOTE — TELEPHONE ENCOUNTER
Care Due:                  Date            Visit Type   Department     Provider  --------------------------------------------------------------------------------                                EP -                              [facility] FAMILY  Last Visit: 11-      CARE ([facility])   [facility]       Chaitanya López                              EP -                              PRIMARY      [facility]  Next Visit: 05-      CARE ([facility])   [facility]       Chaitanya López                                                            Last  Test          Frequency    Reason                     Performed    Due Date  --------------------------------------------------------------------------------    TSH.........  12 months..  levothyroxine............  08- 08-    Health [facility] Embedded Care Due Messages. Reference number: [id number].   1/06/2025 6:33:58 PM CST

## 2025-01-09 ENCOUNTER — DOCUMENTATION ONLY (OUTPATIENT)
Dept: CARDIAC REHAB | Facility: CLINIC | Age: 75
End: 2025-01-09
Payer: MEDICARE

## 2025-01-09 NOTE — PROGRESS NOTES
Mr. Guerrero has completed 4 out of 36 exercise session of Phase II cardiac rehab.  He stopped attending classes and has not returned calls.  He was dropped from the program.    Session: Orientation     Cardiac Rehab Individual Treatment Plan - Initial Assessment      Patient Name: Cesar Simpson MRN: [id number]   : 1950   Age: 74 y.o.   Primary Diagnosis: AV REPLACEMENT  Date of Event: 24  EF: 58%  Risk Stratification: high  Referring Physician: JACKELYN   Exercise Assessment:     CPX/TM Date: 10-14-24 Results   RHR 79   Max    Peak VO2 (CPX only) 14.8   Actual METS (CPX only) 4.2   Estimated METS 9.0     Anthropometrics    Height 67 inches   Weight 176 lbs   BMI 27.6   Abdominal Girth 43.5   Body Composition 20.0%     ST Depression noted on Stress Test?:No  Angina with exercise?: No   Fall Risk: Yes   Assistive Devices:  independent   Currently exercising? No   Mr. Guerrero stated there were no limitations to exercise but during a range of motion assessment it was noted he has right shoulder limitations.  Exercise modalities will be modified to meet the patient's needs and capabilities.     Exercise Plan:   Goals:  CR Exercise Goals: Attend Cardiac Rehab 3 times/week: In Progress  Home Aerobic Exercise: 2 additional days/week for 30-60 minutes: In Progress  Intensity of 12-15 on the Rate of Perceived Exertion (RPE) scale: In Progress  30% increase in entry estimated METS: 11.7 : In Progress  5 days/week for 30-60 minutes: In Progress  Demonstrate proper pulse taking technique: In Progress    Intervention:   Discussed importance of regular attendance to cardiac rehab class    Exercise Prescription:  THR Range 100-114   Mode: Treadmill  Recumbent Bike  Upright Bike  Nustep  Elliptical   Frequency:  3 days/week   Duration:  30 - 60 minutes   Intensity:  12 - 15 RPE   Resistance Training:  Yes: 0 to 5 lb weights with 10-15 reps based on strength and range of motion assessment     Home Prescription:  Mode Aerobic    Frequency: 2- 3 days/week   Duration: 30-60 minutes   Resistance Training: None        Education:  Orientation to Equipment; verbalizes understanding; Date: 10-17-24  Exercise Recommendations; verbalizes understanding; Date: 10-17-24  Exercise Safety; verbalizes understanding; Date: 10-17-24  Class Preparation: verbalizes understanding; Date: 10-17-24  Signs and symptoms to report: verbalizes understanding; Date: 10-17-24  Caffeine/Hydration: verbalizes understanding; Date: 10-17-24  Exercise Terminology: verbalizes understanding; Date: 10-17-24  Resistance Training: verbalizes understanding; Date: 10-17-24    Comments:  I encouraged Mr. Guerrero to begin thinking about some type of aerobic exercise he can participate in at least 2 non-rehab days per week for at least 30 minutes in addition to attending Phase II cardiac rehab classes 3 days per week.  He stated understanding.    All consent forms were signed, proper attire and shoes were discussed.       Mr. Guerrero will begin Cardiac Rehab on  at 10:00 am.    The exercise prescription will be adjusted based on tolerance of exercise intensity by patient.    Cassie Kaur, CEP    Session: Orientation   Cardiac Rehab Individual Treatment Plan - Initial Assessment      Patient Name: Cesar Simpson MRN: [id number]   : 1950   Age: 74 y.o.   Date of Event: 24   Primary Diagnosis: S/P AV Replacement    EF: 58% (4/3/24)    Physical Assessment:   There were no vitals taken for this visit.    ASSESSMENT:  Heart Sounds: regular rate and rhythm, S1, S2 normal, no murmur, click, rub or gallop  Prosthetic Valve: Yes  Lung Sounds: normal air entry, lungs clear to auscultation  Capillary Refill: normal  Left Radial Pulse: Normal (+2)  Right Radial Pulse: Normal (+2)  Left Pedal Pulse: Normal (+2)  Right Pedal Pulse: Normal (+2)  Right Edema: none  Left Edema none  Strength: normal  Range of Motion: diminished range of motion Right Shoulder  Existing  Limitations:      Site   [x] Arthritis, bursitis Bilateral shoulders   [] Amputation, atrophy    [] Other:    []       Diabetic patient's foot examination comments: Normal -  Bilateral  Incisional site: healing well  Special needs: pt reports being Hard of Hearing and utilizes hearing aids which he did not wear today. Pt was instructed to wear his hearing aids when attending cardiac rehab so he could hear instructions and follow. Patient verbalized understanding.    Psychosocial Assessment:   Outcome Survey Tools:    PIPE SCORES:               SF-36             PHQ-9:      10/17/2024     9:14 AM   PHQ-9 Depression Patient Health Questionnaire   Over the last two weeks how often have you been bothered by little interest or pleasure in doing things 0   Over the last two weeks how often have you been bothered by feeling down, depressed or hopeless 0   Over the last two weeks how often have you been bothered by trouble falling or staying asleep, or sleeping too much 0   Over the last two weeks how often have you been bothered by feeling tired or having little energy 1   Over the last two weeks how often have you been bothered by a poor appetite or overeating 0   Over the last two weeks how often have you been bothered by feeling bad about yourself - or that you are a failure or have let yourself or your family down 0   Over the last two weeks how often have you been bothered by trouble concentrating on things, such as reading the newspaper or watching television 0   Over the last two weeks how often have you been bothered by moving or speaking so slowly that other people could have noticed. 0   Over the last two weeks how often have you been bothered by thoughts that you would be better off dead, or of hurting yourself 0   If you checked off any problems, how difficult have these problems made it for you to do your work, take care of things at home or get along with other people? Somewhat difficult   PHQ-9 Score 1               Living Arrangements: Lives alone  Family Support: children daughter lives near patient and assists   Self Reported: Anxiety and Anger/Hostility  Displays: calmness  Medication:  cymbalta daily    Psychosocial Plan:   Goals:  Improved psychosocial coping strategies  Reduce manifestation of anxiety  Reduce manifestation of anger/hostility  Maintain positive support system  Maintain positive outlook  Improve overall quality of life    Interventions/Recommendations:  Discussed Results of Surveys  Patient to Self Report Emotional Changes at Session Check In  Recommend Physical Activity  Recommend Attending Education Lectures  Notify MD: No  Program Referral: No  Pharmaceutical Intervention/Therapy: Yes  Other Needs: not applicable  Stage of Readiness to Change: Contemplation    Education:  Stress Management; verbalizes understanding; Date: 10/17/24  Stress; verbalizes understanding; Date: 10/17/24  Risk Factors; verbalizes understanding; Date: 10/17/24    Comments:  Screening tools and results discussed with patient. Patient had a TAVR scheduled and resulted in an AV replacement with chest incision. Patient still upset over the experience which has caused him some anxiety and stress. Discussed effects of stress on cardiac health pt verbalized understandingPatient has been instructed to notify staff in the event that circumstances worsen.  Patient verbalizes understanding.    Other Core Components/Risk Factors Assessment:   RISK FACTORS:  diabetes, hyperlipidemia, hypertension, positive family history, sedentary lifestyle, PAD, Hx PTCA/Stent    Learning Barriers: Hearing Impairment, emotional    Education Level:  Attended College/Technical School    Pre-test Score: 70    Medication Compliance: has been compliant with taking medications    Other Core Components/Risk Factors Plan:   Goals:  Decrease cholesterol level: In Progress  Increase exercise tolerance: In Progress  Increase knowledge of CAD: In  Progress  Decrease blood pressure: In Progress  Weight loss: In Progress  Identify and manage personal areas of stress: In Progress  Control diabetes by adjusting diet and exercise: In Progress  Learn more about healthy eating: In Progress    Interventions/Recommendations:  Recommend regular attendance for Cardiac Rehab: Exercise and Education Lectures  Encourage medication compliance  Individual Education/ Counseling: Yes  Physician Referral: No    Education:    blood pressure meds, verbalizes understanding; Date: 10/17/24  risk factors, verbalizes understanding; Date: 10/17/24  stress, verbalizes understanding; Date: 10/17/24         Education method adapted to patients education level and preferred method of learning.  Method: explanation    Comments:  Discussed risk factor modification to improve cardiac health pt verbalized understanding. Discussed medication list, dosing, side effects, pt verbalized understanding.     Other Core Components/Hypertension Assessment:   Resting BP:   BP Readings from Last 1 Encounters:   12/23/24 109/74         BP Diagnosis: Hypertensive  Patient reported symptoms: none    Medications:  Medication Prescribed? Adherent? Exception   Beta-blocker [x]Yes  []No  []Unknown [x]Yes  []No  []Unknown    ACEI/ARB []Yes  []No  []Unknown []Yes  []No  []Unknown    Calcium Channel Blocker [x]Yes  []No  []Unknown [x]Yes  []No  []Unknown    Diuretic [x]Yes  []No  []Unknown [x]Yes  []No  []Unknown        Other Core Components/Hypertension Plan:   Goals:  Blood Pressure <130/80    Interventions/Recommendations:  Med Card Reconciled: Yes  Encourage medication compliance  Encourage sodium reduction  Reduce alcohol consumption  Encourage weight loss  Recommend physical activity  Educate on contributory factors  Reduce stress, anxiety, anger, depression, and/or chronic pain  Encourage home blood pressure monitoring  Recommend daily weights    Education:    Risk Factors; verbalizes understanding; Date:  10/17/24  Stress; verbalizes understanding; Date: 10/17/24         Comments:  Patient reports he is participating in the digital hypertension program.       Does the patient have Heart Failure? No    Other Core Components/Tobacco Cessation Assessment:   Smoking Status: Lifetime Non-smoker  Primary Tobacco Type: N/A  Tobacco Usage: no  Smoking Cessation Barriers:  NA  Stage of Readiness to Change: Maintenance    Other Core Components/Tobacco Cessation Plan:   Goals:  Maintain non-smoking status    Interventions:  Maintains non-smoking status    Education:    Risk Factors; verbalizes understanding; Date: 10/17/24  Benefits of Cardiac Rehab; verbalizes understanding; Date: 10/17/24         Comments:  Discussed screening tools and results, pt admits to anxiety and hostility from his exerience with his surgery. He is currently enrolled in digital hypertension and diabetic program. Pt reports he is no longer on any diabetes medications and does not monitor his glucose. DIscussed stress management techniques pt verbalized understanding. Pt instructed to wear his hearing aids to all classes as pt was noted to had difficulty hearing/understanding converstation. Discussed Cardiac Rehab program in depth with patient.  Medication list updated per patient & marked as reviewed.  Patient has been instructed to notify staff of any problems while attending rehab (ie: chest pain, shortness of breath, lightheadedness, dizziness).  Patient has been instructed to monitor blood pressure readings outside of rehab & to keep a daily log of the readings.  Patient verbalizes understanding.    Joseph Man RN  Cardiac Rehab Nurse

## 2025-01-13 ENCOUNTER — LAB VISIT (OUTPATIENT)
Dept: LAB | Facility: HOSPITAL | Age: 75
End: 2025-01-13
Attending: FAMILY MEDICINE
Payer: MEDICARE

## 2025-01-13 DIAGNOSIS — E11.9 DIET-CONTROLLED TYPE 2 DIABETES MELLITUS: ICD-10-CM

## 2025-01-13 DIAGNOSIS — D64.9 NORMOCYTIC ANEMIA: ICD-10-CM

## 2025-01-13 DIAGNOSIS — Z79.899 ENCOUNTER FOR LONG-TERM CURRENT USE OF MEDICATION: ICD-10-CM

## 2025-01-13 DIAGNOSIS — R35.1 BPH ASSOCIATED WITH NOCTURIA: ICD-10-CM

## 2025-01-13 DIAGNOSIS — E03.8 OTHER SPECIFIED HYPOTHYROIDISM: ICD-10-CM

## 2025-01-13 DIAGNOSIS — N40.1 BPH ASSOCIATED WITH NOCTURIA: ICD-10-CM

## 2025-01-13 DIAGNOSIS — E78.49 OTHER HYPERLIPIDEMIA: ICD-10-CM

## 2025-01-13 DIAGNOSIS — Z12.5 SCREENING PSA (PROSTATE SPECIFIC ANTIGEN): ICD-10-CM

## 2025-01-13 DIAGNOSIS — E78.5 HYPERLIPIDEMIA ASSOCIATED WITH TYPE 2 DIABETES MELLITUS: ICD-10-CM

## 2025-01-13 DIAGNOSIS — E11.69 HYPERLIPIDEMIA ASSOCIATED WITH TYPE 2 DIABETES MELLITUS: ICD-10-CM

## 2025-01-13 LAB
25(OH)D3+25(OH)D2 SERPL-MCNC: 34 NG/ML (ref 30–96)
ALBUMIN SERPL BCP-MCNC: 4 G/DL (ref 3.5–5.2)
ALP SERPL-CCNC: 62 U/L (ref 40–150)
ALT SERPL W/O P-5'-P-CCNC: 25 U/L (ref 10–44)
ANION GAP SERPL CALC-SCNC: 12 MMOL/L (ref 8–16)
AST SERPL-CCNC: 18 U/L (ref 10–40)
BASOPHILS # BLD AUTO: 0.06 K/UL (ref 0–0.2)
BASOPHILS NFR BLD: 1.1 % (ref 0–1.9)
BILIRUB SERPL-MCNC: 0.5 MG/DL (ref 0.1–1)
BUN SERPL-MCNC: 26 MG/DL (ref 8–23)
CALCIUM SERPL-MCNC: 9.8 MG/DL (ref 8.7–10.5)
CHLORIDE SERPL-SCNC: 102 MMOL/L (ref 95–110)
CHOLEST SERPL-MCNC: 126 MG/DL (ref 120–199)
CHOLEST/HDLC SERPL: 3.5 {RATIO} (ref 2–5)
CO2 SERPL-SCNC: 25 MMOL/L (ref 23–29)
COMPLEXED PSA SERPL-MCNC: 0.4 NG/ML (ref 0–4)
CREAT SERPL-MCNC: 1.1 MG/DL (ref 0.5–1.4)
DIFFERENTIAL METHOD BLD: ABNORMAL
EOSINOPHIL # BLD AUTO: 0.2 K/UL (ref 0–0.5)
EOSINOPHIL NFR BLD: 4.3 % (ref 0–8)
ERYTHROCYTE [DISTWIDTH] IN BLOOD BY AUTOMATED COUNT: 12.9 % (ref 11.5–14.5)
EST. GFR  (NO RACE VARIABLE): >60 ML/MIN/1.73 M^2
ESTIMATED AVG GLUCOSE: 120 MG/DL (ref 68–131)
FERRITIN SERPL-MCNC: 50 NG/ML (ref 20–300)
GLUCOSE SERPL-MCNC: 112 MG/DL (ref 70–110)
HBA1C MFR BLD: 5.8 % (ref 4–5.6)
HCT VFR BLD AUTO: 42.4 % (ref 40–54)
HDLC SERPL-MCNC: 36 MG/DL (ref 40–75)
HDLC SERPL: 28.6 % (ref 20–50)
HGB BLD-MCNC: 13.8 G/DL (ref 14–18)
IMM GRANULOCYTES # BLD AUTO: 0.03 K/UL (ref 0–0.04)
IMM GRANULOCYTES NFR BLD AUTO: 0.6 % (ref 0–0.5)
IRON SERPL-MCNC: 76 UG/DL (ref 45–160)
LDLC SERPL CALC-MCNC: 76.2 MG/DL (ref 63–159)
LYMPHOCYTES # BLD AUTO: 1.2 K/UL (ref 1–4.8)
LYMPHOCYTES NFR BLD: 21.9 % (ref 18–48)
MCH RBC QN AUTO: 29.8 PG (ref 27–31)
MCHC RBC AUTO-ENTMCNC: 32.5 G/DL (ref 32–36)
MCV RBC AUTO: 92 FL (ref 82–98)
MONOCYTES # BLD AUTO: 0.5 K/UL (ref 0.3–1)
MONOCYTES NFR BLD: 10 % (ref 4–15)
NEUTROPHILS # BLD AUTO: 3.3 K/UL (ref 1.8–7.7)
NEUTROPHILS NFR BLD: 62.1 % (ref 38–73)
NONHDLC SERPL-MCNC: 90 MG/DL
NRBC BLD-RTO: 0 /100 WBC
PLATELET # BLD AUTO: 160 K/UL (ref 150–450)
PMV BLD AUTO: 10.4 FL (ref 9.2–12.9)
POTASSIUM SERPL-SCNC: 4 MMOL/L (ref 3.5–5.1)
PROT SERPL-MCNC: 7.6 G/DL (ref 6–8.4)
RBC # BLD AUTO: 4.63 M/UL (ref 4.6–6.2)
SATURATED IRON: 21 % (ref 20–50)
SODIUM SERPL-SCNC: 139 MMOL/L (ref 136–145)
T4 FREE SERPL-MCNC: 1.08 NG/DL (ref 0.71–1.51)
TOTAL IRON BINDING CAPACITY: 361 UG/DL (ref 250–450)
TRANSFERRIN SERPL-MCNC: 244 MG/DL (ref 200–375)
TRIGL SERPL-MCNC: 69 MG/DL (ref 30–150)
TSH SERPL DL<=0.005 MIU/L-ACNC: 2.78 UIU/ML (ref 0.4–4)
VIT B12 SERPL-MCNC: 379 PG/ML (ref 210–950)
WBC # BLD AUTO: 5.29 K/UL (ref 3.9–12.7)

## 2025-01-13 PROCEDURE — 82306 VITAMIN D 25 HYDROXY: CPT | Mod: HCNC | Performed by: FAMILY MEDICINE

## 2025-01-13 PROCEDURE — 80053 COMPREHEN METABOLIC PANEL: CPT | Mod: HCNC | Performed by: FAMILY MEDICINE

## 2025-01-13 PROCEDURE — 85025 COMPLETE CBC W/AUTO DIFF WBC: CPT | Mod: HCNC | Performed by: FAMILY MEDICINE

## 2025-01-13 PROCEDURE — 82607 VITAMIN B-12: CPT | Mod: HCNC | Performed by: FAMILY MEDICINE

## 2025-01-13 PROCEDURE — 84439 ASSAY OF FREE THYROXINE: CPT | Mod: HCNC | Performed by: FAMILY MEDICINE

## 2025-01-13 PROCEDURE — 83540 ASSAY OF IRON: CPT | Mod: HCNC | Performed by: FAMILY MEDICINE

## 2025-01-13 PROCEDURE — 80061 LIPID PANEL: CPT | Mod: HCNC | Performed by: FAMILY MEDICINE

## 2025-01-13 PROCEDURE — 82728 ASSAY OF FERRITIN: CPT | Mod: HCNC | Performed by: FAMILY MEDICINE

## 2025-01-13 PROCEDURE — 84443 ASSAY THYROID STIM HORMONE: CPT | Mod: HCNC | Performed by: FAMILY MEDICINE

## 2025-01-13 PROCEDURE — 83036 HEMOGLOBIN GLYCOSYLATED A1C: CPT | Mod: HCNC | Performed by: FAMILY MEDICINE

## 2025-01-13 PROCEDURE — 84153 ASSAY OF PSA TOTAL: CPT | Mod: HCNC | Performed by: FAMILY MEDICINE

## 2025-01-15 ENCOUNTER — OFFICE VISIT (OUTPATIENT)
Dept: FAMILY MEDICINE | Facility: CLINIC | Age: 75
End: 2025-01-15
Payer: MEDICARE

## 2025-01-15 VITALS
WEIGHT: 177.69 LBS | SYSTOLIC BLOOD PRESSURE: 106 MMHG | HEART RATE: 70 BPM | HEIGHT: 67 IN | BODY MASS INDEX: 27.89 KG/M2 | DIASTOLIC BLOOD PRESSURE: 58 MMHG | OXYGEN SATURATION: 96 %

## 2025-01-15 DIAGNOSIS — E78.49 OTHER HYPERLIPIDEMIA: ICD-10-CM

## 2025-01-15 DIAGNOSIS — I25.118 CORONARY ARTERY DISEASE OF NATIVE ARTERY OF NATIVE HEART WITH STABLE ANGINA PECTORIS: Primary | ICD-10-CM

## 2025-01-15 DIAGNOSIS — I10 ESSENTIAL HYPERTENSION: ICD-10-CM

## 2025-01-15 DIAGNOSIS — J84.9 ILD (INTERSTITIAL LUNG DISEASE): ICD-10-CM

## 2025-01-15 DIAGNOSIS — E11.59 HYPERTENSION ASSOCIATED WITH DIABETES: ICD-10-CM

## 2025-01-15 DIAGNOSIS — I15.2 HYPERTENSION ASSOCIATED WITH DIABETES: ICD-10-CM

## 2025-01-15 DIAGNOSIS — E11.9 DIET-CONTROLLED TYPE 2 DIABETES MELLITUS: ICD-10-CM

## 2025-01-15 PROCEDURE — 1157F ADVNC CARE PLAN IN RCRD: CPT | Mod: HCNC,CPTII,S$GLB,

## 2025-01-15 PROCEDURE — 1126F AMNT PAIN NOTED NONE PRSNT: CPT | Mod: HCNC,CPTII,S$GLB,

## 2025-01-15 PROCEDURE — 1160F RVW MEDS BY RX/DR IN RCRD: CPT | Mod: HCNC,CPTII,S$GLB,

## 2025-01-15 PROCEDURE — 99999 PR PBB SHADOW E&M-EST. PATIENT-LVL V: CPT | Mod: PBBFAC,HCNC,,

## 2025-01-15 PROCEDURE — 1159F MED LIST DOCD IN RCRD: CPT | Mod: HCNC,CPTII,S$GLB,

## 2025-01-15 PROCEDURE — 3044F HG A1C LEVEL LT 7.0%: CPT | Mod: HCNC,CPTII,S$GLB,

## 2025-01-15 PROCEDURE — 3288F FALL RISK ASSESSMENT DOCD: CPT | Mod: HCNC,CPTII,S$GLB,

## 2025-01-15 PROCEDURE — 1101F PT FALLS ASSESS-DOCD LE1/YR: CPT | Mod: HCNC,CPTII,S$GLB,

## 2025-01-15 PROCEDURE — 3078F DIAST BP <80 MM HG: CPT | Mod: HCNC,CPTII,S$GLB,

## 2025-01-15 PROCEDURE — 3074F SYST BP LT 130 MM HG: CPT | Mod: HCNC,CPTII,S$GLB,

## 2025-01-15 PROCEDURE — 99214 OFFICE O/P EST MOD 30 MIN: CPT | Mod: HCNC,S$GLB,,

## 2025-01-15 PROCEDURE — 3008F BODY MASS INDEX DOCD: CPT | Mod: HCNC,CPTII,S$GLB,

## 2025-01-15 PROCEDURE — 3072F LOW RISK FOR RETINOPATHY: CPT | Mod: HCNC,CPTII,S$GLB,

## 2025-01-15 NOTE — PROGRESS NOTES
"Subjective     Patient ID: Cesar Simpson is a 74 y.o. male.    Chief Complaint: Follow-up      HTN: on  metoprolol 25 BID and amlodipine 5 mg daily   On clopidogrel and asa     BPH: continue flomax and finasteride. States that nocturia has improved     Mood/Insomnia: on cymbalta and trazodone. Uses trazodone 100 mg . This is helping     DLD: LDL was not at goal of <70. On statin, zetia was added last visit. LDL down to 76    DM: Diet controlled. States that his last fasting glucose was 110    Per EMR, " He recently under underwent a TAVR on 8/27/24 that was complicated by an aortic dissection prompting OR intervention with CTS."     S/P the following procedures on 8/27/24  1.  Hemiarch replacement with a  26 mm Gelweave Dacron tube graft under deep   hypothermic circulatory arrest, circulatory arrest time 24 minutes at 28 degrees   Celsius with antegrade cerebral perfusion.  2.  Ascending aortic replacement with a 26 mm Gelweave Dacron tube graft   3. Explantation of Evolut TAVR valve   4. Aortic valve replacement with bioprosthetic 23 mm Avalus Valve    CAD: s/p angiogram 4/17/2024. Successful PTCA to the prox mid and distal LAD with 3 CHAY. We had to stent mid LAD due to balloon rupture induced dissection that compromised FREDIS flow     Labs reviewed       HPI  Review of Systems   Constitutional:  Negative for fatigue and fever.   Respiratory:  Negative for cough, shortness of breath and wheezing.    Cardiovascular:  Negative for chest pain, palpitations and leg swelling.   Gastrointestinal:  Negative for diarrhea, nausea and vomiting.   Genitourinary:  Negative for difficulty urinating, dysuria and hematuria.   Neurological:  Negative for dizziness and headaches.          Objective     Vitals:    01/15/25 1037   BP: (!) 106/58   Pulse: 70   SpO2: 96%   Weight: 80.6 kg (177 lb 11.1 oz)   Height: 5' 7" (1.702 m)   PainSc: 0-No pain      Physical Exam  HENT:      Head: Normocephalic and atraumatic.   Cardiovascular:    "   Rate and Rhythm: Normal rate and regular rhythm.   Pulmonary:      Effort: No respiratory distress.      Breath sounds: No wheezing.   Musculoskeletal:      Right lower leg: No edema.      Left lower leg: No edema.   Lymphadenopathy:      Cervical: No cervical adenopathy.   Neurological:      Mental Status: He is alert and oriented to person, place, and time.              Assessment and Plan     1. Coronary artery disease of native artery of native heart with stable angina pectoris  -     CBC Auto Differential; Future; Expected date: 01/15/2025  -     Comprehensive Metabolic Panel; Future; Expected date: 01/15/2025  -     Hemoglobin A1C; Future; Expected date: 01/15/2025  -     Lipid Panel; Future; Expected date: 01/15/2025    2. Hypertension associated with diabetes    3. Other hyperlipidemia  -     CBC Auto Differential; Future; Expected date: 01/15/2025  -     Comprehensive Metabolic Panel; Future; Expected date: 01/15/2025  -     Hemoglobin A1C; Future; Expected date: 01/15/2025  -     Lipid Panel; Future; Expected date: 01/15/2025    4. Diet-controlled type 2 diabetes mellitus  -     CBC Auto Differential; Future; Expected date: 01/15/2025  -     Comprehensive Metabolic Panel; Future; Expected date: 01/15/2025  -     Hemoglobin A1C; Future; Expected date: 01/15/2025  -     Lipid Panel; Future; Expected date: 01/15/2025    5. Essential hypertension  -     CBC Auto Differential; Future; Expected date: 01/15/2025  -     Comprehensive Metabolic Panel; Future; Expected date: 01/15/2025  -     Hemoglobin A1C; Future; Expected date: 01/15/2025  -     Lipid Panel; Future; Expected date: 01/15/2025    6. ILD (interstitial lung disease)        Continue zetia and atorvastatin  Continue amlodipine and metoprolol  Continue Nexium  Continue Aspirin and Plavix  Continue Cymbalta and trazodone   Continue flomax and finasteride     Start OTC Vit D and B-12     Follow up with Dr. López in May, Labs prior  Appt with  cardiology in February       30 minutes of total time spent on the encounter, which includes face to face time and non-face to face time preparing to see the patient (eg, review of tests), Obtaining and/or reviewing separately obtained history, Documenting clinical information in the electronic or other health record, Independently interpreting results (not separately reported) and communicating results to the patient/family/caregiver, or Care coordination (not separately reported).      Gloria Paulino PA-C  Family Practice/Internal Medicine   Office Phone: [phone]

## 2025-01-15 NOTE — PATIENT INSTRUCTIONS
Continue zetia and atorvastatin  Continue amlodipine and metoprolol  Continue Nexium  Continue Aspirin and Plavix  Continue Cymbalta and trazodone   Continue flomax and finasteride     Start OTC Vit D and B-12     Follow up with Dr. López in May, Labs prior  Appt with cardiology in February

## 2025-01-16 DIAGNOSIS — I73.9 PAD (PERIPHERAL ARTERY DISEASE): ICD-10-CM

## 2025-01-16 RX ORDER — NAPROXEN SODIUM 220 MG/1
TABLET, FILM COATED ORAL
Qty: 90 TABLET | Refills: 3 | Status: SHIPPED | OUTPATIENT
Start: 2025-01-16

## 2025-01-16 NOTE — TELEPHONE ENCOUNTER
Refill Routing Note   Medication(s) are not appropriate for processing by [facility] for the following reason(s):        Outside of protocol    [facility] action(s):  Route               Appointments  past 12m or future 3m with PCP    Date Provider   Last Visit   11/6/2024 Chaitanya López DO   Next Visit   5/7/2025 Chaitanya López,    ED visits in past 90 days: 0        Note composed:10:49 AM 01/16/2025

## 2025-01-17 ENCOUNTER — LAB VISIT (OUTPATIENT)
Dept: LAB | Facility: HOSPITAL | Age: 75
End: 2025-01-17
Attending: FAMILY MEDICINE
Payer: MEDICARE

## 2025-01-17 DIAGNOSIS — R31.21 ASYMPTOMATIC MICROSCOPIC HEMATURIA: ICD-10-CM

## 2025-01-17 LAB
BACTERIA #/AREA URNS AUTO: ABNORMAL /HPF
BILIRUB UR QL STRIP: NEGATIVE
CLARITY UR REFRACT.AUTO: ABNORMAL
COLOR UR AUTO: YELLOW
GLUCOSE UR QL STRIP: NEGATIVE
HGB UR QL STRIP: ABNORMAL
HYALINE CASTS UR QL AUTO: 20 /LPF
KETONES UR QL STRIP: NEGATIVE
LEUKOCYTE ESTERASE UR QL STRIP: ABNORMAL
MICROSCOPIC COMMENT: ABNORMAL
NITRITE UR QL STRIP: POSITIVE
PH UR STRIP: 7 [PH] (ref 5–8)
PROT UR QL STRIP: ABNORMAL
RBC #/AREA URNS AUTO: 12 /HPF (ref 0–4)
SP GR UR STRIP: 1.02 (ref 1–1.03)
URN SPEC COLLECT METH UR: ABNORMAL
WBC #/AREA URNS AUTO: >100 /HPF (ref 0–5)
WBC CLUMPS UR QL AUTO: ABNORMAL

## 2025-01-17 PROCEDURE — 81001 URINALYSIS AUTO W/SCOPE: CPT | Mod: HCNC | Performed by: FAMILY MEDICINE

## 2025-01-17 PROCEDURE — 87086 URINE CULTURE/COLONY COUNT: CPT | Mod: HCNC | Performed by: FAMILY MEDICINE

## 2025-01-17 PROCEDURE — 87077 CULTURE AEROBIC IDENTIFY: CPT | Mod: 59,HCNC | Performed by: FAMILY MEDICINE

## 2025-01-17 PROCEDURE — 87088 URINE BACTERIA CULTURE: CPT | Mod: HCNC | Performed by: FAMILY MEDICINE

## 2025-01-17 PROCEDURE — 87186 SC STD MICRODIL/AGAR DIL: CPT | Mod: HCNC | Performed by: FAMILY MEDICINE

## 2025-01-20 LAB — BACTERIA UR CULT: ABNORMAL

## 2025-01-21 ENCOUNTER — TELEPHONE (OUTPATIENT)
Dept: FAMILY MEDICINE | Facility: CLINIC | Age: 75
End: 2025-01-21
Payer: MEDICARE

## 2025-01-21 DIAGNOSIS — N39.0 URINARY TRACT INFECTION WITHOUT HEMATURIA, SITE UNSPECIFIED: Primary | ICD-10-CM

## 2025-01-21 RX ORDER — SULFAMETHOXAZOLE AND TRIMETHOPRIM 800; 160 MG/1; MG/1
1 TABLET ORAL 2 TIMES DAILY
Qty: 14 TABLET | Refills: 0 | Status: SHIPPED | OUTPATIENT
Start: 2025-01-21 | End: 2025-01-31

## 2025-01-30 ENCOUNTER — DOCUMENTATION ONLY (OUTPATIENT)
Dept: CARDIAC REHAB | Facility: CLINIC | Age: 75
End: 2025-01-30
Payer: MEDICARE

## 2025-01-30 NOTE — PROGRESS NOTES
Mr. Guerrero has completed 4 out of 36 exercise session of Phase II cardiac rehab.  He stopped attending classes and has not returned calls.  He was dropped from the program.    Session: Orientation     Cardiac Rehab Individual Treatment Plan - Initial Assessment      Patient Name: Cesar Simpson MRN: [id number]   : 1950   Age: 74 y.o.   Primary Diagnosis: AV REPLACEMENT  Date of Event: 24  EF: 58%  Risk Stratification: high  Referring Physician: JACKELYN   Exercise Assessment:     CPX/TM Date: 10-14-24 Results   RHR 79   Max    Peak VO2 (CPX only) 14.8   Actual METS (CPX only) 4.2   Estimated METS 9.0     Anthropometrics    Height 67 inches   Weight 176 lbs   BMI 27.6   Abdominal Girth 43.5   Body Composition 20.0%     ST Depression noted on Stress Test?:No  Angina with exercise?: No   Fall Risk: Yes   Assistive Devices:  independent   Currently exercising? No   Mr. Guerrero stated there were no limitations to exercise but during a range of motion assessment it was noted he has right shoulder limitations.  Exercise modalities will be modified to meet the patient's needs and capabilities.     Exercise Plan:   Goals:  CR Exercise Goals: Attend Cardiac Rehab 3 times/week: In Progress  Home Aerobic Exercise: 2 additional days/week for 30-60 minutes: In Progress  Intensity of 12-15 on the Rate of Perceived Exertion (RPE) scale: In Progress  30% increase in entry estimated METS: 11.7 : In Progress  5 days/week for 30-60 minutes: In Progress  Demonstrate proper pulse taking technique: In Progress    Intervention:   Discussed importance of regular attendance to cardiac rehab class    Exercise Prescription:  THR Range 100-114   Mode: Treadmill  Recumbent Bike  Upright Bike  Nustep  Elliptical   Frequency:  3 days/week   Duration:  30 - 60 minutes   Intensity:  12 - 15 RPE   Resistance Training:  Yes: 0 to 5 lb weights with 10-15 reps based on strength and range of motion assessment     Home Prescription:  Mode Aerobic    Frequency: 2- 3 days/week   Duration: 30-60 minutes   Resistance Training: None        Education:  Orientation to Equipment; verbalizes understanding; Date: 10-17-24  Exercise Recommendations; verbalizes understanding; Date: 10-17-24  Exercise Safety; verbalizes understanding; Date: 10-17-24  Class Preparation: verbalizes understanding; Date: 10-17-24  Signs and symptoms to report: verbalizes understanding; Date: 10-17-24  Caffeine/Hydration: verbalizes understanding; Date: 10-17-24  Exercise Terminology: verbalizes understanding; Date: 10-17-24  Resistance Training: verbalizes understanding; Date: 10-17-24    Comments:  I encouraged Mr. Guerrero to begin thinking about some type of aerobic exercise he can participate in at least 2 non-rehab days per week for at least 30 minutes in addition to attending Phase II cardiac rehab classes 3 days per week.  He stated understanding.    All consent forms were signed, proper attire and shoes were discussed.       Mr. Guerrero will begin Cardiac Rehab on  at 10:00 am.    The exercise prescription will be adjusted based on tolerance of exercise intensity by patient.    Cassie Kaur, CEP    Session: Orientation   Cardiac Rehab Individual Treatment Plan - Initial Assessment      Patient Name: Cesar Simpson MRN: [id number]   : 1950   Age: 74 y.o.   Date of Event: 24   Primary Diagnosis: S/P AV Replacement    EF: 58% (4/3/24)    Physical Assessment:   There were no vitals taken for this visit.    ASSESSMENT:  Heart Sounds: regular rate and rhythm, S1, S2 normal, no murmur, click, rub or gallop  Prosthetic Valve: Yes  Lung Sounds: normal air entry, lungs clear to auscultation  Capillary Refill: normal  Left Radial Pulse: Normal (+2)  Right Radial Pulse: Normal (+2)  Left Pedal Pulse: Normal (+2)  Right Pedal Pulse: Normal (+2)  Right Edema: none  Left Edema none  Strength: normal  Range of Motion: diminished range of motion Right Shoulder  Existing  Limitations:      Site   [x] Arthritis, bursitis Bilateral shoulders   [] Amputation, atrophy    [] Other:    []       Diabetic patient's foot examination comments: Normal -  Bilateral  Incisional site: healing well  Special needs: pt reports being Hard of Hearing and utilizes hearing aids which he did not wear today. Pt was instructed to wear his hearing aids when attending cardiac rehab so he could hear instructions and follow. Patient verbalized understanding.    Psychosocial Assessment:   Outcome Survey Tools:    PIPE SCORES:               SF-36             PHQ-9:      1/15/2025    10:42 AM   PHQ-9 Depression Patient Health Questionnaire   Over the last two weeks how often have you been bothered by little interest or pleasure in doing things 1   Over the last two weeks how often have you been bothered by feeling down, depressed or hopeless 1              Living Arrangements: Lives alone  Family Support: children daughter lives near patient and assists   Self Reported: Anxiety and Anger/Hostility  Displays: calmness  Medication:  cymbalta daily    Psychosocial Plan:   Goals:  Improved psychosocial coping strategies  Reduce manifestation of anxiety  Reduce manifestation of anger/hostility  Maintain positive support system  Maintain positive outlook  Improve overall quality of life    Interventions/Recommendations:  Discussed Results of Surveys  Patient to Self Report Emotional Changes at Session Check In  Recommend Physical Activity  Recommend Attending Education Lectures  Notify MD: No  Program Referral: No  Pharmaceutical Intervention/Therapy: Yes  Other Needs: not applicable  Stage of Readiness to Change: Contemplation    Education:  Stress Management; verbalizes understanding; Date: 10/17/24  Stress; verbalizes understanding; Date: 10/17/24  Risk Factors; verbalizes understanding; Date: 10/17/24    Comments:  Screening tools and results discussed with patient. Patient had a TAVR scheduled and resulted in an  AV replacement with chest incision. Patient still upset over the experience which has caused him some anxiety and stress. Discussed effects of stress on cardiac health pt verbalized understandingPatient has been instructed to notify staff in the event that circumstances worsen.  Patient verbalizes understanding.    Other Core Components/Risk Factors Assessment:   RISK FACTORS:  diabetes, hyperlipidemia, hypertension, positive family history, sedentary lifestyle, PAD, Hx PTCA/Stent    Learning Barriers: Hearing Impairment, emotional    Education Level:  Attended College/Technical School    Pre-test Score: 70    Medication Compliance: has been compliant with taking medications    Other Core Components/Risk Factors Plan:   Goals:  Decrease cholesterol level: In Progress  Increase exercise tolerance: In Progress  Increase knowledge of CAD: In Progress  Decrease blood pressure: In Progress  Weight loss: In Progress  Identify and manage personal areas of stress: In Progress  Control diabetes by adjusting diet and exercise: In Progress  Learn more about healthy eating: In Progress    Interventions/Recommendations:  Recommend regular attendance for Cardiac Rehab: Exercise and Education Lectures  Encourage medication compliance  Individual Education/ Counseling: Yes  Physician Referral: No    Education:    blood pressure meds, verbalizes understanding; Date: 10/17/24  risk factors, verbalizes understanding; Date: 10/17/24  stress, verbalizes understanding; Date: 10/17/24         Education method adapted to patients education level and preferred method of learning.  Method: explanation    Comments:  Discussed risk factor modification to improve cardiac health pt verbalized understanding. Discussed medication list, dosing, side effects, pt verbalized understanding.     Other Core Components/Hypertension Assessment:   Resting BP:   BP Readings from Last 1 Encounters:   01/15/25 (!) 106/58         BP Diagnosis:  Hypertensive  Patient reported symptoms: none    Medications:  Medication Prescribed? Adherent? Exception   Beta-blocker [x]Yes  []No  []Unknown [x]Yes  []No  []Unknown    ACEI/ARB []Yes  []No  []Unknown []Yes  []No  []Unknown    Calcium Channel Blocker [x]Yes  []No  []Unknown [x]Yes  []No  []Unknown    Diuretic [x]Yes  []No  []Unknown [x]Yes  []No  []Unknown        Other Core Components/Hypertension Plan:   Goals:  Blood Pressure <130/80    Interventions/Recommendations:  Med Card Reconciled: Yes  Encourage medication compliance  Encourage sodium reduction  Reduce alcohol consumption  Encourage weight loss  Recommend physical activity  Educate on contributory factors  Reduce stress, anxiety, anger, depression, and/or chronic pain  Encourage home blood pressure monitoring  Recommend daily weights    Education:    Risk Factors; verbalizes understanding; Date: 10/17/24  Stress; verbalizes understanding; Date: 10/17/24         Comments:  Patient reports he is participating in the digital hypertension program.       Does the patient have Heart Failure? No    Other Core Components/Tobacco Cessation Assessment:   Smoking Status: Lifetime Non-smoker  Primary Tobacco Type: N/A  Tobacco Usage: no  Smoking Cessation Barriers:  NA  Stage of Readiness to Change: Maintenance    Other Core Components/Tobacco Cessation Plan:   Goals:  Maintain non-smoking status    Interventions:  Maintains non-smoking status    Education:    Risk Factors; verbalizes understanding; Date: 10/17/24  Benefits of Cardiac Rehab; verbalizes understanding; Date: 10/17/24         Comments:  Discussed screening tools and results, pt admits to anxiety and hostility from his exerience with his surgery. He is currently enrolled in digital hypertension and diabetic program. Pt reports he is no longer on any diabetes medications and does not monitor his glucose. DIscussed stress management techniques pt verbalized understanding. Pt instructed to wear his  hearing aids to all classes as pt was noted to had difficulty hearing/understanding converstation. Discussed Cardiac Rehab program in depth with patient.  Medication list updated per patient & marked as reviewed.  Patient has been instructed to notify staff of any problems while attending rehab (ie: chest pain, shortness of breath, lightheadedness, dizziness).  Patient has been instructed to monitor blood pressure readings outside of rehab & to keep a daily log of the readings.  Patient verbalizes understanding.    Joseph Man RN  Cardiac Rehab Nurse

## 2025-02-05 RX ORDER — FAMOTIDINE 40 MG/1
TABLET, FILM COATED ORAL
Qty: 90 TABLET | Refills: 3 | Status: SHIPPED | OUTPATIENT
Start: 2025-02-05

## 2025-02-20 ENCOUNTER — DOCUMENTATION ONLY (OUTPATIENT)
Dept: CARDIAC REHAB | Facility: CLINIC | Age: 75
End: 2025-02-20
Payer: MEDICARE

## 2025-02-20 NOTE — PROGRESS NOTES
Mr. Guerrero has completed 4 out of 36 exercise session of Phase II cardiac rehab.  He stopped attending classes and has not returned calls.  He was dropped from the program.    Session: Orientation     Cardiac Rehab Individual Treatment Plan - Initial Assessment      Patient Name: Cesar Simpson MRN: [id number]   : 1950   Age: 74 y.o.   Primary Diagnosis: AV REPLACEMENT  Date of Event: 24  EF: 58%  Risk Stratification: high  Referring Physician: JACKELYN   Exercise Assessment:     CPX/TM Date: 10-14-24 Results   RHR 79   Max    Peak VO2 (CPX only) 14.8   Actual METS (CPX only) 4.2   Estimated METS 9.0     Anthropometrics    Height 67 inches   Weight 176 lbs   BMI 27.6   Abdominal Girth 43.5   Body Composition 20.0%     ST Depression noted on Stress Test?:No  Angina with exercise?: No   Fall Risk: Yes   Assistive Devices:  independent   Currently exercising? No   Mr. Guerrero stated there were no limitations to exercise but during a range of motion assessment it was noted he has right shoulder limitations.  Exercise modalities will be modified to meet the patient's needs and capabilities.     Exercise Plan:   Goals:  CR Exercise Goals: Attend Cardiac Rehab 3 times/week: In Progress  Home Aerobic Exercise: 2 additional days/week for 30-60 minutes: In Progress  Intensity of 12-15 on the Rate of Perceived Exertion (RPE) scale: In Progress  30% increase in entry estimated METS: 11.7 : In Progress  5 days/week for 30-60 minutes: In Progress  Demonstrate proper pulse taking technique: In Progress    Intervention:   Discussed importance of regular attendance to cardiac rehab class    Exercise Prescription:  THR Range 100-114   Mode: Treadmill  Recumbent Bike  Upright Bike  Nustep  Elliptical   Frequency:  3 days/week   Duration:  30 - 60 minutes   Intensity:  12 - 15 RPE   Resistance Training:  Yes: 0 to 5 lb weights with 10-15 reps based on strength and range of motion assessment     Home Prescription:  Mode Aerobic    Frequency: 2- 3 days/week   Duration: 30-60 minutes   Resistance Training: None        Education:  Orientation to Equipment; verbalizes understanding; Date: 10-17-24  Exercise Recommendations; verbalizes understanding; Date: 10-17-24  Exercise Safety; verbalizes understanding; Date: 10-17-24  Class Preparation: verbalizes understanding; Date: 10-17-24  Signs and symptoms to report: verbalizes understanding; Date: 10-17-24  Caffeine/Hydration: verbalizes understanding; Date: 10-17-24  Exercise Terminology: verbalizes understanding; Date: 10-17-24  Resistance Training: verbalizes understanding; Date: 10-17-24    Comments:  I encouraged Mr. Guerrero to begin thinking about some type of aerobic exercise he can participate in at least 2 non-rehab days per week for at least 30 minutes in addition to attending Phase II cardiac rehab classes 3 days per week.  He stated understanding.    All consent forms were signed, proper attire and shoes were discussed.       Mr. Guerrero will begin Cardiac Rehab on  at 10:00 am.    The exercise prescription will be adjusted based on tolerance of exercise intensity by patient.    Cassie Kaur, CEP    Session: Orientation   Cardiac Rehab Individual Treatment Plan - Initial Assessment      Patient Name: Cesar Simpson MRN: [id number]   : 1950   Age: 74 y.o.   Date of Event: 24   Primary Diagnosis: S/P AV Replacement    EF: 58% (4/3/24)    Physical Assessment:   There were no vitals taken for this visit.    ASSESSMENT:  Heart Sounds: regular rate and rhythm, S1, S2 normal, no murmur, click, rub or gallop  Prosthetic Valve: Yes  Lung Sounds: normal air entry, lungs clear to auscultation  Capillary Refill: normal  Left Radial Pulse: Normal (+2)  Right Radial Pulse: Normal (+2)  Left Pedal Pulse: Normal (+2)  Right Pedal Pulse: Normal (+2)  Right Edema: none  Left Edema none  Strength: normal  Range of Motion: diminished range of motion Right Shoulder  Existing  Limitations:      Site   [x] Arthritis, bursitis Bilateral shoulders   [] Amputation, atrophy    [] Other:    []       Diabetic patient's foot examination comments: Normal -  Bilateral  Incisional site: healing well  Special needs: pt reports being Hard of Hearing and utilizes hearing aids which he did not wear today. Pt was instructed to wear his hearing aids when attending cardiac rehab so he could hear instructions and follow. Patient verbalized understanding.    Psychosocial Assessment:   Outcome Survey Tools:    PIPE SCORES:               SF-36             PHQ-9:      1/15/2025    10:42 AM   PHQ-9 Depression Patient Health Questionnaire   Over the last two weeks how often have you been bothered by little interest or pleasure in doing things 1   Over the last two weeks how often have you been bothered by feeling down, depressed or hopeless 1              Living Arrangements: Lives alone  Family Support: children daughter lives near patient and assists   Self Reported: Anxiety and Anger/Hostility  Displays: calmness  Medication:  cymbalta daily    Psychosocial Plan:   Goals:  Improved psychosocial coping strategies  Reduce manifestation of anxiety  Reduce manifestation of anger/hostility  Maintain positive support system  Maintain positive outlook  Improve overall quality of life    Interventions/Recommendations:  Discussed Results of Surveys  Patient to Self Report Emotional Changes at Session Check In  Recommend Physical Activity  Recommend Attending Education Lectures  Notify MD: No  Program Referral: No  Pharmaceutical Intervention/Therapy: Yes  Other Needs: not applicable  Stage of Readiness to Change: Contemplation    Education:  Stress Management; verbalizes understanding; Date: 10/17/24  Stress; verbalizes understanding; Date: 10/17/24  Risk Factors; verbalizes understanding; Date: 10/17/24    Comments:  Screening tools and results discussed with patient. Patient had a TAVR scheduled and resulted in an  AV replacement with chest incision. Patient still upset over the experience which has caused him some anxiety and stress. Discussed effects of stress on cardiac health pt verbalized understandingPatient has been instructed to notify staff in the event that circumstances worsen.  Patient verbalizes understanding.    Other Core Components/Risk Factors Assessment:   RISK FACTORS:  diabetes, hyperlipidemia, hypertension, positive family history, sedentary lifestyle, PAD, Hx PTCA/Stent    Learning Barriers: Hearing Impairment, emotional    Education Level:  Attended College/Technical School    Pre-test Score: 70    Medication Compliance: has been compliant with taking medications    Other Core Components/Risk Factors Plan:   Goals:  Decrease cholesterol level: In Progress  Increase exercise tolerance: In Progress  Increase knowledge of CAD: In Progress  Decrease blood pressure: In Progress  Weight loss: In Progress  Identify and manage personal areas of stress: In Progress  Control diabetes by adjusting diet and exercise: In Progress  Learn more about healthy eating: In Progress    Interventions/Recommendations:  Recommend regular attendance for Cardiac Rehab: Exercise and Education Lectures  Encourage medication compliance  Individual Education/ Counseling: Yes  Physician Referral: No    Education:    blood pressure meds, verbalizes understanding; Date: 10/17/24  risk factors, verbalizes understanding; Date: 10/17/24  stress, verbalizes understanding; Date: 10/17/24         Education method adapted to patients education level and preferred method of learning.  Method: explanation    Comments:  Discussed risk factor modification to improve cardiac health pt verbalized understanding. Discussed medication list, dosing, side effects, pt verbalized understanding.     Other Core Components/Hypertension Assessment:   Resting BP:   BP Readings from Last 1 Encounters:   01/15/25 (!) 106/58         BP Diagnosis:  Hypertensive  Patient reported symptoms: none    Medications:  Medication Prescribed? Adherent? Exception   Beta-blocker [x]Yes  []No  []Unknown [x]Yes  []No  []Unknown    ACEI/ARB []Yes  []No  []Unknown []Yes  []No  []Unknown    Calcium Channel Blocker [x]Yes  []No  []Unknown [x]Yes  []No  []Unknown    Diuretic [x]Yes  []No  []Unknown [x]Yes  []No  []Unknown        Other Core Components/Hypertension Plan:   Goals:  Blood Pressure <130/80    Interventions/Recommendations:  Med Card Reconciled: Yes  Encourage medication compliance  Encourage sodium reduction  Reduce alcohol consumption  Encourage weight loss  Recommend physical activity  Educate on contributory factors  Reduce stress, anxiety, anger, depression, and/or chronic pain  Encourage home blood pressure monitoring  Recommend daily weights    Education:    Risk Factors; verbalizes understanding; Date: 10/17/24  Stress; verbalizes understanding; Date: 10/17/24         Comments:  Patient reports he is participating in the digital hypertension program.       Does the patient have Heart Failure? No    Other Core Components/Tobacco Cessation Assessment:   Smoking Status: Lifetime Non-smoker  Primary Tobacco Type: N/A  Tobacco Usage: no  Smoking Cessation Barriers:  NA  Stage of Readiness to Change: Maintenance    Other Core Components/Tobacco Cessation Plan:   Goals:  Maintain non-smoking status    Interventions:  Maintains non-smoking status    Education:    Risk Factors; verbalizes understanding; Date: 10/17/24  Benefits of Cardiac Rehab; verbalizes understanding; Date: 10/17/24         Comments:  Discussed screening tools and results, pt admits to anxiety and hostility from his exerience with his surgery. He is currently enrolled in digital hypertension and diabetic program. Pt reports he is no longer on any diabetes medications and does not monitor his glucose. DIscussed stress management techniques pt verbalized understanding. Pt instructed to wear his  hearing aids to all classes as pt was noted to had difficulty hearing/understanding converstation. Discussed Cardiac Rehab program in depth with patient.  Medication list updated per patient & marked as reviewed.  Patient has been instructed to notify staff of any problems while attending rehab (ie: chest pain, shortness of breath, lightheadedness, dizziness).  Patient has been instructed to monitor blood pressure readings outside of rehab & to keep a daily log of the readings.  Patient verbalizes understanding.    Joseph Man RN  Cardiac Rehab Nurse

## 2025-02-24 NOTE — PROGRESS NOTES
Subjective:   Patient ID:  Ceasr Simpson is a 74 y.o. male who presents for evaluation of AS, PAD    2/2025: F/U.  Vascular ultrasound with occlusion of the right CFA pseudoaneurysm.  He is doing well.  Denies CP or significant BASILIO.  Rides stationary bike 20-30 minutes daily without any issues.  BP well-controlled.    9/2024:  Follow up.  He was referred for TAVR.  TAVR was complicated by ascending aortic dissection type A.  Had emergent brenton arch/ascending aortic replacement and explanation of the TAVR valve with reimplantation of bioprosthetic 23 mm Avalus valve.  He developed right common femoral artery pseudoaneurysms 1.3 cm that was noted during postop CTA.  Vascular surgery consulted and recommended follow up as an outpatient.  He is doing well today.  He is recovering very well actually.  Still reports cough when he lays flat.  Lower extremity edema has end of the day.       5/9/2023:  Full up postprocedure.  RHC/C with severe aortic stenosis based in aortic valve area index.  Severe single-vessel coronary artery disease including distal LAD.  After shared decision we elected to proceed with PCI to the LAD and refer for TAVR.  He is doing well.  Still with significant dyspnea on exertion. NYHA FC III.  He is compliant with his medications    4/10/2024: Patient seen in clinic today, reports he's had a difficult past year. He reports over the past year he has been experiencing worsening, ongoing fatigue, dizziness, and BASILIO. Has not been very active the past year due to BASILIO, cough, and worsening fatigue; he used to ride his stationary bike and walk frequently. Long conversation with patient and Dr. Vickers concerning aortic stenosis diagnosis and noted CAD on CT last month. Patient denies CP, SOB, orthopnea, PND, syncope, palpitations, LE edema; denies any LE claudication. Endorses pre-syncope/dizziness and ongoing BASILIO/fatigue. Reports compliance and tolerance with all medications.      Historically:    74  yo M with hx of DM2, HTN, HLD, FABY present to clinic for evaluation of newly diagnosed mod-sev AS, PAD.       8/27/2024:  TAVR was complicated by ascending aortic dissection type A.  Had emergent brenton arch/ascending aortic replacement and explanation of the TAVR valve with reimplantation of bioprosthetic 23 mm Avalus valve    OPERATIONS PERFORMED:  1.  Hemiarch replacement with a  26 mm Gelweave Dacron tube graft under deep   hypothermic circulatory arrest, circulatory arrest time 24 minutes at 28 degrees   Celsius with antegrade cerebral perfusion.  2.  Ascending aortic replacement with a 26 mm Gelweave Dacron tube graft   3. Explantation of Evolut TAVR valve   4. Aortic valve replacement with bioprosthetic 23 mm Avalus Valve    PCI to LAD April 23, 2024  Status post successful PTCA and stenting to prox mid and distal LAD with 3.0 x 3(2) mm CHAY prox and mid and 2.5 x 16 mm CHAY distally     During balloon predilatation of the mid LAD heavily calcified lesion with a 3.0 NC balloon,  the balloon ruptured resulting in dissection of the mid LAD all the way.  IVUS confirmed  confirmed contrast stasis in the subintimal space hence 3.0 cutting balloon used with  reestablishing flow in the mid  LAD.  Unfortunately we had to stent the mid LAD dissected segment with 3.0 x 38 CHAY to seal the dissection successfully     Prox LAD heavily calcified lesion pre-dilated with 3.5 shockwave balloon due to heavy  calcification     The patient was hemodynamically stable all the way through the procedure and postprocedure    RHC/[facility] April 16, 2024  Access:  Rt Radial 5-6 Fr sheath   Rt AC vein with 5-6 for RHC   LM:  FREDIS 3 flow. 0% stenosis      LAD: FREDIS 3 flow.  Prox/mid calcified 70-80% stenosis.  Mid/distal 99% stenosis.  D1 distally 99% stenosis but small caliber vessel distally  LCx:  FREDIS 3 flow.  Proximal 30-40% stenosis.  Large principal OM1 with diffuse 30% stenosis     RCA: FREDIS 3 flow.  Ostial RCA 60% stenosis IFR 0.95.   Prox mid and distal diffuse 30 40% calcified stenosis.  TAYLOR branch very distally there is 95% calcified stenosis but small caliber vessel     LVgram: LVEDP 10 mm Hg     Right heart catheterization pressures in mmHg     PCWP:  7  /   3 /    3  PA: 17   /   6 /10  RV: 21   /  -3  /2  RA: 7   /  4  /3     Oxygen saturations in%, PA:70, RA:75, AO: 100     Cardiac output:   5.52     l/min     Prema  Cardiac index :  2.86      l/min     Aortic valve area 1.03cm2  Aortic valve area index 0.53 cm2  Peak to Peak  40 mmHg  Mean gradient 37.7    Echo 4/3/2024    Left Ventricle: The left ventricle is normal in size. Normal wall thickness. There is concentric remodeling. There is normal systolic function. Biplane (2D) method of discs ejection fraction is 58%. There is normal diastolic function.    Right Ventricle: Normal right ventricular cavity size. Wall thickness is normal. Right ventricle wall motion  is normal. Systolic function is normal.    Aortic Valve: There is moderate aortic valve sclerosis. Moderately restricted motion. There is moderate to severe stenosis. Aortic valve area by VTI is 0.90 cm². Aortic valve peak velocity is 3.18 m/s. Mean gradient is 24 mmHg. The dimensionless index is 0.29.    Mitral Valve: There is moderate mitral annular calcification present.    Pulmonary Artery: The estimated pulmonary artery systolic pressure is 32 mmHg.    IVC/SVC: Normal venous pressure at 3 mmHg.    Echo 12/2024:    Left Ventricle: The left ventricle is normal in size. Normal wall thickness. There is normal systolic function. Quantitated ejection fraction is 52%. There is normal diastolic function. LV strain -15.5%.    Right Ventricle: Normal right ventricular cavity size. Systolic function is borderline low. TDI S' is 10.0 cm/s.    Aortic Valve: There is a bioprosthetic valve in the aortic position that is well-seated. It is reported to be a 23 mm Medtronic valve. There is normal leaflet mobility. Aortic valve area by VTI is  1.3 cm². Aortic valve peak velocity is 2.2 m/s. Mean gradient is 11.4 mmHg. The dimensionless index is 0.40.    Mitral Valve: There is moderate mitral annular calcification present. There is mild regurgitation.    IVC was not well visualized due to poor acoustic window.    Overall the study quality was technically difficult.    US LOWER EXTREMITY ARTERIES BILATERAL 3/25/2024  FINDINGS:  Right mid BERNABE is occluded with large amount plaque.  Left proximal BERNABE is occluded with large amounts of plaque.     Impression:  BERNABE occlusions bilaterally.    Past Medical History:   Diagnosis Date    Coronary artery disease of native artery of native heart with stable angina pectoris 4/10/2024    Diabetes mellitus type II, uncontrolled 02/27/2013    High-density lipoprotein deficiency 02/27/2013    HTN (hypertension) 02/27/2013    Hyperlipidemia     Hypothyroidism     Moderate obstructive sleep apnea 6/29/2022    Normocytic anemia 06/28/2021    Obesity     Osteoarthritis 02/08/2016    PAD (peripheral artery disease) 4/10/2024    Trouble in sleeping     Type 2 diabetes mellitus     Type 2 diabetes mellitus with diabetic polyneuropathy, without long-term current use of insulin           Patient Active Problem List    Diagnosis Date Noted    Coronary atherosclerosis of native coronary artery 10/25/2024    Heart valve replaced by transplant 10/25/2024    S/P AVR (aortic valve replacement) and aortoplasty 09/03/2024    Pseudoaneurysm of femoral artery 09/03/2024    Severe aortic stenosis 04/10/2024     Moderate to Severe       PAD (peripheral artery disease) 04/10/2024    Coronary artery disease of native artery of native heart with stable angina pectoris 04/10/2024    ILD (interstitial lung disease) 03/26/2024    Chronic cough 03/26/2024    Moderate obstructive sleep apnea 06/29/2022    Does use hearing aid 06/28/2021    Normocytic anemia 06/28/2021    Diet-controlled type 2 diabetes mellitus 09/24/2020    BPH associated with nocturia  09/24/2020    Arthritis 09/24/2020    Insomnia 05/23/2019    Hypertension associated with diabetes 03/27/2018    BMI 29.0-29.9,adult 03/27/2018    Hyperlipidemia associated with type 2 diabetes mellitus 03/24/2017    Osteoarthritis 02/08/2016    Hypothyroidism 02/10/2015    Posterior vitreous detachment, right eye 07/07/2014    Floater, vitreous 07/07/2014    Vitreous detachment 07/07/2014    Pseudophakia 07/07/2014    Refractive error 07/07/2014    Hyperlipidemia 02/27/2013    Prominent aorta 08/13/2010     Seen on chest x-ray dated 08/13/10         Patient's Medications   New Prescriptions    No medications on file   Previous Medications    ACETAMINOPHEN (TYLENOL) 500 MG TABLET    Take 2 tablets (1,000 mg total) by mouth every 6 (six) hours as needed for Pain.    AMLODIPINE (NORVASC) 5 MG TABLET    Take 1 tablet (5 mg total) by mouth once daily.    ASPIRIN 81 MG CHEW    CHEW AND SWALLOW 1 TABLET EVERY DAY    ATORVASTATIN (LIPITOR) 80 MG TABLET    Take 1 tablet (80 mg total) by mouth once daily.    BLOOD-GLUCOSE METER MISC    1 device.  Check sugar 1 times daily as directed by MD. Supply preferred brand .Dx Code: [E11.8]    CLOPIDOGREL (PLAVIX) 75 MG TABLET    Take 1 tablet (75 mg total) by mouth once daily.    DULOXETINE (CYMBALTA) 30 MG CAPSULE    Take 1 capsule (30 mg total) by mouth once daily.    ESOMEPRAZOLE (NEXIUM) 40 MG CAPSULE    Take 1 capsule (40 mg total) by mouth daily with dinner or evening meal.    EZETIMIBE (ZETIA) 10 MG TABLET    Take 1 tablet (10 mg total) by mouth once daily.    FAMOTIDINE (PEPCID) 40 MG TABLET    TAKE 1 TABLET (40 MG TOTAL) BY MOUTH DAILY WITH DINNER OR EVENING MEAL.    FINASTERIDE (PROSCAR) 5 MG TABLET    Take 1 tablet (5 mg total) by mouth once daily.    FUROSEMIDE (LASIX) 20 MG TABLET    Take 1 tablet (20 mg total) by mouth every other day.    LANCETS MISC    1 box.  Check 1x daily as directed by MD. Supply brand covered by insurance. Dx Code: [E11.8]. Grace Brandon.     LEVOTHYROXINE (SYNTHROID) 88 MCG TABLET    Take 1 tablet (88 mcg total) by mouth before breakfast.    METOPROLOL TARTRATE (LOPRESSOR) 25 MG TABLET    Take 1 tablet (25 mg total) by mouth 2 (two) times daily.    TAMSULOSIN (FLOMAX) 0.4 MG CAP    Take 2 capsules (0.8 mg total) by mouth once daily.    TRAZODONE (DESYREL) 100 MG TABLET    Take 1 tablet (100 mg total) by mouth every evening.   Modified Medications    No medications on file   Discontinued Medications    No medications on file        Review of Systems   Constitutional: Negative for chills, fever and malaise/fatigue.   HENT:  Negative for hearing loss and nosebleeds.    Eyes:  Negative for blurred vision.   Cardiovascular:         As in HPI   Respiratory:  Negative for hemoptysis and shortness of breath.    Hematologic/Lymphatic: Negative for bleeding problem.   Skin:  Negative for itching.   Gastrointestinal:  Negative for abdominal pain and hematochezia.   Genitourinary:  Negative for hematuria.   Neurological:  Negative for dizziness and loss of balance.   Psychiatric/Behavioral:  Negative for altered mental status and depression.        Family History   Problem Relation Name Age of Onset    Osteoporosis Mother Aniyah     Hypertension Mother Aniyah     Early death Father Cesar         Heart,  Stroke age 50    Stroke Father Cesar     Heart disease Father Cesar     Hypertension Father Cesar     Heart disease Brother Jose Enrique     Early death Brother Jose Enrique         Heart Att.  age 45    Hypertension Brother Jose Enrique     No Known Problems Daughter      No Known Problems Son      Early death Paternal Uncle Beltran         Heart,  age 49    Heart disease Paternal Uncle Beltran        Social History     Socioeconomic History    Marital status:    Tobacco Use    Smoking status: Never     Passive exposure: Never    Smokeless tobacco: Never   Substance and Sexual Activity    Alcohol use: Never     Comment: occasionally (maybe every 3 months)    Drug use: Never    Sexual  "activity: Not Currently     Partners: Female     Birth control/protection: None   Social History Narrative    2023: he lives alone. He has 2 kids, they live in [place] and [place]. 7 grandchildren. 2 grandchildren live nearby. No pets at home. He is retired. 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      Social Drivers of Health     Financial Resource Strain: Patient Declined (2024)    Overall Financial Resource Strain (CARDIA)     Difficulty of Paying Living Expenses: Patient declined   Food Insecurity: Patient Declined (2024)    Hunger Vital Sign     Worried About Running Out of Food in the Last Year: Patient declined     Ran Out of Food in the Last Year: Patient declined   Transportation Needs: Patient Declined (2024)    TRANSPORTATION NEEDS     Transportation : Patient declined   Physical Activity: Patient Declined (2024)    Exercise Vital Sign     Days of Exercise per Week: Patient declined     Minutes of Exercise per Session: Patient declined   Stress: Patient Declined (2024)    [employer] - Occupational Stress Questionnaire     Feeling of Stress : Patient declined   Housing Stability: Patient Declined (2024)    Housing Stability Vital Sign     Unable to Pay for Housing in the Last Year: Patient declined     Homeless in the Last Year: Patient declined            Objective:   Vitals  Vitals:    25 0842   BP: 124/76   Pulse: 96   SpO2: 98%   Weight: 79.8 kg (176 lb)   Height: 5' 7" (1.702 m)           Right Arm BP - Sittin/76  Left Arm BP - Sittin/81    Physical Exam  Constitutional:       Appearance: He is well-developed.   HENT:      Head: Normocephalic and atraumatic.   Eyes:      Conjunctiva/sclera: Conjunctivae normal.   Neck:      Vascular: No carotid bruit or JVD.   Cardiovascular:      Rate and Rhythm: Normal rate and regular rhythm.      Pulses:           Carotid pulses are 2+ on the right side and 2+ on the " left side.       Radial pulses are 2+ on the right side and 2+ on the left side.        Femoral pulses are 2+ on the right side.     Heart sounds: Murmur (Grade 2 systolic) heard.      No friction rub.   Pulmonary:      Effort: Pulmonary effort is normal. No respiratory distress.      Breath sounds: Normal breath sounds. No stridor. No wheezing.   Abdominal:      General: Abdomen is flat.      Palpations: Abdomen is soft.   Musculoskeletal:      Cervical back: Neck supple.      Right lower leg: No edema.      Left lower leg: No edema.   Skin:     Comments: Surgical wound healed   Neurological:      Mental Status: He is alert and oriented to person, place, and time.   Psychiatric:         Behavior: Behavior normal.         Lab Results    Lab Results   Component Value Date    WBC 5.29 01/13/2025    HGB 13.8 (L) 01/13/2025    HCT 42.4 01/13/2025    HCT 22 (L) 08/28/2024    MCV 92 01/13/2025       Lab Results   Component Value Date     01/13/2025    INR 1.1 09/02/2024       Lab Results   Component Value Date    K 4.0 01/13/2025    MG 2.2 09/03/2024    BUN 26 (H) 01/13/2025    CREATININE 1.1 01/13/2025       Lab Results   Component Value Date     (H) 01/13/2025    HGBA1C 5.8 (H) 01/13/2025       Lab Results   Component Value Date    AST 18 01/13/2025    ALT 25 01/13/2025    ALBUMIN 4.0 01/13/2025    PROT 7.6 01/13/2025       Lab Results   Component Value Date    CHOL 126 01/13/2025    HDL 36 (L) 01/13/2025    LDLCALC 76.2 01/13/2025    TRIG 69 01/13/2025       Lab Results   Component Value Date    CRP 0.6 08/11/2023    BNP <10 08/27/2024       Assessment:     1. Coronary artery disease of native artery of native heart with stable angina pectoris    2. Atherosclerosis of native coronary artery of native heart with stable angina pectoris    3. Heart valve replaced by transplant    4. Mixed hyperlipidemia    5. Hyperlipidemia associated with type 2 diabetes mellitus    6. PAD (peripheral artery disease)    7.  S/P AVR (aortic valve replacement) and aortoplasty    8. Hypertension associated with diabetes              Plan:   1. Coronary artery disease  Status post PCI to the LAD prox mid and distal.   We had to stent the whole mid segment and use longer stents due to balloon rupture from heavy calcification resulting in uncontrolled dissection.   Stable coronary artery disease symptoms  Continue aspirin and Plavix  Continue statin  LDL goal lower than 70      2. Aortic valve stenosis    Attempt for TAVR complicated by type A aortic dissection.  Emergent open aortic repair with hemiarch replacement and implantation of new aortic bioprosthesis.   Stable arch and bioprosthesis.  We will consider CTA chest next visit      3. Heart failure preserved EF  Continue Lasix every other day  Euvolemic      4. Hypertension  BP well-controlled  Continue current regimen  Low-salt diet    5. Hyperlipidemia  Continue high-intensity statin  LDL goal lower than 70      -In today's visit, at least 4 established conditions that pose a risk to life or bodily function have been addressed and the conditions are severe.    -In today's visit, monitoring for drug toxicity was accomplished.       Visit today included increased complexity associated with the care of the episodic problem coronary artery disease, aortic stenosis, aortic valve replacement, aortic arch replacement, hypertension, hyperlipidemia addressed and managing the longitudinal care of the patient due to the serious and/or complex managed problems       No orders of the defined types were placed in this encounter.      He expressed verbal understanding and agreed with the plan    Pertinent cardiac images and EKG reviewed independently.    Continue with current medical plan and lifestyle changes.  Return sooner for concerns or questions. If symptoms persist go to the ED.  I have reviewed all pertinent data including patient's medical history in detail and updated the computerized  patient record.     Counseling included discussion regarding imaging findings, diagnosis, possibilities, treatment options, risks and benefits.    Thank you for the opportunity to care for this patient. Will be available for questions if needed.

## 2025-02-25 ENCOUNTER — OFFICE VISIT (OUTPATIENT)
Dept: CARDIOLOGY | Facility: CLINIC | Age: 75
End: 2025-02-25
Payer: MEDICARE

## 2025-02-25 VITALS
HEART RATE: 96 BPM | SYSTOLIC BLOOD PRESSURE: 124 MMHG | BODY MASS INDEX: 27.62 KG/M2 | OXYGEN SATURATION: 98 % | WEIGHT: 176 LBS | HEIGHT: 67 IN | DIASTOLIC BLOOD PRESSURE: 76 MMHG

## 2025-02-25 DIAGNOSIS — I73.9 PAD (PERIPHERAL ARTERY DISEASE): Chronic | ICD-10-CM

## 2025-02-25 DIAGNOSIS — E11.59 HYPERTENSION ASSOCIATED WITH DIABETES: ICD-10-CM

## 2025-02-25 DIAGNOSIS — E78.5 HYPERLIPIDEMIA ASSOCIATED WITH TYPE 2 DIABETES MELLITUS: ICD-10-CM

## 2025-02-25 DIAGNOSIS — E78.2 MIXED HYPERLIPIDEMIA: ICD-10-CM

## 2025-02-25 DIAGNOSIS — Z95.2 S/P AVR (AORTIC VALVE REPLACEMENT) AND AORTOPLASTY: ICD-10-CM

## 2025-02-25 DIAGNOSIS — I25.118 ATHEROSCLEROSIS OF NATIVE CORONARY ARTERY OF NATIVE HEART WITH STABLE ANGINA PECTORIS: ICD-10-CM

## 2025-02-25 DIAGNOSIS — I25.118 CORONARY ARTERY DISEASE OF NATIVE ARTERY OF NATIVE HEART WITH STABLE ANGINA PECTORIS: Primary | Chronic | ICD-10-CM

## 2025-02-25 DIAGNOSIS — I15.2 HYPERTENSION ASSOCIATED WITH DIABETES: ICD-10-CM

## 2025-02-25 DIAGNOSIS — Z95.2 HEART VALVE REPLACED BY TRANSPLANT: ICD-10-CM

## 2025-02-25 DIAGNOSIS — E11.69 HYPERLIPIDEMIA ASSOCIATED WITH TYPE 2 DIABETES MELLITUS: ICD-10-CM

## 2025-02-25 PROCEDURE — 99999 PR PBB SHADOW E&M-EST. PATIENT-LVL IV: CPT | Mod: PBBFAC,HCNC,, | Performed by: INTERNAL MEDICINE

## 2025-03-05 ENCOUNTER — TELEPHONE (OUTPATIENT)
Dept: CARDIOTHORACIC SURGERY | Facility: CLINIC | Age: 75
End: 2025-03-05
Payer: MEDICARE

## 2025-03-05 ENCOUNTER — HOSPITAL ENCOUNTER (OUTPATIENT)
Dept: RADIOLOGY | Facility: HOSPITAL | Age: 75
Discharge: HOME | End: 2025-03-05
Attending: STUDENT IN AN ORGANIZED HEALTH CARE EDUCATION/TRAINING PROGRAM
Payer: MEDICARE

## 2025-03-05 DIAGNOSIS — Z98.890 S/P AORTIC DISSECTION REPAIR: ICD-10-CM

## 2025-03-05 LAB
CREAT SERPL-MCNC: 1 MG/DL (ref 0.5–1.4)
SAMPLE: NORMAL

## 2025-03-05 PROCEDURE — 25500020 PHARM REV CODE 255: Mod: HCNC | Performed by: STUDENT IN AN ORGANIZED HEALTH CARE EDUCATION/TRAINING PROGRAM

## 2025-03-05 PROCEDURE — 74174 CTA ABD&PLVS W/CONTRAST: CPT | Mod: TC,HCNC

## 2025-03-05 RX ADMIN — IOHEXOL 100 ML: 350 INJECTION, SOLUTION INTRAVENOUS at 09:03

## 2025-03-05 NOTE — TELEPHONE ENCOUNTER
Called pt to schedule 6 mo f/u with Dr. Carroll. Pt is scheduled for echo and f/u on 4/9 which pt agreed to. Pt verbalized understanding of appt date, times, and locations. Appt slip mailed.

## 2025-03-13 ENCOUNTER — DOCUMENTATION ONLY (OUTPATIENT)
Dept: CARDIAC REHAB | Facility: CLINIC | Age: 75
End: 2025-03-13
Payer: MEDICARE

## 2025-03-13 NOTE — PROGRESS NOTES
Mr. Guerrero has completed 4 out of 36 exercise session of Phase II cardiac rehab.  He stopped attending classes and has not returned calls.  He was dropped from the program.    Session: Orientation     Cardiac Rehab Individual Treatment Plan - Initial Assessment      Patient Name: Cesar Simpson MRN: [id number]   : 1950   Age: 74 y.o.   Primary Diagnosis: AV REPLACEMENT  Date of Event: 24  EF: 58%  Risk Stratification: high  Referring Physician: JACKELYN   Exercise Assessment:     CPX/TM Date: 10-14-24 Results   RHR 79   Max    Peak VO2 (CPX only) 14.8   Actual METS (CPX only) 4.2   Estimated METS 9.0     Anthropometrics    Height 67 inches   Weight 176 lbs   BMI 27.6   Abdominal Girth 43.5   Body Composition 20.0%     ST Depression noted on Stress Test?:No  Angina with exercise?: No   Fall Risk: Yes   Assistive Devices:  independent   Currently exercising? No   Mr. Guerrero stated there were no limitations to exercise but during a range of motion assessment it was noted he has right shoulder limitations.  Exercise modalities will be modified to meet the patient's needs and capabilities.     Exercise Plan:   Goals:  CR Exercise Goals: Attend Cardiac Rehab 3 times/week: In Progress  Home Aerobic Exercise: 2 additional days/week for 30-60 minutes: In Progress  Intensity of 12-15 on the Rate of Perceived Exertion (RPE) scale: In Progress  30% increase in entry estimated METS: 11.7 : In Progress  5 days/week for 30-60 minutes: In Progress  Demonstrate proper pulse taking technique: In Progress    Intervention:   Discussed importance of regular attendance to cardiac rehab class    Exercise Prescription:  THR Range 100-114   Mode: Treadmill  Recumbent Bike  Upright Bike  Nustep  Elliptical   Frequency:  3 days/week   Duration:  30 - 60 minutes   Intensity:  12 - 15 RPE   Resistance Training:  Yes: 0 to 5 lb weights with 10-15 reps based on strength and range of motion assessment     Home Prescription:  Mode Aerobic    Frequency: 2- 3 days/week   Duration: 30-60 minutes   Resistance Training: None        Education:  Orientation to Equipment; verbalizes understanding; Date: 10-17-24  Exercise Recommendations; verbalizes understanding; Date: 10-17-24  Exercise Safety; verbalizes understanding; Date: 10-17-24  Class Preparation: verbalizes understanding; Date: 10-17-24  Signs and symptoms to report: verbalizes understanding; Date: 10-17-24  Caffeine/Hydration: verbalizes understanding; Date: 10-17-24  Exercise Terminology: verbalizes understanding; Date: 10-17-24  Resistance Training: verbalizes understanding; Date: 10-17-24    Comments:  I encouraged Mr. Guerrero to begin thinking about some type of aerobic exercise he can participate in at least 2 non-rehab days per week for at least 30 minutes in addition to attending Phase II cardiac rehab classes 3 days per week.  He stated understanding.    All consent forms were signed, proper attire and shoes were discussed.       Mr. Guerrero will begin Cardiac Rehab on  at 10:00 am.    The exercise prescription will be adjusted based on tolerance of exercise intensity by patient.    Cassie Kaur, CEP    Session: Orientation   Cardiac Rehab Individual Treatment Plan - Initial Assessment      Patient Name: Cesar Simpson MRN: [id number]   : 1950   Age: 74 y.o.   Date of Event: 24   Primary Diagnosis: S/P AV Replacement    EF: 58% (4/3/24)    Physical Assessment:   There were no vitals taken for this visit.    ASSESSMENT:  Heart Sounds: regular rate and rhythm, S1, S2 normal, no murmur, click, rub or gallop  Prosthetic Valve: Yes  Lung Sounds: normal air entry, lungs clear to auscultation  Capillary Refill: normal  Left Radial Pulse: Normal (+2)  Right Radial Pulse: Normal (+2)  Left Pedal Pulse: Normal (+2)  Right Pedal Pulse: Normal (+2)  Right Edema: none  Left Edema none  Strength: normal  Range of Motion: diminished range of motion Right Shoulder  Existing  Limitations:      Site   [x] Arthritis, bursitis Bilateral shoulders   [] Amputation, atrophy    [] Other:    []       Diabetic patient's foot examination comments: Normal -  Bilateral  Incisional site: healing well  Special needs: pt reports being Hard of Hearing and utilizes hearing aids which he did not wear today. Pt was instructed to wear his hearing aids when attending cardiac rehab so he could hear instructions and follow. Patient verbalized understanding.    Psychosocial Assessment:   Outcome Survey Tools:    PIPE SCORES:               SF-36             PHQ-9:      1/15/2025    10:42 AM   PHQ-9 Depression Patient Health Questionnaire   Over the last two weeks how often have you been bothered by little interest or pleasure in doing things 1   Over the last two weeks how often have you been bothered by feeling down, depressed or hopeless 1              Living Arrangements: Lives alone  Family Support: children daughter lives near patient and assists   Self Reported: Anxiety and Anger/Hostility  Displays: calmness  Medication:  cymbalta daily    Psychosocial Plan:   Goals:  Improved psychosocial coping strategies  Reduce manifestation of anxiety  Reduce manifestation of anger/hostility  Maintain positive support system  Maintain positive outlook  Improve overall quality of life    Interventions/Recommendations:  Discussed Results of Surveys  Patient to Self Report Emotional Changes at Session Check In  Recommend Physical Activity  Recommend Attending Education Lectures  Notify MD: No  Program Referral: No  Pharmaceutical Intervention/Therapy: Yes  Other Needs: not applicable  Stage of Readiness to Change: Contemplation    Education:  Stress Management; verbalizes understanding; Date: 10/17/24  Stress; verbalizes understanding; Date: 10/17/24  Risk Factors; verbalizes understanding; Date: 10/17/24    Comments:  Screening tools and results discussed with patient. Patient had a TAVR scheduled and resulted in an  AV replacement with chest incision. Patient still upset over the experience which has caused him some anxiety and stress. Discussed effects of stress on cardiac health pt verbalized understandingPatient has been instructed to notify staff in the event that circumstances worsen.  Patient verbalizes understanding.    Other Core Components/Risk Factors Assessment:   RISK FACTORS:  diabetes, hyperlipidemia, hypertension, positive family history, sedentary lifestyle, PAD, Hx PTCA/Stent    Learning Barriers: Hearing Impairment, emotional    Education Level:  Attended College/Technical School    Pre-test Score: 70    Medication Compliance: has been compliant with taking medications    Other Core Components/Risk Factors Plan:   Goals:  Decrease cholesterol level: In Progress  Increase exercise tolerance: In Progress  Increase knowledge of CAD: In Progress  Decrease blood pressure: In Progress  Weight loss: In Progress  Identify and manage personal areas of stress: In Progress  Control diabetes by adjusting diet and exercise: In Progress  Learn more about healthy eating: In Progress    Interventions/Recommendations:  Recommend regular attendance for Cardiac Rehab: Exercise and Education Lectures  Encourage medication compliance  Individual Education/ Counseling: Yes  Physician Referral: No    Education:    blood pressure meds, verbalizes understanding; Date: 10/17/24  risk factors, verbalizes understanding; Date: 10/17/24  stress, verbalizes understanding; Date: 10/17/24         Education method adapted to patients education level and preferred method of learning.  Method: explanation    Comments:  Discussed risk factor modification to improve cardiac health pt verbalized understanding. Discussed medication list, dosing, side effects, pt verbalized understanding.     Other Core Components/Hypertension Assessment:   Resting BP:   BP Readings from Last 1 Encounters:   02/25/25 124/76         BP Diagnosis:  Hypertensive  Patient reported symptoms: none    Medications:  Medication Prescribed? Adherent? Exception   Beta-blocker [x]Yes  []No  []Unknown [x]Yes  []No  []Unknown    ACEI/ARB []Yes  []No  []Unknown []Yes  []No  []Unknown    Calcium Channel Blocker [x]Yes  []No  []Unknown [x]Yes  []No  []Unknown    Diuretic [x]Yes  []No  []Unknown [x]Yes  []No  []Unknown        Other Core Components/Hypertension Plan:   Goals:  Blood Pressure <130/80    Interventions/Recommendations:  Med Card Reconciled: Yes  Encourage medication compliance  Encourage sodium reduction  Reduce alcohol consumption  Encourage weight loss  Recommend physical activity  Educate on contributory factors  Reduce stress, anxiety, anger, depression, and/or chronic pain  Encourage home blood pressure monitoring  Recommend daily weights    Education:    Risk Factors; verbalizes understanding; Date: 10/17/24  Stress; verbalizes understanding; Date: 10/17/24         Comments:  Patient reports he is participating in the digital hypertension program.       Does the patient have Heart Failure? No    Other Core Components/Tobacco Cessation Assessment:   Smoking Status: Lifetime Non-smoker  Primary Tobacco Type: N/A  Tobacco Usage: no  Smoking Cessation Barriers:  NA  Stage of Readiness to Change: Maintenance    Other Core Components/Tobacco Cessation Plan:   Goals:  Maintain non-smoking status    Interventions:  Maintains non-smoking status    Education:    Risk Factors; verbalizes understanding; Date: 10/17/24  Benefits of Cardiac Rehab; verbalizes understanding; Date: 10/17/24         Comments:  Discussed screening tools and results, pt admits to anxiety and hostility from his exerience with his surgery. He is currently enrolled in digital hypertension and diabetic program. Pt reports he is no longer on any diabetes medications and does not monitor his glucose. DIscussed stress management techniques pt verbalized understanding. Pt instructed to wear his  hearing aids to all classes as pt was noted to had difficulty hearing/understanding converstation. Discussed Cardiac Rehab program in depth with patient.  Medication list updated per patient & marked as reviewed.  Patient has been instructed to notify staff of any problems while attending rehab (ie: chest pain, shortness of breath, lightheadedness, dizziness).  Patient has been instructed to monitor blood pressure readings outside of rehab & to keep a daily log of the readings.  Patient verbalizes understanding.    Joseph Man RN  Cardiac Rehab Nurse

## 2025-04-03 ENCOUNTER — DOCUMENTATION ONLY (OUTPATIENT)
Dept: CARDIAC REHAB | Facility: CLINIC | Age: 75
End: 2025-04-03
Payer: MEDICARE

## 2025-04-03 NOTE — PROGRESS NOTES
Mr. Guerrero has completed 4 out of 36 exercise session of Phase II cardiac rehab.  He stopped attending classes and has not returned calls.  He was dropped from the program.    Session: Orientation     Cardiac Rehab Individual Treatment Plan - Initial Assessment      Patient Name: Cesar Simpson MRN: [id number]   : 1950   Age: 75 y.o.   Primary Diagnosis: AV REPLACEMENT  Date of Event: 24  EF: 58%  Risk Stratification: high  Referring Physician: JACKELYN   Exercise Assessment:     CPX/TM Date: 10-14-24 Results   RHR 79   Max    Peak VO2 (CPX only) 14.8   Actual METS (CPX only) 4.2   Estimated METS 9.0     Anthropometrics    Height 67 inches   Weight 176 lbs   BMI 27.6   Abdominal Girth 43.5   Body Composition 20.0%     ST Depression noted on Stress Test?:No  Angina with exercise?: No   Fall Risk: Yes   Assistive Devices:  independent   Currently exercising? No   Mr. Guerrero stated there were no limitations to exercise but during a range of motion assessment it was noted he has right shoulder limitations.  Exercise modalities will be modified to meet the patient's needs and capabilities.     Exercise Plan:   Goals:  CR Exercise Goals: Attend Cardiac Rehab 3 times/week: In Progress  Home Aerobic Exercise: 2 additional days/week for 30-60 minutes: In Progress  Intensity of 12-15 on the Rate of Perceived Exertion (RPE) scale: In Progress  30% increase in entry estimated METS: 11.7 : In Progress  5 days/week for 30-60 minutes: In Progress  Demonstrate proper pulse taking technique: In Progress    Intervention:   Discussed importance of regular attendance to cardiac rehab class    Exercise Prescription:  THR Range 100-114   Mode: Treadmill  Recumbent Bike  Upright Bike  Nustep  Elliptical   Frequency:  3 days/week   Duration:  30 - 60 minutes   Intensity:  12 - 15 RPE   Resistance Training:  Yes: 0 to 5 lb weights with 10-15 reps based on strength and range of motion assessment     Home Prescription:  Mode Aerobic    Frequency: 2- 3 days/week   Duration: 30-60 minutes   Resistance Training: None        Education:  Orientation to Equipment; verbalizes understanding; Date: 10-17-24  Exercise Recommendations; verbalizes understanding; Date: 10-17-24  Exercise Safety; verbalizes understanding; Date: 10-17-24  Class Preparation: verbalizes understanding; Date: 10-17-24  Signs and symptoms to report: verbalizes understanding; Date: 10-17-24  Caffeine/Hydration: verbalizes understanding; Date: 10-17-24  Exercise Terminology: verbalizes understanding; Date: 10-17-24  Resistance Training: verbalizes understanding; Date: 10-17-24    Comments:  I encouraged Mr. Guerrero to begin thinking about some type of aerobic exercise he can participate in at least 2 non-rehab days per week for at least 30 minutes in addition to attending Phase II cardiac rehab classes 3 days per week.  He stated understanding.    All consent forms were signed, proper attire and shoes were discussed.       Mr. Guerrero will begin Cardiac Rehab on  at 10:00 am.    The exercise prescription will be adjusted based on tolerance of exercise intensity by patient.    Cassie Kaur, CEP    Session: Orientation   Cardiac Rehab Individual Treatment Plan - Initial Assessment      Patient Name: Cesar Simpson MRN: [id number]   : 1950   Age: 75 y.o.   Date of Event: 24   Primary Diagnosis: S/P AV Replacement    EF: 58% (4/3/24)    Physical Assessment:   There were no vitals taken for this visit.    ASSESSMENT:  Heart Sounds: regular rate and rhythm, S1, S2 normal, no murmur, click, rub or gallop  Prosthetic Valve: Yes  Lung Sounds: normal air entry, lungs clear to auscultation  Capillary Refill: normal  Left Radial Pulse: Normal (+2)  Right Radial Pulse: Normal (+2)  Left Pedal Pulse: Normal (+2)  Right Pedal Pulse: Normal (+2)  Right Edema: none  Left Edema none  Strength: normal  Range of Motion: diminished range of motion Right Shoulder  Existing  Limitations:      Site   [x] Arthritis, bursitis Bilateral shoulders   [] Amputation, atrophy    [] Other:    []       Diabetic patient's foot examination comments: Normal -  Bilateral  Incisional site: healing well  Special needs: pt reports being Hard of Hearing and utilizes hearing aids which he did not wear today. Pt was instructed to wear his hearing aids when attending cardiac rehab so he could hear instructions and follow. Patient verbalized understanding.    Psychosocial Assessment:   Outcome Survey Tools:    PIPE SCORES:               SF-36             PHQ-9:      1/15/2025    10:42 AM   PHQ-9 Depression Patient Health Questionnaire   Over the last two weeks how often have you been bothered by little interest or pleasure in doing things 1   Over the last two weeks how often have you been bothered by feeling down, depressed or hopeless 1              Living Arrangements: Lives alone  Family Support: children daughter lives near patient and assists   Self Reported: Anxiety and Anger/Hostility  Displays: calmness  Medication:  cymbalta daily    Psychosocial Plan:   Goals:  Improved psychosocial coping strategies  Reduce manifestation of anxiety  Reduce manifestation of anger/hostility  Maintain positive support system  Maintain positive outlook  Improve overall quality of life    Interventions/Recommendations:  Discussed Results of Surveys  Patient to Self Report Emotional Changes at Session Check In  Recommend Physical Activity  Recommend Attending Education Lectures  Notify MD: No  Program Referral: No  Pharmaceutical Intervention/Therapy: Yes  Other Needs: not applicable  Stage of Readiness to Change: Contemplation    Education:  Stress Management; verbalizes understanding; Date: 10/17/24  Stress; verbalizes understanding; Date: 10/17/24  Risk Factors; verbalizes understanding; Date: 10/17/24    Comments:  Screening tools and results discussed with patient. Patient had a TAVR scheduled and resulted in an  AV replacement with chest incision. Patient still upset over the experience which has caused him some anxiety and stress. Discussed effects of stress on cardiac health pt verbalized understandingPatient has been instructed to notify staff in the event that circumstances worsen.  Patient verbalizes understanding.    Other Core Components/Risk Factors Assessment:   RISK FACTORS:  diabetes, hyperlipidemia, hypertension, positive family history, sedentary lifestyle, PAD, Hx PTCA/Stent    Learning Barriers: Hearing Impairment, emotional    Education Level:  Attended College/Technical School    Pre-test Score: 70    Medication Compliance: has been compliant with taking medications    Other Core Components/Risk Factors Plan:   Goals:  Decrease cholesterol level: In Progress  Increase exercise tolerance: In Progress  Increase knowledge of CAD: In Progress  Decrease blood pressure: In Progress  Weight loss: In Progress  Identify and manage personal areas of stress: In Progress  Control diabetes by adjusting diet and exercise: In Progress  Learn more about healthy eating: In Progress    Interventions/Recommendations:  Recommend regular attendance for Cardiac Rehab: Exercise and Education Lectures  Encourage medication compliance  Individual Education/ Counseling: Yes  Physician Referral: No    Education:    blood pressure meds, verbalizes understanding; Date: 10/17/24  risk factors, verbalizes understanding; Date: 10/17/24  stress, verbalizes understanding; Date: 10/17/24         Education method adapted to patients education level and preferred method of learning.  Method: explanation    Comments:  Discussed risk factor modification to improve cardiac health pt verbalized understanding. Discussed medication list, dosing, side effects, pt verbalized understanding.     Other Core Components/Hypertension Assessment:   Resting BP:   BP Readings from Last 1 Encounters:   02/25/25 124/76         BP Diagnosis:  Hypertensive  Patient reported symptoms: none    Medications:  Medication Prescribed? Adherent? Exception   Beta-blocker [x]Yes  []No  []Unknown [x]Yes  []No  []Unknown    ACEI/ARB []Yes  []No  []Unknown []Yes  []No  []Unknown    Calcium Channel Blocker [x]Yes  []No  []Unknown [x]Yes  []No  []Unknown    Diuretic [x]Yes  []No  []Unknown [x]Yes  []No  []Unknown        Other Core Components/Hypertension Plan:   Goals:  Blood Pressure <130/80    Interventions/Recommendations:  Med Card Reconciled: Yes  Encourage medication compliance  Encourage sodium reduction  Reduce alcohol consumption  Encourage weight loss  Recommend physical activity  Educate on contributory factors  Reduce stress, anxiety, anger, depression, and/or chronic pain  Encourage home blood pressure monitoring  Recommend daily weights    Education:    Risk Factors; verbalizes understanding; Date: 10/17/24  Stress; verbalizes understanding; Date: 10/17/24         Comments:  Patient reports he is participating in the digital hypertension program.       Does the patient have Heart Failure? No    Other Core Components/Tobacco Cessation Assessment:   Smoking Status: Lifetime Non-smoker  Primary Tobacco Type: N/A  Tobacco Usage: no  Smoking Cessation Barriers:  NA  Stage of Readiness to Change: Maintenance    Other Core Components/Tobacco Cessation Plan:   Goals:  Maintain non-smoking status    Interventions:  Maintains non-smoking status    Education:    Risk Factors; verbalizes understanding; Date: 10/17/24  Benefits of Cardiac Rehab; verbalizes understanding; Date: 10/17/24         Comments:  Discussed screening tools and results, pt admits to anxiety and hostility from his exerience with his surgery. He is currently enrolled in digital hypertension and diabetic program. Pt reports he is no longer on any diabetes medications and does not monitor his glucose. DIscussed stress management techniques pt verbalized understanding. Pt instructed to wear his  hearing aids to all classes as pt was noted to had difficulty hearing/understanding converstation. Discussed Cardiac Rehab program in depth with patient.  Medication list updated per patient & marked as reviewed.  Patient has been instructed to notify staff of any problems while attending rehab (ie: chest pain, shortness of breath, lightheadedness, dizziness).  Patient has been instructed to monitor blood pressure readings outside of rehab & to keep a daily log of the readings.  Patient verbalizes understanding.    Joseph Man RN  Cardiac Rehab Nurse

## 2025-04-08 ENCOUNTER — TELEPHONE (OUTPATIENT)
Dept: CARDIOTHORACIC SURGERY | Facility: CLINIC | Age: 75
End: 2025-04-08
Payer: MEDICARE

## 2025-04-08 DIAGNOSIS — I73.9 PAD (PERIPHERAL ARTERY DISEASE): ICD-10-CM

## 2025-04-08 DIAGNOSIS — E11.69 HYPERLIPIDEMIA ASSOCIATED WITH TYPE 2 DIABETES MELLITUS: ICD-10-CM

## 2025-04-08 DIAGNOSIS — E78.2 MIXED HYPERLIPIDEMIA: ICD-10-CM

## 2025-04-08 DIAGNOSIS — I25.118 ATHEROSCLEROSIS OF NATIVE CORONARY ARTERY OF NATIVE HEART WITH STABLE ANGINA PECTORIS: Primary | ICD-10-CM

## 2025-04-08 DIAGNOSIS — E78.5 HYPERLIPIDEMIA ASSOCIATED WITH TYPE 2 DIABETES MELLITUS: ICD-10-CM

## 2025-04-08 RX ORDER — ROSUVASTATIN CALCIUM 20 MG/1
TABLET, COATED ORAL
Refills: 0 | OUTPATIENT
Start: 2025-04-08

## 2025-04-08 NOTE — PROGRESS NOTES
Subjective:      Patient ID: Cesar Simpson is a 75 y.o. male.    Chief Complaint: No chief complaint on file.      HPI:  Cesar Simpson is a 75 y.o. male who presents for follow up type A dissection during TAVR 8/27/24. Medical conditions include coronary artery disease s/p PCI, peripheral artery disease, hypertension, hyperlipidemia, hypothyroidism, diabetes mellitus, ILD. He presents today with a repeat CTA and ECHO.  He denies complaints    Operation:   1.  Hemiarch replacement with a  26 mm Gelweave Dacron tube graft under deep   hypothermic circulatory arrest, circulatory arrest time 24 minutes at 28 degrees   Celsius with antegrade cerebral perfusion.  2.  Ascending aortic replacement with a 26 mm Gelweave Dacron tube graft   3. Explantation of Evolut TAVR valve   4. Aortic valve replacement with bioprosthetic 23 mm Avalus Valve  Family and social history reviewed    Review of patient's allergies indicates:  No Known Allergies  Past Medical History:   Diagnosis Date    Coronary artery disease of native artery of native heart with stable angina pectoris 4/10/2024    Diabetes mellitus type II, uncontrolled 02/27/2013    High-density lipoprotein deficiency 02/27/2013    HTN (hypertension) 02/27/2013    Hyperlipidemia     Hypothyroidism     Moderate obstructive sleep apnea 6/29/2022    Normocytic anemia 06/28/2021    Obesity     Osteoarthritis 02/08/2016    PAD (peripheral artery disease) 4/10/2024    Trouble in sleeping     Type 2 diabetes mellitus     Type 2 diabetes mellitus with diabetic polyneuropathy, without long-term current use of insulin      Past Surgical History:   Procedure Laterality Date    AORTIC VALVE REPLACEMENT  8/27/2024    Procedure: REPLACEMENT, AORTIC VALVE;  Surgeon: London Guillen MD;  Location: [facility] OR [address] [place] [address];  Service: Cardiovascular;;    CARDIAC CATH COSURGEON N/A 8/27/2024    Procedure: Cardiac Cath Cosurgeon;  Surgeon: Melvin Carroll MD;  Location: [facility] CATH LAB;  Service: Cardiology;   Laterality: N/A;    CATARACT EXTRACTION      CATHETERIZATION OF BOTH LEFT AND RIGHT HEART N/A 4/16/2024    Procedure: CATHETERIZATION, HEART, BOTH LEFT AND RIGHT;  Surgeon: Brian Vickers MD;  Location: [facility] CATH LAB/EP;  Service: Cardiology;  Laterality: N/A;  Aortic valve study/ Saul Catheter/2 manifolds    CORONARY ANGIOGRAPHY N/A 4/16/2024    Procedure: ANGIOGRAM, CORONARY ARTERY;  Surgeon: Brian Vickers MD;  Location: [facility] CATH LAB/EP;  Service: Cardiology;  Laterality: N/A;    CORONARY ANGIOPLASTY WITH STENT PLACEMENT N/A 4/23/2024    Procedure: ANGIOPLASTY, CORONARY ARTERY, WITH STENT INSERTION;  Surgeon: Brian Vickers MD;  Location: [facility] CATH LAB/EP;  Service: Cardiology;  Laterality: N/A;    EXPLANT N/A 8/27/2024    Procedure: EXPLANT; TAVR;  Surgeon: London Guillen MD;  Location: [facility] OR 2ND FLR;  Service: Cardiovascular;  Laterality: N/A;    EYE SURGERY      cataracts    INSTANTANEOUS WAVE-FREE RATIO (IFR) N/A 4/16/2024    Procedure: Instantaneous Wave-Free Ratio (IFR);  Surgeon: Brian Vickers MD;  Location: [facility] CATH LAB/EP;  Service: Cardiology;  Laterality: N/A;    IVUS, CORONARY  4/23/2024    Procedure: IVUS, Coronary;  Surgeon: Brian Vickers MD;  Location: [facility] CATH LAB/EP;  Service: Cardiology;;    JOINT REPLACEMENT Left     arthroplasty    PERCUTANEOUS CORONARY INTERVENTION, ARTERY N/A 4/23/2024    Procedure: Percutaneous coronary intervention;  Surgeon: Brian Vickers MD;  Location: [facility] CATH LAB/EP;  Service: Cardiology;  Laterality: N/A;  LAD    REPLACEMENT OF ASCENDING AORTA N/A 8/27/2024    Procedure: REPLACEMENT, AORTA, ASCENDING; HEMIARCH;  Surgeon: London Guillen MD;  Location: [facility] OR 2ND FLR;  Service: Cardiovascular;  Laterality: N/A;    SHOULDER SURGERY      TONSILLECTOMY      TOTAL SHOULDER ARTHROPLASTY Left     TRANSCATHETER AORTIC VALVE REPLACEMENT (TAVR) N/A 8/27/2024    Procedure: REPLACEMENT, AORTIC VALVE, TRANSCATHETER (TAVR);  Surgeon: Eula  Cesar Lee MD;  Location: [facility] CATH LAB;  Service: Cardiology;  Laterality: N/A;     Family History       Problem Relation (Age of Onset)    Early death Father, Brother, Paternal Uncle    Heart disease Father, Brother, Paternal Uncle    Hypertension Mother, Father, Brother    No Known Problems Daughter, Son    Osteoporosis Mother    Stroke Father          Social History[1]  Current Medications[2]  Current medications Reviewed    Review of Systems   Constitutional:  Negative for activity change, appetite change, fatigue and fever.   HENT:  Negative for nosebleeds.    Respiratory:  Negative for cough and shortness of breath.    Cardiovascular:  Negative for chest pain, palpitations and leg swelling.   Gastrointestinal:  Negative for abdominal distention, abdominal pain and nausea.   Genitourinary:  Negative for frequency.   Musculoskeletal:  Negative for arthralgias and myalgias.   Skin:  Negative for rash.   Neurological:  Negative for dizziness and numbness.   Hematological:  Does not bruise/bleed easily.     Objective:   Physical Exam  HENT:      Head: Normocephalic and atraumatic.   Eyes:      Extraocular Movements: Extraocular movements intact.   Cardiovascular:      Rate and Rhythm: Normal rate and regular rhythm.   Pulmonary:      Effort: Pulmonary effort is normal.   Abdominal:      General: Abdomen is flat.      Palpations: Abdomen is soft.   Musculoskeletal:         General: Normal range of motion.      Cervical back: Normal range of motion.   Skin:     General: Skin is warm and dry.      Capillary Refill: Capillary refill takes less than 2 seconds.   Neurological:      General: No focal deficit present.         Diagnostic Results: Reviewed   CTA :  Impression:  Status post ascending aorta repair and aortic valve replacement without evidence of new dissection or endovascular leak.  Similar-appearing subcentimeter out pouching of the ascending aorta.  Pulmonary findings consistent with UIP pattern and  pulmonary fibrosis, mild progression when compared to CT of the chest 12/08/2024 on this nondedicated study.  5 mm left lower lobe nodule series 2, image 203 similar to December 18, 2024.  Interval resolution of the right femoral pseudoaneurysm.  Nonspecific solitary right hepatic lobe lesion that is unchanged when compared to CT on 08/27/2024.  Innumerable simple hepatic cysts.  Bilateral adrenal myelolipomas.  Left simple renal cyst.  This report was flagged in Epic as abnormal.    ECHO:    Left Ventricle: The left ventricle is normal in size. Normal wall thickness. There is normal systolic function. Ejection fraction is approximately 55%. Grade II diastolic dysfunction.    Right Ventricle: The right ventricle is normal in size. Wall thickness is normal. Systolic function is normal.    Left Atrium: Mildly dilated.    Right Atrium: Right atrium is dilated.    Aortic Valve: There is a bioprosthetic valve in the aortic position. It is reported to be a 23mm Medtronic Avalus valve. Aortic valve area by VTI is 1.7 cm². Aortic valve peak velocity is 2.3 m/s. Mean gradient is 11 mmHg. The dimensionless index is 0.41. There is mild to moderate aortic regurgitation.    Mitral Valve: There is severe posterior mitral annular calcification. Mildly restricted motion. There is mild stenosis. The mean pressure gradient across the mitral valve is 2 mmHg at a heart rate of 59 bpm. There is mild regurgitation.    Tricuspid Valve: There is mild regurgitation with a centrally directed jet.    Aorta: Homograft present.    Pulmonary Artery: The estimated pulmonary artery systolic pressure is 26 mmHg.    IVC/SVC: Normal venous pressure at 3 mmHg.     CTS 8/27/24  OPERATIONS PERFORMED:  1.  Hemiarch replacement with a 26 mm Gelweave Dacron tube graft under deep hypothermic circulatory arrest, circulatory arrest time 24 minutes at 28 degrees. Celsius with antegrade cerebral perfusion.  2. Ascending aortic replacement with a 26 mm Gelweave  Dacron tube graft   3. Explantation of Evolut TAVR valve   4. Aortic valve replacement with bioprosthetic 23 mm Avalus Valve  Assessment:   S/p Aortic Dissection Repair s/p DANNA  Plan:            [1]   Social History  Socioeconomic History    Marital status:    Tobacco Use    Smoking status: Never     Passive exposure: Never    Smokeless tobacco: Never   Substance and Sexual Activity    Alcohol use: Never     Comment: occasionally (maybe every 3 months)    Drug use: Never    Sexual activity: Not Currently     Partners: Female     Birth control/protection: None   Social History Narrative    9/7/2023: he lives alone. He has 2 kids, they live in [place] and [place]. 7 grandchildren. 2 grandchildren live nearby. No pets at home. He is retired. 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      Social Drivers of Health     Financial Resource Strain: Patient Declined (8/28/2024)    Overall Financial Resource Strain (CARDIA)     Difficulty of Paying Living Expenses: Patient declined   Food Insecurity: Patient Declined (8/28/2024)    Hunger Vital Sign     Worried About Running Out of Food in the Last Year: Patient declined     Ran Out of Food in the Last Year: Patient declined   Transportation Needs: Patient Declined (8/28/2024)    TRANSPORTATION NEEDS     Transportation : Patient declined   Physical Activity: Patient Declined (8/28/2024)    Exercise Vital Sign     Days of Exercise per Week: Patient declined     Minutes of Exercise per Session: Patient declined   Stress: Patient Declined (8/28/2024)    [employer] - Occupational Stress Questionnaire     Feeling of Stress : Patient declined   Housing Stability: Patient Declined (8/28/2024)    Housing Stability Vital Sign     Unable to Pay for Housing in the Last Year: Patient declined     Homeless in the Last Year: Patient declined   [2]   Current Outpatient Medications:     acetaminophen (TYLENOL) 500 MG tablet, Take 2 tablets  (1,000 mg total) by mouth every 6 (six) hours as needed for Pain., Disp: 30 tablet, Rfl: 0    amLODIPine (NORVASC) 5 MG tablet, Take 1 tablet (5 mg total) by mouth once daily., Disp: 90 tablet, Rfl: 3    aspirin 81 MG Chew, CHEW AND SWALLOW 1 TABLET EVERY DAY, Disp: 90 tablet, Rfl: 3    atorvastatin (LIPITOR) 80 MG tablet, Take 1 tablet (80 mg total) by mouth once daily., Disp: 30 tablet, Rfl: 11    blood-glucose meter Misc, 1 device.  Check sugar 1 times daily as directed by MD. Supply preferred brand .Dx Code: [E11.8], Disp: 1 each, Rfl: 0    clopidogreL (PLAVIX) 75 mg tablet, Take 1 tablet (75 mg total) by mouth once daily., Disp: 30 tablet, Rfl: 11    DULoxetine (CYMBALTA) 30 MG capsule, Take 1 capsule (30 mg total) by mouth once daily., Disp: 90 capsule, Rfl: 2    esomeprazole (NEXIUM) 40 MG capsule, Take 1 capsule (40 mg total) by mouth daily with dinner or evening meal., Disp: 30 capsule, Rfl: 11    ezetimibe (ZETIA) 10 mg tablet, Take 1 tablet (10 mg total) by mouth once daily., Disp: 90 tablet, Rfl: 3    famotidine (PEPCID) 40 MG tablet, TAKE 1 TABLET (40 MG TOTAL) BY MOUTH DAILY WITH DINNER OR EVENING MEAL., Disp: 90 tablet, Rfl: 3    finasteride (PROSCAR) 5 mg tablet, Take 1 tablet (5 mg total) by mouth once daily., Disp: 90 tablet, Rfl: 2    furosemide (LASIX) 20 MG tablet, Take 1 tablet (20 mg total) by mouth every other day., Disp: 15 tablet, Rfl: 11    lancets Misc, 1 box.  Check 1x daily as directed by MD. Supply brand covered by insurance. Dx Code: [E11.8]. Accucheck Roma., Disp: 100 each, Rfl: 3    levothyroxine (SYNTHROID) 88 MCG tablet, Take 1 tablet (88 mcg total) by mouth before breakfast., Disp: 90 tablet, Rfl: 0    metoprolol tartrate (LOPRESSOR) 25 MG tablet, Take 1 tablet (25 mg total) by mouth 2 (two) times daily., Disp: 180 tablet, Rfl: 3    tamsulosin (FLOMAX) 0.4 mg Cap, Take 2 capsules (0.8 mg total) by mouth once daily., Disp: 180 capsule, Rfl: 3    traZODone (DESYREL) 100 MG tablet,  Take 1 tablet (100 mg total) by mouth every evening., Disp: 90 tablet, Rfl: 3

## 2025-04-08 NOTE — TELEPHONE ENCOUNTER
Spoke with pt and confirmed 4/9 appt with Dr. Carroll. Pt verbalized understanding of appt times and locations. Pt denies any questions at this time.

## 2025-04-09 ENCOUNTER — OFFICE VISIT (OUTPATIENT)
Dept: CARDIOTHORACIC SURGERY | Facility: CLINIC | Age: 75
End: 2025-04-09
Payer: MEDICARE

## 2025-04-09 ENCOUNTER — HOSPITAL ENCOUNTER (OUTPATIENT)
Dept: CARDIOLOGY | Facility: HOSPITAL | Age: 75
Discharge: HOME | End: 2025-04-09
Attending: STUDENT IN AN ORGANIZED HEALTH CARE EDUCATION/TRAINING PROGRAM
Payer: MEDICARE

## 2025-04-09 VITALS
HEIGHT: 67 IN | WEIGHT: 174.19 LBS | SYSTOLIC BLOOD PRESSURE: 124 MMHG | DIASTOLIC BLOOD PRESSURE: 76 MMHG | HEART RATE: 56 BPM | BODY MASS INDEX: 27.34 KG/M2

## 2025-04-09 VITALS
HEART RATE: 62 BPM | BODY MASS INDEX: 29.09 KG/M2 | HEIGHT: 66 IN | SYSTOLIC BLOOD PRESSURE: 131 MMHG | DIASTOLIC BLOOD PRESSURE: 60 MMHG | OXYGEN SATURATION: 99 % | WEIGHT: 181 LBS

## 2025-04-09 DIAGNOSIS — Z95.2 S/P AVR (AORTIC VALVE REPLACEMENT) AND AORTOPLASTY: Primary | ICD-10-CM

## 2025-04-09 DIAGNOSIS — Z98.890 S/P AORTIC DISSECTION REPAIR: ICD-10-CM

## 2025-04-09 LAB
ASCENDING AORTA: 3.5 CM
AV AREA BY CONTINUOUS VTI: 2 CM2
AV INDEX (PROSTH): 0.41
AV LVOT MEAN GRADIENT: 2 MMHG
AV LVOT PEAK GRADIENT: 3 MMHG
AV MEAN GRADIENT: 11 MMHG
AV PEAK GRADIENT: 21 MMHG
AV VALVE AREA BY VELOCITY RATIO: 1.6 CM²
AV VALVE AREA: 1.7 CM²
AV VELOCITY RATIO: 0.39
BSA FOR ECHO PROCEDURE: 1.93 M2
CV ECHO LV RWT: 0.33 CM
DOP CALC AO PEAK VEL: 2.3 M/S
DOP CALC AO VTI: 54.5 CM
DOP CALC LVOT AREA: 4.2 CM2
DOP CALC LVOT DIAMETER: 2.3 CM
DOP CALC LVOT PEAK VEL: 0.9 M/S
DOP CALC LVOT STROKE VOLUME: 93.4 CM3
DOP CALCLVOT PEAK VEL VTI: 22.5 CM
E WAVE DECELERATION TIME: 220 MS
E/A RATIO: 1.22
E/E' RATIO: 17 M/S
ECHO EF ESTIMATED: 55 %
ECHO LV POSTERIOR WALL: 0.8 CM (ref 0.6–1.1)
EJECTION FRACTION: 55 %
FRACTIONAL SHORTENING: 28.6 % (ref 28–44)
HR MV ECHO: 59 BPM
INTERVENTRICULAR SEPTUM: 0.8 CM (ref 0.6–1.1)
IVC DIAMETER: 1.24 CM
IVRT: 74 MS
LA MAJOR: 6.1 CM
LA MINOR: 5.7 CM
LA WIDTH: 4 CM
LEFT ATRIUM SIZE: 4.9 CM
LEFT ATRIUM VOLUME INDEX MOD: 36 ML/M2
LEFT ATRIUM VOLUME INDEX: 51 ML/M2
LEFT ATRIUM VOLUME MOD: 68 ML
LEFT ATRIUM VOLUME: 98 CM3
LEFT INTERNAL DIMENSION IN SYSTOLE: 3.5 CM (ref 2.1–4)
LEFT VENTRICLE DIASTOLIC VOLUME INDEX: 59.16 ML/M2
LEFT VENTRICLE DIASTOLIC VOLUME: 113 ML
LEFT VENTRICLE MASS INDEX: 68.7 G/M2
LEFT VENTRICLE SYSTOLIC VOLUME INDEX: 26.7 ML/M2
LEFT VENTRICLE SYSTOLIC VOLUME: 51 ML
LEFT VENTRICULAR INTERNAL DIMENSION IN DIASTOLE: 4.9 CM (ref 3.5–6)
LEFT VENTRICULAR MASS: 131.2 G
LV LATERAL E/E' RATIO: 13.9
LV SEPTAL E/E' RATIO: 22.2
MV A" WAVE DURATION": 234.06 MS
MV MEAN GRADIENT: 2 MMHG
MV PEAK A VEL: 0.91 M/S
MV PEAK E VEL: 1.11 M/S
MV PEAK GRADIENT: 6 MMHG
OHS CV RV/LV RATIO: 0.86 CM
PISA TR MAX VEL: 2.4 M/S
PULM VEIN A" WAVE DURATION": 234.06 MS
PULM VEIN S/D RATIO: 0.86
PULMONIC VEIN PEAK A VELOCITY: 0.2 M/S
PV PEAK D VEL: 0.42 M/S
PV PEAK S VEL: 0.36 M/S
RA MAJOR: 5.3 CM
RA PRESSURE ESTIMATED: 3 MMHG
RA WIDTH: 4.15 CM
RIGHT ATRIAL AREA: 23 CM2
RIGHT VENTRICLE DIASTOLIC BASEL DIMENSION: 4.2 CM
RV TB RVSP: 5 MMHG
RV TISSUE DOPPLER FREE WALL SYSTOLIC VELOCITY 1 (APICAL 4 CHAMBER VIEW): 8.16 CM/S
SINUS: 3.5 CM
STJ: 2.6 CM
TDI LATERAL: 0.08 M/S
TDI SEPTAL: 0.05 M/S
TDI: 0.07 M/S
TRICUSPID ANNULAR PLANE SYSTOLIC EXCURSION: 1.53 CM
TV PEAK GRADIENT: 28 MMHG
TV REST PULMONARY ARTERY PRESSURE: 26 MMHG
Z-SCORE OF LEFT VENTRICULAR DIMENSION IN END DIASTOLE: -0.92
Z-SCORE OF LEFT VENTRICULAR DIMENSION IN END SYSTOLE: 0.44

## 2025-04-09 PROCEDURE — 1157F ADVNC CARE PLAN IN RCRD: CPT | Mod: HCNC,CPTII,S$GLB, | Performed by: STUDENT IN AN ORGANIZED HEALTH CARE EDUCATION/TRAINING PROGRAM

## 2025-04-09 PROCEDURE — 99215 OFFICE O/P EST HI 40 MIN: CPT | Mod: HCNC,S$GLB,, | Performed by: STUDENT IN AN ORGANIZED HEALTH CARE EDUCATION/TRAINING PROGRAM

## 2025-04-09 PROCEDURE — 3075F SYST BP GE 130 - 139MM HG: CPT | Mod: HCNC,CPTII,S$GLB, | Performed by: STUDENT IN AN ORGANIZED HEALTH CARE EDUCATION/TRAINING PROGRAM

## 2025-04-09 PROCEDURE — 93306 TTE W/DOPPLER COMPLETE: CPT | Mod: HCNC

## 2025-04-09 PROCEDURE — 1159F MED LIST DOCD IN RCRD: CPT | Mod: HCNC,CPTII,S$GLB, | Performed by: STUDENT IN AN ORGANIZED HEALTH CARE EDUCATION/TRAINING PROGRAM

## 2025-04-09 PROCEDURE — 3078F DIAST BP <80 MM HG: CPT | Mod: HCNC,CPTII,S$GLB, | Performed by: STUDENT IN AN ORGANIZED HEALTH CARE EDUCATION/TRAINING PROGRAM

## 2025-04-09 PROCEDURE — 3044F HG A1C LEVEL LT 7.0%: CPT | Mod: HCNC,CPTII,S$GLB, | Performed by: STUDENT IN AN ORGANIZED HEALTH CARE EDUCATION/TRAINING PROGRAM

## 2025-04-09 PROCEDURE — 3008F BODY MASS INDEX DOCD: CPT | Mod: HCNC,CPTII,S$GLB, | Performed by: STUDENT IN AN ORGANIZED HEALTH CARE EDUCATION/TRAINING PROGRAM

## 2025-04-09 PROCEDURE — 3072F LOW RISK FOR RETINOPATHY: CPT | Mod: HCNC,CPTII,S$GLB, | Performed by: STUDENT IN AN ORGANIZED HEALTH CARE EDUCATION/TRAINING PROGRAM

## 2025-04-09 PROCEDURE — 1126F AMNT PAIN NOTED NONE PRSNT: CPT | Mod: HCNC,CPTII,S$GLB, | Performed by: STUDENT IN AN ORGANIZED HEALTH CARE EDUCATION/TRAINING PROGRAM

## 2025-04-09 PROCEDURE — 99999 PR PBB SHADOW E&M-EST. PATIENT-LVL IV: CPT | Mod: PBBFAC,HCNC,, | Performed by: STUDENT IN AN ORGANIZED HEALTH CARE EDUCATION/TRAINING PROGRAM

## 2025-04-21 ENCOUNTER — LAB VISIT (OUTPATIENT)
Dept: LAB | Facility: HOSPITAL | Age: 75
End: 2025-04-21
Attending: FAMILY MEDICINE
Payer: MEDICARE

## 2025-04-21 DIAGNOSIS — N39.0 URINARY TRACT INFECTION WITHOUT HEMATURIA, SITE UNSPECIFIED: ICD-10-CM

## 2025-04-21 LAB
BACTERIA #/AREA URNS AUTO: ABNORMAL /HPF
BILIRUB UR QL STRIP.AUTO: NEGATIVE
CLARITY UR: ABNORMAL
COLOR UR AUTO: YELLOW
GLUCOSE UR QL STRIP: NEGATIVE
HGB UR QL STRIP: ABNORMAL
HYALINE CASTS UR QL AUTO: 3 /LPF (ref 0–1)
KETONES UR QL STRIP: NEGATIVE
LEUKOCYTE ESTERASE UR QL STRIP: ABNORMAL
MICROSCOPIC COMMENT: ABNORMAL
NITRITE UR QL STRIP: POSITIVE
PH UR STRIP: 6 [PH]
PROT UR QL STRIP: NEGATIVE
RBC #/AREA URNS AUTO: 6 /HPF (ref 0–4)
SP GR UR STRIP: 1.02
SQUAMOUS #/AREA URNS AUTO: 1 /HPF
UROBILINOGEN UR STRIP-ACNC: NEGATIVE EU/DL
WBC #/AREA URNS AUTO: >100 /HPF (ref 0–5)
WBC CLUMPS UR QL AUTO: ABNORMAL
YEAST UR QL AUTO: ABNORMAL /HPF

## 2025-04-21 PROCEDURE — 81001 URINALYSIS AUTO W/SCOPE: CPT | Mod: HCNC

## 2025-04-22 ENCOUNTER — LAB VISIT (OUTPATIENT)
Dept: LAB | Facility: HOSPITAL | Age: 75
End: 2025-04-22
Attending: FAMILY MEDICINE
Payer: MEDICARE

## 2025-04-22 DIAGNOSIS — N39.0 RECURRENT UTI: ICD-10-CM

## 2025-04-22 PROBLEM — Z95.2 HEART VALVE REPLACED BY TRANSPLANT: Status: RESOLVED | Noted: 2024-10-25 | Resolved: 2025-04-22

## 2025-04-22 PROBLEM — Z98.890 S/P AORTIC DISSECTION REPAIR: Status: ACTIVE | Noted: 2025-04-22

## 2025-04-22 LAB
BACTERIA #/AREA URNS AUTO: ABNORMAL /HPF
BILIRUB UR QL STRIP.AUTO: NEGATIVE
CLARITY UR: CLEAR
COLOR UR AUTO: YELLOW
GLUCOSE UR QL STRIP: NEGATIVE
HGB UR QL STRIP: ABNORMAL
KETONES UR QL STRIP: NEGATIVE
LEUKOCYTE ESTERASE UR QL STRIP: ABNORMAL
MICROSCOPIC COMMENT: ABNORMAL
NITRITE UR QL STRIP: NEGATIVE
PH UR STRIP: 7 [PH]
PROT UR QL STRIP: NEGATIVE
RBC #/AREA URNS AUTO: 1 /HPF (ref 0–4)
SP GR UR STRIP: 1.01
UROBILINOGEN UR STRIP-ACNC: NEGATIVE EU/DL
WBC #/AREA URNS AUTO: 38 /HPF (ref 0–5)

## 2025-04-22 PROCEDURE — 87086 URINE CULTURE/COLONY COUNT: CPT | Mod: HCNC

## 2025-04-22 PROCEDURE — 81001 URINALYSIS AUTO W/SCOPE: CPT | Mod: HCNC

## 2025-04-23 DIAGNOSIS — G47.00 INSOMNIA, UNSPECIFIED TYPE: ICD-10-CM

## 2025-04-23 RX ORDER — TRAZODONE HYDROCHLORIDE 100 MG/1
100 TABLET ORAL NIGHTLY
Qty: 90 TABLET | Refills: 1 | Status: SHIPPED | OUTPATIENT
Start: 2025-04-23

## 2025-04-23 NOTE — TELEPHONE ENCOUNTER
Refill Decision Note   Cesar Simpson  is requesting a refill authorization.  Brief Assessment and Rationale for Refill:  Approve     Medication Therapy Plan:        Comments:     Note composed:12:33 PM 04/23/2025

## 2025-04-23 NOTE — TELEPHONE ENCOUNTER
No care due was identified.  [facility] Embedded Care Due Messages. Reference number: [id number].   4/23/2025 12:17:01 PM CDT

## 2025-04-24 ENCOUNTER — DOCUMENTATION ONLY (OUTPATIENT)
Dept: CARDIAC REHAB | Facility: CLINIC | Age: 75
End: 2025-04-24
Payer: MEDICARE

## 2025-04-24 DIAGNOSIS — I73.9 PAD (PERIPHERAL ARTERY DISEASE): Primary | ICD-10-CM

## 2025-04-24 RX ORDER — CLOPIDOGREL BISULFATE 75 MG/1
75 TABLET ORAL DAILY
Qty: 30 TABLET | Refills: 11 | Status: SHIPPED | OUTPATIENT
Start: 2025-04-24 | End: 2026-04-24

## 2025-04-24 NOTE — PROGRESS NOTES
Mr. Guerrero has completed 4 out of 36 exercise session of Phase II cardiac rehab.  He stopped attending classes and has not returned calls.  He was dropped from the program.    Session: Orientation     Cardiac Rehab Individual Treatment Plan - Initial Assessment      Patient Name: Cesar Simpson MRN: [id number]   : 1950   Age: 75 y.o.   Primary Diagnosis: AV REPLACEMENT  Date of Event: 24  EF: 58%  Risk Stratification: high  Referring Physician: JACKELYN   Exercise Assessment:     CPX/TM Date: 10-14-24 Results   RHR 79   Max    Peak VO2 (CPX only) 14.8   Actual METS (CPX only) 4.2   Estimated METS 9.0     Anthropometrics    Height 67 inches   Weight 176 lbs   BMI 27.6   Abdominal Girth 43.5   Body Composition 20.0%     ST Depression noted on Stress Test?:No  Angina with exercise?: No   Fall Risk: Yes   Assistive Devices:  independent   Currently exercising? No   Mr. Guerrero stated there were no limitations to exercise but during a range of motion assessment it was noted he has right shoulder limitations.  Exercise modalities will be modified to meet the patient's needs and capabilities.     Exercise Plan:   Goals:  CR Exercise Goals: Attend Cardiac Rehab 3 times/week: In Progress  Home Aerobic Exercise: 2 additional days/week for 30-60 minutes: In Progress  Intensity of 12-15 on the Rate of Perceived Exertion (RPE) scale: In Progress  30% increase in entry estimated METS: 11.7 : In Progress  5 days/week for 30-60 minutes: In Progress  Demonstrate proper pulse taking technique: In Progress    Intervention:   Discussed importance of regular attendance to cardiac rehab class    Exercise Prescription:  THR Range 100-114   Mode: Treadmill  Recumbent Bike  Upright Bike  Nustep  Elliptical   Frequency:  3 days/week   Duration:  30 - 60 minutes   Intensity:  12 - 15 RPE   Resistance Training:  Yes: 0 to 5 lb weights with 10-15 reps based on strength and range of motion assessment     Home Prescription:  Mode Aerobic    Frequency: 2- 3 days/week   Duration: 30-60 minutes   Resistance Training: None        Education:  Orientation to Equipment; verbalizes understanding; Date: 10-17-24  Exercise Recommendations; verbalizes understanding; Date: 10-17-24  Exercise Safety; verbalizes understanding; Date: 10-17-24  Class Preparation: verbalizes understanding; Date: 10-17-24  Signs and symptoms to report: verbalizes understanding; Date: 10-17-24  Caffeine/Hydration: verbalizes understanding; Date: 10-17-24  Exercise Terminology: verbalizes understanding; Date: 10-17-24  Resistance Training: verbalizes understanding; Date: 10-17-24    Comments:  I encouraged Mr. Guerrero to begin thinking about some type of aerobic exercise he can participate in at least 2 non-rehab days per week for at least 30 minutes in addition to attending Phase II cardiac rehab classes 3 days per week.  He stated understanding.    All consent forms were signed, proper attire and shoes were discussed.       Mr. Guerrero will begin Cardiac Rehab on  at 10:00 am.    The exercise prescription will be adjusted based on tolerance of exercise intensity by patient.    Cassie Kaur, CEP    Session: Orientation   Cardiac Rehab Individual Treatment Plan - Initial Assessment      Patient Name: Cesar Simpson MRN: [id number]   : 1950   Age: 75 y.o.   Date of Event: 24   Primary Diagnosis: S/P AV Replacement    EF: 58% (4/3/24)    Physical Assessment:   There were no vitals taken for this visit.    ASSESSMENT:  Heart Sounds: regular rate and rhythm, S1, S2 normal, no murmur, click, rub or gallop  Prosthetic Valve: Yes  Lung Sounds: normal air entry, lungs clear to auscultation  Capillary Refill: normal  Left Radial Pulse: Normal (+2)  Right Radial Pulse: Normal (+2)  Left Pedal Pulse: Normal (+2)  Right Pedal Pulse: Normal (+2)  Right Edema: none  Left Edema none  Strength: normal  Range of Motion: diminished range of motion Right Shoulder  Existing  Limitations:      Site   [x] Arthritis, bursitis Bilateral shoulders   [] Amputation, atrophy    [] Other:    []       Diabetic patient's foot examination comments: Normal -  Bilateral  Incisional site: healing well  Special needs: pt reports being Hard of Hearing and utilizes hearing aids which he did not wear today. Pt was instructed to wear his hearing aids when attending cardiac rehab so he could hear instructions and follow. Patient verbalized understanding.    Psychosocial Assessment:   Outcome Survey Tools:    PIPE SCORES:               SF-36             PHQ-9:      1/15/2025    10:42 AM   PHQ-9 Depression Patient Health Questionnaire   Over the last two weeks how often have you been bothered by little interest or pleasure in doing things 1   Over the last two weeks how often have you been bothered by feeling down, depressed or hopeless 1              Living Arrangements: Lives alone  Family Support: children daughter lives near patient and assists   Self Reported: Anxiety and Anger/Hostility  Displays: calmness  Medication:  cymbalta daily    Psychosocial Plan:   Goals:  Improved psychosocial coping strategies  Reduce manifestation of anxiety  Reduce manifestation of anger/hostility  Maintain positive support system  Maintain positive outlook  Improve overall quality of life    Interventions/Recommendations:  Discussed Results of Surveys  Patient to Self Report Emotional Changes at Session Check In  Recommend Physical Activity  Recommend Attending Education Lectures  Notify MD: No  Program Referral: No  Pharmaceutical Intervention/Therapy: Yes  Other Needs: not applicable  Stage of Readiness to Change: Contemplation    Education:  Stress Management; verbalizes understanding; Date: 10/17/24  Stress; verbalizes understanding; Date: 10/17/24  Risk Factors; verbalizes understanding; Date: 10/17/24    Comments:  Screening tools and results discussed with patient. Patient had a TAVR scheduled and resulted in an  AV replacement with chest incision. Patient still upset over the experience which has caused him some anxiety and stress. Discussed effects of stress on cardiac health pt verbalized understandingPatient has been instructed to notify staff in the event that circumstances worsen.  Patient verbalizes understanding.    Other Core Components/Risk Factors Assessment:   RISK FACTORS:  diabetes, hyperlipidemia, hypertension, positive family history, sedentary lifestyle, PAD, Hx PTCA/Stent    Learning Barriers: Hearing Impairment, emotional    Education Level:  Attended College/Technical School    Pre-test Score: 70    Medication Compliance: has been compliant with taking medications    Other Core Components/Risk Factors Plan:   Goals:  Decrease cholesterol level: In Progress  Increase exercise tolerance: In Progress  Increase knowledge of CAD: In Progress  Decrease blood pressure: In Progress  Weight loss: In Progress  Identify and manage personal areas of stress: In Progress  Control diabetes by adjusting diet and exercise: In Progress  Learn more about healthy eating: In Progress    Interventions/Recommendations:  Recommend regular attendance for Cardiac Rehab: Exercise and Education Lectures  Encourage medication compliance  Individual Education/ Counseling: Yes  Physician Referral: No    Education:    blood pressure meds, verbalizes understanding; Date: 10/17/24  risk factors, verbalizes understanding; Date: 10/17/24  stress, verbalizes understanding; Date: 10/17/24         Education method adapted to patients education level and preferred method of learning.  Method: explanation    Comments:  Discussed risk factor modification to improve cardiac health pt verbalized understanding. Discussed medication list, dosing, side effects, pt verbalized understanding.     Other Core Components/Hypertension Assessment:   Resting BP:   BP Readings from Last 1 Encounters:   04/09/25 124/76         BP Diagnosis:  Hypertensive  Patient reported symptoms: none    Medications:  Medication Prescribed? Adherent? Exception   Beta-blocker [x]Yes  []No  []Unknown [x]Yes  []No  []Unknown    ACEI/ARB []Yes  []No  []Unknown []Yes  []No  []Unknown    Calcium Channel Blocker [x]Yes  []No  []Unknown [x]Yes  []No  []Unknown    Diuretic [x]Yes  []No  []Unknown [x]Yes  []No  []Unknown        Other Core Components/Hypertension Plan:   Goals:  Blood Pressure <130/80    Interventions/Recommendations:  Med Card Reconciled: Yes  Encourage medication compliance  Encourage sodium reduction  Reduce alcohol consumption  Encourage weight loss  Recommend physical activity  Educate on contributory factors  Reduce stress, anxiety, anger, depression, and/or chronic pain  Encourage home blood pressure monitoring  Recommend daily weights    Education:    Risk Factors; verbalizes understanding; Date: 10/17/24  Stress; verbalizes understanding; Date: 10/17/24         Comments:  Patient reports he is participating in the digital hypertension program.       Does the patient have Heart Failure? No    Other Core Components/Tobacco Cessation Assessment:   Smoking Status: Lifetime Non-smoker  Primary Tobacco Type: N/A  Tobacco Usage: no  Smoking Cessation Barriers:  NA  Stage of Readiness to Change: Maintenance    Other Core Components/Tobacco Cessation Plan:   Goals:  Maintain non-smoking status    Interventions:  Maintains non-smoking status    Education:    Risk Factors; verbalizes understanding; Date: 10/17/24  Benefits of Cardiac Rehab; verbalizes understanding; Date: 10/17/24         Comments:  Discussed screening tools and results, pt admits to anxiety and hostility from his exerience with his surgery. He is currently enrolled in digital hypertension and diabetic program. Pt reports he is no longer on any diabetes medications and does not monitor his glucose. DIscussed stress management techniques pt verbalized understanding. Pt instructed to wear his  hearing aids to all classes as pt was noted to had difficulty hearing/understanding converstation. Discussed Cardiac Rehab program in depth with patient.  Medication list updated per patient & marked as reviewed.  Patient has been instructed to notify staff of any problems while attending rehab (ie: chest pain, shortness of breath, lightheadedness, dizziness).  Patient has been instructed to monitor blood pressure readings outside of rehab & to keep a daily log of the readings.  Patient verbalizes understanding.    Joseph Man RN  Cardiac Rehab Nurse

## 2025-05-01 ENCOUNTER — RESULTS FOLLOW-UP (OUTPATIENT)
Dept: FAMILY MEDICINE | Facility: CLINIC | Age: 75
End: 2025-05-01

## 2025-05-01 ENCOUNTER — TELEPHONE (OUTPATIENT)
Dept: FAMILY MEDICINE | Facility: CLINIC | Age: 75
End: 2025-05-01
Payer: MEDICARE

## 2025-05-01 DIAGNOSIS — N39.0 RECURRENT UTI: Primary | ICD-10-CM

## 2025-05-01 NOTE — TELEPHONE ENCOUNTER
Called patient to let him know he needs to schedule urine and urine culture gets scheduled asap. No answer. Left voicemail for patient.

## 2025-05-01 NOTE — TELEPHONE ENCOUNTER
I called patient and left a detailed voicemail  I was not able to reach patient  Patient's first urine didn't have a culture  Lab misplaced/didn't run culture on second urine    I apologized to him via voicemail    Please call him and make sure urine and urine culture gets scheduled asap. Please let me if unable to reach patient.

## 2025-05-02 ENCOUNTER — TELEPHONE (OUTPATIENT)
Dept: FAMILY MEDICINE | Facility: CLINIC | Age: 75
End: 2025-05-02
Payer: MEDICARE

## 2025-05-02 ENCOUNTER — LAB VISIT (OUTPATIENT)
Dept: LAB | Facility: HOSPITAL | Age: 75
End: 2025-05-02
Attending: FAMILY MEDICINE
Payer: MEDICARE

## 2025-05-02 DIAGNOSIS — N39.0 RECURRENT UTI: ICD-10-CM

## 2025-05-02 LAB
BACTERIA #/AREA URNS AUTO: ABNORMAL /HPF
BILIRUB UR QL STRIP.AUTO: NEGATIVE
CLARITY UR: CLEAR
COLOR UR AUTO: YELLOW
GLUCOSE UR QL STRIP: NEGATIVE
HGB UR QL STRIP: ABNORMAL
KETONES UR QL STRIP: NEGATIVE
LEUKOCYTE ESTERASE UR QL STRIP: ABNORMAL
MICROSCOPIC COMMENT: ABNORMAL
NITRITE UR QL STRIP: NEGATIVE
PH UR STRIP: 6 [PH]
PROT UR QL STRIP: ABNORMAL
RBC #/AREA URNS AUTO: 15 /HPF (ref 0–4)
SP GR UR STRIP: 1.02
SQUAMOUS #/AREA URNS AUTO: 21 /HPF
UROBILINOGEN UR STRIP-ACNC: NEGATIVE EU/DL
WBC #/AREA URNS AUTO: >100 /HPF (ref 0–5)

## 2025-05-02 PROCEDURE — 87086 URINE CULTURE/COLONY COUNT: CPT | Mod: HCNC

## 2025-05-02 PROCEDURE — 81003 URINALYSIS AUTO W/O SCOPE: CPT | Mod: HCNC

## 2025-05-02 NOTE — TELEPHONE ENCOUNTER
----- Message from Prachi sent at 5/2/2025  9:21 AM CDT -----  Type:  Patient Returning CallWho Called:ptWho Left Message for Patient:officeDoes the patient know what this is regarding?:n/aWould the patient rather a call back or a response via [facility]? callBe Call Back Number:[phone] Information:

## 2025-05-02 NOTE — TELEPHONE ENCOUNTER
Return patient phone call. Spoke to patient. Patient stated he went to lab this am to give urine.

## 2025-05-04 ENCOUNTER — HOSPITAL ENCOUNTER (EMERGENCY)
Facility: HOSPITAL | Age: 75
Discharge: HOME | End: 2025-05-04
Attending: STUDENT IN AN ORGANIZED HEALTH CARE EDUCATION/TRAINING PROGRAM
Payer: MEDICARE

## 2025-05-04 VITALS
DIASTOLIC BLOOD PRESSURE: 79 MMHG | RESPIRATION RATE: 16 BRPM | OXYGEN SATURATION: 99 % | TEMPERATURE: 99 F | BODY MASS INDEX: 28.25 KG/M2 | WEIGHT: 180 LBS | HEART RATE: 90 BPM | SYSTOLIC BLOOD PRESSURE: 132 MMHG | HEIGHT: 67 IN

## 2025-05-04 DIAGNOSIS — S09.90XA CLOSED HEAD INJURY, INITIAL ENCOUNTER: Primary | ICD-10-CM

## 2025-05-04 DIAGNOSIS — S01.81XA FOREHEAD LACERATION, INITIAL ENCOUNTER: ICD-10-CM

## 2025-05-04 PROCEDURE — 99284 EMERGENCY DEPT VISIT MOD MDM: CPT | Mod: 25,HCNC

## 2025-05-04 PROCEDURE — 12013 RPR F/E/E/N/L/M 2.6-5.0 CM: CPT | Mod: HCNC

## 2025-05-04 PROCEDURE — 25000003 PHARM REV CODE 250: Mod: HCNC | Performed by: STUDENT IN AN ORGANIZED HEALTH CARE EDUCATION/TRAINING PROGRAM

## 2025-05-04 RX ORDER — ACETAMINOPHEN 500 MG
1000 TABLET ORAL
Status: COMPLETED | OUTPATIENT
Start: 2025-05-04 | End: 2025-05-04

## 2025-05-04 RX ADMIN — ACETAMINOPHEN 1000 MG: 500 TABLET ORAL at 03:05
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
## 2025-05-04 NOTE — ED NOTES
Patient arrived to ED via private vehicle after sustaining a trip and fall outside while taking out the trash. Does not believe he lost consciousness. Reports taking blood thinners. Laceration above R eyebrow with edges approximated. Band-aid covering laceration. Bleeding controlled. No neurological or neuromuscular deficits. Multiple small superficial lacerations noted to bilateral hands. No active bleeding. Denies any other pain or injuries. No deformities noted. Patient ambulatory steady gait from walker to bed. PMS intact. Denies C spine tenderness. Reports his last tetanus shot was last year.     APPEARANCE: Alert, oriented and in no acute distress.  CARDIAC: Normal rate and rhythm, no murmur heard.   PERIPHERAL VASCULAR: peripheral pulses present. Normal cap refill. No edema. Warm to touch.    RESPIRATORY:Normal rate and effort, breath sounds clear bilaterally throughout chest. Respirations are equal and unlabored no obvious signs of distress.  GASTRO: soft, bowel sounds normal, no tenderness, no abdominal distention.  MUSC: Full ROM. No bony tenderness or soft tissue tenderness. No obvious deformity.  SKIN: Skin is warm and dry, normal skin turgor, mucous membranes moist. Approximately 3 cm laceration to R eyebrow. Edges approximated. Multiple superficial abrasions to bilateral hands.   NEURO: 5/5 strength major flexors/extensors bilaterally. Sensory intact to light touch bilaterally. Erickson coma scale: eyes open spontaneously-4, oriented & converses-5, obeys commands-6. No neurological abnormalities.   MENTAL STATUS: awake, alert and aware of environment.  EYE: PERRL, both eyes: pupils brisk and reactive to light. Normal size.  ENT: EARS: no obvious drainage. NOSE: no active bleeding.

## 2025-05-05 ENCOUNTER — RESULTS FOLLOW-UP (OUTPATIENT)
Dept: FAMILY MEDICINE | Facility: CLINIC | Age: 75
End: 2025-05-05

## 2025-05-05 ENCOUNTER — TELEPHONE (OUTPATIENT)
Dept: FAMILY MEDICINE | Facility: CLINIC | Age: 75
End: 2025-05-05
Payer: MEDICARE

## 2025-05-05 ENCOUNTER — LAB VISIT (OUTPATIENT)
Dept: LAB | Facility: HOSPITAL | Age: 75
End: 2025-05-05
Payer: MEDICARE

## 2025-05-05 DIAGNOSIS — I25.118 CORONARY ARTERY DISEASE OF NATIVE ARTERY OF NATIVE HEART WITH STABLE ANGINA PECTORIS: ICD-10-CM

## 2025-05-05 DIAGNOSIS — E78.49 OTHER HYPERLIPIDEMIA: ICD-10-CM

## 2025-05-05 DIAGNOSIS — N39.0 COMPLICATED UTI (URINARY TRACT INFECTION): Primary | ICD-10-CM

## 2025-05-05 DIAGNOSIS — I10 ESSENTIAL HYPERTENSION: ICD-10-CM

## 2025-05-05 DIAGNOSIS — E11.9 DIET-CONTROLLED TYPE 2 DIABETES MELLITUS: ICD-10-CM

## 2025-05-05 LAB
ABSOLUTE EOSINOPHIL (OHS): 0.29 K/UL
ABSOLUTE MONOCYTE (OHS): 0.76 K/UL (ref 0.3–1)
ABSOLUTE NEUTROPHIL COUNT (OHS): 4.88 K/UL (ref 1.8–7.7)
ALBUMIN SERPL BCP-MCNC: 3.5 G/DL (ref 3.5–5.2)
ALP SERPL-CCNC: 78 UNIT/L (ref 40–150)
ALT SERPL W/O P-5'-P-CCNC: 19 UNIT/L (ref 10–44)
ANION GAP (OHS): 9 MMOL/L (ref 8–16)
AST SERPL-CCNC: 19 UNIT/L (ref 11–45)
BACTERIA UR CULT: ABNORMAL
BASOPHILS # BLD AUTO: 0.09 K/UL
BASOPHILS NFR BLD AUTO: 1.2 %
BILIRUB SERPL-MCNC: 0.4 MG/DL (ref 0.1–1)
BUN SERPL-MCNC: 17 MG/DL (ref 8–23)
CALCIUM SERPL-MCNC: 8.6 MG/DL (ref 8.7–10.5)
CHLORIDE SERPL-SCNC: 104 MMOL/L (ref 95–110)
CHOLEST SERPL-MCNC: 91 MG/DL (ref 120–199)
CHOLEST/HDLC SERPL: 2.8 {RATIO} (ref 2–5)
CO2 SERPL-SCNC: 27 MMOL/L (ref 23–29)
CREAT SERPL-MCNC: 0.9 MG/DL (ref 0.5–1.4)
EAG (OHS): 123 MG/DL (ref 68–131)
ERYTHROCYTE [DISTWIDTH] IN BLOOD BY AUTOMATED COUNT: 13.2 % (ref 11.5–14.5)
GFR SERPLBLD CREATININE-BSD FMLA CKD-EPI: >60 ML/MIN/1.73/M2
GLUCOSE SERPL-MCNC: 131 MG/DL (ref 70–110)
HBA1C MFR BLD: 5.9 % (ref 4–5.6)
HCT VFR BLD AUTO: 38.5 % (ref 40–54)
HDLC SERPL-MCNC: 33 MG/DL (ref 40–75)
HDLC SERPL: 36.3 % (ref 20–50)
HGB BLD-MCNC: 12.6 GM/DL (ref 14–18)
IMM GRANULOCYTES # BLD AUTO: 0.05 K/UL (ref 0–0.04)
IMM GRANULOCYTES NFR BLD AUTO: 0.7 % (ref 0–0.5)
LDLC SERPL CALC-MCNC: 46.8 MG/DL (ref 63–159)
LYMPHOCYTES # BLD AUTO: 1.48 K/UL (ref 1–4.8)
MCH RBC QN AUTO: 30.1 PG (ref 27–31)
MCHC RBC AUTO-ENTMCNC: 32.7 G/DL (ref 32–36)
MCV RBC AUTO: 92 FL (ref 82–98)
NONHDLC SERPL-MCNC: 58 MG/DL
NUCLEATED RBC (/100WBC) (OHS): 0 /100 WBC
PLATELET # BLD AUTO: 174 K/UL (ref 150–450)
PMV BLD AUTO: 10.4 FL (ref 9.2–12.9)
POTASSIUM SERPL-SCNC: 3.7 MMOL/L (ref 3.5–5.1)
PROT SERPL-MCNC: 6.9 GM/DL (ref 6–8.4)
RBC # BLD AUTO: 4.18 M/UL (ref 4.6–6.2)
RELATIVE EOSINOPHIL (OHS): 3.8 %
RELATIVE LYMPHOCYTE (OHS): 19.6 % (ref 18–48)
RELATIVE MONOCYTE (OHS): 10.1 % (ref 4–15)
RELATIVE NEUTROPHIL (OHS): 64.6 % (ref 38–73)
SODIUM SERPL-SCNC: 140 MMOL/L (ref 136–145)
TRIGL SERPL-MCNC: 56 MG/DL (ref 30–150)
WBC # BLD AUTO: 7.55 K/UL (ref 3.9–12.7)

## 2025-05-05 PROCEDURE — 83036 HEMOGLOBIN GLYCOSYLATED A1C: CPT | Mod: HCNC

## 2025-05-05 PROCEDURE — 85025 COMPLETE CBC W/AUTO DIFF WBC: CPT | Mod: HCNC

## 2025-05-05 PROCEDURE — 36415 COLL VENOUS BLD VENIPUNCTURE: CPT | Mod: HCNC,PO

## 2025-05-05 PROCEDURE — 80061 LIPID PANEL: CPT | Mod: HCNC

## 2025-05-05 PROCEDURE — 82040 ASSAY OF SERUM ALBUMIN: CPT | Mod: HCNC

## 2025-05-05 RX ORDER — SULFAMETHOXAZOLE AND TRIMETHOPRIM 800; 160 MG/1; MG/1
1 TABLET ORAL 2 TIMES DAILY
Qty: 28 TABLET | Refills: 0 | Status: SHIPPED | OUTPATIENT
Start: 2025-05-05 | End: 2025-05-20

## 2025-05-06 ENCOUNTER — RESULTS FOLLOW-UP (OUTPATIENT)
Dept: FAMILY MEDICINE | Facility: CLINIC | Age: 75
End: 2025-05-06

## 2025-05-06 NOTE — TELEPHONE ENCOUNTER
Rossana, can you see this patient? Or arrange a f/u with ID?    Having UTI symptoms  I treated with bactrim, still having symptoms  I am going to try a longer course    I have never used clindamycin for a UTI but current sensitives show that it should work? I can change to that if suggested.   Cultures as below.       If you prefer an e consult, I can do that also      Urine Culture 50,000 - 99,999 cfu/ml Methicillin resistant Staphylococcus aureus Abnormal         Resulting Agency: [facility]     Susceptibility     Methicillin resistant Staphylococcus aureus     PRABHA     Clindamycin <=0.5 µg/ml Sensitive     Daptomycin <=0.5 µg/ml Sensitive     Linezolid <=1 µg/ml Sensitive     Oxacillin 2 µg/ml Resistant     Penicillin >8 µg/ml Resistant     Trimeth/Sulfa <=0.5/9.5 µ... Sensitive     Vancomycin 1 µg/ml Sensitive

## 2025-05-07 ENCOUNTER — OFFICE VISIT (OUTPATIENT)
Dept: FAMILY MEDICINE | Facility: CLINIC | Age: 75
End: 2025-05-07
Payer: MEDICARE

## 2025-05-07 ENCOUNTER — LAB VISIT (OUTPATIENT)
Dept: LAB | Facility: HOSPITAL | Age: 75
End: 2025-05-07
Attending: FAMILY MEDICINE
Payer: MEDICARE

## 2025-05-07 VITALS
OXYGEN SATURATION: 99 % | SYSTOLIC BLOOD PRESSURE: 130 MMHG | TEMPERATURE: 97 F | DIASTOLIC BLOOD PRESSURE: 68 MMHG | WEIGHT: 183.88 LBS | BODY MASS INDEX: 28.86 KG/M2 | HEIGHT: 67 IN | HEART RATE: 88 BPM

## 2025-05-07 DIAGNOSIS — M19.90 ARTHRITIS: ICD-10-CM

## 2025-05-07 DIAGNOSIS — N39.0 COMPLICATED UTI (URINARY TRACT INFECTION): ICD-10-CM

## 2025-05-07 DIAGNOSIS — E03.8 OTHER SPECIFIED HYPOTHYROIDISM: ICD-10-CM

## 2025-05-07 DIAGNOSIS — G47.00 INSOMNIA, UNSPECIFIED TYPE: ICD-10-CM

## 2025-05-07 DIAGNOSIS — R05.3 CHRONIC COUGH: ICD-10-CM

## 2025-05-07 DIAGNOSIS — N40.1 BPH ASSOCIATED WITH NOCTURIA: ICD-10-CM

## 2025-05-07 DIAGNOSIS — E83.51 HYPOCALCEMIA: ICD-10-CM

## 2025-05-07 DIAGNOSIS — I25.118 CORONARY ARTERY DISEASE OF NATIVE ARTERY OF NATIVE HEART WITH STABLE ANGINA PECTORIS: Chronic | ICD-10-CM

## 2025-05-07 DIAGNOSIS — E11.9 DIET-CONTROLLED TYPE 2 DIABETES MELLITUS: ICD-10-CM

## 2025-05-07 DIAGNOSIS — J84.9 ILD (INTERSTITIAL LUNG DISEASE): ICD-10-CM

## 2025-05-07 DIAGNOSIS — D64.9 NORMOCYTIC ANEMIA: ICD-10-CM

## 2025-05-07 DIAGNOSIS — R00.0 TACHYCARDIA: Primary | ICD-10-CM

## 2025-05-07 DIAGNOSIS — E11.59 HYPERTENSION ASSOCIATED WITH DIABETES: ICD-10-CM

## 2025-05-07 DIAGNOSIS — Z12.11 COLON CANCER SCREENING: ICD-10-CM

## 2025-05-07 DIAGNOSIS — R35.1 BPH ASSOCIATED WITH NOCTURIA: ICD-10-CM

## 2025-05-07 DIAGNOSIS — N39.0 RECURRENT UTI: ICD-10-CM

## 2025-05-07 DIAGNOSIS — I15.2 HYPERTENSION ASSOCIATED WITH DIABETES: ICD-10-CM

## 2025-05-07 DIAGNOSIS — E78.49 OTHER HYPERLIPIDEMIA: ICD-10-CM

## 2025-05-07 PROCEDURE — 1159F MED LIST DOCD IN RCRD: CPT | Mod: CPTII,HCNC,S$GLB, | Performed by: FAMILY MEDICINE

## 2025-05-07 PROCEDURE — 87040 BLOOD CULTURE FOR BACTERIA: CPT | Mod: HCNC

## 2025-05-07 PROCEDURE — 3288F FALL RISK ASSESSMENT DOCD: CPT | Mod: CPTII,HCNC,S$GLB, | Performed by: FAMILY MEDICINE

## 2025-05-07 PROCEDURE — 3072F LOW RISK FOR RETINOPATHY: CPT | Mod: CPTII,HCNC,S$GLB, | Performed by: FAMILY MEDICINE

## 2025-05-07 PROCEDURE — 36415 COLL VENOUS BLD VENIPUNCTURE: CPT | Mod: HCNC,PO

## 2025-05-07 PROCEDURE — 1125F AMNT PAIN NOTED PAIN PRSNT: CPT | Mod: CPTII,HCNC,S$GLB, | Performed by: FAMILY MEDICINE

## 2025-05-07 PROCEDURE — 3008F BODY MASS INDEX DOCD: CPT | Mod: CPTII,HCNC,S$GLB, | Performed by: FAMILY MEDICINE

## 2025-05-07 PROCEDURE — 1101F PT FALLS ASSESS-DOCD LE1/YR: CPT | Mod: CPTII,HCNC,S$GLB, | Performed by: FAMILY MEDICINE

## 2025-05-07 PROCEDURE — G2211 COMPLEX E/M VISIT ADD ON: HCPCS | Mod: HCNC,S$GLB,, | Performed by: FAMILY MEDICINE

## 2025-05-07 PROCEDURE — 99215 OFFICE O/P EST HI 40 MIN: CPT | Mod: HCNC,S$GLB,, | Performed by: FAMILY MEDICINE

## 2025-05-07 PROCEDURE — 3078F DIAST BP <80 MM HG: CPT | Mod: CPTII,HCNC,S$GLB, | Performed by: FAMILY MEDICINE

## 2025-05-07 PROCEDURE — 3044F HG A1C LEVEL LT 7.0%: CPT | Mod: CPTII,HCNC,S$GLB, | Performed by: FAMILY MEDICINE

## 2025-05-07 PROCEDURE — 3075F SYST BP GE 130 - 139MM HG: CPT | Mod: CPTII,HCNC,S$GLB, | Performed by: FAMILY MEDICINE

## 2025-05-07 PROCEDURE — 99999 PR PBB SHADOW E&M-EST. PATIENT-LVL V: CPT | Mod: PBBFAC,HCNC,, | Performed by: FAMILY MEDICINE

## 2025-05-07 PROCEDURE — 1157F ADVNC CARE PLAN IN RCRD: CPT | Mod: CPTII,HCNC,S$GLB, | Performed by: FAMILY MEDICINE

## 2025-05-07 RX ORDER — METOPROLOL TARTRATE 25 MG/1
25 TABLET, FILM COATED ORAL 2 TIMES DAILY
Qty: 180 TABLET | Refills: 3 | Status: SHIPPED | OUTPATIENT
Start: 2025-05-07 | End: 2026-05-07

## 2025-05-07 RX ORDER — EZETIMIBE 10 MG/1
10 TABLET ORAL DAILY
Qty: 90 TABLET | Refills: 3 | Status: SHIPPED | OUTPATIENT
Start: 2025-05-07 | End: 2026-05-07

## 2025-05-07 RX ORDER — DULOXETIN HYDROCHLORIDE 30 MG/1
30 CAPSULE, DELAYED RELEASE ORAL DAILY
Qty: 90 CAPSULE | Refills: 2 | Status: SHIPPED | OUTPATIENT
Start: 2025-05-07

## 2025-05-07 RX ORDER — AMLODIPINE BESYLATE 5 MG/1
5 TABLET ORAL DAILY
Qty: 90 TABLET | Refills: 3 | Status: SHIPPED | OUTPATIENT
Start: 2025-05-07 | End: 2026-05-07

## 2025-05-07 RX ORDER — TRAZODONE HYDROCHLORIDE 100 MG/1
100 TABLET ORAL NIGHTLY
Qty: 90 TABLET | Refills: 1 | Status: SHIPPED | OUTPATIENT
Start: 2025-05-07

## 2025-05-07 RX ORDER — ESOMEPRAZOLE MAGNESIUM 40 MG/1
40 CAPSULE, DELAYED RELEASE ORAL
Qty: 30 CAPSULE | Refills: 11 | Status: SHIPPED | OUTPATIENT
Start: 2025-05-07 | End: 2026-05-07

## 2025-05-07 RX ORDER — TAMSULOSIN HYDROCHLORIDE 0.4 MG/1
0.8 CAPSULE ORAL DAILY
Qty: 180 CAPSULE | Refills: 3 | Status: SHIPPED | OUTPATIENT
Start: 2025-05-07 | End: 2026-05-07

## 2025-05-07 RX ORDER — LEVOTHYROXINE SODIUM 88 UG/1
88 TABLET ORAL
Qty: 90 TABLET | Refills: 0 | Status: SHIPPED | OUTPATIENT
Start: 2025-05-07

## 2025-05-07 RX ORDER — ATORVASTATIN CALCIUM 80 MG/1
80 TABLET, FILM COATED ORAL DAILY
Qty: 90 TABLET | Refills: 1 | Status: SHIPPED | OUTPATIENT
Start: 2025-05-07 | End: 2026-05-07

## 2025-05-07 RX ORDER — FINASTERIDE 5 MG/1
5 TABLET, FILM COATED ORAL DAILY
Qty: 90 TABLET | Refills: 2 | Status: SHIPPED | OUTPATIENT
Start: 2025-05-07

## 2025-05-07 NOTE — PATIENT INSTRUCTIONS
"Diet controlled diabetes: monitor  HTN: continue metoprolol 25 mg BID and amlodipine 5 mg  DLD: continue zetia and atorvastatin 80 mg    Palpitations: order holter  BPH/MRSA UTI/Urinary symptoms: get ID and urologic evaluation. Continue bactrim for now    RESTART NEXIUM  May help ILD and cough  Not primarily for GERD  "Proton pump inhibitors (PPIs) and Interstitial Lung Disease (ILD) have a complex relationship, particularly in the context of systemic sclerosis-associated ILD (SSc-ILD). Studies suggest that PPIs may improve survival and reduce disease progression in SSc-ILD"    DAPT per cardiology    Continue cymbalta and trazodone.     Monitor anemia with labs in a month  Due to bactrim?  Will monitor    Will ask Dr. Vickers to clear patient for gym activity if appropriate.     F/u 6 months.    "

## 2025-05-07 NOTE — TELEPHONE ENCOUNTER
Rossana,   Thank you for responding  That is what brenda and I were talking about and I was so confused  Its grown twice    He does not have any known urological issues that requre him to self catheterize that I am aware of    He did have surgery on his heart valves but that was about a year ago    Xi or Sary, can you call patient and confirm that he has not had a recent urinary catheter that I am unaware of  Can you also set up blood culture x 2 and ECHO

## 2025-05-07 NOTE — PROGRESS NOTES
"Subjective:       Patient ID: Cesar Simpson is a 75 y.o. male.    Chief Complaint: Hyperlipidemia    Cesar is a 75 y.o. male who presents today for f/u    Exercise: Needs clearance for gym?? From cardiology.     Per EMR, " He recently under underwent a TAVR on 8/27/24 that was complicated by an aortic dissection prompting OR intervention with CTS."   S/P the following procedures on 8/27/24  1.  Hemiarch replacement with a  26 mm Gelweave Dacron tube graft under deep   hypothermic circulatory arrest, circulatory arrest time 24 minutes at 28 degrees   Celsius with antegrade cerebral perfusion.  2.  Ascending aortic replacement with a 26 mm Gelweave Dacron tube graft   3. Explantation of Evolut TAVR valve   4. Aortic valve replacement with bioprosthetic 23 mm Avalus Valve    CAD: s/p angiogram 4/17/2024. Successful PTCA to the prox mid and distal LAD with 3 CHAY. We had to stent mid LAD due to balloon rupture induced dissection that compromised FREDIS flow     Tripped and fell 3 days ago. Large laceration on R side of forehead on eyebrow that has been glued closed by ED. Significant bruising under the eye. CT head obtained in ED was negative. Notes some discomfort but otherwise asymptomatic. No light headedness. No dizziness. No LOC. Tripped on side walk    Noticed periods of elevated HR unrelated to exertion. Feels that HR does not return to normal after resting. Patient is asymptomatic but notices this on fitbit. Highest he has noticed is 120s and states it takes a long time for HR to return to baseline after elevation.     Diet controlled DM: A1c normal 10/14/2024. A1c up trending but okay  DLD: LDL at goal <70. Atorvostatin 80mg zetia 10 mg   HTN: on metoprolol 25 mg BID and amlodipine 5 mg. No dizziness at home. No orthostatic symptoms.   Insomnia: on cymbalta and trazodone.   FABY: not using CPAP anymore    BPH: on flomax and finasteride. Nocturia has returned 2x tightly. This returned 3 months ago. Still symptomatic " "on current regimen.   Hypothyroidism: on synthroid 88 mcg  ILD/GERD: on nexium 40 mg. Has been taking.      MRSA UTI: per last message to me. "I do have some discomfort mostly first thing in the AM. frequent urination less than 5 min.  like the bladder doesn't empty all the way, and some foam in the urine when I first wake up"  Of note, In his mid 20s would get a UTI every few months. Would take bactrim for that.   In his 30s doctors were unsure why he was getting recurrent UTIs. Did a scope but no clear results/resolution at that time.    At some point these UTIs became asymptomatic and the UTI'S were only detected on urinalysis.   Has not had any catheterization recently.     Weight gain: Has gained 6 pounds since last month and is concerned about this.       Review of Systems   Constitutional:  Negative for chills and fever.   Respiratory:  Positive for cough. Negative for chest tightness and shortness of breath.    Cardiovascular:  Negative for chest pain.   Gastrointestinal:  Negative for nausea and vomiting.             Results for orders placed or performed in visit on 05/05/25   Comprehensive Metabolic Panel    Collection Time: 05/05/25  8:33 AM   Result Value Ref Range    Sodium 140 136 - 145 mmol/L    Potassium 3.7 3.5 - 5.1 mmol/L    Chloride 104 95 - 110 mmol/L    CO2 27 23 - 29 mmol/L    Glucose 131 (H) 70 - 110 mg/dL    BUN 17 8 - 23 mg/dL    Creatinine 0.9 0.5 - 1.4 mg/dL    Calcium 8.6 (L) 8.7 - 10.5 mg/dL    Protein Total 6.9 6.0 - 8.4 gm/dL    Albumin 3.5 3.5 - 5.2 g/dL    Bilirubin Total 0.4 0.1 - 1.0 mg/dL    ALP 78 40 - 150 unit/L    AST 19 11 - 45 unit/L    ALT 19 10 - 44 unit/L    Anion Gap 9 8 - 16 mmol/L    eGFR >60 >60 mL/min/1.73/m2   Hemoglobin A1C    Collection Time: 05/05/25  8:33 AM   Result Value Ref Range    Hemoglobin A1c 5.9 (H) 4.0 - 5.6 %    Estimated Average Glucose 123 68 - 131 mg/dL   Lipid Panel    Collection Time: 05/05/25  8:33 AM   Result Value Ref Range    Cholesterol " "Total 91 (L) 120 - 199 mg/dL    Triglyceride 56 30 - 150 mg/dL    HDL Cholesterol 33 (L) 40 - 75 mg/dL    LDL Cholesterol 46.8 (L) 63.0 - 159.0 mg/dL    HDL/Cholesterol Ratio 36.3 20.0 - 50.0 %    Cholesterol/HDL Ratio 2.8 2.0 - 5.0    Non HDL Cholesterol 58 mg/dL   CBC with Differential    Collection Time: 05/05/25  8:33 AM   Result Value Ref Range    WBC 7.55 3.90 - 12.70 K/uL    RBC 4.18 (L) 4.60 - 6.20 M/uL    HGB 12.6 (L) 14.0 - 18.0 gm/dL    HCT 38.5 (L) 40.0 - 54.0 %    MCV 92 82 - 98 fL    MCH 30.1 27.0 - 31.0 pg    MCHC 32.7 32.0 - 36.0 g/dL    RDW 13.2 11.5 - 14.5 %    Platelet Count 174 150 - 450 K/uL    MPV 10.4 9.2 - 12.9 fL    Nucleated RBC 0 <=0 /100 WBC    Neut % 64.6 38 - 73 %    Lymph % 19.6 18 - 48 %    Mono % 10.1 4 - 15 %    Eos % 3.8 <=8 %    Basophil % 1.2 <=1.9 %    Imm Grans % 0.7 (H) 0.0 - 0.5 %    Neut # 4.88 1.8 - 7.7 K/uL    Lymph # 1.48 1 - 4.8 K/uL    Mono # 0.76 0.3 - 1 K/uL    Eos # 0.29 <=0.5 K/uL    Baso # 0.09 <=0.2 K/uL    Imm Grans # 0.05 (H) 0.00 - 0.04 K/uL     *Note: Due to a large number of results and/or encounters for the requested time period, some results have not been displayed. A complete set of results can be found in Results Review.       Objective:     Vitals:    05/07/25 1300   BP: 130/68   BP Location: Left arm   Patient Position: Sitting   Pulse: 88   Temp: 97.1 °F (36.2 °C)   TempSrc: Temporal   SpO2: 99%   Weight: 83.4 kg (183 lb 13.8 oz)   Height: 5' 7" (1.702 m)        Physical Exam  Vitals and nursing note reviewed.   Constitutional:       General: He is not in acute distress.     Appearance: He is not ill-appearing, toxic-appearing or diaphoretic.   Cardiovascular:      Rate and Rhythm: Normal rate and regular rhythm.      Heart sounds: Murmur heard.      Comments: Murmur noted, also noted previously. Likely from valve replacement.   Pulmonary:      Effort: Pulmonary effort is normal. No respiratory distress.      Breath sounds: Normal breath sounds. " "  Chest:          Comments: Surgical scar noted  Abdominal:      Palpations: Abdomen is soft.      Tenderness: There is no abdominal tenderness.   Musculoskeletal:         General: No swelling.   Skin:     Findings: Bruising present.   Neurological:      General: No focal deficit present.      Mental Status: He is alert.   Psychiatric:         Mood and Affect: Mood normal.         Behavior: Behavior normal.         Thought Content: Thought content normal.         Judgment: Judgment normal.         Assessment:       1. Tachycardia    2. Recurrent UTI    3. Complicated UTI (urinary tract infection)    4. Hypertension associated with diabetes    5. Other hyperlipidemia    6. Coronary artery disease of native artery of native heart with stable angina pectoris    7. Arthritis    8. BPH associated with nocturia    9. Other specified hypothyroidism    10. Colon cancer screening    11. ILD (interstitial lung disease)    12. Chronic cough    13. Insomnia, unspecified type    14. Normocytic anemia    15. Hypocalcemia    16. Diet-controlled type 2 diabetes mellitus        Plan:       Diet controlled diabetes: monitor  HTN: continue metoprolol 25 mg BID and amlodipine 5 mg  DLD: continue zetia and atorvastatin 80 mg    Palpitations: order holter  BPH/MRSA UTI/Urinary symptoms: get ID and urologic evaluation. Continue bactrim for now    Take Nexium   May help ILD and cough  Not primarily for GERD  "Proton pump inhibitors (PPIs) and Interstitial Lung Disease (ILD) have a complex relationship, particularly in the context of systemic sclerosis-associated ILD (SSc-ILD). Studies suggest that PPIs may improve survival and reduce disease progression in SSc-ILD"    DAPT per cardiology    Continue cymbalta and trazodone.     Monitor anemia with labs in a month  Due to bactrim?  Will monitor    Will ask Dr. Parsonf to clear patient for gym activity if appropriate.   I would advise if cleared by cardiology to increase activity to help " weight/sugar/deconditioning.     F/u 4 months.     Patient is under my ongoing care. I will continue to be the focal point for all health care services the patient needs.       Tachycardia  -     Holter monitor - 48 hour; Future    Recurrent UTI  -     Ambulatory referral/consult to Urology; Future; Expected date: 05/14/2025    Complicated UTI (urinary tract infection)  -     Ambulatory referral/consult to Urology; Future; Expected date: 05/14/2025    Hypertension associated with diabetes  -     amLODIPine (NORVASC) 5 MG tablet; Take 1 tablet (5 mg total) by mouth once daily.  Dispense: 90 tablet; Refill: 3  -     metoprolol tartrate (LOPRESSOR) 25 MG tablet; Take 1 tablet (25 mg total) by mouth 2 (two) times daily.  Dispense: 180 tablet; Refill: 3    Other hyperlipidemia  -     atorvastatin (LIPITOR) 80 MG tablet; Take 1 tablet (80 mg total) by mouth once daily.  Dispense: 90 tablet; Refill: 1  -     ezetimibe (ZETIA) 10 mg tablet; Take 1 tablet (10 mg total) by mouth once daily.  Dispense: 90 tablet; Refill: 3    Coronary artery disease of native artery of native heart with stable angina pectoris  -     atorvastatin (LIPITOR) 80 MG tablet; Take 1 tablet (80 mg total) by mouth once daily.  Dispense: 90 tablet; Refill: 1  -     ezetimibe (ZETIA) 10 mg tablet; Take 1 tablet (10 mg total) by mouth once daily.  Dispense: 90 tablet; Refill: 3    Arthritis  -     DULoxetine (CYMBALTA) 30 MG capsule; Take 1 capsule (30 mg total) by mouth once daily.  Dispense: 90 capsule; Refill: 2    BPH associated with nocturia  -     Ambulatory referral/consult to Urology; Future; Expected date: 05/14/2025  -     finasteride (PROSCAR) 5 mg tablet; Take 1 tablet (5 mg total) by mouth once daily.  Dispense: 90 tablet; Refill: 2  -     tamsulosin (FLOMAX) 0.4 mg Cap; Take 2 capsules (0.8 mg total) by mouth once daily.  Dispense: 180 capsule; Refill: 3    Other specified hypothyroidism  -     levothyroxine (SYNTHROID) 88 MCG tablet; Take 1  tablet (88 mcg total) by mouth before breakfast.  Dispense: 90 tablet; Refill: 0    Colon cancer screening  -     Fecal Immunochemical Test (iFOBT); Future; Expected date: 05/07/2025    ILD (interstitial lung disease)  -     esomeprazole (NEXIUM) 40 MG capsule; Take 1 capsule (40 mg total) by mouth daily with dinner or evening meal.  Dispense: 30 capsule; Refill: 11    Chronic cough  -     esomeprazole (NEXIUM) 40 MG capsule; Take 1 capsule (40 mg total) by mouth daily with dinner or evening meal.  Dispense: 30 capsule; Refill: 11    Insomnia, unspecified type  -     traZODone (DESYREL) 100 MG tablet; Take 1 tablet (100 mg total) by mouth every evening.  Dispense: 90 tablet; Refill: 1    Normocytic anemia  -     CBC Auto Differential; Future; Expected date: 05/07/2025  -     Iron and TIBC; Future; Expected date: 05/07/2025  -     Ferritin; Future; Expected date: 05/07/2025  -     Comprehensive Metabolic Panel; Future; Expected date: 05/07/2025    Hypocalcemia  -     Comprehensive Metabolic Panel; Future; Expected date: 05/07/2025    Diet-controlled type 2 diabetes mellitus  -     CBC Auto Differential; Future; Expected date: 05/07/2025  -     Comprehensive Metabolic Panel; Future; Expected date: 05/07/2025  -     Hemoglobin A1C; Future; Expected date: 05/07/2025  -     Ambulatory referral/consult to Optometry; Future; Expected date: 05/14/2025

## 2025-05-07 NOTE — Clinical Note
Hi,  Patient's gym is asking for a letter to say that he is cleared to go back to the gym after his heart issues. Can you write this for him?  JM

## 2025-05-08 ENCOUNTER — RESULTS FOLLOW-UP (OUTPATIENT)
Dept: FAMILY MEDICINE | Facility: CLINIC | Age: 75
End: 2025-05-08

## 2025-05-13 ENCOUNTER — TELEPHONE (OUTPATIENT)
Dept: CARDIOLOGY | Facility: CLINIC | Age: 75
End: 2025-05-13
Payer: MEDICARE

## 2025-05-13 LAB
BACTERIA BLD CULT: NORMAL
BACTERIA BLD CULT: NORMAL

## 2025-05-14 ENCOUNTER — TELEPHONE (OUTPATIENT)
Dept: FAMILY MEDICINE | Facility: CLINIC | Age: 75
End: 2025-05-14
Payer: MEDICARE

## 2025-05-14 ENCOUNTER — PATIENT MESSAGE (OUTPATIENT)
Dept: FAMILY MEDICINE | Facility: CLINIC | Age: 75
End: 2025-05-14
Payer: MEDICARE

## 2025-05-14 DIAGNOSIS — I73.9 PAD (PERIPHERAL ARTERY DISEASE): ICD-10-CM

## 2025-05-14 NOTE — TELEPHONE ENCOUNTER
Return patient phone call. Spoke to patient. Patient needed appointment with Infection Disease schedule. Gave patient number to contact office to schedule.

## 2025-05-14 NOTE — TELEPHONE ENCOUNTER
----- Message from Taina sent at 5/14/2025  3:03 PM CDT -----  Type:  Patient Returning CallWho Called:Pt Would the patient rather a call back or a response via [facility]? Call back Best Call Back Number:[phone] Information: pt ECHO was rescheduled until 06/11 but did not reschedule his appt on 05/20 and 05/22... want a call back to figure out what to do about appts

## 2025-05-14 NOTE — TELEPHONE ENCOUNTER
Wrote a letter,clearing the patient to return back to the G[facility].  Ok per .  I sent patient the Letter though the patient portal 5-.      Sincerely ,  Nw

## 2025-05-14 NOTE — TELEPHONE ENCOUNTER
---- Message -----   From: Brian Vickers MD   Sent: 5/7/2025   2:28 PM CDT   To: Dunia Corona MA; Chaitanya López DO; Mil*     Sure we can take care of it.  Thanks     GY   ----- Message -----   From: Chaitanya López DO   Sent: 5/7/2025   2:26 PM CDT   To: Brian Vickers MD     Hi,   Patient's gym is asking for a letter to say that he is cleared to go back to the gym after his heart issues. Can you write this for him?     JM

## 2025-05-15 ENCOUNTER — DOCUMENTATION ONLY (OUTPATIENT)
Dept: CARDIAC REHAB | Facility: CLINIC | Age: 75
End: 2025-05-15
Payer: MEDICARE

## 2025-05-15 RX ORDER — CLOPIDOGREL BISULFATE 75 MG/1
75 TABLET ORAL DAILY
Qty: 30 TABLET | Refills: 11 | Status: SHIPPED | OUTPATIENT
Start: 2025-05-15 | End: 2026-05-15

## 2025-05-15 NOTE — PROGRESS NOTES
Mr. Guerrero has completed 4 out of 36 exercise session of Phase II cardiac rehab.  He stopped attending classes and has not returned calls.  He was dropped from the program.    Session: Orientation     Cardiac Rehab Individual Treatment Plan - Initial Assessment      Patient Name: Cesar Simpson MRN: [id number]   : 1950   Age: 75 y.o.   Primary Diagnosis: AV REPLACEMENT  Date of Event: 24  EF: 58%  Risk Stratification: high  Referring Physician: JACKELYN   Exercise Assessment:     CPX/TM Date: 10-14-24 Results   RHR 79   Max    Peak VO2 (CPX only) 14.8   Actual METS (CPX only) 4.2   Estimated METS 9.0     Anthropometrics    Height 67 inches   Weight 176 lbs   BMI 27.6   Abdominal Girth 43.5   Body Composition 20.0%     ST Depression noted on Stress Test?:No  Angina with exercise?: No   Fall Risk: Yes   Assistive Devices:  independent   Currently exercising? No   Mr. Guerrero stated there were no limitations to exercise but during a range of motion assessment it was noted he has right shoulder limitations.  Exercise modalities will be modified to meet the patient's needs and capabilities.     Exercise Plan:   Goals:  CR Exercise Goals: Attend Cardiac Rehab 3 times/week: In Progress  Home Aerobic Exercise: 2 additional days/week for 30-60 minutes: In Progress  Intensity of 12-15 on the Rate of Perceived Exertion (RPE) scale: In Progress  30% increase in entry estimated METS: 11.7 : In Progress  5 days/week for 30-60 minutes: In Progress  Demonstrate proper pulse taking technique: In Progress    Intervention:   Discussed importance of regular attendance to cardiac rehab class    Exercise Prescription:  THR Range 100-114   Mode: Treadmill  Recumbent Bike  Upright Bike  Nustep  Elliptical   Frequency:  3 days/week   Duration:  30 - 60 minutes   Intensity:  12 - 15 RPE   Resistance Training:  Yes: 0 to 5 lb weights with 10-15 reps based on strength and range of motion assessment     Home Prescription:  Mode Aerobic    Frequency: 2- 3 days/week   Duration: 30-60 minutes   Resistance Training: None        Education:  Orientation to Equipment; verbalizes understanding; Date: 10-17-24  Exercise Recommendations; verbalizes understanding; Date: 10-17-24  Exercise Safety; verbalizes understanding; Date: 10-17-24  Class Preparation: verbalizes understanding; Date: 10-17-24  Signs and symptoms to report: verbalizes understanding; Date: 10-17-24  Caffeine/Hydration: verbalizes understanding; Date: 10-17-24  Exercise Terminology: verbalizes understanding; Date: 10-17-24  Resistance Training: verbalizes understanding; Date: 10-17-24    Comments:  I encouraged Mr. Guerrero to begin thinking about some type of aerobic exercise he can participate in at least 2 non-rehab days per week for at least 30 minutes in addition to attending Phase II cardiac rehab classes 3 days per week.  He stated understanding.    All consent forms were signed, proper attire and shoes were discussed.       Mr. Guerrero will begin Cardiac Rehab on  at 10:00 am.    The exercise prescription will be adjusted based on tolerance of exercise intensity by patient.    Cassie Kaur, CEP    Session: Orientation   Cardiac Rehab Individual Treatment Plan - Initial Assessment      Patient Name: Cesar Simpson MRN: [id number]   : 1950   Age: 75 y.o.   Date of Event: 24   Primary Diagnosis: S/P AV Replacement    EF: 58% (4/3/24)    Physical Assessment:   There were no vitals taken for this visit.    ASSESSMENT:  Heart Sounds: regular rate and rhythm, S1, S2 normal, no murmur, click, rub or gallop  Prosthetic Valve: Yes  Lung Sounds: normal air entry, lungs clear to auscultation  Capillary Refill: normal  Left Radial Pulse: Normal (+2)  Right Radial Pulse: Normal (+2)  Left Pedal Pulse: Normal (+2)  Right Pedal Pulse: Normal (+2)  Right Edema: none  Left Edema none  Strength: normal  Range of Motion: diminished range of motion Right Shoulder  Existing  Limitations:      Site   [x] Arthritis, bursitis Bilateral shoulders   [] Amputation, atrophy    [] Other:    []       Diabetic patient's foot examination comments: Normal -  Bilateral  Incisional site: healing well  Special needs: pt reports being Hard of Hearing and utilizes hearing aids which he did not wear today. Pt was instructed to wear his hearing aids when attending cardiac rehab so he could hear instructions and follow. Patient verbalized understanding.    Psychosocial Assessment:   Outcome Survey Tools:    PIPE SCORES:               SF-36             PHQ-9:      1/15/2025    10:42 AM   PHQ-9 Depression Patient Health Questionnaire   Over the last two weeks how often have you been bothered by little interest or pleasure in doing things 1   Over the last two weeks how often have you been bothered by feeling down, depressed or hopeless 1              Living Arrangements: Lives alone  Family Support: children daughter lives near patient and assists   Self Reported: Anxiety and Anger/Hostility  Displays: calmness  Medication: cymbalta daily    Psychosocial Plan:   Goals:  Improved psychosocial coping strategies  Reduce manifestation of anxiety  Reduce manifestation of anger/hostility  Maintain positive support system  Maintain positive outlook  Improve overall quality of life    Interventions/Recommendations:  Discussed Results of Surveys  Patient to Self Report Emotional Changes at Session Check In  Recommend Physical Activity  Recommend Attending Education Lectures  Notify MD: No  Program Referral: No  Pharmaceutical Intervention/Therapy: Yes  Other Needs: not applicable  Stage of Readiness to Change: Contemplation    Education:  Stress Management; verbalizes understanding; Date: 10/17/24  Stress; verbalizes understanding; Date: 10/17/24  Risk Factors; verbalizes understanding; Date: 10/17/24    Comments:  Screening tools and results discussed with patient. Patient had a TAVR scheduled and resulted in an  AV replacement with chest incision. Patient still upset over the experience which has caused him some anxiety and stress. Discussed effects of stress on cardiac health pt verbalized understandingPatient has been instructed to notify staff in the event that circumstances worsen.  Patient verbalizes understanding.    Other Core Components/Risk Factors Assessment:   RISK FACTORS:  diabetes, hyperlipidemia, hypertension, positive family history, sedentary lifestyle, PAD, Hx PTCA/Stent    Learning Barriers: Hearing Impairment, emotional    Education Level:  Attended College/Technical School    Pre-test Score: 70    Medication Compliance: has been compliant with taking medications    Other Core Components/Risk Factors Plan:   Goals:  Decrease cholesterol level: In Progress  Increase exercise tolerance: In Progress  Increase knowledge of CAD: In Progress  Decrease blood pressure: In Progress  Weight loss: In Progress  Identify and manage personal areas of stress: In Progress  Control diabetes by adjusting diet and exercise: In Progress  Learn more about healthy eating: In Progress    Interventions/Recommendations:  Recommend regular attendance for Cardiac Rehab: Exercise and Education Lectures  Encourage medication compliance  Individual Education/ Counseling: Yes  Physician Referral: No    Education:    blood pressure meds, verbalizes understanding; Date: 10/17/24  risk factors, verbalizes understanding; Date: 10/17/24  stress, verbalizes understanding; Date: 10/17/24        Education method adapted to patients education level and preferred method of learning.  Method: explanation    Comments:  Discussed risk factor modification to improve cardiac health pt verbalized understanding. Discussed medication list, dosing, side effects, pt verbalized understanding.     Other Core Components/Hypertension Assessment:   Resting BP:   BP Readings from Last 1 Encounters:   05/07/25 130/68         BP Diagnosis: Hypertensive  Patient  reported symptoms: none    Medications:  Medication Prescribed? Adherent? Exception   Beta-blocker [x]Yes  []No  []Unknown [x]Yes  []No  []Unknown    ACEI/ARB []Yes  []No  []Unknown []Yes  []No  []Unknown    Calcium Channel Blocker [x]Yes  []No  []Unknown [x]Yes  []No  []Unknown    Diuretic [x]Yes  []No  []Unknown [x]Yes  []No  []Unknown        Other Core Components/Hypertension Plan:   Goals:  Blood Pressure <130/80    Interventions/Recommendations:  Med Card Reconciled: Yes  Encourage medication compliance  Encourage sodium reduction  Reduce alcohol consumption  Encourage weight loss  Recommend physical activity  Educate on contributory factors  Reduce stress, anxiety, anger, depression, and/or chronic pain  Encourage home blood pressure monitoring  Recommend daily weights    Education:    Risk Factors; verbalizes understanding; Date: 10/17/24  Stress; verbalizes understanding; Date: 10/17/24        Comments:  Patient reports he is participating in the digital hypertension program.       Does the patient have Heart Failure? No    Other Core Components/Tobacco Cessation Assessment:   Smoking Status: Lifetime Non-smoker  Primary Tobacco Type: N/A  Tobacco Usage: no  Smoking Cessation Barriers: NA  Stage of Readiness to Change: Maintenance    Other Core Components/Tobacco Cessation Plan:   Goals:  Maintain non-smoking status    Interventions:  Maintains non-smoking status    Education:    Risk Factors; verbalizes understanding; Date: 10/17/24  Benefits of Cardiac Rehab; verbalizes understanding; Date: 10/17/24        Comments:  Discussed screening tools and results, pt admits to anxiety and hostility from his exerience with his surgery. He is currently enrolled in digital hypertension and diabetic program. Pt reports he is no longer on any diabetes medications and does not monitor his glucose. DIscussed stress management techniques pt verbalized understanding. Pt instructed to wear his hearing aids to all classes as  pt was noted to had difficulty hearing/understanding converstation. Discussed Cardiac Rehab program in depth with patient.  Medication list updated per patient & marked as reviewed.  Patient has been instructed to notify staff of any problems while attending rehab (ie: chest pain, shortness of breath, lightheadedness, dizziness).  Patient has been instructed to monitor blood pressure readings outside of rehab & to keep a daily log of the readings.  Patient verbalizes understanding.    Joseph Man RN  Cardiac Rehab Nurse

## 2025-05-20 ENCOUNTER — HOSPITAL ENCOUNTER (OUTPATIENT)
Dept: CARDIOLOGY | Facility: HOSPITAL | Age: 75
Discharge: HOME | End: 2025-05-20
Attending: FAMILY MEDICINE
Payer: MEDICARE

## 2025-05-20 DIAGNOSIS — R00.0 TACHYCARDIA: ICD-10-CM

## 2025-05-20 PROCEDURE — 93227 XTRNL ECG REC<48 HR R&I: CPT | Mod: HCNC,,, | Performed by: STUDENT IN AN ORGANIZED HEALTH CARE EDUCATION/TRAINING PROGRAM

## 2025-05-20 PROCEDURE — 93225 XTRNL ECG REC<48 HRS REC: CPT | Mod: HCNC,PO

## 2025-05-22 ENCOUNTER — OFFICE VISIT (OUTPATIENT)
Dept: INFECTIOUS DISEASES | Facility: CLINIC | Age: 75
End: 2025-05-22
Payer: MEDICARE

## 2025-05-22 VITALS
HEIGHT: 67 IN | TEMPERATURE: 98 F | HEART RATE: 103 BPM | SYSTOLIC BLOOD PRESSURE: 149 MMHG | WEIGHT: 184.5 LBS | DIASTOLIC BLOOD PRESSURE: 80 MMHG | BODY MASS INDEX: 28.96 KG/M2

## 2025-05-22 DIAGNOSIS — Z98.890 S/P AORTIC DISSECTION REPAIR: ICD-10-CM

## 2025-05-22 DIAGNOSIS — N39.0 COMPLICATED UTI (URINARY TRACT INFECTION): Primary | ICD-10-CM

## 2025-05-22 DIAGNOSIS — Z95.2 S/P AVR (AORTIC VALVE REPLACEMENT) AND AORTOPLASTY: ICD-10-CM

## 2025-05-22 DIAGNOSIS — Z22.322 MRSA CARRIER: ICD-10-CM

## 2025-05-22 PROCEDURE — 99999 PR PBB SHADOW E&M-EST. PATIENT-LVL IV: CPT | Mod: PBBFAC,HCNC,, | Performed by: INTERNAL MEDICINE

## 2025-05-22 NOTE — PROGRESS NOTES
Subjective:      Chief Complaint:    History of Present Illness  75M with history of CAD, HTN, HLD, BPH, ILD, severe aortic stenosis s/p TAVR c/b acute Juve A aortic dissection s/p aortic valve replacement with hemiarch replacement with 26 mm gelweave dacron tube graft, presents for evaluation of MRSA bacteriuria.    Patient reports he did not have any symptoms when urinalysis and urine cultures were obtained 1/2025 and 5/2025. Patient reports he at times has foamy urine and urinary urgency. Denied having any pain with urination, denied hematuria. Patient also reports being on a lot of medications which cause him to urinate a lot including lasix.    Currently he is on finasteride and tamsulosin for BPH. Reports having a history of kidney stones when he was younger along with recurrent UTI.    Patient denied having any fevers or chills. No chest pain. Chronic cough in setting of known ILD.    Patient reports overall feeing a lot weaker since his surgery 8/2025, he feels tired all the time and he has poor exercise tolerance.    Review of Systems   Constitutional:  Positive for malaise/fatigue. Negative for chills, diaphoresis, fever and weight loss.   HENT:  Negative for congestion, sinus pain and sore throat.    Eyes:  Negative for photophobia and pain.   Respiratory:  Negative for cough, sputum production and shortness of breath.    Cardiovascular:  Negative for chest pain and leg swelling.   Gastrointestinal:  Negative for abdominal pain, diarrhea, nausea and vomiting.   Genitourinary:  Negative for dysuria and hematuria.   Musculoskeletal:  Negative for joint pain.   Skin:  Negative for rash.   Neurological:  Negative for focal weakness and headaches.   Psychiatric/Behavioral:  Negative for depression. The patient is not nervous/anxious.          Objective:   Physical Exam  Vitals reviewed.   Constitutional:       General: He is not in acute distress.     Appearance: He is well-developed. He is not  diaphoretic.   HENT:      Head: Normocephalic and atraumatic.      Nose: Nose normal.   Eyes:      Conjunctiva/sclera: Conjunctivae normal.   Cardiovascular:      Rate and Rhythm: Normal rate.      Heart sounds: Murmur heard.   Pulmonary:      Effort: Pulmonary effort is normal. No respiratory distress.   Abdominal:      General: Abdomen is flat. There is no distension.   Musculoskeletal:      Cervical back: Normal range of motion.      Right lower leg: No edema.      Left lower leg: No edema.   Skin:     General: Skin is warm and dry.      Findings: No erythema or rash.   Neurological:      Mental Status: He is alert.   Psychiatric:         Behavior: Behavior normal.           Significant results reviewed:    Sodium   Date Value Ref Range Status   05/05/2025 140 136 - 145 mmol/L Final   01/13/2025 139 136 - 145 mmol/L Final   09/03/2024 134 (L) 136 - 145 mmol/L Final   09/02/2024 135 (L) 136 - 145 mmol/L Final      Potassium   Date Value Ref Range Status   05/05/2025 3.7 3.5 - 5.1 mmol/L Final   01/13/2025 4.0 3.5 - 5.1 mmol/L Final   09/03/2024 4.1 3.5 - 5.1 mmol/L Final   09/02/2024 3.8 3.5 - 5.1 mmol/L Final      Chloride   Date Value Ref Range Status   05/05/2025 104 95 - 110 mmol/L Final   01/13/2025 102 95 - 110 mmol/L Final   09/03/2024 98 95 - 110 mmol/L Final   09/02/2024 96 95 - 110 mmol/L Final      CO2   Date Value Ref Range Status   05/05/2025 27 23 - 29 mmol/L Final   01/13/2025 25 23 - 29 mmol/L Final   09/03/2024 24 23 - 29 mmol/L Final   09/02/2024 28 23 - 29 mmol/L Final      BUN   Date Value Ref Range Status   05/05/2025 17 8 - 23 mg/dL Final   01/13/2025 26 (H) 8 - 23 mg/dL Final   09/03/2024 25 (H) 8 - 23 mg/dL Final   09/02/2024 25 (H) 8 - 23 mg/dL Final      Creatinine   Date Value Ref Range Status   05/05/2025 0.9 0.5 - 1.4 mg/dL Final   01/13/2025 1.1 0.5 - 1.4 mg/dL Final   09/03/2024 1.0 0.5 - 1.4 mg/dL Final   09/02/2024 1.0 0.5 - 1.4 mg/dL Final      Glucose   Date Value Ref Range  Status   05/05/2025 131 (H) 70 - 110 mg/dL Final   01/13/2025 112 (H) 70 - 110 mg/dL Final   09/03/2024 115 (H) 70 - 110 mg/dL Final   09/02/2024 119 (H) 70 - 110 mg/dL Final       ALT   Date Value Ref Range Status   05/05/2025 19 10 - 44 unit/L Final   01/13/2025 25 10 - 44 U/L Final   09/03/2024 23 10 - 44 U/L Final   09/02/2024 14 10 - 44 U/L Final      AST   Date Value Ref Range Status   05/05/2025 19 11 - 45 unit/L Final   01/13/2025 18 10 - 40 U/L Final   09/03/2024 38 10 - 40 U/L Final   09/02/2024 25 10 - 40 U/L Final      Total Bilirubin   Date Value Ref Range Status   01/13/2025 0.5 0.1 - 1.0 mg/dL Final     Comment:     For infants and newborns, interpretation of results should be based  on gestational age, weight and in agreement with clinical  observations.    Premature Infant recommended reference ranges:  Up to 24 hours.............<8.0 mg/dL  Up to 48 hours............<12.0 mg/dL  3-5 days..................<15.0 mg/dL  6-29 days.................<15.0 mg/dL     09/03/2024 1.0 0.1 - 1.0 mg/dL Final     Comment:     For infants and newborns, interpretation of results should be based  on gestational age, weight and in agreement with clinical  observations.    Premature Infant recommended reference ranges:  Up to 24 hours.............<8.0 mg/dL  Up to 48 hours............<12.0 mg/dL  3-5 days..................<15.0 mg/dL  6-29 days.................<15.0 mg/dL     09/02/2024 1.1 (H) 0.1 - 1.0 mg/dL Final     Comment:     For infants and newborns, interpretation of results should be based  on gestational age, weight and in agreement with clinical  observations.    Premature Infant recommended reference ranges:  Up to 24 hours.............<8.0 mg/dL  Up to 48 hours............<12.0 mg/dL  3-5 days..................<15.0 mg/dL  6-29 days.................<15.0 mg/dL       Bilirubin Total   Date Value Ref Range Status   05/05/2025 0.4 0.1 - 1.0 mg/dL Final     Comment:     For infants and newborns,  interpretation of results should be based   on gestational age, weight and in agreement with clinical   observations.    Premature Infant recommended reference ranges:   0-24 hours:  <8.0 mg/dL   24-48 hours: <12.0 mg/dL   3-5 days:    <15.0 mg/dL   6-29 days:   <15.0 mg/dL      Albumin   Date Value Ref Range Status   05/05/2025 3.5 3.5 - 5.2 g/dL Final   01/13/2025 4.0 3.5 - 5.2 g/dL Final   09/03/2024 2.7 (L) 3.5 - 5.2 g/dL Final   09/02/2024 2.7 (L) 3.5 - 5.2 g/dL Final      Protein Total   Date Value Ref Range Status   05/05/2025 6.9 6.0 - 8.4 gm/dL Final     Total Protein   Date Value Ref Range Status   01/13/2025 7.6 6.0 - 8.4 g/dL Final   09/03/2024 6.3 6.0 - 8.4 g/dL Final   09/02/2024 6.2 6.0 - 8.4 g/dL Final      Alkaline Phosphatase   Date Value Ref Range Status   01/13/2025 62 40 - 150 U/L Final   09/03/2024 57 55 - 135 U/L Final   09/02/2024 55 55 - 135 U/L Final     ALP   Date Value Ref Range Status   05/05/2025 78 40 - 150 unit/L Final        WBC   Date Value Ref Range Status   05/05/2025 7.55 3.90 - 12.70 K/uL Final   01/13/2025 5.29 3.90 - 12.70 K/uL Final   10/14/2024 4.29 3.90 - 12.70 K/uL Final   09/03/2024 9.26 3.90 - 12.70 K/uL Final      Hemoglobin   Date Value Ref Range Status   01/13/2025 13.8 (L) 14.0 - 18.0 g/dL Final   10/14/2024 12.3 (L) 14.0 - 18.0 g/dL Final   09/03/2024 11.2 (L) 14.0 - 18.0 g/dL Final     HGB   Date Value Ref Range Status   05/05/2025 12.6 (L) 14.0 - 18.0 gm/dL Final      POC Hematocrit   Date Value Ref Range Status   08/28/2024 22 (L) 36 - 54 %PCV Final   08/27/2024 19 (LL) 36 - 54 %PCV Final   08/27/2024 21 (L) 36 - 54 %PCV Final     Hematocrit   Date Value Ref Range Status   01/13/2025 42.4 40.0 - 54.0 % Final   10/14/2024 37.6 (L) 40.0 - 54.0 % Final   09/03/2024 34.7 (L) 40.0 - 54.0 % Final     HCT   Date Value Ref Range Status   05/05/2025 38.5 (L) 40.0 - 54.0 % Final      Platelet Count   Date Value Ref Range Status   05/05/2025 174 150 - 450 K/uL Final      Platelets   Date Value Ref Range Status   01/13/2025 160 150 - 450 K/uL Final   10/14/2024 189 150 - 450 K/uL Final   09/03/2024 173 150 - 450 K/uL Final       Urine culture 5/2025  Susceptibility     Methicillin resistant Staphylococcus aureus     PRABHA     Clindamycin <=0.5 µg/ml Sensitive     Daptomycin <=0.5 µg/ml Sensitive     Linezolid <=1 µg/ml Sensitive     Oxacillin 2 µg/ml Resistant     Penicillin >8 µg/ml Resistant     Trimeth/Sulfa <=0.5/9.5 µ... Sensitive     Vancomycin 1 µg/ml Sensitive        CTA CAP 3/5/2025  Status post ascending aorta repair and aortic valve replacement without evidence of new dissection or endovascular leak.  Similar-appearing subcentimeter out pouching of the ascending aorta.     Pulmonary findings consistent with UIP pattern and pulmonary fibrosis, mild progression when compared to CT of the chest 12/08/2024 on this nondedicated study.  5 mm left lower lobe nodule series 2, image 203 similar to December 18, 2024.     Interval resolution of the right femoral pseudoaneurysm.     Nonspecific solitary right hepatic lobe lesion that is unchanged when compared to CT on 08/27/2024.     Innumerable simple hepatic cysts.     Bilateral adrenal myelolipomas.     Left simple renal cyst.       TTE 4/2025    Left Ventricle: The left ventricle is normal in size. Normal wall thickness. There is normal systolic function. Ejection fraction is approximately 55%. Grade II diastolic dysfunction.    Right Ventricle: The right ventricle is normal in size. Wall thickness is normal. Systolic function is normal.    Left Atrium: Mildly dilated.    Right Atrium: Right atrium is dilated.    Aortic Valve: There is a bioprosthetic valve in the aortic position. It is reported to be a 23mm [employer]onic Avalus valve. Aortic valve area by VTI is 1.7 cm². Aortic valve peak velocity is 2.3 m/s. Mean gradient is 11 mmHg. The dimensionless index is 0.41. There is mild to moderate aortic regurgitation.    Mitral Valve:  There is severe posterior mitral annular calcification. Mildly restricted motion. There is mild stenosis. The mean pressure gradient across the mitral valve is 2 mmHg at a heart rate of 59 bpm. There is mild regurgitation.    Tricuspid Valve: There is mild regurgitation with a centrally directed jet.    Aorta: Homograft present.    Pulmonary Artery: The estimated pulmonary artery systolic pressure is 26 mmHg.    IVC/SVC: Normal venous pressure at 3 mmHg.          Assessment:   75M with history of CAD, HTN, HLD, BPH, ILD, severe aortic stenosis s/p TAVR c/b acute Juve A aortic dissection s/p aortic valve replacement with hemiarch replacement with 26 mm gelweave dacron tube graft, presents for evaluation of MRSA bacteriuria.    Per patient, he did not have any symptoms when urinalysis and urine cultures were obtained.     Discussed with patient at length that MRSA is usually not a typical urinary pathogen and usually comes from the bloodstream. Patient without any systemic signs or symptoms of infection, blood cultures have been negative.     Unclear etiology of MRSA bacteriuria, but reassuring that blood cultures are negative. Most recent TTE 4/2025 with no acute findings and CTA 3/2025 with no evidence of infection.    Plan:   - Follow-up TTE  - Appreciate urology evaluation  - Patient advised to present to ED immediately if he develops any fevers and chills for evaluation for bacteremia      30 minutes of total time spent on the encounter, which includes face to face time and non-face to face time preparing to see the patient (eg, review of tests), Obtaining and/or reviewing separately obtained history, Documenting clinical information in the electronic or other health record, Independently interpreting results (not separately reported) and communicating results to the patient/family/caregiver, or Care coordination (not separately reported).     This patient's visit complexity is inherent to evaluation and  management associated with medical care services that are part of ongoing care related to this patient's single, serious condition or complex condition.       Rossana Sosa MD MPH  [facility] - Infectious Disease

## 2025-05-23 LAB
OHS CV EVENT MONITOR DAY: 0
OHS CV HOLTER LENGTH DECIMAL HOURS: 47.98
OHS CV HOLTER LENGTH HOURS: 47
OHS CV HOLTER LENGTH MINUTES: 59
OHS CV HOLTER SINUS AVERAGE HR: 83
OHS CV HOLTER SINUS MAX HR: 140
OHS CV HOLTER SINUS MIN HR: 52

## 2025-05-25 ENCOUNTER — RESULTS FOLLOW-UP (OUTPATIENT)
Dept: FAMILY MEDICINE | Facility: CLINIC | Age: 75
End: 2025-05-25

## 2025-05-29 ENCOUNTER — OFFICE VISIT (OUTPATIENT)
Dept: UROLOGY | Facility: CLINIC | Age: 75
End: 2025-05-29
Payer: MEDICARE

## 2025-05-29 VITALS
SYSTOLIC BLOOD PRESSURE: 126 MMHG | DIASTOLIC BLOOD PRESSURE: 73 MMHG | WEIGHT: 188.25 LBS | HEART RATE: 73 BPM | BODY MASS INDEX: 29.49 KG/M2

## 2025-05-29 DIAGNOSIS — N39.0 RECURRENT UTI: ICD-10-CM

## 2025-05-29 DIAGNOSIS — N39.0 COMPLICATED UTI (URINARY TRACT INFECTION): ICD-10-CM

## 2025-05-29 DIAGNOSIS — N40.1 BPH ASSOCIATED WITH NOCTURIA: ICD-10-CM

## 2025-05-29 DIAGNOSIS — R35.1 BPH ASSOCIATED WITH NOCTURIA: ICD-10-CM

## 2025-05-29 LAB — POC RESIDUAL URINE VOLUME: 188 ML (ref 0–100)

## 2025-05-29 PROCEDURE — 3072F LOW RISK FOR RETINOPATHY: CPT | Mod: CPTII,S$GLB,, | Performed by: STUDENT IN AN ORGANIZED HEALTH CARE EDUCATION/TRAINING PROGRAM

## 2025-05-29 PROCEDURE — 3078F DIAST BP <80 MM HG: CPT | Mod: CPTII,S$GLB,, | Performed by: STUDENT IN AN ORGANIZED HEALTH CARE EDUCATION/TRAINING PROGRAM

## 2025-05-29 PROCEDURE — 51798 US URINE CAPACITY MEASURE: CPT | Mod: S$GLB,,, | Performed by: STUDENT IN AN ORGANIZED HEALTH CARE EDUCATION/TRAINING PROGRAM

## 2025-05-29 PROCEDURE — 99999 PR PBB SHADOW E&M-EST. PATIENT-LVL III: CPT | Mod: PBBFAC,,, | Performed by: STUDENT IN AN ORGANIZED HEALTH CARE EDUCATION/TRAINING PROGRAM

## 2025-05-29 PROCEDURE — 3008F BODY MASS INDEX DOCD: CPT | Mod: CPTII,S$GLB,, | Performed by: STUDENT IN AN ORGANIZED HEALTH CARE EDUCATION/TRAINING PROGRAM

## 2025-05-29 PROCEDURE — 3074F SYST BP LT 130 MM HG: CPT | Mod: CPTII,S$GLB,, | Performed by: STUDENT IN AN ORGANIZED HEALTH CARE EDUCATION/TRAINING PROGRAM

## 2025-05-29 PROCEDURE — 1101F PT FALLS ASSESS-DOCD LE1/YR: CPT | Mod: CPTII,S$GLB,, | Performed by: STUDENT IN AN ORGANIZED HEALTH CARE EDUCATION/TRAINING PROGRAM

## 2025-05-29 PROCEDURE — 1157F ADVNC CARE PLAN IN RCRD: CPT | Mod: CPTII,S$GLB,, | Performed by: STUDENT IN AN ORGANIZED HEALTH CARE EDUCATION/TRAINING PROGRAM

## 2025-05-29 PROCEDURE — 3288F FALL RISK ASSESSMENT DOCD: CPT | Mod: CPTII,S$GLB,, | Performed by: STUDENT IN AN ORGANIZED HEALTH CARE EDUCATION/TRAINING PROGRAM

## 2025-05-29 PROCEDURE — 1159F MED LIST DOCD IN RCRD: CPT | Mod: CPTII,S$GLB,, | Performed by: STUDENT IN AN ORGANIZED HEALTH CARE EDUCATION/TRAINING PROGRAM

## 2025-05-29 PROCEDURE — 3044F HG A1C LEVEL LT 7.0%: CPT | Mod: CPTII,S$GLB,, | Performed by: STUDENT IN AN ORGANIZED HEALTH CARE EDUCATION/TRAINING PROGRAM

## 2025-05-29 PROCEDURE — 99204 OFFICE O/P NEW MOD 45 MIN: CPT | Mod: S$GLB,,, | Performed by: STUDENT IN AN ORGANIZED HEALTH CARE EDUCATION/TRAINING PROGRAM

## 2025-05-29 NOTE — PROGRESS NOTES
It was great to see Cesar today.    Cesar Simpson is a pleasant 75 y.o. male presenting for management of recurrent UTI, BPH/LUTS and ED.     History of Present Illness     - Patient has a history of two UTIs with MRSA, an uncommon finding in urine. The first UTI was detected in January 2025 through a urinalysis and culture, showing no signs of contamination. The second UTI was identified in May 2025, but the urinalysis showed the presence of skin cells, suggesting potential contamination. Patient reports no UTI symptoms such as burning or frequent urination when those cultures were taken. He monitors his urine regularly and occasionally notices foam, prompting increased water intake or consumption of cranberry juice.   - Patient reports urinary symptoms since his late 20s or 30s, with burning urination at that time, especially following a viral illness.   - Patient has a history of TAVR (Transcatheter Aortic Valve Replacement) for his aortic valve.   - He also has a penile prosthesis implanted a few years ago by Dr. Dupont, which is currently not functioning properly due to a leak in the system. The release mechanism on the pump became difficult to operate before ceasing to function entirely. He has been ambulating with a partially inflated prosthesis for some time.   - Patient denies burning urination, frequent urination, blood in the urine, or any other typical UTI symptoms during his recent infections. Patient denies having diabetes.    MEDICAL HISTORY:  - MRSA urinary tract infections: January 2025 and May 2025  - Aortic valve issue: Requiring TAVR (Transcatheter Aortic Valve Replacement)  - BPH: Prostate slightly enlarged (47 grams)  - Prediabetes: Recent A1C of 5.9  - Recurrent urinary tract infections: Since late 20s/30s    SURGICAL HISTORY:  - TAVR (Transcatheter Aortic Valve Replacement): Date not specified, for aortic valve issue  - Penile prosthesis implantation: A few years ago, performed by   [facility]. Currently non-functional due to fluid leak in the system.           Past Medical History:   Diagnosis Date    Coronary artery disease of native artery of native heart with stable angina pectoris 4/10/2024    Diabetes mellitus type II, uncontrolled 02/27/2013    High-density lipoprotein deficiency 02/27/2013    HTN (hypertension) 02/27/2013    Hyperlipidemia     Hypothyroidism     Moderate obstructive sleep apnea 6/29/2022    Normocytic anemia 06/28/2021    Obesity     Osteoarthritis 02/08/2016    PAD (peripheral artery disease) 4/10/2024    Trouble in sleeping     Type 2 diabetes mellitus     Type 2 diabetes mellitus with diabetic polyneuropathy, without long-term current use of insulin       Past Surgical History:   Procedure Laterality Date    AORTIC VALVE REPLACEMENT  8/27/2024    Procedure: REPLACEMENT, AORTIC VALVE;  Surgeon: London Guillen MD;  [facility];  Service: Cardiovascular;;    CARDIAC CATH COSURGEON N/A 8/27/2024    Procedure: Cardiac Cath Cosurgeon;  Surgeon: Melvin Carroll MD;  Location: [facility] CATH LAB;  Service: Cardiology;  Laterality: N/A;    CATARACT EXTRACTION      CATHETERIZATION OF BOTH LEFT AND RIGHT HEART N/A 4/16/2024    Procedure: CATHETERIZATION, HEART, BOTH LEFT AND RIGHT;  Surgeon: Brian Vickers MD;  Location: [facility] CATH LAB/EP;  Service: Cardiology;  Laterality: N/A;  Aortic valve study/ Saul Catheter/2 manifolds    CORONARY ANGIOGRAPHY N/A 4/16/2024    Procedure: ANGIOGRAM, CORONARY ARTERY;  Surgeon: Brian Vickers MD;  Location: [facility] CATH LAB/EP;  Service: Cardiology;  Laterality: N/A;    CORONARY ANGIOPLASTY WITH STENT PLACEMENT N/A 4/23/2024    Procedure: ANGIOPLASTY, CORONARY ARTERY, WITH STENT INSERTION;  Surgeon: Brian Vickers MD;  Location: [facility] CATH LAB/EP;  Service: Cardiology;  Laterality: N/A;    EXPLANT N/A 8/27/2024    Procedure: EXPLANT; TAVR;  Surgeon: London Guillen MD;  Location: [facility] OR [address] [place] [address];  Service: Cardiovascular;   Laterality: N/A;    EYE SURGERY      cataracts    INSTANTANEOUS WAVE-FREE RATIO (IFR) N/A 4/16/2024    Procedure: Instantaneous Wave-Free Ratio (IFR);  Surgeon: Brian Vickers MD;  Location: [facility] CATH LAB/EP;  Service: Cardiology;  Laterality: N/A;    IVUS, CORONARY  4/23/2024    Procedure: IVUS, Coronary;  Surgeon: Brian Vickers MD;  Location: [facility] CATH LAB/EP;  Service: Cardiology;;    JOINT REPLACEMENT Left     arthroplasty    PERCUTANEOUS CORONARY INTERVENTION, ARTERY N/A 4/23/2024    Procedure: Percutaneous coronary intervention;  Surgeon: Brian Vickers MD;  Location: [facility] CATH LAB/EP;  Service: Cardiology;  Laterality: N/A;  LAD    REPLACEMENT OF ASCENDING AORTA N/A 8/27/2024    Procedure: REPLACEMENT, AORTA, ASCENDING; HEMIARCH;  Surgeon: London Guillen MD;  Location: [facility] OR [address] [place] [address];  Service: Cardiovascular;  Laterality: N/A;    SHOULDER SURGERY      TONSILLECTOMY      TOTAL SHOULDER ARTHROPLASTY Left     TRANSCATHETER AORTIC VALVE REPLACEMENT (TAVR) N/A 8/27/2024    Procedure: REPLACEMENT, AORTIC VALVE, TRANSCATHETER (TAVR);  Surgeon: Cesar Louis MD;  Location: [facility] CATH LAB;  Service: Cardiology;  Laterality: N/A;       ROS: as per HPI    Tobacco Use History[1]     Physical Exam     General: No acute distress. Nontoxic appearing.  HENT: Normocephalic. Atraumatic.  Respiratory: Normal respiratory effort. No conversational dyspnea. No audible wheezing.  Abdomen: No obvious distension.  Skin: No visible abnormalities.  Extremities: No edema upper extremities. No edema lower extremities.  Neurological: Alert and oriented x3. Normal speech.  Psychiatric: Normal mood. Normal affect. No evidence of SI.   : circumcised phallus. IPP 2/3rds inflated. I was able to deflate it, but the IPP does not cycle. It is a coloplast IPP and may have been placed infrapubic based on his scan.  Patent meatus without discharge. B/L testicles in scrotum, without masses or tenderness. No palpable hernias.        Data review  Prior internal/external notes have been reviewed: Yes  Care Everywhere reviewed for external records:  Yes    Pertinent labs and imaging independently reviewed include:   Lab Results   Component Value Date     05/05/2025    K 3.7 05/05/2025    CREATININE 0.9 05/05/2025    EGFRNORACEVR >60 05/05/2025     Lab Results   Component Value Date    HGBA1C 5.9 (H) 05/05/2025      Lab Results   Component Value Date    PSA 0.40 01/13/2025    PSA 0.43 12/23/2020    PSA 1.1 11/24/2017     IMAGING:  - CT angiogram chest/abdomen/pelvis: Recent, showed prostate size of 47 g, penile prosthesis reservoir appeared empty    TEST RESULTS:  - Urinalysis: January 2025, showed MRSA UTI, no signs of contamination  - Urinalysis and culture: May 2025, showed MRSA, presence of skin cells indicating potential contamination  - A1C: 3 weeks ago, 5.9 (prediabetes range)  - PSA: 4 months ago, 0.4     Bladder scan 188cc prior to voiding 125cc.    Problems addressed during today's encounter  1. Recurrent UTI  -     Ambulatory referral/consult to Urology  -     Cystoscopy; Future    2. Complicated UTI (urinary tract infection)  -     Ambulatory referral/consult to Urology  -     Cystoscopy; Future    3. BPH associated with nocturia  -     Ambulatory referral/consult to Urology  -     POCT Bladder Scan  -     Cystoscopy; Future         Plan for problems listed above includes:   Assessment & Plan    RECURRENT MRSA URINARY TRACT INFECTIONS:   Evaluated for recurrent MRSA UTIs, noting 2 positive cultures in January and May 2025.   Considered potential causes for recurrent UTIs, including incomplete bladder emptying, stones, or prostate obstruction.   Ordered cystoscopy to examine bladder with urine culture to be checked at time of procedure.   Patient to stay hydrated.   Assessed bladder function: post-void residual of 63 mL after voiding 125 mL, considered acceptable for age.    BENIGN PROSTATIC HYPERPLASIA:   Reviewed CT  angiogram showing prostate size of 47 g, slightly enlarged but not significantly concerning.   Considered prostate surgery options if urinary symptoms worsen, favoring tissue removal over UroLift procedure that he enquired about.  -Continue Flomax and proscar    PENILE PROSTHESIS COMPLICATION:   Examined non-functional penile prosthesis, noting partial inflation and system leak.   Discussed potential for prosthesis replacement in the future, pending resolution of infection concerns.     FOLLOW-UP:   Follow up for cystoscopy procedure.         Risk for nontreatment of mentioned condition(s) includes, but is not limited to:   Continuation or worsening of the current condition(s)  Infection/sepsis  Impaired sexual function    Further evaluation ordered today:   Voiding studies (e.g. Uroflow, PVRs, UDS)    This note was generated with the assistance of ambient listening technology. Verbal consent was obtained by the patient and accompanying visitor(s) for the recording of patient appointment to facilitate this note. I attest to having reviewed and edited the generated note for accuracy, though some syntax or spelling errors may persist. Please contact the author of this note for any clarification.     Jose Roth MD             [1]   Social History  Tobacco Use   Smoking Status Never    Passive exposure: Never   Smokeless Tobacco Never

## 2025-06-02 ENCOUNTER — LAB VISIT (OUTPATIENT)
Dept: LAB | Facility: HOSPITAL | Age: 75
End: 2025-06-02
Attending: FAMILY MEDICINE
Payer: MEDICARE

## 2025-06-02 DIAGNOSIS — Z12.11 COLON CANCER SCREENING: ICD-10-CM

## 2025-06-02 PROCEDURE — 82274 ASSAY TEST FOR BLOOD FECAL: CPT | Mod: HCNC

## 2025-06-03 LAB — OB PNL STL IA: NEGATIVE

## 2025-06-05 ENCOUNTER — DOCUMENTATION ONLY (OUTPATIENT)
Dept: CARDIAC REHAB | Facility: CLINIC | Age: 75
End: 2025-06-05
Payer: MEDICARE

## 2025-06-09 ENCOUNTER — LAB VISIT (OUTPATIENT)
Dept: LAB | Facility: HOSPITAL | Age: 75
End: 2025-06-09
Attending: FAMILY MEDICINE
Payer: MEDICARE

## 2025-06-09 DIAGNOSIS — D64.9 NORMOCYTIC ANEMIA: ICD-10-CM

## 2025-06-09 LAB
ABSOLUTE EOSINOPHIL (OHS): 0.33 K/UL
ABSOLUTE MONOCYTE (OHS): 0.64 K/UL (ref 0.3–1)
ABSOLUTE NEUTROPHIL COUNT (OHS): 4.48 K/UL (ref 1.8–7.7)
ALBUMIN SERPL BCP-MCNC: 4 G/DL (ref 3.5–5.2)
ALP SERPL-CCNC: 69 UNIT/L (ref 40–150)
ALT SERPL W/O P-5'-P-CCNC: 35 UNIT/L (ref 10–44)
ANION GAP (OHS): 11 MMOL/L (ref 8–16)
AST SERPL-CCNC: 23 UNIT/L (ref 11–45)
BASOPHILS # BLD AUTO: 0.09 K/UL
BASOPHILS NFR BLD AUTO: 1.3 %
BILIRUB SERPL-MCNC: 0.4 MG/DL (ref 0.1–1)
BUN SERPL-MCNC: 19 MG/DL (ref 8–23)
CALCIUM SERPL-MCNC: 9 MG/DL (ref 8.7–10.5)
CHLORIDE SERPL-SCNC: 102 MMOL/L (ref 95–110)
CO2 SERPL-SCNC: 26 MMOL/L (ref 23–29)
CREAT SERPL-MCNC: 1 MG/DL (ref 0.5–1.4)
ERYTHROCYTE [DISTWIDTH] IN BLOOD BY AUTOMATED COUNT: 12.8 % (ref 11.5–14.5)
FERRITIN SERPL-MCNC: 52 NG/ML (ref 20–300)
GFR SERPLBLD CREATININE-BSD FMLA CKD-EPI: >60 ML/MIN/1.73/M2
GLUCOSE SERPL-MCNC: 135 MG/DL (ref 70–110)
HCT VFR BLD AUTO: 40 % (ref 40–54)
HGB BLD-MCNC: 13.3 GM/DL (ref 14–18)
IMM GRANULOCYTES # BLD AUTO: 0.1 K/UL (ref 0–0.04)
IMM GRANULOCYTES NFR BLD AUTO: 1.4 % (ref 0–0.5)
IRON SATN MFR SERPL: 18 % (ref 20–50)
IRON SERPL-MCNC: 69 UG/DL (ref 45–160)
LYMPHOCYTES # BLD AUTO: 1.46 K/UL (ref 1–4.8)
MCH RBC QN AUTO: 30.7 PG (ref 27–31)
MCHC RBC AUTO-ENTMCNC: 33.3 G/DL (ref 32–36)
MCV RBC AUTO: 92 FL (ref 82–98)
NUCLEATED RBC (/100WBC) (OHS): 0 /100 WBC
PLATELET # BLD AUTO: 177 K/UL (ref 150–450)
PMV BLD AUTO: 10.4 FL (ref 9.2–12.9)
POTASSIUM SERPL-SCNC: 3.9 MMOL/L (ref 3.5–5.1)
PROT SERPL-MCNC: 7.1 GM/DL (ref 6–8.4)
RBC # BLD AUTO: 4.33 M/UL (ref 4.6–6.2)
RELATIVE EOSINOPHIL (OHS): 4.6 %
RELATIVE LYMPHOCYTE (OHS): 20.6 % (ref 18–48)
RELATIVE MONOCYTE (OHS): 9 % (ref 4–15)
RELATIVE NEUTROPHIL (OHS): 63.1 % (ref 38–73)
SODIUM SERPL-SCNC: 139 MMOL/L (ref 136–145)
TIBC SERPL-MCNC: 388 UG/DL (ref 250–450)
TRANSFERRIN SERPL-MCNC: 262 MG/DL (ref 200–375)
WBC # BLD AUTO: 7.1 K/UL (ref 3.9–12.7)

## 2025-06-09 PROCEDURE — 84466 ASSAY OF TRANSFERRIN: CPT | Mod: HCNC

## 2025-06-09 PROCEDURE — 85025 COMPLETE CBC W/AUTO DIFF WBC: CPT | Mod: HCNC

## 2025-06-09 PROCEDURE — 80053 COMPREHEN METABOLIC PANEL: CPT | Mod: HCNC

## 2025-06-09 PROCEDURE — 82728 ASSAY OF FERRITIN: CPT | Mod: HCNC

## 2025-06-09 PROCEDURE — 36415 COLL VENOUS BLD VENIPUNCTURE: CPT | Mod: HCNC,PO

## 2025-06-11 ENCOUNTER — HOSPITAL ENCOUNTER (OUTPATIENT)
Dept: CARDIOLOGY | Facility: HOSPITAL | Age: 75
Discharge: HOME | End: 2025-06-11
Attending: FAMILY MEDICINE
Payer: MEDICARE

## 2025-06-11 VITALS
DIASTOLIC BLOOD PRESSURE: 70 MMHG | HEART RATE: 70 BPM | SYSTOLIC BLOOD PRESSURE: 110 MMHG | WEIGHT: 188 LBS | HEIGHT: 67 IN | BODY MASS INDEX: 29.51 KG/M2

## 2025-06-11 DIAGNOSIS — N39.0 COMPLICATED UTI (URINARY TRACT INFECTION): ICD-10-CM

## 2025-06-11 LAB
AORTIC SIZE INDEX (SOV): 1.6 CM/M2
AORTIC SIZE INDEX: 1.5 CM/M2
ASCENDING AORTA: 2.9 CM
AV AREA BY CONTINUOUS VTI: 2.4 CM2
AV INDEX (PROSTH): 0.5
AV LVOT MEAN GRADIENT: 4 MMHG
AV LVOT PEAK GRADIENT: 6 MMHG
AV MEAN GRADIENT: 15 MMHG
AV PEAK GRADIENT: 25 MMHG
AV REGURGITATION PRESSURE HALF TIME: 509 MS
AV VALVE AREA BY VELOCITY RATIO: 2.6 CM²
AV VALVE AREA: 2.4 CM2
AV VELOCITY RATIO: 0.52
BSA FOR ECHO PROCEDURE: 2.01 M2
CV ECHO LV RWT: 0.53 CM
DOP CALC AO PEAK VEL: 2.5 M/S
DOP CALC AO VTI: 56.6 CM
DOP CALC LVOT AREA: 4.9 CM2
DOP CALC LVOT DIAMETER: 2.5 CM
DOP CALC LVOT PEAK VEL: 1.3 M/S
DOP CALC LVOT STROKE VOLUME: 137.9 CM3
DOP CALC RVOT AREA: 5.18 CM2
DOP CALC RVOT DIAMETER: 2.57 CM
DOP CALCLVOT PEAK VEL VTI: 28.1 CM
E WAVE DECELERATION TIME: 254 MS
E/A RATIO: 0.75
E/E' RATIO: 10 M/S
ECHO EF ESTIMATED: 65 %
ECHO LV POSTERIOR WALL: 1 CM (ref 0.6–1.1)
FRACTIONAL SHORTENING: 34.2 % (ref 28–44)
HR MV ECHO: 70 BPM
INTERVENTRICULAR SEPTUM: 1 CM (ref 0.6–1.1)
LA MAJOR: 5.2 CM
LA MINOR: 4.3 CM
LA WIDTH: 3.5 CM
LEFT ATRIUM SIZE: 4 CM
LEFT ATRIUM VOLUME INDEX MOD: 23 ML/M2
LEFT ATRIUM VOLUME INDEX: 28 ML/M2
LEFT ATRIUM VOLUME MOD: 46 ML
LEFT ATRIUM VOLUME: 56 CM3
LEFT INTERNAL DIMENSION IN SYSTOLE: 2.5 CM (ref 2.1–4)
LEFT VENTRICLE DIASTOLIC VOLUME INDEX: 30.96 ML/M2
LEFT VENTRICLE DIASTOLIC VOLUME: 61 ML
LEFT VENTRICLE MASS INDEX: 59.5 G/M2
LEFT VENTRICLE SYSTOLIC VOLUME INDEX: 11.2 ML/M2
LEFT VENTRICLE SYSTOLIC VOLUME: 22 ML
LEFT VENTRICULAR INTERNAL DIMENSION IN DIASTOLE: 3.8 CM (ref 3.5–6)
LEFT VENTRICULAR MASS: 117.3 G
LV LATERAL E/E' RATIO: 8.6
LV SEPTAL E/E' RATIO: 11.9
MV A" WAVE DURATION": 121.79 MS
MV MEAN GRADIENT: 4 MMHG
MV PEAK A VEL: 1.26 M/S
MV PEAK E VEL: 0.95 M/S
MV PEAK GRADIENT: 8 MMHG
OHS LV EJECTION FRACTION SIMPSONS BIPLANE MOD: 62 %
PISA TR MAX VEL: 2.5 M/S
PULM VEIN A" WAVE DURATION": 121.79 MS
PULM VEIN S/D RATIO: 1.31
PULMONIC VEIN PEAK A VELOCITY: 0.2 M/S
PV PEAK D VEL: 0.26 M/S
PV PEAK S VEL: 0.34 M/S
RA MAJOR: 4.7 CM
RA WIDTH: 2.66 CM
RV TISSUE DOPPLER FREE WALL SYSTOLIC VELOCITY 1 (APICAL 4 CHAMBER VIEW): 10.78 CM/S
SINUS: 3.11 CM
STJ: 2.8 CM
TDI LATERAL: 0.11 M/S
TDI SEPTAL: 0.08 M/S
TDI: 0.1 M/S
TV PEAK GRADIENT: 25 MMHG
Z-SCORE OF LEFT VENTRICULAR DIMENSION IN END DIASTOLE: -3.99
Z-SCORE OF LEFT VENTRICULAR DIMENSION IN END SYSTOLE: -2.62

## 2025-06-11 PROCEDURE — 93306 TTE W/DOPPLER COMPLETE: CPT

## 2025-06-11 PROCEDURE — 93306 TTE W/DOPPLER COMPLETE: CPT | Mod: 26,,, | Performed by: INTERNAL MEDICINE

## 2025-06-23 DIAGNOSIS — E78.49 OTHER HYPERLIPIDEMIA: ICD-10-CM

## 2025-06-23 DIAGNOSIS — I25.118 CORONARY ARTERY DISEASE OF NATIVE ARTERY OF NATIVE HEART WITH STABLE ANGINA PECTORIS: Chronic | ICD-10-CM

## 2025-06-23 RX ORDER — ATORVASTATIN CALCIUM 80 MG/1
80 TABLET, FILM COATED ORAL DAILY
Qty: 90 TABLET | Refills: 1 | Status: SHIPPED | OUTPATIENT
Start: 2025-06-23 | End: 2026-06-23

## 2025-06-23 NOTE — TELEPHONE ENCOUNTER
No care due was identified.  Health [facility] Embedded Care Due Messages. Reference number: [id number].   6/23/2025 12:22:41 AM CDT

## 2025-06-26 ENCOUNTER — DOCUMENTATION ONLY (OUTPATIENT)
Dept: CARDIAC REHAB | Facility: CLINIC | Age: 75
End: 2025-06-26
Payer: MEDICARE

## 2025-06-26 NOTE — PROGRESS NOTES
Mr. Guerrero has completed 4 out of 36 exercise session of Phase II cardiac rehab.  He stopped attending classes and has not returned calls.  He was dropped from the program.  As of 25, Mr. Guerrero is now out of the 36 week time period to return to the program and has been discharged from the program.    Session: Orientation     Cardiac Rehab Individual Treatment Plan - Initial Assessment      Patient Name: Cesar Simpson MRN: [id number]   : 1950   Age: 75 y.o.   Primary Diagnosis: AV REPLACEMENT  Date of Event: 24  EF: 58%  Risk Stratification: high  Referring Physician: JACKELYN   Exercise Assessment:     CPX/TM Date: 10-14-24 Results   RHR 79   Max    Peak VO2 (CPX only) 14.8   Actual METS (CPX only) 4.2   Estimated METS 9.0     Anthropometrics    Height 67 inches   Weight 176 lbs   BMI 27.6   Abdominal Girth 43.5   Body Composition 20.0%     ST Depression noted on Stress Test?:No  Angina with exercise?: No   Fall Risk: Yes   Assistive Devices:  independent   Currently exercising? No   Mr. Guerrero stated there were no limitations to exercise but during a range of motion assessment it was noted he has right shoulder limitations.  Exercise modalities will be modified to meet the patient's needs and capabilities.     Exercise Plan:   Goals:  CR Exercise Goals: Attend Cardiac Rehab 3 times/week: In Progress  Home Aerobic Exercise: 2 additional days/week for 30-60 minutes: In Progress  Intensity of 12-15 on the Rate of Perceived Exertion (RPE) scale: In Progress  30% increase in entry estimated METS: 11.7 : In Progress  5 days/week for 30-60 minutes: In Progress  Demonstrate proper pulse taking technique: In Progress    Intervention:   Discussed importance of regular attendance to cardiac rehab class    Exercise Prescription:  THR Range 100-114   Mode: Treadmill  Recumbent Bike  Upright Bike  Nustep  Elliptical   Frequency:  3 days/week   Duration:  30 - 60 minutes   Intensity:  12 - 15 RPE   Resistance  Training:  Yes: 0 to 5 lb weights with 10-15 reps based on strength and range of motion assessment     Home Prescription:  Mode Aerobic   Frequency: 2- 3 days/week   Duration: 30-60 minutes   Resistance Training: None        Education:  Orientation to Equipment; verbalizes understanding; Date: 10-17-24  Exercise Recommendations; verbalizes understanding; Date: 10-17-24  Exercise Safety; verbalizes understanding; Date: 10-17-24  Class Preparation: verbalizes understanding; Date: 10-17-24  Signs and symptoms to report: verbalizes understanding; Date: 10-17-24  Caffeine/Hydration: verbalizes understanding; Date: 10-17-24  Exercise Terminology: verbalizes understanding; Date: 10-17-24  Resistance Training: verbalizes understanding; Date: 10-17-24    Comments:  I encouraged Mr. Guerrero to begin thinking about some type of aerobic exercise he can participate in at least 2 non-rehab days per week for at least 30 minutes in addition to attending Phase II cardiac rehab classes 3 days per week.  He stated understanding.    All consent forms were signed, proper attire and shoes were discussed.       Mr. Guerrero will begin Cardiac Rehab on  at 10:00 am.    The exercise prescription will be adjusted based on tolerance of exercise intensity by patient.    Cassie Kaur, CEP    Session: Orientation   Cardiac Rehab Individual Treatment Plan - Initial Assessment      Patient Name: Cesar Simpson MRN: [id number]   : 1950   Age: 75 y.o.   Date of Event: 24   Primary Diagnosis: S/P AV Replacement    EF: 58% (4/3/24)    Physical Assessment:   There were no vitals taken for this visit.    ASSESSMENT:  Heart Sounds: regular rate and rhythm, S1, S2 normal, no murmur, click, rub or gallop  Prosthetic Valve: Yes  Lung Sounds: normal air entry, lungs clear to auscultation  Capillary Refill: normal  Left Radial Pulse: Normal (+2)  Right Radial Pulse: Normal (+2)  Left Pedal Pulse: Normal (+2)  Right Pedal Pulse:  Normal (+2)  Right Edema: none  Left Edema none  Strength: normal  Range of Motion: diminished range of motion Right Shoulder  Existing Limitations:      Site   [x] Arthritis, bursitis Bilateral shoulders   [] Amputation, atrophy    [] Other:    []       Diabetic patient's foot examination comments: Normal -  Bilateral  Incisional site: healing well  Special needs: pt reports being Hard of Hearing and utilizes hearing aids which he did not wear today. Pt was instructed to wear his hearing aids when attending cardiac rehab so he could hear instructions and follow. Patient verbalized understanding.    Psychosocial Assessment:   Outcome Survey Tools:    PIPE SCORES:               SF-36             PHQ-9:      1/15/2025    10:42 AM   PHQ-9 Depression Patient Health Questionnaire   Over the last two weeks how often have you been bothered by little interest or pleasure in doing things 1   Over the last two weeks how often have you been bothered by feeling down, depressed or hopeless 1              Living Arrangements: Lives alone  Family Support: children daughter lives near patient and assists   Self Reported: Anxiety and Anger/Hostility  Displays: calmness  Medication: cymbalta daily    Psychosocial Plan:   Goals:  Improved psychosocial coping strategies  Reduce manifestation of anxiety  Reduce manifestation of anger/hostility  Maintain positive support system  Maintain positive outlook  Improve overall quality of life    Interventions/Recommendations:  Discussed Results of Surveys  Patient to Self Report Emotional Changes at Session Check In  Recommend Physical Activity  Recommend Attending Education Lectures  Notify MD: No  Program Referral: No  Pharmaceutical Intervention/Therapy: Yes  Other Needs: not applicable  Stage of Readiness to Change: Contemplation    Education:  Stress Management; verbalizes understanding; Date: 10/17/24  Stress; verbalizes understanding; Date: 10/17/24  Risk Factors; verbalizes  understanding; Date: 10/17/24    Comments:  Screening tools and results discussed with patient. Patient had a TAVR scheduled and resulted in an AV replacement with chest incision. Patient still upset over the experience which has caused him some anxiety and stress. Discussed effects of stress on cardiac health pt verbalized understandingPatient has been instructed to notify staff in the event that circumstances worsen.  Patient verbalizes understanding.    Other Core Components/Risk Factors Assessment:   RISK FACTORS:  diabetes, hyperlipidemia, hypertension, positive family history, sedentary lifestyle, PAD, Hx PTCA/Stent    Learning Barriers: Hearing Impairment, emotional    Education Level:  Attended College/Technical School    Pre-test Score: 70    Medication Compliance: has been compliant with taking medications    Other Core Components/Risk Factors Plan:   Goals:  Decrease cholesterol level: In Progress  Increase exercise tolerance: In Progress  Increase knowledge of CAD: In Progress  Decrease blood pressure: In Progress  Weight loss: In Progress  Identify and manage personal areas of stress: In Progress  Control diabetes by adjusting diet and exercise: In Progress  Learn more about healthy eating: In Progress    Interventions/Recommendations:  Recommend regular attendance for Cardiac Rehab: Exercise and Education Lectures  Encourage medication compliance  Individual Education/ Counseling: Yes  Physician Referral: No    Education:    blood pressure meds, verbalizes understanding; Date: 10/17/24  risk factors, verbalizes understanding; Date: 10/17/24  stress, verbalizes understanding; Date: 10/17/24        Education method adapted to patients education level and preferred method of learning.  Method: explanation    Comments:  Discussed risk factor modification to improve cardiac health pt verbalized understanding. Discussed medication list, dosing, side effects, pt verbalized understanding.     Other Core  Components/Hypertension Assessment:   Resting BP:   BP Readings from Last 1 Encounters:   06/11/25 110/70         BP Diagnosis: Hypertensive  Patient reported symptoms: none    Medications:  Medication Prescribed? Adherent? Exception   Beta-blocker [x]Yes  []No  []Unknown [x]Yes  []No  []Unknown    ACEI/ARB []Yes  []No  []Unknown []Yes  []No  []Unknown    Calcium Channel Blocker [x]Yes  []No  []Unknown [x]Yes  []No  []Unknown    Diuretic [x]Yes  []No  []Unknown [x]Yes  []No  []Unknown        Other Core Components/Hypertension Plan:   Goals:  Blood Pressure <130/80    Interventions/Recommendations:  Med Card Reconciled: Yes  Encourage medication compliance  Encourage sodium reduction  Reduce alcohol consumption  Encourage weight loss  Recommend physical activity  Educate on contributory factors  Reduce stress, anxiety, anger, depression, and/or chronic pain  Encourage home blood pressure monitoring  Recommend daily weights    Education:    Risk Factors; verbalizes understanding; Date: 10/17/24  Stress; verbalizes understanding; Date: 10/17/24        Comments:  Patient reports he is participating in the digital hypertension program.       Does the patient have Heart Failure? No    Other Core Components/Tobacco Cessation Assessment:   Smoking Status: Lifetime Non-smoker  Primary Tobacco Type: N/A  Tobacco Usage: no  Smoking Cessation Barriers: NA  Stage of Readiness to Change: Maintenance    Other Core Components/Tobacco Cessation Plan:   Goals:  Maintain non-smoking status    Interventions:  Maintains non-smoking status    Education:    Risk Factors; verbalizes understanding; Date: 10/17/24  Benefits of Cardiac Rehab; verbalizes understanding; Date: 10/17/24        Comments:  Discussed screening tools and results, pt admits to anxiety and hostility from his exerience with his surgery. He is currently enrolled in digital hypertension and diabetic program. Pt reports he is no longer on any diabetes medications and does  not monitor his glucose. DIscussed stress management techniques pt verbalized understanding. Pt instructed to wear his hearing aids to all classes as pt was noted to had difficulty hearing/understanding converstation. Discussed Cardiac Rehab program in depth with patient.  Medication list updated per patient & marked as reviewed.  Patient has been instructed to notify staff of any problems while attending rehab (ie: chest pain, shortness of breath, lightheadedness, dizziness).  Patient has been instructed to monitor blood pressure readings outside of rehab & to keep a daily log of the readings.  Patient verbalizes understanding.    Joseph Man RN  Cardiac Rehab Nurse

## 2025-07-03 ENCOUNTER — TELEPHONE (OUTPATIENT)
Dept: FAMILY MEDICINE | Facility: CLINIC | Age: 75
End: 2025-07-03
Payer: MEDICARE

## 2025-07-03 NOTE — TELEPHONE ENCOUNTER
Copied from CRM #[id number]. Topic: Appointments - Appointment Scheduling  >> Jul 3, 2025  2:34 PM Penny wrote:  Type:  Needs Medical Advice    Who Called: pt   Would the patient rather a call back or a response via [facility]? Call back   Best Call Back Number: [phone]   Additional Information: pt requesting a call back in regards to need a chronic illness form filled out per insurance needs to know when he can dropped the form off to be filled out

## 2025-07-17 DIAGNOSIS — E03.8 OTHER SPECIFIED HYPOTHYROIDISM: ICD-10-CM

## 2025-07-17 RX ORDER — LEVOTHYROXINE SODIUM 88 UG/1
88 TABLET ORAL
Qty: 90 TABLET | Refills: 1 | Status: SHIPPED | OUTPATIENT
Start: 2025-07-17

## 2025-07-17 NOTE — TELEPHONE ENCOUNTER
Refill Decision Note   Cesar Simpson  is requesting a refill authorization.  Brief Assessment and Rationale for Refill:  Approve     Medication Therapy Plan:         Comments:     Note composed:6:51 AM 07/17/2025

## 2025-07-17 NOTE — TELEPHONE ENCOUNTER
No care due was identified.  [facility] Embedded Care Due Messages. Reference number: [id number].   7/17/2025 1:33:36 AM CDT

## 2025-07-26 ENCOUNTER — PATIENT MESSAGE (OUTPATIENT)
Dept: ADMINISTRATIVE | Facility: OTHER | Age: 75
End: 2025-07-26
Payer: MEDICARE

## 2025-07-31 ENCOUNTER — TELEPHONE (OUTPATIENT)
Dept: UROLOGY | Facility: CLINIC | Age: 75
End: 2025-07-31
Payer: MEDICARE

## 2025-07-31 NOTE — TELEPHONE ENCOUNTER
Confirmed appt. Instructions given to park on the [facility] parking lot. Check in on the 2nd floor of the [facility]. Please arrive 15 minutes prior to procedure start time and be ready to provide urine specimen. Thank you!

## 2025-08-04 ENCOUNTER — PROCEDURE VISIT (OUTPATIENT)
Dept: UROLOGY | Facility: CLINIC | Age: 75
End: 2025-08-04
Payer: MEDICARE

## 2025-08-04 ENCOUNTER — TELEPHONE (OUTPATIENT)
Dept: UROLOGY | Facility: CLINIC | Age: 75
End: 2025-08-04
Payer: MEDICARE

## 2025-08-04 VITALS
RESPIRATION RATE: 16 BRPM | BODY MASS INDEX: 30.73 KG/M2 | HEART RATE: 72 BPM | SYSTOLIC BLOOD PRESSURE: 158 MMHG | DIASTOLIC BLOOD PRESSURE: 71 MMHG | WEIGHT: 196.19 LBS

## 2025-08-04 DIAGNOSIS — R35.1 BPH ASSOCIATED WITH NOCTURIA: ICD-10-CM

## 2025-08-04 DIAGNOSIS — N39.0 COMPLICATED UTI (URINARY TRACT INFECTION): ICD-10-CM

## 2025-08-04 DIAGNOSIS — N40.1 BPH ASSOCIATED WITH NOCTURIA: ICD-10-CM

## 2025-08-04 DIAGNOSIS — N39.0 RECURRENT UTI: ICD-10-CM

## 2025-08-04 PROCEDURE — 87086 URINE CULTURE/COLONY COUNT: CPT | Mod: HCNC

## 2025-08-04 PROCEDURE — 52000 CYSTOURETHROSCOPY: CPT | Mod: HCNC,S$GLB,, | Performed by: STUDENT IN AN ORGANIZED HEALTH CARE EDUCATION/TRAINING PROGRAM

## 2025-08-04 PROCEDURE — 99499 UNLISTED E&M SERVICE: CPT | Mod: HCNC,S$GLB,, | Performed by: STUDENT IN AN ORGANIZED HEALTH CARE EDUCATION/TRAINING PROGRAM

## 2025-08-04 RX ORDER — SULFAMETHOXAZOLE AND TRIMETHOPRIM 800; 160 MG/1; MG/1
1 TABLET ORAL
Status: COMPLETED | OUTPATIENT
Start: 2025-08-04 | End: 2025-08-04

## 2025-08-04 RX ORDER — LIDOCAINE HYDROCHLORIDE 20 MG/ML
JELLY TOPICAL
Status: COMPLETED | OUTPATIENT
Start: 2025-08-04 | End: 2025-08-04

## 2025-08-04 RX ADMIN — SULFAMETHOXAZOLE AND TRIMETHOPRIM 1 TABLET: 800; 160 TABLET ORAL at 10:08

## 2025-08-04 RX ADMIN — LIDOCAINE HYDROCHLORIDE 5 ML: 20 JELLY TOPICAL at 10:08

## 2025-08-04 NOTE — PROCEDURES
Cystoscopy    Date/Time: 8/4/2025 9:45 AM    Performed by: Jose Roth MD  Authorized by: Jose Roth MD    Prep: patient was prepped and draped in usual sterile fashion    Preparation: Patient was prepped and draped in usual sterile fashion      Risks and benefits of the procedure(s) were reviewed and written consent was provided by the patient.  The patient was prepped and positioned for the procedure.  Lidocaine infused lubricant was used for local anesthesia.    Procedure: Cystourethroscopy     Urethra: Normal in course and caliber   Prostate:  Very mild bilobar hypertrophy  Bladder: Normal trigone  Ureteral orifices: Orthotopic and effluxing clear urine   No stones, tumors or foreign bodies    No suspicious flat lesions of the mucosa. There is some mild erythema throughout the bladder in spots and notable debris concerning for infection.  No trabeculation or diverticula    Patient tolerated the procedure(s) well.       Interpretation:  He reports no new concerns for infections, though his urine had debris on exam today. Urine culture sent from the scope. We will plan to treat it if positive. If negative, he elects observation rather than considering BPH treatments given his lack of LUTS. RTC following Urine culture results if negative to discuss IPP revision.

## 2025-08-04 NOTE — PATIENT INSTRUCTIONS
What to Expect After a Cystoscopy  For the next 24-48 hours, you may feel a mild burning when you urinate. This burning is normal and expected. Drink plenty of water to dilute the urine to help relieve the burning sensation. You may also see a small amount of blood in your urine after the procedure.    Unless you are already taking antibiotics, you may be given an antibiotic after the test to prevent infection.    Signs and Symptoms to Report  Call the [facility] at [phone] if you develop any of the following:  Fever of 101 degrees or higher  Chills or persistent bleeding  Inability to urinate

## 2025-08-04 NOTE — TELEPHONE ENCOUNTER
----- Message from Jose Roth MD sent at 8/4/2025 10:25 AM CDT -----  Please schedule for 3 month f/u with flow/pvr

## 2025-08-07 LAB — BACTERIA UR CULT: ABNORMAL

## 2025-08-07 RX ORDER — SULFAMETHOXAZOLE AND TRIMETHOPRIM 800; 160 MG/1; MG/1
1 TABLET ORAL 2 TIMES DAILY
Qty: 20 TABLET | Refills: 0 | Status: SHIPPED | OUTPATIENT
Start: 2025-08-07 | End: 2025-08-17

## 2025-08-10 RX ORDER — FUROSEMIDE 20 MG/1
20 TABLET ORAL EVERY OTHER DAY
Qty: 15 TABLET | Refills: 11 | Status: SHIPPED | OUTPATIENT
Start: 2025-08-10 | End: 2026-08-10

## 2025-08-25 RX ORDER — FUROSEMIDE 20 MG/1
20 TABLET ORAL EVERY OTHER DAY
Qty: 15 TABLET | Refills: 11 | Status: SHIPPED | OUTPATIENT
Start: 2025-08-25 | End: 2026-08-25

## (undated) DEVICE — CATH EMERGE MR 15 X 3.00

## (undated) DEVICE — KIT INTRODUCER W/GUIDEWIRE

## (undated) DEVICE — CATH SHOCKWAVE C2+ IVL 3.5X12M

## (undated) DEVICE — CATH NC EMERGE MR 3.5X20MM

## (undated) DEVICE — CATH DXTERITY IMA 100CM 6FR

## (undated) DEVICE — DRAPE ANGIO BRACH 38X44IN

## (undated) DEVICE — CATH TEMP PACER 5.0FR

## (undated) DEVICE — TRAY CATH LAB OMC

## (undated) DEVICE — LINE 60IN PRESSURE MON.

## (undated) DEVICE — CATH DXTERITY AL20 100CM 6FR

## (undated) DEVICE — GUIDEWIRE STF .035X260CM STR

## (undated) DEVICE — GUIDEWIRE OMNI J TIP 185CM

## (undated) DEVICE — CATH EMERGE MR 12 X 2.50

## (undated) DEVICE — GUIDEWIRE STF .035X180CM ANG

## (undated) DEVICE — GUIDEWIRE SION BLUE STR 190CM

## (undated) DEVICE — PAD DEFIB CADENCE ADULT R2

## (undated) DEVICE — GUIDEWIRE WHOLEY STD STR 260CM

## (undated) DEVICE — KIT PERCUTANEOUS SHEATH

## (undated) DEVICE — GUIDEWIRE EMERALD 150CM PTFE

## (undated) DEVICE — Device

## (undated) DEVICE — HEMOSTAT VASC BAND REG 24CM

## (undated) DEVICE — SPIKE SHORT LG BORE 1-WAY 2IN

## (undated) DEVICE — SHEATH PINNACLE 8FR

## (undated) DEVICE — LINE INJECTION 30IN 25/BX

## (undated) DEVICE — CATH PIG145 LANGSTON 6FR 110CM

## (undated) DEVICE — GUIDEWIRE X SPORT .014IN 190CM

## (undated) DEVICE — GUIDEWIRE SUPRA CORE 035 190CM

## (undated) DEVICE — CATH FL 3.5 5FR

## (undated) DEVICE — DRAPE PED LAP SURG 108X77IN

## (undated) DEVICE — BOWL STERILE LARGE 32OZ

## (undated) DEVICE — GUIDE LAUNCHER 6FR JR 4.0

## (undated) DEVICE — CATH MPA2 INFINITI 4FR 100CM

## (undated) DEVICE — CATH IMPULSE 5FR PIGTAIL 125CM

## (undated) DEVICE — SYR MED RAD 150ML

## (undated) DEVICE — VALVE CONTROL COPILOT

## (undated) DEVICE — VISE RADIFOCUS MULTI TORQUE

## (undated) DEVICE — DEVICE PERCLOSE SUT CLSR 6FR

## (undated) DEVICE — CATH BLLN WOLVERINE 15X3MM

## (undated) DEVICE — KIT GLIDESHEATH SLEND 6FR 10CM

## (undated) DEVICE — VISIPAQUE 320 200ML +PK

## (undated) DEVICE — KIT MNTR POLE MT DUL 12&48 MAC

## (undated) DEVICE — GUIDEWIRE SAFARI2 XS CRV 275CM

## (undated) DEVICE — GUIDE LAUNCHER 6FR EBU 3.75

## (undated) DEVICE — GUIDEWIRE RUNTHROUGH EF 180CM

## (undated) DEVICE — OMNIPAQUE CONTRAST 350MG/100ML

## (undated) DEVICE — INFLATOR ENCORE 26 BLLN INFL

## (undated) DEVICE — CATH ANGIO EXPO VENT PIGTL 6FR

## (undated) DEVICE — SHEATH DRYSEAL 18FR 33CM

## (undated) DEVICE — CATH SWAN DBL LMN 5FR 110CM

## (undated) DEVICE — CATH AL1 5FR

## (undated) DEVICE — CATH IMPULSE FR4 5FR 125CM

## (undated) DEVICE — KIT LEFT HEART MANIFOLD CUSTOM

## (undated) DEVICE — KIT CUSTOM MANIFOLD

## (undated) DEVICE — COVER PROBE US 5.5X58L NON LTX

## (undated) DEVICE — CATH NC EMERGE MR 3.5X15MM

## (undated) DEVICE — TUBE CONTRAST SQUEEZE

## (undated) DEVICE — CATH NC EMERGE MR 3X15MM

## (undated) DEVICE — STOPCOCK 3-WAY

## (undated) DEVICE — CATH OPTITORQUE TIGER 5F 100CM

## (undated) DEVICE — KIT MICROINTRO 4F .018X40X7CM

## (undated) DEVICE — CATH EAGLE EYE PLATINUM

## (undated) DEVICE — CABLE PACER

## (undated) DEVICE — SHEATH INTRODUCER 6FR 11CM